# Patient Record
Sex: MALE | Race: WHITE | Employment: OTHER | URBAN - NONMETROPOLITAN AREA
[De-identification: names, ages, dates, MRNs, and addresses within clinical notes are randomized per-mention and may not be internally consistent; named-entity substitution may affect disease eponyms.]

---

## 2017-01-03 ENCOUNTER — ANTI-COAG VISIT (OUTPATIENT)
Dept: OTHER | Age: 82
End: 2017-01-03

## 2017-01-03 VITALS
DIASTOLIC BLOOD PRESSURE: 70 MMHG | HEART RATE: 48 BPM | SYSTOLIC BLOOD PRESSURE: 150 MMHG | BODY MASS INDEX: 31.58 KG/M2 | WEIGHT: 184 LBS

## 2017-01-03 DIAGNOSIS — I48.91 ATRIAL FIBRILLATION, CONTROLLED (HCC): ICD-10-CM

## 2017-01-03 DIAGNOSIS — I48.91 ATRIAL FIBRILLATION, UNSPECIFIED TYPE (HCC): ICD-10-CM

## 2017-01-03 LAB — POC INR: 2.4 (ref 0.8–1.2)

## 2017-01-03 PROCEDURE — 85610 PROTHROMBIN TIME: CPT | Performed by: PHARMACIST

## 2017-01-03 ASSESSMENT — ENCOUNTER SYMPTOMS
SHORTNESS OF BREATH: 0
DIARRHEA: 0
BLOOD IN STOOL: 0
CONSTIPATION: 0

## 2017-01-17 ENCOUNTER — ANTI-COAG VISIT (OUTPATIENT)
Dept: OTHER | Age: 82
End: 2017-01-17

## 2017-01-17 VITALS
WEIGHT: 183 LBS | BODY MASS INDEX: 31.41 KG/M2 | SYSTOLIC BLOOD PRESSURE: 134 MMHG | DIASTOLIC BLOOD PRESSURE: 74 MMHG | HEART RATE: 48 BPM

## 2017-01-17 DIAGNOSIS — I48.91 ATRIAL FIBRILLATION, UNSPECIFIED TYPE (HCC): ICD-10-CM

## 2017-01-17 DIAGNOSIS — I48.91 ATRIAL FIBRILLATION, CONTROLLED (HCC): ICD-10-CM

## 2017-01-17 LAB — POC INR: 2.5 (ref 0.8–1.2)

## 2017-01-17 PROCEDURE — 99999 PR OFFICE/OUTPT VISIT,PROCEDURE ONLY: CPT | Performed by: NURSE PRACTITIONER

## 2017-01-17 PROCEDURE — 85610 PROTHROMBIN TIME: CPT | Performed by: NURSE PRACTITIONER

## 2017-01-17 PROCEDURE — 4040F PNEUMOC VAC/ADMIN/RCVD: CPT | Performed by: NURSE PRACTITIONER

## 2017-01-17 PROCEDURE — G8419 CALC BMI OUT NRM PARAM NOF/U: HCPCS | Performed by: NURSE PRACTITIONER

## 2017-01-17 PROCEDURE — G8598 ASA/ANTIPLAT THER USED: HCPCS | Performed by: NURSE PRACTITIONER

## 2017-01-17 PROCEDURE — G8427 DOCREV CUR MEDS BY ELIG CLIN: HCPCS | Performed by: NURSE PRACTITIONER

## 2017-01-17 ASSESSMENT — ENCOUNTER SYMPTOMS
SHORTNESS OF BREATH: 0
CONSTIPATION: 0
BLOOD IN STOOL: 0
DIARRHEA: 0

## 2017-02-07 ENCOUNTER — ANTI-COAG VISIT (OUTPATIENT)
Dept: OTHER | Age: 82
End: 2017-02-07

## 2017-02-07 VITALS
HEART RATE: 43 BPM | SYSTOLIC BLOOD PRESSURE: 151 MMHG | BODY MASS INDEX: 30.9 KG/M2 | DIASTOLIC BLOOD PRESSURE: 67 MMHG | WEIGHT: 180 LBS

## 2017-02-07 DIAGNOSIS — I48.91 ATRIAL FIBRILLATION, CONTROLLED (HCC): ICD-10-CM

## 2017-02-07 DIAGNOSIS — I48.91 ATRIAL FIBRILLATION, UNSPECIFIED TYPE (HCC): ICD-10-CM

## 2017-02-07 LAB
HCT VFR BLD CALC: 33.9 % (ref 42–52)
HEMOGLOBIN: 11 GM/DL (ref 14–18)
POC INR: 2.8 (ref 0.8–1.2)

## 2017-02-07 PROCEDURE — 85610 PROTHROMBIN TIME: CPT | Performed by: NURSE PRACTITIONER

## 2017-02-07 PROCEDURE — 99999 PR OFFICE/OUTPT VISIT,PROCEDURE ONLY: CPT | Performed by: NURSE PRACTITIONER

## 2017-02-07 PROCEDURE — G8419 CALC BMI OUT NRM PARAM NOF/U: HCPCS | Performed by: NURSE PRACTITIONER

## 2017-02-07 PROCEDURE — 4040F PNEUMOC VAC/ADMIN/RCVD: CPT | Performed by: NURSE PRACTITIONER

## 2017-02-07 PROCEDURE — G8598 ASA/ANTIPLAT THER USED: HCPCS | Performed by: NURSE PRACTITIONER

## 2017-02-07 PROCEDURE — G8427 DOCREV CUR MEDS BY ELIG CLIN: HCPCS | Performed by: NURSE PRACTITIONER

## 2017-02-07 ASSESSMENT — ENCOUNTER SYMPTOMS
BLOOD IN STOOL: 0
DIARRHEA: 0
CONSTIPATION: 0
SHORTNESS OF BREATH: 1

## 2017-02-20 ENCOUNTER — OFFICE VISIT (OUTPATIENT)
Dept: CARDIOLOGY | Age: 82
End: 2017-02-20

## 2017-02-20 VITALS
BODY MASS INDEX: 30.34 KG/M2 | DIASTOLIC BLOOD PRESSURE: 60 MMHG | WEIGHT: 177.7 LBS | SYSTOLIC BLOOD PRESSURE: 132 MMHG | HEART RATE: 52 BPM | HEIGHT: 64 IN

## 2017-02-20 DIAGNOSIS — I10 ESSENTIAL HYPERTENSION: ICD-10-CM

## 2017-02-20 DIAGNOSIS — I25.10 CORONARY ARTERY DISEASE INVOLVING NATIVE CORONARY ARTERY OF NATIVE HEART WITHOUT ANGINA PECTORIS: Primary | ICD-10-CM

## 2017-02-20 DIAGNOSIS — I48.20 CHRONIC ATRIAL FIBRILLATION (HCC): ICD-10-CM

## 2017-02-20 PROCEDURE — G8598 ASA/ANTIPLAT THER USED: HCPCS | Performed by: NUCLEAR MEDICINE

## 2017-02-20 PROCEDURE — 4040F PNEUMOC VAC/ADMIN/RCVD: CPT | Performed by: NUCLEAR MEDICINE

## 2017-02-20 PROCEDURE — 1036F TOBACCO NON-USER: CPT | Performed by: NUCLEAR MEDICINE

## 2017-02-20 PROCEDURE — G8484 FLU IMMUNIZE NO ADMIN: HCPCS | Performed by: NUCLEAR MEDICINE

## 2017-02-20 PROCEDURE — G8417 CALC BMI ABV UP PARAM F/U: HCPCS | Performed by: NUCLEAR MEDICINE

## 2017-02-20 PROCEDURE — G8427 DOCREV CUR MEDS BY ELIG CLIN: HCPCS | Performed by: NUCLEAR MEDICINE

## 2017-02-20 PROCEDURE — 99213 OFFICE O/P EST LOW 20 MIN: CPT | Performed by: NUCLEAR MEDICINE

## 2017-02-20 PROCEDURE — 1123F ACP DISCUSS/DSCN MKR DOCD: CPT | Performed by: NUCLEAR MEDICINE

## 2017-03-07 ENCOUNTER — ANTI-COAG VISIT (OUTPATIENT)
Dept: OTHER | Age: 82
End: 2017-03-07

## 2017-03-07 VITALS
SYSTOLIC BLOOD PRESSURE: 145 MMHG | BODY MASS INDEX: 31.48 KG/M2 | DIASTOLIC BLOOD PRESSURE: 70 MMHG | WEIGHT: 183.4 LBS | HEART RATE: 48 BPM

## 2017-03-07 DIAGNOSIS — I48.20 CHRONIC ATRIAL FIBRILLATION (HCC): ICD-10-CM

## 2017-03-07 DIAGNOSIS — I48.91 ATRIAL FIBRILLATION, CONTROLLED (HCC): ICD-10-CM

## 2017-03-07 LAB — POC INR: 3 (ref 0.8–1.2)

## 2017-03-07 PROCEDURE — G8427 DOCREV CUR MEDS BY ELIG CLIN: HCPCS | Performed by: NURSE PRACTITIONER

## 2017-03-07 PROCEDURE — 85610 PROTHROMBIN TIME: CPT | Performed by: NURSE PRACTITIONER

## 2017-03-07 PROCEDURE — G8417 CALC BMI ABV UP PARAM F/U: HCPCS | Performed by: NURSE PRACTITIONER

## 2017-03-07 PROCEDURE — 99999 PR OFFICE/OUTPT VISIT,PROCEDURE ONLY: CPT | Performed by: NURSE PRACTITIONER

## 2017-03-07 PROCEDURE — 4040F PNEUMOC VAC/ADMIN/RCVD: CPT | Performed by: NURSE PRACTITIONER

## 2017-03-07 PROCEDURE — G8598 ASA/ANTIPLAT THER USED: HCPCS | Performed by: NURSE PRACTITIONER

## 2017-03-07 ASSESSMENT — ENCOUNTER SYMPTOMS
SHORTNESS OF BREATH: 1
DIARRHEA: 0
CONSTIPATION: 0
BLOOD IN STOOL: 0

## 2017-03-30 DIAGNOSIS — I48.91 ATRIAL FIBRILLATION WITH SLOW VENTRICULAR RESPONSE (HCC): Primary | ICD-10-CM

## 2017-04-04 ENCOUNTER — ANTI-COAG VISIT (OUTPATIENT)
Dept: OTHER | Age: 82
End: 2017-04-04

## 2017-04-04 VITALS
HEART RATE: 53 BPM | BODY MASS INDEX: 30.21 KG/M2 | DIASTOLIC BLOOD PRESSURE: 62 MMHG | SYSTOLIC BLOOD PRESSURE: 140 MMHG | WEIGHT: 176 LBS

## 2017-04-04 DIAGNOSIS — I48.91 ATRIAL FIBRILLATION, UNSPECIFIED TYPE (HCC): ICD-10-CM

## 2017-04-04 DIAGNOSIS — I48.91 ATRIAL FIBRILLATION WITH SLOW VENTRICULAR RESPONSE (HCC): ICD-10-CM

## 2017-04-04 LAB — POC INR: 2.2 (ref 0.8–1.2)

## 2017-04-04 RX ORDER — FUROSEMIDE 20 MG/1
20 TABLET ORAL DAILY
Qty: 30 TABLET | Refills: 2 | Status: SHIPPED | OUTPATIENT
Start: 2017-04-04 | End: 2021-10-18 | Stop reason: SDUPTHER

## 2017-04-04 RX ORDER — FUROSEMIDE 20 MG/1
20 TABLET ORAL DAILY
COMMUNITY
End: 2017-04-04 | Stop reason: SDUPTHER

## 2017-04-04 ASSESSMENT — ENCOUNTER SYMPTOMS
SHORTNESS OF BREATH: 0
BLOOD IN STOOL: 0
CONSTIPATION: 0
DIARRHEA: 0

## 2017-05-02 ENCOUNTER — ANTI-COAG VISIT (OUTPATIENT)
Dept: OTHER | Age: 82
End: 2017-05-02

## 2017-05-02 ENCOUNTER — TELEPHONE (OUTPATIENT)
Dept: CARDIOLOGY | Age: 82
End: 2017-05-02

## 2017-05-02 VITALS
BODY MASS INDEX: 29.01 KG/M2 | HEART RATE: 44 BPM | DIASTOLIC BLOOD PRESSURE: 60 MMHG | SYSTOLIC BLOOD PRESSURE: 128 MMHG | WEIGHT: 169 LBS

## 2017-05-02 DIAGNOSIS — I48.91 ATRIAL FIBRILLATION WITH SLOW VENTRICULAR RESPONSE (HCC): ICD-10-CM

## 2017-05-02 DIAGNOSIS — I48.91 ATRIAL FIBRILLATION, UNSPECIFIED TYPE (HCC): ICD-10-CM

## 2017-05-02 LAB — POC INR: 1.9 (ref 0.8–1.2)

## 2017-05-02 ASSESSMENT — ENCOUNTER SYMPTOMS
DIARRHEA: 0
BLOOD IN STOOL: 0
CONSTIPATION: 0

## 2017-05-23 ENCOUNTER — ANTI-COAG VISIT (OUTPATIENT)
Dept: OTHER | Age: 82
End: 2017-05-23

## 2017-05-23 VITALS
HEART RATE: 41 BPM | BODY MASS INDEX: 30.31 KG/M2 | WEIGHT: 176.6 LBS | DIASTOLIC BLOOD PRESSURE: 60 MMHG | SYSTOLIC BLOOD PRESSURE: 139 MMHG

## 2017-05-23 DIAGNOSIS — I48.91 ATRIAL FIBRILLATION WITH SLOW VENTRICULAR RESPONSE (HCC): ICD-10-CM

## 2017-05-23 DIAGNOSIS — I48.91 ATRIAL FIBRILLATION, UNSPECIFIED TYPE (HCC): ICD-10-CM

## 2017-05-23 LAB — POC INR: 2.3 (ref 0.8–1.2)

## 2017-05-23 ASSESSMENT — ENCOUNTER SYMPTOMS
SHORTNESS OF BREATH: 0
DIARRHEA: 0
CONSTIPATION: 0
BLOOD IN STOOL: 0

## 2017-06-20 ENCOUNTER — ANTI-COAG VISIT (OUTPATIENT)
Dept: OTHER | Age: 82
End: 2017-06-20

## 2017-06-20 VITALS
SYSTOLIC BLOOD PRESSURE: 149 MMHG | WEIGHT: 170.6 LBS | BODY MASS INDEX: 29.28 KG/M2 | DIASTOLIC BLOOD PRESSURE: 56 MMHG | HEART RATE: 44 BPM

## 2017-06-20 DIAGNOSIS — I48.91 ATRIAL FIBRILLATION, UNSPECIFIED TYPE (HCC): ICD-10-CM

## 2017-06-20 DIAGNOSIS — I48.91 ATRIAL FIBRILLATION WITH SLOW VENTRICULAR RESPONSE (HCC): ICD-10-CM

## 2017-06-20 LAB — POC INR: 2 (ref 0.8–1.2)

## 2017-06-20 ASSESSMENT — ENCOUNTER SYMPTOMS
BLOOD IN STOOL: 0
CONSTIPATION: 0
DIARRHEA: 0
SHORTNESS OF BREATH: 0

## 2017-07-19 ENCOUNTER — HOSPITAL ENCOUNTER (OUTPATIENT)
Dept: PHARMACY | Age: 82
Setting detail: THERAPIES SERIES
Discharge: HOME | End: 2017-07-19
Payer: MEDICARE

## 2017-07-19 VITALS
SYSTOLIC BLOOD PRESSURE: 142 MMHG | DIASTOLIC BLOOD PRESSURE: 74 MMHG | WEIGHT: 168.4 LBS | BODY MASS INDEX: 28.91 KG/M2 | HEART RATE: 40 BPM

## 2017-07-19 DIAGNOSIS — I48.91 ATRIAL FIBRILLATION, UNSPECIFIED TYPE (HCC): ICD-10-CM

## 2017-07-19 LAB — POC INR: 2 (ref 0.8–1.2)

## 2017-07-19 PROCEDURE — 36416 COLLJ CAPILLARY BLOOD SPEC: CPT

## 2017-07-19 PROCEDURE — 85610 PROTHROMBIN TIME: CPT

## 2017-07-19 PROCEDURE — 99211 OFF/OP EST MAY X REQ PHY/QHP: CPT

## 2017-07-19 ASSESSMENT — ENCOUNTER SYMPTOMS
CONSTIPATION: 0
BLOOD IN STOOL: 0
SHORTNESS OF BREATH: 0
DIARRHEA: 0

## 2017-08-21 ENCOUNTER — HOSPITAL ENCOUNTER (OUTPATIENT)
Dept: PHARMACY | Age: 82
Setting detail: THERAPIES SERIES
Discharge: HOME | End: 2017-08-21
Payer: MEDICARE

## 2017-08-21 VITALS
WEIGHT: 174.4 LBS | DIASTOLIC BLOOD PRESSURE: 70 MMHG | HEART RATE: 48 BPM | SYSTOLIC BLOOD PRESSURE: 160 MMHG | BODY MASS INDEX: 29.94 KG/M2

## 2017-08-21 DIAGNOSIS — I48.91 ATRIAL FIBRILLATION, UNSPECIFIED TYPE (HCC): ICD-10-CM

## 2017-08-21 LAB — POC INR: 2.1 (ref 0.8–1.2)

## 2017-08-21 PROCEDURE — 36416 COLLJ CAPILLARY BLOOD SPEC: CPT

## 2017-08-21 PROCEDURE — 99211 OFF/OP EST MAY X REQ PHY/QHP: CPT

## 2017-08-21 PROCEDURE — 85610 PROTHROMBIN TIME: CPT

## 2017-08-21 ASSESSMENT — ENCOUNTER SYMPTOMS
DIARRHEA: 0
BLOOD IN STOOL: 0
CONSTIPATION: 0
SHORTNESS OF BREATH: 0

## 2017-09-18 ENCOUNTER — HOSPITAL ENCOUNTER (OUTPATIENT)
Dept: PHARMACY | Age: 82
Setting detail: THERAPIES SERIES
Discharge: HOME | End: 2017-09-18
Payer: MEDICARE

## 2017-09-18 VITALS
WEIGHT: 171.6 LBS | HEART RATE: 60 BPM | BODY MASS INDEX: 29.46 KG/M2 | SYSTOLIC BLOOD PRESSURE: 158 MMHG | DIASTOLIC BLOOD PRESSURE: 86 MMHG

## 2017-09-18 DIAGNOSIS — I48.91 ATRIAL FIBRILLATION, UNSPECIFIED TYPE (HCC): ICD-10-CM

## 2017-09-18 LAB — POC INR: 1.8 (ref 0.8–1.2)

## 2017-09-18 PROCEDURE — 85610 PROTHROMBIN TIME: CPT

## 2017-09-18 PROCEDURE — 99211 OFF/OP EST MAY X REQ PHY/QHP: CPT

## 2017-09-18 PROCEDURE — 36416 COLLJ CAPILLARY BLOOD SPEC: CPT

## 2017-09-18 PROCEDURE — 99212 OFFICE O/P EST SF 10 MIN: CPT

## 2017-09-18 RX ORDER — WARFARIN SODIUM 3 MG/1
3 TABLET ORAL SEE ADMIN INSTRUCTIONS
Qty: 45 TABLET | Refills: 11 | Status: SHIPPED | OUTPATIENT
Start: 2017-09-18 | End: 2017-09-18 | Stop reason: SDUPTHER

## 2017-09-18 RX ORDER — WARFARIN SODIUM 3 MG/1
3 TABLET ORAL SEE ADMIN INSTRUCTIONS
Qty: 45 TABLET | Refills: 11 | Status: SHIPPED | OUTPATIENT
Start: 2017-09-18 | End: 2017-09-19 | Stop reason: SDUPTHER

## 2017-09-18 ASSESSMENT — ENCOUNTER SYMPTOMS
DIARRHEA: 0
BLOOD IN STOOL: 0
SHORTNESS OF BREATH: 0
CONSTIPATION: 0

## 2017-09-19 ENCOUNTER — TELEPHONE (OUTPATIENT)
Dept: PHARMACY | Age: 82
End: 2017-09-19

## 2017-09-19 RX ORDER — WARFARIN SODIUM 3 MG/1
3 TABLET ORAL SEE ADMIN INSTRUCTIONS
Qty: 45 TABLET | Refills: 11 | Status: SHIPPED | OUTPATIENT
Start: 2017-09-19 | End: 2017-09-19 | Stop reason: SDUPTHER

## 2017-09-19 RX ORDER — WARFARIN SODIUM 3 MG/1
3 TABLET ORAL SEE ADMIN INSTRUCTIONS
Qty: 45 TABLET | Refills: 11 | OUTPATIENT
Start: 2017-09-19 | End: 2021-04-19

## 2017-09-26 ENCOUNTER — TELEPHONE (OUTPATIENT)
Dept: CARDIOLOGY CLINIC | Age: 82
End: 2017-09-26

## 2017-10-10 ENCOUNTER — TELEPHONE (OUTPATIENT)
Dept: PHARMACY | Age: 82
End: 2017-10-10

## 2017-10-10 NOTE — TELEPHONE ENCOUNTER
Patient called and states he is going to be followed by the [place] for his coumadin and would like to be discharged from our clinic.      Thanks,   Armand Ralph

## 2019-09-13 LAB
ALBUMIN SERPL-MCNC: NORMAL G/DL
ALP BLD-CCNC: NORMAL U/L
ALT SERPL-CCNC: 20 U/L
ANION GAP SERPL CALCULATED.3IONS-SCNC: NORMAL MMOL/L
AST SERPL-CCNC: 23 U/L
AVERAGE GLUCOSE: NORMAL
BILIRUB SERPL-MCNC: 0.5 MG/DL (ref 0.1–1.4)
BUN BLDV-MCNC: NORMAL MG/DL
CALCIUM SERPL-MCNC: NORMAL MG/DL
CHLORIDE BLD-SCNC: NORMAL MMOL/L
CHOLESTEROL, TOTAL: 139 MG/DL
CHOLESTEROL/HDL RATIO: NORMAL
CO2: 24 MMOL/L
CREAT SERPL-MCNC: 1.4 MG/DL
GFR CALCULATED: NORMAL
GLUCOSE BLD-MCNC: 91 MG/DL
HBA1C MFR BLD: 5.9 %
HCT VFR BLD CALC: 36.2 % (ref 41–53)
HDLC SERPL-MCNC: 67 MG/DL (ref 35–70)
HEMOGLOBIN: 12.1 G/DL (ref 13.5–17.5)
LDL CHOLESTEROL CALCULATED: 60 MG/DL (ref 0–160)
PLATELET # BLD: 247 K/ΜL
POTASSIUM SERPL-SCNC: 3.9 MMOL/L
SODIUM BLD-SCNC: 141 MMOL/L
TOTAL PROTEIN: NORMAL
TRIGL SERPL-MCNC: 62 MG/DL
VLDLC SERPL CALC-MCNC: NORMAL MG/DL
WBC # BLD: 7.7 10^3/ML

## 2019-12-04 ENCOUNTER — HOSPITAL ENCOUNTER (EMERGENCY)
Age: 84
Discharge: HOME | End: 2019-12-04
Attending: EMERGENCY MEDICINE
Payer: MEDICARE

## 2019-12-04 VITALS
DIASTOLIC BLOOD PRESSURE: 78 MMHG | SYSTOLIC BLOOD PRESSURE: 128 MMHG | HEART RATE: 56 BPM | TEMPERATURE: 98.3 F | BODY MASS INDEX: 27.46 KG/M2 | RESPIRATION RATE: 17 BRPM | WEIGHT: 160 LBS | OXYGEN SATURATION: 98 %

## 2019-12-04 DIAGNOSIS — Z79.01 WARFARIN ANTICOAGULATION: ICD-10-CM

## 2019-12-04 DIAGNOSIS — N30.01 ACUTE CYSTITIS WITH HEMATURIA: Primary | ICD-10-CM

## 2019-12-04 LAB
BILIRUBIN URINE: NEGATIVE
BLOOD, URINE: ABNORMAL
CHARACTER, URINE: ABNORMAL
COLOR: YELLOW
GLUCOSE, URINE: NEGATIVE MG/DL
KETONES, URINE: 40
LEUKOCYTES, UA: ABNORMAL
NITRATE, UA: POSITIVE
PH UA: 5.5 (ref 5–9)
PROTEIN UA: 30 MG/DL
REFLEX TO URINE C & S: ABNORMAL
SPECIFIC GRAVITY UA: 1.01 (ref 1–1.03)
UROBILINOGEN, URINE: 1 EU/DL (ref 0–1)

## 2019-12-04 PROCEDURE — 87086 URINE CULTURE/COLONY COUNT: CPT

## 2019-12-04 PROCEDURE — 99213 OFFICE O/P EST LOW 20 MIN: CPT

## 2019-12-04 PROCEDURE — 87186 SC STD MICRODIL/AGAR DIL: CPT

## 2019-12-04 PROCEDURE — 99213 OFFICE O/P EST LOW 20 MIN: CPT | Performed by: EMERGENCY MEDICINE

## 2019-12-04 PROCEDURE — 87077 CULTURE AEROBIC IDENTIFY: CPT

## 2019-12-04 PROCEDURE — 81003 URINALYSIS AUTO W/O SCOPE: CPT

## 2019-12-04 RX ORDER — CEFDINIR 300 MG/1
300 CAPSULE ORAL 2 TIMES DAILY
Qty: 14 CAPSULE | Refills: 0 | Status: SHIPPED | OUTPATIENT
Start: 2019-12-04 | End: 2019-12-11

## 2019-12-04 ASSESSMENT — ENCOUNTER SYMPTOMS
VOMITING: 0
TROUBLE SWALLOWING: 0
SORE THROAT: 0
COUGH: 0
EYE REDNESS: 0
SINUS PRESSURE: 0
BACK PAIN: 0
NAUSEA: 0
STRIDOR: 0
EYE PAIN: 0
ABDOMINAL PAIN: 0
SHORTNESS OF BREATH: 0
DIARRHEA: 0
EYE DISCHARGE: 0
WHEEZING: 0
VOICE CHANGE: 0

## 2019-12-06 LAB
ORGANISM: ABNORMAL
URINE CULTURE REFLEX: ABNORMAL

## 2020-01-16 ENCOUNTER — OFFICE VISIT (OUTPATIENT)
Dept: UROLOGY | Age: 85
End: 2020-01-16
Payer: OTHER GOVERNMENT

## 2020-01-16 VITALS
SYSTOLIC BLOOD PRESSURE: 138 MMHG | BODY MASS INDEX: 30.17 KG/M2 | WEIGHT: 176.7 LBS | DIASTOLIC BLOOD PRESSURE: 70 MMHG | HEIGHT: 64 IN

## 2020-01-16 LAB
BILIRUBIN URINE: NEGATIVE
BLOOD URINE, POC: NEGATIVE
CHARACTER, URINE: CLEAR
COLOR, URINE: YELLOW
GLUCOSE URINE: NEGATIVE MG/DL
KETONES, URINE: NEGATIVE
LEUKOCYTE CLUMPS, URINE: NEGATIVE
NITRITE, URINE: NEGATIVE
PH, URINE: 5.5 (ref 5–9)
PROTEIN, URINE: NEGATIVE MG/DL
SPECIFIC GRAVITY, URINE: 1.01 (ref 1–1.03)
UROBILINOGEN, URINE: 0.2 EU/DL (ref 0–1)

## 2020-01-16 PROCEDURE — 99204 OFFICE O/P NEW MOD 45 MIN: CPT | Performed by: UROLOGY

## 2020-01-16 PROCEDURE — 81003 URINALYSIS AUTO W/O SCOPE: CPT | Performed by: UROLOGY

## 2020-01-16 RX ORDER — TAMSULOSIN HYDROCHLORIDE 0.4 MG/1
0.4 CAPSULE ORAL DAILY
Qty: 24 CAPSULE | Refills: 0 | Status: ON HOLD | OUTPATIENT
Start: 2020-01-16 | End: 2020-12-09 | Stop reason: SDUPTHER

## 2020-01-16 RX ORDER — TAMSULOSIN HYDROCHLORIDE 0.4 MG/1
0.4 CAPSULE ORAL DAILY
Qty: 30 CAPSULE | Refills: 5 | Status: SHIPPED | OUTPATIENT
Start: 2020-01-16 | End: 2020-01-16

## 2020-01-16 RX ORDER — TAMSULOSIN HYDROCHLORIDE 0.4 MG/1
0.4 CAPSULE ORAL DAILY
Qty: 30 CAPSULE | Refills: 5 | Status: ON HOLD | OUTPATIENT
Start: 2020-01-16 | End: 2021-02-02 | Stop reason: HOSPADM

## 2020-01-16 NOTE — PROGRESS NOTES
Yellow YELLOW-STR    Character, Urine Clear [id number]       Last BUN and creatinine:  Lab Results   Component Value Date    BUN 5 (L) 08/24/2016     Lab Results   Component Value Date    CREATININE 1.4 09/13/2019         Imaging Reviewed during this Office Visit:   Joaquin Stephenson MD independently reviewed the images and verified the radiology reports from:    CT 8/20/2016      1. The appearance of the small bowel is consistent with a small bowel enteritis. Infectious versus ischemic. The ascites is likely reactive. A partial small bowel obstruction is not completely excluded distally near the anastomosis with the colon. 2. There is a new moderate-sized right and scant left pleural effusion. 3. There is a moderate-sized hiatal hernia containing oral contrast.   4. Stable cardiomegaly. 5. Stable severe diverticular change of the sigmoid colon which is uncomplicated this time.    6. Most likely a small simple cyst in the right renal cortex.             PAST MEDICAL, FAMILY AND SOCIAL HISTORY:  Past Medical History:   Diagnosis Date    Actinic keratoses 8/21/2013    Adenomatous polyp 2008    Atrial fibrillation ([address]     CAD (coronary artery disease) 2009    Carotid artery disease ([address] 2009    left    Hyperlipidemia 2004    Hypertension 2004    Inguinal hernia, left 2/19/2014    Unspecified cerebral artery occlusion with cerebral infarction      Past Surgical History:   Procedure Laterality Date    ABDOMEN SURGERY      ABDOMINAL ADHESION SURGERY  6/2012    Dr Guerra Sing REMOVAL  2004    right    [facility]    CORONARY ARTERY BYPASS GRAFT  2009    SMALL INTESTINE SURGERY      SMALL INTESTINE SURGERY  02/20/2015    Exploratory Laparotomy     Family History   Problem Relation Age of Onset    Cancer Mother      Outpatient Medications Marked as Taking for the 1/16/20 encounter (Office Visit) with Hill Musa MD   Medication Sig Dispense Refill    tamsulosin (FLOMAX) 0.4 MG capsule Take 1 capsule by mouth daily 30 capsule 5    tamsulosin (FLOMAX) 0.4 MG capsule Take 1 capsule by mouth daily Take one capsule daily 24 capsule 0    warfarin (COUMADIN) 3 MG tablet Take 1 tablet by mouth See Admin Instructions Indications: Anticoagulant Therapy, [facility], #45=30 days supply 45 tablet 11    metoprolol tartrate (LOPRESSOR) 50 MG tablet Take 25 mg by mouth 2 times daily Indications: Atrial Fibrillation       diltiazem (CARDIZEM CD) 120 MG ER capsule Take 120 mg by mouth daily Indications: Atrial Fibrillation       pantoprazole (PROTONIX) 40 MG tablet Take 1 tablet by mouth 2 times daily (before meals). 60 tablet 0    pravastatin (PRAVACHOL) 40 MG tablet Take 40 mg by mouth every evening. Indications: Blood Cholesterol Abnormal      aspirin 325 MG EC tablet Take 81 mg by mouth daily Indications: Anticoagulant Therapy Take with food. Patient has no known allergies. Social History     Tobacco Use   Smoking Status Former Smoker    Last attempt to quit: 1980    Years since quittin.0   Smokeless Tobacco Never Used      (If patient a smoker, smoking cessation counseling offered)   Social History     Substance and Sexual Activity   Alcohol Use Yes    Alcohol/week: 1.0 standard drinks    Types: 1 Cans of beer per week    Comment: occasionally       REVIEW OF SYSTEMS:  Constitutional: negative  Eyes: negative  Respiratory: negative  Cardiovascular: negative  Gastrointestinal: negative  Genitourinary: see HPI  Musculoskeletal: negative  Skin: negative   Neurological: negative  Hematological/Lymphatic: negative  Psychological: negative        Physical Exam:    This a 80 y.o. male  Vitals:    20 1421   BP: 138/70     Body mass index is 30.33 kg/m². Constitutional: Patient in no acute distress; Neuro: alert and oriented to person place and time.     Psych: Mood and affect normal.  Skin: warm, dry  Lungs: Respiratory effort normal, Symmetric chest rise  Cardiovascular:  Normal peripheral pulses; Regular rate, radial pulses palpable  Abdomen: Soft, non-tender, non-distended with no CVA, flank pain, hepatosplenomegaly or hernia. Kidneys normal.  Bladder non-tender and not distended. Lymphatics: no palpable lymphadenopathy  Minimal/no edema in bilateral lower extremities      Assessment and Plan        1. Recurrent UTI    2. Benign localized prostatic hyperplasia with lower urinary tract symptoms (LUTS)               Plan:      No abx as infection has cleared  Will start flomax for worsening LUTS, namely nocturia. Infections likely secondary to BPH  Follow up in six weeks for med check.       Prescriptions Ordered:  Orders Placed This Encounter   Medications    DISCONTD: tamsulosin (FLOMAX) 0.4 MG capsule     Sig: Take 1 capsule by mouth daily     Dispense:  30 capsule     Refill:  5    tamsulosin (FLOMAX) 0.4 MG capsule     Sig: Take 1 capsule by mouth daily     Dispense:  30 capsule     Refill:  5    tamsulosin (FLOMAX) 0.4 MG capsule     Sig: Take 1 capsule by mouth daily Take one capsule daily     Dispense:  24 capsule     Refill:  0      Orders Placed:  Orders Placed This Encounter   Procedures    POCT Urinalysis No Micro (Auto)            LIZETTE SIEGEL [facility], MD

## 2020-02-25 ENCOUNTER — OFFICE VISIT (OUTPATIENT)
Dept: UROLOGY | Age: 85
End: 2020-02-25
Payer: OTHER GOVERNMENT

## 2020-02-25 VITALS
BODY MASS INDEX: 29.82 KG/M2 | WEIGHT: 174.7 LBS | HEIGHT: 64 IN | SYSTOLIC BLOOD PRESSURE: 130 MMHG | DIASTOLIC BLOOD PRESSURE: 64 MMHG

## 2020-02-25 LAB
BILIRUBIN URINE: NEGATIVE
BLOOD URINE, POC: NEGATIVE
CHARACTER, URINE: CLEAR
COLOR, URINE: YELLOW
GLUCOSE URINE: NEGATIVE MG/DL
KETONES, URINE: NEGATIVE
LEUKOCYTE CLUMPS, URINE: NEGATIVE
NITRITE, URINE: NEGATIVE
PH, URINE: 5.5 (ref 5–9)
PROTEIN, URINE: NEGATIVE MG/DL
SPECIFIC GRAVITY, URINE: 1.02 (ref 1–1.03)
UROBILINOGEN, URINE: 0.2 EU/DL (ref 0–1)

## 2020-02-25 PROCEDURE — 81003 URINALYSIS AUTO W/O SCOPE: CPT | Performed by: UROLOGY

## 2020-02-25 PROCEDURE — 99213 OFFICE O/P EST LOW 20 MIN: CPT | Performed by: UROLOGY

## 2020-12-09 ENCOUNTER — HOSPITAL ENCOUNTER (INPATIENT)
Age: 85
LOS: 3 days | Discharge: HOME | DRG: 310 | End: 2020-12-12
Attending: EMERGENCY MEDICINE | Admitting: HOSPITALIST
Payer: MEDICARE

## 2020-12-09 PROBLEM — R00.1 SYMPTOMATIC BRADYCARDIA: Status: ACTIVE | Noted: 2020-12-09

## 2020-12-09 LAB
ANION GAP SERPL CALCULATED.3IONS-SCNC: 12 MEQ/L (ref 8–16)
APTT: 43.6 SECONDS (ref 22–38)
BASOPHILS # BLD: 0.8 %
BASOPHILS ABSOLUTE: 0.1 THOU/MM3 (ref 0–0.1)
BUN BLDV-MCNC: 26 MG/DL (ref 7–22)
CALCIUM SERPL-MCNC: 8.8 MG/DL (ref 8.5–10.5)
CHLORIDE BLD-SCNC: 100 MEQ/L (ref 98–111)
CO2: 24 MEQ/L (ref 23–33)
CREAT SERPL-MCNC: 1.5 MG/DL (ref 0.4–1.2)
EKG ATRIAL RATE: 42 BPM
EKG Q-T INTERVAL: 512 MS
EKG QRS DURATION: 98 MS
EKG QTC CALCULATION (BAZETT): 401 MS
EKG R AXIS: -83 DEGREES
EKG T AXIS: 153 DEGREES
EKG VENTRICULAR RATE: 37 BPM
EOSINOPHIL # BLD: 1.1 %
EOSINOPHILS ABSOLUTE: 0.1 THOU/MM3 (ref 0–0.4)
ERYTHROCYTE [DISTWIDTH] IN BLOOD BY AUTOMATED COUNT: 17.4 % (ref 11.5–14.5)
ERYTHROCYTE [DISTWIDTH] IN BLOOD BY AUTOMATED COUNT: 55.1 FL (ref 35–45)
GFR SERPL CREATININE-BSD FRML MDRD: 44 ML/MIN/1.73M2
GLUCOSE BLD-MCNC: 106 MG/DL (ref 70–108)
HCT VFR BLD CALC: 33.9 % (ref 42–52)
HEMOGLOBIN: 10.6 GM/DL (ref 14–18)
IMMATURE GRANS (ABS): 0.01 THOU/MM3 (ref 0–0.07)
IMMATURE GRANULOCYTES: 0.1 %
INR BLD: 3.78 (ref 0.85–1.13)
LYMPHOCYTES # BLD: 26.3 %
LYMPHOCYTES ABSOLUTE: 1.9 THOU/MM3 (ref 1–4.8)
MCH RBC QN AUTO: 27.2 PG (ref 26–33)
MCHC RBC AUTO-ENTMCNC: 31.3 GM/DL (ref 32.2–35.5)
MCV RBC AUTO: 86.9 FL (ref 80–94)
MONOCYTES # BLD: 13.3 %
MONOCYTES ABSOLUTE: 1 THOU/MM3 (ref 0.4–1.3)
NUCLEATED RED BLOOD CELLS: 0 /100 WBC
OSMOLALITY CALCULATION: 277.1 MOSMOL/KG (ref 275–300)
PLATELET # BLD: 226 THOU/MM3 (ref 130–400)
PMV BLD AUTO: 10.9 FL (ref 9.4–12.4)
POTASSIUM SERPL-SCNC: 5.4 MEQ/L (ref 3.5–5.2)
RBC # BLD: 3.9 MILL/MM3 (ref 4.7–6.1)
SEG NEUTROPHILS: 58.4 %
SEGMENTED NEUTROPHILS ABSOLUTE COUNT: 4.2 THOU/MM3 (ref 1.8–7.7)
SODIUM BLD-SCNC: 136 MEQ/L (ref 135–145)
T4 FREE: 1.29 NG/DL (ref 0.93–1.76)
TROPONIN T: < 0.01 NG/ML
TSH SERPL DL<=0.05 MIU/L-ACNC: 9.47 UIU/ML (ref 0.4–4.2)
WBC # BLD: 7.2 THOU/MM3 (ref 4.8–10.8)

## 2020-12-09 PROCEDURE — 99221 1ST HOSP IP/OBS SF/LOW 40: CPT | Performed by: INTERNAL MEDICINE

## 2020-12-09 PROCEDURE — 94760 N-INVAS EAR/PLS OXIMETRY 1: CPT

## 2020-12-09 PROCEDURE — 2060000000 HC ICU INTERMEDIATE R&B

## 2020-12-09 PROCEDURE — 99222 1ST HOSP IP/OBS MODERATE 55: CPT | Performed by: PHYSICIAN ASSISTANT

## 2020-12-09 PROCEDURE — 85610 PROTHROMBIN TIME: CPT

## 2020-12-09 PROCEDURE — 99223 1ST HOSP IP/OBS HIGH 75: CPT | Performed by: INTERNAL MEDICINE

## 2020-12-09 PROCEDURE — 93005 ELECTROCARDIOGRAM TRACING: CPT | Performed by: EMERGENCY MEDICINE

## 2020-12-09 PROCEDURE — 6370000000 HC RX 637 (ALT 250 FOR IP): Performed by: PHYSICIAN ASSISTANT

## 2020-12-09 PROCEDURE — 84443 ASSAY THYROID STIM HORMONE: CPT

## 2020-12-09 PROCEDURE — 85025 COMPLETE CBC W/AUTO DIFF WBC: CPT

## 2020-12-09 PROCEDURE — 36415 COLL VENOUS BLD VENIPUNCTURE: CPT

## 2020-12-09 PROCEDURE — 80048 BASIC METABOLIC PNL TOTAL CA: CPT

## 2020-12-09 PROCEDURE — 99285 EMERGENCY DEPT VISIT HI MDM: CPT

## 2020-12-09 PROCEDURE — 85730 THROMBOPLASTIN TIME PARTIAL: CPT

## 2020-12-09 PROCEDURE — 93010 ELECTROCARDIOGRAM REPORT: CPT | Performed by: NUCLEAR MEDICINE

## 2020-12-09 PROCEDURE — 84439 ASSAY OF FREE THYROXINE: CPT

## 2020-12-09 PROCEDURE — 84484 ASSAY OF TROPONIN QUANT: CPT

## 2020-12-09 PROCEDURE — 2580000003 HC RX 258: Performed by: PHYSICIAN ASSISTANT

## 2020-12-09 RX ORDER — ACETAMINOPHEN 325 MG/1
650 TABLET ORAL EVERY 6 HOURS PRN
Status: DISCONTINUED | OUTPATIENT
Start: 2020-12-09 | End: 2020-12-12 | Stop reason: HOSPADM

## 2020-12-09 RX ORDER — TAMSULOSIN HYDROCHLORIDE 0.4 MG/1
0.4 CAPSULE ORAL DAILY
Status: DISCONTINUED | OUTPATIENT
Start: 2020-12-10 | End: 2020-12-12 | Stop reason: HOSPADM

## 2020-12-09 RX ORDER — ONDANSETRON 2 MG/ML
4 INJECTION INTRAMUSCULAR; INTRAVENOUS EVERY 6 HOURS PRN
Status: DISCONTINUED | OUTPATIENT
Start: 2020-12-09 | End: 2020-12-12 | Stop reason: HOSPADM

## 2020-12-09 RX ORDER — FUROSEMIDE 40 MG/1
20 TABLET ORAL DAILY
Status: DISCONTINUED | OUTPATIENT
Start: 2020-12-10 | End: 2020-12-12 | Stop reason: HOSPADM

## 2020-12-09 RX ORDER — HYDRALAZINE HYDROCHLORIDE 25 MG/1
25 TABLET, FILM COATED ORAL EVERY 4 HOURS PRN
Status: DISCONTINUED | OUTPATIENT
Start: 2020-12-09 | End: 2020-12-12 | Stop reason: HOSPADM

## 2020-12-09 RX ORDER — PRAVASTATIN SODIUM 40 MG
40 TABLET ORAL EVERY EVENING
Status: DISCONTINUED | OUTPATIENT
Start: 2020-12-09 | End: 2020-12-12 | Stop reason: HOSPADM

## 2020-12-09 RX ORDER — POLYETHYLENE GLYCOL 3350 17 G/17G
17 POWDER, FOR SOLUTION ORAL DAILY PRN
Status: DISCONTINUED | OUTPATIENT
Start: 2020-12-09 | End: 2020-12-12 | Stop reason: HOSPADM

## 2020-12-09 RX ORDER — ASPIRIN 81 MG/1
81 TABLET ORAL DAILY
Status: DISCONTINUED | OUTPATIENT
Start: 2020-12-10 | End: 2020-12-12 | Stop reason: HOSPADM

## 2020-12-09 RX ORDER — SODIUM CHLORIDE 0.9 % (FLUSH) 0.9 %
10 SYRINGE (ML) INJECTION PRN
Status: DISCONTINUED | OUTPATIENT
Start: 2020-12-09 | End: 2020-12-12 | Stop reason: HOSPADM

## 2020-12-09 RX ORDER — SODIUM CHLORIDE 0.9 % (FLUSH) 0.9 %
10 SYRINGE (ML) INJECTION EVERY 12 HOURS SCHEDULED
Status: DISCONTINUED | OUTPATIENT
Start: 2020-12-09 | End: 2020-12-12 | Stop reason: HOSPADM

## 2020-12-09 RX ORDER — PROMETHAZINE HYDROCHLORIDE 25 MG/1
12.5 TABLET ORAL EVERY 6 HOURS PRN
Status: DISCONTINUED | OUTPATIENT
Start: 2020-12-09 | End: 2020-12-12 | Stop reason: HOSPADM

## 2020-12-09 RX ORDER — ACETAMINOPHEN 650 MG/1
650 SUPPOSITORY RECTAL EVERY 6 HOURS PRN
Status: DISCONTINUED | OUTPATIENT
Start: 2020-12-09 | End: 2020-12-12 | Stop reason: HOSPADM

## 2020-12-09 RX ORDER — PANTOPRAZOLE SODIUM 40 MG/1
40 TABLET, DELAYED RELEASE ORAL
Status: DISCONTINUED | OUTPATIENT
Start: 2020-12-09 | End: 2020-12-12 | Stop reason: HOSPADM

## 2020-12-09 RX ADMIN — PANTOPRAZOLE SODIUM 40 MG: 40 TABLET, DELAYED RELEASE ORAL at 17:37

## 2020-12-09 RX ADMIN — PRAVASTATIN SODIUM 40 MG: 40 TABLET ORAL at 17:38

## 2020-12-09 RX ADMIN — Medication 10 ML: at 20:15

## 2020-12-09 ASSESSMENT — PAIN SCALES - GENERAL
PAINLEVEL_OUTOF10: 0

## 2020-12-09 NOTE — PROGRESS NOTES
Pharmacy Medication History Note      List of current medications patient is taking is complete. Source of information: [facility] & patient    Changes made to medication list:  Medications removed (include reason, ex. therapy complete or physician discontinued):  Duplicate entry of tamsulosin     Medications added/doses adjusted:  Aspirin 325 mg daily changed to 81 mg daily   Klor-con 20 mEq daily     Other notes (ex. Recent course of antibiotics, Coumadin dosing):  Pt takes coumadin for a. Fib and has 3 mg tablets. Prior to admission, he was taking 3 mg Mon & 4.5 mg all other days. Pt came to [address] [place] [address] from [facility] appointment where INR was elevated. At anti-coagulation apt on 12/9/20, clinic changed directions to 3 mg Mon/Wed/Fri and 4.5 mg Tue/Thu/Sat/Sun but pt had not started that regimen upon admission to [address] [place] [address].    Outpatient warfarin provider: Jerry Cox (phone: [phone])  Has anticoagulation apt on 12/23/20 at 9:30 am already scheduled   Copy of med list placed in patient's chart      Allergies reviewed      Electronically signed by Corine Kelly [address] [place] [address] on 12/9/2020 at 3:54 PM

## 2020-12-09 NOTE — ED NOTES
Pt and family; daughter in law and son updated on POC and admission process. Pt and family verbalized understanding. Pt denies any needs and voices no complaints.       Hallie Celeste RN  12/09/20 1223

## 2020-12-09 NOTE — H&P
Hospitalist History & Physical    Patient:  Taryn Hein    Unit/Bed:4A-09/009-A  YOB: 1932  MRN: [id number]   Acct: [de-identified]   PCP: Leydi Contreras DO  Code Status: Full Code    Date of Service: Pt seen/examined on 12/09/20 and admitted to Inpatient with expected LOS greater than two midnights due to medical therapy. Chief Complaint: low HR    Assessment/Plan:    1. Bradycardia: ASX, HR 30s. Hold home diltiazem and metoprolol. Maintain tele. Consult cardiology. 2. Chronic atrial fibrillation: anticoagulated on coumadin. Diltiazem and metoprolol held d/t #1.     3. Hx CAD: asa/plavix/statin. BB held d/t #1. History of Present Illness: This is a 80year old male with PMHx CAD, HTN, AF who presented to [facility] for bradycardia. Pt states that he was incidentially noted to have a HR in the 30s during a routine visit with his PCP today and was advised to come to the ED. Pt has been asymptomatic. He denies syncope, lightheadedness, weakness, SOB, CP, blurred vision, HA, abd pain, n/v/d, urinary sx. Pt admitted for further evaluation. Review of Systems: Pertinent positives as noted in the HPI. All other systems reviewed and negative.     Past Medical History:        Diagnosis Date    Actinic keratoses 8/21/2013    Adenomatous polyp 2008    [facility] [address]     CAD (coronary artery disease) 2009    Carotid artery disease ([facility] 2009    left    Hyperlipidemia 2004    Hypertension 2004    Inguinal hernia, left 2/19/2014    Unspecified cerebral artery occlusion with cerebral infarction        Past Surgical History:        Procedure Laterality Date    ABDOMEN SURGERY      ABDOMINAL ADHESION SURGERY  6/2012    Dr Pradeep Talbert  2004    right    [facility]OS[facility]      [facility]    CORONARY ARTERY BYPASS GRAFT  2009    SMALL INTESTINE SURGERY      SMALL INTESTINE SURGERY  02/20/2015    Exploratory Laparotomy       Home Medications:   No current facility-administered medications on file prior to encounter. Current Outpatient Medications on File Prior to Encounter   Medication Sig Dispense Refill    tamsulosin (FLOMAX) 0.4 MG capsule Take 1 capsule by mouth daily 30 capsule 5    warfarin (COUMADIN) 3 MG tablet Take 1 tablet by mouth See Admin Instructions Indications: Anticoagulant Therapy, [facility] dose, #45=30 days supply 45 tablet 11    furosemide (LASIX) 20 MG tablet Take 1 tablet by mouth daily 30 tablet 2    metoprolol tartrate (LOPRESSOR) 50 MG tablet Take 25 mg by mouth 2 times daily Indications: Atrial Fibrillation       diltiazem (CARDIZEM CD) 120 MG ER capsule Take 120 mg by mouth daily Indications: Atrial Fibrillation       pantoprazole (PROTONIX) 40 MG tablet Take 1 tablet by mouth 2 times daily (before meals). 60 tablet 0    pravastatin (PRAVACHOL) 40 MG tablet Take 40 mg by mouth every evening. Indications: Blood Cholesterol Abnormal      aspirin 81 MG EC tablet Take 81 mg by mouth daily Indications: Anticoagulant Therapy Take with food. Allergies:    Patient has no known allergies. Social History:    reports that he quit smoking about 40 years ago. He has never used smokeless tobacco. He reports current alcohol use of about 1.0 standard drinks of alcohol per week. He reports that he does not use drugs. Family History:       Problem Relation Age of Onset   [facility] Cancer Mother        Diet:  DIET CARDIAC;      Physical Exam:  BP (!) 140/58   Pulse (!) 38   Temp 97.8 °F (36.6 °C) (Oral)   Resp 22   Ht 5' 4\" (1.626 m)   Wt 174 lb 3.2 oz (79 kg)   SpO2 98%   BMI 29.90 kg/m²   General:   Pleasant male. NAD   HEENT:  normocephalic and atraumatic. No scleral icterus. PERR. Neck: supple. No JVD. No thyromegaly. Lungs: clear to auscultation. No retractions  Cardiac: regular, slow rate, without murmur. Abdomen: soft. Nontender.   Bowel sounds

## 2020-12-09 NOTE — ED PROVIDER NOTES
[address]       Chief Complaint   Patient presents with    Bradycardia       Nurses Notes reviewed and I agree except as noted in the HPI. HISTORY OF PRESENT ILLNESS    Manuela Vazquez is a 80 y.o. male who presents with complaint of bradycardia, symptomatic. Incidental finding during a primary care physician visit at the [place]. Patient has no history of bradycardias, denies any chest pain, denies syncope, denies any fatigue. He has known history of CAD, status post CABG 11 years ago. States that he has no heart specialist.  Onset: Acute  Duration: Prior to arrival  Timing: Constant  Location of Pain: No pain  Intesity/severity: Severe bradycardia  Modifying Factors:   Relieved by;  Previous Episodes; Tx Before arrival:   REVIEW OF SYSTEMS      Review of Systems   Constitutional: Negative for fever, chills, diaphoresis and fatigue. HENT: Negative for congestion, drooling, facial swelling and sore throat. Eyes: Negative for photophobia, pain and discharge. Respiratory: Negative for cough, shortness of breath, wheezing and stridor. Cardiovascular: Negative for chest pain, palpitations and leg swelling. Positive for bradycardia  Gastrointestinal: Negative for abdominal pain, blood in stool and abdominal distention. Endocrine: Negative for cold intolerance, heat intolerance, polydipsia and polyuria. Genitourinary: Negative for dysuria, urgency, hematuria and difficulty urinating. Musculoskeletal: Negative for gait problem, neck pain and neck stiffness. Skin; No rash, No itching  Neurological: Negative for seizures, weakness and numbness. Hematological: Negative for adenopathy. Does not bruise/bleed easily. Psychiatric/Behavioral: Negative for hallucinations, confusion and agitation.      PAST MEDICAL HISTORY    has a past medical history of Actinic keratoses, Adenomatous polyp, Atrial fibrillation ([facility] CAD (coronary artery disease), [facility] disease ([facility] Hyperlipidemia, Hypertension, Inguinal hernia, left, and Unspecified cerebral artery occlusion with cerebral infarction. SURGICAL HISTORY      has a past surgical history that includes Cataract removal (); Coronary artery bypass graft (); Colonoscopy; Abdominal adhesion surgery (2012); Small intestine surgery; Small intestine surgery (2015); Cardiac surgery; and Abdomen surgery. CURRENT MEDICATIONS       Current Discharge Medication List      CONTINUE these medications which have NOT CHANGED    Details   tamsulosin (FLOMAX) 0.4 MG capsule Take 1 capsule by mouth daily  Qty: 30 capsule, Refills: 5      warfarin (COUMADIN) 3 MG tablet Take 1 tablet by mouth See Admin Instructions Indications: Anticoagulant Therapy, [facility] F[facility] to dose, #45=30 days supply  Qty: 45 tablet, Refills: 11      furosemide (LASIX) 20 MG tablet Take 1 tablet by mouth daily  Qty: 30 tablet, Refills: 2      metoprolol tartrate (LOPRESSOR) 50 MG tablet Take 25 mg by mouth 2 times daily Indications: Atrial Fibrillation       diltiazem (CARDIZEM CD) 120 MG ER capsule Take 120 mg by mouth daily Indications: Atrial Fibrillation       pantoprazole (PROTONIX) 40 MG tablet Take 1 tablet by mouth 2 times daily (before meals). Qty: 60 tablet, Refills: 0      pravastatin (PRAVACHOL) 40 MG tablet Take 40 mg by mouth every evening. Indications: Blood Cholesterol Abnormal      aspirin 81 MG EC tablet Take 81 mg by mouth daily Indications: Anticoagulant Therapy Take with food. ALLERGIES     has No Known Allergies. FAMILY HISTORY     He indicated that his mother is . He indicated that his father is . family history includes Cancer in his mother. SOCIAL HISTORY      reports that he quit smoking about 40 years ago. He has never used smokeless tobacco. He reports current alcohol use of about 1.0 standard drinks of alcohol per week.  He reports that he does not use drugs.    PHYSICAL EXAM     INITIAL VITALS:  height is 5' 4\" (1.626 m) and weight is 174 lb 3.2 oz (79 kg). His oral temperature is 97.8 °F (36.6 °C). His blood pressure is 140/58 (abnormal) and his pulse is 38 (abnormal). His respiration is 22 and oxygen saturation is 99%. Physical Exam   Constitutional:  well-developed and well-nourished. HENT: Head: Normocephalic, atraumatic, Bilateral external ears normal, Oropharynx mosit, No oral exudates, Nose normal.   Eyes: PERRL, EOMI, Conjunctiva normal, No discharge. No scleral icterus  Neck: Normal range of motion, No tenderness, Supple  Cardiovascular: Slow rate, regular rhythm, S1 normal and S2 normal.  Exam reveals no gallop. Pulmonary/Chest: Effort normal and breath sounds normal. No accessory muscle usage or stridor. No respiratory distress. no wheezes. has no rales. exhibits no tenderness. Abdominal: Soft. Bowel sounds are normal.  exhibits no distension. There is no tenderness. There is no rebound and no guarding. Extremities: No edema, no tenderness, no cyanosis, no clubbing. Musculoskeletal: Good range of motion in major joints is observed. No major deformities noted. Neurological: Alert and oriented ×3, normal motor function, normal sensory function, no focal deficits. GCS 15  Skin: Skin is warm, dry and intact. No rash noted. No erythema. Psychiatric: Affect normal, judgment normal, mood normal.  DIFFERENTIAL DIAGNOSIS:       DIAGNOSTIC RESULTS     EKG: All EKG's are interpreted by the Emergency Department Physician who either signs or Co-signs this chart in the absence of a cardiologist.      RADIOLOGY: non-plain film images(s) such as CT, Ultrasound and MRI are read by the radiologist.  Plain radiographic images are visualized and preliminarily interpreted by the emergency physician unless otherwise stated below.       LABS:   Labs Reviewed   CBC WITH AUTO DIFFERENTIAL - Abnormal; Notable for the following components:       Result Value RBC 3.90 (*)     Hemoglobin 10.6 (*)     Hematocrit 33.9 (*)     MCHC 31.3 (*)     RDW-CV 17.4 (*)     RDW-SD 55.1 (*)     All other components within normal limits   BASIC METABOLIC PANEL - Abnormal; Notable for the following components:    Potassium 5.4 (*)     BUN 26 (*)     CREATININE 1.5 (*)     All other components within normal limits   PROTIME-INR - Abnormal; Notable for the following components:    INR 3.78 (*)     All other components within normal limits   APTT - Abnormal; Notable for the following components:    aPTT 43.6 (*)     All other components within normal limits   GLOMERULAR FILTRATION RATE, ESTIMATED - Abnormal; Notable for the following components:    Est, Glom Filt Rate 44 (*)     All other components within normal limits   TROPONIN   ANION GAP   OSMOLALITY       EMERGENCY DEPARTMENT COURSE:   Vitals:    Vitals:    12/09/20 1313 12/09/20 1328 12/09/20 1413 12/09/20 1446   BP: (!) 142/58 (!) 142/48 (!) 140/47 (!) 140/58   Pulse: (!) 37 (!) 39 (!) 36 (!) 38   Resp: 16 17 18 22   Temp:    97.8 °F (36.6 °C)   TempSrc:    Oral   SpO2: 96% 98%  99%   Weight:    174 lb 3.2 oz (79 kg)   Height:    5' 4\" (1.626 m)         CRITICAL CARE:     CONSULTS:  Dr Kelley Abebe, Cardiology   Hospitalist    PROCEDURES:  None    FINAL IMPRESSION      1. Bradycardia          DISPOSITION/PLAN   Admitted    PATIENT REFERRED TO:  No follow-up provider specified.     DISCHARGE MEDICATIONS:  Current Discharge Medication List          (Please note that portions of this note were completed with a voice recognition program.  Efforts were made to edit the dictations but occasionally words are mis-transcribed.)    Shelby Anne, [address] [place] [address],   12/09/20 9949

## 2020-12-09 NOTE — PROGRESS NOTES
Patient admitted to [facility] Room 09 from ED. No complaints upon arrival. IV site free of s/s of infection or infiltration. Vital signs obtained. Oriented to room. Policies and procedures for 4a explained. All questions answered with no further questions at this time. Fall prevention and safety discussed with patient. 2 person skin check completed. Patient declines PCP notification  Patient request family notification. PARVIN Wood notified.

## 2020-12-09 NOTE — ED NOTES
Bed: 011A  Expected date: 12/9/20  Expected time: 11:56 AM  Means of arrival: [facility]  Comments:     Grace Almanza RN  12/09/20 1922

## 2020-12-09 NOTE — CONSULTS
[address] [facility]   [address] [facility] [address]  Dept: [phone]  Loc: [phone]     CARDIAC ELECTROPHYSIOLOGY: CONSULTATION NOTE    PATIENT DEMOGRAPHICS:  Date:   12/9/2020  Patient name: Chele Estrada  YOB: 1932  Sex: male   MRN:   [id number]  Date of admission:  12/9/2020 12:01 PM      PRIMARY CARE PHYSICIAN:   Elizabeth Watkins DO    REFERRING PROVIDER:  Carlton Spears MD    REASON FOR CONSULTATION:  Junctional bradycardia, evaluation for permanent pacemaker implantation. HISTORY OF PRESENT ILLNESS:  87/M incidentally noted to have bradycardia (heart rate 30 bpm) during a routine visit with his primary care physician this morning. The patient was asymptomatic and hemodynamically stable. He was referred here for further evaluation. The patient has chronic atrial fibrillation on rate control with Metoprolol and Cardizem, CAD/CABG, HTN, HPL and remote history of CVA (no neurological deficits). The patient lives by hisself and is independent. Reports no complaints. He has chronic exertional shortness of breath and mild bilateral lower extremity edema but denies chest pain, orthopnea, PND, dizziness, syncope or fatigue. REVIEW OF SYSTEMS:    Constitutional: Negative for chills and fever  HENT: Negative for congestion, sinus pressure, sneezing and sore throat. Eyes: Negative for pain, discharge, redness and itching. Respiratory: Negative for apnea, cough  Gastrointestinal: Negative for blood in stool, constipation, diarrhea   Endocrine: Negative for cold intolerance, heat intolerance, polydipsia. Genitourinary: Negative for dysuria, enuresis, flank pain and hematuria. Musculoskeletal: Negative for arthralgias, joint swelling and neck pain. Neurological: Negative for numbness and headaches. Psychiatric/Behavioral: Negative for agitation, confusion, decreased concentration and dysphoric mood.       PAST MEDICAL HISTORY:  Past Medical History:   Diagnosis Date    Actinic keratoses 2013    Adenomatous polyp 2008    [facility]     CAD (coronary artery disease) 2009    Carotid artery disease ([facility] [address] [facility] 2009    left    Hyperlipidemia 2004    Hypertension 2004    Inguinal hernia, left 2014    Unspecified cerebral artery occlusion with cerebral infarction        PSH:  Past Surgical History:   Procedure Laterality Date    ABDOMEN SURGERY      ABDOMINAL ADHESION SURGERY  2012    Dr Kenny Bejarano CATARACT REMOVAL  2004    right    COLONOSCOPY      [facility]    CORONARY ARTERY BYPASS GRAFT  2009    SMALL INTESTINE SURGERY      SMALL INTESTINE SURGERY  2015    Exploratory Laparotomy       FAMILY HISTORY:  Family History   Problem Relation Age of Onset    Cancer Mother         SOCIAL HISTORY:  Social History     Socioeconomic History    Marital status:      Spouse name: None    Number of children: None    Years of education: None    Highest education level: None   Occupational History    None   Social Needs    Financial resource strain: None    Food insecurity     Worry: None     Inability: None    Transportation needs     Medical: None     Non-medical: None   Tobacco Use    Smoking status: Former Smoker     Last attempt to quit: 1980     Years since quittin.9    Smokeless tobacco: Never Used   Substance and Sexual Activity    Alcohol use:  Yes     Alcohol/week: 1.0 standard drinks     Types: 1 Cans of beer per week     Comment: occasionally    Drug use: No    Sexual activity: None   Lifestyle    Physical activity     Days per week: None     Minutes per session: None    Stress: None   Relationships    Social connections     Talks on phone: None     Gets together: None     Attends Hinduism service: None     Active member of club or organization: None     Attends meetings of clubs or organizations: None     Relationship status: None    Intimate hours ending 12/09/20 1707  Patient Vitals for the past 96 hrs (Last 3 readings):   Weight   12/09/20 1446 174 lb 3.2 oz (79 kg)   12/09/20 1216 162 lb 9.6 oz (73.8 kg)     GENERAL: Alert and oriented. No distress. EYES: No pallor or icterus. ENT: No central cyanosis. VESSELS: No jugular venous distension. Faint bilateral carotid bruits. HEART: Normal S1/S2 intensity. Bradycardic, ESM I/VI. No rub or gallop. LUNGS: Clear to auscultation. ABDOMEN: Soft and non-tender. EXTREMITIES: 1+ lower extremity edema. Feet are warm. NEUROLOGICAL: Hard of hearing. No neurological deficits. Jerad Hunt LABORATORY DATA AND DIAGNOSTIC DATA:  I have personally reviewed and interpreted the results of the following diagnostic testing    Lab Results   Component Value Date    WBC 7.2 12/09/2020    HGB 10.6 (L) 12/09/2020    HCT 33.9 (L) 12/09/2020     12/09/2020    CHOL 139 09/13/2019    TRIG 62 09/13/2019    HDL 67 09/13/2019    ALT 20 09/13/2019    AST 23 09/13/2019     12/09/2020    K 5.4 (H) 12/09/2020     12/09/2020    CREATININE 1.5 (H) 12/09/2020    BUN 26 (H) 12/09/2020    CO2 24 12/09/2020    TSH 2.170 02/20/2015    INR 3.78 (H) 12/09/2020    LABA1C 5.9 09/13/2019       Echocardiogram 2/19/2015: LVEF 55-65%, moderately dilated LA, mild MR and TR and mild concentric LVH. ECG 12/9/20: Atrial fibrillation, complete heart block with slow narrow complex escape. IMPRESSION:  1. Chronic atrial fibrillation with slow ventricular rate. 2. Underlying complete heart block with narrow QRS escape. 3. CAD/CABG (2009)  4. Chronic anticoagulation, supratherapeutic INR. 5. Hypertension, controlled. 6. Hyperlipidemia. ASSESSMENT AND RECOMMENDATION:  The patients is on a reasonable dose of Cardizem and metoprolol. I cannot completely exclude underlying AV node disease possibly made worse by the combination of AVN blocking agents.  He has similar issue is past and his heart rate improved following dose

## 2020-12-09 NOTE — PROGRESS NOTES
Clinical Pharmacy Note    Bang Arora is a 80 y.o. male for whom pharmacy has been asked to manage warfarin therapy. Reason for Admission: bradycardia    Consulting Physician: Conrado Clark PA-C   Warfarin dose prior to admission: 3 mg Mon & 4.5 mg all other days   Pt came to [facility] from [place] appointment. At anti-coagulation apt on 12/9/20, changed directions to 3 mg Mon/Wed/Fri and 4.5 mg Tue/Thu/Sat/Sun but pt had not started that regimen upon admission  Warfarin indication: atrial fibrillation  Target INR range: 2-3  Outpatient warfarin provider: Jerry Cox (phone: [phone])    Has anticoagulation apt on 12/23/20 at 9:30 am already scheduled     Past Medical History:   Diagnosis Date    Actinic keratoses 8/21/2013    Adenomatous polyp 2008    [facility]rial [facility] ([facility] [facility]     CAD (coronary artery disease) 2009    Carotid artery disease ([facility] [address] 2009    left    Hyperlipidemia 2004    Hypertension 2004    Inguinal hernia, left 2/19/2014    Unspecified cerebral artery occlusion with cerebral infarction       Recent Labs     12/09/20  1211   INR 3.78*     Recent Labs     12/09/20  1211   HGB 10.6*   HCT 33.9*        Current warfarin drug-drug interactions: aspirin    Date INR Warfarin Dose   12/9/20 3.78 No coumadin                                   Daily PT/INR until stable within therapeutic range. Thank you for the consult.    Faviola Mccormack, PharmD, BCPS, [facility] 12/9/2020 3:52 PM

## 2020-12-09 NOTE — CONSULTS
[facility] [address] [facility]    Patient's Name/Date of Birth: Treasure Douglas / 3/30/1932 (73 y.o.)    Date: December 9, 2020     Referring Provider: Adán Judd PA-C    CHIEF COMPLAINT: bradycardia       HPI: This is a pleasant 80 y.o. male with a history of CAD s/p CABG 2009, Atrial fibrillation, HLD, HTN, CVA. He presented to the ED today with symptomatic bradycardia, found incidentally at the [facility]. He has had a similar bradycardic episode in the past, family stated it may have been medication induced. Patient has no acute complaints. He denies headaches, dizziness, light headedness, chest pain, palpitations, shortness of breath, cough, lower extremity edema. ECG 12/09/2020: junctional bradycardia, regular rhythm. Patient is hemodynamically stable, with normotensive pressure.        All labs, EKG's, diagnostic testing and images as well as cardiac cath, stress testing were reviewed during this encounter    Past Medical History:   Diagnosis Date    Actinic keratoses 8/21/2013    Adenomatous polyp 2008    Atrial fibrillation ([address]     CAD (coronary artery disease) 2009    Carotid artery disease ([address] 2009    left    Hyperlipidemia 2004    Hypertension 2004    Inguinal hernia, left 2/19/2014    Unspecified cerebral artery occlusion with cerebral infarction      Past Surgical History:   Procedure Laterality Date    ABDOMEN SURGERY      ABDOMINAL ADHESION SURGERY  6/2012    Dr Moon Boateng  2004    right    [facility]OS[facility]      [facility]    CORONARY ARTERY BYPASS GRAFT  2009    SMALL INTESTINE SURGERY      SMALL INTESTINE SURGERY  02/20/2015    Exploratory Laparotomy     Current Facility-Administered Medications   Medication Dose Route Frequency Provider Last Rate Last Dose    [START ON 12/10/2020] aspirin EC tablet 81 mg  81 mg Oral Daily Tuyet Escobar PA-C        [START ON 12/10/2020] furosemide (LASIX) tablet 20 mg  20 mg Oral Daily Dipika Kenney PA-C        pantoprazole (PROTONIX) tablet 40 mg  40 mg Oral BID AC Tuyet Escobar PA-C        pravastatin (PRAVACHOL) tablet 40 mg  40 mg Oral QPM Tuyet Escobar PA-C        [START ON 12/10/2020] tamsulosin (FLOMAX) capsule 0.4 mg  0.4 mg Oral Daily Tuyet Escobar PA-C        sodium chloride flush 0.9 % injection 10 mL  10 mL Intravenous 2 times per day Tuyet Escobar PA-C        sodium chloride flush 0.9 % injection 10 mL  10 mL Intravenous PRN Tuyet Escobar PA-C        acetaminophen (TYLENOL) tablet 650 mg  650 mg Oral Q6H PRN Tuyet Escobar PA-C        Or    acetaminophen (TYLENOL) suppository 650 mg  650 mg Rectal Q6H PRN Tuyet Escobar PA-C        polyethylene glycol (GLYCOLAX) packet 17 g  17 g Oral Daily PRN Tuyet Escobar PA-C        promethazine (PHENERGAN) tablet 12.5 mg  12.5 mg Oral Q6H PRN Tuyet Escobar PA-C        Or    ondansetron (ZOFRAN) injection 4 mg  4 mg Intravenous Q6H PRN Tuyet Escobar PA-C        hydrALAZINE (APRESOLINE) tablet 25 mg  25 mg Oral Q4H PRN Tuyet Escobar PA-C        warfarin (COUMADIN) daily dosing (placeholder)   Other RX Placeholder Dipika Kenney PA-C         Prior to Admission medications    Medication Sig Start Date End Date Taking?  Authorizing Provider   tamsulosin (FLOMAX) 0.4 MG capsule Take 1 capsule by mouth daily 1/16/20  Yes Jan Feliciano MD   warfarin (COUMADIN) 3 MG tablet Take 1 tablet by mouth See Admin Instructions Indications: Anticoagulant Therapy, Atrial Fibrillation [facility] coumadin clinic to dose, #45=30 days supply 9/19/17  Yes Silvino Camargo MD   furosemide (LASIX) 20 MG tablet Take 1 tablet by mouth daily 4/4/17  Yes Meena Hogan MD   metoprolol tartrate (LOPRESSOR) 50 MG tablet Take 25 mg by mouth 2 times daily Indications: Atrial Fibrillation    Yes Historical Provider, MD   diltiazem (CARDIZEM CD) 120 MG ER capsule Take 120 mg by mouth daily Indications: Atrial Fibrillation Yes Historical Provider, MD   pantoprazole (PROTONIX) 40 MG tablet Take 1 tablet by mouth 2 times daily (before meals). 3/1/15  Yes ROBBIE Tom CNP   pravastatin (PRAVACHOL) 40 MG tablet Take 40 mg by mouth every evening. Indications: Blood Cholesterol Abnormal   Yes Historical Provider, MD   aspirin 81 MG EC tablet Take 81 mg by mouth daily Indications: Anticoagulant Therapy Take with food. Yes Historical Provider, MD   Scheduled Meds:   [START ON 12/10/2020] aspirin  81 mg Oral Daily    [START ON 12/10/2020] furosemide  20 mg Oral Daily    pantoprazole  40 mg Oral BID AC    pravastatin  40 mg Oral QPM    [START ON 12/10/2020] tamsulosin  0.4 mg Oral Daily    sodium chloride flush  10 mL Intravenous 2 times per day    warfarin (COUMADIN) daily dosing (placeholder)   Other RX Placeholder     Continuous Infusions:  PRN Meds:.sodium chloride flush, acetaminophen **OR** acetaminophen, polyethylene glycol, promethazine **OR** ondansetron, hydrALAZINE    No Known Allergies  Family History   Problem Relation Age of Onset    Cancer Mother      Social History     Socioeconomic History    Marital status:      Spouse name: Not on file    Number of children: Not on file    Years of education: Not on file    Highest education level: Not on file   Occupational History    Not on file   Social Needs    Financial resource strain: Not on file    Food insecurity     Worry: Not on file     Inability: Not on file    Transportation needs     Medical: Not on file     Non-medical: Not on file   Tobacco Use    Smoking status: Former Smoker     Last attempt to quit: 1980     Years since quittin.9    Smokeless tobacco: Never Used   Substance and Sexual Activity    Alcohol use:  Yes     Alcohol/week: 1.0 standard drinks     Types: 1 Cans of beer per week     Comment: occasionally    Drug use: No    Sexual activity: Not on file   Lifestyle    Physical activity     Days per week: Not on file     Minutes per session: Not on file    Stress: Not on file   Relationships    Social connections     Talks on phone: Not on file     Gets together: Not on file     Attends Roman Catholic service: Not on file     Active member of club or organization: Not on file     Attends meetings of clubs or organizations: Not on file     Relationship status: Not on file    Intimate partner violence     Fear of current or ex partner: Not on file     Emotionally abused: Not on file     Physically abused: Not on file     Forced sexual activity: Not on file   Other Topics Concern    Not on file   Social History Narrative    Not on file     ROS:   Constitutional: Denies any recent wt change. Eyes:  Denies any blurring or double vision, no glaucoma. Ears/Nose/Mouth/Throat:  Denies any chronic sinus/rhinitis, bleeding gums. Cardiovascular:  As described above. Respiratory:  Denies any frequent cough, wheezing or coughing up blood. Genitourinary:  Denies difficulty with urination and kidney stones. Gastrointestinal:  Denies any chronic problems with abdominal pain, nausea, vomiting or diarrhea. Musculoskeletal:  Denies any joint pain, back pain, or difficulty walking. Integumentary:  Denies any rash. Neurological:  No numbness or tingling. Endocrine:  Denies any polydipsia. Hematologic/Lymphatic:  Denies any hemorrhage or lymphatic drainage problems. Labs:  CBC:   Recent Labs     12/09/20  1211   WBC 7.2   HGB 10.6*   HCT 33.9*   MCV 86.9        BMP:   Recent Labs     12/09/20  1211      K 5.4*      CO2 24   BUN 26*   CREATININE 1.5*     Accucheck Glucoses: No results for input(s): POCGLU in the last 72 hours. Cardiac Enzymes: No results for input(s): CKTOTAL, CKMB, CKMBINDEX, TROPONINI in the last 72 hours.   PT/INR:   Recent Labs     12/09/20  1211   INR 3.78*     APTT:   Recent Labs     12/09/20  1211   APTT 43.6*     Liver Profile:  Lab Results   Component Value Date    AST 23 09/13/2019    ALT 20 09/13/2019    BILIDIR <0.2 02/20/2015    BILITOT 0.5 09/13/2019    ALKPHOS 237 08/20/2016     Lab Results   Component Value Date    CHOL 139 09/13/2019    HDL 67 09/13/2019    TRIG 62 09/13/2019     TSH:   Lab Results   Component Value Date    TSH 2.170 02/20/2015     UA:   Lab Results   Component Value Date    COLORU Yellow 02/25/2020    COLORU Yellow 12/04/2019    PHUR 5.50 12/04/2019    LABCAST NONE SEEN 09/06/2014    LABCAST NONE SEEN 09/06/2014    WBCUA 0-2 08/20/2016    RBCUA 3-5 08/20/2016    YEAST NONE SEEN 08/20/2016    BACTERIA NONE 08/20/2016    SPECGRAV 1.015 12/04/2019    LEUKOCYTESUR Moderate 12/04/2019    LEUKOCYTESUR NEGATIVE 08/20/2016    UROBILINOGEN 0.20 02/25/2020    BILIRUBINUR Negative 02/25/2020    BLOODU Negative 02/25/2020    BLOODU Moderate 12/04/2019    GLUCOSEU Negative 02/25/2020         Physical Exam:  Vitals:    12/09/20 1446   BP: (!) 140/58   Pulse: (!) 38   Resp: 22   Temp: 97.8 °F (36.6 °C)   SpO2: 99%    No intake or output data in the 24 hours ending 12/09/20 1638     General:  No acute distress. Neck: Supple, no JVD, no carotid bruits. Heart: Bradycardic rate, Regular rhythm normal S1 and S2, no rubs, murmurs or gallops. Lungs: clear to ascultation no rales, wheezes, or rhonchi. Abdomen: positive bowel sounds, soft, non-tender, non-distended, no bruits, no masses  Extremities:no clubbing, cyanosis. Bilateral lower extremity +1 pitting edema. Neurologic: alert and oriented x 3, cranial nerves 2-12 grossly intact, motor and sensory intact, moving all extremities. Skin: No rashes. 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 Referral to EP for possible pacemaker placement. Thank you for allowing us to participate in the care of this patient. Please do not hesitate to call us with questions.     Electronically signed by Juanita Rebolledo, OMS III on 12/9/2020 at 4:38 PM     Attending Supervising [de-identified] Attestation Statement  I performed a history and physical examination on the patient and discussed the management with the resident physician/med student. I reviewed and agree with the findings and plan as documented in the resident's/med student;s note except for as noted below. 79 yo M hx of CABG, Afib, CVA on [address] who presents for management of profound bradycardia. Found to have junctional bradycardia with rates 30-40s. He is asymptomatic. Was at [facility], asymptomatic. He does have some mild fatigue and weakness, attributed to his age per patient. He is on BB/CCB. INR ~3.  Cr 1.5, K 5.4. Lives independently and is still driving. No chest pain, angina, [place], orthopnea, PND, sob at rest, palpitations, LE edema, or syncope. D/c BB/CCB for now, observe HR/rhythm, he has an indication for his BB due to his CAD/CABG/Afib. Consult EP for discussion regarding PM.  Will need INR to drop. Check TSH, TTE.   Further recommendations based on results and clinical course    Electronically signed by Toyin Booth MD on 12/10/20 at 5:58 AM EST      Interventional Cardiology - [facility]

## 2020-12-10 LAB
ANION GAP SERPL CALCULATED.3IONS-SCNC: 10 MEQ/L (ref 8–16)
BUN BLDV-MCNC: 24 MG/DL (ref 7–22)
CALCIUM SERPL-MCNC: 8.3 MG/DL (ref 8.5–10.5)
CHLORIDE BLD-SCNC: 102 MEQ/L (ref 98–111)
CO2: 22 MEQ/L (ref 23–33)
CREAT SERPL-MCNC: 1.4 MG/DL (ref 0.4–1.2)
ERYTHROCYTE [DISTWIDTH] IN BLOOD BY AUTOMATED COUNT: 17.2 % (ref 11.5–14.5)
ERYTHROCYTE [DISTWIDTH] IN BLOOD BY AUTOMATED COUNT: 54.3 FL (ref 35–45)
GFR SERPL CREATININE-BSD FRML MDRD: 48 ML/MIN/1.73M2
GLUCOSE BLD-MCNC: 83 MG/DL (ref 70–108)
HCT VFR BLD CALC: 31.2 % (ref 42–52)
HEMOGLOBIN: 9.9 GM/DL (ref 14–18)
INR BLD: 3.89 (ref 0.85–1.13)
LV EF: 58 %
LVEF MODALITY: NORMAL
MCH RBC QN AUTO: 27.3 PG (ref 26–33)
MCHC RBC AUTO-ENTMCNC: 31.7 GM/DL (ref 32.2–35.5)
MCV RBC AUTO: 86.2 FL (ref 80–94)
PLATELET # BLD: 200 THOU/MM3 (ref 130–400)
PMV BLD AUTO: 10.8 FL (ref 9.4–12.4)
POTASSIUM REFLEX MAGNESIUM: 4.1 MEQ/L (ref 3.5–5.2)
RBC # BLD: 3.62 MILL/MM3 (ref 4.7–6.1)
SODIUM BLD-SCNC: 134 MEQ/L (ref 135–145)
WBC # BLD: 5.5 THOU/MM3 (ref 4.8–10.8)

## 2020-12-10 PROCEDURE — 2580000003 HC RX 258: Performed by: PHYSICIAN ASSISTANT

## 2020-12-10 PROCEDURE — 6370000000 HC RX 637 (ALT 250 FOR IP): Performed by: NURSE PRACTITIONER

## 2020-12-10 PROCEDURE — 99232 SBSQ HOSP IP/OBS MODERATE 35: CPT | Performed by: NURSE PRACTITIONER

## 2020-12-10 PROCEDURE — 85027 COMPLETE CBC AUTOMATED: CPT

## 2020-12-10 PROCEDURE — 93005 ELECTROCARDIOGRAM TRACING: CPT | Performed by: PHYSICIAN ASSISTANT

## 2020-12-10 PROCEDURE — 6370000000 HC RX 637 (ALT 250 FOR IP): Performed by: PHYSICIAN ASSISTANT

## 2020-12-10 PROCEDURE — 99233 SBSQ HOSP IP/OBS HIGH 50: CPT | Performed by: PHYSICIAN ASSISTANT

## 2020-12-10 PROCEDURE — 36415 COLL VENOUS BLD VENIPUNCTURE: CPT

## 2020-12-10 PROCEDURE — 80048 BASIC METABOLIC PNL TOTAL CA: CPT

## 2020-12-10 PROCEDURE — 94760 N-INVAS EAR/PLS OXIMETRY 1: CPT

## 2020-12-10 PROCEDURE — 85610 PROTHROMBIN TIME: CPT

## 2020-12-10 PROCEDURE — 93306 TTE W/DOPPLER COMPLETE: CPT

## 2020-12-10 PROCEDURE — 2060000000 HC ICU INTERMEDIATE R&B

## 2020-12-10 RX ORDER — AMLODIPINE BESYLATE 10 MG/1
10 TABLET ORAL DAILY
Status: DISCONTINUED | OUTPATIENT
Start: 2020-12-10 | End: 2020-12-11

## 2020-12-10 RX ADMIN — AMLODIPINE BESYLATE 10 MG: 10 TABLET ORAL at 12:08

## 2020-12-10 RX ADMIN — FUROSEMIDE 20 MG: 40 TABLET ORAL at 09:56

## 2020-12-10 RX ADMIN — TAMSULOSIN HYDROCHLORIDE 0.4 MG: 0.4 CAPSULE ORAL at 09:57

## 2020-12-10 RX ADMIN — Medication 10 ML: at 09:57

## 2020-12-10 RX ADMIN — Medication 10 ML: at 20:15

## 2020-12-10 RX ADMIN — PRAVASTATIN SODIUM 40 MG: 40 TABLET ORAL at 20:14

## 2020-12-10 RX ADMIN — ASPIRIN 81 MG: 81 TABLET, COATED ORAL at 09:56

## 2020-12-10 RX ADMIN — ACETAMINOPHEN 650 MG: 325 TABLET ORAL at 12:09

## 2020-12-10 RX ADMIN — PANTOPRAZOLE SODIUM 40 MG: 40 TABLET, DELAYED RELEASE ORAL at 20:14

## 2020-12-10 RX ADMIN — PANTOPRAZOLE SODIUM 40 MG: 40 TABLET, DELAYED RELEASE ORAL at 05:23

## 2020-12-10 ASSESSMENT — PAIN SCALES - GENERAL
PAINLEVEL_OUTOF10: 6
PAINLEVEL_OUTOF10: 0

## 2020-12-10 ASSESSMENT — PAIN DESCRIPTION - DESCRIPTORS: DESCRIPTORS: SHARP

## 2020-12-10 ASSESSMENT — PAIN DESCRIPTION - ONSET: ONSET: GRADUAL

## 2020-12-10 ASSESSMENT — PAIN DESCRIPTION - PAIN TYPE: TYPE: ACUTE PAIN

## 2020-12-10 ASSESSMENT — PAIN DESCRIPTION - ORIENTATION: ORIENTATION: LEFT

## 2020-12-10 ASSESSMENT — PAIN DESCRIPTION - FREQUENCY: FREQUENCY: INTERMITTENT

## 2020-12-10 ASSESSMENT — PAIN DESCRIPTION - LOCATION: LOCATION: ABDOMEN

## 2020-12-10 NOTE — PROGRESS NOTES
Cardiology Progress Note      Patient:  Kike Jacinto  YOB: 1932  MRN: [id number]   Acct: [de-identified]  Admit Date:  12/9/2020  Primary Cardiologist: Dr. Sabrina Mejia by Dr. Laureen Lizarraga / Josue Gage    Per prior cardiology consult note-  REASON FOR CONSULTATION:  Junctional bradycardia, evaluation for permanent pacemaker implantation.      HISTORY OF PRESENT ILLNESS:  87/M incidentally noted to have bradycardia (heart rate 30 bpm) during a routine visit with his primary care physician this morning. The patient was asymptomatic and hemodynamically stable. He was referred here for further evaluation. The patient has chronic atrial fibrillation on rate control with Metoprolol and Cardizem, CAD/CABG, HTN, HPL and remote history of CVA (no neurological deficits). The patient lives by hisself and is independent.     Reports no complaints.  He has chronic exertional shortness of breath and mild bilateral lower extremity edema but denies chest pain, orthopnea, PND, dizziness, syncope or fatigue.         Subjective (Events in last 24 hours):     Pt up in room Hr 59-60 with this   No dizziness or SOB or CP     AFB SVR noted   BP up       Objective:   /60   Pulse (!) 44   Temp 97.9 °F (36.6 °C) (Oral)   Resp 16   Ht 5' 4\" (1.626 m)   Wt 175 lb 14.4 oz (79.8 kg)   SpO2 94%   BMI 30.19 kg/m²        TELEMETRY: AFB SVR rest 30-40   Activity 59-60    Physical Exam:  General Appearance: alert and oriented to person, place and time, in no acute distress  Cardiovascular: irregular rhythm, normal S1 and S2, no murmurs, rubs, clicks, or gallops, distal pulses intact, no   Pulmonary/Chest: clear to auscultation bilaterally- no wheezes, rales or rhonchi, normal air movement, no respiratory distress  Abdomen: soft, non-tender, non-distended, normal bowel sounds, no masses Extremities: no cyanosis, clubbing or edema, pulses present   Skin: warm and dry, no rash or erythema  Head: normocephalic and atraumatic  Musculoskeletal: normal range of motion, no joint swelling, deformity or tenderness  Neurological: alert, oriented, normal speech, no focal findings or movement disorder noted    Medications:    aspirin  81 mg Oral Daily    furosemide  20 mg Oral Daily    pantoprazole  40 mg Oral BID AC    pravastatin  40 mg Oral QPM    tamsulosin  0.4 mg Oral Daily    sodium chloride flush  10 mL Intravenous 2 times per day    warfarin (COUMADIN) daily dosing (placeholder)   Other RX Placeholder       sodium chloride flush, 10 mL, PRN  acetaminophen, 650 mg, Q6H PRN    Or  acetaminophen, 650 mg, Q6H PRN  polyethylene glycol, 17 g, Daily PRN  promethazine, 12.5 mg, Q6H PRN    Or  ondansetron, 4 mg, Q6H PRN  hydrALAZINE, 25 mg, Q4H PRN        Diagnostics:  EK-DEC-2020 12:11:14 [facility] ROUTINE RETRIEVAL  ** Poor data quality, interpretation may be adversely affected  Junctional bradycardia  Left axis deviation  Anteroseptal infarct (cited on or before 2009)  Abnormal ECG  When compared with ECG of 22-AUG-2016 13:35,  Junctional rhythm has replaced Atrial fibrillation  Incomplete right bundle branch block is no longer Present  Questionable change in initial forces of Septal leads  Confirmed by OSORIO DICKERSON ([id number]) on 2020 4:09:46 PM    Echo:  Left ventricle:  Size was normal.  Systolic function was normal. Ejection fraction was estimated in the range of 55 % to 65 %. There were no regional wall motion abnormalities.     Left atrium:  The atrium was moderately dilated.     Right ventricle: The ventricle was mildly dilated.     Mitral valve: There was mild regurgitation.     Tricuspid valve: There was mild regurgitation.     Prepared and signed by  Carla White MD  Signed [id number]       Stress: IMPRESSIONS:   -  Equivocal study.   -  There was a small amount of ischemia in the anterior region.  -  Left ventricular systolic function was normal.  -  Clinical correlation is

## 2020-12-10 NOTE — PROGRESS NOTES
Hospitalist Progress Note      Patient:  Cecily Hatchet    Unit/Bed:4A-09/009-A  YOB: 1932  MRN: [id number]   Acct: [de-identified]   PCP: Raul Alexander DO  Date of Admission: 12/9/2020    Assessment/Plan:    1. Asymptomatic Bradycardia: Noted at OP visit. Home Diltiazem and Metoprolol held on admission, monitoring on telemetry with cardiology/EP consulted. 1. HR 36-45 overnight, remains asymptomatic. Echo pending. 2. Discussed case with cardiology, monitoring response to holding CCB/BB. Has strong indication for BB with hx of CAD and Afib. EP notes may need pacemaker placement when INR <2.5.   3. Discussed coumadin reversal with cardiology who would prefer to monitor INR daily, no coumadin for past two days, rather than reversing with FFP or Vit K due to thromboembolic risks and [id number] of 6. INR in AM, monitor on telemetry. 2. Chronic Atrial Fibrillation: Chronically on Coumadin/Diltiazem as above, currently held. On Coumadin. INR supratherapeutic at 3.8, goal <2.5 for pacemaker placement. 3. Supratherapeutic INR: Goal <2.5 for possible pacemaker placement per EP. Discussion with cardiology regarding monitoring rather than reversal as above. 4. Hx of CAD: Continue ASA/statin. BB held as above. Denies any active CP, monitor on tlemetry. Disposition: Monitoring HR response on telemetry off BB/CCB, monitor INR goal <2.5 for possible pacemaker implantation per EP. Discussed with Dr. Sotero Duncan, plans possible pacemaker placement next week if he remains bradycardic despite CCB/BB washout. Chief Complaint: Bradycardia    Initial H and P:-    \"This is a 80year old male with PMHx CAD, HTN, AF who presented to [facility] for bradycardia. Pt states that he was incidentially noted to have a HR in the 30s during a routine visit with his PCP today and was advised to come to the ED. Pt has been asymptomatic.  He denies syncope, lightheadedness, weakness, SOB, CP, blurred vision, HA, abd pain, n/v/d, urinary sx. Pt admitted for further evaluation. \"    Subjective (past 24 hours):   Patient in chair eating breakfast upon evaluation. Denies any chest pain, palpitations, lightheadedness, or dizziness. States he feels fine without any shortness of breath. Denies any symptoms with activity including ambulating. Discussed current plan of care. No further questions at this time. Past medical history, family history, social history and allergies reviewed again and is unchanged since admission. ROS (12 point review of systems completed. Pertinent positives noted. Otherwise ROS is negative)     Medications:  Reviewed    Infusion Medications   Scheduled Medications    amLODIPine  10 mg Oral Daily    aspirin  81 mg Oral Daily    furosemide  20 mg Oral Daily    pantoprazole  40 mg Oral BID AC    pravastatin  40 mg Oral QPM    tamsulosin  0.4 mg Oral Daily    sodium chloride flush  10 mL Intravenous 2 times per day    warfarin (COUMADIN) daily dosing (placeholder)   Other RX Placeholder     PRN Meds: sodium chloride flush, acetaminophen **OR** acetaminophen, polyethylene glycol, promethazine **OR** ondansetron, hydrALAZINE      Intake/Output Summary (Last 24 hours) at 12/10/2020 1122  Last data filed at 12/10/2020 0357  Gross per 24 hour   Intake 550 ml   Output --   Net 550 ml       Diet:  DIET CARDIAC; Exam:  /60   Pulse (!) 44   Temp 97.9 °F (36.6 °C) (Oral)   Resp 16   Ht 5' 4\" (1.626 m)   Wt 175 lb 14.4 oz (79.8 kg)   SpO2 95%   BMI 30.19 kg/m²   General appearance: No apparent distress, appears stated age and cooperative. HEENT: Pupils equal, round, and reactive to light. Conjunctivae/corneas clear. Neck: Supple, with full range of motion. No jugular venous distention. Trachea midline. Respiratory:  Normal respiratory effort. Clear to auscultation, bilaterally without Rales/Wheezes/Rhonchi.   Cardiovascular: Regular rhythm, bradycardia with normal S1/S2 without murmurs, rubs or gallops. Abdomen: Soft, non-tender, non-distended with normal bowel sounds. Musculoskeletal: passive and active ROM x 4 extremities. Skin: Skin color, texture, turgor normal.  No rashes or lesions. Neurologic:  Neurovascularly intact without any focal sensory/motor deficits. Cranial nerves: II-XII intact, grossly non-focal.  Psychiatric: Alert and oriented, thought content appropriate, normal insight  Capillary Refill: Brisk,< 3 seconds   Peripheral Pulses: +2 palpable, equal bilaterally     Labs:   Recent Labs     12/09/20  1211 12/10/20  0611   WBC 7.2 5.5   HGB 10.6* 9.9*   HCT 33.9* 31.2*    200     Recent Labs     12/09/20  1211 12/10/20  0611    134*   K 5.4* 4.1    102   CO2 24 22*   BUN 26* 24*   CREATININE 1.5* 1.4*   CALCIUM 8.8 8.3*     No results for input(s): AST, ALT, BILIDIR, BILITOT, ALKPHOS in the last 72 hours. Recent Labs     12/09/20  1211 12/10/20  0611   INR 3.78* 3.89*     No results for input(s): CKTOTAL, TROPONINI in the last 72 hours. Microbiology:    Blood culture #1:   Lab Results   Component Value Date    BC No growth-preliminary  No growth   02/19/2015       Blood culture #2:No results found for: Alli Lucas    Organism:  Lab Results   Component Value Date    ORG Escherichia coli 12/04/2019       No results found for: LABGRAM    MRSA culture only:No results found for: [address] [place] [address]    Urine culture:   Lab Results   Component Value Date    LABURIN  09/06/2014     Mixed growth. The mixture of organisms present represents  both organisms that may cause urinary tract infections and  organisms that are not a common cause of urinary tract  infections and are possibly skin vin or distal urethral  vin.          Respiratory culture: No results found for: CULTRESP    Aerobic and Anaerobic :  No results found for: LABAERO  No results found for: LABANAE    Urinalysis:      Lab Results   Component Value Date    NITRU Negative 02/25/2020    WBCUA 0-2 08/20/2016    BACTERIA NONE 08/20/2016    RBCUA 3-5 08/20/2016    BLOODU Negative 02/25/2020    BLOODU Moderate 12/04/2019    SPECGRAV 1.015 12/04/2019    GLUCOSEU Negative 02/25/2020       Radiology:  No orders to display     No results found.     Electronically signed by Celia Huitron PA-C on 12/10/2020 at 11:22 AM

## 2020-12-10 NOTE — CARE COORDINATION
DISASTER CHARTING    12/10/20, 9:27 AM EST    DISCHARGE ONGOING EVALUATION:     Scott Sims day: 1  Location: 4A-09/009-A Reason for admit: Symptomatic bradycardia [R00.1]   Barriers to Discharge: Patient was at PCP at [facility] and found to have HR 30's and was transferred to [facility] ED. Cardiology consulted for junctional bradycardia and evaluation for permanent pacemaker implantation. Cardizem and lopressor held; await INR drifting down and response to stopping BB/CCB meds. Echo done, not read yet. Afebrile. Junctional 40's. On room air. Ox4. Po lasix, protonix, flomax, coumadin - pharmacy dosing. Na+ 134, K+ 5.4 -now 4.1, BUN 26 - now 24, Creat 1.5 - now 1.4, tsh 9.47, trop neg, hgb 10.6 -now 9.9, INR 3.78 - now 3.89. PCP: Elizabeth Watkins, DO  Patient Goals/Plan/Treatment Preferences: Spoke with Julia Iraheta; states he lives at home alone and did not use any DME or have any HH services PTA. He drives, cares for himself independently, and has a PCP. Julai Iraheta states he plans to return home at discharge, denies needs, and declines HH. Julia Iraheta states he did not call the [place] to notify that he was here; this  states she will [facility] to notify. Julia Iraheta states he also has Rogelio Kraft. [facility] and spoke with Brendan; reported patient's admission to [address] [place] [address]. Notification #[id number].

## 2020-12-11 LAB
ANION GAP SERPL CALCULATED.3IONS-SCNC: 9 MEQ/L (ref 8–16)
BUN BLDV-MCNC: 20 MG/DL (ref 7–22)
CALCIUM SERPL-MCNC: 8.2 MG/DL (ref 8.5–10.5)
CHLORIDE BLD-SCNC: 104 MEQ/L (ref 98–111)
CO2: 24 MEQ/L (ref 23–33)
CREAT SERPL-MCNC: 1.3 MG/DL (ref 0.4–1.2)
EKG ATRIAL RATE: 40 BPM
EKG Q-T INTERVAL: 458 MS
EKG QRS DURATION: 126 MS
EKG QTC CALCULATION (BAZETT): 480 MS
EKG R AXIS: -41 DEGREES
EKG T AXIS: -34 DEGREES
EKG VENTRICULAR RATE: 66 BPM
ERYTHROCYTE [DISTWIDTH] IN BLOOD BY AUTOMATED COUNT: 17.3 % (ref 11.5–14.5)
ERYTHROCYTE [DISTWIDTH] IN BLOOD BY AUTOMATED COUNT: 53.6 FL (ref 35–45)
GFR SERPL CREATININE-BSD FRML MDRD: 52 ML/MIN/1.73M2
GLUCOSE BLD-MCNC: 89 MG/DL (ref 70–108)
HCT VFR BLD CALC: 28.2 % (ref 42–52)
HEMOGLOBIN: 9 GM/DL (ref 14–18)
INR BLD: 2.71 (ref 0.85–1.13)
MAGNESIUM: 1.8 MG/DL (ref 1.6–2.4)
MCH RBC QN AUTO: 27.2 PG (ref 26–33)
MCHC RBC AUTO-ENTMCNC: 31.9 GM/DL (ref 32.2–35.5)
MCV RBC AUTO: 85.2 FL (ref 80–94)
PLATELET # BLD: 191 THOU/MM3 (ref 130–400)
PMV BLD AUTO: 11.1 FL (ref 9.4–12.4)
POTASSIUM SERPL-SCNC: 4 MEQ/L (ref 3.5–5.2)
RBC # BLD: 3.31 MILL/MM3 (ref 4.7–6.1)
SODIUM BLD-SCNC: 137 MEQ/L (ref 135–145)
WBC # BLD: 8.5 THOU/MM3 (ref 4.8–10.8)

## 2020-12-11 PROCEDURE — 99232 SBSQ HOSP IP/OBS MODERATE 35: CPT | Performed by: NURSE PRACTITIONER

## 2020-12-11 PROCEDURE — 6370000000 HC RX 637 (ALT 250 FOR IP): Performed by: PHYSICIAN ASSISTANT

## 2020-12-11 PROCEDURE — 85027 COMPLETE CBC AUTOMATED: CPT

## 2020-12-11 PROCEDURE — 83735 ASSAY OF MAGNESIUM: CPT

## 2020-12-11 PROCEDURE — 85610 PROTHROMBIN TIME: CPT

## 2020-12-11 PROCEDURE — 93010 ELECTROCARDIOGRAM REPORT: CPT | Performed by: NUCLEAR MEDICINE

## 2020-12-11 PROCEDURE — 99232 SBSQ HOSP IP/OBS MODERATE 35: CPT | Performed by: PHYSICIAN ASSISTANT

## 2020-12-11 PROCEDURE — 80048 BASIC METABOLIC PNL TOTAL CA: CPT

## 2020-12-11 PROCEDURE — 94760 N-INVAS EAR/PLS OXIMETRY 1: CPT

## 2020-12-11 PROCEDURE — 36415 COLL VENOUS BLD VENIPUNCTURE: CPT

## 2020-12-11 PROCEDURE — 2060000000 HC ICU INTERMEDIATE R&B

## 2020-12-11 PROCEDURE — 2580000003 HC RX 258: Performed by: PHYSICIAN ASSISTANT

## 2020-12-11 RX ORDER — WARFARIN SODIUM 3 MG/1
3 TABLET ORAL
Status: COMPLETED | OUTPATIENT
Start: 2020-12-11 | End: 2020-12-11

## 2020-12-11 RX ADMIN — ASPIRIN 81 MG: 81 TABLET, COATED ORAL at 14:34

## 2020-12-11 RX ADMIN — PANTOPRAZOLE SODIUM 40 MG: 40 TABLET, DELAYED RELEASE ORAL at 06:41

## 2020-12-11 RX ADMIN — PRAVASTATIN SODIUM 40 MG: 40 TABLET ORAL at 22:20

## 2020-12-11 RX ADMIN — Medication 10 ML: at 22:20

## 2020-12-11 RX ADMIN — TAMSULOSIN HYDROCHLORIDE 0.4 MG: 0.4 CAPSULE ORAL at 14:35

## 2020-12-11 RX ADMIN — PANTOPRAZOLE SODIUM 40 MG: 40 TABLET, DELAYED RELEASE ORAL at 18:30

## 2020-12-11 RX ADMIN — FUROSEMIDE 20 MG: 40 TABLET ORAL at 14:35

## 2020-12-11 RX ADMIN — WARFARIN SODIUM 3 MG: 3 TABLET ORAL at 18:28

## 2020-12-11 ASSESSMENT — PAIN SCALES - GENERAL
PAINLEVEL_OUTOF10: 0

## 2020-12-11 NOTE — PROGRESS NOTES
Value Date     12/11/2020    K 4.0 12/11/2020    K 4.1 12/10/2020     12/11/2020    CO2 24 12/11/2020    BUN 20 12/11/2020    CREATININE 1.3 12/11/2020    LABGLOM 52 12/11/2020    GLUCOSE 89 12/11/2020    GLUCOSE 87 01/09/2015    CALCIUM 8.2 12/11/2020       Hepatic Function Panel:    Lab Results   Component Value Date    ALKPHOS 237 08/20/2016    ALT 20 09/13/2019    AST 23 09/13/2019    PROT 7.5 08/20/2016    BILITOT 0.5 09/13/2019    BILIDIR <0.2 02/20/2015    LABALBU 3.2 08/24/2016       Magnesium:    Lab Results   Component Value Date    MG 1.8 12/11/2020       PT/INR:    Lab Results   Component Value Date    INR 2.71 12/11/2020       HgBA1c:    Lab Results   Component Value Date    LABA1C 5.9 09/13/2019       FLP:    Lab Results   Component Value Date    TRIG 62 09/13/2019    HDL 67 09/13/2019    LDLCALC 60 09/13/2019    LABVLDL 9 01/09/2015       TSH:    Lab Results   Component Value Date    TSH 9.470 12/09/2020         Assessment:  15 beats NSVT-- lytes WNL    Chronic AFB w/ SVR--> lowest 30's when asleep - currently while awake - 48   With activity hr 59-60 --> pt asymtpomatic   [id number] = 6       HTN  HLP    CKD stage 3  Hyperkalemia - 5.4  Anemia    Hx PVD  Hx CAD / CABG    Hx CVA      Plan:    · Add back BB - for NSVT   · Monitor HR   · Continue Coumadin  · likely not need PPM this time   · follow         Electronically signed by ROBBIE Campos - CNP on 12/11/2020 at 9:41 AM

## 2020-12-11 NOTE — FLOWSHEET NOTE
12/10/20 2301   Provider Notification   Reason for Communication Review case   Provider Name [facility]   Provider Notification [facility] Clinician (CNS, NP, CNM, CRNA, PA)   Method of Communication Secure Message   Response Waiting for response   Notification Time 99 [id number]   Message sent at this time regarding 15 beat run of vtach. Awaiting response at this time.

## 2020-12-11 NOTE — PROGRESS NOTES
Hospitalist Progress Note      Patient:  Kike Jacinto    Unit/Bed:4A-09/009-A  YOB: 1932  MRN: [id number]   Acct: [de-identified]   PCP: Jeremiah Jordan DO  Date of Admission: 12/9/2020    Assessment/Plan:    1. Asymptomatic Bradycardia/NSVT: Noted at OP visit. Home Diltiazem and Metoprolol held on admission, monitoring on telemetry with cardiology/EP consulted. 1. Discussed case with cardiology, monitoring response to holding CCB/BB. Has strong indication for BB with hx of CAD and Afib. EP notes may need pacemaker placement when INR <2.5.   2. Discussed coumadin reversal with cardiology who would prefer to monitor INR daily, no coumadin for past two days, rather than reversing with FFP or Vit K due to thromboembolic risks and [id number] of 6.  3. 15 beat run of NSVT overnight. BB resumed per cardiology. Discussed case with Abhinav Singleton, will continue to monitor on telemetry at this time, possible pacemaker implantation next week. INR 2.7, goal 2-2.5, discussed with pharmacy. 2. Chronic Atrial Fibrillation: Chronically on Coumadin/Diltiazem as above, currently held. On Coumadin. INR supratherapeutic at 2.7, goal <2.5 for pacemaker placement. 3. Supratherapeutic INR: Goal <2.5 for possible pacemaker placement per EP. Discussion with cardiology regarding monitoring rather than reversal as above. 1. Resolved, still not at goal of 2-2.5, monitor daily. 4. Hx of CAD: Continue ASA/statin. BB held as above. Denies any active CP, monitor on tlemetry. Disposition: BB resumed per cardiology recs 2/2 NSVT. Continue to monitor on telemetry, possible pacemaker placement next week per EP. Chief Complaint: Bradycardia    Initial H and P:-    \"This is a 80year old male with PMHx CAD, HTN, AF who presented to [facility] for bradycardia.  Pt states that he was incidentially noted to have a HR in the 30s during a routine visit with his PCP today and was advised to come to the ED. Pt has been asymptomatic. He denies syncope, lightheadedness, weakness, SOB, CP, blurred vision, HA, abd pain, n/v/d, urinary sx. Pt admitted for further evaluation. \"    Subjective (past 24 hours):   Resting comfortably in bed. No concerns or complaints of chest pain, palpitations, or shortness of breath overnight. Was unaware he was in V. tach last night. Tolerating diet without difficulty. Slept well. Discussed current plan of care. Past medical history, family history, social history and allergies reviewed again and is unchanged since admission. ROS (12 point review of systems completed. Pertinent positives noted. Otherwise ROS is negative)     Medications:  Reviewed    Infusion Medications   Scheduled Medications    amLODIPine  10 mg Oral Daily    aspirin  81 mg Oral Daily    furosemide  20 mg Oral Daily    pantoprazole  40 mg Oral BID AC    pravastatin  40 mg Oral QPM    tamsulosin  0.4 mg Oral Daily    sodium chloride flush  10 mL Intravenous 2 times per day    warfarin (COUMADIN) daily dosing (placeholder)   Other RX Placeholder     PRN Meds: sodium chloride flush, acetaminophen **OR** acetaminophen, polyethylene glycol, promethazine **OR** ondansetron, hydrALAZINE      Intake/Output Summary (Last 24 hours) at 12/11/2020 0896  Last data filed at 12/11/2020 0317  Gross per 24 hour   Intake 590 ml   Output --   Net 590 ml       Diet:  DIET CARDIAC; Exam:  /65   Pulse (!) 49   Temp 97.6 °F (36.4 °C) (Oral)   Resp 18   Ht 5' 4\" (1.626 m)   Wt 172 lb 8 oz (78.2 kg)   SpO2 96%   BMI 29.61 kg/m²   General appearance: No apparent distress, appears stated age and cooperative. HEENT: Pupils equal, round, and reactive to light. Conjunctivae/corneas clear. Neck: Supple, with full range of motion. No jugular venous distention. Trachea midline. Respiratory:  Normal respiratory effort.  Clear to auscultation, bilaterally without Rales/Wheezes/Rhonchi. Cardiovascular: Regular rhythm, bradycardia with normal S1/S2 without murmurs, rubs or gallops. Abdomen: Soft, non-tender, non-distended with normal bowel sounds. Musculoskeletal: passive and active ROM x 4 extremities. Skin: Skin color, texture, turgor normal.  No rashes or lesions. Neurologic:  Neurovascularly intact without any focal sensory/motor deficits. Cranial nerves: II-XII intact, grossly non-focal.  Psychiatric: Alert and oriented, thought content appropriate, normal insight  Capillary Refill: Brisk,< 3 seconds   Peripheral Pulses: +2 palpable, equal bilaterally     Labs:   Recent Labs     12/09/20  1211 12/10/20  0611 12/11/20  0522   WBC 7.2 5.5 8.5   HGB 10.6* 9.9* 9.0*   HCT 33.9* 31.2* 28.2*    200 191     Recent Labs     12/09/20  1211 12/10/20  0611 12/11/20  0521    134* 137   K 5.4* 4.1 4.0    102 104   CO2 24 22* 24   BUN 26* 24* 20   CREATININE 1.5* 1.4* 1.3*   CALCIUM 8.8 8.3* 8.2*     No results for input(s): AST, ALT, BILIDIR, BILITOT, ALKPHOS in the last 72 hours. Recent Labs     12/09/20  1211 12/10/20  0611 12/11/20  0521   INR 3.78* 3.89* 2.71*     No results for input(s): CKTOTAL, TROPONINI in the last 72 hours. Microbiology:    Blood culture #1:   Lab Results   Component Value Date    BC No growth-preliminary  No growth   02/19/2015       Blood culture #2:No results found for: Dean Upton    Organism:  Lab Results   Component Value Date    ORG Escherichia coli 12/04/2019       No results found for: LABGRAM    MRSA culture only:No results found for: [facility]    Urine culture:   Lab Results   Component Value Date    LABURIN  09/06/2014     Mixed growth. The mixture of organisms present represents  both organisms that may cause urinary tract infections and  organisms that are not a common cause of urinary tract  infections and are possibly skin vin or distal urethral  vin.          Respiratory culture: No results found for: CULTRESP    Aerobic and Anaerobic :  No results found for: LABAERO  No results found for: LABANAE    Urinalysis:      Lab Results   Component Value Date    NITRU Negative 02/25/2020    WBCUA 0-2 08/20/2016    BACTERIA NONE 08/20/2016    RBCUA 3-5 08/20/2016    BLOODU Negative 02/25/2020    BLOODU Moderate 12/04/2019    SPECGRAV 1.015 12/04/2019    GLUCOSEU Negative 02/25/2020       Radiology:  No orders to display     No results found.     Electronically signed by Tony Yu PA-C on 12/11/2020 at 8:44 AM

## 2020-12-11 NOTE — PROGRESS NOTES
Clinical Pharmacy Note    Warfarin consult follow-up    Recent Labs     12/11/20  0521   INR 2.71*     Recent Labs     12/09/20  1211 12/10/20  0611 12/11/20  0522   HGB 10.6* 9.9* 9.0*   HCT 33.9* 31.2* 28.2*    200 191       Significant Drug-Drug Interactions:  New warfarin drug-drug interactions: None  Discontinued drug-drug interactions: None  Current warfarin drug-drug interactions: aspirin     Date INR Warfarin Dose   12/9/20 3.78 No coumadin    12/10/2020  3.89  No Coumadin    12/11/2020   2.71   3 mg                                         Notes:                     Daily PT/INR until stable within therapeutic range.      Eric Babcock, PharmD, BCPS  12/11/2020  10:52 AM

## 2020-12-11 NOTE — CARE COORDINATION
DISASTER CHARTING    12/11/20, 10:11 AM EST    DISCHARGE ONGOING EVALUATION:     [address] day: 2  Location: [facility]09/009-A Reason for admit: Symptomatic bradycardia [R00.1]   Barriers to Discharge: Overnight had 15 beats VTach, then junctional, then afib, then back to junctional rhythm. Cardiology saw this am; BB restarted this morning, continue coumadin, and states will likely not need PPM at this time. Afebrile. NSR 60's. On room air. Ox4. Asa, Po lasix, lopressor, protonix, pravastatin, flomax, coumadin - pharmacy dosing. Creat down to 1.3, hgb 9, INR 2.71. PCP: Karis Schmid, DO  Patient Goals/Plan/Treatment Preferences: Home alone. Denies needs, declines HH.

## 2020-12-12 VITALS
HEART RATE: 58 BPM | WEIGHT: 170.7 LBS | SYSTOLIC BLOOD PRESSURE: 137 MMHG | DIASTOLIC BLOOD PRESSURE: 63 MMHG | HEIGHT: 64 IN | BODY MASS INDEX: 29.14 KG/M2 | RESPIRATION RATE: 18 BRPM | TEMPERATURE: 97.8 F | OXYGEN SATURATION: 95 %

## 2020-12-12 LAB
ANION GAP SERPL CALCULATED.3IONS-SCNC: 9 MEQ/L (ref 8–16)
BUN BLDV-MCNC: 16 MG/DL (ref 7–22)
CALCIUM SERPL-MCNC: 8 MG/DL (ref 8.5–10.5)
CHLORIDE BLD-SCNC: 103 MEQ/L (ref 98–111)
CO2: 25 MEQ/L (ref 23–33)
CREAT SERPL-MCNC: 1.3 MG/DL (ref 0.4–1.2)
GFR SERPL CREATININE-BSD FRML MDRD: 52 ML/MIN/1.73M2
GLUCOSE BLD-MCNC: 96 MG/DL (ref 70–108)
INR BLD: 1.7 (ref 0.85–1.13)
MAGNESIUM: 1.9 MG/DL (ref 1.6–2.4)
POTASSIUM SERPL-SCNC: 4 MEQ/L (ref 3.5–5.2)
SODIUM BLD-SCNC: 137 MEQ/L (ref 135–145)

## 2020-12-12 PROCEDURE — 6370000000 HC RX 637 (ALT 250 FOR IP): Performed by: PHYSICIAN ASSISTANT

## 2020-12-12 PROCEDURE — 80048 BASIC METABOLIC PNL TOTAL CA: CPT

## 2020-12-12 PROCEDURE — 36415 COLL VENOUS BLD VENIPUNCTURE: CPT

## 2020-12-12 PROCEDURE — 83735 ASSAY OF MAGNESIUM: CPT

## 2020-12-12 PROCEDURE — 99238 HOSP IP/OBS DSCHRG MGMT 30/<: CPT | Performed by: PHYSICIAN ASSISTANT

## 2020-12-12 PROCEDURE — 0296T HC EXT ECG RECORDING 2-21 DAY HOOKUP: CPT

## 2020-12-12 PROCEDURE — 99232 SBSQ HOSP IP/OBS MODERATE 35: CPT | Performed by: NURSE PRACTITIONER

## 2020-12-12 PROCEDURE — 2580000003 HC RX 258: Performed by: PHYSICIAN ASSISTANT

## 2020-12-12 PROCEDURE — 85610 PROTHROMBIN TIME: CPT

## 2020-12-12 RX ORDER — HYDRALAZINE HYDROCHLORIDE 25 MG/1
25 TABLET, FILM COATED ORAL 3 TIMES DAILY
Qty: 90 TABLET | Refills: 3 | Status: ON HOLD | OUTPATIENT
Start: 2020-12-12 | End: 2021-02-02 | Stop reason: HOSPADM

## 2020-12-12 RX ORDER — WARFARIN SODIUM 3 MG/1
6 TABLET ORAL
Status: COMPLETED | OUTPATIENT
Start: 2020-12-12 | End: 2020-12-12

## 2020-12-12 RX ADMIN — WARFARIN SODIUM 6 MG: 3 TABLET ORAL at 13:27

## 2020-12-12 RX ADMIN — Medication 10 ML: at 09:19

## 2020-12-12 RX ADMIN — ASPIRIN 81 MG: 81 TABLET, COATED ORAL at 09:19

## 2020-12-12 RX ADMIN — FUROSEMIDE 20 MG: 40 TABLET ORAL at 09:19

## 2020-12-12 RX ADMIN — PANTOPRAZOLE SODIUM 40 MG: 40 TABLET, DELAYED RELEASE ORAL at 09:19

## 2020-12-12 RX ADMIN — HYDRALAZINE HYDROCHLORIDE 25 MG: 25 TABLET, FILM COATED ORAL at 00:19

## 2020-12-12 RX ADMIN — TAMSULOSIN HYDROCHLORIDE 0.4 MG: 0.4 CAPSULE ORAL at 09:19

## 2020-12-12 NOTE — PROCEDURES
Mini  [id number] applied. Patient will wear ten days. Detailed instructions given to patient and his son.

## 2020-12-12 NOTE — DISCHARGE SUMMARY
Hospitalist Discharge Summary        Patient: Wendy Luis  YOB: 1932  MRN: [id number]   Acct: [de-identified]    Primary Care Physician: Ramone Bose DO    Admit date  12/9/2020    Discharge date:  12/12/2020 9:00 AM    Chief Complaint on presentation :-  Bradycardia    Discharge Assessment and Plan:-   1. Asymptomatic Bradycardia/NSVT: Noted at OP visit. Home Diltiazem and Metoprolol held on admission, monitoring on telemetry with cardiology/EP consulted. 1. Discussed case with cardiology, monitoring response to holding CCB/BB. Has strong indication for BB with hx of CAD and Afib. 2. Discussed coumadin reversal with cardiology who would prefer to monitor INR daily, no coumadin for past two days, rather than reversing with FFP or Vit K due to thromboembolic risks and [id number] of 6.  3. 15 beat run of NSVT during admission noted on telemetry. 4. Recommendations per cardio, raji for d/c. Discontinue BB/CCB, ten day event monitor on d/c, f/u with Dr. Mellissa Hancock, EP in 2 weeks. No plans for PPM at this time. Continue Coumadin, discussed with pharmacy. Warfarin recommendations in place on d/c with repeat INR 12/23/20 and results to be sent to [facility]. 2. Chronic Atrial Fibrillation: Chronically on Coumadin/Diltiazem as above, currently held. On Coumadin. INR subtherapeutic at 1.7,  Coumadin dosing per pharmacy as above. Stable for d/c with ten day event monitor and f/u with EP in 2 weeks. 3. Subtherapeutic INR: Initially supratherapeutic with Initial goal <2.5 for possible pacemaker placement per EP. Discussion with cardiology regarding monitoring rather than reversal as above. 1. INR 1.7 on day of d/c. 6mg of Coumadin prior to d/c with further recs for coumadin dosing per pharmacy and f/u INR as above. 4. Hx of CAD: Continue ASA/statin. BB held as above. Denies any active CP, monitored on telemetry. 5. Essential HTN: BB/CCB held per cardiology on d/c.  Discussed case with cardio, okay for Hydralazine 25mg TID. Initial H and P and Hospital course:-  \"This is a 80year old male with PMHx CAD, HTN, AF who presented to [facility] for bradycardia. Pt states that he was incidentially noted to have a HR in the 30s during a routine visit with his PCP today and was advised to come to the ED. Pt has been asymptomatic. He denies syncope, lightheadedness, weakness, SOB, CP, blurred vision, HA, abd pain, n/v/d, urinary sx. Pt admitted for further evaluation. \"    As above, patient was initially admitted on 12/9/2020 secondary to being noted to have asymptomatic bradycardia at outpatient visit. Heart rate was in 30s to 40s, cardiology and EP were consulted on admission with home CCB/BB held. Patient had supratherapeutic INR on Coumadin and pharmacy assisted with management at that time. Initially patient was considered for permanent pacemaker, however responded nicely with CCB/BB held with improvement in heart rate. Patient does have strong indication for beta-blocker with history of CAD and chronic A. fib. INR went from supratherapeutic to subtherapeutic with pharmacy assisting with management. On 12/12/2020, cardiology notes that patient being planned for permanent pacemaker at this time. They discontinued CCB/BB with plans for hydralazine for hypertension. Instructed to have 10-day cardiac event monitor placed and follow-up with EP, Dr. Angel Foss in 2 weeks. Patient stable for discharge and agreeable with plan of care. Return to ED for new or worsening concerns or complaints. Remained asymptomatic throughout admission.     Physical Exam:-  Vitals:   Patient Vitals for the past 24 hrs:   BP Temp Temp src Pulse Resp SpO2 Weight   12/12/20 0350 (!) 108/52 98.1 °F (36.7 °C) Oral 56 14 93 % 170 lb 11.2 oz (77.4 kg)   12/12/20 0015 (!) 184/74 -- -- -- -- -- --   12/11/20 2337 (!) 181/71 -- -- 63 -- -- --   12/11/20 2212 (!) 141/60 98.2 °F (36.8 °C) Oral 51 16 95 % --   12/11/20 1749 -- -- -- -- mouth daily     warfarin 3 MG tablet  Commonly known as: COUMADIN  Take as directed. If you are unsure how to take this medication, talk to your nurse or doctor. Original instructions:  Take 1 tablet by mouth See Admin Instructions Indications: Anticoagulant Therapy, Atrial Fibrillation [facility] coumadin clinic to dose, #45=30 days supply        STOP taking these medications    dilTIAZem 120 MG extended release capsule  Commonly known as: CARDIZEM CD     metoprolol tartrate 50 MG tablet  Commonly known as: LOPRESSOR           Where to Get Your Medications      These medications were sent to [address] - [place] - [place] [phone] [address], [place] [address]    Phone: [phone]   · hydrALAZINE 25 MG tablet         Labs :-  Recent Results (from the past 72 hour(s))   CBC auto differential    Collection Time: 12/09/20 12:11 PM   Result Value Ref Range    WBC 7.2 4.8 - 10.8 thou/mm3    RBC 3.90 (L) 4.70 - 6.10 mill/mm3    Hemoglobin 10.6 (L) 14.0 - 18.0 gm/dl    Hematocrit 33.9 (L) 42.0 - 52.0 %    MCV 86.9 80.0 - 94.0 fL    MCH 27.2 26.0 - 33.0 pg    MCHC 31.3 (L) 32.2 - 35.5 gm/dl    RDW-CV 17.4 (H) 11.5 - 14.5 %    RDW-SD 55.1 (H) 35.0 - 45.0 fL    Platelets 107 404 - 232 thou/mm3    MPV 10.9 9.4 - 12.4 fL    Seg Neutrophils 58.4 %    Lymphocytes 26.3 %    Monocytes 13.3 %    Eosinophils 1.1 %    Basophils 0.8 %    Immature Granulocytes 0.1 %    Segs Absolute 4.2 1.8 - 7.7 thou/mm3    Lymphocytes Absolute 1.9 1.0 - 4.8 thou/mm3    Monocytes Absolute 1.0 0.4 - 1.3 thou/mm3    Eosinophils Absolute 0.1 0.0 - 0.4 thou/mm3    Basophils Absolute 0.1 0.0 - 0.1 thou/mm3    Immature Grans (Abs) 0.01 0.00 - 0.07 thou/mm3    nRBC 0 /100 wbc   Troponin    Collection Time: 12/09/20 12:11 PM   Result Value Ref Range    Troponin T < 0.010 ng/ml   Basic Metabolic Panel    Collection Time: 12/09/20 12:11 PM   Result Value Ref Range    Sodium 136 135 - 145 meq/L    Potassium 5.4 (H) MPV 11.1 9.4 - 12.4 fL   Basic Metabolic Panel    Collection Time: 12/12/20  5:16 AM   Result Value Ref Range    Sodium 137 135 - 145 meq/L    Potassium 4.0 3.5 - 5.2 meq/L    Chloride 103 98 - 111 meq/L    CO2 25 23 - 33 meq/L    Glucose 96 70 - 108 mg/dL    BUN 16 7 - 22 mg/dL    CREATININE 1.3 (H) 0.4 - 1.2 mg/dL    Calcium 8.0 (L) 8.5 - 10.5 mg/dL   Magnesium    Collection Time: 12/12/20  5:16 AM   Result Value Ref Range    Magnesium 1.9 1.6 - 2.4 mg/dL   Anion Gap    Collection Time: 12/12/20  5:16 AM   Result Value Ref Range    Anion Gap 9.0 8.0 - 16.0 meq/L   Glomerular Filtration Rate, Estimated    Collection Time: 12/12/20  5:16 AM   Result Value Ref Range    Est, Glom Filt Rate 52 (A) ml/min/1.73m2   Protime-INR    Collection Time: 12/12/20  5:17 AM   Result Value Ref Range    INR 1.70 (H) 0.85 - 1.13        Microbiology:    Blood culture #1:   Lab Results   Component Value Date    BC No growth-preliminary  No growth   02/19/2015       Blood culture #2:No results found for: [place]ela Dunk    Organism:  No results found for: LABGRAM    MRSA culture only:No results found for: [address] [place] [address]    Urine culture:   Lab Results   Component Value Date    LABURIN  09/06/2014     Mixed growth. The mixture of organisms present represents  both organisms that may cause urinary tract infections and  organisms that are not a common cause of urinary tract  infections and are possibly skin vin or distal urethral  vin.        Lab Results   Component Value Date    ORG Escherichia coli 12/04/2019        Respiratory culture: No results found for: CULTRESP    Aerobic and Anaerobic :  No results found for: LABAERO  No results found for: LABANAE    Urinalysis:      Lab Results   Component Value Date    NITRU Negative 02/25/2020    WBCUA 0-2 08/20/2016    BACTERIA NONE 08/20/2016    RBCUA 3-5 08/20/2016    BLOODU Negative 02/25/2020    BLOODU Moderate 12/04/2019    SPECGRAV 1.015 12/04/2019    GLUCOSEU Negative 02/25/2020 Radiology:-  Echo Complete 2d W Doppler W Color    Result Date: 12/10/2020  Transthoracic Echocardiography Report (TTE)  Demographics   Patient Name  Dom Kim Gender             Male                H   MR #          [id number]      Race                                               Ethnicity   Account #     [de-identified]      Room Number        0009   Accession     [id number]     Date of Study      12/10/2020  Number   Date of Birth 03/30/1932     Referring          Julienne Morales                               Physician          Mona Hampton MD   Age           80 year(s)     Sonographer        MARNIE Christianson, RDCS,                                                  RDMS, RVT                                Interpreting       Mona Hampton MD                               Physician  Procedure Type of Study   TTE procedure:ECHOCARDIOGRAM COMPLETE 2D W DOPPLER W COLOR. Procedure Date Date: 12/10/2020 Start: 08:52 AM Study Location: Bedside Technical Quality: Adequate visualization Indications:Bradycardia. Additional Medical History:exsmoker, coronary artery disease, coronary artery bypass grafting,m hypertension, hyperlipidemia, history of cerebrovascular accident, atrial fibrillation Patient Status: Routine Height: 64 inches Weight: 175 pounds BSA: 1.85 m^2 BMI: 30.04 kg/m^2 BP: 127/60 mmHg  Conclusions   Summary  Ejection fraction was estimated at 55-60%. Left atrial size was severely dilated. Right atrial size was severely dilated. There was mild mitral regurgitation. There was trace tricuspid regurgitation. Assuming RAP = 5 mmHg, the estimated RVSP = 44 mmHg. IVC size is within normal limits with normal respiratory phasic changes.    Signature   ----------------------------------------------------------------  Electronically signed by Mona Hampton MD (Interpreting  physician) on 12/10/2020 at 11:31 AM  ----------------------------------------------------------------   Findings Mitral Valve  The mitral valve structure was normal with normal leaflet separation. DOPPLER: The transmitral velocity was within the normal range with no  evidence for mitral stenosis. There was mild mitral regurgitation. Aortic Valve  The aortic valve was trileaflet with normal thickness and cuspal  separation. DOPPLER: Transaortic velocity was within the normal range with  no evidence of aortic stenosis. There was no evidence of aortic  regurgitation. Tricuspid Valve  The tricuspid valve structure was normal with normal leaflet separation. DOPPLER: There was no evidence of tricuspid stenosis. There was trace  tricuspid regurgitation. Assuming RAP = 5 mmHg, the estimated RVSP = 44  mmHg. Pulmonic Valve  The pulmonic valve leaflets exhibited normal thickness, no calcification,  and normal cuspal separation. DOPPLER: The transpulmonic velocity was  within the normal range with trace regurgitation. Left Atrium  Left atrial size was severely dilated. Left Ventricle  Normal left ventricular size and systolic function. There were no regional wall motion abnormalities. Wall thickness was within normal limits. Ejection fraction was estimated at 55-60%. Right Atrium  Right atrial size was severely dilated. Right Ventricle  The right ventricular size was normal with normal systolic function and  wall thickness. Pericardial Effusion  The pericardium was normal in appearance with no evidence of a pericardial  effusion. Pleural Effusion  No evidence of pleural effusion. Aorta / Great Vessels  IVC size is within normal limits with normal respiratory phasic changes.   M-Mode/2D Measurements & Calculations   LV Diastolic   LV Systolic Dimension:    AV Cusp Separation: 1.7 cmLA  Dimension: 4.4 3.1 cm                    Dimension: 5.4 cmAO Root  cm             LV Volume Diastolic: 84.6 Dimension: 3.3 cmLA Area: 28.3  LV FS:29.6 %   ml                        cm^2  LV PW          LV Volume Systolic: 60.4 Diastolic: 1.1 ml  cm             LV EDV/LV EDV Index: 87.7  Septum         ml/47 m^2LV ESV/LV ESV    RV Diastolic Dimension: 3.6 cm  Diastolic: 1.1 Index: 87.7 ml/20 m^2  cm             EF Calculated: 56.8 %     LA/Aorta: 1.64                                           Ascending Aorta: 3 cm                                           LA volume/Index: 90.4 ml /49m^2  Doppler Measurements & Calculations   MV Peak E-Wave: 157 cm/s  AV Peak Velocity: 183  LVOT Peak Velocity: 104  MV Peak A-Wave: 64.3 cm/s cm/s                   cm/s  MV E/A Ratio: 2.44        AV Peak Gradient: 13.4 LVOT Peak Gradient: 4  MV Peak Gradient: 9.86    mmHg                   mmHg  mmHg                                                   TV Peak E-Wave: 46.1 cm/s  MV Deceleration Time: 123  msec                                             TV Peak Gradient: 0.85                            IVRT: 81 msec          mmHg                                                   TR Velocity:314 cm/s                                                   TR Gradient:39.44 mmHg                            AV DVI (Vmax):0.57     PV Peak Velocity: 71.8                                                   cm/s  MR Velocity: 465 cm/s                            PV Peak Gradient: 2.06                                                   mmHg                                                    [place] ED Velocity: 117 cm/s  [email or web address] DocKey=[id number]       Follow-up scheduled after discharge :-    in 1 weeks with Jeremiah Jordan, DO  in 2 weeks with Dr. Josue Gage, Electrophysiology    Consultations during this hospital stay:-  [] NONE [x] Cardiology  [] Nephrology  [] Hemo onco   [] GI   [] ID  [] Endocrine  [] Pulm    [] Neuro    [] Psych   [] Urology  [] ENT   [] G SURGERY   []Ortho    []CV surg    [] Palliative  [] Hospice [] Pain management   []    []TCU [] PT/OT  OTHERS:-    Disposition: home  Condition at Discharge: Stable    Time Spent:- 30 minutes    Electronically signed by Mushtaq Wagner PA-C on 12/12/2020 at 9:00 AM  Discharging Hospitalist

## 2020-12-12 NOTE — PROGRESS NOTES
within the normal range with no   evidence for mitral stenosis. There was mild mitral regurgitation. Aortic Valve   The aortic valve was trileaflet with normal thickness and cuspal   separation. DOPPLER: Transaortic velocity was within the normal range with   no evidence of aortic stenosis. There was no evidence of aortic   regurgitation. Tricuspid Valve   The tricuspid valve structure was normal with normal leaflet separation. DOPPLER: There was no evidence of tricuspid stenosis. There was trace   tricuspid regurgitation. Assuming RAP = 5 mmHg, the estimated RVSP = 44   mmHg. Pulmonic Valve   The pulmonic valve leaflets exhibited normal thickness, no calcification,   and normal cuspal separation. DOPPLER: The transpulmonic velocity was   within the normal range with trace regurgitation. Left Atrium   Left atrial size was severely dilated. Left Ventricle   Normal left ventricular size and systolic function. There were no regional wall motion abnormalities. Wall thickness was within normal limits. Ejection fraction was estimated at 55-60%. Right Atrium   Right atrial size was severely dilated. Right Ventricle   The right ventricular size was normal with normal systolic function and   wall thickness. Pericardial Effusion   The pericardium was normal in appearance with no evidence of a pericardial   effusion. Pleural Effusion   No evidence of pleural effusion. Aorta / Great Vessels   IVC size is within normal limits with normal respiratory phasic changes. Stress: IMPRESSIONS:   -  Equivocal study.   -  There was a small amount of ischemia in the anterior region.  -  Left ventricular systolic function was normal.  -  Clinical correlation is recommended.     Prepared and signed by     Renetta Collado MD  Signed 08/27/2015      Left Heart Cath:     Lab Data:    Cardiac Enzymes:  No results for input(s): CKTOTAL, CKMB, CKMBINDEX, TROPONINI in the last 72 hours.    CBC:   Lab Results   Component Value Date    WBC 8.5 12/11/2020    RBC 3.31 12/11/2020    HGB 9.0 12/11/2020    HCT 28.2 12/11/2020     12/11/2020       CMP:    Lab Results   Component Value Date     12/12/2020    K 4.0 12/12/2020    K 4.1 12/10/2020     12/12/2020    CO2 25 12/12/2020    BUN 16 12/12/2020    CREATININE 1.3 12/12/2020    LABGLOM 52 12/12/2020    GLUCOSE 96 12/12/2020    GLUCOSE 87 01/09/2015    CALCIUM 8.0 12/12/2020       Hepatic Function Panel:    Lab Results   Component Value Date    ALKPHOS 237 08/20/2016    ALT 20 09/13/2019    AST 23 09/13/2019    PROT 7.5 08/20/2016    BILITOT 0.5 09/13/2019    BILIDIR <0.2 02/20/2015    LABALBU 3.2 08/24/2016       Magnesium:    Lab Results   Component Value Date    MG 1.9 12/12/2020       PT/INR:    Lab Results   Component Value Date    INR 1.70 12/12/2020       HgBA1c:    Lab Results   Component Value Date    LABA1C 5.9 09/13/2019       FLP:    Lab Results   Component Value Date    TRIG 62 09/13/2019    HDL 67 09/13/2019    LDLCALC 60 09/13/2019    LABVLDL 9 01/09/2015       TSH:    Lab Results   Component Value Date    TSH 9.470 12/09/2020         Assessment:  15 beats NSVT-- lytes WNL- none further     Chronic AFB w/ SVR--> lowest 30's when asleep - currently while awake -79   With activity hr 70-80--> pt asymtpomatic   [id number] = 6       HTN  HLP    CKD stage 3  Anemia    Hx PVD  Hx CAD / CABG    Hx CVA      Plan:    · Ok to DC   · Event at DC --> 10 day event   · Follow 2 weeks Dr. Cindy Garcia           Electronically signed by ROBBIE Thakkar CNP on 12/12/2020 at 7:50 AM

## 2020-12-12 NOTE — PROGRESS NOTES
Clinical Pharmacy Note                                               Warfarin Discharge Recommendations      Pt discharged from [facility] today after admission for bradycardia    INR today:  Recent Labs     12/12/20  0517   INR 1.70*       Coumadin 3 mg tabs    Interacting medications at discharge: aspirin    INR goal during admission:2.0-3.0    Recommendations for discharge:   Date Warfarin Dose   12/12/20 6 mg (given in hospital prior to discharge)   12/13/20 4.5 mg   12/14/202 3 mg   12/15/20 4.5 mg   12/16/20 3 mg   12/17/20 4.5 mg    12/18/20 3 mg   12/19/20 4.5 mg   12/20/20 4.5 mg   12/21/20 3 mg   12/22/20 4.5 mg                           Recheck INR:  12/23/20 with results to [facility] appointment at 9:30 AM.       Lashon Marcelo, PharmD, BCPS  12/12/2020  8:58 AM

## 2020-12-30 NOTE — PROCEDURES
[address] [place] [address]                                 EVENT MONITOR    PATIENT NAME: Viky Thomas                   :        1932  MED REC NO:   [id number]                           ROOM:       0009  ACCOUNT NO:   [de-identified]                           ADMIT DATE: 2020  PROVIDER:     Ari Perez M.D. CLINICAL HISTORY AND INDICATION:  This is an 80-year-old patient with  atrial fibrillation. EVENT MONITOR DESCRIPTION:  Event monitor was attached to the patient  between 2020 and 2020. EVENT MONITOR FINDINGS:  Baseline rhythm showed atrial fibrillation. The patient was pretty much in AFib throughout the reading with variable  heart rate, no prolonged pauses with rare PVCs. CONCLUSION:  1. Persistent atrial fibrillation with variable heart rate, mostly seem  to be within controlled rate with rare to occasional PVCs. 2.  Clinical correlation is recommended.         Leeanna Nunez M.D.    D: 2020 7:42:52       T: 2020 10:35:22     [id number]  Job#: [id number]     Doc#: [id number]    CC:

## 2021-01-05 ENCOUNTER — HOSPITAL ENCOUNTER (EMERGENCY)
Age: 86
Discharge: HOME | End: 2021-01-05
Payer: OTHER GOVERNMENT

## 2021-01-05 ENCOUNTER — APPOINTMENT (OUTPATIENT)
Dept: GENERAL RADIOLOGY | Age: 86
End: 2021-01-05
Payer: OTHER GOVERNMENT

## 2021-01-05 VITALS
SYSTOLIC BLOOD PRESSURE: 132 MMHG | DIASTOLIC BLOOD PRESSURE: 82 MMHG | OXYGEN SATURATION: 97 % | HEIGHT: 64 IN | BODY MASS INDEX: 27.31 KG/M2 | RESPIRATION RATE: 18 BRPM | HEART RATE: 58 BPM | TEMPERATURE: 98.1 F | WEIGHT: 160 LBS

## 2021-01-05 DIAGNOSIS — R25.2 MUSCLE CRAMPING: Primary | ICD-10-CM

## 2021-01-05 DIAGNOSIS — M79.605 LEFT LEG PAIN: ICD-10-CM

## 2021-01-05 LAB
ANION GAP SERPL CALCULATED.3IONS-SCNC: 12 MEQ/L (ref 8–16)
BASOPHILS # BLD: 0.6 %
BASOPHILS ABSOLUTE: 0 THOU/MM3 (ref 0–0.1)
BILIRUBIN URINE: NEGATIVE
BLOOD, URINE: NEGATIVE
BUN BLDV-MCNC: 24 MG/DL (ref 7–22)
CALCIUM SERPL-MCNC: 8.9 MG/DL (ref 8.5–10.5)
CHARACTER, URINE: CLEAR
CHLORIDE BLD-SCNC: 98 MEQ/L (ref 98–111)
CO2: 20 MEQ/L (ref 23–33)
COLOR: YELLOW
CREAT SERPL-MCNC: 1.4 MG/DL (ref 0.4–1.2)
EKG ATRIAL RATE: 144 BPM
EKG Q-T INTERVAL: 502 MS
EKG QRS DURATION: 96 MS
EKG QTC CALCULATION (BAZETT): 444 MS
EKG R AXIS: -87 DEGREES
EKG T AXIS: 40 DEGREES
EKG VENTRICULAR RATE: 47 BPM
EOSINOPHIL # BLD: 0.3 %
EOSINOPHILS ABSOLUTE: 0 THOU/MM3 (ref 0–0.4)
ERYTHROCYTE [DISTWIDTH] IN BLOOD BY AUTOMATED COUNT: 18 % (ref 11.5–14.5)
ERYTHROCYTE [DISTWIDTH] IN BLOOD BY AUTOMATED COUNT: 55.7 FL (ref 35–45)
GFR SERPL CREATININE-BSD FRML MDRD: 48 ML/MIN/1.73M2
GLUCOSE BLD-MCNC: 100 MG/DL (ref 70–108)
GLUCOSE URINE: NEGATIVE MG/DL
HCT VFR BLD CALC: 31.1 % (ref 42–52)
HEMOGLOBIN: 9.9 GM/DL (ref 14–18)
IMMATURE GRANS (ABS): 0.02 THOU/MM3 (ref 0–0.07)
IMMATURE GRANULOCYTES: 0.3 %
INR BLD: 1.35 (ref 0.85–1.13)
KETONES, URINE: NEGATIVE
LEUKOCYTE ESTERASE, URINE: NEGATIVE
LYMPHOCYTES # BLD: 15.7 %
LYMPHOCYTES ABSOLUTE: 1 THOU/MM3 (ref 1–4.8)
MAGNESIUM: 1.9 MG/DL (ref 1.6–2.4)
MCH RBC QN AUTO: 27.3 PG (ref 26–33)
MCHC RBC AUTO-ENTMCNC: 31.8 GM/DL (ref 32.2–35.5)
MCV RBC AUTO: 85.9 FL (ref 80–94)
MONOCYTES # BLD: 11.7 %
MONOCYTES ABSOLUTE: 0.7 THOU/MM3 (ref 0.4–1.3)
NITRITE, URINE: NEGATIVE
NUCLEATED RED BLOOD CELLS: 0 /100 WBC
OSMOLALITY CALCULATION: 264.9 MOSMOL/KG (ref 275–300)
PH UA: 5 (ref 5–9)
PLATELET # BLD: 229 THOU/MM3 (ref 130–400)
PMV BLD AUTO: 10.7 FL (ref 9.4–12.4)
POTASSIUM SERPL-SCNC: 4.2 MEQ/L (ref 3.5–5.2)
PROTEIN UA: NEGATIVE
RBC # BLD: 3.62 MILL/MM3 (ref 4.7–6.1)
SEG NEUTROPHILS: 71.4 %
SEGMENTED NEUTROPHILS ABSOLUTE COUNT: 4.4 THOU/MM3 (ref 1.8–7.7)
SODIUM BLD-SCNC: 130 MEQ/L (ref 135–145)
SPECIFIC GRAVITY, URINE: < 1.005 (ref 1–1.03)
TOTAL CK: 271 U/L (ref 55–170)
UROBILINOGEN, URINE: 0.2 EU/DL (ref 0–1)
WBC # BLD: 6.2 THOU/MM3 (ref 4.8–10.8)

## 2021-01-05 PROCEDURE — 73502 X-RAY EXAM HIP UNI 2-3 VIEWS: CPT

## 2021-01-05 PROCEDURE — 96375 TX/PRO/DX INJ NEW DRUG ADDON: CPT

## 2021-01-05 PROCEDURE — 36415 COLL VENOUS BLD VENIPUNCTURE: CPT

## 2021-01-05 PROCEDURE — 93005 ELECTROCARDIOGRAM TRACING: CPT | Performed by: EMERGENCY MEDICINE

## 2021-01-05 PROCEDURE — 6360000002 HC RX W HCPCS

## 2021-01-05 PROCEDURE — 81003 URINALYSIS AUTO W/O SCOPE: CPT

## 2021-01-05 PROCEDURE — 80048 BASIC METABOLIC PNL TOTAL CA: CPT

## 2021-01-05 PROCEDURE — 6360000002 HC RX W HCPCS: Performed by: PHYSICIAN ASSISTANT

## 2021-01-05 PROCEDURE — 82550 ASSAY OF CK (CPK): CPT

## 2021-01-05 PROCEDURE — 2580000003 HC RX 258: Performed by: PHYSICIAN ASSISTANT

## 2021-01-05 PROCEDURE — 96376 TX/PRO/DX INJ SAME DRUG ADON: CPT

## 2021-01-05 PROCEDURE — 85610 PROTHROMBIN TIME: CPT

## 2021-01-05 PROCEDURE — 99284 EMERGENCY DEPT VISIT MOD MDM: CPT

## 2021-01-05 PROCEDURE — 85025 COMPLETE CBC W/AUTO DIFF WBC: CPT

## 2021-01-05 PROCEDURE — 96374 THER/PROPH/DIAG INJ IV PUSH: CPT

## 2021-01-05 PROCEDURE — 83735 ASSAY OF MAGNESIUM: CPT

## 2021-01-05 RX ORDER — FENTANYL CITRATE 50 UG/ML
INJECTION, SOLUTION INTRAMUSCULAR; INTRAVENOUS
Status: COMPLETED
Start: 2021-01-05 | End: 2021-01-05

## 2021-01-05 RX ORDER — FENTANYL CITRATE 50 UG/ML
50 INJECTION, SOLUTION INTRAMUSCULAR; INTRAVENOUS ONCE
Status: COMPLETED | OUTPATIENT
Start: 2021-01-05 | End: 2021-01-05

## 2021-01-05 RX ORDER — CYCLOBENZAPRINE HCL 5 MG
5 TABLET ORAL 3 TIMES DAILY PRN
Qty: 10 TABLET | Refills: 0 | Status: ON HOLD | OUTPATIENT
Start: 2021-01-05 | End: 2021-02-02 | Stop reason: HOSPADM

## 2021-01-05 RX ORDER — ORPHENADRINE CITRATE 30 MG/ML
30 INJECTION INTRAMUSCULAR; INTRAVENOUS ONCE
Status: COMPLETED | OUTPATIENT
Start: 2021-01-05 | End: 2021-01-05

## 2021-01-05 RX ORDER — 0.9 % SODIUM CHLORIDE 0.9 %
1000 INTRAVENOUS SOLUTION INTRAVENOUS ONCE
Status: COMPLETED | OUTPATIENT
Start: 2021-01-05 | End: 2021-01-05

## 2021-01-05 RX ADMIN — FENTANYL CITRATE 50 MCG: 50 INJECTION, SOLUTION INTRAMUSCULAR; INTRAVENOUS at 13:47

## 2021-01-05 RX ADMIN — ORPHENADRINE CITRATE 30 MG: 30 INJECTION INTRAMUSCULAR; INTRAVENOUS at 11:04

## 2021-01-05 RX ADMIN — ORPHENADRINE CITRATE 30 MG: 30 INJECTION INTRAMUSCULAR; INTRAVENOUS at 12:41

## 2021-01-05 RX ADMIN — SODIUM CHLORIDE 1000 ML: 9 INJECTION, SOLUTION INTRAVENOUS at 11:03

## 2021-01-05 ASSESSMENT — ENCOUNTER SYMPTOMS
BACK PAIN: 0
VOMITING: 0
NAUSEA: 0
RHINORRHEA: 0
PHOTOPHOBIA: 0
DIARRHEA: 0
SHORTNESS OF BREATH: 0

## 2021-01-05 ASSESSMENT — PAIN DESCRIPTION - ORIENTATION: ORIENTATION: LEFT

## 2021-01-05 ASSESSMENT — PAIN DESCRIPTION - PAIN TYPE: TYPE: ACUTE PAIN

## 2021-01-05 ASSESSMENT — PAIN DESCRIPTION - DESCRIPTORS: DESCRIPTORS: ACHING

## 2021-01-05 ASSESSMENT — PAIN SCALES - GENERAL
PAINLEVEL_OUTOF10: 10
PAINLEVEL_OUTOF10: 8

## 2021-01-05 NOTE — ED NOTES
Bed: 029A  Expected date: 1/5/21  Expected time: 9:13 AM  Means of arrival:   Comments:  MAHOGANY Norton RN  01/05/21 0331

## 2021-01-05 NOTE — ED NOTES
Reassessment of the patients Leg Pain   is unchanged, the patients pain reassessment is a 6/10, Side rails up times 2, call light in reach, will continue to monitor.      Vidal Xie RN  01/05/21 7986

## 2021-01-05 NOTE — ED PROVIDER NOTES
[facility] EMERGENCY DEPT      CHIEF COMPLAINT       Chief Complaint   Patient presents with    Leg Pain       Nurses Notes reviewed and I agree except as noted in the HPI. HISTORY OF PRESENT ILLNESS    Deb White is a 80 y.o. male who presents for muscle cramping. Patient woke up with cramping in his left thigh. Yesterday his hands were cramped up. Son states this has happened before when the patient did not drink enough water. Cramping is occurring almost every 2 minutes. Patient drank some water yesterday and the hands improved. The patient denies fall or injury. There has been no chest pain or shortness of breath. Patient denies other complaints. The patient does take pravastatin. REVIEW OF SYSTEMS     Review of Systems   Constitutional: Negative for appetite change, chills and fever. HENT: Negative for congestion and rhinorrhea. Eyes: Negative for photophobia. Respiratory: Negative for shortness of breath. Cardiovascular: Negative for chest pain and leg swelling. Gastrointestinal: Negative for diarrhea, nausea and vomiting. Genitourinary: Negative for decreased urine volume. Musculoskeletal: Positive for myalgias. Negative for arthralgias, back pain, gait problem and neck pain. Skin: Negative for rash and wound. Neurological: Negative for weakness and numbness. Psychiatric/Behavioral: Negative for confusion. PAST MEDICAL HISTORY    has a past medical history of Actinic keratoses, Adenomatous polyp, Atrial fibrillation (HCC), CAD (coronary artery disease), Carotid artery disease ([facility] [place] [address] Hyperlipidemia, Hypertension, Inguinal hernia, left, and Unspecified cerebral artery occlusion with cerebral infarction. SURGICAL HISTORY      has a past surgical history that includes Cataract removal (2004); Coronary artery bypass graft (2009); Colonoscopy; Abdominal adhesion surgery (6/2012); Small intestine surgery; Small intestine surgery (02/20/2015);  Cardiac surgery; and Abdomen surgery. CURRENT MEDICATIONS       Discharge Medication List as of 2021 12:27 PM      CONTINUE these medications which have NOT CHANGED    Details   hydrALAZINE (APRESOLINE) 25 MG tablet Take 1 tablet by mouth 3 times daily, Disp-90 tablet, R-3Normal      tamsulosin (FLOMAX) 0.4 MG capsule Take 1 capsule by mouth daily, Disp-30 capsule, R-5Print      warfarin (COUMADIN) 3 MG tablet Take 1 tablet by mouth See Admin Instructions Indications: Anticoagulant Therapy, [facility] to dose, #45=30 days supply, Disp-45 tablet, R-11Phone In      furosemide (LASIX) 20 MG tablet Take 1 tablet by mouth daily, Disp-30 tablet, R-2Normal      pantoprazole (PROTONIX) 40 MG tablet Take 1 tablet by mouth 2 times daily (before meals). , Disp-60 tablet, R-0      pravastatin (PRAVACHOL) 40 MG tablet Take 40 mg by mouth every evening. Indications: Blood Cholesterol Abnormal      aspirin 81 MG EC tablet Take 81 mg by mouth daily Indications: Anticoagulant Therapy Take with food. Historical Med             ALLERGIES     has No Known Allergies. FAMILY HISTORY     He indicated that his mother is . He indicated that his father is . family history includes Cancer in his mother. SOCIAL HISTORY    reports that he quit smoking about 41 years ago. He has never used smokeless tobacco. He reports current alcohol use of about 1.0 standard drinks of alcohol per week. He reports that he does not use drugs. PHYSICAL EXAM     INITIAL VITALS:  height is 5' 4\" (1.626 m) and weight is 160 lb (72.6 kg). His oral temperature is 98.1 °F (36.7 °C). His blood pressure is 132/82 and his pulse is 58. His respiration is 18 and oxygen saturation is 97%. Physical Exam  Vitals signs and nursing note reviewed. Constitutional:       General: He is not in acute distress. Appearance: He is well-developed. He is not toxic-appearing or diaphoretic.    HENT:      Head: Normocephalic and atraumatic. Right Ear: Hearing normal.      Left Ear: Hearing normal.      Nose: Nose normal. No nasal deformity or rhinorrhea. Mouth/Throat:      Pharynx: Uvula midline. No oropharyngeal exudate. Eyes:      General: Lids are normal. No scleral icterus. Conjunctiva/sclera: Conjunctivae normal.      Pupils: Pupils are equal, round, and reactive to light. Neck:      Musculoskeletal: Normal range of motion and neck supple. No neck rigidity. Trachea: Trachea normal. No tracheal deviation. Cardiovascular:      Rate and Rhythm: Normal rate and regular rhythm. Pulses:           Dorsalis pedis pulses are 2+ on the right side and 2+ on the left side. Posterior tibial pulses are 2+ on the right side and 2+ on the left side. Heart sounds: Normal heart sounds. No murmur. Pulmonary:      Effort: Pulmonary effort is normal. No tachypnea or respiratory distress. Breath sounds: Normal breath sounds. No stridor. No decreased breath sounds or wheezing. Abdominal:      General: There is no distension. Palpations: Abdomen is soft. Abdomen is not rigid. Tenderness: There is no abdominal tenderness. There is no guarding. Musculoskeletal: Normal range of motion. Left hip: Normal.      Right hand: Normal.      Left hand: Normal.      Left upper leg: Normal.   Lymphadenopathy:      Cervical: No cervical adenopathy. Skin:     General: Skin is warm and dry. Coloration: Skin is not pale. Findings: No rash. Neurological:      Mental Status: He is alert and oriented to person, place, and time. GCS: GCS eye subscore is 4. GCS verbal subscore is 5. GCS motor subscore is 6. Sensory: No sensory deficit. Gait: Gait normal.      Comments: No gross deficits observed   Psychiatric:         Mood and Affect: Mood normal.         Speech: Speech normal.         Behavior: Behavior normal. Behavior is cooperative. Thought Content:  Thought content normal. DIFFERENTIAL DIAGNOSIS:   Including but not limited to: Hypokalemia, hyperkalemia, dehydration, ANNIKA, hypercalcemia, medication adverse drug reaction    DIAGNOSTIC RESULTS     EKG: All EKG's are interpreted by [facility] who either signs or Co-signs this chart in the absence of a cardiologist.  None    RADIOLOGY: non-plain film images(s) such as CT,Ultrasound and MRI are read by the radiologist.  Plain radiographic images are visualized and preliminarily interpreted by the emergency physician unless otherwise stated below. XR HIP 2-3 VW W PELVIS LEFT   Final Result   No acute abnormality. **This report has been created using voice recognition software. It may contain minor errors which are inherent in voice recognition technology. **      Final report electronically signed by Dr. Ever Quispe on 1/5/2021 11:35 AM          LABS:   Labs Reviewed   BASIC METABOLIC PANEL - Abnormal; Notable for the following components:       Result Value    Sodium 130 (*)     CO2 20 (*)     BUN 24 (*)     CREATININE 1.4 (*)     All other components within normal limits   CBC WITH AUTO DIFFERENTIAL - Abnormal; Notable for the following components:    RBC 3.62 (*)     Hemoglobin 9.9 (*)     Hematocrit 31.1 (*)     MCHC 31.8 (*)     RDW-CV 18.0 (*)     RDW-SD 55.7 (*)     All other components within normal limits   PROTIME-INR - Abnormal; Notable for the following components:    INR 1.35 (*)     All other components within normal limits   GLOMERULAR FILTRATION RATE, ESTIMATED - Abnormal; Notable for the following components:    Est, Glom Filt Rate 48 (*)     All other components within normal limits   OSMOLALITY - Abnormal; Notable for the following components:    Osmolality Calc 264.9 (*)     All other components within normal limits   CK - Abnormal; Notable for the following components:     Total  (*)     All other components within normal limits   MAGNESIUM   URINE RT REFLEX TO CULTURE ANION GAP       EMERGENCY DEPARTMENT COURSE:   Vitals:    Vitals:    01/05/21 0922 01/05/21 1036 01/05/21 1237 01/05/21 1350   BP: (!) 150/44 (!) 125/49 (!) 141/63 132/82   Pulse: 54 52 58 58   Resp: 20 18 18 18   Temp: 98.1 °F (36.7 °C)      TempSrc: Oral      SpO2: 97% 97% 97% 97%   Weight: 160 lb (72.6 kg)      Height: 5' 4\" (1.626 m)        MDM:  The patient was seen and evaluated within the ED today for cramping of the left thigh. On exam, I appreciated a neurovascularly intact patient with no signs of DVT. Old records were reviewed. Within the department, I observed the patient's vital signs to be within acceptable range. Radiological studies within the department revealed no acute process. Laboratory work was reassuring. Within the department, the patient was treated with saline and Norflex. I observed the patient's condition to modestly improve during the duration of their stay. I have considered metabolic derangement, DVT and this patient's presentation is not consistent with such entities and therefore, no further testing was warranted. The patient was comfortable with the plan of discharge home and to follow up with Dr. Jean Pierre Ricardo. Anticipatory guidance was given. The patient was told to stop pravastatin. The patient was instructed to return to the emergency department for any worsening of their symptoms. Patient was discharged from the emergency department in good condition with all questions answered. See disposition below. I have given the patient strict written and verbal instructions about care at home, follow-up, and signs and symptoms of worsening of condition and they did verbalize understanding. CRITICAL CARE:   None    CONSULTS:  None    PROCEDURES:  None    FINAL IMPRESSION      1. Muscle cramping    2. Left leg pain          DISPOSITION/PLAN     1. Muscle cramping    2.  Left leg pain        PATIENT REFERRED TO:  DO Jesus Burris 53 [address]  [phone]    Schedule an appointment as soon as possible for a visit         DISCHARGE MEDICATIONS:  Discharge Medication List as of 1/5/2021 12:27 PM      START taking these medications    Details   cyclobenzaprine (FLEXERIL) 5 MG tablet Take 1 tablet by mouth 3 times daily as needed for Muscle spasms, Disp-10 tablet, R-0Print             (Please note that portions of this note were completed with a voice recognition program.  Efforts were made to edit the dictations but occasionally words are mis-transcribed.)    Armand Allen PA-C 01/09/21 2:51 PM    JESSICA Marcos PA-C  01/05/21 [address] JESSICA Krishnan  01/09/21 3646

## 2021-01-05 NOTE — ED NOTES
Patient to ED for left leg and hip pain. Patient states pain started this AM. Patient denies injury/ trauma.  Patient also complaining of hands cramping intermittently over the past several weeks     [place] GERARDO Koenig  01/05/21 [id number]

## 2021-01-06 ENCOUNTER — OFFICE VISIT (OUTPATIENT)
Dept: CARDIOLOGY CLINIC | Age: 86
End: 2021-01-06
Payer: MEDICARE

## 2021-01-06 VITALS
OXYGEN SATURATION: 98 % | WEIGHT: 162 LBS | SYSTOLIC BLOOD PRESSURE: 137 MMHG | HEART RATE: 53 BPM | DIASTOLIC BLOOD PRESSURE: 53 MMHG | HEIGHT: 64 IN | BODY MASS INDEX: 27.66 KG/M2

## 2021-01-06 DIAGNOSIS — R00.1 BRADYCARDIA: ICD-10-CM

## 2021-01-06 DIAGNOSIS — I48.91 ATRIAL FIBRILLATION, UNSPECIFIED TYPE (HCC): Primary | ICD-10-CM

## 2021-01-06 PROCEDURE — 4040F PNEUMOC VAC/ADMIN/RCVD: CPT | Performed by: INTERNAL MEDICINE

## 2021-01-06 PROCEDURE — 1123F ACP DISCUSS/DSCN MKR DOCD: CPT | Performed by: INTERNAL MEDICINE

## 2021-01-06 PROCEDURE — G8484 FLU IMMUNIZE NO ADMIN: HCPCS | Performed by: INTERNAL MEDICINE

## 2021-01-06 PROCEDURE — 93000 ELECTROCARDIOGRAM COMPLETE: CPT | Performed by: INTERNAL MEDICINE

## 2021-01-06 PROCEDURE — G8427 DOCREV CUR MEDS BY ELIG CLIN: HCPCS | Performed by: INTERNAL MEDICINE

## 2021-01-06 PROCEDURE — 99213 OFFICE O/P EST LOW 20 MIN: CPT | Performed by: INTERNAL MEDICINE

## 2021-01-06 PROCEDURE — G8417 CALC BMI ABV UP PARAM F/U: HCPCS | Performed by: INTERNAL MEDICINE

## 2021-01-06 PROCEDURE — 1111F DSCHRG MED/CURRENT MED MERGE: CPT | Performed by: INTERNAL MEDICINE

## 2021-01-06 PROCEDURE — 1036F TOBACCO NON-USER: CPT | Performed by: INTERNAL MEDICINE

## 2021-01-06 NOTE — PROGRESS NOTES
heat intolerance, polydipsia. Genitourinary: Negative for dysuria, enuresis, flank pain and hematuria. Musculoskeletal: Negative for arthralgias, joint swelling and neck pain. Neurological: Negative for numbness and headaches. Psychiatric/Behavioral: Negative for agitation, confusion, decreased concentration and dysphoric mood. PAST MEDICAL HISTORY:  Past Medical History:   Diagnosis Date    Actinic keratoses 2013    Adenomatous polyp 2008    [facility] [address]     CAD (coronary artery disease) 2009    Carotid artery disease ([facility] [address] 2009    left    Hyperlipidemia 2004    Hypertension 2004    Inguinal hernia, left 2014    Unspecified cerebral artery occlusion with cerebral infarction        PSH:  Past Surgical History:   Procedure Laterality Date    ABDOMEN SURGERY      ABDOMINAL ADHESION SURGERY  2012    Dr Bacilio Fraser CATARACT REMOVAL  2004    right    COLONOSCOPY      [facility]    CORONARY ARTERY BYPASS GRAFT  2009    SMALL INTESTINE SURGERY      SMALL INTESTINE SURGERY  2015    Exploratory Laparotomy       FAMILY HISTORY:  Family History   Problem Relation Age of Onset    Cancer Mother         SOCIAL HISTORY:  Social History     Socioeconomic History    Marital status:      Spouse name: None    Number of children: None    Years of education: None    Highest education level: None   Occupational History    None   Social Needs    Financial resource strain: None    Food insecurity     Worry: None     Inability: None    Transportation needs     Medical: None     Non-medical: None   Tobacco Use    Smoking status: Former Smoker     Quit date: 1980     Years since quittin.0    Smokeless tobacco: Never Used   Substance and Sexual Activity    Alcohol use:  Yes     Alcohol/week: 1.0 standard drinks     Types: 1 Cans of beer per week     Comment: occasionally    Drug use: No    Sexual activity: None   Lifestyle    Physical activity     Days per week: None     Minutes per session: None    Stress: None   Relationships    Social connections     Talks on phone: None     Gets together: None     Attends Restorationist service: None     Active member of club or organization: None     Attends meetings of clubs or organizations: None     Relationship status: None    Intimate partner violence     Fear of current or ex partner: None     Emotionally abused: None     Physically abused: None     Forced sexual activity: None   Other Topics Concern    None   Social History Narrative    None        ALLERGY HISTORY:  No Known Allergies     MEDICATIONS:  Current Outpatient Medications   Medication Sig Dispense Refill    hydrALAZINE (APRESOLINE) 25 MG tablet Take 1 tablet by mouth 3 times daily 90 tablet 3    tamsulosin (FLOMAX) 0.4 MG capsule Take 1 capsule by mouth daily 30 capsule 5    warfarin (COUMADIN) 3 MG tablet Take 1 tablet by mouth See Admin Instructions Indications: Anticoagulant Therapy, [facility] dose, #45=30 days supply 45 tablet 11    furosemide (LASIX) 20 MG tablet Take 1 tablet by mouth daily 30 tablet 2    pantoprazole (PROTONIX) 40 MG tablet Take 1 tablet by mouth 2 times daily (before meals). 60 tablet 0    aspirin 81 MG EC tablet Take 81 mg by mouth daily Indications: Anticoagulant Therapy Take with food.  cyclobenzaprine (FLEXERIL) 5 MG tablet Take 1 tablet by mouth 3 times daily as needed for Muscle spasms (Patient not taking: Reported on 1/6/2021) 10 tablet 0    pravastatin (PRAVACHOL) 40 MG tablet Take 40 mg by mouth every evening. Indications: Blood Cholesterol Abnormal       No current facility-administered medications for this visit. PHYSICAL EXAM:  BP (!) 137/53   Pulse 53   Ht 5' 4\" (1.626 m)   Wt 162 lb (73.5 kg)   SpO2 98%   BMI 27.81 kg/m²   No intake or output data in the 24 hours ending 01/06/21 1542  [unfilled]  GENERAL: Alert and oriented.  No

## 2021-01-12 LAB
BUN BLDV-MCNC: NORMAL MG/DL
CALCIUM SERPL-MCNC: NORMAL MG/DL
CHLORIDE BLD-SCNC: NORMAL MMOL/L
CO2: 22 MMOL/L
CREAT SERPL-MCNC: 1.8 MG/DL
GFR CALCULATED: NORMAL
GLUCOSE BLD-MCNC: 85 MG/DL
POTASSIUM SERPL-SCNC: 3.8 MMOL/L
SODIUM BLD-SCNC: 131 MMOL/L

## 2021-01-17 ENCOUNTER — APPOINTMENT (OUTPATIENT)
Dept: CT IMAGING | Age: 86
DRG: 177 | End: 2021-01-17
Payer: OTHER GOVERNMENT

## 2021-01-17 ENCOUNTER — APPOINTMENT (OUTPATIENT)
Dept: GENERAL RADIOLOGY | Age: 86
DRG: 177 | End: 2021-01-17
Payer: OTHER GOVERNMENT

## 2021-01-17 ENCOUNTER — HOSPITAL ENCOUNTER (INPATIENT)
Age: 86
LOS: 19 days | Discharge: NURSING, REHAB OR LONG-TERM CARE | DRG: 177 | End: 2021-02-05
Attending: EMERGENCY MEDICINE | Admitting: INTERNAL MEDICINE
Payer: OTHER GOVERNMENT

## 2021-01-17 DIAGNOSIS — E87.1 HYPO-OSMOLAR HYPONATREMIA: ICD-10-CM

## 2021-01-17 DIAGNOSIS — R29.6 UNWITNESSED FALL: Primary | ICD-10-CM

## 2021-01-17 DIAGNOSIS — T79.6XXA TRAUMATIC RHABDOMYOLYSIS, INITIAL ENCOUNTER (HCC): ICD-10-CM

## 2021-01-17 LAB
ANION GAP SERPL CALCULATED.3IONS-SCNC: 12 MEQ/L (ref 8–16)
ANION GAP SERPL CALCULATED.3IONS-SCNC: 18 MEQ/L (ref 8–16)
APTT: 42.8 SECONDS (ref 22–38)
BACTERIA: ABNORMAL
BASE EXCESS MIXED: -5.6 MMOL/L (ref -2–3)
BASOPHILS # BLD: 0.2 %
BASOPHILS ABSOLUTE: 0 THOU/MM3 (ref 0–0.1)
BILIRUBIN URINE: NEGATIVE
BLOOD, URINE: ABNORMAL
BUN BLDV-MCNC: 16 MG/DL (ref 7–22)
BUN BLDV-MCNC: 17 MG/DL (ref 7–22)
CALCIUM SERPL-MCNC: 7.8 MG/DL (ref 8.5–10.5)
CALCIUM SERPL-MCNC: 8.4 MG/DL (ref 8.5–10.5)
CASTS: ABNORMAL /LPF
CASTS: ABNORMAL /LPF
CHARACTER, URINE: ABNORMAL
CHLORIDE BLD-SCNC: 82 MEQ/L (ref 98–111)
CHLORIDE BLD-SCNC: 86 MEQ/L (ref 98–111)
CO2: 16 MEQ/L (ref 23–33)
CO2: 18 MEQ/L (ref 23–33)
COLLECTED BY:: ABNORMAL
COLOR: ABNORMAL
CREAT SERPL-MCNC: 1.1 MG/DL (ref 0.4–1.2)
CREAT SERPL-MCNC: 1.4 MG/DL (ref 0.4–1.2)
CREATININE URINE: 146.3 MG/DL
CRYSTALS: ABNORMAL
DEVICE: ABNORMAL
EKG ATRIAL RATE: 24 BPM
EKG Q-T INTERVAL: 534 MS
EKG QRS DURATION: 100 MS
EKG QTC CALCULATION (BAZETT): 537 MS
EKG R AXIS: -81 DEGREES
EKG T AXIS: -7 DEGREES
EKG VENTRICULAR RATE: 61 BPM
EOSINOPHIL # BLD: 0.1 %
EOSINOPHILS ABSOLUTE: 0 THOU/MM3 (ref 0–0.4)
EPITHELIAL CELLS, UA: ABNORMAL /HPF
ERYTHROCYTE [DISTWIDTH] IN BLOOD BY AUTOMATED COUNT: 18 % (ref 11.5–14.5)
ERYTHROCYTE [DISTWIDTH] IN BLOOD BY AUTOMATED COUNT: 53.3 FL (ref 35–45)
FIO2, MIXED VENOUS: 3
GFR SERPL CREATININE-BSD FRML MDRD: 48 ML/MIN/1.73M2
GFR SERPL CREATININE-BSD FRML MDRD: 63 ML/MIN/1.73M2
GLUCOSE BLD-MCNC: 106 MG/DL (ref 70–108)
GLUCOSE BLD-MCNC: 72 MG/DL (ref 70–108)
GLUCOSE BLD-MCNC: 87 MG/DL (ref 70–108)
GLUCOSE, URINE: NEGATIVE MG/DL
HCO3, MIXED: 19 MMOL/L (ref 23–28)
HCT VFR BLD CALC: 31.1 % (ref 42–52)
HEMOGLOBIN: 10.5 GM/DL (ref 14–18)
IMMATURE GRANS (ABS): 0.03 THOU/MM3 (ref 0–0.07)
IMMATURE GRANULOCYTES: 0.3 %
INR BLD: 5.19 (ref 0.85–1.13)
KETONES, URINE: 15
LEUKOCYTE EST, POC: ABNORMAL
LYMPHOCYTES # BLD: 5.2 %
LYMPHOCYTES ABSOLUTE: 0.6 THOU/MM3 (ref 1–4.8)
MAGNESIUM: 1.4 MG/DL (ref 1.6–2.4)
MCH RBC QN AUTO: 27.9 PG (ref 26–33)
MCHC RBC AUTO-ENTMCNC: 33.8 GM/DL (ref 32.2–35.5)
MCV RBC AUTO: 82.5 FL (ref 80–94)
MISCELLANEOUS LAB TEST RESULT: ABNORMAL
MONOCYTES # BLD: 9.2 %
MONOCYTES ABSOLUTE: 1 THOU/MM3 (ref 0.4–1.3)
MRSA SCREEN RT-PCR: NEGATIVE
MYOGLOBIN URINE: POSITIVE
NITRITE, URINE: NEGATIVE
NUCLEATED RED BLOOD CELLS: 0 /100 WBC
O2 SAT, MIXED: 59 %
OSMOLALITY CALCULATION: 235.3 MOSMOL/KG (ref 275–300)
PCO2, MIXED VENOUS: 31 MMHG (ref 41–51)
PH UA: 5 (ref 5–9)
PH, MIXED: 7.39 (ref 7.31–7.41)
PLATELET # BLD: 277 THOU/MM3 (ref 130–400)
PMV BLD AUTO: 10.3 FL (ref 9.4–12.4)
PO2 MIXED: 30 MMHG (ref 25–40)
POC LACTIC ACID: 1.8 MMOL/L (ref 0.5–1.9)
POTASSIUM REFLEX MAGNESIUM: 4.9 MEQ/L (ref 3.5–5.2)
POTASSIUM SERPL-SCNC: 4.8 MEQ/L (ref 3.5–5.2)
PRO-BNP: ABNORMAL PG/ML (ref 0–1800)
PROCALCITONIN: 0.73 NG/ML (ref 0.01–0.09)
PROTEIN UA: 100 MG/DL
RBC # BLD: 3.77 MILL/MM3 (ref 4.7–6.1)
RBC URINE: ABNORMAL /HPF
RENAL EPITHELIAL, UA: ABNORMAL
SEG NEUTROPHILS: 85 %
SEGMENTED NEUTROPHILS ABSOLUTE COUNT: 9.4 THOU/MM3 (ref 1.8–7.7)
SODIUM BLD-SCNC: 116 MEQ/L (ref 135–145)
SODIUM URINE: < 20 MEQ/L
SPECIFIC GRAVITY UA: 1.02 (ref 1–1.03)
TOTAL CK: ABNORMAL U/L (ref 55–170)
TROPONIN T: 0.55 NG/ML
TROPONIN T: 0.58 NG/ML
UROBILINOGEN, URINE: 1 EU/DL (ref 0–1)
VANCOMYCIN RESISTANT ENTEROCOCCUS: NEGATIVE
WBC # BLD: 11 THOU/MM3 (ref 4.8–10.8)
WBC UA: ABNORMAL /HPF
YEAST: ABNORMAL

## 2021-01-17 PROCEDURE — 51702 INSERT TEMP BLADDER CATH: CPT

## 2021-01-17 PROCEDURE — 84550 ASSAY OF BLOOD/URIC ACID: CPT

## 2021-01-17 PROCEDURE — 96360 HYDRATION IV INFUSION INIT: CPT

## 2021-01-17 PROCEDURE — 82948 REAGENT STRIP/BLOOD GLUCOSE: CPT

## 2021-01-17 PROCEDURE — 87641 MR-STAPH DNA AMP PROBE: CPT

## 2021-01-17 PROCEDURE — 85730 THROMBOPLASTIN TIME PARTIAL: CPT

## 2021-01-17 PROCEDURE — 73552 X-RAY EXAM OF FEMUR 2/>: CPT

## 2021-01-17 PROCEDURE — 72170 X-RAY EXAM OF PELVIS: CPT

## 2021-01-17 PROCEDURE — 82553 CREATINE MB FRACTION: CPT

## 2021-01-17 PROCEDURE — 93005 ELECTROCARDIOGRAM TRACING: CPT | Performed by: EMERGENCY MEDICINE

## 2021-01-17 PROCEDURE — 85610 PROTHROMBIN TIME: CPT

## 2021-01-17 PROCEDURE — 6360000002 HC RX W HCPCS: Performed by: NURSE PRACTITIONER

## 2021-01-17 PROCEDURE — 72125 CT NECK SPINE W/O DYE: CPT

## 2021-01-17 PROCEDURE — 2000000000 HC ICU R&B

## 2021-01-17 PROCEDURE — 2580000003 HC RX 258: Performed by: INTERNAL MEDICINE

## 2021-01-17 PROCEDURE — 83605 ASSAY OF LACTIC ACID: CPT

## 2021-01-17 PROCEDURE — 84145 PROCALCITONIN (PCT): CPT

## 2021-01-17 PROCEDURE — 99291 CRITICAL CARE FIRST HOUR: CPT | Performed by: INTERNAL MEDICINE

## 2021-01-17 PROCEDURE — 81001 URINALYSIS AUTO W/SCOPE: CPT

## 2021-01-17 PROCEDURE — 36415 COLL VENOUS BLD VENIPUNCTURE: CPT

## 2021-01-17 PROCEDURE — 82550 ASSAY OF CK (CPK): CPT

## 2021-01-17 PROCEDURE — 83874 ASSAY OF MYOGLOBIN: CPT

## 2021-01-17 PROCEDURE — 87081 CULTURE SCREEN ONLY: CPT

## 2021-01-17 PROCEDURE — 71250 CT THORAX DX C-: CPT

## 2021-01-17 PROCEDURE — 84295 ASSAY OF SERUM SODIUM: CPT

## 2021-01-17 PROCEDURE — 84484 ASSAY OF TROPONIN QUANT: CPT

## 2021-01-17 PROCEDURE — 71046 X-RAY EXAM CHEST 2 VIEWS: CPT

## 2021-01-17 PROCEDURE — 2580000003 HC RX 258: Performed by: NURSE PRACTITIONER

## 2021-01-17 PROCEDURE — 80048 BASIC METABOLIC PNL TOTAL CA: CPT

## 2021-01-17 PROCEDURE — 85025 COMPLETE CBC W/AUTO DIFF WBC: CPT

## 2021-01-17 PROCEDURE — 70450 CT HEAD/BRAIN W/O DYE: CPT

## 2021-01-17 PROCEDURE — 36556 INSERT NON-TUNNEL CV CATH: CPT

## 2021-01-17 PROCEDURE — 6360000002 HC RX W HCPCS

## 2021-01-17 PROCEDURE — 87500 VANOMYCIN DNA AMP PROBE: CPT

## 2021-01-17 PROCEDURE — 6370000000 HC RX 637 (ALT 250 FOR IP): Performed by: INTERNAL MEDICINE

## 2021-01-17 PROCEDURE — 83735 ASSAY OF MAGNESIUM: CPT

## 2021-01-17 PROCEDURE — 84300 ASSAY OF URINE SODIUM: CPT

## 2021-01-17 PROCEDURE — 36556 INSERT NON-TUNNEL CV CATH: CPT | Performed by: INTERNAL MEDICINE

## 2021-01-17 PROCEDURE — 82570 ASSAY OF URINE CREATININE: CPT

## 2021-01-17 PROCEDURE — 82803 BLOOD GASES ANY COMBINATION: CPT

## 2021-01-17 PROCEDURE — 2580000003 HC RX 258: Performed by: STUDENT IN AN ORGANIZED HEALTH CARE EDUCATION/TRAINING PROGRAM

## 2021-01-17 PROCEDURE — 99285 EMERGENCY DEPT VISIT HI MDM: CPT

## 2021-01-17 PROCEDURE — 02HV33Z INSERTION OF INFUSION DEVICE INTO SUPERIOR VENA CAVA, PERCUTANEOUS APPROACH: ICD-10-PCS | Performed by: INTERNAL MEDICINE

## 2021-01-17 PROCEDURE — 83880 ASSAY OF NATRIURETIC PEPTIDE: CPT

## 2021-01-17 PROCEDURE — 82533 TOTAL CORTISOL: CPT

## 2021-01-17 PROCEDURE — 71045 X-RAY EXAM CHEST 1 VIEW: CPT

## 2021-01-17 RX ORDER — PANTOPRAZOLE SODIUM 40 MG/1
40 TABLET, DELAYED RELEASE ORAL
Status: DISCONTINUED | OUTPATIENT
Start: 2021-01-18 | End: 2021-01-31

## 2021-01-17 RX ORDER — SODIUM CHLORIDE, SODIUM LACTATE, POTASSIUM CHLORIDE, CALCIUM CHLORIDE 600; 310; 30; 20 MG/100ML; MG/100ML; MG/100ML; MG/100ML
INJECTION, SOLUTION INTRAVENOUS ONCE
Status: COMPLETED | OUTPATIENT
Start: 2021-01-17 | End: 2021-01-17

## 2021-01-17 RX ORDER — ONDANSETRON 2 MG/ML
4 INJECTION INTRAMUSCULAR; INTRAVENOUS EVERY 6 HOURS PRN
Status: DISCONTINUED | OUTPATIENT
Start: 2021-01-17 | End: 2021-01-26 | Stop reason: SDUPTHER

## 2021-01-17 RX ORDER — SODIUM CHLORIDE, SODIUM LACTATE, POTASSIUM CHLORIDE, AND CALCIUM CHLORIDE .6; .31; .03; .02 G/100ML; G/100ML; G/100ML; G/100ML
500 INJECTION, SOLUTION INTRAVENOUS ONCE
Status: DISCONTINUED | OUTPATIENT
Start: 2021-01-17 | End: 2021-01-17

## 2021-01-17 RX ORDER — CYCLOBENZAPRINE HCL 10 MG
5 TABLET ORAL 3 TIMES DAILY PRN
Status: DISCONTINUED | OUTPATIENT
Start: 2021-01-17 | End: 2021-02-02

## 2021-01-17 RX ORDER — SODIUM CHLORIDE 0.9 % (FLUSH) 0.9 %
10 SYRINGE (ML) INJECTION EVERY 12 HOURS SCHEDULED
Status: DISCONTINUED | OUTPATIENT
Start: 2021-01-17 | End: 2021-01-26 | Stop reason: SDUPTHER

## 2021-01-17 RX ORDER — POLYETHYLENE GLYCOL 3350 17 G/17G
17 POWDER, FOR SOLUTION ORAL DAILY PRN
Status: DISCONTINUED | OUTPATIENT
Start: 2021-01-17 | End: 2021-01-29

## 2021-01-17 RX ORDER — SODIUM CHLORIDE 0.9 % (FLUSH) 0.9 %
10 SYRINGE (ML) INJECTION PRN
Status: DISCONTINUED | OUTPATIENT
Start: 2021-01-17 | End: 2021-01-26 | Stop reason: SDUPTHER

## 2021-01-17 RX ORDER — PRAVASTATIN SODIUM 40 MG
40 TABLET ORAL EVERY EVENING
Status: DISCONTINUED | OUTPATIENT
Start: 2021-01-17 | End: 2021-01-27

## 2021-01-17 RX ORDER — TAMSULOSIN HYDROCHLORIDE 0.4 MG/1
0.4 CAPSULE ORAL DAILY
Status: DISCONTINUED | OUTPATIENT
Start: 2021-01-17 | End: 2021-02-05 | Stop reason: HOSPADM

## 2021-01-17 RX ORDER — MIDAZOLAM HYDROCHLORIDE 1 MG/ML
INJECTION INTRAMUSCULAR; INTRAVENOUS
Status: COMPLETED
Start: 2021-01-17 | End: 2021-01-17

## 2021-01-17 RX ORDER — 3% SODIUM CHLORIDE 3 G/100ML
50 INJECTION, SOLUTION INTRAVENOUS ONCE
Status: COMPLETED | OUTPATIENT
Start: 2021-01-17 | End: 2021-01-17

## 2021-01-17 RX ORDER — SODIUM CHLORIDE 9 MG/ML
INJECTION, SOLUTION INTRAVENOUS CONTINUOUS
Status: DISCONTINUED | OUTPATIENT
Start: 2021-01-17 | End: 2021-01-17

## 2021-01-17 RX ORDER — ACETAMINOPHEN 650 MG/1
650 SUPPOSITORY RECTAL EVERY 6 HOURS PRN
Status: DISCONTINUED | OUTPATIENT
Start: 2021-01-17 | End: 2021-01-18 | Stop reason: SDUPTHER

## 2021-01-17 RX ORDER — ACETAMINOPHEN 325 MG/1
650 TABLET ORAL EVERY 6 HOURS PRN
Status: DISCONTINUED | OUTPATIENT
Start: 2021-01-17 | End: 2021-01-18 | Stop reason: SDUPTHER

## 2021-01-17 RX ORDER — ASPIRIN 81 MG/1
81 TABLET ORAL DAILY
Status: DISCONTINUED | OUTPATIENT
Start: 2021-01-17 | End: 2021-02-02 | Stop reason: SDUPTHER

## 2021-01-17 RX ORDER — PROMETHAZINE HYDROCHLORIDE 25 MG/1
12.5 TABLET ORAL EVERY 6 HOURS PRN
Status: DISCONTINUED | OUTPATIENT
Start: 2021-01-17 | End: 2021-01-26 | Stop reason: SDUPTHER

## 2021-01-17 RX ORDER — MAGNESIUM SULFATE IN WATER 40 MG/ML
2 INJECTION, SOLUTION INTRAVENOUS PRN
Status: DISCONTINUED | OUTPATIENT
Start: 2021-01-17 | End: 2021-02-05 | Stop reason: HOSPADM

## 2021-01-17 RX ORDER — HYDRALAZINE HYDROCHLORIDE 25 MG/1
25 TABLET, FILM COATED ORAL 3 TIMES DAILY
Status: DISCONTINUED | OUTPATIENT
Start: 2021-01-17 | End: 2021-01-29

## 2021-01-17 RX ORDER — WARFARIN SODIUM 3 MG/1
3 TABLET ORAL SEE ADMIN INSTRUCTIONS
Status: DISCONTINUED | OUTPATIENT
Start: 2021-01-17 | End: 2021-01-17 | Stop reason: DRUGHIGH

## 2021-01-17 RX ORDER — 3% SODIUM CHLORIDE 3 G/100ML
50 INJECTION, SOLUTION INTRAVENOUS ONCE
Status: DISCONTINUED | OUTPATIENT
Start: 2021-01-17 | End: 2021-01-17

## 2021-01-17 RX ADMIN — SODIUM CHLORIDE, POTASSIUM CHLORIDE, SODIUM LACTATE AND CALCIUM CHLORIDE: 600; 310; 30; 20 INJECTION, SOLUTION INTRAVENOUS at 15:15

## 2021-01-17 RX ADMIN — PRAVASTATIN SODIUM 40 MG: 40 TABLET ORAL at 21:57

## 2021-01-17 RX ADMIN — SODIUM CHLORIDE 50 ML: 3 INJECTION, SOLUTION INTRAVENOUS at 22:31

## 2021-01-17 RX ADMIN — MIDAZOLAM 1 MG: 1 INJECTION INTRAMUSCULAR; INTRAVENOUS at 18:54

## 2021-01-17 RX ADMIN — SODIUM CHLORIDE: 9 INJECTION, SOLUTION INTRAVENOUS at 17:45

## 2021-01-17 RX ADMIN — TAMSULOSIN HYDROCHLORIDE 0.4 MG: 0.4 CAPSULE ORAL at 21:57

## 2021-01-17 RX ADMIN — MAGNESIUM SULFATE HEPTAHYDRATE 2 G: 40 INJECTION, SOLUTION INTRAVENOUS at 22:18

## 2021-01-17 ASSESSMENT — ENCOUNTER SYMPTOMS
DIARRHEA: 0
NAUSEA: 0
SHORTNESS OF BREATH: 0
COUGH: 0
EYE PAIN: 0
VOMITING: 0
ABDOMINAL PAIN: 0

## 2021-01-17 ASSESSMENT — PAIN DESCRIPTION - FREQUENCY: FREQUENCY: CONTINUOUS

## 2021-01-17 ASSESSMENT — PAIN DESCRIPTION - DESCRIPTORS: DESCRIPTORS: CRAMPING

## 2021-01-17 NOTE — ED PROVIDER NOTES
Negative for rash. Neurological: Positive for syncope. Negative for dizziness, speech difficulty, weakness and headaches. Psychiatric/Behavioral: The patient is not nervous/anxious. PAST MEDICAL AND SURGICAL HISTORY     Past Medical History:   Diagnosis Date    Actinic keratoses 2013    Adenomatous polyp     Atrial fibrillation ([facility] [address]     CAD (coronary artery disease) 2009    Carotid artery disease ([facility] [address] 2009    left    Hyperlipidemia 2004    Hypertension 2004    Inguinal hernia, left 2014    Unspecified cerebral artery occlusion with cerebral infarction      Past Surgical History:   Procedure Laterality Date    ABDOMEN SURGERY      ABDOMINAL ADHESION SURGERY  2012    Dr Tim Mosley CATARACT REMOVAL  2004    right    [facility]ONOSCOPY      [facility]    CORONARY ARTERY BYPASS GRAFT  2009    SMALL INTESTINE SURGERY      SMALL INTESTINE SURGERY  2015    Exploratory Laparotomy         MEDICATIONS     Current Facility-Administered Medications:     0.9 % sodium chloride infusion, , Intravenous, Continuous, Dick Hodge MD, Last Rate: 100 mL/hr at 21 1745, New Bag at 21 174    sodium chloride 3 % solution 50 mL, 50 mL, Intravenous, Once, Dick Hodge MD    midazolam (VERSED) 2 MG/2ML injection, , , ,       SOCIAL HISTORY     Social History     Social History Narrative    Not on file     Social History     Tobacco Use    Smoking status: Former Smoker     Quit date: 1980     Years since quittin.0    Smokeless tobacco: Never Used   Substance Use Topics    Alcohol use:  Yes     Alcohol/week: 1.0 standard drinks     Types: 1 Cans of beer per week     Comment: occasionally    Drug use: No         ALLERGIES   No Known Allergies      FAMILY HISTORY     Family History   Problem Relation Age of Onset    Cancer Mother          PREVIOUS RECORDS   Previous records reviewed: history, imaging, previous ED visits, previous labs.        PHYSICAL EXAM     ED Triage Vitals [01/17/21 1447]   BP Temp Temp Source Pulse Resp SpO2 Height Weight   (!) 107/53 97.6 °F (36.4 °C) Oral 52 22 100 % 5' 4\" (1.626 m) 162 lb (73.5 kg)     Initial vital signs and nursing assessment reviewed and BP and HR low, increased RR . Body mass index is 27.81 kg/m². Pulsoximetry is normal per my interpretation. Additional Vital Signs:  Vitals:    01/17/21 1800   BP: (!) 112/51   Pulse: 57   Resp: 18   Temp:    SpO2: 95%       Physical Exam  Vitals signs and nursing note reviewed. Constitutional:       General: He is not in acute distress. Appearance: He is well-developed. He is not diaphoretic. HENT:      Head: Normocephalic and atraumatic. Right Ear: External ear normal.      Left Ear: External ear normal.      Nose: Nose normal.      Mouth/Throat:      Mouth: Mucous membranes are moist.   Eyes:      General: No scleral icterus. Right eye: No discharge. Left eye: No discharge. Extraocular Movements: Extraocular movements intact. Conjunctiva/sclera: Conjunctivae normal.      Pupils: Pupils are equal, round, and reactive to light. Neck:      Musculoskeletal: Normal range of motion. Cardiovascular:      Rate and Rhythm: Bradycardia present. Rhythm irregular. Heart sounds: Normal heart sounds. No murmur. Pulmonary:      Effort: Pulmonary effort is normal.      Breath sounds: Normal breath sounds. No wheezing or rales. Abdominal:      Palpations: Abdomen is soft. Tenderness: There is no abdominal tenderness. Skin:     General: Skin is warm and dry. Findings: Bruising (left medial thigh ) and lesion (superficial abrasions to bilateral knees, right shin superficial laceration) present. No erythema or rash. Neurological:      Mental Status: He is alert and oriented to person, place, and time. Cranial Nerves: No cranial nerve deficit. Sensory: No sensory deficit.       Motor: Weakness (4/5 left leg ) present. Psychiatric:         Behavior: Behavior normal.         Thought Content: Thought content normal.         Judgment: Judgment normal.         MEDICAL DECISION MAKING   Initial Assessment: Witnessed fall with possible loss of consciousness was on the floor for unknown period of time and is now having generalized muscle aches. Therefore considering cardiogenic syncope, electrolyte abnormalities, CVA, rhabdomyolysis, MI.   Plan: Stat CT head, CT cervical spine, x-ray left hip, x-ray left femur, CBC, BMP, CK, troponin, chest x-ray, INR and PTT, begin lactated Ringer's. ED RESULTS   Laboratory results:  Labs Reviewed   CBC WITH AUTO DIFFERENTIAL - Abnormal; Notable for the following components:       Result Value    WBC 11.0 (*)     RBC 3.77 (*)     Hemoglobin 10.5 (*)     Hematocrit 31.1 (*)     RDW-CV 18.0 (*)     RDW-SD 53.3 (*)     Segs Absolute 9.4 (*)     Lymphocytes Absolute 0.6 (*)     All other components within normal limits   BASIC METABOLIC PANEL W/ REFLEX TO MG FOR LOW K - Abnormal; Notable for the following components:    Sodium 116 (*)     Chloride 82 (*)     CO2 16 (*)     Calcium 8.4 (*)     All other components within normal limits   TROPONIN - Abnormal; Notable for the following components:    Troponin T 0.577 (*)     All other components within normal limits   PROTIME-INR - Abnormal; Notable for the following components:    INR 5.19 (*)     All other components within normal limits   APTT - Abnormal; Notable for the following components:    aPTT 42.8 (*)     All other components within normal limits   CK - Abnormal; Notable for the following components:     Total CK 10,517 (*)     All other components within normal limits   BRAIN NATRIURETIC PEPTIDE - Abnormal; Notable for the following components:    Pro-BNP 86247.0 (*)     All other components within normal limits   ANION GAP - Abnormal; Notable for the following components:    Anion Gap 18.0 (*)     All other components within normal limits   GLOMERULAR FILTRATION RATE, ESTIMATED - Abnormal; Notable for the following components:    Est, Glom Filt Rate 63 (*)     All other components within normal limits   OSMOLALITY - Abnormal; Notable for the following components:    Osmolality Calc 235.3 (*)     All other components within normal limits   CULTURE, MRSA, SCREENING   CULTURE, VRE   SODIUM, URINE, RANDOM   CREATININE, RANDOM URINE   LACTIC ACID, PLASMA   MRSA BY PCR   SODIUM   SODIUM   SODIUM   SODIUM   SODIUM   SODIUM   SODIUM   POCT GLUCOSE       Radiologic studies results:  CT Head WO Contrast   Final Result   1. No acute intracranial hemorrhage or mass effect. 2. Moderate diffuse atrophy is demonstrated. 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 **This report has been created using voice recognition software. It may contain minor errors which are inherent in voice recognition technology. **      Final report electronically signed by Dr. Gina Sabillon on 1/17/2021 4:17 PM      CT Cervical Spine WO Contrast   Final Result   1. Slightly limited evaluation due to motion artifact. However, no definite acute fracture of the cervical spine is seen. 2. Straightening of the normal cervical lordosis is demonstrated. This may be related to muscle spasm. 3. Multilevel degenerative changes of the cervical spine are demonstrated. **This report has been created using voice recognition software. It may contain minor errors which are inherent in voice recognition technology. **      Final report electronically signed by Dr. Gina Sabillon on 1/17/2021 4:21 PM      XR PELVIS (1-2 VIEWS)   Final Result   1. No acute fracture or malalignment. Degenerative changes are as described above. **This report has been created using voice recognition software. It may contain minor errors which are inherent in voice recognition technology. **      Final report electronically signed by Dr. Citlalli Banks on 1/17/2021 3:56 PM      XR FEMUR LEFT (MIN 2 VIEWS)   Final Result   1. No acute fracture or malalignment. Degenerative changes are as described above. **This report has been created using voice recognition software. It may contain minor errors which are inherent in voice recognition technology. **      Final report electronically signed by Dr. Citlalli Banks on 1/17/2021 3:50 PM      XR CHEST (2 VW)   Final Result   1. Moderate enlargement of the cardiomediastinal silhouette. 2. There is hyperinflation noted. 2. No focal consolidation, pleural effusion or pneumothorax is seen. **This report has been created using voice recognition software. It may contain minor errors which are inherent in voice recognition technology. **      Final report electronically signed by Dr. Citlalli Banks on 1/17/2021 3:54 PM          ED Medications administered this visit:   Medications   0.9 % sodium chloride infusion ( Intravenous New Bag 1/17/21 1745)   sodium chloride 3 % solution 50 mL (has no administration in time range)   midazolam (VERSED) 2 MG/2ML injection (has no administration in time range)   lactated ringers infusion ( Intravenous Stopped 1/17/21 1745)         ED COURSE     ED Course as of Jan 17 1842   Sun Jan 17, 2021   1542 CBC demonstrating mildly elevated WBCs 11.0, hemoglobin 10.5 (baseline is 9-10), plt 277   CBC Auto Differential(!):    WBC 11.0(!)   RBC 3.77(!)   Hemoglobin Quant 10.5(!)   Hematocrit 31.1(!)   MCV 82.5   MCH 27.9   MCHC 33.8   RDW-CV 18.0(!)   RDW-SD 53.3(!)   Platelet Count 950   MPV 10.3   Seg Neutrophils 85.0   Lymphocytes 5.2   Monocytes 9.2   Eosinophils 0.1   Basophils 0.2   Immature Granulocytes 0.3   Segs Absolute 9.4(!)   Lymphocytes Absolute 0.6(!)   Monocytes Absolute 1.0   Eosinophils Absolute 0.0   Basophils Absolute 0.0   Immature Grans (Abs) 0.03   Nucleated. .. [KR]   1543 History of chronic bradycardia   Pulse: 52 [KR]   1550 P sodium 116, CO2 16, chloride 82, calcium 8.4. Anion gap 18. Basic Metabolic Panel w/ Reflex to MG(!!):    Sodium 116(!!)   Potassium 4.9   Chloride 82(!)   CO2 16(!)   Glucose 87   BUN 16   Creatinine 1.1   Calcium 8.4(!) [KR]   1551 Lactic acid ordered    [KR]   1618 CK 10,517   CK(!):    Total CK 10,517(!) [KR]   1619 PTT 42.8   APTT(!):    aPTT 42.8(!) [KR]   1619 INR 5.19   Protime-INR(!!):    INR 5.19(!!) [KR]   1620 Troponin(!):    Troponin T 0.577(!) [KR]   1620 BNP 33,543, troponin 0 0.577 likely due to rhabdomyolysis in setting of elevated CK   Brain Natriuretic Peptide(!):    Pro-BNP 44513. 0(!) [KR]   1621 No acute fracture    [KR]   1621 Moderate enlargement of cardiomediastinal silhouette, no infiltrate, effusion, pulmonary edema visualized   XR CHEST (2 VW) [KR]   1621 No acute intracranial abnormality   CT Head WO Contrast [KR]   1637 CK 10,517   CK(!):    Total CK 10,517(!) [KR]   1638 ICU contacted for admission, Dr. Austyn Ramírez accepted. [KR]      ED Course User Index  [KR] Jolenelois Elders, DO     ED course as above, in summary he had an unwitnessed fall. Imaging and blood work consistent with rhabdomyolysis due to elevated creatinine kinase of 10,517. He also is having hypoosmolar hyponatremia the sodium of 16. No prior history of BNP is on file, therefore hard to establish what his baseline BNP is. Today it is 33,543 however chest x-ray and on exam does not appear to be in fluid overload. He takes Coumadin for his atrial fibrillation, INR today 5.19. He received lactated ringers at 100mL/hr due while in the ED. Due to sodium of 116 in the setting of rhabdomyolysis, ICU was contacted for admission and accepted. MEDICATION CHANGES     Current Discharge Medication List            FINAL DISPOSITION     Final diagnoses:   Unwitnessed fall   Hypo-osmolar hyponatremia   Traumatic rhabdomyolysis, initial encounter ([facility] [address]     Condition: condition: critical  Dispo:  Admit to CCU/ICU      This transcription was electronically signed. Parts of this transcriptions may have been dictated by use of voice recognition software and electronically transcribed, and parts may have been transcribed with the assistance of an ED scribe. The transcription may contain errors not detected in proofreading. Please refer to my supervising physician's documentation if my documentation differs.     Electronically Signed: Rios Torres, 01/17/21, 6:42 PM            Kerry Pena DO  Resident  01/17/21 [id number]

## 2021-01-17 NOTE — ED NOTES
ED to inpatient nurses report    Chief Complaint   Patient presents with    Fall      Present to ED from home  LOC: alert and orientated to name, place, date  Vital signs   Vitals:    01/17/21 1555 01/17/21 1610 01/17/21 1656 01/17/21 1710   BP: 111/79 111/70 (!) 114/53 119/65   Pulse: 66 60 58 61   Resp: (!) 6 21 24 27   Temp:       TempSrc:       SpO2: (!) 87% 100%  99%   Weight:       Height:          Oxygen Baseline room air     Current needs required room air Bipap/Cpap No  LDAs:   Peripheral IV 01/17/21 Right Forearm (Active)   Site Assessment Clean;Dry; Intact 01/17/21 1511   Line Status Blood return noted; Flushed; Infusing 01/17/21 1511   Dressing Status Clean;Dry; Intact 01/17/21 1511   Dressing Intervention New 01/17/21 1511     Mobility: Requires assistance * 2  Pending ED orders: none   Present condition: stable      Electronically signed by Jeramy Patel RN on 1/17/2021 at 5:13 PM      Jeramy Patel RN  01/17/21 1719

## 2021-01-17 NOTE — PROGRESS NOTES
Patient arrived to unit from ED via cart. Patient transferred to ICU bed and placed on continuous ICU bedside monitor. Patient admitted for Hyponatremia [E87.1]. Vitals obtained. See flowsheets. Patient's IV access includes right forearm IV. Current infusions and rates of infusion include 0.9 @ 100 ml/hr. Assessment completed by Дмитрий Treviño. Two nurse skin assessment completed by Sima Krishna. See flowsheets for assessment details. Policies and procedures of ICU able to be explained to patient at this time. Family member(s)/representative(s) present at time of admission include Son, Jaja Phan. Patient rights explained to family member(s)/representatives and patient, as able. Patient/patient's family member(s)/representative(s) N/A to have physician notified of their admission. All questions posed by patient's family member(s)/representative(s) and patient answered at this time.

## 2021-01-17 NOTE — PROGRESS NOTES
Pharmacy Medication History Note      List of current medications patient is taking is complete. Source of information: patient's son    Changes made to medication list:  Medications removed (include reason, ex. therapy complete or physician discontinued):        Medications added/doses adjusted:  Warfarin 3 mg alternating with 4.5 mg [facility]      Other notes (ex. Recent course of antibiotics, Coumadin dosing):    Denies use of other OTC or herbal medications.       Allergies reviewed      Electronically signed by Dara Seip, [address] [place] [address] on 1/17/2021 at 4:47 PM

## 2021-01-17 NOTE — H&P
[facility]  Critical Care consult note    Patient:  Tio Goodwin    Unit/Bed:4D-06/006-A  MRN: [id number]   PCP: Lethia Babinski, DO  Date of Admission: 1/17/2021    Assessment and Plan:(All pulmonary edema, renal failure, PE, and respiratory failure diagnoses are acute in nature unless otherwise specified):       -Severe symptomatic Hyponatremia: Acute/? Subacute/? Chronic. Will place a CVC line and start patient on hypertonic saline ( 3%) 50ml bolus over 20minutes. Will continue to monitor serum sodium [id number] so that the serum sodium did not increase by >9meq/L in any 24hour period. Will continue IVF with 0.9NS at 100ml/hr due to his ongoing Rhabdomyolysis. -Rhabdomyolysis most likely due to his fall in the bathroom today morning. I contacted Dr. Leopold Andrea, MD-on-call for trauma service. No need for trauma consult at this time.  -Chronic Atrial Fibrillation with a controlled ventricular response. Stable blood pressure.  -Supratherapeutic INR. Patient currently on Coumadin therapy at home.  -Elevated troponins with history of coronary artery disease. He used to follow with Dr. Laurent Razo MD.  Patient found to have elevated troponins. Will trend troponins every 6 hourly x2 more times. Will consult cardiology service with Dr. Laurent Razo MD.  -Essential hypertension-under control with current medications.  -Decreased hearing sensation. He is currently using hearing aids. I spoke with Dr. Sandy Girard MD-patient's son at bedside and informed about my impression plan along with the prognosis. Chief Complaint: Patient found to be unresponsive in his bathroom today morning. Patient was evaluated in the emergency room at [facility] and found to be having severe hyponatremia. Patient also having rhabdomyolysis.     [place]: Tio Goodwin is a 80 y.o. male history of CVA, CAD, hypertension, hyperlipidemia, chronic atrial fibrillation on anticoagulation with Coumadin was recently admitted and discharged from [facility] for asymptomatic bradycardia management. He also had a history of recent falls. He presented to the emergency department for evaluation of unwitnessed fall at home today morning in his bathroom by his family members. He was found on the ground and was down for unknown length of time. Per his son Dr. Madyson Prasad. MD Asael he was in his normal state of health until yesterday 3 PM when he was seen by him for the last time. Patient lives alone at his home. Yesterday he was very chatty when he was visiting. Today, when they tried to reach him they were unable to get a hold of him therefore they went to his home to check on him and found him on the floor in the bathroom. He was brought via EMS to [facility] emergency department. He does not remember what happened prior to the incident. Denies chest pain, shortness of breath, abdominal pain. He reports pain in his right leg and he is also complaining of generalized muscle aches- this is chronic for him according to his son  Dr. Elsi Mcqueen who is at the bedside. Review of Systems:  General/Constitutional:  No fever or chills. HENT: Negative. Decreased hearing sensation. He is currently using hearing aids  Eyes: Negative. Upper respiratory tract: No nasal stuffiness. Lower respiratory tract/ lungs: No cough or sputum production. Cardiovascular: No palpitations or chest pain. Gastrointestinal: No nausea or vomiting. Neurological: No focal neurologiacal weakness. Extremities: Bilateral leg edema with a bruise markings on his both knees along with a small lacerations  Musculoskeletal: Generalized body aches. Genitourinary: Negative  Hematological: Negative. Psychiatric/Behavioral: Negative. Skin: No itching.     PMH:    Past Medical History:   Diagnosis Date    Actinic keratoses 8/21/2013    Adenomatous polyp 2008    Atrial fibrillation [facility]     CAD (coronary artery disease) 2009    Carotid artery disease ([facility] 2009    left    Hyperlipidemia 2004    Hypertension 2004    Inguinal hernia, left 2014    Unspecified cerebral artery occlusion with cerebral infarction        Past surgical Hx:  Past Surgical History:   Procedure Laterality Date    ABDOMEN SURGERY      ABDOMINAL ADHESION SURGERY  2012    Dr Guero Peralta  2004    right    [facility]    CORONARY ARTERY BYPASS GRAFT  2009    SMALL INTESTINE SURGERY      SMALL INTESTINE SURGERY  2015    Exploratory Laparotomy           SHX:    Social History     Tobacco Use    Smoking status: Former Smoker     Quit date: 1980     Years since quittin.0    Smokeless tobacco: Never Used   Substance Use Topics    Alcohol use: Yes     Alcohol/week: 1.0 standard drinks     Types: 1 Cans of beer per week     Comment: occasionally    Drug use: No       FHX:   Family History   Problem Relation Age of Onset    Cancer Mother         Allergies: No Known Allergies      Vitals: /65   Pulse 61   Temp 97.6 °F (36.4 °C) (Oral)   Resp 27   Ht 5' 4\" (1.626 m)   Wt 162 lb (73.5 kg)   SpO2 99%   BMI 27.81 kg/m²           Medications:     sodium chloride 100 mL/hr at 21 1745     Vital Signs:   VITALS:  /65   Pulse 61   Temp 97.6 °F (36.4 °C) (Oral)   Resp 27   Ht 5' 4\" (1.626 m)   Wt 162 lb (73.5 kg)   SpO2 99%   BMI 27.81 kg/m²   Body mass index is 27.81 kg/m². David [place] 24HR INTAKE/OUTPUT:  No intake or output data in the 24 hours ending 21 1753  Stool Output:      Diet: No diet orders on file     Physical Exam   Constitutional:  Patient appears moderately built and moderately nourished in no acute distress on room air. Head: Normocephalic and atraumatic.    Right Ear: External ear normal.  Hearing aid in place  Left Ear: External ear normal.   Mouth/Throat: Moist oral mucosa  Eyes: Pupils are equal, round, and reacting to light. Neck: Neck supple. No JVD present. Cardiovascular: Irregular heart rate with a controlled ventricular rhythm. Normal heart sounds. Pulmonary/Chest: Bilateral air entry present with good breath sounds. No respiratory distress on vent. No wheezes. No rales. Abdominal: Soft. Patient exhibits no distension. No organomegaly appreciated. BS +ve present. Musculoskeletal: Alert and oriented x3. Affect appropriate. Decreased hearing sensation. Extremities: Bilateral 1+ chronic leg edema edema. Noted to have bruits markings present over his right thigh. Skin abrasions noted over his both lower legs. .  Vasculature: capillary refill < 3 seconds. Neurological: No focal deficit. No seizures. Skin: Skin is warm and dry. Diagnostic Data: (All radiographs, tracings, PFTs, and imaging are personally viewed and interpreted unless otherwise noted). ABG:  Lab Results   Component Value Date    PH 7.30 02/20/2015    PCO2 35 02/20/2015    PO2 71 02/20/2015    HCO3 17 02/20/2015    O2SAT 93 02/20/2015     No results found for: IFIO2, MODE, SETTIDVOL, SETPEEP  CBC:   Recent Labs     01/17/21  1520   WBC 11.0*   HGB 10.5*   HCT 31.1*        BMP:  Recent Labs     01/17/21  1520   *   K 4.9   CL 82*   CO2 16*   BUN 16   CREATININE 1.1   GLUCOSE 87   CALCIUM 8.4*     Hepatic: No results for input(s): AST, ALT, ALB, BILITOT, ALKPHOS, LIPASE in the last 72 hours. Invalid input(s): AMYLASE  Cardiac Enzymes:   Recent Labs     01/17/21  1520   CKTOTAL 10,517*     BNP: No results for input(s): BNP in the last 72 hours. INR:   Recent Labs     01/17/21  1520   INR 5.19*     POC   Recent Labs     01/17/21  1437   POCGLU 106     No results for input(s): LACTA in the last 72 hours. Cultures: None    Radiology:  CT cervical spine without contrast performed today on 17 January 2021:    Slightly limited evaluation due to motion artifact.  However, no definite acute fracture of the cervical spine is seen. 2. Straightening of the normal cervical lordosis is demonstrated. This may be related to muscle spasm. 3. Multilevel degenerative changes of the cervical spine are demonstrated. CT head without contrast performed today: 1. No acute intracranial hemorrhage or mass effect. 2. Moderate diffuse atrophy is demonstrated. 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 X-ray of pelvis:  Sun Jan 17, 2021   PROCEDURE: XR PELVIS (1-2 VIEWS)   1. No acute fracture or malalignment. Degenerative changes are as described above. X-ray of left femur:  Sun Jan 17, 2021  3:50 PM   PROCEDURE: XR FEMUR LEFT (MIN 2 VIEWS)   1. No acute fracture or malalignment. Degenerative changes are as described above. Chest x-ray PA and lateral views performed today:  Sun Jan 17, 2021  3:54 PM   PROCEDURE: XR CHEST (2 VW)     1. Moderate enlargement of the cardiomediastinal silhouette. 2. There is hyperinflation noted. 2. No focal consolidation, pleural effusion or pneumothorax is seen. Sun Jan 17, 2021  3:50 PM   PROCEDURE: XR FEMUR LEFT (MIN 2 VIEWS)   1. No acute fracture or malalignment. Degenerative changes are as described above. Echo:  12/10/2020   Narrative & Impression      Transthoracic Echocardiography Report (TTE)  Conclusions      Summary   Ejection fraction was estimated at 55-60%. Left atrial size was severely dilated. Right atrial size was severely dilated. There was mild mitral regurgitation. There was trace tricuspid regurgitation. Assuming RAP = 5 mmHg, the estimated RVSP = 44 mmHg. IVC size is within normal limits with normal respiratory phasic changes.       Signature      ----------------------------------------------------------------   Electronically signed by Joyce Wilburn MD (Interpreting   physician) on 12/10/2020 at 11:31 AM   ----------------------------------------------------------------    ICU PROPHYLAXIS/THERAPY: DVT prophylaxis:                                  [x] Already on Anticoagulation. Code status    [x] Full resuscitation      Critical care time provided:70 Minutes excluding any procedures. The time was discontiguous.     Electronically signed by  Luda Joel MD on 1/17/2021 at 5:53 PM

## 2021-01-18 ENCOUNTER — APPOINTMENT (OUTPATIENT)
Dept: MRI IMAGING | Age: 86
DRG: 177 | End: 2021-01-18
Payer: OTHER GOVERNMENT

## 2021-01-18 LAB
ABO CHECK: NORMAL
ALBUMIN SERPL-MCNC: 2.9 G/DL (ref 3.5–5.1)
ALBUMIN SERPL-MCNC: 2.9 G/DL (ref 3.5–5.1)
ALP BLD-CCNC: 80 U/L (ref 38–126)
ALP BLD-CCNC: 85 U/L (ref 38–126)
ALT SERPL-CCNC: 66 U/L (ref 11–66)
ALT SERPL-CCNC: 71 U/L (ref 11–66)
ANION GAP SERPL CALCULATED.3IONS-SCNC: 10 MEQ/L (ref 8–16)
ANION GAP SERPL CALCULATED.3IONS-SCNC: 12 MEQ/L (ref 8–16)
ANION GAP SERPL CALCULATED.3IONS-SCNC: 12 MEQ/L (ref 8–16)
ANION GAP SERPL CALCULATED.3IONS-SCNC: 13 MEQ/L (ref 8–16)
ANION GAP SERPL CALCULATED.3IONS-SCNC: 8 MEQ/L (ref 8–16)
ANTIBODY SCREEN: NORMAL
AST SERPL-CCNC: 242 U/L (ref 5–40)
AST SERPL-CCNC: 242 U/L (ref 5–40)
BASOPHILS # BLD: 0.1 %
BASOPHILS ABSOLUTE: 0 THOU/MM3 (ref 0–0.1)
BILIRUB SERPL-MCNC: 0.6 MG/DL (ref 0.3–1.2)
BILIRUB SERPL-MCNC: 0.7 MG/DL (ref 0.3–1.2)
BUN BLDV-MCNC: 15 MG/DL (ref 7–22)
BUN BLDV-MCNC: 16 MG/DL (ref 7–22)
BUN BLDV-MCNC: 16 MG/DL (ref 7–22)
BUN BLDV-MCNC: 18 MG/DL (ref 7–22)
BUN BLDV-MCNC: 18 MG/DL (ref 7–22)
C-REACTIVE PROTEIN: 7.84 MG/DL (ref 0–1)
CALCIUM IONIZED: 1.05 MMOL/L (ref 1.12–1.32)
CALCIUM SERPL-MCNC: 7.6 MG/DL (ref 8.5–10.5)
CALCIUM SERPL-MCNC: 7.9 MG/DL (ref 8.5–10.5)
CALCIUM SERPL-MCNC: 8 MG/DL (ref 8.5–10.5)
CALCIUM SERPL-MCNC: 8 MG/DL (ref 8.5–10.5)
CALCIUM SERPL-MCNC: 8.1 MG/DL (ref 8.5–10.5)
CHLORIDE BLD-SCNC: 87 MEQ/L (ref 98–111)
CHLORIDE BLD-SCNC: 87 MEQ/L (ref 98–111)
CHLORIDE BLD-SCNC: 88 MEQ/L (ref 98–111)
CHLORIDE BLD-SCNC: 89 MEQ/L (ref 98–111)
CHLORIDE BLD-SCNC: 89 MEQ/L (ref 98–111)
CK MB: 164.5 NG/ML
CO2: 18 MEQ/L (ref 23–33)
CO2: 19 MEQ/L (ref 23–33)
CO2: 19 MEQ/L (ref 23–33)
CO2: 20 MEQ/L (ref 23–33)
CO2: 21 MEQ/L (ref 23–33)
CORTISOL COLLECTION INFO: NORMAL
CORTISOL: 20.91 UG/DL
CREAT SERPL-MCNC: 1.2 MG/DL (ref 0.4–1.2)
CREAT SERPL-MCNC: 1.3 MG/DL (ref 0.4–1.2)
CREAT SERPL-MCNC: 1.5 MG/DL (ref 0.4–1.2)
D-DIMER QUANTITATIVE: 1102 NG/ML FEU (ref 0–500)
D-DIMER QUANTITATIVE: 1158 NG/ML FEU (ref 0–500)
EKG ATRIAL RATE: 53 BPM
EKG ATRIAL RATE: 85 BPM
EKG Q-T INTERVAL: 502 MS
EKG Q-T INTERVAL: 592 MS
EKG QRS DURATION: 112 MS
EKG QRS DURATION: 170 MS
EKG QTC CALCULATION (BAZETT): 505 MS
EKG QTC CALCULATION (BAZETT): 581 MS
EKG R AXIS: -79 DEGREES
EKG R AXIS: 108 DEGREES
EKG T AXIS: -17 DEGREES
EKG T AXIS: -37 DEGREES
EKG VENTRICULAR RATE: 58 BPM
EKG VENTRICULAR RATE: 61 BPM
EOSINOPHIL # BLD: 0.1 %
EOSINOPHILS ABSOLUTE: 0 THOU/MM3 (ref 0–0.4)
ERYTHROCYTE [DISTWIDTH] IN BLOOD BY AUTOMATED COUNT: 18.2 % (ref 11.5–14.5)
ERYTHROCYTE [DISTWIDTH] IN BLOOD BY AUTOMATED COUNT: 54.6 FL (ref 35–45)
FERRITIN: 142 NG/ML (ref 22–322)
FIBRINOGEN: 338 MG/100ML (ref 155–475)
FIBRINOGEN: 370 MG/100ML (ref 155–475)
GFR SERPL CREATININE-BSD FRML MDRD: 44 ML/MIN/1.73M2
GFR SERPL CREATININE-BSD FRML MDRD: 52 ML/MIN/1.73M2
GFR SERPL CREATININE-BSD FRML MDRD: 57 ML/MIN/1.73M2
GLUCOSE BLD-MCNC: 62 MG/DL (ref 70–108)
GLUCOSE BLD-MCNC: 77 MG/DL (ref 70–108)
GLUCOSE BLD-MCNC: 86 MG/DL (ref 70–108)
GLUCOSE BLD-MCNC: 91 MG/DL (ref 70–108)
GLUCOSE BLD-MCNC: 92 MG/DL (ref 70–108)
GLUCOSE BLD-MCNC: 93 MG/DL (ref 70–108)
GLUCOSE BLD-MCNC: 99 MG/DL (ref 70–108)
HCT VFR BLD CALC: 26.8 % (ref 42–52)
HEMOGLOBIN: 9 GM/DL (ref 14–18)
IMMATURE GRANS (ABS): 0.03 THOU/MM3 (ref 0–0.07)
IMMATURE GRANULOCYTES: 0.4 %
INR BLD: 4.97 (ref 0.85–1.13)
INR BLD: 5.32 (ref 0.85–1.13)
LD: 583 U/L (ref 100–190)
LYMPHOCYTES # BLD: 5.6 %
LYMPHOCYTES ABSOLUTE: 0.4 THOU/MM3 (ref 1–4.8)
MAGNESIUM: 2.1 MG/DL (ref 1.6–2.4)
MCH RBC QN AUTO: 27.8 PG (ref 26–33)
MCHC RBC AUTO-ENTMCNC: 33.6 GM/DL (ref 32.2–35.5)
MCV RBC AUTO: 82.7 FL (ref 80–94)
MONOCYTES # BLD: 9.9 %
MONOCYTES ABSOLUTE: 0.8 THOU/MM3 (ref 0.4–1.3)
NUCLEATED RED BLOOD CELLS: 0 /100 WBC
OSMOLALITY: 247 MOSMOL/KG (ref 275–295)
OSMOLALITY: 247 MOSMOL/KG (ref 275–295)
OSMOLALITY: 250 MOSMOL/KG (ref 275–295)
OSMOLALITY: 252 MOSMOL/KG (ref 275–295)
OSMOLALITY: 258 MOSMOL/KG (ref 275–295)
PLATELET # BLD: 253 THOU/MM3 (ref 130–400)
PMV BLD AUTO: 11 FL (ref 9.4–12.4)
POTASSIUM REFLEX MAGNESIUM: 4.4 MEQ/L (ref 3.5–5.2)
POTASSIUM REFLEX MAGNESIUM: 4.6 MEQ/L (ref 3.5–5.2)
POTASSIUM SERPL-SCNC: 4.4 MEQ/L (ref 3.5–5.2)
POTASSIUM SERPL-SCNC: 4.4 MEQ/L (ref 3.5–5.2)
POTASSIUM SERPL-SCNC: 4.5 MEQ/L (ref 3.5–5.2)
POTASSIUM SERPL-SCNC: 4.6 MEQ/L (ref 3.5–5.2)
POTASSIUM SERPL-SCNC: 4.8 MEQ/L (ref 3.5–5.2)
PROLACTIN: 10.2 NG/ML
RBC # BLD: 3.24 MILL/MM3 (ref 4.7–6.1)
RH FACTOR: NORMAL
SARS-COV-2, NAAT: DETECTED
SEG NEUTROPHILS: 83.9 %
SEGMENTED NEUTROPHILS ABSOLUTE COUNT: 6.5 THOU/MM3 (ref 1.8–7.7)
SODIUM BLD-SCNC: 116 MEQ/L (ref 135–145)
SODIUM BLD-SCNC: 117 MEQ/L (ref 135–145)
SODIUM BLD-SCNC: 117 MEQ/L (ref 135–145)
SODIUM BLD-SCNC: 118 MEQ/L (ref 135–145)
SODIUM BLD-SCNC: 120 MEQ/L (ref 135–145)
SODIUM BLD-SCNC: 122 MEQ/L (ref 135–145)
TOTAL CK: 7393 U/L (ref 55–170)
TOTAL CK: 8378 U/L (ref 55–170)
TOTAL CK: 9463 U/L (ref 55–170)
TOTAL PROTEIN: 5.5 G/DL (ref 6.1–8)
TOTAL PROTEIN: 5.7 G/DL (ref 6.1–8)
TROPONIN T: 0.38 NG/ML
URIC ACID: 6.8 MG/DL (ref 3.7–7)
WBC # BLD: 7.7 THOU/MM3 (ref 4.8–10.8)

## 2021-01-18 PROCEDURE — 2580000003 HC RX 258: Performed by: INTERNAL MEDICINE

## 2021-01-18 PROCEDURE — 83930 ASSAY OF BLOOD OSMOLALITY: CPT

## 2021-01-18 PROCEDURE — 6370000000 HC RX 637 (ALT 250 FOR IP): Performed by: NURSE PRACTITIONER

## 2021-01-18 PROCEDURE — 83735 ASSAY OF MAGNESIUM: CPT

## 2021-01-18 PROCEDURE — U0002 COVID-19 LAB TEST NON-CDC: HCPCS

## 2021-01-18 PROCEDURE — 2580000003 HC RX 258: Performed by: NURSE PRACTITIONER

## 2021-01-18 PROCEDURE — 70551 MRI BRAIN STEM W/O DYE: CPT

## 2021-01-18 PROCEDURE — 82550 ASSAY OF CK (CPK): CPT

## 2021-01-18 PROCEDURE — 6360000002 HC RX W HCPCS: Performed by: INTERNAL MEDICINE

## 2021-01-18 PROCEDURE — 82330 ASSAY OF CALCIUM: CPT

## 2021-01-18 PROCEDURE — 99291 CRITICAL CARE FIRST HOUR: CPT | Performed by: INTERNAL MEDICINE

## 2021-01-18 PROCEDURE — 94761 N-INVAS EAR/PLS OXIMETRY MLT: CPT

## 2021-01-18 PROCEDURE — 84146 ASSAY OF PROLACTIN: CPT

## 2021-01-18 PROCEDURE — 84295 ASSAY OF SERUM SODIUM: CPT

## 2021-01-18 PROCEDURE — 85385 FIBRINOGEN ANTIGEN: CPT

## 2021-01-18 PROCEDURE — 84484 ASSAY OF TROPONIN QUANT: CPT

## 2021-01-18 PROCEDURE — 93005 ELECTROCARDIOGRAM TRACING: CPT | Performed by: NURSE PRACTITIONER

## 2021-01-18 PROCEDURE — 86901 BLOOD TYPING SEROLOGIC RH(D): CPT

## 2021-01-18 PROCEDURE — 83615 LACTATE (LD) (LDH) ENZYME: CPT

## 2021-01-18 PROCEDURE — 6360000002 HC RX W HCPCS

## 2021-01-18 PROCEDURE — 2700000000 HC OXYGEN THERAPY PER DAY

## 2021-01-18 PROCEDURE — 6360000002 HC RX W HCPCS: Performed by: NURSE PRACTITIONER

## 2021-01-18 PROCEDURE — 2100000000 HC CCU R&B

## 2021-01-18 PROCEDURE — 85025 COMPLETE CBC W/AUTO DIFF WBC: CPT

## 2021-01-18 PROCEDURE — 6370000000 HC RX 637 (ALT 250 FOR IP): Performed by: INTERNAL MEDICINE

## 2021-01-18 PROCEDURE — 86140 C-REACTIVE PROTEIN: CPT

## 2021-01-18 PROCEDURE — 80053 COMPREHEN METABOLIC PANEL: CPT

## 2021-01-18 PROCEDURE — 99223 1ST HOSP IP/OBS HIGH 75: CPT | Performed by: INTERNAL MEDICINE

## 2021-01-18 PROCEDURE — 86900 BLOOD TYPING SEROLOGIC ABO: CPT

## 2021-01-18 PROCEDURE — 95819 EEG AWAKE AND ASLEEP: CPT

## 2021-01-18 PROCEDURE — 86850 RBC ANTIBODY SCREEN: CPT

## 2021-01-18 PROCEDURE — 82728 ASSAY OF FERRITIN: CPT

## 2021-01-18 PROCEDURE — 95816 EEG AWAKE AND DROWSY: CPT | Performed by: PSYCHIATRY & NEUROLOGY

## 2021-01-18 PROCEDURE — 99291 CRITICAL CARE FIRST HOUR: CPT | Performed by: NURSE PRACTITIONER

## 2021-01-18 PROCEDURE — 85610 PROTHROMBIN TIME: CPT

## 2021-01-18 PROCEDURE — 2500000003 HC RX 250 WO HCPCS: Performed by: INTERNAL MEDICINE

## 2021-01-18 PROCEDURE — 85379 FIBRIN DEGRADATION QUANT: CPT

## 2021-01-18 PROCEDURE — 82948 REAGENT STRIP/BLOOD GLUCOSE: CPT

## 2021-01-18 PROCEDURE — 36592 COLLECT BLOOD FROM PICC: CPT

## 2021-01-18 PROCEDURE — 36415 COLL VENOUS BLD VENIPUNCTURE: CPT

## 2021-01-18 RX ORDER — LACTOBACILLUS RHAMNOSUS GG 10B CELL
1 CAPSULE ORAL
Status: DISCONTINUED | OUTPATIENT
Start: 2021-01-19 | End: 2021-02-02

## 2021-01-18 RX ORDER — ACETAMINOPHEN 325 MG/1
650 TABLET ORAL EVERY 6 HOURS PRN
Status: DISCONTINUED | OUTPATIENT
Start: 2021-01-18 | End: 2021-02-05 | Stop reason: HOSPADM

## 2021-01-18 RX ORDER — DEXAMETHASONE 4 MG/1
6 TABLET ORAL DAILY
Status: DISCONTINUED | OUTPATIENT
Start: 2021-01-18 | End: 2021-01-22

## 2021-01-18 RX ORDER — ACETAMINOPHEN 650 MG/1
650 SUPPOSITORY RECTAL EVERY 6 HOURS PRN
Status: DISCONTINUED | OUTPATIENT
Start: 2021-01-18 | End: 2021-02-05 | Stop reason: HOSPADM

## 2021-01-18 RX ORDER — 0.9 % SODIUM CHLORIDE 0.9 %
30 INTRAVENOUS SOLUTION INTRAVENOUS PRN
Status: DISCONTINUED | OUTPATIENT
Start: 2021-01-18 | End: 2021-01-23

## 2021-01-18 RX ORDER — DESMOPRESSIN ACETATE 4 UG/ML
2 INJECTION, SOLUTION INTRAVENOUS; SUBCUTANEOUS ONCE
Status: COMPLETED | OUTPATIENT
Start: 2021-01-18 | End: 2021-01-18

## 2021-01-18 RX ORDER — SODIUM CHLORIDE 9 MG/ML
INJECTION, SOLUTION INTRAVENOUS CONTINUOUS
Status: DISCONTINUED | OUTPATIENT
Start: 2021-01-18 | End: 2021-01-18

## 2021-01-18 RX ORDER — SODIUM CHLORIDE 9 MG/ML
INJECTION, SOLUTION INTRAVENOUS CONTINUOUS
Status: DISCONTINUED | OUTPATIENT
Start: 2021-01-18 | End: 2021-01-19

## 2021-01-18 RX ORDER — NALOXONE HYDROCHLORIDE 0.4 MG/ML
INJECTION, SOLUTION INTRAMUSCULAR; INTRAVENOUS; SUBCUTANEOUS
Status: COMPLETED
Start: 2021-01-18 | End: 2021-01-18

## 2021-01-18 RX ORDER — DOPAMINE HYDROCHLORIDE 160 MG/100ML
2.5 INJECTION, SOLUTION INTRAVENOUS CONTINUOUS
Status: DISCONTINUED | OUTPATIENT
Start: 2021-01-18 | End: 2021-01-20

## 2021-01-18 RX ORDER — DESMOPRESSIN ACETATE 4 UG/ML
1 INJECTION, SOLUTION INTRAVENOUS; SUBCUTANEOUS ONCE
Status: DISCONTINUED | OUTPATIENT
Start: 2021-01-18 | End: 2021-01-18

## 2021-01-18 RX ADMIN — SODIUM CHLORIDE, PRESERVATIVE FREE 10 ML: 5 INJECTION INTRAVENOUS at 08:41

## 2021-01-18 RX ADMIN — PIPERACILLIN AND TAZOBACTAM 3.38 G: 3; .375 INJECTION, POWDER, LYOPHILIZED, FOR SOLUTION INTRAVENOUS at 13:45

## 2021-01-18 RX ADMIN — CEFTRIAXONE SODIUM 1 G: 1 INJECTION, POWDER, FOR SOLUTION INTRAMUSCULAR; INTRAVENOUS at 18:02

## 2021-01-18 RX ADMIN — PIPERACILLIN AND TAZOBACTAM 3.38 G: 3; .375 INJECTION, POWDER, LYOPHILIZED, FOR SOLUTION INTRAVENOUS at 04:43

## 2021-01-18 RX ADMIN — NALOXONE HYDROCHLORIDE 0.4 MG: 0.4 INJECTION, SOLUTION INTRAMUSCULAR; INTRAVENOUS; SUBCUTANEOUS at 10:54

## 2021-01-18 RX ADMIN — PANTOPRAZOLE SODIUM 40 MG: 40 TABLET, DELAYED RELEASE ORAL at 06:10

## 2021-01-18 RX ADMIN — DOPAMINE HYDROCHLORIDE IN DEXTROSE 2.5 MCG/KG/MIN: 1.6 INJECTION, SOLUTION INTRAVENOUS at 00:46

## 2021-01-18 RX ADMIN — PHYTONADIONE 2 MG: 10 INJECTION, EMULSION INTRAMUSCULAR; INTRAVENOUS; SUBCUTANEOUS at 18:04

## 2021-01-18 RX ADMIN — SODIUM CHLORIDE: 9 INJECTION, SOLUTION INTRAVENOUS at 14:39

## 2021-01-18 RX ADMIN — SODIUM CHLORIDE: 9 INJECTION, SOLUTION INTRAVENOUS at 19:23

## 2021-01-18 RX ADMIN — DOPAMINE HYDROCHLORIDE IN DEXTROSE 10 MCG/KG/MIN: 1.6 INJECTION, SOLUTION INTRAVENOUS at 15:07

## 2021-01-18 RX ADMIN — DESMOPRESSIN ACETATE 2 MCG: 4 SOLUTION INTRAVENOUS at 13:46

## 2021-01-18 RX ADMIN — SODIUM CHLORIDE: 9 INJECTION, SOLUTION INTRAVENOUS at 06:09

## 2021-01-18 RX ADMIN — DEXAMETHASONE 6 MG: 1.5 TABLET ORAL at 04:54

## 2021-01-18 RX ADMIN — SODIUM CHLORIDE, PRESERVATIVE FREE 10 ML: 5 INJECTION INTRAVENOUS at 19:31

## 2021-01-18 RX ADMIN — DOPAMINE HYDROCHLORIDE IN DEXTROSE 7.5 MCG/KG/MIN: 1.6 INJECTION, SOLUTION INTRAVENOUS at 23:08

## 2021-01-18 RX ADMIN — REMDESIVIR 200 MG: 100 INJECTION, POWDER, LYOPHILIZED, FOR SOLUTION INTRAVENOUS at 08:41

## 2021-01-18 ASSESSMENT — PAIN SCALES - GENERAL: PAINLEVEL_OUTOF10: 0

## 2021-01-18 NOTE — PROGRESS NOTES
Physician Progress Note      PATIENT:               Rasta Marshall  CSN #:                  [id number]  :                       3/30/1932  ADMIT DATE:       2021 2:31 PM  DISCH DATE:  RESPONDING  PROVIDER #:        Bernadine MITTAL DO          QUERY TEXT:    Pt admitted post fall at home and noted to have documentation of   rhabdomyolysis. If possible, please document in progress notes and discharge   summary if you are evaluating and/or treating any of the following: The medical record reflects the following:  Risk Factors: fall at home  Clinical Indicators: documentation of rhabdo post unwitnessed fall at home; CK   8,378  Treatment: Labs, holding off on IVF d/t hyponatremia, monitoring  Per [email or web address]  Traumatic rhabdomyolysis cause examples: crush syndrome, prolonged   immobilization  Nontraumatic rhabdomyolysis cause examples:  marked exertion, hyperthermia,   metabolic myopathy, drugs or toxins, infections, electrolyte disorders. Options provided:  -- Traumatic rhabdomyolysis  -- Nontraumatic rhabdomyolysis  -- Other - I will add my own diagnosis  -- Disagree - Not applicable / Not valid  -- Disagree - Clinically unable to determine / Unknown  -- Refer to Clinical Documentation Reviewer    PROVIDER RESPONSE TEXT:    This patient has traumatic rhabdomyolysis.     Query created by: Gomez Turner on 2021 9:38 AM      Electronically signed by:  Xenia Gallardo DO 2021 9:45 AM

## 2021-01-18 NOTE — FLOWSHEET NOTE
01/18/21 0530   Provider Notification   Reason for Communication Critical Value (comment)  (INR 5.32)   Provider Name Toddjoe Valdez   Provider Notification Advance Practice Clinician (CNS, NP, CNM, CRNA, PA)   Method of Communication Call   Response No new orders   Notification Time 0530     Notified of INR level 5.32 this morning. No new orders received.

## 2021-01-18 NOTE — SIGNIFICANT EVENT
Madeline Robbins is an 55-year-old  male admitted to [facility] on 1/17/2021 with chief complaint of fall. Patient has a significant past medical history of former smoker, ASHD s/p CABG 2009, Atrial Fibrillation/permanent-on Coumadin, HFpEF-ECHO 12/2020 LVEF 55-60%, Essential HPTN, CVA, Chronic Hyponatremia (Sodium 1/5/2021 130), CKD stage 3 (Baseline Crea 1.1-1.7), BPH. Recent 1/16/2021 had been seen by Urology for BPH and recurrent UTI. Recent [facility] Hospitalization 12/9/2020 Asymptomatic Bradycardia/NSVT stopped BB and CCB with f/u with Dr. Suad Layton and 10 day Cardiac event monitor. 1/6/2021 Seen [facility] Dr. Suad Layton and no plans for PPM at this time. Ila Mandel presented to [facility] ER 1/17/2021 with chief complaint of unwitnessed fall at home. Patient does live home alone. He was found on the ground and was down for unknown length of time. Per son, states that he was his normal self yesterday and was very chatty when he was visiting. Today, when they tried to reach him they were unable to get a hold of him therefore they went to his home to check on him and found him on the floor in the bathroom. He does not remember what happened prior to the incident. He reports pain in his right leg and he is complaining of generalized muscle aches and this is chronic for him. ER evaluation noted hyponatremia and elevated CPK. Patient was admitted to the [facility] ICU. CT of Head-no acute intracranial hemorrhage or mass effect, encephalomalacia within the left temporal lobe from prior remote infarct. CT of Cervical Spine no acute fracture. CT of Chest-moderate right pleural effusion, mild opacity right lung base. CVC was placed and patient did receive 50 ml of 3% Normal Saline. COVID positive. Acute Respiratory failure, hypoxia: multifactorial noted right sided pleural effusion, and noted mild volume overload. 1/18/2021 COVID testing did come back positive.      Mild COVID-will consult pharmacy for Remdesivir, and will start steroids. With questioning family and patient onset of symptoms noted complaint of diarrhea 4-5 days prior to admission. Positive for cough. Patient does not met criteria for SIRS/Sepsis at time of admission. Chronic Hyponatremia, severe (sodium 116) hypotonic. Patient does have low risk for ODS. Symptoms of fatigue, cramps. Patient did receive Versed for CVC placement which has caused some altered mental status. Repeat labs every 3 hours. Nephrology has been consulted with noted rhabdomyolysis that was present at time of admission. Concerns of volume overload with elevated P-BNP and lower leg swelling present at time of admission. CT of CHEST and Chest Xray no CHF present. 1/17/2021 50 ml of 3% NS bolus was given. Rhabdomyolysis multifactorial noted history of fall, statin use and severe hyponatremia. CPK 14,274-5,984, Urine myoglobin is positive. Urine positive for large amount of blood. No ANNIKA present at time of admission. I did collaborate with Nephrology, no IV fluids to be ran at this time. Concerns of overcorrection secondary to Low Urine Sodium. Elevated Troponin in the setting of fall. Noted T wave inversion in Leads V1-2-3-4 with no reciprocal changes. These were present on EKG 1/5/2021 except V4. Patient is on Coumadin. Cardiology to evaluate. Syncope History of fall prior to admission with LOC, patient has no recall of events prior. ? Cardiac in nature, doubtful Seizure but considering hyponatremia possible. EEG will be ordered. Prolactin level is pending. Cardiology to evaluate. CAP/versus COVID related pneumonia ? Aspiration. Patient does not met criteria for Severe pneumonia at time of admission. PCT=0.73 Positive for cough and mild Leukocytosis. No fever. Zosyn will be started. Undifferentiated shock likely cardiogenic secondary to bradycardia, Dopamine has been started to maintain MAP >65. Random Cortisol 20.91. Anion gap acidosis: repeat BMP and monitor. Supra-therapeutic INR: with no bleeding at time of admission. Considering elevated risk thromboembolism with COVID positive state. Will monitor. Patient is on Coumadin, family denies any recent ATB exposure. CKD stage 3 (baseline Crea 1.1-1.7) Dose medication to GFR, no NSAIDS to be given. Anemia, normocytic monitor and trend. [id number]  Lungs Audible crackles in the RLL  Positive for bilateral lower leg edema  Noted lacerations on bilateral lower legs. [place]    Patient will be transferred to   Family were updated on current condition and plan of care    Due to the immediate potential for life-threatening deterioration due to severe hyponatremia, Covid positive, acute respiratory failure, I spent 35 minutes providing critical care. This time is excluding time spent performing procedures.     Remedios Christian, DNP, APRN-CNP

## 2021-01-18 NOTE — CONSULTS
[facility] [address] [place] [address]  Cardiology Consult      Patient:  Fabio Smith  YOB: 1932    MRN: [id number]   Acct: [de-identified]     Primary Care Physician: Conner Oden DO    REASON FOR CONSULT:    For evaluation of elevated troponins.  History of bradycardia with episode of unresponsiveness. CHIEF COMPLAINT:    AMS    HISTORY OF PRESENT ILLNESS:    Fabio Smith is a pleasant 80year old male patient with past medical history that includes:   Past Medical History:   Diagnosis Date    Actinic keratoses 8/21/2013    Adenomatous polyp 2008    Atrial fibrillation ([facility] [address]     CAD (coronary artery disease) 2009    Carotid artery disease ([facility] [address] 2009    left    Hyperlipidemia 2004    Hypertension 2004    Inguinal hernia, left 2/19/2014    Unspecified cerebral artery occlusion with cerebral infarction    Has h/o CABG. He is currently non-verbal, unable to provide history. Chart was reviewed. The patient was admitted to the hospital on 11/17/2021 with AMS. He apparently was found unresponsive in the bathroom. Labs revealed evidence for severe hyponatremia. Patient did not report chest pain, shortness of breath, dyspnea on exertion. He has known history of falls. EKG 1/17/2021 revealed A Fib, HR 61, QTc 505 msec, RBBB. RKG today revealed AF, HR 53. Cardiology was consulted for elevated troponin and bradycardia. Troponin 0.57>0.054>0. 38. The patient is being treated for Rhabdomyolysis, CK was as high as 10,500. Cr was up to 1.4. Ns 117. Ca 8. Hgb 9. The patient was noted to have bradycardia during recent admit at [address] [place] [address]. Recent event monitor was c/w AF. His HR has improved after cardizem was stopped. Patient was seen by EP, Dr Joycelyn Rivers, as outpatient on 1/6/2021. Recommendation was to continue to monitor, avoid AVB agents and consider PPM only if patient develops symptomatic bradycardia.      All labs, EKG's, diagnostic testing and images as well as cardiac cath, stress testing were reviewed during this encounter    CARDIAC TESTING    EVENT MONITOR 2020  PATIENT NAME: Jolynn Gonzalez                   :        1932  MED REC NO:   [id number]                           ROOM:       0009  ACCOUNT NO:   [de-identified]                           ADMIT DATE: 2020  PROVIDER:     Michelle Castillo M.D.     CLINICAL HISTORY AND INDICATION:  This is an 80year-old patient with  atrial fibrillation.     EVENT MONITOR DESCRIPTION:  Event monitor was attached to the patient  between 2020 and 2020.     EVENT MONITOR FINDINGS:  Baseline rhythm showed atrial fibrillation. The patient was pretty much in AFib throughout the reading with variable  heart rate, no prolonged pauses with rare PVCs.     CONCLUSION:  1. Persistent atrial fibrillation with variable heart rate, mostly seem  to be within controlled rate with rare to occasional PVCs. 2.  Clinical correlation is recommended.           Janette Maxwell M.D.     D: 2020 7:42:52     Echo:   ECHO:   Results for orders placed during the hospital encounter of 20   ECHO Complete 2D W Doppler W Color    Narrative Transthoracic Echocardiography Report (TTE)     Demographics      Patient Name  Maty Leyva Gender             Male                 H      MR #          [id number]      Race                                                  Ethnicity      Account #     [de-identified]      Room Number        0009      Accession     [id number]     Date of Study      12/10/2020   Number      Date of Birth 1932     Referring          Vonnie Pineda                                Physician          Joaquina Novak MD      Age           80 year(s)     Sonographer        MARNIE Abdullahi, RDCS,                                                   RDMS, RVT                                   Interpreting       Joaquina Novak MD                                Physician     Procedure    Type of Study      TTE procedure:ECHOCARDIOGRAM COMPLETE 2D W DOPPLER W COLOR. Procedure Date  Date: 12/10/2020 Start: 08:52 AM    Study Location: Bedside  Technical Quality: Adequate visualization    Indications:Bradycardia. Additional Medical History:exsmoker, coronary artery disease, coronary  artery bypass grafting,m hypertension, hyperlipidemia, history of  cerebrovascular accident, atrial fibrillation    Patient Status: Routine    Height: 64 inches Weight: 175 pounds BSA: 1.85 m^2 BMI: 30.04 kg/m^2    BP: 127/60 mmHg     Conclusions      Summary   Ejection fraction was estimated at 55-60%. Left atrial size was severely dilated. Right atrial size was severely dilated. There was mild mitral regurgitation. There was trace tricuspid regurgitation. Assuming RAP = 5 mmHg, the estimated RVSP = 44 mmHg. IVC size is within normal limits with normal respiratory phasic changes. Signature      ----------------------------------------------------------------   Electronically signed by Zenon Nevarez MD (Interpreting   physician) on 12/10/2020 at 11:31 AM   ----------------------------------------------------------------      Findings      Mitral Valve   The mitral valve structure was normal with normal leaflet separation. DOPPLER: The transmitral velocity was within the normal range with no   evidence for mitral stenosis. There was mild mitral regurgitation. Aortic Valve   The aortic valve was trileaflet with normal thickness and cuspal   separation. DOPPLER: Transaortic velocity was within the normal range with   no evidence of aortic stenosis. There was no evidence of aortic   regurgitation. Tricuspid Valve   The tricuspid valve structure was normal with normal leaflet separation. DOPPLER: There was no evidence of tricuspid stenosis. There was trace   tricuspid regurgitation. Assuming RAP = 5 mmHg, the estimated RVSP = 44   mmHg.       Pulmonic Valve   The pulmonic valve leaflets exhibited normal thickness, no calcification,   and normal cuspal separation. DOPPLER: The transpulmonic velocity was   within the normal range with trace regurgitation. Left Atrium   Left atrial size was severely dilated. Left Ventricle   Normal left ventricular size and systolic function. There were no regional wall motion abnormalities. Wall thickness was within normal limits. Ejection fraction was estimated at 55-60%. Right Atrium   Right atrial size was severely dilated. Right Ventricle   The right ventricular size was normal with normal systolic function and   wall thickness. Pericardial Effusion   The pericardium was normal in appearance with no evidence of a pericardial   effusion. Pleural Effusion   No evidence of pleural effusion. Aorta / Great Vessels   IVC size is within normal limits with normal respiratory phasic changes.      M-Mode/2D Measurements & Calculations      LV Diastolic   LV Systolic Dimension:    AV Cusp Separation: 1.7 cmLA   Dimension: 4.4 3.1 cm                    Dimension: 5.4 cmAO Root   cm             LV Volume Diastolic: 49.0 Dimension: 3.3 cmLA Area: 28.3   LV FS:29.6 %   ml                        cm^2   LV PW          LV Volume Systolic: 40.8   Diastolic: 1.1 ml   cm             LV EDV/LV EDV Index: 87.7   Septum         ml/47 m^2LV ESV/LV ESV    RV Diastolic Dimension: 3.6 cm   Diastolic: 1.1 Index: 27.9 ml/20 m^2   cm             EF Calculated: 56.8 %     LA/Aorta: 1.64                                            Ascending Aorta: 3 cm                                            LA volume/Index: 90.4 ml /49m^2     Doppler Measurements & Calculations      MV Peak E-Wave: 157 cm/s  AV Peak Velocity: 183  LVOT Peak Velocity: 104   MV Peak A-Wave: 64.3 cm/s cm/s                   cm/s   MV E/A Ratio: 2.44        AV Peak Gradient: 13.4 LVOT Peak Gradient: 4   MV Peak Gradient: 9.86    mmHg                   mmHg   mmHg                                                    [facility] to dose, #45=30 days supply  furosemide (LASIX) 20 MG tablet, Take 1 tablet by mouth daily  pantoprazole (PROTONIX) 40 MG tablet, Take 1 tablet by mouth 2 times daily (before meals). pravastatin (PRAVACHOL) 40 MG tablet, Take 40 mg by mouth every evening. Indications: Blood Cholesterol Abnormal  aspirin 81 MG EC tablet, Take 81 mg by mouth daily Indications: Anticoagulant Therapy Take with food. cyclobenzaprine (FLEXERIL) 5 MG tablet, Take 1 tablet by mouth 3 times daily as needed for Muscle spasms (Patient not taking: Reported on 1/6/2021)    Allergies:    Patient has no known allergies. Social History:    reports that he quit smoking about 41 years ago. He has never used smokeless tobacco. He reports current alcohol use of about 1.0 standard drinks of alcohol per week. He reports that he does not use drugs. Family History:   family history includes Cancer in his mother.     REVIEW OF SYSTEMS:  Unable to obtain  See HPI    PHYSICAL EXAM:   Vitals:  Patient Vitals for the past 24 hrs:   BP Temp Temp src Pulse Resp SpO2 Height Weight   01/18/21 1430 (!) 108/51 -- -- 62 17 96 % -- --   01/18/21 1400 (!) 112/52 -- -- 63 17 95 % -- --   01/18/21 1300 (!) 115/50 -- -- 62 17 97 % -- --   01/18/21 1200 (!) 119/49 98.7 °F (37.1 °C) Axillary 65 17 97 % -- --   01/18/21 1030 (!) 118/48 -- -- 69 16 93 % -- --   01/18/21 0950 -- -- -- -- 15 95 % -- --   01/18/21 0900 (!) 128/55 -- -- 63 16 97 % -- --   01/18/21 0800 (!) 126/50 98.9 °F (37.2 °C) Axillary 65 18 96 % -- --   01/18/21 0703 (!) 115/45 -- -- 66 25 97 % -- --   01/18/21 0625 (!) 119/42 -- -- 67 23 95 % -- --   01/18/21 0603 (!) 109/44 -- -- 66 21 93 % -- --   01/18/21 0503 (!) 119/44 -- -- 65 24 94 % -- --   01/18/21 0403 (!) 127/58 97.7 °F (36.5 °C) Oral 61 24 93 % -- 158 lb 1.1 oz (71.7 kg)   01/18/21 0341 (!) 120/42 -- -- 63 22 94 % -- --   01/18/21 0303 (!) 122/38 -- -- 59 23 94 % -- --   01/18/21 0204 (!) 120/44 -- -- 62 23 94 % -- -- 01/18/21 0115 (!) 108/56 -- -- 63 21 91 % -- --   01/18/21 0100 (!) 98/35 -- -- 59 20 90 % -- --   01/18/21 0000 (!) 103/38 99.5 °F (37.5 °C) Axillary 59 25 93 % -- --   01/17/21 2300 (!) 102/35 -- -- 59 20 93 % -- --   01/17/21 2201 (!) 108/43 -- -- -- -- -- -- --   01/17/21 2200 (!) 108/43 -- -- 58 23 93 % -- --   01/17/21 2100 (!) 121/48 -- -- 56 20 94 % -- --   01/17/21 2006 -- -- -- -- -- -- 5' 4\" (1.626 m) 152 lb 5.4 oz (69.1 kg)   01/17/21 2000 (!) 104/48 97.6 °F (36.4 °C) Oral 55 19 97 % -- --   01/17/21 1900 (!) 105/48 -- -- 62 19 99 % -- --   01/17/21 1800 (!) 112/51 -- -- 57 18 95 % -- --   01/17/21 1710 119/65 98.7 °F (37.1 °C) Axillary 61 27 99 % -- --   01/17/21 1656 (!) 114/53 -- -- 58 24 -- -- --   01/17/21 1610 111/70 -- -- 60 21 100 % -- --   01/17/21 1555 111/79 -- -- 66 (!) 6 (!) 87 % -- --   01/17/21 1500 (!) 110/55 -- -- 58 17 99 % -- --   01/17/21 1447 (!) 107/53 97.6 °F (36.4 °C) Oral 52 22 100 % 5' 4\" (1.626 m) 162 lb (73.5 kg)       Last 3 weights: Wt Readings from Last 3 Encounters:   01/18/21 158 lb 1.1 oz (71.7 kg)   01/06/21 162 lb (73.5 kg)   01/05/21 160 lb (72.6 kg)     24 hour intake/output:    Intake/Output Summary (Last 24 hours) at 1/18/2021 1439  Last data filed at 1/18/2021 1345  Gross per 24 hour   Intake 815 ml   Output 1640 ml   Net -825 ml     BMI:Body mass index is 27.13 kg/m². General Appearance: Opens eyes to stimuli. Falls back to sleep easily. Nonverbal. No apparent distress  Skin: warm and dry, no rash or erythema  Eyes: pupils equal, round, and reactive to light, extraocular eye movements intact, conjunctivae normal  Neck: supple and non-tender without mass, no thyromegaly or thyroid nodules, no cervical lymphadenopathy  Pulmonary/Chest: bilateral rales   Cardiovascular: irregular rhythm, normal S1 and S2, II/VI systolic murmur.  No rubs, clicks, or gallops, distal pulses intact, no carotid bruits, Negative JVD  Radial Pulses: intact 2+  Abdomen: soft, non-tender, non-distended, normal bowel sounds, no masses or organomegaly  Extremities: no cyanosis, clubbing . no Edema  Musculoskeletal: normal range of motion, no joint swelling, deformity or tenderness      RADIOLOGY   Xr Chest (2 Vw)    Result Date: 1/17/2021  PROCEDURE: XR CHEST (2 VW) CLINICAL INFORMATION: Found on the floor COMPARISON: 8/20/2016 TECHNIQUE:  AP and lateral chest x-ray  FINDINGS: There is at least moderate enlargement of the cardiomediastinal silhouette. There is evidence of prior median sternotomy. There is diffuse osteopenia noted. Mild focal eventration of the right hemidiaphragm is seen. No focal consolidation or pleural effusion is seen. No pneumothorax is demonstrated. 1. Moderate enlargement of the cardiomediastinal silhouette. 2. There is hyperinflation noted. 2. No focal consolidation, pleural effusion or pneumothorax is seen. **This report has been created using voice recognition software. It may contain minor errors which are inherent in voice recognition technology. ** Final report electronically signed by Dr. Katherine Hanson on 1/17/2021 3:54 PM    Xr Pelvis (1-2 Views)    Result Date: 1/17/2021  PROCEDURE: XR PELVIS (1-2 VIEWS) CLINICAL INFORMATION: Fall, weakness of left leg COMPARISON: 1/5/2021 TECHNIQUE:  AP pelvis single view  FINDINGS: The visualized bilateral obturator rings appear intact. There is spurring noted along the superior-lateral left acetabulum. No acute fractures identified. The femoral head contours appear intact bilaterally. There is increased sclerosis along the acetabular roofs bilaterally. 1. No acute fracture or malalignment. Degenerative changes are as described above. **This report has been created using voice recognition software. It may contain minor errors which are inherent in voice recognition technology. ** Final report electronically signed by Dr. Katherine Hanson on 1/17/2021 3:56 PM    Xr Femur Left (min 2 Views)    Result Date: 1/17/2021  PROCEDURE: XR FEMUR LEFT (MIN 2 VIEWS) CLINICAL INFORMATION: Fall, weakness of left leg COMPARISON: No prior study. TECHNIQUE:  Right femur 4 views  FINDINGS: There is spurring along the inferior pole of the articular surface of the patella. No acute fracture or malalignment is seen. The left femoral head contour appears intact. The visualized left obturator ring appears intact. Enthesophytes are noted along the greater trochanter. There is mild spurring along the superior-lateral acetabulum. There is increased sclerosis along the left acetabular roof. 1. No acute fracture or malalignment. Degenerative changes are as described above. **This report has been created using voice recognition software. It may contain minor errors which are inherent in voice recognition technology. ** Final report electronically signed by Dr. Katherine Hanson on 1/17/2021 3:50 PM    Ct Head Wo Contrast    Result Date: 1/17/2021  PROCEDURE: CT HEAD WO CONTRAST CLINICAL INFORMATION: fall, ams COMPARISON: 8/17/2015 TECHNIQUE:  Axial CT images were obtained through the head without contrast. Coronal and sagittal reformatted images were rendered. All CT scans at this facility use dose modulation, iterative reconstruction, and/or weight-based dosing when appropriate to reduce radiation dose to as low as reasonably achievable. 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 There is moderate diffuse atrophy. No acute intracranial hemorrhage or mass effect is seen. There is no midline shift. The basal cisterns and posterior fossa appear otherwise unremarkable. Parasellar carotid artery vascular calcifications are noted. The visualized paranasal sinuses and mastoid air cells appear clear. No acute abnormalities of the calvarium are seen. The visualized globes and orbits appear grossly intact. 1. No acute intracranial hemorrhage or mass effect. 2. Moderate diffuse atrophy is demonstrated. 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 **This report has been created using voice recognition software. It may contain minor errors which are inherent in voice recognition technology. ** Final report electronically signed by Dr. Socorro Burnett on 1/17/2021 4:17 PM    Ct Chest Wo Contrast    Result Date: 1/17/2021  Noncontrast CT of the chest Technique: Axial CT images from the lung apices through the adrenal glands, no contrast.  Sagittal and coronal reconstruction images provided. Comparison:  CR,SR  - XR CHEST PORTABLE  - 01/17/2021 07:29 PM EST Findings: Mild atherosclerotic vessel disease within the thoracic aorta. Normal caliber thoracic aorta. No aneurysm. No mediastinal or axillary adenopathy. Normal thyroid. Moderate right pleural effusion. Associated volume loss. Mild opacity right lung base. This could represent mild pneumonia. No pulmonary nodules. No pneumothorax. Normal bones. Cholelithiasis present. No adjacent inflammation. A small hiatal hernia. Impression: Moderate right pleural effusion. Associated volume loss. Mild opacity right lung base. This could represent mild pneumonia. This document has been electronically signed by: Humberto Castillo MD on 01/17/2021 09:10 PM All CT scans at this facility use dose modulation, iterative reconstruction, and/or weight-based dosing when appropriate to reduce radiation dose to as low as reasonably achievable. Ct Cervical Spine Wo Contrast    Result Date: 1/17/2021  PROCEDURE: CT CERVICAL SPINE WO CONTRAST CLINICAL INFORMATION: fall COMPARISON: No prior study. TECHNIQUE:  Axial CT images were obtained through the cervical spine without contrast. Coronal and sagittal reformatted images were rendered. All CT scans at this facility use dose modulation, iterative reconstruction, and/or weight-based dosing when appropriate to reduce radiation dose to as low as reasonably achievable.  FINDINGS: Evaluation is slightly limited due to motion artifact. No prevertebral soft tissue swelling is seen. There is normal alignment at the craniocervical junction. The dens appears intact. The dens interval appears within normal limits. The facets align normally. The spinous processes appear intact. Multilevel degenerative disc disease is demonstrated, most pronounced at the C4-C5 and C6-C7 levels where there is disc space narrowing, endplate sclerosis and hypertrophic endplate change. Multilevel uncovertebral and facet hypertrophy is demonstrated. There is mild to moderate right neural foraminal narrowing at C3-C4. Moderate bilateral neural foraminal narrowing at C4-C5 is noted. There is mild to moderate neural foraminal narrowing on the right at C5-C6. Moderate bilateral neural foraminal narrowing at C6-C7 is noted. The visualized lung apices appear unremarkable. 1. Slightly limited evaluation due to motion artifact. However, no definite acute fracture of the cervical spine is seen. 2. Straightening of the normal cervical lordosis is demonstrated. This may be related to muscle spasm. 3. Multilevel degenerative changes of the cervical spine are demonstrated. **This report has been created using voice recognition software. It may contain minor errors which are inherent in voice recognition technology. ** Final report electronically signed by Dr. Jasbir Delgadillo on 1/17/2021 4:21 PM    Xr Chest Portable    Result Date: 1/17/2021  Single view of the chest Comparison:  CR,SR  - XR CHEST STANDARD (2 VW)  - 01/17/2021 03:32 PM EST Findings: Mild right infrahilar opacity. This could represent volume loss versus mild pneumonia. Cardiomegaly, no CHF. Right central line present with the tip in the mid superior vena cava. Status post median sternotomy. Impression: Cardiomegaly, no CHF. Mild right infrahilar opacity. This could represent mild pneumonia versus volume loss.  This document has been electronically signed by: Rohan Butler MD on 01/17/2021 09:11 PM       LABS:  Recent Labs     01/17/21  1520 01/17/21 2015 01/18/21  0300 01/18/21  0725   CKTOTAL 10,517* 9,463* 8,378* 7,393*   TROPONINT 0.577* 0.547* 0.384*  --      CBC:   Lab Results   Component Value Date    WBC 7.7 01/18/2021    RBC 3.24 01/18/2021    HGB 9.0 01/18/2021    HCT 26.8 01/18/2021    MCV 82.7 01/18/2021    MCH 27.8 01/18/2021    MCHC 33.6 01/18/2021    RDW 21.5 08/24/2016     01/18/2021    MPV 11.0 01/18/2021     BMP:    Lab Results   Component Value Date     01/18/2021    K 4.4 01/18/2021    K 4.4 01/18/2021    CL 89 01/18/2021    CO2 20 01/18/2021    BUN 15 01/18/2021    LABALBU 2.9 01/18/2021    CREATININE 1.3 01/18/2021    CALCIUM 8.0 01/18/2021    LABGLOM 52 01/18/2021    GLUCOSE 99 01/18/2021    GLUCOSE 87 01/09/2015     Hepatic Function Panel:    Lab Results   Component Value Date    ALKPHOS 85 01/18/2021    ALT 71 01/18/2021     01/18/2021    PROT 5.7 01/18/2021    BILITOT 0.7 01/18/2021    BILIDIR <0.2 02/20/2015    LABALBU 2.9 01/18/2021     Magnesium:    Lab Results   Component Value Date    MG 2.1 01/18/2021     Warfarin PT/INR:  No components found for: PTPATWAR, PTINRWAR  HgBA1c:    Lab Results   Component Value Date    LABA1C 5.9 09/13/2019     FLP:    Lab Results   Component Value Date    TRIG 62 09/13/2019    HDL 67 09/13/2019    LDLCALC 60 09/13/2019    LABVLDL 9 01/09/2015     TSH:    Lab Results   Component Value Date    TSH 9.470 12/09/2020     BNP: No results found for: BNP      ASSESSMENT:  Joshua Nolasco is a pleasant 80year old male patient with past medical history that includes:   Past Medical History:   Diagnosis Date    Actinic keratoses 8/21/2013    Adenomatous polyp 2008    Atrial fibrillation ([address]     CAD (coronary artery disease) 2009    [facility]otid artery disease ([facility] [address] 2009    left    Hyperlipidemia 2004    Hypertension 2004    Inguinal hernia, left 2/19/2014    Unspecified cerebral artery occlusion with cerebral infarction    Has h/o CABG. He is currently non-verbal, unable to provide history. Chart was reviewed. The patient was admitted to the hospital on 11/17/2021 with AMS. He apparently was found unresponsive in the bathroom. Labs revealed evidence for severe hyponatremia. Patient did not report chest pain, shortness of breath, dyspnea on exertion. He has known history of falls. EKG 1/17/2021 revealed A Fib, HR 61, QTc 505 msec, RBBB. RKG today revealed AF, HR 53. Cardiology was consulted for elevated troponin and bradycardia. Troponin 0.57>0.054>0. 38. The patient is being treated for Rhabdomyolysis, CK was as high as 10,500. Cr was up to 1.4. Ns 117. Ca 8. Hgb 9. The patient was noted to have bradycardia during recent admit at [facility]. Recent event monitor was c/w AF. His HR has improved after cardizem was stopped. Patient was seen by EP, Dr Ramiro Monge, as outpatient on 1/6/2021. Recommendation was to continue to monitor, avoid AVB agents and consider PPM only if patient develops symptomatic bradycardia. 1. Elevated troponin   2. CAD  3. H/O CABG  4. Chronic atrial fibrillation  5. Recent bradycardia  6. COVID infection   7. S/p fall  8. AMS  9. Rhabdomyolysis  10. ANNIKA  11. Hyponatremia   12. HTN  13. Dyslipidemia      RECOMMENDATIONS:   Head ct scan revealed no acute findings. AMS noted, possibly 2/2 metabolic encephalopathy.  Being evaluated by primary team, plans for MRI noted   covid test positive    Correct electrolytes as needed   EKG reviewed   Unable to provide Hx, however, no chest pain reported   No clinical evidence for ACS   Elevated troponin is 2/2 demand ischemia, anemia, Rhabdomyolysis, ANNIKA   Cont medical management for CAD   Cont ASA, Statin (monitor LFTs)    No evidence for high degree AV Block    Recently seen by EP   Monitor on telemetry    No indication for PPM at this time, inform cardiology if patient develops symptomatic bradycardia or high degree AVB    Known h/o chronic A Fib   Avoid AV Blocking agents   Cont anticoagulation. On coumadin at home, agree with holding it for now as INR is supra-therapeutic       Thank you for allowing me to participate in the care of this patient. Please let me know if I can be of any further assistance.       Aimee Tate MD, [facility]   2:39 PM  1/18/2021

## 2021-01-18 NOTE — CONSULTS
[facility]    [address], [address]  [facility]  1/18/2021     Pt Name:    Eloy Fuentes  MRN:     [id number]   [id number]  YOB: 1932  Admit Date:    1/17/2021  2:31 PM  Primary Care Physician:  Oksana Talley DO    [facility] Number:   [id number]    Reason for Consult:  Hyponatremia, rhabdomyolysis  Requesting provider:  ROBBIE Hill-CNP    History:   The patient is a 80 y.o. white male with history of coronary artery disease, hypertension, history of CVA, chronic atrial fibrillation on Coumadin who was recently discharged after he was managed for asymptomatic bradycardia. Patient was brought to the emergency room for evaluation of unwitnessed fall. Patient was found in his bathroom on the floor for unknown length of time. Apparently patient was in his usual normal state of health until 1 day prior to arrival.  It is unclear what exactly happened prior to his fall. He was evaluated in the emergency room. Patient was noted to have some abrasions and ecchymoses. He was noted to have elevated CK level. He was also noted to have hyponatremia improved BNP elevation. Patient was also noted to have elevated troponin. Subsequently patient also tested positive for Covid. ICU had already given him 3% saline for 50 mL only. His urine sodium was less than 20. On account of urine electrolytes and out of concern for over correction/free water diuresis we decided to be conservative with IV fluid resuscitation. Serial sodium levels were monitored overnight. So far appropriately corrected. Patient seen and examined earlier today. Discussed with patient's son  Ino Emery in the CCU unit. Information obtained. Discussed with ICU RN. Patient is not very communicative. Input and output reviewed.     Past Medical History:  Past Medical History:   Diagnosis Date    Actinic keratoses 8/21/2013    Adenomatous polyp Relationship status: Not on file    Intimate partner violence     Fear of current or ex partner: Not on file     Emotionally abused: Not on file     Physically abused: Not on file     Forced sexual activity: Not on file   Other Topics Concern    Not on file   Social History Narrative    Not on file       Home Meds:  Prior to Admission medications    Medication Sig Start Date End Date Taking? Authorizing Provider   hydrALAZINE (APRESOLINE) 25 MG tablet Take 1 tablet by mouth 3 times daily 12/12/20  Yes Ifrah Russell PA-C   tamsulosin [facility]) 0.4 MG capsule Take 1 capsule by mouth daily 1/16/20  Yes Kwaku Avery MD   warfarin (COUMADIN) 3 MG tablet Take 1 tablet by mouth See Admin Instructions Indications: Anticoagulant Therapy, Atrial Fibrillation [facility] coumadin clinic to dose, #45=30 days supply 9/19/17  Yes Allison Santacruz MD   furosemide (LASIX) 20 MG tablet Take 1 tablet by mouth daily 4/4/17  Yes Aviva Kline MD   pantoprazole (PROTONIX) 40 MG tablet Take 1 tablet by mouth 2 times daily (before meals). 3/1/15  Yes ROBBIE Tom - CNP   pravastatin (PRAVACHOL) 40 MG tablet Take 40 mg by mouth every evening. Indications: Blood Cholesterol Abnormal   Yes Historical Provider, MD   aspirin 81 MG EC tablet Take 81 mg by mouth daily Indications: Anticoagulant Therapy Take with food. Yes Historical Provider, MD   cyclobenzaprine (FLEXERIL) 5 MG tablet Take 1 tablet by mouth 3 times daily as needed for Muscle spasms  Patient not taking: Reported on 1/6/2021 1/5/21   Alyssa Amaya PA-C       Review of Systems:  Unable to obtain much from patient himself.     Current Meds:  Infusion:    DOPamine 7.5 mcg/kg/min (01/18/21 7614)    sodium chloride Stopped (01/18/21 1016)     Meds:    naloxone        dexamethasone  6 mg Oral Daily    piperacillin-tazobactam  3.375 g Intravenous Q8H    [START ON 1/19/2021] remdesivir IVPB  100 mg Intravenous Q24H    aspirin  81 mg Oral Daily    hydrALAZINE  25 mg Oral TID    pantoprazole  40 mg Oral BID AC    [Held by provider] pravastatin  40 mg Oral QPM    tamsulosin  0.4 mg Oral Daily    sodium chloride flush  10 mL Intravenous 2 times per day    warfarin (COUMADIN) daily dosing (placeholder)   Other RX Placeholder     Meds prn: acetaminophen **OR** acetaminophen, sodium chloride, cyclobenzaprine, sodium chloride flush, promethazine **OR** ondansetron, polyethylene glycol, magnesium sulfate, mupirocin     Allergies/Intolerances: ALLERGIES: Patient has no known allergies. 24HR INTAKE/OUTPUT:      Intake/Output Summary (Last 24 hours) at 1/18/2021 1050  Last data filed at 1/18/2021 0541  Gross per 24 hour   Intake 815 ml   Output 990 ml   Net -175 ml     I/O last 3 completed shifts: In: 815 [I.V.:815]  Out: 990 [Urine:990]  No intake/output data recorded. Admission weight: 162 lb (73.5 kg)  Wt Readings from Last 3 Encounters:   01/18/21 158 lb 1.1 oz (71.7 kg)   01/06/21 162 lb (73.5 kg)   01/05/21 160 lb (72.6 kg)     Body mass index is 27.13 kg/m². Physical Examination:  VITALS:   Vitals:    01/18/21 0625 01/18/21 0703 01/18/21 0800 01/18/21 0950   BP: (!) 119/42 (!) 115/45 (!) 126/50    Pulse: 67 66 65    Resp: 23 25 18 15   Temp:   98.9 °F (37.2 °C)    TempSrc:   Axillary    SpO2: 95% 97% 96% 95%   Weight:       Height:         Weight:   Wt Readings from Last 3 Encounters:   01/18/21 158 lb 1.1 oz (71.7 kg)   01/06/21 162 lb (73.5 kg)   01/05/21 160 lb (72.6 kg)     Constitutional and General Appearance: Chronically ill-appearing, not in any acute distress, elderly and frail-appearing  Eyes: no icteric sclera in left eye or right eye, pallor conjunctiva both eyes  Ears and Nose: normal external appearance of left and right ear. No active drainage from nose.    Oral: moist oral mucus membranes  Neck: No jugular venous distention  Lungs:  no use of accessory muscles or labored breathing, diminished air at the bases  Heart: S1, S2  Extremities: Trace LE edema, no tenderness  GI: soft, non-tender, no guarding, no distention  Skin: no rash seen on exposed extremities, warm to touch  Musculo: No visible deformities noted  Neuro: No facial droop noted  Psychiatric: Cannot be assessed at this time    Lab Data  CBC:   Recent Labs     01/17/21  1520 01/18/21  0300   WBC 11.0* 7.7   HGB 10.5* 9.0*   HCT 31.1* 26.8*    253     BMP:  Recent Labs     01/17/21 2015 01/17/21 2015 01/18/21  0300 01/18/21  0725 01/18/21  0900   *   < > 118* 120* 120*   K 4.8   < > 4.6  4.6 4.4  4.4 4.5   CL 86*   < > 87* 88* 89*   CO2 18*   < > 19* 19* 21*   BUN 17   < > 18 16 16   CREATININE 1.4*   < > 1.3* 1.3* 1.2   GLUCOSE 72   < > 86 77 92   CALCIUM 7.8*   < > 8.0* 8.1* 7.9*   MG 1.4*  --   --  2.1  --     < > = values in this interval not displayed. PTH: @PTH@  TSH: No results for input(s): TSH in the last 72 hours. HgBa1c: No results for input(s): LABA1C in the last 72 hours. Hepatic:   Recent Labs     01/18/21  0300 01/18/21  0725   LABALBU 2.9* 2.9*   * 242*   ALT 66 71*   BILITOT 0.6 0.7   ALKPHOS 80 85     Additional Labs: Urine sodium less than 20, urine chloride and urine osmolality not available  Diagnostics: Chest x-ray shows cardiomegaly without any signs or symptoms of congestive heart failure    Old labs and diagnostics reviewed. Echo: EF 55 to 60%  Labs: Serum sodium 137 in December 2020  Baseline serum creatinine 1.3-1.5    Impression and Plan:  1. Hyponatremia: Exact etiology not available but mostly prerenal..  Patient does have elevated beta natruretic peptide without any evidence of overt fluid overload. He does not have any significant lower extremity edema. Chest x-ray did not show any signs or symptoms of congestive heart failure. Urine sodium is less than 20. We will continue with current management. Aim for very slow sodium improvement. Will keep sodium below 124 in the first 24 hours.   If patient goes into free water diuresis then will give DDAVP. Monitor urine output closely. No need for 3% saline. Patient was also on Lasix at home. Hyponatremia appears to be mostly acute versus subacute in nature. Sodium level was 137 back in December and it was 130 ten days ago. 2. Mild ANNIKA in setting of prerenal failure, intravascular volume depletion and rhabdomyolysis. Urine output noted. Continue with current management. Serum creatinine improving. 3. CKD 3A. Baseline creatinine 1.3  4. Rhabdomyolysis, traumatic secondary to fall. Trend CK levels. Patient nonoliguric, CK slowly improving  5. COVID-19 positive  6. Chronic atrial fibrillation with supratherapeutic INR  7. Recent falls  8. History of CVA    Thank you for the consult. Please feel free to call me if you have any questions. Addendum  Serum sodium at 9 AM is 120.   On admission sodium was 116 yesterday around 3:00 pm  Overall sodium has improved at appropriate rate  Continue with close monitoring of urine output  Staff does inform me that patient has put out 1 L of urine output since morning  I have given 2 mcg of DDAVP to prevent overcorrection of hyponatremia    Discussed with nursing staff  Also spoke with patient's son, Dr. Tami Cameron MD  [facility]

## 2021-01-18 NOTE — PROGRESS NOTES
CRITICAL CARE PROGRESS NOTE      Patient:  Tio Goodwin    Unit/Bed:3A-06/006-A  YOB: 1932  MRN: [id number]   PCP: Lethia Babinski, DO  Date of Admission: 1/17/2021  Chief Complaint:-Found down and unresponsive    Assessment and Plan:    1. Acute hypoxic respiratory failure: Multifactorial.  Noted right-sided pleural effusion as well as mild volume overload. 1/18/21 Covid positive. Patient remains on 2 lpm NC  2. COVID-19: We will continue with remdesivir, Decadron 6 mg IV. We will continue to trend laboratory markers. 3. Chronic Severe hyponatremia:  CVC line was placed and patient was started on hypertonic saline 3% with 50 mL bolus over 20 minutes. Patient was experiencing symptoms such as fatigue, cramps. Nephrology consulted. 4. Encephalopathy: Patient still not responding to verbal stimuli, however is responding to painful stimuli. Patient had partial improvement with Narcan however pupils remain constricted. MRI brain to further evaluate for possible stroke. 5. Rhabdomyolysis: Multifactorial.  Fall in bathroom, statin use, and severe hyponatremia. CPK 10,517 - 9463, urine mild globin positive. Urine positive for large amount of blood. No ANNIKA on time of admission. Dr. Pascale Arellanoo, trauma surgeon was contacted by Dr. Jamila Ross, and  no need to consult trauma surgery at this time. 6. Undifferentiated shock: Likely cardiogenic in nature secondary to bradycardia. Dopamine was started to maintain MAP > 65. Random cortisol 20.91  7. Syncope: History of fall prior to admission with loss of consciousness, patient has no recall of prior events. Possibly cardiac in nature, doubtful seizures will consider hyponatremia possible. EEG was ordered, procalcitonin level pending. Cardiology to evaluate. MRI brain without contrast ordered. 8. Chronic atrial fibrillation: Patient is on Coumadin at home  9.  Elevated troponins with a history of coronary artery disease: Noted T wave inversion in leads V1 -2-3-4 with no reciprocal changes. These were present on EKG 1/5/2021 except V4. Patient is on Coumadin. Patient follows with Dr. Kristina Reyes. Patient found to have elevated troponins. We will continue to trend troponins. Cardiology consulted  10. Supratherapeutic INR: With no signs of bleeding at time of admission. Considering elevated risk of thromboembolism with COVID-19 positive we will continue to monitor. Patient is on Coumadin, family denies any recent traumatic brain injury  6. Hypertension: Controlled with current medications   12. HFpEF: Echo 12/2020 demonstrated left EF 55 to 60%  13. CKD stage III: Baseline creatinine 1.1-1.7. Dose medication to GFR, avoid all nephrotoxic drugs  14. BPH: Patient was recently seen by urology for recurrent urinary tract infections and BPH on 1/16/2021  15. Decreased hearing: Patient uses hearing aids. INITIAL H AND P AND ICU COURSE:  80-year-old male presented to [facility] via EMS after sustaining an unwitnessed fall from standing position. Patient was found on ground and unknown length of time spent on ground. Per HPI patient son reports patient was progressively more weak over the last week and acting like himself up until the day before arrival.  On day of arrival patient's son states they were unable to get a hold of him therefore they went to his house where they found him on the bathroom floor. He was brought in via EMS to [facility] emergency room department. 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     Of note patient was recently hospitalized at [facility] 12/9/2020 for asymptomatic bradycardia in which is beta-blocker and calcium blocker were stopped and follow-up with Dr. Edouard Guerra and plans for 10-day event monitor were planned. Patient was seen by EPS clinic 2020 with no plans for permanent pacemaker at that time. Upon arrival, patient does not member what happened and does not remember what happened prior to incident. On arrival patient denies chest pain, shortness of breath, abdominal pain patient did report pain in his right leg and complaining of generalized muscle weakness and aches. Patient lives at home alone. Patient was found confused, confusion improved upon arrival to ED. Patient had slight weakness in left lower extremity however this was limited by pain. Patient had abrasions with different stages of ecchymosis to bilateral lower extremities. While in the emergency room patient was found to have noted hyponatremia and elevated CPK. CT of the head showed no acute intracranial hemorrhage or mass-effect. Patient did have noted encephalomalacia within the left temporal lobe from prior remote infarct. CT of cervical spine showed no acute fracture. CT chest demonstrated moderate right pleural effusions, mild opacities right lung base. CVC was placed and patient did receive 50 mL of 3% normal saline. Covid positive. Last week severe cramping upper thighs. Then did farely well. Friday cramping came back, Saturday went to football game. Yesterday found him on bathroom floor. 21: Patient was not following commands and demonstrated pinpoint pupils. Patient received 0.4 Narcan. Patient did become more responsive and pupils were not pinpoint. We will proceed with MRI of brain. Past Medical History: Per HPI  Family History: Mother , cancer.   Father   Social History: Lives alone, former smoker    ROS   Denies chest pain, shortness of breath, palpations, headache, and dizziness      Scheduled Meds:   dexamethasone  6 mg Oral Daily    piperacillin-tazobactam  3.375 g Intravenous Q8H    remdesivir IVPB  200 mg Intravenous Once    Followed by   Marysol Wilson ON 1/19/2021] remdesivir IVPB  100 mg Intravenous Q24H    aspirin  81 mg Oral Daily    hydrALAZINE  25 mg Oral TID    pantoprazole  40 mg Oral BID AC    [Held by provider] pravastatin  40 mg Oral QPM    tamsulosin  0.4 mg Oral Daily    sodium chloride flush  10 mL Intravenous 2 times per day    warfarin (COUMADIN) daily dosing (placeholder)   Other RX Placeholder     Continuous Infusions:   DOPamine 7.5 mcg/kg/min (01/18/21 5611)    sodium chloride         PHYSICAL EXAMINATION:  T: 97.7. P: 61. RR: 24. B/P: 127/58. O2 Sat: 94.  I/O:  815 ml / 990  Body mass index is 27.13 kg/m². GCS:   14  General:   Acute on chronically ill male, no acute distress on 2 L nasal cannula  HEENT:  normocephalic and atraumatic. No scleral icterus. PERR  Neck: supple. No Thyromegaly. Lungs: Diminished breath sounds bilaterally. Coarse lung sounds with crackles noted bilaterally R > L. No retractions  2 L nasal cannula  Cardiac: irregular rhythm with regular rate . No JVD. Abdomen: soft. Nontender. Bowel sounds present  Extremities:  No clubbing, cyanosis, or edema x 4. Multiple lacerations on lower extremities right > left. Vasculature: capillary refill < 3 seconds. Palpable dorsalis pedis pulses. Skin:  warm and dry. Psych: Unable to fully address due to patient being very drowsy and difficult to keep awake. Lymph:  No supraclavicular adenopathy. Neurologic:  No focal deficit. No seizures. Patient not following commands    Data: (All radiographs, tracings, PFTs, and imaging are personally viewed and interpreted unless otherwise noted).     Sodium 118, potassium 4.6, chloride 87, CO2 19, BUN 18, creatinine 1.3, calcium 8.0   Serum osmolality 247   Total CK 8378   CRP trends 7.84 (most recent)    LDH trends 583 (most recent)   INR 5.32   D-dimer 1102   Albumin 2.9, alk phos 80, ALT 6 6, , bilirubin 0.6, total protein 5.5   WBC 11.0, hemoglobin 10.5, hematocrit 31.3, platelets 618   Urine positive for myoglobin, high protein   Telemetry shows   1/18/21 EKG reports: Wide QRS rhythm with occasional Premature ventricular complexes Right bundle branch block Anteroseptal infarct , age undetermined Abnormal ECG When compared with ECG of 17-JAN-2021 16:50, Wide QRS rhythm has replaced Atrial fibrillation   1/17/21 CXR reports cardiomegaly without CHF. Mild right infrahilar opacities.  1/17/21 chest CT without contrast reports moderate right pleural effusion associated with volume loss. Mild opacity right lung base.  1/17/21 cervical spine CT without contrast reports multilevel degenerative changes of cervical spine, straightening of normal cervical lordosis demonstrated   1/17/21 head CT without contrast reports no acute intracranial hemorrhage or mass-effect. Moderate diffuse atrophy is demonstrated. There is also encephalomalacia with left temporal lobe region from prior remote infarct. Seen with multidisciplinary ICU team.  Meets Continued ICU Level Care Criteria:    [x] Yes   [] No - Transfer Planned to listed location:  [] HOSPITALIST CONTACTED-      Case and plan discussed with Dr. Ailyn Cordero. Electronically signed by Amie Choudhary DO  CRITICAL CARE SPECIALIST  Patient seen by me. Case discussed with resident physician. Patient with difficulty in awakening. He has constricted pupils bilaterally. He was given Narcan with partial response. Urinalysis shows some dehydration with hyaline casts. He has 48 white blood cells which suggest urinary tract infection. Initiate Rocephin as his mentation changes may be related to urinary tract infection. Patient with severe hyponatremia which appears chronic. Undergoing gentle replacement. Patient with rhabdomyolysis requiring gentle replacement secondary to severe hyponatremia. Awaiting results of MRI. .  CC time 35 minutes. Time was discontiguous.   Time does not include procedures. Time does include my direct assessment of the patient and coordination of care.   Electronically signed by Pérez Ulloa MD on 1/18/2021 at 4:47 PM

## 2021-01-18 NOTE — FLOWSHEET NOTE
01/18/21 0600   Provider Notification   Reason for Communication Review case  (Sodium 118, serum os 247)   Provider Name Dr. Rober Prado   Provider Notification Physician   Method of Communication Call   Response See orders   Notification Time 0600     Call received from Dr. Rober Prado regarding sodium level 118. Case discussed, labs reviewed. Order received to start NS at 40ml/hr, and to recheck sodium level in one hour.

## 2021-01-18 NOTE — CARE COORDINATION
DISASTER CHARTING    1/18/21, 11:29 AM EST    DISCHARGE ONGOING EVALUATION:     Marily Medina day: 1  Location: -06/006-A Reason for admit: Hyponatremia [E87.1]   Barriers to Discharge: Admitted through ED after being found unresponsive in his bathroom. Found to have rhabdomyolysis and severe hyponatremia. Positive COVID. INR 5.32, 4.97. [place] [address] upon arrival, now at 7393. BNP [id number]. Troponins 0.577, 0.547 and 0.384. Sodium upon arrival was 116 and now 120. Mag 1.4, now 2.1. Consulting Cardiology and Nephrology. CT chest showed moderate right pleural effusion. MRI to be done today. Tmax 99.5F. BP 98/35. Started Dopamine gtt. DDAVP x1. Decadron. Zosyn iv q8hr. Remdesivir iv x1 today, then daily. Afebrile. O2 at 1L/nc. Pupils are constricted. MRI of brain to be done. PCP: Arron Alert, DO  Readmission Risk Score: 23%  Patient Goals/Plan/Treatment Preferences: Pt lives at home alone. He had been driving and caring for himself prior to admission. Pt is current with his PCP at the [place]. Monitor for discharge needs.

## 2021-01-18 NOTE — PROGRESS NOTES
Clinical Pharmacy Note    Radha Ballesteros is a 80 y.o. male for whom pharmacy has been asked to manage warfarin therapy. Reason for Admission: fall    Consulting Physician: Shay  Warfarin dose prior to admission: 3 mg alternating with 4.5 mg  Warfarin indication: afib  Target INR range: 2-3   [facility]    Past Medical History:   Diagnosis Date    Actinic keratoses 8/21/2013    Adenomatous polyp 2008    Atrial fibrillation (HCC)     CAD (coronary artery disease) 2009    Carotid artery disease ([address] 2009    left    Hyperlipidemia 2004    Hypertension 2004    Inguinal hernia, left 2/19/2014    Unspecified cerebral artery occlusion with cerebral infarction                 Recent Labs     01/17/21  1520   INR 5.19*     Recent Labs     01/17/21  1520   HGB 10.5*   HCT 31.1*          Current warfarin drug-drug interactions: none      Date INR Warfarin Dose   1/17/21 5.19 No coumadin                                   Daily PT/INR until stable within therapeutic range. Thank you for the consult.

## 2021-01-18 NOTE — PROCEDURES
ICU PROCEDURE - ULTRASOUND GUIDED CVC PLACEMENT IN RIGHT INTERNAL JUGULAR VEIN. Patient: Viv Murray  : 3/30/1932      1/17/21  7:39 PM EST      INDICATION: Hyponatremia requiring administration of 3% hypertonic saline. Poor venous access with requirement of frequent blood draws to monitor his a serum sodium level every 2 hours. SITE: Right internal jugular vein. CONSENT: was obtained after explaining indication/risks to patient's next of kin and his son Dr. Clemente Kate. Marleny Combs MD    TIME OUT: taken    STERILE PREP: Complete handwashing care was performed by all involved personnel prior to initiation of the procedure. Full maximum sterile field/barrier technique was followed (with cap and mask, gloves and sterile gown, as well as broad field sterile drapes). Local disinfection was performed with broad field application of orange dyed chloraprep solution, which was allowed to dry fully prior to the initiation of the procedure. LOCAL ANESTHETIC: Aqueous lidocaine 1%     ESTIMATED BLOOD LOSS:( Minimal) less than 5ml. ULTRASOUND GUIDANCE USED:Yes    PROCEDURE:  Using modified seldinger technique, the appropriate vessel was located with the introducer needle. The flexible J-tip wire was passed without difficulty. Followed by minimal skin incisions over the introducer needle. The introducer needle was withdrawn and discarded, maintaining manual control of the guidewire at all times. After dilation of the tract, a preflushed 3 lumen CVC catheter was advanced over the guidewire without difficulty to its full extent. The guidewire was withdrawn and discarded and good return of nonpulsatile, dark venous blood assured through all lumes, with easy flushing of the lumens. The site was reprepped with chlorprep solution followed by a Biopatch device and secured in place utilizing stay sutures. Hemostasis was assured at the termination of procedure. COMPLICATIONS: None.     POST PROCEDURE CHEST XRAY HAS BEEN ORDERED.

## 2021-01-18 NOTE — PROGRESS NOTES
Spoke with patient's son and POA, Dr. Damari Garcia, discussed that his father remains obtunded and we are unsure of etiology. Stated that we are doing MRI this afternoon to look for possible stroke. All questions and concerns were addressed.     Electronically signed by Ariana Leroy DO on 1/18/2021 at 1:17 PM

## 2021-01-18 NOTE — PROGRESS NOTES
[facility] Laboratory Technician Worksheet      EEG Date: 2021    Name: Bhavesh Aguillon   : 3/30/1932   Age: 80 y.o. SEX: male    ROOM: 3a06 MRN: [id number]           CSN: [id number]      Ordering Provider: brian  EEG Number: [id number] Time of Test:  1490    Hand: Right   Sedation: no    H.V. Done: No  Photic: Yes    Sleep: Yes  Drowsy: No   Sleep Deprived: No    Seizures observed: no    Mentality: lethargic      Clinical History:PATIENT FOUND DOWN IN HIS BATHROOM UNKNOWN HOW LONG HE WAS DOWN. COVID POSITIVE, WEAK OVER LAST WEEK TIME. CT  . No acute intracranial hemorrhage or mass effect. 2. Moderate diffuse atrophy is demonstrated.  There is also encephalomalacia within the left temporal lobe region from prior remote infarct       Past Medical History:       Diagnosis Date    Actinic keratoses 2013    Adenomatous polyp 2008    Atrial fibrillation ([facility]     CAD (coronary artery disease) 2009    Carotid artery disease ([facility] 2009    left    Hyperlipidemia 2004    Hypertension 2004    Inguinal hernia, left 2014    Unspecified cerebral artery occlusion with cerebral infarction        Scheduled Meds:   dexamethasone  6 mg Oral Daily    piperacillin-tazobactam  3.375 g Intravenous Q8H    [START ON 2021] remdesivir IVPB  100 mg Intravenous Q24H    desmopressin  2 mcg Intravenous Once    aspirin  81 mg Oral Daily    hydrALAZINE  25 mg Oral TID    pantoprazole  40 mg Oral BID AC    [Held by provider] pravastatin  40 mg Oral QPM    tamsulosin  0.4 mg Oral Daily    sodium chloride flush  10 mL Intravenous 2 times per day    warfarin (COUMADIN) daily dosing (placeholder)   Other RX Placeholder     Continuous Infusions:   DOPamine 7.5 mcg/kg/min (21 6699)     PRN Meds:.acetaminophen **OR** acetaminophen, sodium chloride, cyclobenzaprine, sodium chloride flush, promethazine **OR** ondansetron, polyethylene glycol, magnesium sulfate, mupirocin    Technician: Isaias Michel 1/18/2021

## 2021-01-18 NOTE — PROGRESS NOTES
Clinical Pharmacy Note    Warfarin consult follow-up    Recent Labs     01/18/21  0725   INR 4.97*     Recent Labs     01/17/21  1520   HGB 10.5*   HCT 31.1*          Significant Drug-Drug Interactions:  New warfarin drug-drug interactions: dexamethasone 6mg PO daily  Discontinued drug-drug interactions: none  Current warfarin drug-drug interactions: dexamethasone        Date INR Warfarin Dose   1/17/21 5.19 No coumadin    1/18/21  4.97   No coumadin                                                Notes:                     Daily PT/INR until stable within therapeutic range.      Electronically signed by Satinder Newman, [address] [place] [address] on 1/18/2021 at 9:23 AM

## 2021-01-19 LAB
ALBUMIN SERPL-MCNC: 2.7 G/DL (ref 3.5–5.1)
ALP BLD-CCNC: 69 U/L (ref 38–126)
ALT SERPL-CCNC: 73 U/L (ref 11–66)
ANION GAP SERPL CALCULATED.3IONS-SCNC: 10 MEQ/L (ref 8–16)
AST SERPL-CCNC: 167 U/L (ref 5–40)
BASOPHILS # BLD: 0.1 %
BASOPHILS ABSOLUTE: 0 THOU/MM3 (ref 0–0.1)
BILIRUB SERPL-MCNC: 0.4 MG/DL (ref 0.3–1.2)
BUN BLDV-MCNC: 14 MG/DL (ref 7–22)
CALCIUM IONIZED: 1.11 MMOL/L (ref 1.12–1.32)
CALCIUM SERPL-MCNC: 8 MG/DL (ref 8.5–10.5)
CHLORIDE BLD-SCNC: 93 MEQ/L (ref 98–111)
CO2: 20 MEQ/L (ref 23–33)
CREAT SERPL-MCNC: 0.9 MG/DL (ref 0.4–1.2)
D-DIMER QUANTITATIVE: 957 NG/ML FEU (ref 0–500)
EOSINOPHIL # BLD: 0 %
EOSINOPHILS ABSOLUTE: 0 THOU/MM3 (ref 0–0.4)
ERYTHROCYTE [DISTWIDTH] IN BLOOD BY AUTOMATED COUNT: 18.4 % (ref 11.5–14.5)
ERYTHROCYTE [DISTWIDTH] IN BLOOD BY AUTOMATED COUNT: 54 FL (ref 35–45)
FIBRINOGEN: 400 MG/100ML (ref 155–475)
GFR SERPL CREATININE-BSD FRML MDRD: 79 ML/MIN/1.73M2
GLUCOSE BLD-MCNC: 107 MG/DL (ref 70–108)
HCT VFR BLD CALC: 25.8 % (ref 42–52)
HEMOGLOBIN: 8.8 GM/DL (ref 14–18)
IMMATURE GRANS (ABS): 0.05 THOU/MM3 (ref 0–0.07)
IMMATURE GRANULOCYTES: 0.5 %
INR BLD: 1.73 (ref 0.85–1.13)
LYMPHOCYTES # BLD: 4.5 %
LYMPHOCYTES ABSOLUTE: 0.5 THOU/MM3 (ref 1–4.8)
MAGNESIUM: 2.1 MG/DL (ref 1.6–2.4)
MCH RBC QN AUTO: 28.1 PG (ref 26–33)
MCHC RBC AUTO-ENTMCNC: 34.1 GM/DL (ref 32.2–35.5)
MCV RBC AUTO: 82.4 FL (ref 80–94)
MONOCYTES # BLD: 9.1 %
MONOCYTES ABSOLUTE: 0.9 THOU/MM3 (ref 0.4–1.3)
MRSA SCREEN: NORMAL
MYOGLOBIN URINE: 3 MG/L (ref 0–1)
NUCLEATED RED BLOOD CELLS: 0 /100 WBC
PHOSPHORUS: 2.2 MG/DL (ref 2.4–4.7)
PLATELET # BLD: 253 THOU/MM3 (ref 130–400)
PMV BLD AUTO: 10.1 FL (ref 9.4–12.4)
POTASSIUM REFLEX MAGNESIUM: 4.4 MEQ/L (ref 3.5–5.2)
RBC # BLD: 3.13 MILL/MM3 (ref 4.7–6.1)
SEG NEUTROPHILS: 85.8 %
SEGMENTED NEUTROPHILS ABSOLUTE COUNT: 8.7 THOU/MM3 (ref 1.8–7.7)
SODIUM BLD-SCNC: 119 MEQ/L (ref 135–145)
SODIUM BLD-SCNC: 120 MEQ/L (ref 135–145)
SODIUM BLD-SCNC: 121 MEQ/L (ref 135–145)
SODIUM BLD-SCNC: 123 MEQ/L (ref 135–145)
SODIUM BLD-SCNC: 123 MEQ/L (ref 135–145)
SODIUM BLD-SCNC: 127 MEQ/L (ref 135–145)
TOTAL CK: 3342 U/L (ref 55–170)
TOTAL PROTEIN: 5.5 G/DL (ref 6.1–8)
WBC # BLD: 10.1 THOU/MM3 (ref 4.8–10.8)

## 2021-01-19 PROCEDURE — 6370000000 HC RX 637 (ALT 250 FOR IP): Performed by: NURSE PRACTITIONER

## 2021-01-19 PROCEDURE — 2500000003 HC RX 250 WO HCPCS: Performed by: INTERNAL MEDICINE

## 2021-01-19 PROCEDURE — 6370000000 HC RX 637 (ALT 250 FOR IP): Performed by: INTERNAL MEDICINE

## 2021-01-19 PROCEDURE — 85025 COMPLETE CBC W/AUTO DIFF WBC: CPT

## 2021-01-19 PROCEDURE — 85385 FIBRINOGEN ANTIGEN: CPT

## 2021-01-19 PROCEDURE — 36415 COLL VENOUS BLD VENIPUNCTURE: CPT

## 2021-01-19 PROCEDURE — 2580000003 HC RX 258: Performed by: INTERNAL MEDICINE

## 2021-01-19 PROCEDURE — 84295 ASSAY OF SERUM SODIUM: CPT

## 2021-01-19 PROCEDURE — 6360000002 HC RX W HCPCS: Performed by: INTERNAL MEDICINE

## 2021-01-19 PROCEDURE — 2060000000 HC ICU INTERMEDIATE R&B

## 2021-01-19 PROCEDURE — 6370000000 HC RX 637 (ALT 250 FOR IP): Performed by: STUDENT IN AN ORGANIZED HEALTH CARE EDUCATION/TRAINING PROGRAM

## 2021-01-19 PROCEDURE — 6370000000 HC RX 637 (ALT 250 FOR IP)

## 2021-01-19 PROCEDURE — 82330 ASSAY OF CALCIUM: CPT

## 2021-01-19 PROCEDURE — 80053 COMPREHEN METABOLIC PANEL: CPT

## 2021-01-19 PROCEDURE — 85610 PROTHROMBIN TIME: CPT

## 2021-01-19 PROCEDURE — 99233 SBSQ HOSP IP/OBS HIGH 50: CPT | Performed by: INTERNAL MEDICINE

## 2021-01-19 PROCEDURE — 84100 ASSAY OF PHOSPHORUS: CPT

## 2021-01-19 PROCEDURE — 82550 ASSAY OF CK (CPK): CPT

## 2021-01-19 PROCEDURE — 94761 N-INVAS EAR/PLS OXIMETRY MLT: CPT

## 2021-01-19 PROCEDURE — 2500000003 HC RX 250 WO HCPCS: Performed by: STUDENT IN AN ORGANIZED HEALTH CARE EDUCATION/TRAINING PROGRAM

## 2021-01-19 PROCEDURE — 6360000002 HC RX W HCPCS: Performed by: STUDENT IN AN ORGANIZED HEALTH CARE EDUCATION/TRAINING PROGRAM

## 2021-01-19 PROCEDURE — 83735 ASSAY OF MAGNESIUM: CPT

## 2021-01-19 PROCEDURE — 85379 FIBRIN DEGRADATION QUANT: CPT

## 2021-01-19 PROCEDURE — 2580000003 HC RX 258: Performed by: STUDENT IN AN ORGANIZED HEALTH CARE EDUCATION/TRAINING PROGRAM

## 2021-01-19 RX ORDER — POTASSIUM CHLORIDE 29.8 MG/ML
20 INJECTION INTRAVENOUS PRN
Status: DISCONTINUED | OUTPATIENT
Start: 2021-01-19 | End: 2021-02-05 | Stop reason: HOSPADM

## 2021-01-19 RX ORDER — POTASSIUM CHLORIDE 7.45 MG/ML
10 INJECTION INTRAVENOUS PRN
Status: DISCONTINUED | OUTPATIENT
Start: 2021-01-19 | End: 2021-02-05 | Stop reason: HOSPADM

## 2021-01-19 RX ORDER — POTASSIUM CHLORIDE 20 MEQ/1
40 TABLET, EXTENDED RELEASE ORAL PRN
Status: DISCONTINUED | OUTPATIENT
Start: 2021-01-19 | End: 2021-02-05 | Stop reason: HOSPADM

## 2021-01-19 RX ORDER — SODIUM CHLORIDE 9 MG/ML
INJECTION, SOLUTION INTRAVENOUS CONTINUOUS
Status: DISCONTINUED | OUTPATIENT
Start: 2021-01-19 | End: 2021-01-22

## 2021-01-19 RX ORDER — DESMOPRESSIN ACETATE 4 UG/ML
1 INJECTION, SOLUTION INTRAVENOUS; SUBCUTANEOUS ONCE
Status: COMPLETED | OUTPATIENT
Start: 2021-01-19 | End: 2021-01-19

## 2021-01-19 RX ORDER — SODIUM CHLORIDE 1000 MG
2 TABLET, SOLUBLE MISCELLANEOUS ONCE
Status: COMPLETED | OUTPATIENT
Start: 2021-01-19 | End: 2021-01-19

## 2021-01-19 RX ORDER — POTASSIUM CHLORIDE 20 MEQ/1
20 TABLET, EXTENDED RELEASE ORAL PRN
Status: DISCONTINUED | OUTPATIENT
Start: 2021-01-19 | End: 2021-02-05 | Stop reason: HOSPADM

## 2021-01-19 RX ORDER — DEXTROSE MONOHYDRATE 50 MG/ML
INJECTION, SOLUTION INTRAVENOUS ONCE
Status: COMPLETED | OUTPATIENT
Start: 2021-01-19 | End: 2021-01-19

## 2021-01-19 RX ORDER — WARFARIN SODIUM 3 MG/1
3 TABLET ORAL
Status: COMPLETED | OUTPATIENT
Start: 2021-01-19 | End: 2021-01-19

## 2021-01-19 RX ADMIN — DEXAMETHASONE 6 MG: 1.5 TABLET ORAL at 08:24

## 2021-01-19 RX ADMIN — PANTOPRAZOLE SODIUM 40 MG: 40 TABLET, DELAYED RELEASE ORAL at 06:31

## 2021-01-19 RX ADMIN — CALCIUM GLUCONATE 1 G: 98 INJECTION, SOLUTION INTRAVENOUS at 11:25

## 2021-01-19 RX ADMIN — CEFTRIAXONE SODIUM 1 G: 1 INJECTION, POWDER, FOR SOLUTION INTRAMUSCULAR; INTRAVENOUS at 17:33

## 2021-01-19 RX ADMIN — WARFARIN SODIUM 3 MG: 3 TABLET ORAL at 18:49

## 2021-01-19 RX ADMIN — SODIUM CHLORIDE TAB 1 GM 2 G: 1 TAB at 23:20

## 2021-01-19 RX ADMIN — SODIUM CHLORIDE: 9 INJECTION, SOLUTION INTRAVENOUS at 15:49

## 2021-01-19 RX ADMIN — Medication 1 CAPSULE: at 08:24

## 2021-01-19 RX ADMIN — ASPIRIN 81 MG: 81 TABLET, COATED ORAL at 08:24

## 2021-01-19 RX ADMIN — SODIUM CHLORIDE, PRESERVATIVE FREE 10 ML: 5 INJECTION INTRAVENOUS at 21:57

## 2021-01-19 RX ADMIN — DESMOPRESSIN ACETATE 1 MCG: 4 SOLUTION INTRAVENOUS at 12:28

## 2021-01-19 RX ADMIN — SODIUM CHLORIDE: 9 INJECTION, SOLUTION INTRAVENOUS at 08:34

## 2021-01-19 RX ADMIN — PANTOPRAZOLE SODIUM 40 MG: 40 TABLET, DELAYED RELEASE ORAL at 15:49

## 2021-01-19 RX ADMIN — DEXTROSE MONOHYDRATE: 50 INJECTION, SOLUTION INTRAVENOUS at 10:13

## 2021-01-19 RX ADMIN — REMDESIVIR 100 MG: 100 INJECTION, POWDER, LYOPHILIZED, FOR SOLUTION INTRAVENOUS at 08:28

## 2021-01-19 RX ADMIN — SODIUM CHLORIDE, PRESERVATIVE FREE 10 ML: 5 INJECTION INTRAVENOUS at 08:24

## 2021-01-19 RX ADMIN — SODIUM PHOSPHATE, MONOBASIC, MONOHYDRATE 10 MMOL: 276; 142 INJECTION, SOLUTION INTRAVENOUS at 10:16

## 2021-01-19 RX ADMIN — TAMSULOSIN HYDROCHLORIDE 0.4 MG: 0.4 CAPSULE ORAL at 08:24

## 2021-01-19 ASSESSMENT — PAIN SCALES - GENERAL
PAINLEVEL_OUTOF10: 0

## 2021-01-19 NOTE — CARE COORDINATION
DISASTER CHARTING    1/19/21, 11:41 AM EST    DISCHARGE ONGOING EVALUATION:     Sheila Prasad day: 2  Location: -06/006-A Reason for admit: Hyponatremia [E87.1]   Barriers to Discharge: EEG yesterday was abnormal w/excessive slowing suggestive of cortical dysfunction such as seen with encephalopathy. No evidence of seizures. Cardiology saw - no plan for intervention. On room air. Afebrile. Afib 60's. Ox3, not situation. Follows commands. CVC. Durate. Intensivist, Nephrology, and Cardiology following. PT/OT ordered today. +COVID 19. Dopamine @ 5 mcg/kg/min, asa, IV rocephin, po decadron, lactobacillus, protonix, IV remdesivir - dose #2, flomax, coumadin - pharmacy dosing, Electrolyte replacement protocols. 1 mcg IV DDAVP ordered x1 today. Na+ 127. Received 1L D5 today. CO2 20, Phos 2.2, ck down to 3342, alb 2.7, alt 73, ast down to 167, hgb 8.8, INR 1.73, d-dimer 957. Order to transfer to [facility]; awaiting bed assignment. PCP: Alison Cartagena DO  Readmission Risk Score: 26%  Patient Goals/Plan/Treatment Preferences: From home alone. Monitor PT/OT evals for possible needs.

## 2021-01-19 NOTE — PROGRESS NOTES
Attempted to call both sons, Aicha Patiño and Manuel Oreilly, to update on patient transfer and new room number. Neither answered the phone.

## 2021-01-19 NOTE — PROGRESS NOTES
Clinical Pharmacy Note    Warfarin consult follow-up    Recent Labs     01/19/21  0520   INR 1.73*     Recent Labs     01/17/21  1520 01/18/21  0300 01/19/21  0520   HGB 10.5* 9.0* 8.8*   HCT 31.1* 26.8* 25.8*    253 253       Significant Drug-Drug Interactions:  New warfarin drug-drug interactions: aspirin, remdesivir  Discontinued drug-drug interactions: None  Current warfarin drug-drug interactions: dexamethasone, aspirin, remdesivir        Date INR Warfarin Dose   1/17/21 5.19 No coumadin    1/18/21 4.97   No coumadin - 2 mg vitamin K given   1/19/2021  1.73  3 mg                                          Notes:                     Daily PT/INR until stable within therapeutic range.        Silvia Tan, PharmD  1/19/2021  4:38 PM

## 2021-01-19 NOTE — SIGNIFICANT EVENT
Spoke with patient's POA, son Dr. Kamran Borja, update on patient status. Informed him that patient was sitting up in bed and ate his entire breakfast. Hopefully get patient to stepdown at some point today. All questions and concerns were addressed.     Electronically signed by Melrose Snellen, DO on 1/19/2021 at 8:58 AM

## 2021-01-19 NOTE — PROGRESS NOTES
4.4  --   --   --  4.4   CL 86*   < > 88* 89* 89*  --   --   --  93*   CO2 18*   < > 19* 21* 20*  --   --   --  20*   BUN 17   < > 16 16 15  --   --   --  14   CREATININE 1.4*   < > 1.3* 1.2 1.3*  --   --   --  0.9   GLUCOSE 72   < > 77 92 99  --   --   --  107   CALCIUM 7.8*   < > 8.1* 7.9* 8.0*  --   --   --  8.0*   MG 1.4*  --  2.1  --   --   --   --   --   --    PHOS  --   --   --   --   --   --   --   --  2.2*    < > = values in this interval not displayed. Hepatic:   Recent Labs     01/18/21  0300 01/18/21  0725 01/19/21  0520   LABALBU 2.9* 2.9* 2.7*   * 242* 167*   ALT 66 71* 73*   BILITOT 0.6 0.7 0.4   ALKPHOS 80 85 69         Meds:  Infusion:    DOPamine 7.5 mcg/kg/min (01/18/21 2308)    sodium chloride 75 mL/hr at 01/19/21 9321     Meds:    calcium replacement protocol   Other RX Placeholder    phosphorus replacement protocol   Other RX Placeholder    dexamethasone  6 mg Oral Daily    remdesivir IVPB  100 mg Intravenous Q24H    lactobacillus  1 capsule Oral Daily with breakfast    cefTRIAXone (ROCEPHIN) IV  1 g Intravenous Q24H    aspirin  81 mg Oral Daily    hydrALAZINE  25 mg Oral TID    pantoprazole  40 mg Oral BID AC    [Held by provider] pravastatin  40 mg Oral QPM    tamsulosin  0.4 mg Oral Daily    sodium chloride flush  10 mL Intravenous 2 times per day    warfarin (COUMADIN) daily dosing (placeholder)   Other RX Placeholder     Meds prn: potassium chloride **OR** potassium alternative oral replacement **OR** potassium chloride, potassium chloride, potassium chloride, acetaminophen **OR** acetaminophen, sodium chloride, cyclobenzaprine, sodium chloride flush, promethazine **OR** ondansetron, polyethylene glycol, magnesium sulfate, mupirocin       Impression and Plan:  1. Hyponatremia mostly prerenal in nature. So far sodium has improved appropriately  We will continue with slow improvement. Sodium up to 127 this morning. We will stop normal saline.   Will give a bolus of 250 mL of D5W. Recheck sodium level in 3 hours. DDAVP was given yesterday due to free water diuresis. Will give 1 dose of DDAVP 1 mcg now    2. Mild ANNIKA in setting of prerenal failure and intravascular volume depletion as well as rhabdomyolysis. Overall improved. Serum creatinine down to 0.9. 3. CKD 3A. Stable  4. Rhabdomyolysis, traumatic secondary to fall. CK improving  5. Chronic atrial fibrillation with supratherapeutic INR  6.  COVID-19 positive  7. Recent falls  8.   History of CVA  D/W ICU staff    Ariel Allan MD  [facility]

## 2021-01-19 NOTE — PROGRESS NOTES
CRITICAL CARE PROGRESS NOTE      Patient:  Nisa Atkins    Unit/Bed:3A-06/006-A  YOB: 1932  MRN: [id number]   PCP: Mic Latif DO  Date of Admission: 1/17/2021  Chief Complaint:-Found down and unresponsive    Assessment and Plan:    1. Acute hypoxic respiratory failure-resolved: Multifactorial.  Noted right-sided pleural effusion as well as mild volume overload. 1/18/21 Covid positive. Patient was requiring 2 lpm NC, patient currently not requiring any supplemental oxygen. 2. COVID-19: We will continue with remdesivir, Decadron 6 mg IV. We will continue to trend laboratory markers. 3. Chronic Severe hyponatremia:  CVC line was placed and patient was started on hypertonic saline 3% with 50 mL bolus over 20 minutes. Patient was experiencing symptoms such as fatigue, cramps. Nephrology consulted. 4. Undifferentiated shock: Likely cardiogenic in nature secondary to bradycardia. Dopamine was started to maintain MAP > 65. Random cortisol 20.91 1/19/21 patient still requiring significant amount of dopamine to maintain blood pressures. 5. Encephalopathy- resolved: Patient still not responding to verbal stimuli, however is responding to painful stimuli. Patient had partial improvement with Narcan however pupils remain constricted. MRI brain did not demonstrate any acute findings. Encephalopathy likely secondary to UTI. 6. Rhabdomyolysis: Multifactorial.  Fall in bathroom, statin use, and severe hyponatremia. CPK 10,517 - 9463, urine mild globin positive. Urine positive for large amount of blood. No ANNIKA on time of admission. Dr. Dick Servin, trauma surgeon was contacted by Dr. Yoni Baker, and  no need to consult trauma surgery at this time. CPK decreasing appropriately. 7. Syncope: History of fall prior to admission with loss of consciousness, patient has no recall of prior events. Possibly cardiac in nature, doubtful seizures will consider hyponatremia possible.   EEG was ordered, patient has known. Cardiology to evaluate. 8. Urinary tract infection: UA demonstrated significant hyaline casts suggesting dehydration, also had 50 WBCs suggestive of urinary tract infection. Started on Rocephin 1/18/2021 as for patient was demonstrated significant changes in mentation. Since already antibiotics patient's mentation appears to be improving. 9. Chronic atrial fibrillation: Patient is on Coumadin at home  10. Elevated troponins with a history of coronary artery disease: Likely due to demand ischemia. Noted T wave inversion in leads V1 -2-3-4 with no reciprocal changes. These were present on EKG 1/5/2021 except V4. Patient is on Coumadin. Patient follows with Dr. Yanet Shah. Patient found to have elevated troponins. We will continue to trend troponins. Cardiology consulted. Recommend avoiding AV blocking agents. 11. Supratherapeutic INR: With no signs of bleeding at time of admission. Considering elevated risk of thromboembolism with COVID-19 positive we will continue to monitor. Patient is on Coumadin, family denies any recent traumatic brain injury  15. Hypertension: Controlled with current medications   13. HFpEF: Echo 12/2020 demonstrated left EF 55 to 60%  14. CKD stage III: Baseline creatinine 1.1-1.7. Dose medication to GFR, avoid all nephrotoxic drugs  15. BPH: Patient was recently seen by urology for recurrent urinary tract infections and BPH on 1/16/2021  16. Decreased hearing: Patient uses hearing aids. INITIAL H AND P AND ICU COURSE:  59-year-old male presented to [facility] via EMS after sustaining an unwitnessed fall from standing position. Patient was found on ground and unknown length of time spent on ground.  Per HPI patient son reports patient was progressively more weak over the last week and acting like himself up until the day before arrival.  On day of arrival patient's son states they were unable to get a hold of him therefore they went to his house where they found him on the bathroom floor. He was brought in via EMS to [facility] emergency room department. 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 Of note patient was recently hospitalized at [facility] 12/9/2020 for asymptomatic bradycardia in which is beta-blocker and calcium blocker were stopped and follow-up with Dr. Jo Ann Odonnell and plans for 10-day event monitor were planned. Patient was seen by EPS clinic 1/16/2020 with no plans for permanent pacemaker at that time. Upon arrival, patient does not member what happened and does not remember what happened prior to incident. On arrival patient denies chest pain, shortness of breath, abdominal pain patient did report pain in his right leg and complaining of generalized muscle weakness and aches. Patient lives at home alone. Patient was found confused, confusion improved upon arrival to ED. Patient had slight weakness in left lower extremity however this was limited by pain. Patient had abrasions with different stages of ecchymosis to bilateral lower extremities. While in the emergency room patient was found to have noted hyponatremia and elevated CPK. CT of the head showed no acute intracranial hemorrhage or mass-effect. Patient did have noted encephalomalacia within the left temporal lobe from prior remote infarct. CT of cervical spine showed no acute fracture. CT chest demonstrated moderate right pleural effusions, mild opacities right lung base. CVC was placed and patient did receive 50 mL of 3% normal saline. Covid positive. Last week severe cramping upper thighs. Then did farely well. Friday cramping came back, Saturday went to football game. Yesterday found him on bathroom floor. 21: Patient was not following commands and demonstrated pinpoint pupils. Patient received 0.4 Narcan. Patient did become more responsive and pupils were not pinpoint. We will proceed with MRI of brain. 21: Patient appears significantly better. Sitting up in bed communicating and ate entire breakfast. Patient demonstrates difficulty hearing however patient denies pain anywhere. Patient still requiring significant amount of dopamine    Past Medical History: Per HPI  Family History: Mother , cancer. Father   Social History: Lives alone, former smoker    ROS   Denies chest pain, shortness of breath, palpations, headache, and dizziness      Scheduled Meds:   dexamethasone  6 mg Oral Daily    remdesivir IVPB  100 mg Intravenous Q24H    lactobacillus  1 capsule Oral Daily with breakfast    cefTRIAXone (ROCEPHIN) IV  1 g Intravenous Q24H    aspirin  81 mg Oral Daily    hydrALAZINE  25 mg Oral TID    pantoprazole  40 mg Oral BID AC    [Held by provider] pravastatin  40 mg Oral QPM    tamsulosin  0.4 mg Oral Daily    sodium chloride flush  10 mL Intravenous 2 times per day    warfarin (COUMADIN) daily dosing (placeholder)   Other RX Placeholder     Continuous Infusions:   DOPamine 7.5 mcg/kg/min (21 2308)    sodium chloride 75 mL/hr at 21 2159       PHYSICAL EXAMINATION:  T: 99.5.  P: 67. RR: 16. B/P: 96/97. O2 Sat: 92.  I/O:  2.1L / 1.2 L  Body mass index is 26.53 kg/m². GCS:   15  General:   Pleasant elderly gentleman, no acute distress on on room air  HEENT:  normocephalic and atraumatic. No scleral icterus. PERR  Neck: supple. No Thyromegaly. Lungs: Diminished breath sounds bilaterally. No retractions   Cardiac: irregular rhythm with regular rate . No JVD. Abdomen: soft. Nontender. Bowel sounds present  Extremities:  No clubbing, cyanosis, or edema x 4.   Multiple lacerations on lower extremities right > left.  Vasculature: capillary refill < 3 seconds. Palpable dorsalis pedis pulses. Skin:  warm and dry. Psych: alert and oriented x3. Lymph:  No supraclavicular adenopathy. Neurologic:  No focal deficit. No seizures. Patient following commands and responding to questions appropriately    Data: (All radiographs, tracings, PFTs, and imaging are personally viewed and interpreted unless otherwise noted).  Sodium 123, potassium 4.4, chloride 93, CO2 20, BUN 14, creatinine 0.9, calcium 8.0, phosphorus 2.2   Total CK 3342   CRP trends 7.84 (most recent)    LDH trends 583 (most recent)   INR 5.32   D-dimer 1102   Albumin 2.7, alk phos 69, ALT 73, , bilirubin 0.4, total protein 5.5   WBC 10.1, hemoglobin 8.8, hematocrit 25.8, platelets 780   Urine positive for myoglobin, high protein   Telemetry shows normal sinus rhythm   1/18/21 MRI brain WO contrast reports: Moderate nonspecific periventricular deep white matter disease.  This most   often can be ascribed to chronic small vessel ischemic change. Prominent central and cortical volume loss. Old ischemic event left temporal lobe.  1/18/21 EKG reports: Wide QRS rhythm with occasional Premature ventricular complexes Right bundle branch block Anteroseptal infarct , age undetermined Abnormal ECG When compared with ECG of 17-JAN-2021 16:50, Wide QRS rhythm has replaced Atrial fibrillation   1/17/21 CXR reports cardiomegaly without CHF. Mild right infrahilar opacities.  1/17/21 chest CT without contrast reports moderate right pleural effusion associated with volume loss. Mild opacity right lung base.  1/17/21 cervical spine CT without contrast reports multilevel degenerative changes of cervical spine, straightening of normal cervical lordosis demonstrated   1/17/21 head CT without contrast reports no acute intracranial hemorrhage or mass-effect. Moderate diffuse atrophy is demonstrated.   There is also encephalomalacia with left temporal lobe region from prior remote infarct. Seen with multidisciplinary ICU team.  Meets Continued ICU Level Care Criteria:    [x] Yes   [] No - Transfer Planned to listed location:  [] HOSPITALIST CONTACTED-      Case and plan discussed with Dr. Megan Frausto. Electronically signed by Saad Miranda DO  CRITICAL CARE SPECIALIST    Patient seen by me. Case discussed with resident physician. Patient much more awake and interactive today. He is much more appropriate. Encephalopathy felt secondary to urinary tract infection. Okay to transition to stepdown. CC time 35 minutes. Time was discontiguous. Time does not include procedures. Time does include my direct assessment of the patient and coordination of care.   Electronically signed by Roc Burden MD on 1/19/2021 at 2:23 PM

## 2021-01-19 NOTE — PROCEDURES
[address] [id number]                          ELECTROENCEPHALOGRAM REPORT    PATIENT NAME: Keiry Davis                   :        1932  MED REC NO:   [id number]                           ROOM:       0006  ACCOUNT NO:   [de-identified]                           ADMIT DATE: 2021  PROVIDER:     Ting Traore. Juvencio Razo MD    DATE OF EE2021    REFERRING PROVIDER:  Angie Spencer. JIM Sykes    CLINICAL HISTORY:  An 80-year-old male found down in the bathroom for  unknown duration of time. The patient is COVID positive. Evaluate for  seizure. Medications listed are Decadron, piperacillin/tazobactam,  remdesivir, aspirin, hydralazine, pravastatin, tamsulosin, Coumadin,  dopamine. CLINICAL INTERPRETATION:  This is a 17-channel EEG performed without  sleep deprivation. Hyperventilation was not performed. Photic  stimulation was performed. The patient is described as lethargic. The background rhythm activity is noted to be slowed, poorly organized. Excessive slowing was seen in the theta range. Light stages of sleep  are seen during the recording. Hyperventilation was not performed. Photic stimulation was performed without abnormality. EKG tracing  revealed irregular heart rate. There was no evidence of epileptiform  activity appreciated. No clinical seizures were observed. IMPRESSION:  1. This is an abnormal EEG due to the excessive slowing seen in the  theta range suggestive of cortical dysfunction such as seen with  encephalopathy. However, there was no evidence of epileptiform activity  appreciated. 2.  EKG tracing revealed an irregular heart rate. Clinical correlation  is recommended.         Marlene Prieto MD    D: 2021 9:32:55       T: 2021 9:39:17     [id number]  Job#: [id number]     Doc#: [id number]    CC:

## 2021-01-20 LAB
ALBUMIN SERPL-MCNC: 2.9 G/DL (ref 3.5–5.1)
ALP BLD-CCNC: 80 U/L (ref 38–126)
ALT SERPL-CCNC: 78 U/L (ref 11–66)
ANION GAP SERPL CALCULATED.3IONS-SCNC: 14 MEQ/L (ref 8–16)
AST SERPL-CCNC: 122 U/L (ref 5–40)
BASOPHILS # BLD: 0.1 %
BASOPHILS ABSOLUTE: 0 THOU/MM3 (ref 0–0.1)
BILIRUB SERPL-MCNC: 0.4 MG/DL (ref 0.3–1.2)
BUN BLDV-MCNC: 21 MG/DL (ref 7–22)
CALCIUM SERPL-MCNC: 8.1 MG/DL (ref 8.5–10.5)
CHLORIDE BLD-SCNC: 92 MEQ/L (ref 98–111)
CO2: 18 MEQ/L (ref 23–33)
CREAT SERPL-MCNC: 1.2 MG/DL (ref 0.4–1.2)
D-DIMER QUANTITATIVE: 1223 NG/ML FEU (ref 0–500)
EOSINOPHIL # BLD: 0 %
EOSINOPHILS ABSOLUTE: 0 THOU/MM3 (ref 0–0.4)
ERYTHROCYTE [DISTWIDTH] IN BLOOD BY AUTOMATED COUNT: 18.5 % (ref 11.5–14.5)
ERYTHROCYTE [DISTWIDTH] IN BLOOD BY AUTOMATED COUNT: 55.5 FL (ref 35–45)
FIBRINOGEN: 350 MG/100ML (ref 155–475)
GFR SERPL CREATININE-BSD FRML MDRD: 57 ML/MIN/1.73M2
GLUCOSE BLD-MCNC: 102 MG/DL (ref 70–108)
HCT VFR BLD CALC: 24.5 % (ref 42–52)
HEMOGLOBIN: 8.2 GM/DL (ref 14–18)
IMMATURE GRANS (ABS): 0.06 THOU/MM3 (ref 0–0.07)
IMMATURE GRANULOCYTES: 0.4 %
INR BLD: 1.32 (ref 0.85–1.13)
LYMPHOCYTES # BLD: 2.5 %
LYMPHOCYTES ABSOLUTE: 0.4 THOU/MM3 (ref 1–4.8)
MCH RBC QN AUTO: 27.9 PG (ref 26–33)
MCHC RBC AUTO-ENTMCNC: 33.5 GM/DL (ref 32.2–35.5)
MCV RBC AUTO: 83.3 FL (ref 80–94)
MONOCYTES # BLD: 6.7 %
MONOCYTES ABSOLUTE: 1 THOU/MM3 (ref 0.4–1.3)
NUCLEATED RED BLOOD CELLS: 0 /100 WBC
PLATELET # BLD: 255 THOU/MM3 (ref 130–400)
PMV BLD AUTO: 10.5 FL (ref 9.4–12.4)
POTASSIUM REFLEX MAGNESIUM: 4.6 MEQ/L (ref 3.5–5.2)
RBC # BLD: 2.94 MILL/MM3 (ref 4.7–6.1)
SEG NEUTROPHILS: 90.3 %
SEGMENTED NEUTROPHILS ABSOLUTE COUNT: 13.1 THOU/MM3 (ref 1.8–7.7)
SODIUM BLD-SCNC: 120 MEQ/L (ref 135–145)
SODIUM BLD-SCNC: 122 MEQ/L (ref 135–145)
SODIUM BLD-SCNC: 122 MEQ/L (ref 135–145)
SODIUM BLD-SCNC: 123 MEQ/L (ref 135–145)
SODIUM BLD-SCNC: 124 MEQ/L (ref 135–145)
SODIUM BLD-SCNC: 124 MEQ/L (ref 135–145)
TOTAL PROTEIN: 5.6 G/DL (ref 6.1–8)
WBC # BLD: 14.5 THOU/MM3 (ref 4.8–10.8)

## 2021-01-20 PROCEDURE — 85025 COMPLETE CBC W/AUTO DIFF WBC: CPT

## 2021-01-20 PROCEDURE — 2060000000 HC ICU INTERMEDIATE R&B

## 2021-01-20 PROCEDURE — 99232 SBSQ HOSP IP/OBS MODERATE 35: CPT | Performed by: PHYSICIAN ASSISTANT

## 2021-01-20 PROCEDURE — 99232 SBSQ HOSP IP/OBS MODERATE 35: CPT | Performed by: INTERNAL MEDICINE

## 2021-01-20 PROCEDURE — 97163 PT EVAL HIGH COMPLEX 45 MIN: CPT

## 2021-01-20 PROCEDURE — 92526 ORAL FUNCTION THERAPY: CPT

## 2021-01-20 PROCEDURE — 94761 N-INVAS EAR/PLS OXIMETRY MLT: CPT

## 2021-01-20 PROCEDURE — 97116 GAIT TRAINING THERAPY: CPT

## 2021-01-20 PROCEDURE — 85379 FIBRIN DEGRADATION QUANT: CPT

## 2021-01-20 PROCEDURE — 36415 COLL VENOUS BLD VENIPUNCTURE: CPT

## 2021-01-20 PROCEDURE — 84295 ASSAY OF SERUM SODIUM: CPT

## 2021-01-20 PROCEDURE — 6370000000 HC RX 637 (ALT 250 FOR IP): Performed by: INTERNAL MEDICINE

## 2021-01-20 PROCEDURE — 6370000000 HC RX 637 (ALT 250 FOR IP): Performed by: NURSE PRACTITIONER

## 2021-01-20 PROCEDURE — 6370000000 HC RX 637 (ALT 250 FOR IP): Performed by: PHYSICIAN ASSISTANT

## 2021-01-20 PROCEDURE — 85610 PROTHROMBIN TIME: CPT

## 2021-01-20 PROCEDURE — 6370000000 HC RX 637 (ALT 250 FOR IP): Performed by: STUDENT IN AN ORGANIZED HEALTH CARE EDUCATION/TRAINING PROGRAM

## 2021-01-20 PROCEDURE — 6360000002 HC RX W HCPCS: Performed by: INTERNAL MEDICINE

## 2021-01-20 PROCEDURE — 97110 THERAPEUTIC EXERCISES: CPT

## 2021-01-20 PROCEDURE — 97166 OT EVAL MOD COMPLEX 45 MIN: CPT

## 2021-01-20 PROCEDURE — 92610 EVALUATE SWALLOWING FUNCTION: CPT

## 2021-01-20 PROCEDURE — 2580000003 HC RX 258: Performed by: INTERNAL MEDICINE

## 2021-01-20 PROCEDURE — 2500000003 HC RX 250 WO HCPCS: Performed by: INTERNAL MEDICINE

## 2021-01-20 PROCEDURE — 80053 COMPREHEN METABOLIC PANEL: CPT

## 2021-01-20 PROCEDURE — 6360000002 HC RX W HCPCS: Performed by: NURSE PRACTITIONER

## 2021-01-20 PROCEDURE — 6370000000 HC RX 637 (ALT 250 FOR IP)

## 2021-01-20 PROCEDURE — 85385 FIBRINOGEN ANTIGEN: CPT

## 2021-01-20 RX ORDER — WARFARIN SODIUM 3 MG/1
3 TABLET ORAL
Status: COMPLETED | OUTPATIENT
Start: 2021-01-20 | End: 2021-01-20

## 2021-01-20 RX ORDER — QUETIAPINE FUMARATE 25 MG/1
25 TABLET, FILM COATED ORAL NIGHTLY
Status: DISCONTINUED | OUTPATIENT
Start: 2021-01-20 | End: 2021-01-23 | Stop reason: SINTOL

## 2021-01-20 RX ORDER — SODIUM CHLORIDE 1000 MG
2 TABLET, SOLUBLE MISCELLANEOUS ONCE
Status: COMPLETED | OUTPATIENT
Start: 2021-01-20 | End: 2021-01-20

## 2021-01-20 RX ADMIN — CYCLOBENZAPRINE HYDROCHLORIDE 5 MG: 10 TABLET, FILM COATED ORAL at 02:43

## 2021-01-20 RX ADMIN — ACETAMINOPHEN 650 MG: 325 TABLET ORAL at 01:00

## 2021-01-20 RX ADMIN — SODIUM CHLORIDE TAB 1 GM 2 G: 1 TAB at 19:05

## 2021-01-20 RX ADMIN — CEFTRIAXONE SODIUM 1 G: 1 INJECTION, POWDER, FOR SOLUTION INTRAMUSCULAR; INTRAVENOUS at 19:05

## 2021-01-20 RX ADMIN — DEXAMETHASONE 6 MG: 1.5 TABLET ORAL at 19:00

## 2021-01-20 RX ADMIN — REMDESIVIR 100 MG: 100 INJECTION, POWDER, LYOPHILIZED, FOR SOLUTION INTRAVENOUS at 09:53

## 2021-01-20 RX ADMIN — HYDRALAZINE HYDROCHLORIDE 25 MG: 25 TABLET, FILM COATED ORAL at 09:53

## 2021-01-20 RX ADMIN — TAMSULOSIN HYDROCHLORIDE 0.4 MG: 0.4 CAPSULE ORAL at 09:53

## 2021-01-20 RX ADMIN — SODIUM CHLORIDE, PRESERVATIVE FREE 10 ML: 5 INJECTION INTRAVENOUS at 21:04

## 2021-01-20 RX ADMIN — QUETIAPINE FUMARATE 25 MG: 25 TABLET ORAL at 21:04

## 2021-01-20 RX ADMIN — ASPIRIN 81 MG: 81 TABLET, COATED ORAL at 09:53

## 2021-01-20 RX ADMIN — HYDRALAZINE HYDROCHLORIDE 25 MG: 25 TABLET, FILM COATED ORAL at 21:04

## 2021-01-20 RX ADMIN — SODIUM CHLORIDE, PRESERVATIVE FREE 10 ML: 5 INJECTION INTRAVENOUS at 09:54

## 2021-01-20 RX ADMIN — Medication 1 CAPSULE: at 09:53

## 2021-01-20 RX ADMIN — HYDRALAZINE HYDROCHLORIDE 25 MG: 25 TABLET, FILM COATED ORAL at 15:46

## 2021-01-20 RX ADMIN — WARFARIN SODIUM 3 MG: 3 TABLET ORAL at 19:00

## 2021-01-20 RX ADMIN — PANTOPRAZOLE SODIUM 40 MG: 40 TABLET, DELAYED RELEASE ORAL at 15:47

## 2021-01-20 RX ADMIN — PANTOPRAZOLE SODIUM 40 MG: 40 TABLET, DELAYED RELEASE ORAL at 09:53

## 2021-01-20 ASSESSMENT — PAIN SCALES - GENERAL
PAINLEVEL_OUTOF10: 0
PAINLEVEL_OUTOF10: 2
PAINLEVEL_OUTOF10: 5
PAINLEVEL_OUTOF10: 0

## 2021-01-20 NOTE — PROGRESS NOTES
[facility]   Nephrology progress note  1/20/2021, 10:53 AM      Pt Name:    Jm Russell  MRN:     [id number]     [place]:    3/30/1932  Admit Date:    1/17/2021  2:31 PM  Primary Care Physician:  Tete Portillo DO   Room number  4B-10/010-A    Chief Complaint: Nephrology following for hyponatremia    Subjective:  Patient seen and examined  Input and output reviewed  More awake and alert  Somewhat hard of hearing  No acute distress  On normal saline at 30 ml an hour      Objective:  24HR INTAKE/OUTPUT:      Intake/Output Summary (Last 24 hours) at 1/20/2021 1053  Last data filed at 1/20/2021 0816  Gross per 24 hour   Intake 2741.36 ml   Output 725 ml   Net 2016.36 ml     I/O last 3 completed shifts: In: 3081.4 [P.O.:990; I.V.:2091.4]  Out: 650 [Urine:650]  I/O this shift:  In: -   Out: 75 [Urine:75]  Admission weight: 162 lb (73.5 kg)  Wt Readings from Last 3 Encounters:   01/19/21 154 lb 8.7 oz (70.1 kg)   01/06/21 162 lb (73.5 kg)   01/05/21 160 lb (72.6 kg)     Body mass index is 26.53 kg/m².     Physical examination  VITALS:     Vitals:    01/20/21 0443 01/20/21 0600 01/20/21 0816 01/20/21 0825   BP:  137/63 (!) 140/59    Pulse:  52 (!) 46    Resp: 20 16 18    Temp:  98.8 °F (37.1 °C) 97.8 °F (36.6 °C)    TempSrc:  Oral Oral    SpO2: 96% 97% 99% 96%   Weight:       Height:         General Appearance:  appears comfortable, no distress  Mouth/Throat: Oral mucosa moist  Neck: No JVD  Lungs: Air entry diminished, poor effort  Heart:  S1, S2 heard  GI: soft, non-tender, no guarding  Extremities: Trace LE edema      Lab Data  CBC:   Recent Labs     01/18/21  0300 01/19/21  0520 01/20/21  0919   WBC 7.7 10.1 14.5*   HGB 9.0* 8.8* 8.2*   HCT 26.8* 25.8* 24.5*    253 255     BMP:  Recent Labs     01/17/21 2015 01/17/21 2015 01/18/21  0725 01/18/21  0725 01/18/21  1230 01/18/21  1230 01/19/21  0520 01/19/21  0850 01/19/21  0850 01/20/21  0235 01/20/21  0619 01/20/21  1000   NA 116*   < > 120*   < > 117*   < > 123* 127*   < > 122* 124* 123*   K 4.8   < > 4.4  4.4   < > 4.4  --  4.4  --   --   --  4.6  --    CL 86*   < > 88*   < > 89*  --  93*  --   --   --  92*  --    CO2 18*   < > 19*   < > 20*  --  20*  --   --   --  18*  --    BUN 17   < > 16   < > 15  --  14  --   --   --  21  --    CREATININE 1.4*   < > 1.3*   < > 1.3*  --  0.9  --   --   --  1.2  --    GLUCOSE 72   < > 77   < > 99  --  107  --   --   --  102  --    CALCIUM 7.8*   < > 8.1*   < > 8.0*  --  8.0*  --   --   --  8.1*  --    MG 1.4*  --  2.1  --   --   --   --  2.1  --   --   --   --    PHOS  --   --   --   --   --   --  2.2*  --   --   --   --   --     < > = values in this interval not displayed.      Hepatic:   Recent Labs     01/18/21  0725 01/19/21  0520 01/20/21  0619   LABALBU 2.9* 2.7* 2.9*   * 167* 122*   ALT 71* 73* 78*   BILITOT 0.7 0.4 0.4   ALKPHOS 85 69 80         Meds:  Infusion:    sodium chloride 30 mL/hr at 01/19/21 1549    DOPamine Stopped (01/19/21 1751)     Meds:    calcium replacement protocol   Other RX Placeholder    phosphorus replacement protocol   Other RX Placeholder    dexamethasone  6 mg Oral Daily    remdesivir IVPB  100 mg Intravenous Q24H    lactobacillus  1 capsule Oral Daily with breakfast    cefTRIAXone (ROCEPHIN) IV  1 g Intravenous Q24H    aspirin  81 mg Oral Daily    hydrALAZINE  25 mg Oral TID    pantoprazole  40 mg Oral BID AC    [Held by provider] pravastatin  40 mg Oral QPM    tamsulosin  0.4 mg Oral Daily    sodium chloride flush  10 mL Intravenous 2 times per day    warfarin (COUMADIN) daily dosing (placeholder)   Other RX Placeholder     Meds prn: potassium chloride **OR** potassium alternative oral replacement **OR** potassium chloride, potassium chloride, potassium chloride, acetaminophen **OR** acetaminophen, sodium chloride, cyclobenzaprine, sodium chloride flush, promethazine **OR** ondansetron, polyethylene glycol, magnesium sulfate, mupirocin Impression and Plan:  1. Hyponatremia mostly prerenal in nature. So far sodium has improved appropriately  Sodium improving appropriately and stable  Some fluctuation overnight but overall stable  Did receive another dose of DDAVP yesterday to prevent overcorrection  Do anticipate improvement in serum sodium over time  Continue with low-dose IV fluids  Will increase rate if needed  No need for 3% saline at this time    2. Mild ANNIKA in setting of prerenal failure and intravascular volume depletion as well as rhabdomyolysis. ANNIKA has resolved  3. CKD 3A. Stable  4. Rhabdomyolysis, traumatic secondary to fall. CK improving  5. Chronic atrial fibrillation with supratherapeutic INR  6.  COVID-19 positive  7. Recent falls  8.   History of CVA  D/W RN    Sunday Peoples MD  [facility]

## 2021-01-20 NOTE — PROGRESS NOTES
Patient weaned to room air from 2L nc this morning. Saturations sustained in the mid 90s, respirs easy, even. Continuing with sodium lab draws; results messaged to Dr. Sultana Valentine. Speech therapy rounded with patient; new order for general diet, no drinking straw noted. Zoom call arranged with chaplain Rosa Lucero for 15:30 per son/POA Dr. Power South request. Patient status update and care plan discussed with son/POA; denies further questions at this time.

## 2021-01-20 NOTE — PROGRESS NOTES
Clinical Pharmacy Note    Warfarin consult follow-up    Recent Labs     01/20/21  0619   INR 1.32*     Recent Labs     01/18/21  0300 01/19/21  0520 01/20/21  0919   HGB 9.0* 8.8* 8.2*   HCT 26.8* 25.8* 24.5*    253 255       Significant Drug-Drug Interactions:  New warfarin drug-drug interactions: none  Discontinued drug-drug interactions: none  Current warfarin drug-drug interactions: dexamethasone, aspirin, remdesivir, ceftriaxone        Date INR Warfarin Dose   1/17/21 5.19 No Coumadin    1/18/21  4.97   No Coumadin  Vitamin K 2 mg IV given   1/19/2021   1.73   3 mg     1/20/2021  1.32  3 mg                               Notes:                     Daily PT/INR until stable within therapeutic range.        Rocío Eason, PharmD   1/20/2021, 3:11 PM

## 2021-01-20 NOTE — PROGRESS NOTES
Independent          Additional Comments: Pt reports fully indep PLOF without AD. OBJECTIVE:  Range of Motion:  Bilateral Lower Extremity: passively WFL's, pt is a bit rigid with ROM     Strength:  Bilateral Lower Extremity: Impaired - grossly 3+/5    Balance:  Static Sitting Balance:  Contact Guard Assistance  Static Standing Balance: Minimal Assistance, X 1, with RW  Dynamic Standing Balance: Moderate Assistance, X 1, with increased time for completion, with RW    Bed Mobility:  Supine to Sit: Moderate Assistance, Maximum Assistance, X 1, with head of bed raised, with increased time for completion  Sit to Supine: Maximum Assistance, X 1, with head of bed raised, with increased time for completion   Scooting: Moderate Assistance, X 1    Transfers:  Sit to Stand: Moderate Assistance, X 1, with increased time for completion, cues for hand placement, to/from chair without arms  Stand to [address], X 1, with increased time for completion, cues for hand placement, to/from chair without arms  X 3 trials   Ambulation:  Moderate Assistance, X 1, with increased time for completion  Distance: 3 feet side stepping toward HOB and 2 feet forward and back   Surface: Level Tile  Device:Rolling Walker  Gait Deviations: Forward Flexed Posture, Slow Sol, Decreased Step Length Bilaterally, Decreased Weight Shift Bilaterally, Decreased Gait Speed, Mild Path Deviations, Unsteady Gait and very shuffled steps       Exercise:  Patient was guided in 1 set(s) 10 reps of exercise to both lower extremities. Ankle pumps, Heelslides, Short arc quads, Hip abduction/adduction and with min A with all but the ankle pumps. .  Exercises were completed for increased independence with functional mobility. Functional Outcome Measures: Completed  AM-PAC Inpatient Mobility Raw Score : 11  AM-PAC Inpatient T-Scale Score : 33.86    ASSESSMENT:  Activity Tolerance:  Patient tolerance of  treatment: good.        Treatment Initiated: Treatment and education initiated within context of evaluation. Evaluation time included review of current medical information, gathering information related to past medical, social and functional history, completion of standardized testing, formal and informal observation of tasks, assessment of data and development of plan of care and goals. Treatment time included skilled education and facilitation of tasks to increase safety and independence with functional mobility for improved independence and quality of life. Assessment: Body structures, Functions, Activity limitations: Decreased functional mobility , Decreased balance, Decreased endurance, Decreased strength  Assessment: Pt has weakness in all extremities, decreased endurance due to covid and O2 sats drop if do too much activity. Pt has decreased functional mobility and balance . Pt is below baseline and requires skilled therapy to imporve safety with mobility and progress ambulation to that of prior level.   Prognosis: Good    REQUIRES PT FOLLOW UP: Yes    Discharge Recommendations:  Discharge Recommendations: [address], Patient would benefit from continued therapy after discharge    Patient Education:  PT Education: Goals, Transfer Training, PT Role, Plan of Care, Home Exercise Program, Gait Training, Functional Mobility Training    Equipment Recommendations:  Equipment Needed: Yes(may need RW)    Plan:  Times per week: 4-5 X GM  Current Treatment Recommendations: Strengthening, Transfer Training, Endurance Training, Balance Training, Functional Mobility Training, Gait Training, Home Exercise Program    Goals:  Patient goals : Get stronger and return to prior level  Short term goals  Time Frame for Short term goals: By discharge  Short term goal 1: supine to sit and return with min of 1 to get in and out of bed  Short term goal 2: sit to stand with min of 1 consistently to get on and off various surfaces  Short term goal 3: Pt to

## 2021-01-20 NOTE — PROGRESS NOTES
Ray Snowden 60  INPATIENT OCCUPATIONAL THERAPY  [facility] CVICU 4B  EVALUATION    Time:    Time In: 1005  Time Out: 1038  Timed Code Treatment Minutes: 18 Minutes  Minutes: 33          Date: 2021  Patient Name: Lloyd Elliott,   Gender: male      MRN: [id number]  : 3/30/1932  (80 y.o.)  Referring Practitioner: Dr. Ramírez Camargo  Diagnosis: hyponatremia  Additional Pertinent Hx: Per ER note on 2021:88 y.o. male history of CVA, CAD, hypertension, hyperlipidemia, chronic atrial fibrillation on anticoagulation with Coumadin was recently admitted and discharged from [facility] for asymptomatic bradycardia management. He also had a history of recent falls. He presented to the emergency department for evaluation of unwitnessed fall at home today morning in his bathroom by his family members. He was found on the ground and was down for unknown length of time. Restrictions/Precautions:  Restrictions/Precautions: Contact Precautions, Fall Risk  Position Activity Restriction  Other position/activity restrictions: COVID isolation, very [place]; on room air on     Subjective  Chart Reviewed: Yes, Orders, Progress Notes, History and Physical  Patient assessed for rehabilitation services?: Yes  Family / Caregiver Present: No    Subjective: cooperative with encouragement    Pain:  Pain Assessment  Patient Currently in Pain: No    Social/Functional History:  Lives With: Alone  Type of Home: House  Home Layout: One level  Home Access: Level entry  Home Equipment: Cane   Bathroom Shower/Tub: Walk-in shower  Bathroom Toilet: Handicap height  Bathroom Equipment: Shower chair, Grab bars in shower       ADL Assistance: Independent  Homemaking Assistance: Independent  Ambulation Assistance: Independent  Transfer Assistance: Independent          Additional Comments: Pt reports fully indep PLOF without AD.     VISION:WFL      HEARING:  very [place]    COGNITION: Slow Processing and very [place]; difficult to assess    RANGE OF MOTION:  Bilateral Upper Extremity:  WFL    STRENGTH:  Bilateral Upper Extremity:  4-/5    SENSATION:   WFL    ADL:   Lower Extremity Dressing: Dependent. for donning slipper socks. BALANCE:  Sitting Balance:  Contact Guard Assistance. tendency to lean posterior  Standing Balance: Moderate Assistance. forward flexed posture    BED MOBILITY:  Supine to Sit: Maximum Assistance    Sit to Supine: Maximum Assistance    Rolling: with max A (R/L)    TRANSFERS:  Sit to Stand: Moderate Assistance. from EOB (forward flexed posture, difficulty with getting fully erect    FUNCTIONAL MOBILITY:  Unable to step on this date     Exercise:  Completed shoulder flexion x 5 reps while seated EOB; tendency to lean posteriorly with this task    Activity Tolerance:  Patient tolerance of  treatment: fair. Assessment:  Assessment: Pt demo decreased ADL & functional mobility indep over PLOF d/t decreased endurance, balance, strength, & cognition. Continued OT recommended to educate Pt on safety & adaptative strategies for returning to ADL & IADLs at home. Performance deficits / Impairments: Decreased functional mobility , Decreased ADL status, Decreased safe awareness, Decreased cognition, Decreased balance, Decreased endurance, Decreased strength, Decreased high-level IADLs  Prognosis: Fair  REQUIRES OT FOLLOW UP: Yes  Decision Making: Low Complexity  Safety Devices in place: Yes  Type of devices: All fall risk precautions in place, Gait belt, Call light within reach    Treatment Initiated: Treatment and education initiated within context of evaluation. Evaluation time included review of current medical information, gathering information related to past medical, social and functional history, completion of standardized testing, formal and informal observation of tasks, assessment of data and development of plan of care and goals.   Treatment time included skilled education and facilitation of tasks to increase safety and independence with ADL's for improved functional independence and quality of life. Discharge Recommendations:  Continue to assess pending progress(may be TCU candidate)    Patient Education:  OT Education: OT Role, Plan of Care, ADL Adaptive Strategies, Transfer Training  Barriers to Learning: decreased cogition    Equipment Recommendations:       Plan:  Times per week: 5x  Current Treatment Recommendations: Balance Training, Self-Care / ADL, Endurance Training, Functional Mobility Training, Strengthening, Safety Education & Training. See long-term goal time frame for expected duration of plan of care. If no long-term goals established, a short length of stay is anticipated. Goals:  Patient goals : Pt did not state  Short term goals  Time Frame for Short term goals: until discharge  Short term goal 1: Complete various sit-stand t/fs with min A x 1 for increased ease of BSC t/fs  Short term goal 2: Complete 1-2 min standing ADL with min a X 1 with BUE support on RW  Short term goal 3: Complete BUE AROM/light strengthening exercises to increase UB endurance for BADL  Short term goal 4: Complete BADL routine with min A & min vcs for safety & sequencing  Short term goal 5: Tolerate further assessment of functional ambulation by OTR when appropriate  Long term goals  Time Frame for Long term goals : No LTG d/t short ELOS         Following session, patient left in safe position with all fall risk precautions in place.

## 2021-01-20 NOTE — PROGRESS NOTES
[facility]  SPEECH THERAPY  [facility] CVICU 4B  Bedside Swallowing Evaluation & Dysphagia tx      SLP Individual Minutes  Time In: 2252  Time Out: 2872  Minutes: 19  Timed Code Treatment Minutes: 0 Minutes   Bedside swallow evaluation: 11 minutes  Dysphagia tx: 8 minutes      Date: 2021  Patient Name: David Perez      CSN: [id number]   : 3/30/1932  (80 y.o.)  Gender: male   Referring Physician:  Anil Richardson MD  Diagnosis: Hyponatremia   Secondary Diagnosis: Dysphagia  Precautions: Aspiration, COVID-19   History of Present Illness/Injury: Patient admitted to [facility] with above dx. Per chart review, \"Velasquez Vyas is a 80 y. o. male history of CVA, CAD, hypertension, hyperlipidemia, chronic atrial fibrillation on anticoagulation with Coumadin was recently admitted and discharged from [facility] for asymptomatic bradycardia management. He also had a history of recent falls. He presented to the emergency department for evaluation of unwitnessed fall at home today morning in his bathroom by his family members. He was found on the ground and was down for unknown length of time.  Per his son Dr. Napoleon Vyas MD he was in his normal state of health until yesterday 3 PM when he was seen by him for the last time. Patient lives alone at his home. Yesterday he was very chatty when he was visiting. Brian Frederick, when they tried to reach him they were unable to get a hold of him therefore they went to his home to check on him and found him on the floor in the bathroom. [facility] was brought via EMS to [facility] emergency department. [facility] does not remember what happened prior to the incident.  Denies chest pain, shortness of breath, abdominal pain.  He reports pain in his right leg and he is also complaining of generalized muscle aches- this is chronic for him according to his son  Dr. George Tinoco who is at the bedside. \" ST consulted d/t concerns for aspiration and coughing max assistance provided. Patient with dry, consistent cough prior to any PO trials and intermittent dry cough noted intermittently throughout trials; suspect related to current medical dx of COVID-19. Certainly cannot fully evaluate pharyngeal function of the swallow without support of external imaging. Patient demonstrated with functional oral mastication, adequate bolus formation, timely swallow, no overt s/s of aspiration. Patient with improved performance/control within upright head positioning and restriction to straws. Recommend regular diet with thin liquids, no straws. Patient would benefit from skilled ST services to target barriers/deficits with focus on compensatory strategies. *patient with intermittent confusion; patient lives home alone per patient report and chart review- ST with plan to complete cognitive evaluation within subsequent sessions to evaluate cognitive function and assist with discharge recommendations. RECOMMENDATIONS/ASSESSMENT:   Modified Barium Swallow:  MBS is not indicated at this time. Will recommend as appropriate  Diet Recommendations:  Regular diet with thin liquids, no straws   Strategies:  Full Upright Position, Small Bite/Sip, No Straw and Pulmonary Monitoring   Rehabilitation Potential: good    EDUCATION:  Learner: Patient  Education:  Reviewed results and recommendations of this evaluation, Reviewed diet and strategies, Reviewed signs, symptoms and risks of aspiration, Reviewed ST goals and Plan of Care, Reviewed recommendations for follow-up and Education Related to Potential Risks and Complications Due to Impairment/Illness/Injury  Evaluation of Education: Verbalizes understanding, Needs further instruction, No evidence of learning and Family not present    PLAN:  Skilled SLP intervention on acute care 3-5 x per week or until goals met and/or pt plateaus in function.   Specific interventions for next session may include: dysphagia, complete cognitive evaluation Lysbeth Carrel PATIENT GOAL:    Did not state. Will further assess during treatment. SHORT TERM GOALS:  Short-term Goals  Timeframe for Short-term Goals: 2 weeks  Goal 1: Patient will consume regular diet with thin liquids (no straws) without s/s of aspiration in order to safely maintain adequate hydration and nutrition  INTERVENTIONS: Skilled dysphagia treatment completed within PO trials of puree, hard solids and thin liquids (no straws). Patient consumed remainder of applesauce cup (75%) and cracker (x3 additional crackers) and thin liquids via cup demonstrating with appropriate oral mastication, adequate bolus formation, timely swallow, no s/s of aspiration. Recommend continued regular diet with thin liquids, no straws. Goal 2: Patient will demonstrate understanding of compensatory swallowing strategies (upright position, small bites/sips, slow rate, no straws) in order to reduce risk of aspiration  Goal 3: Should pulmonary concerns arise and/or concerns for dysphagia recommend instrumental evaluation  INTERVENTIONS: ST provided skilled dysphagia treatment discussing importance of upright positioning with all PO intake, including PO medication, rational of avoidance to straws and small bites/sips to assist with reducing risk of aspiration. Patient verbalized understanding.  Would benefit from ongoing education     Goal 4: Complete cognitive evaluation and determine goals and POC as clinically appropriate (patient lives home alone)  INTERVENTIONS: Will complete within subsequent sessions     LONG TERM GOALS:  No LTGs due to [address]

## 2021-01-20 NOTE — CARE COORDINATION
DISASTER CHARTING    1/20/21, 12:16 PM EST    DISCHARGE ONGOING EVALUATION:     Tanner Cha day: 3  Location: [facility]10/010-A Reason for admit: Hyponatremia [E87.1]   Barriers to Discharge: Tmax 98.8, oxygen saturations 96% on room air. PT/OT/ST Na is 123., calcium is 8.1, WBC 14.5. IV fluids, electrolyte replacement protocols, IV Remdesivir, Pharmacy dosing Coumadin, INR 1.32, D-dimer is 1223.00. PCP: Peri Jimenez DO  Readmission Risk Score: 26%  Patient Goals/Plan/Treatment Preferences: from home alone. TCU consult placed. Will continue to follow.

## 2021-01-21 LAB
ALBUMIN SERPL-MCNC: 2.9 G/DL (ref 3.5–5.1)
ALP BLD-CCNC: 80 U/L (ref 38–126)
ALT SERPL-CCNC: 91 U/L (ref 11–66)
ANION GAP SERPL CALCULATED.3IONS-SCNC: 14 MEQ/L (ref 8–16)
AST SERPL-CCNC: 108 U/L (ref 5–40)
BASOPHILS # BLD: 0 %
BASOPHILS ABSOLUTE: 0 THOU/MM3 (ref 0–0.1)
BILIRUB SERPL-MCNC: 0.4 MG/DL (ref 0.3–1.2)
BILIRUBIN DIRECT: < 0.2 MG/DL (ref 0–0.3)
BUN BLDV-MCNC: 22 MG/DL (ref 7–22)
CALCIUM SERPL-MCNC: 8.4 MG/DL (ref 8.5–10.5)
CHLORIDE BLD-SCNC: 92 MEQ/L (ref 98–111)
CHLORIDE, URINE: 35 MEQ/L
CO2: 18 MEQ/L (ref 23–33)
CREAT SERPL-MCNC: 1.1 MG/DL (ref 0.4–1.2)
D-DIMER QUANTITATIVE: 1522 NG/ML FEU (ref 0–500)
EOSINOPHIL # BLD: 0 %
EOSINOPHILS ABSOLUTE: 0 THOU/MM3 (ref 0–0.4)
ERYTHROCYTE [DISTWIDTH] IN BLOOD BY AUTOMATED COUNT: 18.7 % (ref 11.5–14.5)
ERYTHROCYTE [DISTWIDTH] IN BLOOD BY AUTOMATED COUNT: 55.8 FL (ref 35–45)
FIBRINOGEN: 332 MG/100ML (ref 155–475)
GFR SERPL CREATININE-BSD FRML MDRD: 63 ML/MIN/1.73M2
GLUCOSE BLD-MCNC: 125 MG/DL (ref 70–108)
HCT VFR BLD CALC: 25.7 % (ref 42–52)
HEMOGLOBIN: 8.7 GM/DL (ref 14–18)
IMMATURE GRANS (ABS): 0.04 THOU/MM3 (ref 0–0.07)
IMMATURE GRANULOCYTES: 0.5 %
INR BLD: 1.39 (ref 0.85–1.13)
LYMPHOCYTES # BLD: 1.4 %
LYMPHOCYTES ABSOLUTE: 0.1 THOU/MM3 (ref 1–4.8)
MAGNESIUM: 2.1 MG/DL (ref 1.6–2.4)
MCH RBC QN AUTO: 28.2 PG (ref 26–33)
MCHC RBC AUTO-ENTMCNC: 33.9 GM/DL (ref 32.2–35.5)
MCV RBC AUTO: 83.2 FL (ref 80–94)
MONOCYTES # BLD: 4.2 %
MONOCYTES ABSOLUTE: 0.3 THOU/MM3 (ref 0.4–1.3)
NUCLEATED RED BLOOD CELLS: 0 /100 WBC
OSMOLALITY URINE: 511 MOSMOL/KG (ref 250–750)
PHOSPHORUS: 3 MG/DL (ref 2.4–4.7)
PLATELET # BLD: 288 THOU/MM3 (ref 130–400)
PMV BLD AUTO: 10.5 FL (ref 9.4–12.4)
POTASSIUM SERPL-SCNC: 4.6 MEQ/L (ref 3.5–5.2)
RBC # BLD: 3.09 MILL/MM3 (ref 4.7–6.1)
SEG NEUTROPHILS: 93.9 %
SEGMENTED NEUTROPHILS ABSOLUTE COUNT: 7.3 THOU/MM3 (ref 1.8–7.7)
SODIUM BLD-SCNC: 123 MEQ/L (ref 135–145)
SODIUM BLD-SCNC: 123 MEQ/L (ref 135–145)
SODIUM BLD-SCNC: 124 MEQ/L (ref 135–145)
SODIUM BLD-SCNC: 125 MEQ/L (ref 135–145)
SODIUM BLD-SCNC: 125 MEQ/L (ref 135–145)
SODIUM URINE: < 20 MEQ/L
TOTAL CK: 1111 U/L (ref 55–170)
TOTAL PROTEIN: 5.9 G/DL (ref 6.1–8)
WBC # BLD: 7.8 THOU/MM3 (ref 4.8–10.8)

## 2021-01-21 PROCEDURE — 6370000000 HC RX 637 (ALT 250 FOR IP)

## 2021-01-21 PROCEDURE — 6370000000 HC RX 637 (ALT 250 FOR IP): Performed by: PHYSICIAN ASSISTANT

## 2021-01-21 PROCEDURE — 6360000002 HC RX W HCPCS: Performed by: INTERNAL MEDICINE

## 2021-01-21 PROCEDURE — 82550 ASSAY OF CK (CPK): CPT

## 2021-01-21 PROCEDURE — 2580000003 HC RX 258: Performed by: INTERNAL MEDICINE

## 2021-01-21 PROCEDURE — 83935 ASSAY OF URINE OSMOLALITY: CPT

## 2021-01-21 PROCEDURE — 6370000000 HC RX 637 (ALT 250 FOR IP): Performed by: INTERNAL MEDICINE

## 2021-01-21 PROCEDURE — 2500000003 HC RX 250 WO HCPCS: Performed by: INTERNAL MEDICINE

## 2021-01-21 PROCEDURE — 80053 COMPREHEN METABOLIC PANEL: CPT

## 2021-01-21 PROCEDURE — 36415 COLL VENOUS BLD VENIPUNCTURE: CPT

## 2021-01-21 PROCEDURE — 6370000000 HC RX 637 (ALT 250 FOR IP): Performed by: NURSE PRACTITIONER

## 2021-01-21 PROCEDURE — 82248 BILIRUBIN DIRECT: CPT

## 2021-01-21 PROCEDURE — 85025 COMPLETE CBC W/AUTO DIFF WBC: CPT

## 2021-01-21 PROCEDURE — 6370000000 HC RX 637 (ALT 250 FOR IP): Performed by: STUDENT IN AN ORGANIZED HEALTH CARE EDUCATION/TRAINING PROGRAM

## 2021-01-21 PROCEDURE — 6360000002 HC RX W HCPCS: Performed by: PHYSICIAN ASSISTANT

## 2021-01-21 PROCEDURE — 84300 ASSAY OF URINE SODIUM: CPT

## 2021-01-21 PROCEDURE — 84100 ASSAY OF PHOSPHORUS: CPT

## 2021-01-21 PROCEDURE — 6360000002 HC RX W HCPCS: Performed by: NURSE PRACTITIONER

## 2021-01-21 PROCEDURE — 85379 FIBRIN DEGRADATION QUANT: CPT

## 2021-01-21 PROCEDURE — 85610 PROTHROMBIN TIME: CPT

## 2021-01-21 PROCEDURE — 92526 ORAL FUNCTION THERAPY: CPT

## 2021-01-21 PROCEDURE — 99233 SBSQ HOSP IP/OBS HIGH 50: CPT | Performed by: INTERNAL MEDICINE

## 2021-01-21 PROCEDURE — 82436 ASSAY OF URINE CHLORIDE: CPT

## 2021-01-21 PROCEDURE — 99232 SBSQ HOSP IP/OBS MODERATE 35: CPT | Performed by: INTERNAL MEDICINE

## 2021-01-21 PROCEDURE — 83735 ASSAY OF MAGNESIUM: CPT

## 2021-01-21 PROCEDURE — 84295 ASSAY OF SERUM SODIUM: CPT

## 2021-01-21 PROCEDURE — 85385 FIBRINOGEN ANTIGEN: CPT

## 2021-01-21 PROCEDURE — 2060000000 HC ICU INTERMEDIATE R&B

## 2021-01-21 RX ORDER — WARFARIN SODIUM 5 MG/1
5 TABLET ORAL
Status: COMPLETED | OUTPATIENT
Start: 2021-01-21 | End: 2021-01-21

## 2021-01-21 RX ORDER — LANOLIN ALCOHOL/MO/W.PET/CERES
3 CREAM (GRAM) TOPICAL NIGHTLY
Status: DISCONTINUED | OUTPATIENT
Start: 2021-01-21 | End: 2021-02-02

## 2021-01-21 RX ORDER — SODIUM CHLORIDE 1000 MG
2 TABLET, SOLUBLE MISCELLANEOUS ONCE
Status: COMPLETED | OUTPATIENT
Start: 2021-01-21 | End: 2021-01-21

## 2021-01-21 RX ORDER — HALOPERIDOL 5 MG/ML
2 INJECTION INTRAMUSCULAR ONCE
Status: COMPLETED | OUTPATIENT
Start: 2021-01-21 | End: 2021-01-21

## 2021-01-21 RX ADMIN — CEFTRIAXONE SODIUM 1 G: 1 INJECTION, POWDER, FOR SOLUTION INTRAMUSCULAR; INTRAVENOUS at 20:17

## 2021-01-21 RX ADMIN — PANTOPRAZOLE SODIUM 40 MG: 40 TABLET, DELAYED RELEASE ORAL at 17:25

## 2021-01-21 RX ADMIN — Medication 3 MG: at 20:17

## 2021-01-21 RX ADMIN — SODIUM CHLORIDE, PRESERVATIVE FREE 10 ML: 5 INJECTION INTRAVENOUS at 08:43

## 2021-01-21 RX ADMIN — ASPIRIN 81 MG: 81 TABLET, COATED ORAL at 08:42

## 2021-01-21 RX ADMIN — REMDESIVIR 100 MG: 100 INJECTION, POWDER, LYOPHILIZED, FOR SOLUTION INTRAVENOUS at 08:58

## 2021-01-21 RX ADMIN — HALOPERIDOL LACTATE 2 MG: 5 INJECTION, SOLUTION INTRAMUSCULAR at 02:52

## 2021-01-21 RX ADMIN — Medication 1 CAPSULE: at 08:42

## 2021-01-21 RX ADMIN — WARFARIN SODIUM 5 MG: 5 TABLET ORAL at 20:17

## 2021-01-21 RX ADMIN — SODIUM CHLORIDE: 9 INJECTION, SOLUTION INTRAVENOUS at 13:26

## 2021-01-21 RX ADMIN — HYDRALAZINE HYDROCHLORIDE 25 MG: 25 TABLET, FILM COATED ORAL at 20:17

## 2021-01-21 RX ADMIN — HYDRALAZINE HYDROCHLORIDE 25 MG: 25 TABLET, FILM COATED ORAL at 08:43

## 2021-01-21 RX ADMIN — QUETIAPINE FUMARATE 25 MG: 25 TABLET ORAL at 20:17

## 2021-01-21 RX ADMIN — PANTOPRAZOLE SODIUM 40 MG: 40 TABLET, DELAYED RELEASE ORAL at 06:44

## 2021-01-21 RX ADMIN — HYDRALAZINE HYDROCHLORIDE 25 MG: 25 TABLET, FILM COATED ORAL at 13:25

## 2021-01-21 RX ADMIN — SODIUM CHLORIDE TAB 1 GM 2 G: 1 TAB at 17:25

## 2021-01-21 RX ADMIN — DEXAMETHASONE 6 MG: 1.5 TABLET ORAL at 08:42

## 2021-01-21 RX ADMIN — TAMSULOSIN HYDROCHLORIDE 0.4 MG: 0.4 CAPSULE ORAL at 08:42

## 2021-01-21 ASSESSMENT — PAIN SCALES - WONG BAKER: WONGBAKER_NUMERICALRESPONSE: 0

## 2021-01-21 ASSESSMENT — PAIN SCALES - GENERAL
PAINLEVEL_OUTOF10: 0

## 2021-01-21 NOTE — FLOWSHEET NOTE
01/21/21 0233   Provider Notification   Reason for Communication Evaluate;New orders   Provider Name [facility]    Provider Notification Physician   Method of Communication Secure Message   Response See orders   Notification Time [phone]     telesitter in room, pt has pulled at IJ and boswell multiple times.  Increased agitation and confusion throughout night.  fighting and threatening staff

## 2021-01-21 NOTE — PROGRESS NOTES
Hospitalist Progress Note      Patient:  Hernandez Och    Unit/Bed:4B-10/010-A  YOB: 1932  MRN: [id number]   Acct: [de-identified]   PCP: Allie Fishman DO  Date of Admission: 1/17/2021    Assessment/Plan:    COVID 19: Patient tested positive for COVID-19 on 1/18. Patient is afebrile. Remdesvir (Pharmay to Dose - Day 3) and Decadron (Day 3) ordered. Continue Vitamin C, Vitamin D, Zinc.   Acute respiratory failure, hypoxia: Resolved. Patient's baseline is room air. Patient is currently requiring no additional oxygen. Incentive spirometry. Acapella. Hyponatremia, severe, chronic: Nephrology consulted. Patient was started on 3% saline. Patient is now on 30 cc/hr on NS. Sodium Q4H. Recent sodium trend: 127, 123, 121, 119, 122, 124, 123, 124. Syncope & Collapse: Pt was found unresponsive and down prior to admission. Cardiology and Neurology consulted. EEG was negative. PT/OT. Bradycardia: Cardiology consulted. Avoid AV blocking agents. No indication for PPM.  Possibly related to remdesivir usage. Telemetry. Shock: Pt required pressors, Dopamine. This is weaned off. BP is stable. Acute metabolic encephalopathy: Pt has had bouts of encephalopathy throughout his stay. During this evaluation, pt was alert and able to have good conversation but deep thought questions were answered inappropriate. Rhabdomyolysis: CK trending down 9463 to 3342. IVF. CK in the AM.   Atrial Fibrillation, chronic: Bradycardia. Continue Coumadin. Pharmacy to dose. Elevated Troponin: Cardiology consulted. They recommended no acute intervention. Essential HTN: Pressure has been labile. Continue home medications. HFpEF, chronic: Latest echo (12/20) EF 55 to 60%. CKD, Stage 3: Creatinine stable around 1.2. BPH: Aware. Hearing Loss: Hearing aids. Deconditioning: Physical therapy. Occupational Therapy. Social work.   TCU recommended ECF due to low level of activity. Chief Complaint: Fall     Initial H and P:-    80-year-old male presented to [facility] via EMS after sustaining an unwitnessed fall from standing position. Patient was found on ground and unknown length of time spent on ground. Per HPI patient son reports patient was progressively more weak over the last week and acting like himself up until the day before arrival.  On day of arrival patient's son states they were unable to get a hold of him therefore they went to his house where they found him on the bathroom floor. He was brought in via EMS to [facility] emergency room department.     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     Of note patient was recently hospitalized at [facility] 12/9/2020 for asymptomatic bradycardia in which is beta-blocker and calcium blocker were stopped and follow-up with Dr. Haresh Crowley and plans for 10-day event monitor were planned. Patient was seen by EPS clinic 1/16/2020 with no plans for permanent pacemaker at that time.     Upon arrival, patient does not member what happened and does not remember what happened prior to incident. On arrival patient denies chest pain, shortness of breath, abdominal pain patient did report pain in his right leg and complaining of generalized muscle weakness and aches. Patient lives at home alone. Patient was found confused, confusion improved upon arrival to ED. Patient had slight weakness in left lower extremity however this was limited by pain. Patient had abrasions with different stages of ecchymosis to bilateral lower extremities. While in the emergency room patient was found to have noted hyponatremia and elevated CPK.   CT of the head showed no acute intracranial hemorrhage or mass-effect. Patient did have noted encephalomalacia within the left temporal lobe from prior remote infarct. CT of cervical spine showed no acute fracture. CT chest demonstrated moderate right pleural effusions, mild opacities right lung base. CVC was placed and patient did receive 50 mL of 3% normal saline. Covid positive.     Last week severe cramping upper thighs. Then did farely well. Friday cramping came back, Saturday went to football game. Yesterday found him on bathroom floor.      1/18/21: Patient was not following commands and demonstrated pinpoint pupils. Patient received 0.4 Narcan. Patient did become more responsive and pupils were not pinpoint. We will proceed with MRI of brain.     1/19/21: Patient appears significantly better. Sitting up in bed communicating and ate entire breakfast. Patient demonstrates difficulty hearing however patient denies pain anywhere. Patient still requiring significant amount of dopamine    1/20: Pt was transferred out of ICU. Pt did not eat much, he does not feel like eating or like the food. Sodium is stable around 122. Subjective (past 24 hours):   No acute events overnight. Patient not eating well today. Patient does not like the food. Patient states that he eats 2 ounces steak with llanes and toast every day and this is not what he normally eats. Patient would like to have better food. Patient states he is 80years old and does not eat a lot and people do not understand. Past medical history, family history, social history and allergies reviewed again and is unchanged since admission. ROS Pt denies CP, SOB, HA, abd pain.      Medications:  Reviewed    Infusion Medications    sodium chloride 30 mL/hr at 01/19/21 1549    DOPamine Stopped (01/19/21 1751)     Scheduled Medications    sodium chloride  2 g Oral Once    calcium replacement protocol   Other RX Placeholder    phosphorus replacement protocol Other RX Placeholder    dexamethasone  6 mg Oral Daily    remdesivir IVPB  100 mg Intravenous Q24H    lactobacillus  1 capsule Oral Daily with breakfast    cefTRIAXone (ROCEPHIN) IV  1 g Intravenous Q24H    aspirin  81 mg Oral Daily    hydrALAZINE  25 mg Oral TID    pantoprazole  40 mg Oral BID AC    [Held by provider] pravastatin  40 mg Oral QPM    tamsulosin  0.4 mg Oral Daily    sodium chloride flush  10 mL Intravenous 2 times per day    warfarin (COUMADIN) daily dosing (placeholder)   Other RX Placeholder     PRN Meds: potassium chloride **OR** potassium alternative oral replacement **OR** potassium chloride, potassium chloride, potassium chloride, acetaminophen **OR** acetaminophen, sodium chloride, cyclobenzaprine, sodium chloride flush, promethazine **OR** ondansetron, polyethylene glycol, magnesium sulfate, mupirocin      Intake/Output Summary (Last 24 hours) at 1/20/2021 1901  Last data filed at 1/20/2021 0816  Gross per 24 hour   Intake 450 ml   Output 375 ml   Net 75 ml       Diet:  DIET GENERAL; No Drinking Straw    Exam:  /62   Pulse 52   Temp 96.5 °F (35.8 °C) (Axillary)   Resp 18   Ht 5' 4\" (1.626 m)   Wt 154 lb 8.7 oz (70.1 kg)   SpO2 98%   BMI 26.53 kg/m²   General appearance: No apparent distress, appears younger than stated age and cooperative. HEENT: Pupils equal, round, and reactive to light. Conjunctivae/corneas clear. Neck: Supple, with full range of motion. No jugular venous distention. Trachea midline. Respiratory:  Normal respiratory effort on RA. Clear to auscultation, bilaterally without Rales/Wheezes/Rhonchi. Cardiovascular: Irregular rate and rhythm with normal S1/S2 without murmurs, rubs or gallops. Abdomen: Soft, non-tender, non-distended with normal bowel sounds. Musculoskeletal: passive and active ROM x 4 extremities. Skin: Skin color, texture, turgor normal.  No rashes or lesions.   Neurologic:  Neurovascularly intact without any focal sensory/motor deficits. Cranial nerves: II-XII intact, grossly non-focal.  Psychiatric: Alert and oriented, milldy confused   Capillary Refill: Brisk,< 3 seconds   Peripheral Pulses: +2 palpable, equal bilaterally     Labs:   Recent Labs     01/18/21  0300 01/19/21  0520 01/20/21  0919   WBC 7.7 10.1 14.5*   HGB 9.0* 8.8* 8.2*   HCT 26.8* 25.8* 24.5*    253 255     Recent Labs     01/18/21  1230 01/18/21  1230 01/19/21  0520 01/19/21  0520 01/20/21  0619 01/20/21  1000 01/20/21  1630   *   < > 123*   < > 124* 123* 122*   K 4.4  --  4.4  --  4.6  --   --    CL 89*  --  93*  --  92*  --   --    CO2 20*  --  20*  --  18*  --   --    BUN 15  --  14  --  21  --   --    CREATININE 1.3*  --  0.9  --  1.2  --   --    CALCIUM 8.0*  --  8.0*  --  8.1*  --   --    PHOS  --   --  2.2*  --   --   --   --     < > = values in this interval not displayed. Recent Labs     01/18/21  0725 01/19/21 0520 01/20/21 0619   * 167* 122*   ALT 71* 73* 78*   BILITOT 0.7 0.4 0.4   ALKPHOS 85 69 80     Recent Labs     01/18/21  0725 01/19/21  0520 01/20/21  0619   INR 4.97* 1.73* 1.32*     Recent Labs     01/18/21  0300 01/18/21  0725 01/19/21 0520   CKTOTAL 8,378* 7,393* 3,342*     Urinalysis:      Lab Results   Component Value Date    NITRU NEGATIVE 01/17/2021    WBCUA 25-50 01/17/2021    BACTERIA NONE SEEN 01/17/2021    RBCUA 15-20 01/17/2021    BLOODU LARGE 01/17/2021    SPECGRAV 1.017 01/17/2021    GLUCOSEU NEGATIVE 01/05/2021       Radiology:  MRI BRAIN WO CONTRAST   Final Result   Impression:   Moderate nonspecific periventricular deep white matter disease. This most    often can be ascribed to chronic small vessel ischemic change. Prominent central and cortical volume loss. Old ischemic event left temporal lobe. This document has been electronically signed by: Pam Bustamante MD on    01/19/2021 12:59 AM         CT CHEST WO CONTRAST   Final Result   Impression:   Moderate right pleural effusion.   Associated volume loss. Mild opacity right lung base. This could represent mild pneumonia. This document has been electronically signed by: Jose Malave MD on    01/17/2021 09:10 PM      All CT scans at this facility use dose modulation, iterative    reconstruction, and/or weight-based   dosing when appropriate to reduce radiation dose to as low as reasonably    achievable. XR CHEST PORTABLE   Final Result   Impression:   Cardiomegaly, no CHF. Mild right infrahilar opacity. This could represent mild pneumonia versus    volume loss. This document has been electronically signed by: Jose Malave MD on    01/17/2021 09:11 PM         CT Head WO Contrast   Final Result   1. No acute intracranial hemorrhage or mass effect. 2. Moderate diffuse atrophy is demonstrated. 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 **This report has been created using voice recognition software. It may contain minor errors which are inherent in voice recognition technology. **      Final report electronically signed by Dr. Alexys Bill on 1/17/2021 4:17 PM      CT Cervical Spine WO Contrast   Final Result   1. Slightly limited evaluation due to motion artifact. However, no definite acute fracture of the cervical spine is seen. 2. Straightening of the normal cervical lordosis is demonstrated. This may be related to muscle spasm. 3. Multilevel degenerative changes of the cervical spine are demonstrated. **This report has been created using voice recognition software. It may contain minor errors which are inherent in voice recognition technology. **      Final report electronically signed by Dr. Alexys Bill on 1/17/2021 4:21 PM      XR PELVIS (1-2 VIEWS)   Final Result   1. No acute fracture or malalignment. Degenerative changes are as described above. **This report has been created using voice recognition software.   It may contain minor errors which are inherent in voice recognition technology. **      Final report electronically signed by Dr. Socorro Burnett on 1/17/2021 3:56 PM      XR FEMUR LEFT (MIN 2 VIEWS)   Final Result   1. No acute fracture or malalignment. Degenerative changes are as described above. **This report has been created using voice recognition software. It may contain minor errors which are inherent in voice recognition technology. **      Final report electronically signed by Dr. Socorro Burnett on 1/17/2021 3:50 PM      XR CHEST (2 VW)   Final Result   1. Moderate enlargement of the cardiomediastinal silhouette. 2. There is hyperinflation noted. 2. No focal consolidation, pleural effusion or pneumothorax is seen. **This report has been created using voice recognition software. It may contain minor errors which are inherent in voice recognition technology. **      Final report electronically signed by Dr. Socorro Burnett on 1/17/2021 3:54 PM        Electronically signed by OSWALD Rueda on 1/20/2021 at 7:01 PM

## 2021-01-21 NOTE — PROGRESS NOTES
Pt becoming increasingly confused and agitated throughout night. For example, pt stated \"there's a portal up there\" while pointing up and \"push it up and through the door back there\" while pointing to wall behind bed. Unable to reorient pt. Telesitter alarmed multiple times this shift for pt pulling at lines and tubes. At [address] alarmed, tech and nurse to room, pt observed forcefully pulling at boswell. Attempted to stop pt and redirect. Pt began swinging his fists at staff stating, \"let go, I'm going to punch you. \" due to increased confusion, agitated, harm to self and harm to staff hospitalist notified. See orders for bilateral wrist restraints and one time dose haldol.

## 2021-01-21 NOTE — PROGRESS NOTES
Patient with increased agitation and confusion. Attempting to pull out right IJ, stating that he \"wants it out of there. \" Patient prevented from pulling out IJ x 3 nurses. Reports that he hasn't slept in three days. Reports that he believes the IJ to be interfering with his hearing aids, though it is unclear if he is mistaking the IJ for his hearing aids. States that he has four ears. Son/POA Dr. Aristeo Dyer transferred into room to attempt to reorient patient. Patient receptive after great effort of son and this RN. Son stating that patient experiences disorientation when in the hospital setting. Right IJ in place; reinforced. Report given to Dr. Woo Gutierrez via [facility] with request for telesitter to monitor risk to line, and sleep aid so that patient may get better rest this evening. OSWALD Ayers to assess patient. Telesitter initiated. Awaiting additional orders.

## 2021-01-21 NOTE — PROGRESS NOTES
Talked to son, Dr. Ricki Gregory, and updated on patient's condition. All questions and concerns answered.

## 2021-01-21 NOTE — PROGRESS NOTES
[facility]  INPATIENT SPEECH THERAPY  [facility] CVICU 4B  DAILY NOTE    TIME   SLP Individual Minutes  Time In: 8217  Time Out: 8852  Minutes: 24  Timed Code Treatment Minutes: 0 Minutes       Date: 2021  Patient Name: Margarita Bray      CSN: [id number]   : 3/30/1932  (80 y.o.)  Gender: male   Referring Physician:  Daniel Johns MD  Diagnosis: Hyponatremia   Secondary Diagnosis: Dysphagia  Precautions: Aspiration   Current Diet: Regular diet with thin liquids, no straws; *diet downgrade to NPO -see note below   Swallowing Strategies: Upright position, small bites/sips  Date of Last MBS: Not Applicable    Pain:  No pain reported. Subjective:  Upon ST entering room patient lying in supine position with legs half-way out of bed; ST assisted patient with upright positioning and offerings of ice cream to complete therapy session. Following achievement of upright positioning patient seen at bedside; soft wrist restraints in place- patient highly confused. No awareness to current environment or etiology of hospitalization. Patient with consistent, dry cough; patient unable to appropriately complete adequate respiratory/speech coordination to express wants/needs without coughing. Although this date patient with very limited ability to express wants and needs d/t significant level of confusion.      Short-Term Goals:  SHORT TERM GOAL #1:  Goal 1: Patient will consume regular diet with thin liquids (no straws) without s/s of aspiration in order to safely maintain adequate hydration and nutrition  INTERVENTIONS: Patient refused breakfast tray; ST offered PO items consisting of pudding, mandarin oranges and ice cream. Patient strongly refused pudding and mandarin oranges; however, agreeable to vanilla ice cream. Patient consumed entire cup of vanilla ice cream demonstrated with very inconsistent performance; patient with appropriate bolus control and timely swallow ~25% of trials for the remainder 75% of trials patient demonstrated with limited to no awareness of bolus within oral cavity with multiple attempts to \"speak while eating\" while also coughing with and without trials placing patient at very high risk for aspiration with any PO intake at this time. Patient also completed PO trials of thin liquids via straw attempt d/t bilateral wrist restraints; poor breath/swallow coordination with immediate coughing. Patient with notable SOB to follow; however, oxygen levels did remain above 94. ST recommending NPO for this date with approval of PO medication crushed in puree for this date only with plans to re-evaluate/complete skilled speech therapy session as priority 01/22/2021 to determine readiness to resume PO diet or readiness to complete instrumental evaluation (MBS). ST with perfect serve message to attending physician Dr. Parth Obrien; Dr. Parth Obrien in agreement to ST recommendations. SHORT TERM GOAL #2:  Goal 2: Patient will demonstrate understanding of compensatory swallowing strategies (upright position, small bites/sips, slow rate, no straws) in order to reduce risk of aspiration  INTERVENTIONS: See STG1 for details; patient unavailable to comprehend strategies at this time d/t high level of confusion. SHORT TERM GOAL #3:  Goal 3: Should pulmonary concerns arise and/or concerns for dysphagia recommend instrumental evaluation  INTERVENTIONS: RN reporting, \"poor pulmonary status, wheezing. \"     SHORT TERM GOAL #4:  Goal 4: Complete cognitive evaluation and determine goals and POC as clinically appropriate (patient lives home alone)  INTERVENTIONS: Not appropriate at this time d/t high level of confusion and ongoing agitation       Long-Term Goals: No LTGs due to short ELOS         EDUCATION:  Learner: Patient  Education:  Reviewed results and recommendations of this evaluation, Reviewed diet and strategies, Reviewed signs, symptoms and risks of aspiration, Reviewed ST goals and Plan of Care, Reviewed recommendations for follow-up and Education Related to Potential Risks and Complications Due to Impairment/Illness/Injury  Evaluation of Education: Needs further instruction, No evidence of learning and Family not present    ASSESSMENT/PLAN:  Activity Tolerance:  Patient tolerance of  treatment: fair. -patient very confused this date      Assessment/Plan: Patient progressing toward established goals. Continues to require skilled care of licensed speech pathologist to progress toward achievement of established goals and plan of care. .     Plan for Next Session: PO trials, ? Virgen Colunga M.S. Jarvis Hardin

## 2021-01-21 NOTE — PROGRESS NOTES
125* 125*   K 4.4  --   --  4.6  --  4.6  --   --    CL 93*  --   --  92*  --  92*  --   --    CO2 20*  --   --  18*  --  18*  --   --    BUN 14  --   --  21  --  22  --   --    CREATININE 0.9  --   --  1.2  --  1.1  --   --    GLUCOSE 107  --   --  102  --  125*  --   --    CALCIUM 8.0*  --   --  8.1*  --  8.4*  --   --    MG  --  2.1  --   --   --  2.1  --   --    PHOS 2.2*  --   --   --   --  3.0  --   --     < > = values in this interval not displayed. Hepatic:   Recent Labs     01/19/21  0520 01/20/21  0619 01/21/21  0900   LABALBU 2.7* 2.9* 2.9*   * 122* 108*   ALT 73* 78* 91*   BILITOT 0.4 0.4 0.4   ALKPHOS 69 80 80         Meds:  Infusion:    sodium chloride 75 mL/hr at 01/21/21 1326     Meds:    warfarin  5 mg Oral Once    melatonin  3 mg Oral Nightly    QUEtiapine  25 mg Oral Nightly    calcium replacement protocol   Other RX Placeholder    phosphorus replacement protocol   Other RX Placeholder    dexamethasone  6 mg Oral Daily    remdesivir IVPB  100 mg Intravenous Q24H    lactobacillus  1 capsule Oral Daily with breakfast    cefTRIAXone (ROCEPHIN) IV  1 g Intravenous Q24H    aspirin  81 mg Oral Daily    hydrALAZINE  25 mg Oral TID    pantoprazole  40 mg Oral BID AC    [Held by provider] pravastatin  40 mg Oral QPM    tamsulosin  0.4 mg Oral Daily    sodium chloride flush  10 mL Intravenous 2 times per day    warfarin (COUMADIN) daily dosing (placeholder)   Other RX Placeholder     Meds prn: potassium chloride **OR** potassium alternative oral replacement **OR** potassium chloride, potassium chloride, potassium chloride, acetaminophen **OR** acetaminophen, sodium chloride, cyclobenzaprine, sodium chloride flush, promethazine **OR** ondansetron, polyethylene glycol, magnesium sulfate, mupirocin       Impression and Plan:  1. Hyponatremia mostly prerenal in nature.   So far sodium has improved appropriately  Very slowly improving at this time  Sodium staying around 123-125  Urine sodium less than 20 this morning, urine chloride 35  However urine osmolality is 511  We will continue with normal saline for now  We will give 2 g of salt tablet  Continue with serial sodium monitoring  Expect sodium levels to improve further  Recheck uric acid level in a.m.    2.  Mild ANNIKA in setting of prerenal failure and intravascular volume depletion as well as rhabdomyolysis. ANNIKA has resolved  3. CKD 3A. Stable  4. Rhabdomyolysis, traumatic secondary to fall. CK improving  5. Chronic atrial fibrillation  6. COVID-19 positive  7. Recent falls  8.   History of CVA    D/W RN    Nayan Coles MD  [facility]

## 2021-01-21 NOTE — PROGRESS NOTES
Clinical Pharmacy Note    Warfarin consult follow-up    Recent Labs     01/21/21  0515   INR 1.39*     Recent Labs     01/19/21  0520 01/20/21  0919 01/21/21  0515   HGB 8.8* 8.2* 8.7*   HCT 25.8* 24.5* 25.7*    255 288       Significant Drug-Drug Interactions:  New warfarin drug-drug interactions: None  Discontinued drug-drug interactions: None  Current warfarin drug-drug interactions: dexamethasone, aspirin, remdesivir, ceftriaxone        Date INR Warfarin Dose   1/17/21 5.19 No Coumadin    1/18/21  4.97   No Coumadin  Vitamin K 2 mg IV given   1/19/2021   1.73   3 mg     1/20/2021  1.32  3 mg    1/21/2021  1.39 5 mg                         Notes:                     Daily PT/INR until stable within therapeutic range.      Haley Wright, PharmD  1/21/2021  12:47 PM

## 2021-01-21 NOTE — PROGRESS NOTES
Hospitalist Progress Note      Patient:  Steven Rodrigues    Unit/Bed:4B-10/010-A  YOB: 1932  MRN: [id number]   Acct: [de-identified]   PCP: Ganesh Calderón DO  Date of Admission: 1/17/2021    Assessment/Plan:    COVID 19: Patient tested positive for COVID-19 on 1/18. Patient is afebrile. Remdesvir (Pharmay to Dose - Day 4) and Decadron (Day 4) ordered. Continue Vitamin C, Vitamin D, Zinc, Acapella and IS. Acute respiratory failure, hypoxia: Resolved. Patient's baseline is room air. Patient is currently requiring no additional oxygen. Incentive spirometry. Acapella. Hyponatremia, severe, chronic: Nephrology consulted. NS IV 75 cc/hr. Sodium Q4H. Recent sodium trend: 127, 123, 121, 119, 122, 124, 123, 124, 125. Syncope & Collapse: Pt was found unresponsive and down prior to admission. Cardiology and Neurology consulted. EEG was negative. MRI no acute CVA. Cardiology signed off. Bradycardia: Cardiology consulted. Avoid AV blocking agents. No indication for PPM.  Possibly related to remdesivir usage. Telemetry. Acute metabolic encephalopathy: Pt has had bouts of encephalopathy throughout his stay. During this evaluation, pt was alert and able to have good conversation but deep thought questions were answered inappropriate. Rhabdomyolysis: CK trending down 9463 to 3342. IVF. CK in the AM.   Atrial Fibrillation, chronic: Bradycardia. Continue Coumadin. Pharmacy to dose. Elevated Troponin: Cardiology consulted. Likely demand mismatch. They recommended no acute intervention. Essential HTN: Pressure has been labile. Continue home medications. HFpEF, chronic: Latest echo (12/20) EF 55 to 60%. CKD, Stage 3: Creatinine stable around 1.2. BPH: Aware. Hearing Loss: Hearing aids. Deconditioning: Physical therapy. Occupational Therapy. Social work. TCU recommended ECF due to low level of activity.     Chief Complaint: Fall     Initial H and P:-    80-year-old male presented to [facility] via EMS after sustaining an unwitnessed fall from standing position. Patient was found on ground and unknown length of time spent on ground. Per HPI patient son reports patient was progressively more weak over the last week and acting like himself up until the day before arrival.  On day of arrival patient's son states they were unable to get a hold of him therefore they went to his house where they found him on the bathroom floor. He was brought in via EMS to [facility] emergency room department.     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     Of note patient was recently hospitalized at [facility] 12/9/2020 for asymptomatic bradycardia in which is beta-blocker and calcium blocker were stopped and follow-up with Dr. Larry Carreon and plans for 10-day event monitor were planned. Patient was seen by EPS clinic 1/16/2020 with no plans for permanent pacemaker at that time.     Upon arrival, patient does not member what happened and does not remember what happened prior to incident. On arrival patient denies chest pain, shortness of breath, abdominal pain patient did report pain in his right leg and complaining of generalized muscle weakness and aches. Patient lives at home alone. Patient was found confused, confusion improved upon arrival to ED. Patient had slight weakness in left lower extremity however this was limited by pain. Patient had abrasions with different stages of ecchymosis to bilateral lower extremities. While in the emergency room patient was found to have noted hyponatremia and elevated CPK. CT of the head showed no acute intracranial hemorrhage or mass-effect. Patient did have noted encephalomalacia within the left temporal lobe from prior remote infarct. CT of cervical spine showed no acute fracture. CT chest demonstrated moderate right pleural effusions, mild opacities right lung base. CVC was placed and patient did receive 50 mL of 3% normal saline. Covid positive.     Last week severe cramping upper thighs. Then did farely well. Friday cramping came back, Saturday went to football game. Yesterday found him on bathroom floor.      1/18/21: Patient was not following commands and demonstrated pinpoint pupils. Patient received 0.4 Narcan. Patient did become more responsive and pupils were not pinpoint. We will proceed with MRI of brain.     1/19/21: Patient appears significantly better. Sitting up in bed communicating and ate entire breakfast. Patient demonstrates difficulty hearing however patient denies pain anywhere. Patient still requiring significant amount of dopamine    1/20: Pt was transferred out of ICU. Pt did not eat much, he does not feel like eating or like the food. Sodium is stable around 122. Subjective (past 24 hours):   No acute events overnight. Patient not eating well today. SLP saw and recommending NPO except for medications. Mental status waxes and wanes. HD stable on RA. No fevers or chills. No chest pain or sob. Past medical history, family history, social history and allergies reviewed again and is unchanged since admission. ROS Pt denies CP, SOB, HA, abd pain.      Medications:  Reviewed    Infusion Medications    sodium chloride 75 mL/hr at 01/21/21 1326     Scheduled Medications    warfarin  5 mg Oral Once    QUEtiapine  25 mg Oral Nightly    calcium replacement protocol   Other RX Placeholder    phosphorus replacement protocol   Other RX Placeholder    dexamethasone  6 mg Oral Daily    remdesivir IVPB  100 mg Intravenous Q24H    lactobacillus  1 capsule Oral Daily with breakfast    cefTRIAXone (ROCEPHIN) IV  1 g Intravenous Q24H    aspirin  81 mg Oral Daily    hydrALAZINE  25 mg Oral TID    pantoprazole  40 mg Oral BID AC    [Held by provider] pravastatin  40 mg Oral QPM    tamsulosin  0.4 mg Oral Daily    sodium chloride flush  10 mL Intravenous 2 times per day    warfarin (COUMADIN) daily dosing (placeholder)   Other RX Placeholder     PRN Meds: potassium chloride **OR** potassium alternative oral replacement **OR** potassium chloride, potassium chloride, potassium chloride, acetaminophen **OR** acetaminophen, sodium chloride, cyclobenzaprine, sodium chloride flush, promethazine **OR** ondansetron, polyethylene glycol, magnesium sulfate, mupirocin      Intake/Output Summary (Last 24 hours) at 1/21/2021 1422  Last data filed at 1/21/2021 1330  Gross per 24 hour   Intake 1902.38 ml   Output 850 ml   Net 1052.38 ml       Diet:  Diet NPO Effective Now Exceptions are: Other (Specify); Specify Other: Exception of PO medications crushed in puree    Exam:  BP (!) 164/76   Pulse 72   Temp 97.7 °F (36.5 °C) (Oral)   Resp 20   Ht 5' 4\" (1.626 m)   Wt 154 lb 8.7 oz (70.1 kg)   SpO2 93%   BMI 26.53 kg/m²   General appearance: No apparent distress, appears younger than stated age and cooperative. HEENT: Pupils equal, round, and reactive to light. Conjunctivae/corneas clear. Neck: Supple, with full range of motion. No jugular venous distention. Trachea midline. Respiratory:  Normal respiratory effort on RA. Clear to auscultation, bilaterally without Rales/Wheezes/Rhonchi. Cardiovascular: Irregular rate and rhythm with normal S1/S2 without murmurs, rubs or gallops. Abdomen: Soft, non-tender, non-distended with normal bowel sounds. Musculoskeletal: passive and active ROM x 4 extremities. Skin: Skin color, texture, turgor normal.  No rashes or lesions. Neurologic:  Neurovascularly intact without any focal sensory/motor deficits.  Cranial nerves: II-XII intact, grossly non-focal.  Psychiatric: Alert and oriented, milldy confused   Capillary Refill: Brisk,< 3 seconds   Peripheral Pulses: +2 palpable, equal bilaterally     Labs:   Recent Labs     01/19/21  0520 01/20/21  0919 01/21/21 0515   WBC 10.1 14.5* 7.8   HGB 8.8* 8.2* 8.7*   HCT 25.8* 24.5* 25.7*    255 288     Recent Labs     01/19/21 0520 01/19/21  0520 01/20/21  0619 01/20/21  0619 01/21/21  0515 01/21/21  0900 01/21/21  1330   *   < > 124*   < > 124* 125* 125*   K 4.4  --  4.6  --  4.6  --   --    CL 93*  --  92*  --  92*  --   --    CO2 20*  --  18*  --  18*  --   --    BUN 14  --  21  --  22  --   --    CREATININE 0.9  --  1.2  --  1.1  --   --    CALCIUM 8.0*  --  8.1*  --  8.4*  --   --    PHOS 2.2*  --   --   --  3.0  --   --     < > = values in this interval not displayed. Recent Labs     01/19/21 0520 01/20/21 0619 01/21/21  0900   * 122* 108*   ALT 73* 78* 91*   BILIDIR  --   --  <0.2   BILITOT 0.4 0.4 0.4   ALKPHOS 69 80 80     Recent Labs     01/19/21 0520 01/20/21 0619 01/21/21 0515   INR 1.73* 1.32* 1.39*     Recent Labs     01/19/21 0520 01/21/21 0515   CKTOTAL 3,342* 1,111*     Urinalysis:      Lab Results   Component Value Date    NITRU NEGATIVE 01/17/2021    WBCUA 25-50 01/17/2021    BACTERIA NONE SEEN 01/17/2021    RBCUA 15-20 01/17/2021    BLOODU LARGE 01/17/2021    SPECGRAV 1.017 01/17/2021    GLUCOSEU NEGATIVE 01/05/2021       Radiology:  MRI BRAIN WO CONTRAST   Final Result   Impression:   Moderate nonspecific periventricular deep white matter disease. This most    often can be ascribed to chronic small vessel ischemic change. Prominent central and cortical volume loss. Old ischemic event left temporal lobe. This document has been electronically signed by: Anuja Lagunas MD on    01/19/2021 12:59 AM         CT CHEST WO CONTRAST   Final Result   Impression:   Moderate right pleural effusion. Associated volume loss. Mild opacity right lung base. This could represent mild pneumonia.       This electronically signed by Dr. Refugio Santana on 1/17/2021 3:56 PM      XR FEMUR LEFT (MIN 2 VIEWS)   Final Result   1. No acute fracture or malalignment. Degenerative changes are as described above. **This report has been created using voice recognition software. It may contain minor errors which are inherent in voice recognition technology. **      Final report electronically signed by Dr. Refugio Santana on 1/17/2021 3:50 PM      XR CHEST (2 VW)   Final Result   1. Moderate enlargement of the cardiomediastinal silhouette. 2. There is hyperinflation noted. 2. No focal consolidation, pleural effusion or pneumothorax is seen. **This report has been created using voice recognition software. It may contain minor errors which are inherent in voice recognition technology. **      Final report electronically signed by Dr. Refugio Santana on 1/17/2021 3:54 PM        Electronically signed by Jim Nation MD on 1/21/2021 at 2:22 PM

## 2021-01-21 NOTE — PROGRESS NOTES
[facility] [address]  PHYSICAL THERAPY MISSED TREATMENT NOTE  [facility] CV[facility] 4B    Date: 2021  Patient Name: Lloyd Elliott        MRN: [id number]   : 3/30/1932  (80 y.o.)  Gender: male   Referring Practitioner: Uriel Schmitt DO  Diagnosis: Hyponatremia         REASON FOR MISSED TREATMENT:  Hold PT session this date due to patient demonstrating increased agitation, not following commands and now requiring B wrist restraints throughout the day. Will attempt again on next treatment date. Brisa Stearns  [facility] PTA [id number]

## 2021-01-21 NOTE — FLOWSHEET NOTE
01/21/21 1238   Emergency Contacts   Contact 1: Name Dr. Kavita Hutton 1: Number [phone]   Contact 1: Relationship Son      Updated on pt condition this shift. Pt increasingly confused and agitated; pulling at lines and threatening staff. Bilateral wrist restraints and one time dose haldol ordered. Will continue to monitor and update appropriately.

## 2021-01-21 NOTE — CARE COORDINATION
DISASTER CHARTING    1/21/21, 12:53 PM EST    DISCHARGE ONGOING EVALUATION:     Lisa Winkler day: 4  Location: -10/010-A Reason for admit: Hyponatremia [E87.1]   Barriers to Discharge: palliative care for goals of care and code status, confused /pulling at IJ, PT/OT, telesitter, SLP recommends NPO except meds crushed, Na every 4 hours:124/125, follow hgb, IVF, Rocephin IV, Decadron, Vitamins, Day #4 Remdesivir, Coumadin as per pharmacy. PCP: Damian Bethea DO  Readmission Risk Score: 32%  Patient Goals/Plan/Treatment Preferences: from home alone; too low level for TCU per [place]. PT/OT recommends SNF; SW consulted.

## 2021-01-22 ENCOUNTER — APPOINTMENT (OUTPATIENT)
Dept: GENERAL RADIOLOGY | Age: 86
DRG: 177 | End: 2021-01-22
Payer: OTHER GOVERNMENT

## 2021-01-22 LAB
BASOPHILS # BLD: 0 %
BASOPHILS ABSOLUTE: 0 THOU/MM3 (ref 0–0.1)
D-DIMER QUANTITATIVE: 1227 NG/ML FEU (ref 0–500)
EOSINOPHIL # BLD: 0 %
EOSINOPHILS ABSOLUTE: 0 THOU/MM3 (ref 0–0.4)
ERYTHROCYTE [DISTWIDTH] IN BLOOD BY AUTOMATED COUNT: 18.9 % (ref 11.5–14.5)
ERYTHROCYTE [DISTWIDTH] IN BLOOD BY AUTOMATED COUNT: 56.6 FL (ref 35–45)
FIBRINOGEN: 277 MG/100ML (ref 155–475)
HCT VFR BLD CALC: 25 % (ref 42–52)
HEMOGLOBIN: 8.4 GM/DL (ref 14–18)
IMMATURE GRANS (ABS): 0.07 THOU/MM3 (ref 0–0.07)
IMMATURE GRANULOCYTES: 0.6 %
INR BLD: 1.51 (ref 0.85–1.13)
LYMPHOCYTES # BLD: 4.3 %
LYMPHOCYTES ABSOLUTE: 0.5 THOU/MM3 (ref 1–4.8)
MCH RBC QN AUTO: 28.1 PG (ref 26–33)
MCHC RBC AUTO-ENTMCNC: 33.6 GM/DL (ref 32.2–35.5)
MCV RBC AUTO: 83.6 FL (ref 80–94)
MONOCYTES # BLD: 13.2 %
MONOCYTES ABSOLUTE: 1.5 THOU/MM3 (ref 0.4–1.3)
NUCLEATED RED BLOOD CELLS: 0 /100 WBC
PLATELET # BLD: 318 THOU/MM3 (ref 130–400)
PMV BLD AUTO: 10.5 FL (ref 9.4–12.4)
RBC # BLD: 2.99 MILL/MM3 (ref 4.7–6.1)
SEG NEUTROPHILS: 81.9 %
SEGMENTED NEUTROPHILS ABSOLUTE COUNT: 9 THOU/MM3 (ref 1.8–7.7)
SODIUM BLD-SCNC: 124 MEQ/L (ref 135–145)
SODIUM BLD-SCNC: 126 MEQ/L (ref 135–145)
SODIUM BLD-SCNC: 128 MEQ/L (ref 135–145)
SODIUM BLD-SCNC: 131 MEQ/L (ref 135–145)
SODIUM BLD-SCNC: 131 MEQ/L (ref 135–145)
WBC # BLD: 11 THOU/MM3 (ref 4.8–10.8)

## 2021-01-22 PROCEDURE — 99233 SBSQ HOSP IP/OBS HIGH 50: CPT | Performed by: INTERNAL MEDICINE

## 2021-01-22 PROCEDURE — 71045 X-RAY EXAM CHEST 1 VIEW: CPT

## 2021-01-22 PROCEDURE — 6360000002 HC RX W HCPCS: Performed by: NURSE PRACTITIONER

## 2021-01-22 PROCEDURE — 2500000003 HC RX 250 WO HCPCS: Performed by: INTERNAL MEDICINE

## 2021-01-22 PROCEDURE — 85025 COMPLETE CBC W/AUTO DIFF WBC: CPT

## 2021-01-22 PROCEDURE — 97530 THERAPEUTIC ACTIVITIES: CPT

## 2021-01-22 PROCEDURE — 6370000000 HC RX 637 (ALT 250 FOR IP): Performed by: NURSE PRACTITIONER

## 2021-01-22 PROCEDURE — 2060000000 HC ICU INTERMEDIATE R&B

## 2021-01-22 PROCEDURE — 99232 SBSQ HOSP IP/OBS MODERATE 35: CPT | Performed by: INTERNAL MEDICINE

## 2021-01-22 PROCEDURE — 2580000003 HC RX 258: Performed by: INTERNAL MEDICINE

## 2021-01-22 PROCEDURE — 74018 RADEX ABDOMEN 1 VIEW: CPT

## 2021-01-22 PROCEDURE — 85385 FIBRINOGEN ANTIGEN: CPT

## 2021-01-22 PROCEDURE — 6360000002 HC RX W HCPCS: Performed by: INTERNAL MEDICINE

## 2021-01-22 PROCEDURE — 97110 THERAPEUTIC EXERCISES: CPT

## 2021-01-22 PROCEDURE — 85379 FIBRIN DEGRADATION QUANT: CPT

## 2021-01-22 PROCEDURE — 6370000000 HC RX 637 (ALT 250 FOR IP): Performed by: STUDENT IN AN ORGANIZED HEALTH CARE EDUCATION/TRAINING PROGRAM

## 2021-01-22 PROCEDURE — 84295 ASSAY OF SERUM SODIUM: CPT

## 2021-01-22 PROCEDURE — 92526 ORAL FUNCTION THERAPY: CPT

## 2021-01-22 PROCEDURE — 92612 ENDOSCOPY SWALLOW (FEES) VID: CPT

## 2021-01-22 PROCEDURE — 6370000000 HC RX 637 (ALT 250 FOR IP): Performed by: INTERNAL MEDICINE

## 2021-01-22 PROCEDURE — 36415 COLL VENOUS BLD VENIPUNCTURE: CPT

## 2021-01-22 PROCEDURE — 85610 PROTHROMBIN TIME: CPT

## 2021-01-22 PROCEDURE — 0D9670Z DRAINAGE OF STOMACH WITH DRAINAGE DEVICE, VIA NATURAL OR ARTIFICIAL OPENING: ICD-10-PCS | Performed by: RADIOLOGY

## 2021-01-22 RX ORDER — WARFARIN SODIUM 5 MG/1
5 TABLET ORAL
Status: DISPENSED | OUTPATIENT
Start: 2021-01-22 | End: 2021-01-23

## 2021-01-22 RX ORDER — DEXTROSE MONOHYDRATE 50 MG/ML
INJECTION, SOLUTION INTRAVENOUS CONTINUOUS
Status: ACTIVE | OUTPATIENT
Start: 2021-01-22 | End: 2021-01-22

## 2021-01-22 RX ADMIN — SODIUM CHLORIDE, PRESERVATIVE FREE 10 ML: 5 INJECTION INTRAVENOUS at 21:30

## 2021-01-22 RX ADMIN — ASPIRIN 81 MG: 81 TABLET, COATED ORAL at 08:58

## 2021-01-22 RX ADMIN — DEXTROSE MONOHYDRATE: 50 INJECTION, SOLUTION INTRAVENOUS at 16:59

## 2021-01-22 RX ADMIN — Medication 1 CAPSULE: at 08:59

## 2021-01-22 RX ADMIN — CEFTRIAXONE SODIUM 1 G: 1 INJECTION, POWDER, FOR SOLUTION INTRAMUSCULAR; INTRAVENOUS at 18:31

## 2021-01-22 RX ADMIN — PANTOPRAZOLE SODIUM 40 MG: 40 TABLET, DELAYED RELEASE ORAL at 09:02

## 2021-01-22 RX ADMIN — REMDESIVIR 100 MG: 100 INJECTION, POWDER, LYOPHILIZED, FOR SOLUTION INTRAVENOUS at 08:59

## 2021-01-22 RX ADMIN — HYDRALAZINE HYDROCHLORIDE 25 MG: 25 TABLET, FILM COATED ORAL at 08:58

## 2021-01-22 RX ADMIN — DEXAMETHASONE 6 MG: 1.5 TABLET ORAL at 08:59

## 2021-01-22 RX ADMIN — TAMSULOSIN HYDROCHLORIDE 0.4 MG: 0.4 CAPSULE ORAL at 08:59

## 2021-01-22 ASSESSMENT — PAIN SCALES - WONG BAKER
WONGBAKER_NUMERICALRESPONSE: 0

## 2021-01-22 ASSESSMENT — PAIN SCALES - GENERAL: PAINLEVEL_OUTOF10: 0

## 2021-01-22 NOTE — PROGRESS NOTES
[address] [address]  INPATIENT SPEECH THERAPY  [facility] CV[facility] 4B  DAILY NOTE    TIME   SLP Individual Minutes  Time In: 2344  Time Out: 1007  Minutes: 11  Timed Code Treatment Minutes: 0 Minutes       Date: 2021  Patient Name: Rupa Orlando      CSN: [id number]   : 3/30/1932  (80 y.o.)  Gender: male   Referring Physician:  Arianna Delgado MD  Diagnosis: Hyponatremia   Secondary Diagnosis: Dysphagia  Precautions: Aspiration   Current Diet: NPO  Swallowing Strategies: Not Applicable  Date of Last MBS: Not Applicable    Pain:  No pain reported. Subjective:  Pt seen resting in bed and speaking to oneself with confused/incoherent speech. Pt placed in soft wrist restraints. Ongoing confusion. Required assist for repositioning to fully upright to maximize pt tolerance of PO trials this date. Short-Term Goals:  SHORT TERM GOAL #1:  Goal 1: Patient will consume regular diet with thin liquids (no straws) without s/s of aspiration in order to safely maintain adequate hydration and nutrition GOAL NOT MET  NEW GOAL: Goal 1: Pt will consume trials of ice chips, puree and thin liquids x5+ to determine appropriateness for completion of instrumental assessment (I.e. MBS vs FEES). INTERVENTIONS: Skilled dysphagia therapy via PO trials. Trials of thin liquids by straw x2, cough x1  Trials of puree x2, cough x2   **Pt with cough evident at baseline prior to PO intake. Pt demonstrations of at least mild oral dysphagia evidenced by inconsistent oral preparation function with pt demonstrating decreased oral opening and adequate labial closure to elicit effective extraction of bolus presented by spoon. Improved with further trials. Pt with slow bolus formation/transit. Concers for at least mild pharyngeal dysphagia. Certainly cannot r/o premature pharyngeal entry. Immediate cough inconsistently following PO consumption. Pt elicitation of multiple swallows per bolus, indicative of potential pharyngeal stasis. Given high concerns for pharyngeal dysfunction, recommendations for FEES to further assess prior to implementation of PO diet. Will complete this afternoon as tolerated by pt. Pt to remain strict NPO prior to procedure. SHORT TERM GOAL #2:  Goal 2: Patient will demonstrate understanding of compensatory swallowing strategies (upright position, small bites/sips, slow rate, no straws) in order to reduce risk of aspiration  INTERVENTIONS: See STG1 for details; patient unavailable to comprehend strategies at this time d/t high level of confusion. SHORT TERM GOAL #3:  Goal 3: Should pulmonary concerns arise and/or concerns for dysphagia recommend instrumental evaluation  INTERVENTIONS: Will complete FEES this afternoon. See STG 1. SHORT TERM GOAL #4:  Goal 4: Complete cognitive evaluation and determine goals and POC as clinically appropriate (patient lives home alone)  INTERVENTIONS: Not appropriate at this time d/t high level of confusion       Long-Term Goals: No LTGs due to short ELOS         EDUCATION:  Learner: Patient  Education:  Reviewed results and recommendations of this evaluation, Reviewed diet and strategies, Reviewed signs, symptoms and risks of aspiration and Reviewed ST goals and Plan of Care  Evaluation of Education: Needs further instruction, No evidence of learning and Family not present    ASSESSMENT/PLAN:  Activity Tolerance:  Patient tolerance of  treatment: fair. Ongoing confusion      Assessment/Plan: Patient not progressing toward established goals due to continued s/s of aspiration present. .  Will modify plan of care as follows: Complete FEES as tolerated by pt.      Plan for Next Session: Lori Landrum M.S. Hector Servant 1/22/2021

## 2021-01-22 NOTE — FLOWSHEET NOTE
Velasquez's daughter, Yola Dsouza called this  wanting to arrange a zoom meeting soon. Her information was gathered and this  will contact her nurse to see when is a good time to do this.       01/22/21 9802   Encounter Summary   Services provided to: Family   Referral/Consult From: Family;Nurse   Support System Children   Continue Visiting Yes  (1/22 wants zoom)   Complexity of Encounter Moderate   Length of Encounter 15 minutes   Spiritual/Sikh   Type Spiritual support   Care Plan:  Continue spiritual and emotional care for patient and family. Including prayers. Follow up with nurse and then with family.

## 2021-01-22 NOTE — CARE COORDINATION
DISCHARGE/PLANNING EVALUATION  1/22/21, 10:56 AM EST    Reason for Referral: Possible placement for inpt rehab. Mental Status: Alert and oriented pta. Pt currently confused and somewhat agitated due to sleep deprivation. Decision Making: Makes own decisions. Family/Social/Home Environment: Assessment completed with pts son,  Dima Negrete. Son states pt resides in a condo with one step/hand rail to enter. Pt lives alone and is independent. Pt is linked to [facility] and takes the bus for appointments. SW notified son that TCU declined pt and will need a back up plan. Son states is ok with nursing home and would like SW to try [facility], [place] [facility] and [facility]. Current Services including food security, transportation and housekeeping:  See note above. Current Equipment: cane and grab bars in bathroom. Payment Source: [employer]. Concerns or Barriers to Discharge: None reported. Post acute provider list with quality measures, geographic area and applicable managed care information provided. Questions regarding selection process answered: reviewed ecf list with son over the phone. Teach Back Method used with son regarding care plan and discharge planning. Patients son verbalized understanding of the plan of care and contribute to goal setting. Patient goals, treatment preferences and discharge plan: planning ecf at discharge for short term rehab. Anticipated discharge early next week. Electronically signed by CASSI Harley on 1/22/2021 at 10:56 AM    MAZIN called Admissions for [facility], spoke with [place], states no beds on rehab at this time and will review with staff there to see when they may have an opening. MAZIN called [facility] and left detailed message with Admissions Coord Terra Vazquez. MAZIN called [facility], Admissions Coord is not in today, call transferred to [employer], [place], message left.

## 2021-01-22 NOTE — PROGRESS NOTES
[facility]   Nephrology progress note  1/22/2021, 10:45 AM      Pt Name:    David Perez  MRN:     [id number]     [place]:    3/30/1932  Admit Date:    1/17/2021  2:31 PM  Primary Care Physician:  Allie Fishman DO   Room number  4B-10/010-A    Chief Complaint: Nephrology following for hyponatremia    Subjective:  Patient seen and examined  Seen and examined earlier today  Not very oriented  1:1 tele observation  Confused at times  Discussed with RN      Objective:  24HR INTAKE/OUTPUT:      Intake/Output Summary (Last 24 hours) at 1/22/2021 1045  Last data filed at 1/22/2021 0835  Gross per 24 hour   Intake 2030.01 ml   Output 850 ml   Net 1180.01 ml     I/O last 3 completed shifts: In: 2030 [I.V.:2030]  Out: 725 [Urine:725]  I/O this shift:  In: -   Out: 125 [Urine:125]  Admission weight: 162 lb (73.5 kg)  Wt Readings from Last 3 Encounters:   01/19/21 154 lb 8.7 oz (70.1 kg)   01/06/21 162 lb (73.5 kg)   01/05/21 160 lb (72.6 kg)     Body mass index is 26.53 kg/m².     Physical examination  VITALS:     Vitals:    01/21/21 2000 01/21/21 2304 01/22/21 0339 01/22/21 0835   BP: (!) 142/70 (!) 157/69 135/67 139/65   Pulse: 74 76 72 72   Resp: 20  16 18   Temp: 97.5 °F (36.4 °C) 97.5 °F (36.4 °C) 97.1 °F (36.2 °C) 97.4 °F (36.3 °C)   TempSrc: Oral Axillary Axillary Axillary   SpO2: 97% 96% 96% 96%   Weight:       Height:         General Appearance:  appears comfortable, no distress  Mouth/Throat: Oral mucosa moist  Neck: No JVD  Lungs: Air entry diminished, poor effort  Heart:  S1, S2 heard  GI: soft, non-tender, no guarding  Extremities: Trace LE edema      Lab Data  CBC:   Recent Labs     01/20/21  0919 01/21/21  0515 01/22/21  0440   WBC 14.5* 7.8 11.0*   HGB 8.2* 8.7* 8.4*   HCT 24.5* 25.7* 25.0*    288 318     BMP:  Recent Labs     01/20/21  0619 01/20/21  0619 01/21/21  0515 01/21/21  0515 01/21/21 2052 01/22/21  0120 01/22/21  0440   *   < > 124*   < > 123* 126* 124* K 4.6  --  4.6  --   --   --   --    CL 92*  --  92*  --   --   --   --    CO2 18*  --  18*  --   --   --   --    BUN 21  --  22  --   --   --   --    CREATININE 1.2  --  1.1  --   --   --   --    GLUCOSE 102  --  125*  --   --   --   --    CALCIUM 8.1*  --  8.4*  --   --   --   --    MG  --   --  2.1  --   --   --   --    PHOS  --   --  3.0  --   --   --   --     < > = values in this interval not displayed. Hepatic:   Recent Labs     01/20/21  0619 01/21/21  0900   LABALBU 2.9* 2.9*   * 108*   ALT 78* 91*   BILITOT 0.4 0.4   ALKPHOS 80 80         Meds:  Infusion:    sodium chloride 75 mL/hr at 01/21/21 1326     Meds:    melatonin  3 mg Oral Nightly    QUEtiapine  25 mg Oral Nightly    calcium replacement protocol   Other RX Placeholder    phosphorus replacement protocol   Other RX Placeholder    lactobacillus  1 capsule Oral Daily with breakfast    cefTRIAXone (ROCEPHIN) IV  1 g Intravenous Q24H    aspirin  81 mg Oral Daily    hydrALAZINE  25 mg Oral TID    pantoprazole  40 mg Oral BID AC    [Held by provider] pravastatin  40 mg Oral QPM    tamsulosin  0.4 mg Oral Daily    sodium chloride flush  10 mL Intravenous 2 times per day    warfarin (COUMADIN) daily dosing (placeholder)   Other RX Placeholder     Meds prn: potassium chloride **OR** potassium alternative oral replacement **OR** potassium chloride, potassium chloride, potassium chloride, acetaminophen **OR** acetaminophen, sodium chloride, cyclobenzaprine, sodium chloride flush, promethazine **OR** ondansetron, polyethylene glycol, magnesium sulfate, mupirocin       Impression and Plan:  1. Hyponatremia mostly prerenal in nature. So far sodium has improved appropriately  Very slowly improving at this time  Sodium is up to 128 now  Overall appropriate improvement  Will decrease normal saline to 50 ml per hour  Okay to change sodium frequency to every 6 hours  Monitor for free water diuresis    2.   Mild ANNIKA in setting of prerenal failure and intravascular volume depletion as well as rhabdomyolysis. ANNIKA has resolved  3. CKD 3A. Stable  4. Rhabdomyolysis, traumatic secondary to fall. CK improving, about 1100 yesterday  We will recheck in a.m.  5.  Chronic atrial fibrillation  6. COVID-19 positive  7. Recent falls  8.   History of CVA    Discussed with son, Dr. Danni Peter  Discussed with hospitalist  D/W GERARDO Lo MD  [facility]

## 2021-01-22 NOTE — PROGRESS NOTES
Clinical Pharmacy Note    Warfarin consult follow-up    Recent Labs     01/22/21  0440   INR 1.51*     Recent Labs     01/20/21  0919 01/21/21  0515 01/22/21  0440   HGB 8.2* 8.7* 8.4*   HCT 24.5* 25.7* 25.0*    288 318       Significant Drug-Drug Interactions:  New warfarin drug-drug interactions: quetiapine  Discontinued drug-drug interactions: dexamethasone, remdesivir  Current warfarin drug-drug interactions: aspirin, ceftriaxone, quetiapine        Date INR Warfarin Dose   1/17/21 5.19 No Coumadin    1/18/21  4.97   No Coumadin  Vitamin K 2 mg IV given   1/19/2021   1.73   3 mg     1/20/2021  1.32  3 mg     1/21/2021  1.39 5 mg    1/22/2021  1.51  5 mg                Notes:                     Daily PT/INR until stable within therapeutic range.        Charissa Valerio, PharmD   1/22/2021, 3:02 PM

## 2021-01-22 NOTE — CARE COORDINATION
DISASTER CHARTING    1/22/21, 8:29 AM EST    DISCHARGE ONGOING EVALUATION:     Elgin Ragsdale day: 5  Location: [facility]10/010-A Reason for admit: Hyponatremia [E87.1]   Barriers to Discharge: agitated/ restless, confused, pulling at lines, bilat wrist restraints, telesitter, Na 124/128, IVF 75/hr, ASA, Rocephin IV, Apresoline, PPI, Seroquel at HS, day #5 Remdesivir, Decadron day #5, on room air, sats 96%, nephrology follows. PCP: Vaishali Hatfield DO from [place]  Readmission Risk Score: 33%  Patient Goals/Plan/Treatment Preferences: SW consulted to see for ECF placement; see Stephanie RETANA note from today.

## 2021-01-22 NOTE — CARE COORDINATION
1/22/21, 1:38 PM EST    DISCHARGE PLANNING EVALUATION    PC received from Dennie Casa in admission with [facility], they can accept pt when medically ready. PC received from [facility], no beds avail now, possibly next week. Exect director will review chart and discuss next week. Marina with [facility] states no beds available at this time. MAZIN called son for update, son would like to move forward with Denise Bolanos. MAZIN spoke with Dennie Casa with [facility] and faxed face sheet.

## 2021-01-22 NOTE — PROGRESS NOTES
Comprehensive Nutrition Assessment    Type and Reason for Visit:  Initial(LOS)    Nutrition Recommendations/Plan:   Recommend diet vs. NPO per SLP. If requires ongoing NPO status - consider begin TF Vital 1.2 at 10 ml/hr. If able to take po - will start ONS as able. Nutrition Assessment:    Pt. nutritionally compromised AEB NPO status, weight loss over the past 1.5 months. At risk for further nutrition compromise r/t COVID+, confusion, advanced age and underlying medical condition (hx CAD, CKD, CVA). Nutrition recommendations/interventions as per above. Malnutrition Assessment:  Malnutrition Status:  Insufficient data    Context:  Acute Illness     Findings of the 6 clinical characteristics of malnutrition:  Energy Intake:  Unable to assess  Weight Loss:  1 - 5% over 1 month(-9.4% in 1.5 months)     Body Fat Loss:  Unable to assess     Muscle Mass Loss:  Unable to assess    Fluid Accumulation:  Unable to assess     Strength:  Not Performed    Estimated Daily Nutrient Needs:  Energy (kcal):  7907-8741 kcals (18-20); Weight Used for Energy Requirements:  (70 kgm 1/19)     Protein (g):   gm (1.2-2); Weight Used for Protein Requirements:  Ideal(59 kgm)     Method Used for Fluid Requirements:  1 ml/kcal        Nutrition Related Findings:  COVID+ (diagnosed 1/18); respiratory failure resolved, on room air; s/p SLP evaluation - rec NPO; plan to re-eval; per rounds - no plans for NGT/TF as hoping confusion will resolve/improve; 1/21: Glucose 125, Sodium 124, Potassium 4.6; Rx includes Seroquel, Culturelle, Decadron, Glycolax prn, no BM noted      Wounds:  None       Current Nutrition Therapies:    Diet NPO Effective Now Exceptions are: Other (Specify);  Specify Other: Exception of PO medications crushed in puree    Anthropometric Measures:  · Height: 5' 4\" (162.6 cm)  · Current Body Weight: 154 lb 8.7 oz (70.1 kg)(1/19 +2 edema)   · Admission Body Weight: 152 lb 5.4 oz (69.1 kg)(1/17 +2 edema) · Usual Body Weight: (per EMR: 2/25/20: 174# 11.2 oz, 12/9/20: 170# 11.2 oz)     · Ideal Body Weight: 130 lbs;      · BMI: 26.5  · BMI Categories: Overweight (BMI 25.0-29. 9)       Nutrition Diagnosis:   · Inadequate oral intake related to cognitive or neurological impairment, swallowing difficulty as evidenced by swallow study results, NPO or clear liquid status due to medical condition      Nutrition Interventions:   Food and/or Nutrient Delivery:  Continue NPO  Nutrition Education/Counseling:  Education not appropriate   Coordination of Nutrition Care:  Continue to monitor while inpatient    Goals:  Pt. will receive adequate nutrition in next 1-2 days (TF vs. po diet)       Nutrition Monitoring and Evaluation:   Behavioral-Environmental Outcomes:  None Identified   Food/Nutrient Intake Outcomes:  Diet Advancement/Tolerance  Physical Signs/Symptoms Outcomes:  Biochemical Data, Chewing or Swallowing, GI Status, Fluid Status or Edema, Nutrition Focused Physical Findings, Skin, Weight     Discharge Planning:     Too soon to determine     Electronically signed by Christiano Munoz RD, LD on 1/22/21 at 10:41 AM EST    Contact: [phone]

## 2021-01-22 NOTE — PROGRESS NOTES
[address] [address]  INPATIENT PHYSICAL THERAPY  DAILY NOTE  [facility] 4B - 4B-10/010-A    Time In: 1173  Time Out: 1536  Timed Code Treatment Minutes: 29 Minutes  Minutes: 29          Date: 2021  Patient Name: Viv Murray,  Gender:  male        MRN: [id number]  : 3/30/1932  (80 y.o.)     Referring Practitioner: Marcella Otero DO  Diagnosis: Hyponatremia  Additional Pertinent Hx: Per ER note on 2021:88 y.o. male history of CVA, CAD, hypertension, hyperlipidemia, chronic atrial fibrillation on anticoagulation with Coumadin was recently admitted and discharged from [facility] for asymptomatic bradycardia management. He also had a history of recent falls. He presented to the emergency department for evaluation of unwitnessed fall at home today morning in his bathroom by his family members. He was found on the ground and was down for unknown length of time. Prior Level of Function:  Lives With: Alone  Type of Home: House  Home Layout: One level  Home Access: Level entry  Home Equipment: Cane   Bathroom Shower/Tub: Walk-in shower  Bathroom Toilet: Handicap height  Bathroom Equipment: Shower chair, Grab bars in shower    ADL Assistance: [address]: Independent  Transfer Assistance: Independent  Additional Comments: Pt reports fully indep PLOF without AD. Restrictions/Precautions:  Restrictions/Precautions: Contact Precautions, Fall Risk  Position Activity Restriction  Other position/activity restrictions: COVID isolation, very [place]; on room air on ; Bilateral soft wrist restraints d/t confusion and pulling at lines. SUBJECTIVE: RN Maria Teresa Toribio approving session, states that patient has been more confused today but was okay to try therapy with him, requesting that she be in room to assist during session d/t patient frequently attempting to pull at multiple lines.  Patient in bed upon arrival, eyes staring up towards Patient ID: Alda Mathews  MRN: [id number]  : 1970    Chief Complaint   Patient presents with   • Physical     Annual pap/pelciv exam       HPI  Has been gaining weight and now her wedding ring does not fit.  Also for some months waking up with drenching sweats.  No missed menstrual cycles.  Mother did have a history of autoimmune disease, Sjogren's and Raynaud's.  Patient also has Raynaud's.  So wondering if she needs an autoimmune work-up.  Has some bilateral hip pain but no rash and no other significant arthralgias.  Her job is very stressful.  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  She is not gained as much as she had thought but the increase in ring size is unusual.    Patient has history of back surgeries x3.  They were done at age 21, 27, 33.  She is a 7-year-old son.  After he was born her back pain increased significantly and resulted in the third surgery.  She had a number of laminectomies.  She currently takes herbal supplements such as to [place], [place] [facility], and glucosamine.  She took Lyrica until it was no longer covered by insurance and gabapentin made her too drowsy.    She needs a mammogram and she is overdue for Pap.  All Paps in the past have been normal.    Calcium intake.  She now gets some GI upset with yogurt.  She probably does not get enough calcium in her diet.  At one time she was taking a multivitamin with calcium    Exercise.  She needs to increase it.  Last year she did a cardiovascular version of yoga but then injured her left foot.  It is still somewhat painful.  She did see podiatry.  She got new balance shoes but did not go back to podiatry for custom orthotics.  She has seen a nap her past and received acupuncture in the past.    Family history mother  of pulmonary fibrosis and complications of Sjogren's  Maternal grandmother lymphoma in her 80s  No history of colon cancer, breast cancer or coronary artery disease  Paternal grandmother stroke in her 70s  Paternal grandfather--  ,  ceiling and patient talking to someone in room (believe patient might have been praying?). Confused throughout session but pleasant, hallucinating frequently throughout. PAIN: Unable to assess. OBJECTIVE:  Bed Mobility:  Rolling to Left: Maximum Assistance, X 2   Rolling to Right: Maximum Assistance, X 2   Supine to Sit: Maximum Assistance, X 2, with head of bed raised, with verbal cues , with increased time for completion due to patient being unable to follow directions  Sit to Supine: Maximum Assistance, X 2, with increased time for completion   Scooting: Moderate Assistance, Maximum Assistance, X 1    Transfers:  Sit to Stand: Minimal Assistance, Maximum Assistance, X 2, fromo EOB (x2 trials)  Stand to Sit:Moderate Assistance, X 1    Ambulation:  Not Tested  *Unsafe to trial ambulation this date. Balance:  Static Sitting Balance:  Contact Guard Assistance, Minimal Assistance, X 1, with verbal cues , due to left sided lean, unable to achieve midline  Dynamic Sitting Balance: Minimal Assistance, with verbal cues , while sitting EOB, unsupported, therapist had patieint complete dynamic reaching to try and orient back to midline, reaching multiple times with RUE but not following directions to do much reaching with LUE. Static Standing Balance: Minimal Assistance, Maximum Assistance, X 2, with BUE support on AD, increased lean to Left and posteriorly on 1st trial; Slightly better with 2nd trial but still required +2 for safety. Exercise:  Not completed this date due to increased confusion and time spent on other activities. Functional Outcome Measures: Completed  AM-PAC Inpatient Mobility Raw Score : 9  AM-PAC Inpatient T-Scale Score : 30.55    ASSESSMENT:  Assessment: Patient progressing toward established goals. The patient tolerated session fair, limited due to increased confusion this date and having increased difficulty following directions.  Would benefit from additional skilled PT to maternal grandfather --old age    Left forearm lesion for months area it is flesh-colored and somewhat bumpy.  Also last week elbows had peeling skin and left wall, almost wound like lesions on bilateral elbows.  Positive family history of melanoma and a cousin.    Had congestion, bronchitis for a year last year.  She did have construction going on at her house.    No past medical history on file.    Family History   Problem Relation Age of Onset   • Lung Disease Mother         Pulmonary fibrosis   • Sjogren's Syndrome Mother    • Non-Hodgkin's Lymphoma Maternal Grandmother    • Stroke Paternal Grandmother         In 70s   • Cancer Neg Hx         No breast cancer, colon cancer, lung cancer   • Heart disease Neg Hx        Past Surgical History:   Procedure Laterality Date   • Spine surgery      Multiple back surgeries at ages 21, 27, 33       Social History     Socioeconomic History   • Marital status: /Civil Union     Spouse name: Not on file   • Number of children: Not on file   • Years of education: Not on file   • Highest education level: Not on file   Occupational History   • Not on file   Social Needs   • Financial resource strain: Not on file   • Food insecurity:     Worry: Not on file     Inability: Not on file   • Transportation needs:     Medical: Not on file     Non-medical: Not on file   Tobacco Use   • Smoking status: Never Smoker   • Smokeless tobacco: Never Used   Substance and Sexual Activity   • Alcohol use: Not Currently   • Drug use: Never   • Sexual activity: Yes     Partners: Male     Birth control/protection: Condom   Lifestyle   • Physical activity:     Days per week: Not on file     Minutes per session: Not on file   • Stress: Not on file   Relationships   • Social connections:     Talks on phone: Not on file     Gets together: Not on file     Attends [employer] service: Not on file     Active member of club or organization: Not on file     Attends meetings of clubs or organizations: Not  improve functional mobility. Activity Tolerance:  Patient tolerance of  treatment: fair. Limited by cognition. Equipment Recommendations:Equipment Needed: Yes(may need RW)  Discharge Recommendations: Continue to assess, Subacute/Skilled Nursing Facility, Patient would benefit from continued therapy after discharge    Plan: Times per week: 4-5 X GM  Current Treatment Recommendations: Strengthening, Transfer Training, Endurance Training, Balance Training, Functional Mobility Training, Gait Training, Home Exercise Program    Patient Education  Patient Education: Plan of Care, Bed Mobility, Transfers, Reviewed Prior Education, - Patient Requires Continued Education    Goals:  Patient goals : Get stronger and return to prior level  Short term goals  Time Frame for Short term goals: By discharge  Short term goal 1: supine to sit and return with min of 1 to get in and out of bed  Short term goal 2: sit to stand with min of 1 consistently to get on and off various surfaces  Short term goal 3: Pt to ambulate with RW 30 feet with CGA to walk safely to bathroom  Long term goals  Time Frame for Long term goals : NA due to short ELOS    Following session, patient left in safe position with all fall risk precautions in place. on file     Relationship status: Not on file   • Intimate partner violence:     Fear of current or ex partner: Not on file     Emotionally abused: Not on file     Physically abused: Not on file     Forced sexual activity: Not on file   Other Topics Concern   • Not on file   Social History Narrative   • Not on file       Current Outpatient Medications   Medication Sig Dispense Refill   • Multiple Vitamins-Minerals (MULTI FOR HER) Cap      • Omega-3 Fatty Acids (FISH OIL) 1200 MG CAPSULE DELAYED RELEASE      • Cholecalciferol (VITAMIN D-3) 5000 units Tab Take 5,000 Units by mouth 2 days a week. 100 tablet 0   • pimecrolimus (ELIDEL) 1 % cream Apply topically 2 times daily. 30 g 0     No current facility-administered medications for this visit.        ALLERGIES:   Allergen Reactions   • Sulfa Antibiotics RASH         ROS  Review of Systems   Constitutional: Positive for unexpected weight change.   HENT: Positive for congestion.    Musculoskeletal:        See HPI       Physical:  Visit Vitals  /72 (BP Location: [facility], Patient Position: Sitting, Cuff Size: Large Adult)   Pulse 58   Temp 96.9 °F (36.1 °C) (Tympanic)   Resp 18   Ht 5' 10\" (1.778 m)   Wt 108.4 kg (239 lb)   LMP 06/12/2019   SpO2 99%   BMI 34.29 kg/m²       Physical Exam   Constitutional: She is oriented to person, place, and time. She appears well-developed and well-nourished. No distress.   Heavyset   HENT:   Head: Normocephalic and atraumatic.   Nose: Nose normal.   Eyes: Pupils are equal, round, and reactive to light. Conjunctivae and EOM are normal. No scleral icterus.   Neck: Normal range of motion. Neck supple. No thyromegaly present.   Cardiovascular: Normal rate, regular rhythm, normal heart sounds and intact distal pulses. Exam reveals no gallop and no friction rub.   No murmur heard.  Breast exam normal   Pulmonary/Chest: Effort normal and breath sounds normal. No respiratory distress. She has no wheezes. She has no rales. She exhibits no  tenderness.   Abdominal: Soft. Bowel sounds are normal. She exhibits no distension and no mass. There is no tenderness. There is no rebound and no guarding.   Genitourinary:   Genitourinary Comments: Bimanual exam and vaginal exam normal.  Some minimal vaginal discharge   Musculoskeletal: Normal range of motion.   Lymphadenopathy:     She has no cervical adenopathy.   Neurological: She is alert and oriented to person, place, and time. No cranial nerve deficit or sensory deficit. She exhibits normal muscle tone. Coordination normal.   Skin: Skin is warm and dry. No rash noted.   Bilateral elbows with dark red raw peeled off skin irregular borders.  Left forearm with raised flesh-colored 3 mm round lesion   Psychiatric: She has a normal mood and affect. Her behavior is normal. Judgment and thought content normal.   Nursing note and vitals reviewed.        ASSESSMENT AND PLAN: Routine physical  Visit for screening mammogram  - MAMMO SCREENING BILATERAL W FAITH; Future    Skin lesion  Left forearm and bilateral lesions to be evaluated  - SERVICE TO DERMATOLOGY    Pap today.  mammogram ordered.  Calcium intake and vitamin D intake discussed.  Patient takes vitamin D supplements    Sweats and ring not fitting and weight gain.  Will check TSH.  Could be related to menopausal symptoms.  Will also check growth hormone for possible acromegaly.    Positive family history of rheumatologic disease.  Check ADRIENNE    Vitamin D deficiency.  Check vitamin D    Fasting blood work including lipids and CMP      Eliza Lobo MD

## 2021-01-22 NOTE — PROGRESS NOTES
Hospitalist Progress Note      Patient:  Steven Rodrigues    Unit/Bed:4B-10/010-A  YOB: 1932  MRN: [id number]   Acct: [de-identified]   PCP: Ganesh Calderón DO  Date of Admission: 1/17/2021    Assessment/Plan:    COVID 19 PNA: Patient tested positive for COVID-19 on 1/18. Patient is afebrile. Completed 5 days of Decadron and IV Remdisivir. Continue Vitamin C, Vitamin D, Zinc, Acapella and IS. Acute respiratory failure, hypoxia: Resolved. Patient's baseline is room air. Patient is currently requiring no additional oxygen. Incentive spirometry. Acapella. Hyponatremia, severe, chronic: Nephrology consulted. NS IV 75 cc/hr. Sodium Q4H. Recent sodium trend: 127, 123, 121, 119, 122, 124, 123, 124, 125, 128. Syncope & Collapse: Pt was found unresponsive and down prior to admission. Cardiology and Neurology consulted. EEG was negative. MRI no acute CVA. Cardiology signed off. Bradycardia: Cardiology consulted. Avoid AV blocking agents. No indication for PPM.  Possibly related to remdesivir usage. Telemetry. COVID-19 Encephalopathy: Pt has had bouts of encephalopathy throughout his stay. During this evaluation, pt was alert and able to have good conversation but deep thought questions were answered inappropriate. Start Melatonin nightly. Pt on restraints. Keep oriented, blinds open in the morning, visible clock. Rhabdomyolysis: CK trending down 9463 to 3342. IVF. CK in the AM.   Atrial Fibrillation, chronic: Bradycardia. Continue Coumadin. Pharmacy to dose. Elevated Troponin: Cardiology consulted. Likely demand mismatch. They recommended no acute intervention. Essential HTN: Pressure has been labile. Continue home medications. HFpEF, chronic: Latest echo (12/20) EF 55 to 60%. CKD, Stage 3: Creatinine stable around 1.2. BPH: Aware. Hearing Loss: Hearing aids. Deconditioning: Physical therapy. Occupational Therapy. Social work.  TCU recommended ECF due to low level of activity. Chief Complaint: Fall     Initial H and P:-    80-year-old male presented to [facility] via EMS after sustaining an unwitnessed fall from standing position. Patient was found on ground and unknown length of time spent on ground. Per HPI patient son reports patient was progressively more weak over the last week and acting like himself up until the day before arrival.  On day of arrival patient's son states they were unable to get a hold of him therefore they went to his house where they found him on the bathroom floor. He was brought in via EMS to [facility] emergency room department.     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     Of note patient was recently hospitalized at [facility] 12/9/2020 for asymptomatic bradycardia in which is beta-blocker and calcium blocker were stopped and follow-up with Dr. Bertha Guerrero and plans for 10-day event monitor were planned. Patient was seen by EPS clinic 1/16/2020 with no plans for permanent pacemaker at that time.     Upon arrival, patient does not member what happened and does not remember what happened prior to incident. On arrival patient denies chest pain, shortness of breath, abdominal pain patient did report pain in his right leg and complaining of generalized muscle weakness and aches. Patient lives at home alone. Patient was found confused, confusion improved upon arrival to ED. Patient had slight weakness in left lower extremity however this was limited by pain. Patient had abrasions with different stages of ecchymosis to bilateral lower extremities.   While in the emergency room patient was found to have noted hyponatremia and elevated CPK. CT of the head showed no acute intracranial hemorrhage or mass-effect. Patient did have noted encephalomalacia within the left temporal lobe from prior remote infarct. CT of cervical spine showed no acute fracture. CT chest demonstrated moderate right pleural effusions, mild opacities right lung base. CVC was placed and patient did receive 50 mL of 3% normal saline. Covid positive.     Last week severe cramping upper thighs. Then did farely well. Friday cramping came back, Saturday went to football game. Yesterday found him on bathroom floor.      1/18/21: Patient was not following commands and demonstrated pinpoint pupils. Patient received 0.4 Narcan. Patient did become more responsive and pupils were not pinpoint. We will proceed with MRI of brain.     1/19/21: Patient appears significantly better. Sitting up in bed communicating and ate entire breakfast. Patient demonstrates difficulty hearing however patient denies pain anywhere. Patient still requiring significant amount of dopamine    1/20: Pt was transferred out of ICU. Pt did not eat much, he does not feel like eating or like the food. Sodium is stable around 122.     1/21: No acute events overnight. Patient not eating well today. SLP saw and recommending NPO except for medications. Mental status waxes and wanes. HD stable on RA. No fevers or chills. No chest pain or sob. Subjective (past 24 hours):   Overnight more confused pulling on lines, restrains placed. Pt follows commands however confused. No fevers or chills. On RA. Not eating as pt is too confused. Past medical history, family history, social history and allergies reviewed again and is unchanged since admission. ROS Pt denies CP, SOB, HA, abd pain.      Medications:  Reviewed    Infusion Medications    sodium chloride 75 mL/hr at 01/21/21 1326     Scheduled Medications    melatonin  3 mg Oral Nightly    QUEtiapine  25 mg Oral Nightly    calcium replacement protocol   Other RX Placeholder    phosphorus replacement protocol   Other RX Placeholder    lactobacillus  1 capsule Oral Daily with breakfast    cefTRIAXone (ROCEPHIN) IV  1 g Intravenous Q24H    aspirin  81 mg Oral Daily    hydrALAZINE  25 mg Oral TID    pantoprazole  40 mg Oral BID AC    [Held by provider] pravastatin  40 mg Oral QPM    tamsulosin  0.4 mg Oral Daily    sodium chloride flush  10 mL Intravenous 2 times per day    warfarin (COUMADIN) daily dosing (placeholder)   Other RX Placeholder     PRN Meds: potassium chloride **OR** potassium alternative oral replacement **OR** potassium chloride, potassium chloride, potassium chloride, acetaminophen **OR** acetaminophen, sodium chloride, cyclobenzaprine, sodium chloride flush, promethazine **OR** ondansetron, polyethylene glycol, magnesium sulfate, mupirocin      Intake/Output Summary (Last 24 hours) at 1/22/2021 1216  Last data filed at 1/22/2021 0835  Gross per 24 hour   Intake 2030.01 ml   Output 850 ml   Net 1180.01 ml       Diet:  Diet NPO Effective Now Exceptions are: Other (Specify); Specify Other: Pending Feese swallow test; No meds, either    Exam:  /60   Pulse 56   Temp 98.1 °F (36.7 °C) (Axillary)   Resp 18   Ht 5' 4\" (1.626 m)   Wt 154 lb 8.7 oz (70.1 kg)   SpO2 96%   BMI 26.53 kg/m²   General appearance: No apparent distress, appears younger than stated age and cooperative. HEENT: Pupils equal, round, and reactive to light. Conjunctivae/corneas clear. Neck: Supple, with full range of motion. No jugular venous distention. Trachea midline. Respiratory:  Normal respiratory effort on RA. Clear to auscultation, bilaterally without Rales/Wheezes/Rhonchi. Cardiovascular: Irregular rate and rhythm with normal S1/S2 without murmurs, rubs or gallops. Abdomen: Soft, non-tender, non-distended with normal bowel sounds. Musculoskeletal: passive and active ROM x 4 extremities.   Skin: Skin color, texture, turgor normal. No rashes or lesions. Neurologic:  Neurovascularly intact without any focal sensory/motor deficits. Cranial nerves: II-XII intact, grossly non-focal.  Psychiatric: follows commands, on restrains. Confused. Capillary Refill: Brisk,< 3 seconds   Peripheral Pulses: +2 palpable, equal bilaterally     Labs:   Recent Labs     01/20/21  0919 01/21/21  0515 01/22/21  0440   WBC 14.5* 7.8 11.0*   HGB 8.2* 8.7* 8.4*   HCT 24.5* 25.7* 25.0*    288 318     Recent Labs     01/20/21  0619 01/20/21  0619 01/21/21  0515 01/21/21  0515 01/22/21  0120 01/22/21  0440 01/22/21  1015   *   < > 124*   < > 126* 124* 128*   K 4.6  --  4.6  --   --   --   --    CL 92*  --  92*  --   --   --   --    CO2 18*  --  18*  --   --   --   --    BUN 21 -- 22  --   --   --   --    CREATININE 1.2  --  1.1  --   --   --   --    CALCIUM 8.1*  --  8.4*  --   --   --   --    PHOS  --   --  3.0  --   --   --   --     < > = values in this interval not displayed. Recent Labs     01/20/21  0619 01/21/21  0900   * 108*   ALT 78* 91*   BILIDIR  --  <0.2   BILITOT 0.4 0.4   ALKPHOS 80 80     Recent Labs     01/20/21  0619 01/21/21  0515 01/22/21  0440   INR 1.32* 1.39* 1.51*     Recent Labs     01/21/21 0515   CKTOTAL 1,111*     Urinalysis:      Lab Results   Component Value Date    NITRU NEGATIVE 01/17/2021    WBCUA 25-50 01/17/2021    BACTERIA NONE SEEN 01/17/2021    RBCUA 15-20 01/17/2021    BLOODU LARGE 01/17/2021    SPECGRAV 1.017 01/17/2021    GLUCOSEU NEGATIVE 01/05/2021       Radiology:  MRI BRAIN WO CONTRAST   Final Result   Impression:   Moderate nonspecific periventricular deep white matter disease. This most    often can be ascribed to chronic small vessel ischemic change. Prominent central and cortical volume loss. Old ischemic event left temporal lobe.       This document has been electronically signed by: Gayatri Clements MD on    01/19/2021 12:59 AM         CT CHEST WO CONTRAST   Final Result   Impression: Moderate right pleural effusion. Associated volume loss. Mild opacity right lung base. This could represent mild pneumonia. This document has been electronically signed by: Govind Mosquera MD on    01/17/2021 09:10 PM      All CT scans at this facility use dose modulation, iterative    reconstruction, and/or weight-based   dosing when appropriate to reduce radiation dose to as low as reasonably    achievable. XR CHEST PORTABLE   Final Result   Impression:   Cardiomegaly, no CHF. Mild right infrahilar opacity. This could represent mild pneumonia versus    volume loss. This document has been electronically signed by: Govind Mosquera MD on    01/17/2021 09:11 PM         CT Head WO Contrast   Final Result   1. No acute intracranial hemorrhage or mass effect. 2. Moderate diffuse atrophy is demonstrated. 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 **This report has been created using voice recognition software. It may contain minor errors which are inherent in voice recognition technology. **      Final report electronically signed by Dr. Gustabo Atkins on 1/17/2021 4:17 PM      CT Cervical Spine WO Contrast   Final Result   1. Slightly limited evaluation due to motion artifact. However, no definite acute fracture of the cervical spine is seen. 2. Straightening of the normal cervical lordosis is demonstrated. This may be related to muscle spasm. 3. Multilevel degenerative changes of the cervical spine are demonstrated. **This report has been created using voice recognition software. It may contain minor errors which are inherent in voice recognition technology. **      Final report electronically signed by Dr. Gustabo Atkins on 1/17/2021 4:21 PM      XR PELVIS (1-2 VIEWS)   Final Result   1. No acute fracture or malalignment. Degenerative changes are as described above.             **This report has been created using voice recognition software. It may contain minor errors which are inherent in voice recognition technology. **      Final report electronically signed by Dr. Andi Subramanian on 1/17/2021 3:56 PM      XR FEMUR LEFT (MIN 2 VIEWS)   Final Result   1. No acute fracture or malalignment. Degenerative changes are as described above. **This report has been created using voice recognition software. It may contain minor errors which are inherent in voice recognition technology. **      Final report electronically signed by Dr. Andi Subramanian on 1/17/2021 3:50 PM      XR CHEST (2 VW)   Final Result   1. Moderate enlargement of the cardiomediastinal silhouette. 2. There is hyperinflation noted. 2. No focal consolidation, pleural effusion or pneumothorax is seen. **This report has been created using voice recognition software. It may contain minor errors which are inherent in voice recognition technology. **      Final report electronically signed by Dr. Andi Subramanian on 1/17/2021 3:54 PM        Electronically signed by Ariana Baxter MD on 1/22/2021 at 12:16 PM

## 2021-01-22 NOTE — PROGRESS NOTES
[address] [address]  [facility] CVICU 4B  Occupational Therapy  Daily Note  Time:   Time In: 8315  Time Out: 1100  Timed Code Treatment Minutes: 32 Minutes  Minutes: 31          Date: 2021  Patient Name: Jm Russell,   Gender: male      Room: [facility]10/010-A  MRN: [id number]  : 3/30/1932  (80 y.o.)  Referring Practitioner: Dr. Tori Shetty  Diagnosis: hyponatremia  Additional Pertinent Hx: Per ER note on 2021:88 y.o. male history of CVA, CAD, hypertension, hyperlipidemia, chronic atrial fibrillation on anticoagulation with Coumadin was recently admitted and discharged from [facility] for asymptomatic bradycardia management. He also had a history of recent falls. He presented to the emergency department for evaluation of unwitnessed fall at home today morning in his bathroom by his family members. He was found on the ground and was down for unknown length of time. Restrictions/Precautions:  Restrictions/Precautions: Contact Precautions, Fall Risk  Position Activity Restriction  Other position/activity restrictions: COVID isolation, very [place]; on room air on      SUBJECTIVE: Nurse Jay vazquez session, In bed upon arrival, nurse present during session for safety, patient pleasant but impulsive, telesitter      Patient on Room Air  upon arrival to room. Patient O2 sats at 95 %. Upon activity patient maintaining O2 at 93 %. Pt requiring minimal rest break(s) to recover. PAIN: 0/10:       COGNITION: Impaired Memory, Decreased Problem Solving, Difficulty Following Commands and Impulsive    ADL:   No ADL's completed this session. [employer] BALANCE:  Sitting Balance:  Stand By Assistance. at EOB greater than 8 minutes with 0 UE support    BED MOBILITY:  Supine to Sit: Maximum Assistance HOB elevated, used bedrail, difficulty problem solving task  Sit to Supine: Dependent unable to problem solve commands    TRANSFERS:  Sit to Stand: Moderate Assistance.  EOB  Stand to Sit: Moderate Assistance. ADDITIONAL ACTIVITIES:  Guided patient through BUE AROM this date x10 reps x1 set at EOB in order to increase BUE strength and improve activity tolerance for ADLs and homemaking tasks. ASSESSMENT:     Activity Tolerance:  Patient tolerance of  treatment: good. Discharge Recommendations: Continue to assess pending progress(may be TCU candidate)   Equipment Recommendations:    Plan: Times per week: 5x  Current Treatment Recommendations: Balance Training, Self-Care / ADL, Endurance Training, Functional Mobility Training, Strengthening, Safety Education & Training    Patient Education  Patient Education: Home Exercise Program and Orientation    Goals  Short term goals  Time Frame for Short term goals: until discharge  Short term goal 1: Complete various sit-stand t/fs with min A x 1 for increased ease of BSC t/fs  Short term goal 2: Complete 1-2 min standing ADL with min a X 1 with BUE support on RW  Short term goal 3: Complete BUE AROM/light strengthening exercises to increase UB endurance for BADL  Short term goal 4: Complete BADL routine with min A & min vcs for safety & sequencing  Short term goal 5: Tolerate further assessment of functional ambulation by OTR when appropriate  Long term goals  Time Frame for Long term goals : No LTG d/t short ELOS    Following session, patient left in safe position with all fall risk precautions in place.

## 2021-01-22 NOTE — FLOWSHEET NOTE
Set up the zoom and the nurse took the teli-health machine in the room. Sekou Escobedo is conversing with her father.       01/22/21 1100   Encounter Summary   Services provided to: Patient   Referral/Consult From: Nurse   Support System Children   Continue Visiting Yes  (1/22 Zoom  at 11:00)   Complexity of Encounter Low   Length of Encounter 15 minutes   Spiritual/Sabianist   Type Spiritual support

## 2021-01-22 NOTE — PROGRESS NOTES
[facility]  Fiberoptic Endoscopic Evaluation of Swallowing  STRZ CVICU 4B      SLP Individual Minutes  Time In: 8893  Time Out: 0577  Minutes: 20  Timed Code Treatment Minutes: 0 Minutes       Date: 2021  Patient Name: Vinod Dwyer       CSN: [id number]   : 3/30/1932  (80 y.o.)  Gender: male   Referring Physician: Dick Doherty MD  Diagnosis: Hyponatremia   Secondary Diagnosis: Dysphagia; COVID-19   Date of Last MBS/FEES:  N/a   Diet:  NPO    History of Present Illness/Injury: Pt admit with above dx. Please refer to H&P for full pt medical hx. Per chart review, pt with hx of CVA and multiple falls. Was previously living at home alone. Positive for COVID-19 virus. Considerable decline in mentation with nonsensical speech, poor basic direction following and increasing dysphagia. ST recommendations for FEES to further assess r/o pharyngeal dysfunction and determine pt needs for alternative means of nutrition. has a past medical history of Actinic keratoses, Adenomatous polyp, Atrial fibrillation (HCC), CAD (coronary artery disease), Carotid artery disease ([address] Hyperlipidemia, Hypertension, Inguinal hernia, left, and Unspecified cerebral artery occlusion with cerebral infarction. Reason for Study: r/o pharyngeal dysfunction s/t declines in mentation   Prior Swallowing Evaluation/Results: N/a   Pain:  None reported     Current Diet:  NPO    Respiratory Status:Independent    Behavioral Observation: Alert with confusion and nonsensical speech highly prevalent. Poor basic direction following. Cognition: Exam limited by cognition-- poor pt ability to follow directions to elicit multiple swallows    ORAL MECHANISM EXAM: **poor ability to follow commands; the following obtained informally in conjunction to PO intake.    Facial Symmetry WFL    Labial/Facial WFL    Lingual WFL    Oral Mucosa WFL    Mandibular Movement [facility]    Velum Not Tested    Sensation Not Tested    Cough/Reflexes Not Tested      FEES PROCEDURE DETAILS:  Procedure performed by: Milena Cunha MS CCC-SLP  Feeder/Assistant: Lloyd Lora MA CCC-SLP  Scope/Serial Number: Chip Tip Flex Scope - Serial #:  Topical Anesthetic Used: No  Patient Positioning: Bed-- in less than optimal positioning with hyperextended neck and later slouched positioning in bed despite ST repositioning to fully upright prior to the study. Procedure explained and education provided to patient including purpose, benefits and risks of testing. Understanding and agreement with testing was demonstrated. A flexible fiber-optic nasoendoscope was passed transnasally to view the nasopharynx, oropharynx, hypopharynx, larynx through the left nares without difficuty.     Patient tolerance of procedure: Mild discomfort    ANATOMIC/PHYSIOLIGIC ASSESSMENT:  Nasal Cavity WFL    Velopharyngeal Port Impaired Narrow passage    Base of Tongue WFL    Lingual Tonsils WFL    Vocal Fold Margin - Right WFL    Vocal Fold Margin - Left WFL    Abductory/Adductory Movement [facility]    Ventricular Folds WFL    Pharyngeal Contraction for Pitch Glide Impaired    Epiglottis   WFL    Secretions - Appearance thin/clear/stringy    Secretions - Location    diffusely spread unilaterally throughout left side of pharynx in valleculae, pyriform sinuses with significant pooling at the left lateral channel and interarytenoid notch     Valleculae   WFL    Pyriform Sinus   WFL    Glottal Closure   WFL      PATIENT WAS EVALUATED USING: Secretions, Ice Chips, Thin and Puree    SWALLOW OBSERVATION: Delayed oral transit, Premature spillage, Incomplete epiglottic inversion, Residue - Vallecular, Residue - Pyriform, Residue - Base of tongue and Residue - Posterior pharyngeal wall    LARYNGEAL PENETRATION/ASPIRATION: No evidence of aspiration and Laryngeal penetration evident after swallow    PHARYNGEAL PHASE YAS SCORE (dysphagia outcome and severity score)  1 = Severe Dysphagia - NPO - Unable to tolerate any PO safely- Severe residue unable to clear - Silent aspiration with two or more consistencies, non-functional cough OR Unable to achieve a swallow    PENETRATION-ASPIRATION SCALE (PAS)   Ice Chips: 4 = Material enters the airway, contacts the vocal folds, and is ejected from the airway  Thin Liquids: 4 = Material enters the airway, contacts the vocal folds, and is ejected from the airway  Puree:  4 = Material enters the airway, contacts the vocal folds, and is ejected from the airway    ESOPHAGEAL PHASE: No significant findings     ATTEMPTED TECHNIQUES:  Small Bolus Size Ineffective    Straw Not Attempted    Cup Not Attempted    Chin Tuck Not Attempted    Head Turn Not Attempted    Spoon Presentations Ineffective    Volitional Cough Not Attempted    Spontaneous Cough Effective           DIAGNOSTIC IMPRESSIONS:  Pt presents with at least mild oral dysphagia evidenced by delayed oral transit time with slow bolus formation/transit and decreased oral bolus control resulting in premature pharyngeal entry to the left valleculae and at times to the left pyriform sinuses with thinner viscosities. Pt with severe pharyngeal dysphagia evidenced a delayed swallow, decreased laryngeal elevation, likely decreased anterior hyolaryngeal excursion and poor pharyngeal constriction evidenced by incomplete white out period with additional concerns for poor BOT retraction given presence of severe pharyngeal stasis at the left side of valleculae, left pyriform sinuses and severe pooling within the left lateral channel and at the interarytenoid notch (pt with hx of prior CVA). Current level of impaired mentation highly impacting current level of swallow function with pt continuously talking with confused/nonsensical speech production, inability to follow basic commands for elicitation of multiple swallows and slow overall mental processing present.  Given severe stasis/pooling, pt with eventual deep penetration of ALL tested consistencies to the level of the vocal folds. Appropriate sensation with pt eliciting spontaneous coughing. Cough was successful in expelling material into pt's hypopharynx; however, continuous pooling and severe stasis placing pt at continued risks for repeated penetration and likely aspiration. No observed aspiration during the study. Liquid wash was unsuccessful at clearing stasis with additional build up found thereafter. Recommendations for STRICT NPO with consideration for PEG tube. Verbal review of above provided to RN, Peter Schneider and pt's son thereafter. ST to continue follow up for dysphagia management. Recommendations for repeat instrumental assessment as mentation improves. Pt will require a ST/cognitive assessment when clinically appropriate to further establish Goals/POC. Diet Recommendations:  Strict NPO   Strategies:  Recommend NPO   Rehabilitation Potential: fair    EDUCATION:  Learner: Patient and RN, Son   Education:  Reviewed results and recommendations of this evaluation, Reviewed diet and strategies, Reviewed ST goals and Plan of Care and Reviewed recommendations for follow-up  Evaluation of Education: Koki Stairs understanding, Needs further instruction and No evidence of learning    PLAN:  Speech Therapy evaluation to assess speech, language, cognition and/or voice and Skilled SLP intervention on acute care 3-5 x per week or until goals met and/or pt plateaus in function. Specific interventions for next session may include: dysphagia management and ST/cognitive assessment as clinically appropriate . PATIENT GOAL:    Did not state. Will further assess during treatment.     SHORT TERM GOALS:  Short-term Goals  Timeframe for Short-term Goals: 2 weeks  Goal 1: Pt will consume PO TRIALS of ice chips and thin liquids by spoon with ST only with improved mentation, demonstration of improved swallow timeliness and with absence of s/s of aspiration to determine pt appropriateness for repeat instrumental assessment (i.e. FEES vs MBS). Goal 2: Complete cognitive evaluation and determine goals and POC as clinically appropriate (patient lives home alone)  Goal 3: . Goal 4: .       LONG TERM GOALS:  No established LTG's given short PRABHA Mcclendon M.S. Zahira Gagnon 1/22/2021

## 2021-01-23 LAB
ANION GAP SERPL CALCULATED.3IONS-SCNC: 8 MEQ/L (ref 8–16)
BASOPHILS # BLD: 0 %
BASOPHILS ABSOLUTE: 0 THOU/MM3 (ref 0–0.1)
BUN BLDV-MCNC: 22 MG/DL (ref 7–22)
CALCIUM SERPL-MCNC: 8.3 MG/DL (ref 8.5–10.5)
CHLORIDE BLD-SCNC: 105 MEQ/L (ref 98–111)
CO2: 21 MEQ/L (ref 23–33)
CREAT SERPL-MCNC: 1.1 MG/DL (ref 0.4–1.2)
D-DIMER QUANTITATIVE: 1450 NG/ML FEU (ref 0–500)
EOSINOPHIL # BLD: 0 %
EOSINOPHILS ABSOLUTE: 0 THOU/MM3 (ref 0–0.4)
ERYTHROCYTE [DISTWIDTH] IN BLOOD BY AUTOMATED COUNT: 19.1 % (ref 11.5–14.5)
ERYTHROCYTE [DISTWIDTH] IN BLOOD BY AUTOMATED COUNT: 57.1 FL (ref 35–45)
FIBRINOGEN: 250 MG/100ML (ref 155–475)
GFR SERPL CREATININE-BSD FRML MDRD: 63 ML/MIN/1.73M2
GLUCOSE BLD-MCNC: 79 MG/DL (ref 70–108)
HCT VFR BLD CALC: 24 % (ref 42–52)
HEMOGLOBIN: 7.9 GM/DL (ref 14–18)
IMMATURE GRANS (ABS): 0.04 THOU/MM3 (ref 0–0.07)
IMMATURE GRANULOCYTES: 0.4 %
INR BLD: 1.94 (ref 0.85–1.13)
LYMPHOCYTES # BLD: 3.9 %
LYMPHOCYTES ABSOLUTE: 0.4 THOU/MM3 (ref 1–4.8)
MCH RBC QN AUTO: 27.7 PG (ref 26–33)
MCHC RBC AUTO-ENTMCNC: 32.9 GM/DL (ref 32.2–35.5)
MCV RBC AUTO: 84.2 FL (ref 80–94)
MONOCYTES # BLD: 11.9 %
MONOCYTES ABSOLUTE: 1.3 THOU/MM3 (ref 0.4–1.3)
NUCLEATED RED BLOOD CELLS: 0 /100 WBC
PLATELET # BLD: 280 THOU/MM3 (ref 130–400)
PMV BLD AUTO: 10.1 FL (ref 9.4–12.4)
POTASSIUM SERPL-SCNC: 4.6 MEQ/L (ref 3.5–5.2)
RBC # BLD: 2.85 MILL/MM3 (ref 4.7–6.1)
SEG NEUTROPHILS: 83.8 %
SEGMENTED NEUTROPHILS ABSOLUTE COUNT: 9 THOU/MM3 (ref 1.8–7.7)
SODIUM BLD-SCNC: 133 MEQ/L (ref 135–145)
SODIUM BLD-SCNC: 134 MEQ/L (ref 135–145)
WBC # BLD: 10.7 THOU/MM3 (ref 4.8–10.8)

## 2021-01-23 PROCEDURE — 85025 COMPLETE CBC W/AUTO DIFF WBC: CPT

## 2021-01-23 PROCEDURE — 2580000003 HC RX 258: Performed by: INTERNAL MEDICINE

## 2021-01-23 PROCEDURE — 94761 N-INVAS EAR/PLS OXIMETRY MLT: CPT

## 2021-01-23 PROCEDURE — 99232 SBSQ HOSP IP/OBS MODERATE 35: CPT | Performed by: INTERNAL MEDICINE

## 2021-01-23 PROCEDURE — 6360000002 HC RX W HCPCS: Performed by: INTERNAL MEDICINE

## 2021-01-23 PROCEDURE — 1200000003 HC TELEMETRY R&B

## 2021-01-23 PROCEDURE — 36415 COLL VENOUS BLD VENIPUNCTURE: CPT

## 2021-01-23 PROCEDURE — 85610 PROTHROMBIN TIME: CPT

## 2021-01-23 PROCEDURE — 80048 BASIC METABOLIC PNL TOTAL CA: CPT

## 2021-01-23 PROCEDURE — 84295 ASSAY OF SERUM SODIUM: CPT

## 2021-01-23 PROCEDURE — 85379 FIBRIN DEGRADATION QUANT: CPT

## 2021-01-23 PROCEDURE — 99233 SBSQ HOSP IP/OBS HIGH 50: CPT | Performed by: INTERNAL MEDICINE

## 2021-01-23 PROCEDURE — 85385 FIBRINOGEN ANTIGEN: CPT

## 2021-01-23 RX ORDER — DEXTROSE AND SODIUM CHLORIDE 5; .45 G/100ML; G/100ML
INJECTION, SOLUTION INTRAVENOUS CONTINUOUS
Status: DISCONTINUED | OUTPATIENT
Start: 2021-01-23 | End: 2021-01-29

## 2021-01-23 RX ORDER — WARFARIN SODIUM 2 MG/1
2 TABLET ORAL
Status: DISPENSED | OUTPATIENT
Start: 2021-01-23 | End: 2021-01-24

## 2021-01-23 RX ORDER — DEXTROSE MONOHYDRATE 50 MG/ML
INJECTION, SOLUTION INTRAVENOUS CONTINUOUS
Status: ACTIVE | OUTPATIENT
Start: 2021-01-23 | End: 2021-01-23

## 2021-01-23 RX ADMIN — DEXTROSE AND SODIUM CHLORIDE: 5; 450 INJECTION, SOLUTION INTRAVENOUS at 16:08

## 2021-01-23 RX ADMIN — SODIUM CHLORIDE, PRESERVATIVE FREE 10 ML: 5 INJECTION INTRAVENOUS at 09:15

## 2021-01-23 RX ADMIN — DEXTROSE MONOHYDRATE: 50 INJECTION, SOLUTION INTRAVENOUS at 05:49

## 2021-01-23 RX ADMIN — CEFTRIAXONE SODIUM 1 G: 1 INJECTION, POWDER, FOR SOLUTION INTRAMUSCULAR; INTRAVENOUS at 19:42

## 2021-01-23 ASSESSMENT — PAIN SCALES - WONG BAKER
WONGBAKER_NUMERICALRESPONSE: 0

## 2021-01-23 ASSESSMENT — PAIN SCALES - GENERAL: PAINLEVEL_OUTOF10: 0

## 2021-01-23 NOTE — PROGRESS NOTES
Hospitalist Progress Note      Patient:  Jj Marti    Unit/Bed:4B-10/010-A  YOB: 1932  MRN: [id number]   Acct: [de-identified]   PCP: Alex Hair DO  Date of Admission: 1/17/2021    Assessment/Plan:    COVID 19 PNA: Patient tested positive for COVID-19 on 1/18. Symptoms first started on 1/11/2021. Patient is afebrile. Completed 5 days of Decadron and IV Remdisivir. Continue Vitamin C, Vitamin D, Zinc, Acapella and IS. Acute respiratory failure, hypoxia: Resolved. Patient's baseline is room air. Patient is currently requiring no additional oxygen. Incentive spirometry. Acapella. Hyponatremia, severe, chronic: Nephrology consulted. Sodium Q4H. Recent sodium trend: 127, 123, 121, 119, 122, 124, 123, 124, 125, 128, 131, 134. Syncope & Collapse: Pt was found unresponsive and down prior to admission. Cardiology and Neurology consulted. EEG was negative. MRI no acute CVA. Cardiology signed off. Sinus Bradycardia: Cardiology consulted. Avoid AV blocking agents. No indication for PPM. Possibly related to remdesivir usage. Telemetry. COVID-19 Encephalopathy: Pt has had bouts of encephalopathy throughout his stay. Due to confusion, SLP recommending NPO. Start Melatonin nightly. Pt on restraints. Keep oriented, blinds open in the morning, visible clock. Rhabdomyolysis: CK trending down 9463 to 3342. IVF. CK in the AM.   Atrial Fibrillation, chronic: Bradycardia. Continue Coumadin. Pharmacy to dose. Elevated Troponin: Cardiology consulted. Likely demand mismatch. They recommended no acute intervention. Essential HTN: Pressure has been labile. Continue home medications. HFpEF, chronic: Latest echo (12/20) EF 55 to 60%. CKD, Stage 3: Creatinine stable around 1.2. BPH: Aware. Hearing Loss: Hearing aids. Deconditioning: Physical therapy. Occupational Therapy. Social work. TCU recommended ECF due to low level of activity.       Dispo: pt is out of window and can be transferred out of COVID unit to medical GPU. Pt is confused, has been NPO. If confusion does not improve, will need peg tube. Son aware. Chief Complaint: Fall     Initial H and P:-    30-year-old male presented to [facility] via EMS after sustaining an unwitnessed fall from standing position. Patient was found on ground and unknown length of time spent on ground. Per HPI patient son reports patient was progressively more weak over the last week and acting like himself up until the day before arrival.  On day of arrival patient's son states they were unable to get a hold of him therefore they went to his house where they found him on the bathroom floor. He was brought in via EMS to [facility] emergency room department.     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     Of note patient was recently hospitalized at [facility] 12/9/2020 for asymptomatic bradycardia in which is beta-blocker and calcium blocker were stopped and follow-up with Dr. Denny Claudio and plans for 10-day event monitor were planned. Patient was seen by EPS clinic 1/16/2020 with no plans for permanent pacemaker at that time.     Upon arrival, patient does not member what happened and does not remember what happened prior to incident. On arrival patient denies chest pain, shortness of breath, abdominal pain patient did report pain in his right leg and complaining of generalized muscle weakness and aches. Patient lives at home alone. Patient was found confused, confusion improved upon arrival to ED. Patient had slight weakness in left lower extremity however this was limited by pain.   Patient had abrasions with different stages of ecchymosis to bilateral lower extremities. While in the emergency room patient was found to have noted hyponatremia and elevated CPK. CT of the head showed no acute intracranial hemorrhage or mass-effect. Patient did have noted encephalomalacia within the left temporal lobe from prior remote infarct. CT of cervical spine showed no acute fracture. CT chest demonstrated moderate right pleural effusions, mild opacities right lung base. CVC was placed and patient did receive 50 mL of 3% normal saline. Covid positive.     Last week severe cramping upper thighs. Then did farely well. Friday cramping came back, Saturday went to football game. Yesterday found him on bathroom floor.      1/18/21: Patient was not following commands and demonstrated pinpoint pupils. Patient received 0.4 Narcan. Patient did become more responsive and pupils were not pinpoint. We will proceed with MRI of brain.     1/19/21: Patient appears significantly better. Sitting up in bed communicating and ate entire breakfast. Patient demonstrates difficulty hearing however patient denies pain anywhere. Patient still requiring significant amount of dopamine    1/20: Pt was transferred out of ICU. Pt did not eat much, he does not feel like eating or like the food. Sodium is stable around 122.     1/21: No acute events overnight. Patient not eating well today. SLP saw and recommending NPO except for medications. Mental status waxes and wanes. HD stable on RA. No fevers or chills. No chest pain or sob.     1/22: Overnight more confused pulling on lines, restrains placed. Pt follows commands however confused. No fevers or chills. On RA. Not eating as pt is too confused. Subjective (past 24 hours):   No acute events overnight. Pt still confused, on retrains. Follows commands, no fevers or chills. On RA. No nausea or vomiting. No diarrhea.        Past medical history, family history, social history and allergies reviewed again and is unchanged since admission. ROS Pt denies CP, SOB, HA, abd pain. Medications:  Reviewed    Infusion Medications     Scheduled Medications    warfarin  2 mg Oral Once    melatonin  3 mg Oral Nightly    QUEtiapine  25 mg Oral Nightly    calcium replacement protocol   Other RX Placeholder    phosphorus replacement protocol   Other RX Placeholder    lactobacillus  1 capsule Oral Daily with breakfast    cefTRIAXone (ROCEPHIN) IV  1 g Intravenous Q24H    aspirin  81 mg Oral Daily    hydrALAZINE  25 mg Oral TID    pantoprazole  40 mg Oral BID AC    [Held by provider] pravastatin  40 mg Oral QPM    tamsulosin  0.4 mg Oral Daily    sodium chloride flush  10 mL Intravenous 2 times per day    warfarin (COUMADIN) daily dosing (placeholder)   Other RX Placeholder     PRN Meds: potassium chloride **OR** potassium alternative oral replacement **OR** potassium chloride, potassium chloride, potassium chloride, acetaminophen **OR** acetaminophen, sodium chloride, cyclobenzaprine, sodium chloride flush, promethazine **OR** ondansetron, polyethylene glycol, magnesium sulfate, mupirocin      Intake/Output Summary (Last 24 hours) at 1/23/2021 1244  Last data filed at 1/23/2021 0357  Gross per 24 hour   Intake --   Output 1375 ml   Net -1375 ml       Diet:  Diet NPO Effective Now Exceptions are: Other (Specify); Specify Other: Pending Feese swallow test; No meds, either    Exam:  /68   Pulse 51   Temp 97 °F (36.1 °C) (Axillary)   Resp 16   Ht 5' 4\" (1.626 m)   Wt 154 lb 8.7 oz (70.1 kg)   SpO2 94%   BMI 26.53 kg/m²   General appearance: chronically elderly male   HEENT: Pupils equal, round, and reactive to light. Conjunctivae/corneas clear. Neck: Supple, with full range of motion. No jugular venous distention. Trachea midline. Respiratory:  Normal respiratory effort on RA. Clear to auscultation, bilaterally without Rales/Wheezes/Rhonchi.   Cardiovascular: Irregular rate and rhythm with normal S1/S2 without murmurs, rubs or gallops. Abdomen: Soft, non-tender, non-distended with normal bowel sounds. Musculoskeletal: passive and active ROM x 4 extremities. Skin: Skin color, texture, turgor normal.  No rashes or lesions. Neurologic:  Neurovascularly intact without any focal sensory/motor deficits. Cranial nerves: II-XII intact, grossly non-focal.  Psychiatric: follows commands, on restrains. Confused. Capillary Refill: Brisk,< 3 seconds   Peripheral Pulses: +2 palpable, equal bilaterally     Labs:   Recent Labs     01/21/21 0515 01/22/21 0440 01/23/21  0305   WBC 7.8 11.0* 10.7   HGB 8.7* 8.4* 7.9*   HCT 25.7* 25.0* 24.0*    318 280     Recent Labs     01/21/21  0515 01/21/21  0515 01/22/21  2149 01/23/21  0305 01/23/21  1015   *   < > 131* 134* 133*   K 4.6  --   --  4.6  --    CL 92*  --   --  105  --    CO2 18*  --   --  21*  --    BUN 22  --   --  22  --    CREATININE 1.1  --   --  1.1  --    CALCIUM 8.4*  --   --  8.3*  --    PHOS 3.0  --   --   --   --     < > = values in this interval not displayed. Recent Labs     01/21/21  0900   *   ALT 91*   BILIDIR <0.2   BILITOT 0.4   ALKPHOS 80     Recent Labs     01/21/21 0515 01/22/21 0440 01/23/21  0305   INR 1.39* 1.51* 1.94*     Recent Labs     01/21/21 0515   CKTOTAL 1,111*     Urinalysis:      Lab Results   Component Value Date    NITRU NEGATIVE 01/17/2021    WBCUA 25-50 01/17/2021    BACTERIA NONE SEEN 01/17/2021    RBCUA 15-20 01/17/2021    BLOODU LARGE 01/17/2021    SPECGRAV 1.017 01/17/2021    GLUCOSEU NEGATIVE 01/05/2021       Radiology:  XR CHEST PORTABLE   Final Result   The nasogastric tube is at the level of the left mainstem bronchus. Recommend removal and repositioning. This document has been electronically signed by: Nolvia Escobar MD on    01/22/2021 11:31 PM         XR ABDOMEN FOR NG/OG/NE TUBE PLACEMENT   Final Result   1. No visible nasogastric tube on the abdominal x-ray.  On correlation with    the concurrent chest x-ray, the nasogastric tube is at the level of the    left mainstem bronchus. Recommend removal and repositioning. 2. Extensive gaseous distention of bowel suggesting possible ileus. This document has been electronically signed by: Lashawn Crain MD on    01/22/2021 11:22 PM         XR CHEST PORTABLE   Final Result   Stable right basilar opacity. **This report has been created using voice recognition software. It may contain minor errors which are inherent in voice recognition technology. **      Final report electronically signed by Dr. aDnny Marshall MD on 1/22/2021 4:55 PM      MRI BRAIN WO CONTRAST   Final Result   Impression:   Moderate nonspecific periventricular deep white matter disease. This most    often can be ascribed to chronic small vessel ischemic change. Prominent central and cortical volume loss. Old ischemic event left temporal lobe. This document has been electronically signed by: Joel Bowen MD on    01/19/2021 12:59 AM         CT CHEST WO CONTRAST   Final Result   Impression:   Moderate right pleural effusion. Associated volume loss. Mild opacity right lung base. This could represent mild pneumonia. This document has been electronically signed by: Joel Bowen MD on    01/17/2021 09:10 PM      All CT scans at this facility use dose modulation, iterative    reconstruction, and/or weight-based   dosing when appropriate to reduce radiation dose to as low as reasonably    achievable. XR CHEST PORTABLE   Final Result   Impression:   Cardiomegaly, no CHF. Mild right infrahilar opacity. This could represent mild pneumonia versus    volume loss. This document has been electronically signed by: Joel Bowen MD on    01/17/2021 09:11 PM         CT Head WO Contrast   Final Result   1. No acute intracranial hemorrhage or mass effect. 2. Moderate diffuse atrophy is demonstrated.  There is also minor errors which are inherent in voice recognition technology. **      Final report electronically signed by Dr. Magen López on 1/17/2021 3:54 PM        Electronically signed by Silver Salvador MD on 1/23/2021 at 12:44 PM

## 2021-01-23 NOTE — PROGRESS NOTES
Renal Progress Note  [facility]    Patient :  Steven Rodrigues; 80 y.o. MRN# [id number]  Location:  [facility]10/010-A  Attending:  Ariana Baxter MD  Admit Date:  1/17/2021   Hospital Day: 6      Subjective:     Nephrology is following the patient for hyponatremia. Patient seen and examined. Sodium was 134 this AM, received 250 cc D5W. Patient is confused. He is strictly NPO. On room air. Outpatient Medications:     Medications Prior to Admission: hydrALAZINE (APRESOLINE) 25 MG tablet, Take 1 tablet by mouth 3 times daily  tamsulosin (FLOMAX) 0.4 MG capsule, Take 1 capsule by mouth daily  warfarin (COUMADIN) 3 MG tablet, Take 1 tablet by mouth See Admin Instructions Indications: Anticoagulant Therapy, [facility] to dose, #45=30 days supply  furosemide (LASIX) 20 MG tablet, Take 1 tablet by mouth daily  pantoprazole (PROTONIX) 40 MG tablet, Take 1 tablet by mouth 2 times daily (before meals). pravastatin (PRAVACHOL) 40 MG tablet, Take 40 mg by mouth every evening. Indications: Blood Cholesterol Abnormal  aspirin 81 MG EC tablet, Take 81 mg by mouth daily Indications: Anticoagulant Therapy Take with food.   cyclobenzaprine (FLEXERIL) 5 MG tablet, Take 1 tablet by mouth 3 times daily as needed for Muscle spasms (Patient not taking: Reported on 1/6/2021)    Current Medications:     Scheduled Meds:    warfarin  2 mg Oral Once    melatonin  3 mg Oral Nightly    QUEtiapine  25 mg Oral Nightly    calcium replacement protocol   Other RX Placeholder    phosphorus replacement protocol   Other RX Placeholder    lactobacillus  1 capsule Oral Daily with breakfast    cefTRIAXone (ROCEPHIN) IV  1 g Intravenous Q24H    aspirin  81 mg Oral Daily    hydrALAZINE  25 mg Oral TID    pantoprazole  40 mg Oral BID AC    [Held by provider] pravastatin  40 mg Oral QPM    tamsulosin  0.4 mg Oral Daily    sodium chloride flush  10 mL Intravenous 2 times per day    warfarin (COUMADIN) daily dosing (placeholder)   Other RX Placeholder     Continuous Infusions:    dextrose 5 % and 0.45 % NaCl       PRN Meds:  potassium chloride **OR** potassium alternative oral replacement **OR** potassium chloride, potassium chloride, potassium chloride, acetaminophen **OR** acetaminophen, sodium chloride, cyclobenzaprine, sodium chloride flush, promethazine **OR** ondansetron, polyethylene glycol, magnesium sulfate, mupirocin    Input/Output:       I/O last 3 completed shifts:  In: -   Out: 1500 [Urine:1500]. No data found. Vital Signs:   Temperature:  Temp: 97 °F (36.1 °C)  TMax:   Temp (24hrs), Av.7 °F (36.5 °C), Min:97 °F (36.1 °C), Max:98.4 °F (36.9 °C)    Respirations:  Resp: 16  Pulse:   Pulse: 51  BP:    BP: 117/68  BP Range: Systolic (80NXG), GQA:910 , Min:117 , QYN:552       Diastolic (55UPV), BF, Min:46, Max:68      Physical Examination:     General:  Asleep, arousable to voice, moaning  HEENT: NC/AT/ MMM  Chest:               +cough, lungs diminished  Cardiac:  Irregularly irregular  Abdomen: Soft, non-tender,  Neuro:  confused  SKIN:  No rashes, good skin turgor. Extremities:  Edema in arms, no LE edema    Labs:       Recent Labs     21  0515 21  0440 21  0305   WBC 7.8 11.0* 10.7   RBC 3.09* 2.99* 2.85*   HGB 8.7* 8.4* 7.9*   HCT 25.7* 25.0* 24.0*   MCV 83.2 83.6 84.2   MCH 28.2 28.1 27.7   MCHC 33.9 33.6 32.9    318 280   MPV 10.5 10.5 10.1      BMP:   Recent Labs     21  0515 21  0515 21  2149 21  0305 21  1015   *   < > 131* 134* 133*   K 4.6  --   --  4.6  --    CL 92*  --   --  105  --    CO2 18*  --   --  21*  --    BUN 22  --   --  22  --    CREATININE 1.1  --   --  1.1  --    GLUCOSE 125*  --   --  79  --    CALCIUM 8.4*  --   --  8.3*  --     < > = values in this interval not displayed.       Phosphorus:     Recent Labs     21  0515   PHOS 3.0     Magnesium:    Recent Labs     21  0515   MG 2.1 Albumin:    Recent Labs     01/21/21  0900   LABALBU 2.9*     BNP:    No results found for: BNP  DEDE:    No results found for: DEDE  SPEP:  Lab Results   Component Value Date    PROT 5.9 01/21/2021     UPEP:   No results found for: LABPE  C3:   No results found for: C3  C4:   No results found for: C4  MPO ANCA:   No results found for: MPO  PR3 ANCA:   No results found for: PR3  Anti-GBM:   No results found for: GBMABIGG  Hep BsAg:       No results found for: HEPBSAG  Hep C AB:        No results found for: HEPCAB    Urinalysis/Chemistries:      Lab Results   Component Value Date    NITRU NEGATIVE 01/17/2021    COLORU DK YELLOW 01/17/2021    PHUR 5.0 01/17/2021    LABCAST >15 HYALINE 01/17/2021    LABCAST 4-8 FINE GRAN 01/17/2021    WBCUA 25-50 01/17/2021    RBCUA 15-20 01/17/2021    YEAST NONE SEEN 01/17/2021    BACTERIA NONE SEEN 01/17/2021    SPECGRAV 1.017 01/17/2021    LEUKOCYTESUR SMALL 01/17/2021    LEUKOCYTESUR NEGATIVE 01/05/2021    UROBILINOGEN 1.0 01/17/2021    BILIRUBINUR NEGATIVE 01/17/2021    BLOODU LARGE 01/17/2021    GLUCOSEU NEGATIVE 01/05/2021    KETUA 15 01/17/2021     Urine Sodium:   No results found for: ELA  Urine Potassium:  No results found for: KUR  Urine Chloride:  No results found for: CLUR  Urine Osmolarity:   Lab Results   Component Value Date    OSMOU 511 01/21/2021     Urine Protein:   No components found for: TOTALPROTEIN, URINE   Urine Creatinine:   No results found for: LABCREA  Urine Eosinophils:  No components found for: UEOS        Impression and Plan:  1. Hyponatremia:overall improving. Up 9 in about 30 hours. Patient is strictly NPO so will start him on some low rate maintenance fluids D5 half normal saline @ 30 cc/hr. Reduce sodium checks to q 12 hours. Sodium may drop a little on the hypotonic fluid. If staying around 130s will continue the same fluid but if drops below will change to D5 normal saline. 2. CKD III s/p mild ANNIKA  3. COVID-19 pneumonia  4.  S/p syncopal episode  5. Elevated CPK due to fall, much improved  6. Anemia  7. Afib  8. Hx CVA          Please don't hesitate to call with any questions.   Electronically signed by Mariela Mejia DO on 1/23/2021 at 2:00 PM

## 2021-01-23 NOTE — PROGRESS NOTES
Patient more disoriented for today than on previous assessment. Alert to self only, speech incomprehensible. Patient struggled with swallow eval earlier this morning; speech rounded again to complete Feese swallow test. Patient performed poorly with residual into airway as visualized via scope, per ST. Speech recommending peg tube. Patient with frequent wet cough, saturations mid 90s RA. Chest xray ordered. Chest xray resulted to read no significant changes fro previous xray. D5W infusing at 50ml/hr x 5 hrs per Ninetta Denver order r/t sodium 131, up from 124 & 128 this early morning and afternoon, respectively. Patient with audible rhonchi, respirs mildly labored, seemingly aggravated by very frequent moist cough; saturations 94% RA. Bilat upper arm edema +2 nonpitting. Vital signs stable. Report given to Dr. Ninetta Denver, oncoming Dr. Lj Laureano, Dr. Trista Fabian, and son/POA Dr. Reymundo Erickson. Patient remains NPO at this time. Bilateral soft restraints in place with ROM and comfort measures provided.

## 2021-01-23 NOTE — PROGRESS NOTES
Clinical Pharmacy Note    Warfarin consult follow-up    Recent Labs     01/23/21  0305   INR 1.94*     Recent Labs     01/21/21  0515 01/22/21  0440 01/23/21  0305   HGB 8.7* 8.4* 7.9*   HCT 25.7* 25.0* 24.0*    318 280       Significant Drug-Drug Interactions:  New warfarin drug-drug interactions: none  Discontinued drug-drug interactions: none  Current warfarin drug-drug interactions: aspirin, ceftriaxone, quetiapine        Date INR Warfarin Dose   1/17/21 5.19 No Coumadin    1/18/21  4.97   No Coumadin  Vitamin K 2 mg IV given   1/19/2021   1.73   3 mg     1/20/2021  1.32  3 mg     1/21/2021  1.39 5 mg    1/22/2021  1.51  5 mg     1/23/2021 1.94                      2 mg         Notes:                     Daily PT/INR until stable within therapeutic range. Hg continues to trend down. Will follow.      Nati Burdick, JerelD, BCPS  1/23/2021  8:45 AM

## 2021-01-23 NOTE — PROGRESS NOTES
Patient resting quietly with eyes closed during much of the shift; alerts to voice, follows commands. Level of consciousness improving throughout the day. Patient alert with spontaneous eye opening, provides short verbal responses and non verbal gestures as appropriate to the situation. States that he is feeling better. Update provided to son. Seroquel discontinued. D5W in .45 infusing per order r/t NPO status. Soft restraints in place, telesitter at bedside; ROM and comfort measures provided.

## 2021-01-24 LAB
ANION GAP SERPL CALCULATED.3IONS-SCNC: 7 MEQ/L (ref 8–16)
BASOPHILS # BLD: 0.1 %
BASOPHILS ABSOLUTE: 0 THOU/MM3 (ref 0–0.1)
BUN BLDV-MCNC: 17 MG/DL (ref 7–22)
CALCIUM SERPL-MCNC: 8.3 MG/DL (ref 8.5–10.5)
CHLORIDE BLD-SCNC: 106 MEQ/L (ref 98–111)
CO2: 22 MEQ/L (ref 23–33)
CREAT SERPL-MCNC: 1.2 MG/DL (ref 0.4–1.2)
D-DIMER QUANTITATIVE: 1247 NG/ML FEU (ref 0–500)
EOSINOPHIL # BLD: 0.7 %
EOSINOPHILS ABSOLUTE: 0.1 THOU/MM3 (ref 0–0.4)
ERYTHROCYTE [DISTWIDTH] IN BLOOD BY AUTOMATED COUNT: 19.3 % (ref 11.5–14.5)
ERYTHROCYTE [DISTWIDTH] IN BLOOD BY AUTOMATED COUNT: 59.3 FL (ref 35–45)
FIBRINOGEN: 244 MG/100ML (ref 155–475)
GFR SERPL CREATININE-BSD FRML MDRD: 57 ML/MIN/1.73M2
GLUCOSE BLD-MCNC: 84 MG/DL (ref 70–108)
HCT VFR BLD CALC: 25 % (ref 42–52)
HEMOGLOBIN: 8.2 GM/DL (ref 14–18)
IMMATURE GRANS (ABS): 0.07 THOU/MM3 (ref 0–0.07)
IMMATURE GRANULOCYTES: 0.7 %
INR BLD: 2.01 (ref 0.85–1.13)
LYMPHOCYTES # BLD: 5.1 %
LYMPHOCYTES ABSOLUTE: 0.5 THOU/MM3 (ref 1–4.8)
MAGNESIUM: 2 MG/DL (ref 1.6–2.4)
MCH RBC QN AUTO: 27.9 PG (ref 26–33)
MCHC RBC AUTO-ENTMCNC: 32.8 GM/DL (ref 32.2–35.5)
MCV RBC AUTO: 85 FL (ref 80–94)
MONOCYTES # BLD: 11.7 %
MONOCYTES ABSOLUTE: 1.2 THOU/MM3 (ref 0.4–1.3)
NUCLEATED RED BLOOD CELLS: 0 /100 WBC
PLATELET # BLD: 295 THOU/MM3 (ref 130–400)
PMV BLD AUTO: 10 FL (ref 9.4–12.4)
POTASSIUM SERPL-SCNC: 4 MEQ/L (ref 3.5–5.2)
RBC # BLD: 2.94 MILL/MM3 (ref 4.7–6.1)
SEG NEUTROPHILS: 81.7 %
SEGMENTED NEUTROPHILS ABSOLUTE COUNT: 8.3 THOU/MM3 (ref 1.8–7.7)
SODIUM BLD-SCNC: 135 MEQ/L (ref 135–145)
WBC # BLD: 10.1 THOU/MM3 (ref 4.8–10.8)

## 2021-01-24 PROCEDURE — 85610 PROTHROMBIN TIME: CPT

## 2021-01-24 PROCEDURE — 99232 SBSQ HOSP IP/OBS MODERATE 35: CPT | Performed by: INTERNAL MEDICINE

## 2021-01-24 PROCEDURE — 6360000002 HC RX W HCPCS: Performed by: INTERNAL MEDICINE

## 2021-01-24 PROCEDURE — 94761 N-INVAS EAR/PLS OXIMETRY MLT: CPT

## 2021-01-24 PROCEDURE — 80048 BASIC METABOLIC PNL TOTAL CA: CPT

## 2021-01-24 PROCEDURE — 36415 COLL VENOUS BLD VENIPUNCTURE: CPT

## 2021-01-24 PROCEDURE — 97530 THERAPEUTIC ACTIVITIES: CPT

## 2021-01-24 PROCEDURE — 6360000002 HC RX W HCPCS: Performed by: FAMILY MEDICINE

## 2021-01-24 PROCEDURE — 99232 SBSQ HOSP IP/OBS MODERATE 35: CPT | Performed by: FAMILY MEDICINE

## 2021-01-24 PROCEDURE — 93005 ELECTROCARDIOGRAM TRACING: CPT | Performed by: FAMILY MEDICINE

## 2021-01-24 PROCEDURE — 83735 ASSAY OF MAGNESIUM: CPT

## 2021-01-24 PROCEDURE — 2580000003 HC RX 258: Performed by: INTERNAL MEDICINE

## 2021-01-24 PROCEDURE — 85379 FIBRIN DEGRADATION QUANT: CPT

## 2021-01-24 PROCEDURE — 97110 THERAPEUTIC EXERCISES: CPT

## 2021-01-24 PROCEDURE — 85385 FIBRINOGEN ANTIGEN: CPT

## 2021-01-24 PROCEDURE — 1200000003 HC TELEMETRY R&B

## 2021-01-24 PROCEDURE — 85025 COMPLETE CBC W/AUTO DIFF WBC: CPT

## 2021-01-24 RX ADMIN — CEFTRIAXONE SODIUM 1 G: 1 INJECTION, POWDER, FOR SOLUTION INTRAMUSCULAR; INTRAVENOUS at 18:15

## 2021-01-24 RX ADMIN — ENOXAPARIN SODIUM 80 MG: 80 INJECTION SUBCUTANEOUS at 21:23

## 2021-01-24 RX ADMIN — SODIUM CHLORIDE, PRESERVATIVE FREE 10 ML: 5 INJECTION INTRAVENOUS at 21:23

## 2021-01-24 ASSESSMENT — PAIN SCALES - WONG BAKER
WONGBAKER_NUMERICALRESPONSE: 0

## 2021-01-24 ASSESSMENT — PAIN SCALES - GENERAL
PAINLEVEL_OUTOF10: 0
PAINLEVEL_OUTOF10: 0

## 2021-01-24 NOTE — PROGRESS NOTES
on just telemetry as it does not provide the average beat per minute. Verified with EKG: A fib with SVR, HR 55  Continue to monitor    Acute Traumatic Rhabdomyolysis   CK trended down from 9463 to 1111. Will stop checking. Continue gentle fluid hydration     Normocytic Anemia, RAZA vs dilutional  Hgb 10 on admission, now stable around 8  No gross bleeding noted  Check for Iron panel, FOBT  Patient will be restarting coumadin soon    Elevated Troponin  Cardiology consulted. Likely demand mismatch. They recommended no acute intervention. Essential HTN  Pressure has been labile. Continue home medications. HFpEF, chronic  Latest echo (12/20) EF 55 to 60%. CKD, Stage 3  Creatinine stable around 1.2.    BPH  Aware. Hearing Loss  Hearing aids. Deconditioning  Physical therapy. Occupational Therapy. Social work. TCU recommended ECF due to low level of activity. Disposition  Will repeat swallow evaluation due to improved mentation tomorrow  If he fails again, will discuss with family about possible PEG  Continue hydration with D5 containing fluid while NPO  ECF placement on discharge      Chief Complaint:   [facility] Course:   45-year-old male presented to [facility] via EMS after sustaining an unwitnessed fall from standing position.  Patient was found on ground and unknown length of time spent on ground.  Per HPI patient son reports patient was progressively more weak over the last week and acting like himself up until the day before arrival.  On day of arrival patient's son states they were unable to get a hold of him therefore they went to his house where they found him on the bathroom floor. [facility] was brought in via EMS to [facility] emergency room department.     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     Of note patient was recently hospitalized at [facility] 12/9/2020 for asymptomatic bradycardia in which is beta-blocker and calcium blocker were stopped and follow-up with Dr. Nerissa Phan and plans for 10-day event monitor were planned. Brooke Long was seen by EPS clinic 1/16/2020 with no plans for permanent pacemaker at that time.     Upon arrival, patient does not member what happened and does not remember what happened prior to incident.  On arrival patient denies chest pain, shortness of breath, abdominal pain patient did report pain in his right leg and complaining of generalized muscle weakness and aches.  Patient lives at home alone.  Patient was found confused, confusion improved upon arrival to ED.  Patient had slight weakness in left lower extremity however this was limited by pain.  Patient had abrasions with different stages of ecchymosis to bilateral lower extremities.  While in the emergency room patient was found to have noted hyponatremia and elevated CPK.  CT of the head showed no acute intracranial hemorrhage or mass-effect.  Patient did have noted encephalomalacia within the left temporal lobe from prior remote infarct.  CT of cervical spine showed no acute fracture.  CT chest demonstrated moderate right pleural effusions, mild opacities right lung base.  CVC was placed and patient did receive 50 mL of 3% normal saline.  Covid positive.     Last week severe cramping upper thighs. Then did farely well. Friday cramping came back, Saturday went to football game. Yesterday found him on bathroom floor.      1/18/21: Patient was not following commands and demonstrated pinpoint pupils.  Patient received 0.4 Narcan.  Patient did become more responsive and pupils were not pinpoint.  We will proceed with MRI of brain.     1/19/21: Patient appears significantly better.  Sitting up in bed communicating and ate entire breakfast. Patient demonstrates difficulty hearing however patient denies pain anywhere. Patient still requiring significant amount of dopamine     1/20: Pt was transferred out of ICU. Pt did not eat much, he does not feel like eating or like the food. Sodium is stable around 122.      1/21: No acute events overnight. Patient not eating well today. SLP saw and recommending NPO except for medications. Mental status waxes and wanes. HD stable on RA. No fevers or chills. No chest pain or sob.      1/22: Overnight more confused pulling on lines, restrains placed. Pt follows commands however confused. No fevers or chills. On RA. Not eating as pt is too confused.        Subjective:   Patient seen and examined, appears comfortable in bed, without any signs of cardiorespiratory distress. He is hard of hearing but alert, cooperative and answering questions appropriately. VS stable, afebrile, saturating well on room air. Patient denies chest pain, palpitations, or shortness of breath. He denies any nausea, vomiting, abdominal pain, diarrhea or constipation.        Medications:  Reviewed    Infusion Medications    dextrose 5 % and 0.45 % NaCl 30 mL/hr at 01/23/21 1608     Scheduled Medications    melatonin  3 mg Oral Nightly    calcium replacement protocol   Other RX Placeholder    phosphorus replacement protocol   Other RX Placeholder    lactobacillus  1 capsule Oral Daily with breakfast    cefTRIAXone (ROCEPHIN) IV  1 g Intravenous Q24H    aspirin  81 mg Oral Daily    hydrALAZINE  25 mg Oral TID    pantoprazole  40 mg Oral BID AC    [Held by provider] pravastatin  40 mg Oral QPM    tamsulosin  0.4 mg Oral Daily    sodium chloride flush  10 mL Intravenous 2 times per day    warfarin (COUMADIN) daily dosing (placeholder)   Other RX Placeholder     PRN Meds: potassium chloride **OR** potassium alternative oral replacement **OR** potassium chloride, potassium chloride, potassium 8.3*     No results for input(s): AST, ALT, BILIDIR, BILITOT, ALKPHOS in the last 72 hours. Recent Labs     01/22/21  0440 01/23/21  0305 01/24/21  0511   INR 1.51* 1.94* 2.01*     No results for input(s): CKTOTAL, TROPONINI in the last 72 hours. Urinalysis:      Lab Results   Component Value Date    NITRU NEGATIVE 01/17/2021    WBCUA 25-50 01/17/2021    BACTERIA NONE SEEN 01/17/2021    RBCUA 15-20 01/17/2021    BLOODU LARGE 01/17/2021    SPECGRAV 1.017 01/17/2021    GLUCOSEU NEGATIVE 01/05/2021       Radiology:  XR CHEST PORTABLE   Final Result   The nasogastric tube is at the level of the left mainstem bronchus. Recommend removal and repositioning. This document has been electronically signed by: Germán Singleton MD on    01/22/2021 11:31 PM         XR ABDOMEN FOR NG/OG/NE TUBE PLACEMENT   Final Result   1. No visible nasogastric tube on the abdominal x-ray. On correlation with    the concurrent chest x-ray, the nasogastric tube is at the level of the    left mainstem bronchus. Recommend removal and repositioning. 2. Extensive gaseous distention of bowel suggesting possible ileus. This document has been electronically signed by: Germán Singleton MD on    01/22/2021 11:22 PM         XR CHEST PORTABLE   Final Result   Stable right basilar opacity. **This report has been created using voice recognition software. It may contain minor errors which are inherent in voice recognition technology. **      Final report electronically signed by Dr. Leopoldo Mins, MD on 1/22/2021 4:55 PM      MRI BRAIN WO CONTRAST   Final Result   Impression:   Moderate nonspecific periventricular deep white matter disease. This most    often can be ascribed to chronic small vessel ischemic change. Prominent central and cortical volume loss. Old ischemic event left temporal lobe.       This document has been electronically signed by: Tia Velasco MD on    01/19/2021 12:59 AM         CT CHEST WO CONTRAST   Final Result   Impression:   Moderate right pleural effusion. Associated volume loss. Mild opacity right lung base. This could represent mild pneumonia. This document has been electronically signed by: Ayesha Alex MD on    01/17/2021 09:10 PM      All CT scans at this facility use dose modulation, iterative    reconstruction, and/or weight-based   dosing when appropriate to reduce radiation dose to as low as reasonably    achievable. XR CHEST PORTABLE   Final Result   Impression:   Cardiomegaly, no CHF. Mild right infrahilar opacity. This could represent mild pneumonia versus    volume loss. This document has been electronically signed by: Ayesha Alex MD on    01/17/2021 09:11 PM         CT Head WO Contrast   Final Result   1. No acute intracranial hemorrhage or mass effect. 2. Moderate diffuse atrophy is demonstrated. 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 **This report has been created using voice recognition software. It may contain minor errors which are inherent in voice recognition technology. **      Final report electronically signed by Dr. Debbie Oseguera on 1/17/2021 4:17 PM      CT Cervical Spine WO Contrast   Final Result   1. Slightly limited evaluation due to motion artifact. However, no definite acute fracture of the cervical spine is seen. 2. Straightening of the normal cervical lordosis is demonstrated. This may be related to muscle spasm. 3. Multilevel degenerative changes of the cervical spine are demonstrated. **This report has been created using voice recognition software. It may contain minor errors which are inherent in voice recognition technology. **      Final report electronically signed by Dr. Debbie Oseguera on 1/17/2021 4:21 PM      XR PELVIS (1-2 VIEWS)   Final Result   1. No acute fracture or malalignment. Degenerative changes are as described above.

## 2021-01-24 NOTE — PROGRESS NOTES
Renal Progress Note  [facility]    Patient :  Mehul Ramírez; 80 y.o. MRN# [id number]  Location:  [facility]22/022-A  Attending:  Yimi Rosa MD  Admit Date:  1/17/2021   Hospital Day: 7      Subjective:     Nephrology is following the patient for hyponatremia. Patient seen and examined. More awake and alert today. He is on room air. Denies any complaints. Outpatient Medications:     Medications Prior to Admission: hydrALAZINE (APRESOLINE) 25 MG tablet, Take 1 tablet by mouth 3 times daily  tamsulosin (FLOMAX) 0.4 MG capsule, Take 1 capsule by mouth daily  warfarin (COUMADIN) 3 MG tablet, Take 1 tablet by mouth See Admin Instructions Indications: Anticoagulant Therapy, Atrial Fibrillation [facility] coumadin clinic to dose, #45=30 days supply  furosemide (LASIX) 20 MG tablet, Take 1 tablet by mouth daily  pantoprazole (PROTONIX) 40 MG tablet, Take 1 tablet by mouth 2 times daily (before meals). pravastatin (PRAVACHOL) 40 MG tablet, Take 40 mg by mouth every evening. Indications: Blood Cholesterol Abnormal  aspirin 81 MG EC tablet, Take 81 mg by mouth daily Indications: Anticoagulant Therapy Take with food.   cyclobenzaprine (FLEXERIL) 5 MG tablet, Take 1 tablet by mouth 3 times daily as needed for Muscle spasms (Patient not taking: Reported on 1/6/2021)    Current Medications:     Scheduled Meds:    melatonin  3 mg Oral Nightly    calcium replacement protocol   Other RX Placeholder    phosphorus replacement protocol   Other RX Placeholder    lactobacillus  1 capsule Oral Daily with breakfast    cefTRIAXone (ROCEPHIN) IV  1 g Intravenous Q24H    aspirin  81 mg Oral Daily    hydrALAZINE  25 mg Oral TID    pantoprazole  40 mg Oral BID AC    [Held by provider] pravastatin  40 mg Oral QPM    tamsulosin  0.4 mg Oral Daily    sodium chloride flush  10 mL Intravenous 2 times per day    warfarin (COUMADIN) daily dosing (placeholder)   Other RX Placeholder     Continuous Infusions:    dextrose 5 % and 0.45 % NaCl 30 mL/hr at 21 1608     PRN Meds:  potassium chloride **OR** potassium alternative oral replacement **OR** potassium chloride, potassium chloride, potassium chloride, acetaminophen **OR** acetaminophen, cyclobenzaprine, sodium chloride flush, promethazine **OR** ondansetron, polyethylene glycol, magnesium sulfate, mupirocin    Input/Output:       I/O last 3 completed shifts: In: 634 [I.V.:634]  Out: 1100 [Urine:1100]. Patient Vitals for the past 96 hrs (Last 3 readings):   Weight   21 0330 169 lb 12.8 oz (77 kg)       Vital Signs:   Temperature:  Temp: 98 °F (36.7 °C)  TMax:   Temp (24hrs), Av °F (36.7 °C), Min:97.4 °F (36.3 °C), Max:98.5 °F (36.9 °C)    Respirations:  Resp: 16  Pulse:   Pulse: 74  BP:    BP: (!) 145/63  BP Range: Systolic (80FJV), IEA:930 , Min:115 , ZPO:275       Diastolic (38RIM), KFI:68, Min:60, Max:95      Physical Examination:     General:  Awake, more alert  HEENT: NC/AT/ MMM  Chest:               Has some audible wheezing  Cardiac:  Irregularly irregular  Abdomen: Soft, non-tender,  Neuro:  alert  SKIN:  No rashes, good skin turgor. Extremities:  Edema in arms, no LE edema    Labs:       Recent Labs     21  0440 21  0305 21  0511   WBC 11.0* 10.7 10.1   RBC 2.99* 2.85* 2.94*   HGB 8.4* 7.9* 8.2*   HCT 25.0* 24.0* 25.0*   MCV 83.6 84.2 85.0   MCH 28.1 27.7 27.9   MCHC 33.6 32.9 32.8    280 295   MPV 10.5 10.1 10.0      BMP:   Recent Labs     21  0305 21  1015 21  0511   * 133* 135   K 4.6  --  4.0     --  106   CO2 21*  --  22*   BUN 22  --  17   CREATININE 1.1  --  1.2   GLUCOSE 79  --  84   CALCIUM 8.3*  --  8.3*      Phosphorus:     No results for input(s): PHOS in the last 72 hours. Magnesium:    No results for input(s): MG in the last 72 hours. Albumin:    No results for input(s): LABALBU in the last 72 hours.   BNP:    No results found for: BNP  DEDE:    No results found for:

## 2021-01-24 NOTE — PROGRESS NOTES
Made Dr Tran Hansen aware of patients Hr dropping into the 30's and 40's. He ordered an EKG. Results show Afib. Also, made him aware patient is not on any clot preventive agents. He ordered lovenox since patient is npo.

## 2021-01-24 NOTE — PROGRESS NOTES
[address] [address]  INPATIENT PHYSICAL THERAPY  DAILY NOTE  [facility] RENAL TELEMETRY 6K - 6K-22/022-A    Time In: 9625  Time Out: 4307  Timed Code Treatment Minutes: 23 Minutes  Minutes: 23          Date: 2021  Patient Name: Demarco Cisneros,  Gender:  male        MRN: [id number]  : 3/30/1932  (80 y.o.)     Referring Practitioner: Francia Holbrook DO  Diagnosis: Hyponatremia  Additional Pertinent Hx: Per ER note on 2021:88 y.o. male history of CVA, CAD, hypertension, hyperlipidemia, chronic atrial fibrillation on anticoagulation with Coumadin was recently admitted and discharged from [facility] for asymptomatic bradycardia management. He also had a history of recent falls. He presented to the emergency department for evaluation of unwitnessed fall at home today morning in his bathroom by his family members. He was found on the ground and was down for unknown length of time. Prior Level of Function:  Lives With: Alone  Type of Home: House  Home Layout: One level  Home Access: Level entry  Home Equipment: Cane   Bathroom Shower/Tub: Walk-in shower  Bathroom Toilet: Handicap height  Bathroom Equipment: Shower chair, Grab bars in shower    ADL Assistance: [address]: Independent  Transfer Assistance: Independent  Additional Comments: Pt reports fully indep PLOF without AD. Restrictions/Precautions:  Restrictions/Precautions: Contact Precautions, Fall Risk  Position Activity Restriction  Other position/activity restrictions: COVID isolation, very [place]; on room air on      SUBJECTIVE: RN approved therapy session. Patient supine in bed upon PTA arrival. Patient with increased fatigue. Patient agreeable to therapy. Attempts for patient to sit EOB with patient refusal stating \"I just cant, I'm too tired\".     PAIN: 0/10: Patient denies pain    OBJECTIVE:  Bed Mobility:  Scooting: Attempts to scoot up higher to the head of bed, patient unable. TD for scooting this date. Transfers:  Not Tested    Ambulation:  Not Tested    Balance:  Not Tested    Exercise:  Patient was guided in 1 set(s) 15 reps of exercise to both lower extremities. Ankle pumps, Glut sets, Quad sets, Heelslides, Short arc quads, Hip abduction/adduction and Straight leg raises. Exercises were completed for increased independence with functional mobility. Functional Outcome Measures: Not completed       ASSESSMENT:  Assessment: Patient progressing toward established goals. Activity Tolerance:  Patient tolerance of  treatment: fair. Equipment Recommendations:Equipment Needed: Yes(may need RW)  Discharge Recommendations:  [address], Patient would benefit from continued therapy after discharge    Plan: Times per week: 4-5 X GM  Current Treatment Recommendations: Strengthening, Transfer Training, Endurance Training, Balance Training, Functional Mobility Training, Gait Training, Home Exercise Program    Patient Education  Patient Education: Plan of Care, Home Exercise Program, Bed Mobility    Goals:  Patient goals : Get stronger and return to prior level  Short term goals  Time Frame for Short term goals: By discharge  Short term goal 1: supine to sit and return with min of 1 to get in and out of bed  Short term goal 2: sit to stand with min of 1 consistently to get on and off various surfaces  Short term goal 3: Pt to ambulate with RW 30 feet with CGA to walk safely to bathroom  Long term goals  Time Frame for Long term goals : NA due to short ELOS    Following session, patient left in safe position with all fall risk precautions in place.

## 2021-01-25 LAB
ANION GAP SERPL CALCULATED.3IONS-SCNC: 9 MEQ/L (ref 8–16)
BASOPHILS # BLD: 0 %
BASOPHILS ABSOLUTE: 0 THOU/MM3 (ref 0–0.1)
BUN BLDV-MCNC: 15 MG/DL (ref 7–22)
CALCIUM SERPL-MCNC: 8.1 MG/DL (ref 8.5–10.5)
CHLORIDE BLD-SCNC: 108 MEQ/L (ref 98–111)
CO2: 22 MEQ/L (ref 23–33)
CREAT SERPL-MCNC: 1 MG/DL (ref 0.4–1.2)
EKG ATRIAL RATE: 55 BPM
EKG Q-T INTERVAL: 480 MS
EKG QRS DURATION: 120 MS
EKG QTC CALCULATION (BAZETT): 455 MS
EKG R AXIS: 7 DEGREES
EKG T AXIS: 140 DEGREES
EKG VENTRICULAR RATE: 54 BPM
EOSINOPHIL # BLD: 0.9 %
EOSINOPHILS ABSOLUTE: 0.1 THOU/MM3 (ref 0–0.4)
ERYTHROCYTE [DISTWIDTH] IN BLOOD BY AUTOMATED COUNT: 19.4 % (ref 11.5–14.5)
ERYTHROCYTE [DISTWIDTH] IN BLOOD BY AUTOMATED COUNT: 59.7 FL (ref 35–45)
FOLATE: 13.3 NG/ML (ref 4.8–24.2)
GFR SERPL CREATININE-BSD FRML MDRD: 70 ML/MIN/1.73M2
GLUCOSE BLD-MCNC: 82 MG/DL (ref 70–108)
HCT VFR BLD CALC: 25.8 % (ref 42–52)
HEMOGLOBIN: 8.3 GM/DL (ref 14–18)
HEPATITIS B SURFACE ANTIGEN: NEGATIVE
HEPATITIS C ANTIBODY: NEGATIVE
HIV-1 ANTIBODY: NONREACTIVE
IMMATURE GRANS (ABS): 0.07 THOU/MM3 (ref 0–0.07)
IMMATURE GRANULOCYTES: 0.8 %
IRON SATURATION: 14 % (ref 20–50)
IRON: 31 UG/DL (ref 65–195)
LYMPHOCYTES # BLD: 8.3 %
LYMPHOCYTES ABSOLUTE: 0.7 THOU/MM3 (ref 1–4.8)
MCH RBC QN AUTO: 27.5 PG (ref 26–33)
MCHC RBC AUTO-ENTMCNC: 32.2 GM/DL (ref 32.2–35.5)
MCV RBC AUTO: 85.4 FL (ref 80–94)
MONOCYTES # BLD: 12.6 %
MONOCYTES ABSOLUTE: 1.1 THOU/MM3 (ref 0.4–1.3)
NUCLEATED RED BLOOD CELLS: 0 /100 WBC
PLATELET # BLD: 313 THOU/MM3 (ref 130–400)
PMV BLD AUTO: 10.3 FL (ref 9.4–12.4)
POTASSIUM SERPL-SCNC: 4.2 MEQ/L (ref 3.5–5.2)
RBC # BLD: 3.02 MILL/MM3 (ref 4.7–6.1)
SEG NEUTROPHILS: 77.4 %
SEGMENTED NEUTROPHILS ABSOLUTE COUNT: 6.6 THOU/MM3 (ref 1.8–7.7)
SODIUM BLD-SCNC: 139 MEQ/L (ref 135–145)
TOTAL IRON BINDING CAPACITY: 226 UG/DL (ref 171–450)
VITAMIN B-12: 971 PG/ML (ref 211–911)
WBC # BLD: 8.5 THOU/MM3 (ref 4.8–10.8)

## 2021-01-25 PROCEDURE — 97116 GAIT TRAINING THERAPY: CPT

## 2021-01-25 PROCEDURE — 6360000002 HC RX W HCPCS: Performed by: INTERNAL MEDICINE

## 2021-01-25 PROCEDURE — 2580000003 HC RX 258: Performed by: INTERNAL MEDICINE

## 2021-01-25 PROCEDURE — 97110 THERAPEUTIC EXERCISES: CPT

## 2021-01-25 PROCEDURE — 6360000002 HC RX W HCPCS: Performed by: FAMILY MEDICINE

## 2021-01-25 PROCEDURE — 99232 SBSQ HOSP IP/OBS MODERATE 35: CPT | Performed by: FAMILY MEDICINE

## 2021-01-25 PROCEDURE — 82607 VITAMIN B-12: CPT

## 2021-01-25 PROCEDURE — 80048 BASIC METABOLIC PNL TOTAL CA: CPT

## 2021-01-25 PROCEDURE — 92526 ORAL FUNCTION THERAPY: CPT

## 2021-01-25 PROCEDURE — 82746 ASSAY OF FOLIC ACID SERUM: CPT

## 2021-01-25 PROCEDURE — 83540 ASSAY OF IRON: CPT

## 2021-01-25 PROCEDURE — 83550 IRON BINDING TEST: CPT

## 2021-01-25 PROCEDURE — 36415 COLL VENOUS BLD VENIPUNCTURE: CPT

## 2021-01-25 PROCEDURE — 85025 COMPLETE CBC W/AUTO DIFF WBC: CPT

## 2021-01-25 PROCEDURE — 99232 SBSQ HOSP IP/OBS MODERATE 35: CPT | Performed by: INTERNAL MEDICINE

## 2021-01-25 PROCEDURE — 1200000003 HC TELEMETRY R&B

## 2021-01-25 PROCEDURE — 97530 THERAPEUTIC ACTIVITIES: CPT

## 2021-01-25 RX ADMIN — ENOXAPARIN SODIUM 80 MG: 80 INJECTION SUBCUTANEOUS at 19:28

## 2021-01-25 RX ADMIN — CEFTRIAXONE SODIUM 1 G: 1 INJECTION, POWDER, FOR SOLUTION INTRAMUSCULAR; INTRAVENOUS at 17:42

## 2021-01-25 RX ADMIN — ENOXAPARIN SODIUM 80 MG: 80 INJECTION SUBCUTANEOUS at 09:09

## 2021-01-25 RX ADMIN — SODIUM CHLORIDE, PRESERVATIVE FREE 10 ML: 5 INJECTION INTRAVENOUS at 19:28

## 2021-01-25 ASSESSMENT — PAIN SCALES - WONG BAKER
WONGBAKER_NUMERICALRESPONSE: 0

## 2021-01-25 ASSESSMENT — PAIN SCALES - GENERAL: PAINLEVEL_OUTOF10: 0

## 2021-01-25 NOTE — PROGRESS NOTES
Hospitalist Progress Note    Patient:  Kenneth Eng      Unit/Bed:6K-22/022-A    YOB: 1932    MRN: [id number]       Acct: [de-identified]     PCP: Vaishali Hatfield DO    Date of Admission: 1/17/2021    Assessment/Plan:    Anticipated Discharge in :     LEONEL/Lur[facility] [address] Problems    Diagnosis Date Noted    Traumatic rhabdomyolysis ([facility] [address] [place] 6XXA]     Fall [W19. XXXA]     Supratherapeutic INR [R79.1]     Coagulopathy ([facility] [address] [D68.9]     Hyponatremia [E87.1] 02/19/2015     COVID 19 PNA  Patient tested positive for COVID-19 on 1/18. Symptoms first started on 1/11/2021, now out of isolation  Completed 5 days of Decadron and IV Remdisivir. Continue Vitamin C, Vitamin D, Zinc, Acapella and IS. VS stable, afebrile and saturating well on RA  Discontinue daily fibrinogen, dimer monitoring    Acute Hypoxic respiratory failure, resolved  on room air    Hyponatremia, severe, likely pre-renal in etiology  Nephrology consulted. Sodium was slowly corrected, currently 139  Currently on D5 0.45 NS at 30cc/hr as patient is still NPO    Acute Metabolic Encephalopathy, likely from COVID and severe hyponatremia, improved  Currently NPO, as he failed swallow evaluation  Continue speech therapy  Patient is alert and coherent today, answers questions appropriately    Dysphagia  Continue SLP  Patient failed his swallow eval again despite improvement of mentation  Discussed with patient and his children, Maciej Venegas and Chay Macias, and they want to proceed with PEG  Prefers Dr. Emy Painter if available, will consult    Syncope & Collapse  Likely secondary to dehydration, hyponatremia and COVID infection  Pt was found unresponsive and down prior to admission. Cardiology and Neurology consulted. EEG was negative. MRI no acute CVA. Cardiology signed off. Chronic Atrial Fibrillation with SVR (HR 60-70's)  Cardiology consulted. Avoid AV blocking agents.    No indication for PPM.   Bradycardia possibly aggravated by remdesivir usage, which has been stopped. Nurse informed me on 1/24 that his HR fluctuates between 30-40, this is not accurate based on just telemetry as it does not provide the average beat per minute. Verified with EKG: A fib with SVR, HR 55  Continue to monitor    Acute Traumatic Rhabdomyolysis   CK trended down from 9463 to 1111. Will stop checking. Continue gentle fluid hydration     Normocytic Anemia, RAZA vs dilutional  Hgb 10 on admission, now stable around 8  No gross bleeding noted  Iron panel suggests RAZA, will start iron supplementation    Elevated Troponin  Cardiology consulted. Likely demand mismatch. They recommended no acute intervention. Essential HTN  Pressure has been labile. Continue home medications. HFpEF, chronic  Latest echo (12/20) EF 55 to 60%. CKD, Stage 3  Creatinine stable around 1.0    BPH  Aware. Hearing Loss  Hearing aids. Deconditioning  Speech, Physical, Occupational Therapy. ECF placement (lima con)    Disposition  Consult GI for PEG  Continue hydration with D5 containing fluid while NPO  ECF placement on discharge    Chief Complaint:   [facility] Course:   27-year-old male presented to [facility] via EMS after sustaining an unwitnessed fall from standing position.  Patient was found on ground and unknown length of time spent on ground.  Per HPI patient son reports patient was progressively more weak over the last week and acting like himself up until the day before arrival.  On day of arrival patient's son states they were unable to get a hold of him therefore they went to his house where they found him on the bathroom floor. Molly Zapata was brought in via EMS to [facility] emergency room department.     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     Of note patient was recently hospitalized at [facility] 12/9/2020 for asymptomatic bradycardia in which is beta-blocker and calcium blocker were stopped and follow-up with Dr. Elvira Neal and plans for 10-day event monitor were planned. Artem Abraham was seen by EPS clinic 1/16/2020 with no plans for permanent pacemaker at that time.     Upon arrival, patient does not member what happened and does not remember what happened prior to incident.  On arrival patient denies chest pain, shortness of breath, abdominal pain patient did report pain in his right leg and complaining of generalized muscle weakness and aches.  Patient lives at home alone.  Patient was found confused, confusion improved upon arrival to ED.  Patient had slight weakness in left lower extremity however this was limited by pain.  Patient had abrasions with different stages of ecchymosis to bilateral lower extremities.  While in the emergency room patient was found to have noted hyponatremia and elevated CPK.  CT of the head showed no acute intracranial hemorrhage or mass-effect.  Patient did have noted encephalomalacia within the left temporal lobe from prior remote infarct.  CT of cervical spine showed no acute fracture.  CT chest demonstrated moderate right pleural effusions, mild opacities right lung base.  CVC was placed and patient did receive 50 mL of 3% normal saline.  Covid positive.     Last week severe cramping upper thighs. Then did farely well. Friday cramping came back, Saturday went to football game. Yesterday found him on bathroom floor.      1/18/21: Patient was not following commands and demonstrated pinpoint pupils.  Patient received 0.4 Narcan.  Patient did become more responsive and pupils were not pinpoint.  We will proceed with MRI of brain.     1/19/21: Patient appears significantly better.  Sitting up in bed communicating and ate entire breakfast. Patient demonstrates difficulty hearing however patient denies pain anywhere. Patient still requiring significant amount of dopamine     1/20: Pt was transferred out of ICU. Pt did not eat much, he does not feel like eating or like the food. Sodium is stable around 122.      1/21: No acute events overnight. Patient not eating well today. SLP saw and recommending NPO except for medications. Mental status waxes and wanes. HD stable on RA. No fevers or chills. No chest pain or sob.      1/22: Overnight more confused pulling on lines, restrains placed. Pt follows commands however confused. No fevers or chills. On RA. Not eating as pt is too confused.        Subjective:   Patient seen and examined, appears comfortable in bed, without any signs of cardiorespiratory distress. He is hard of hearing but alert, cooperative and answering questions appropriately. VS stable, afebrile, saturating well on room air. Patient denies chest pain, palpitations, or shortness of breath. He denies any nausea, vomiting, abdominal pain, diarrhea or constipation.        Medications:  Reviewed    Infusion Medications    dextrose 5 % and 0.45 % NaCl 30 mL/hr at 01/23/21 1608     Scheduled Medications    enoxaparin  1 mg/kg Subcutaneous Q12H    melatonin  3 mg Oral Nightly    calcium replacement protocol   Other RX Placeholder    phosphorus replacement protocol   Other RX Placeholder    lactobacillus  1 capsule Oral Daily with breakfast    cefTRIAXone (ROCEPHIN) IV  1 g Intravenous Q24H    aspirin  81 mg Oral Daily    hydrALAZINE  25 mg Oral TID    pantoprazole  40 mg Oral BID AC    [Held by provider] pravastatin  40 mg Oral QPM    tamsulosin  0.4 mg Oral Daily    sodium chloride flush  10 mL Intravenous 2 times per day     PRN Meds: potassium chloride **OR** potassium alternative oral replacement **OR** potassium chloride, potassium chloride, potassium chloride, acetaminophen **OR** acetaminophen, cyclobenzaprine, sodium chloride flush, promethazine **OR** ondansetron, polyethylene glycol, magnesium sulfate, mupirocin      Intake/Output Summary (Last 24 hours) at 1/25/2021 0956  Last data filed at 1/25/2021 0449  Gross per 24 hour   Intake 677.84 ml   Output 875 ml   Net -197.16 ml       Diet:  Diet NPO Effective Now Exceptions are: Other (Specify); Specify Other: Pending Feese swallow test; No meds, either    Exam:  /65   Pulse 61   Temp 97.5 °F (36.4 °C) (Oral)   Resp 18   Ht 5' 4\" (1.626 m)   Wt 169 lb 12.8 oz (77 kg)   SpO2 93%   BMI 29.15 kg/m²     General appearance: No apparent distress, appears stated age and cooperative. Frail appearing  HEENT: Pupils equal, round, and reactive to light. Conjunctivae/corneas clear. Neck: Supple, with full range of motion. No jugular venous distention. Trachea midline. Respiratory:  Normal respiratory effort. Clear to auscultation, bilaterally without Rales/Wheezes/Rhonchi. Cardiovascular: irregularly irregular rhythm with normal S1/S2 without murmurs, rubs or gallops. Abdomen: Soft, non-tender, non-distended with normal bowel sounds. Musculoskeletal: passive and active ROM x 4 extremities. Skin: Skin color, texture, turgor normal.  No rashes or lesions. Neurologic:  Neurovascularly intact without any focal sensory/motor deficits.  Cranial nerves: II-XII intact, grossly non-focal.  Psychiatric: Alert and oriented, thought content appropriate, normal insight  Capillary Refill: Brisk,< 3 seconds   Peripheral Pulses: +2 palpable, equal bilaterally       Labs:   Recent Labs     01/23/21  0305 01/24/21  0511 01/25/21  0449   WBC 10.7 10.1 8.5   HGB 7.9* 8.2* 8.3*   HCT 24.0* 25.0* 25.8*    295 313     Recent Labs     01/23/21  0305 01/23/21  1015 01/24/21  0511 01/25/21  0449   * 133* 135 139   K 4.6  --  4.0 4.2     --  106 108   CO2 21*  --  22* 22*   BUN 22  --  17 15   CREATININE 1.1  --  1.2 1.0   CALCIUM 8.3*  --  8.3* 8.1*     No results for input(s): AST, ALT, BILIDIR, BILITOT, ALKPHOS in the last 72 hours. Recent Labs     01/23/21  0305 01/24/21  0511   INR 1.94* 2.01*     No results for input(s): CKTOTAL, TROPONINI in the last 72 hours. Urinalysis:      Lab Results   Component Value Date    NITRU NEGATIVE 01/17/2021    WBCUA 25-50 01/17/2021    BACTERIA NONE SEEN 01/17/2021    RBCUA 15-20 01/17/2021    BLOODU LARGE 01/17/2021    SPECGRAV 1.017 01/17/2021    GLUCOSEU NEGATIVE 01/05/2021       Radiology:  XR CHEST PORTABLE   Final Result   The nasogastric tube is at the level of the left mainstem bronchus. Recommend removal and repositioning. This document has been electronically signed by: Claudette Mullins MD on    01/22/2021 11:31 PM         XR ABDOMEN FOR NG/OG/NE TUBE PLACEMENT   Final Result   1. No visible nasogastric tube on the abdominal x-ray. On correlation with    the concurrent chest x-ray, the nasogastric tube is at the level of the    left mainstem bronchus. Recommend removal and repositioning. 2. Extensive gaseous distention of bowel suggesting possible ileus. This document has been electronically signed by: Claudette Mullins MD on    01/22/2021 11:22 PM         XR CHEST PORTABLE   Final Result   Stable right basilar opacity. **This report has been created using voice recognition software. It may contain minor errors which are inherent in voice recognition technology. **      Final report electronically signed by Dr. Teagan Phan MD on 1/22/2021 4:55 PM      MRI BRAIN WO CONTRAST   Final Result   Impression:   Moderate nonspecific periventricular deep white matter disease. This most    often can be ascribed to chronic small vessel ischemic change. Prominent central and cortical volume loss. Old ischemic event left temporal lobe.       This document has been electronically signed by: Cortney Doty MD on    01/19/2021 12:59 AM         CT CHEST WO CONTRAST   Final Result   Impression:   Moderate right pleural effusion. Associated volume loss. Mild opacity right lung base. This could represent mild pneumonia. This document has been electronically signed by: El Fisher MD on    01/17/2021 09:10 PM      All CT scans at this facility use dose modulation, iterative    reconstruction, and/or weight-based   dosing when appropriate to reduce radiation dose to as low as reasonably    achievable. XR CHEST PORTABLE   Final Result   Impression:   Cardiomegaly, no CHF. Mild right infrahilar opacity. This could represent mild pneumonia versus    volume loss. This document has been electronically signed by: El Fisher MD on    01/17/2021 09:11 PM         CT Head WO Contrast   Final Result   1. No acute intracranial hemorrhage or mass effect. 2. Moderate diffuse atrophy is demonstrated. 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 **This report has been created using voice recognition software. It may contain minor errors which are inherent in voice recognition technology. **      Final report electronically signed by Dr. Viki Richter on 1/17/2021 4:17 PM      CT Cervical Spine WO Contrast   Final Result   1. Slightly limited evaluation due to motion artifact. However, no definite acute fracture of the cervical spine is seen. 2. Straightening of the normal cervical lordosis is demonstrated. This may be related to muscle spasm. 3. Multilevel degenerative changes of the cervical spine are demonstrated. **This report has been created using voice recognition software. It may contain minor errors which are inherent in voice recognition technology. **      Final report electronically signed by Dr. Viki Richter on 1/17/2021 4:21 PM      XR PELVIS (1-2 VIEWS)   Final Result   1. No acute fracture or malalignment. Degenerative changes are as described above.             **This report has been created using voice recognition

## 2021-01-25 NOTE — PROGRESS NOTES
Text [facility] [place] regarding pt BP trending up,   Response: No just let me know if > 180

## 2021-01-25 NOTE — PROGRESS NOTES
Palliative care eval received \"per protocol\" to assist with goals of care. Pt admitted after being found on floor at home, for unknown period of time. Pt was found to have + COVID, UTI, hyponatremia and rhabdo. Per chart review, pt was confused for a time but is now awake and alert, appropriate in conversation. Pt has failed swallow eval and plan is for PEG tube placement. Discussed with Dr William Bolivar, no needs from palliative care at this time. We will remain available, floor to notify PRN if palliative care can be of assistance. Pt's nurse,FlakitaRN updated.

## 2021-01-25 NOTE — PROGRESS NOTES
[facility]   Nephrology progress note  1/25/2021, 9:08 AM      Pt Name:    Nisa Atkins  MRN:     [id number]     YOB: 1932  Admit Date:    1/17/2021  2:31 PM  Primary Care Physician:  Mic Latif DO   Room number  2D-50/195-C    Chief Complaint: Nephrology following for hyponatremia    Subjective:  Patient seen and examined. Alert and oriented x3. Responding to questions appropriately. No chest pain or shortness of breath. Feels okay. Afebrile. / 24 hrs  Failed speech eval.    Objective:  24HR INTAKE/OUTPUT:      Intake/Output Summary (Last 24 hours) at 1/25/2021 0908  Last data filed at 1/25/2021 0449  Gross per 24 hour   Intake 677.84 ml   Output 875 ml   Net -197.16 ml     I/O last 3 completed shifts: In: 677.8 [I.V.:677.8]  Out: 875 [Urine:875]  No intake/output data recorded. Admission weight: 162 lb (73.5 kg)  Wt Readings from Last 3 Encounters:   01/24/21 169 lb 12.8 oz (77 kg)   01/06/21 162 lb (73.5 kg)   01/05/21 160 lb (72.6 kg)     Body mass index is 29.15 kg/m².     Physical examination  VITALS:     Vitals:    01/24/21 2123 01/24/21 2356 01/25/21 0421 01/25/21 0845   BP: (!) 142/63 (!) 151/63 (!) 146/63 134/65   Pulse:  75 73 61   Resp:  16 16 18   Temp:  98.1 °F (36.7 °C) 97.9 °F (36.6 °C) 97.5 °F (36.4 °C)   TempSrc:  Oral Oral Oral   SpO2:  94% 93% 93%   Weight:       Height:         General Appearance: alert and cooperative with exam, appears comfortable, no distress  Mouth/Throat: Oral mucosa moist  Neck: No JVD  Lungs: Expiratory wheezes right lung base  Heart:  Irregularly irregular  GI: soft, non-tender, no guarding  Extremities: Right 1+ LE edema      Lab Data  CBC:   Recent Labs     01/23/21  0305 01/24/21  0511 01/25/21  0449   WBC 10.7 10.1 8.5   HGB 7.9* 8.2* 8.3*   HCT 24.0* 25.0* 25.8*    295 313     BMP:  Recent Labs     01/23/21  0305 01/23/21  1015 01/24/21  0511 01/25/21  0449   * 133* 135 139   K 4.6  --  4.0 4.2     --  106 108   CO2 21*  --  22* 22*   BUN 22  --  17 15   CREATININE 1.1  --  1.2 1.0   GLUCOSE 79  --  84 82   CALCIUM 8.3*  --  8.3* 8.1*   MG  --   --  2.0  --      Hepatic: No results for input(s): LABALBU, AST, ALT, ALB, BILITOT, ALKPHOS in the last 72 hours. Meds:  Infusion:    dextrose 5 % and 0.45 % NaCl 30 mL/hr at 01/23/21 1608     Meds:    enoxaparin  1 mg/kg Subcutaneous Q12H    melatonin  3 mg Oral Nightly    calcium replacement protocol   Other RX Placeholder    phosphorus replacement protocol   Other RX Placeholder    lactobacillus  1 capsule Oral Daily with breakfast    cefTRIAXone (ROCEPHIN) IV  1 g Intravenous Q24H    aspirin  81 mg Oral Daily    hydrALAZINE  25 mg Oral TID    pantoprazole  40 mg Oral BID AC    [Held by provider] pravastatin  40 mg Oral QPM    tamsulosin  0.4 mg Oral Daily    sodium chloride flush  10 mL Intravenous 2 times per day     Meds prn: potassium chloride **OR** potassium alternative oral replacement **OR** potassium chloride, potassium chloride, potassium chloride, acetaminophen **OR** acetaminophen, cyclobenzaprine, sodium chloride flush, promethazine **OR** ondansetron, polyethylene glycol, magnesium sulfate, mupirocin       Impression and Plan:  1. Hyponatremia: Resolved. On D5W with 1/2 NS at 30 ml/hr due to NPO status. 2. CKD Stage 3: Stable  3. S/p syncopal episode  4. COVID 19 positive  5. Anemia  6. Afib  7. Hx of CVA  8.  Confusion    D/W patient and RN    Graeme Wilhelm MD  [facility]

## 2021-01-25 NOTE — PROGRESS NOTES
[address] [address]  INPATIENT PHYSICAL THERAPY  DAILY NOTE  [facility] RENAL TELEMETRY 6K - 6K-22/022-A    Time In: 1388  Time Out: 1123  Timed Code Treatment Minutes: 32 Minutes  Minutes: 27          Date: 2021  Patient Name: Janet Gilbert,  Gender:  male        MRN: [id number]  : 3/30/1932  (80 y.o.)     Referring Practitioner: Mallory Blake DO  Diagnosis: Hyponatremia  Additional Pertinent Hx: Per ER note on 2021:88 y.o. male history of CVA, CAD, hypertension, hyperlipidemia, chronic atrial fibrillation on anticoagulation with Coumadin was recently admitted and discharged from [facility] for asymptomatic bradycardia management. He also had a history of recent falls. He presented to the emergency department for evaluation of unwitnessed fall at home today morning in his bathroom by his family members. He was found on the ground and was down for unknown length of time. Prior Level of Function:  Lives With: Alone  Type of Home: House  Home Layout: One level  Home Access: Level entry  Home Equipment: Cane   Bathroom Shower/Tub: Walk-in shower  Bathroom Toilet: Handicap height  Bathroom Equipment: Shower chair, Grab bars in shower    ADL Assistance: [address]: Independent  Transfer Assistance: Independent  Additional Comments: Pt reports fully indep PLOF without AD. Restrictions/Precautions:  Restrictions/Precautions: Contact Precautions, Fall Risk  Position Activity Restriction  Other position/activity restrictions: COVID isolation, very [place]; on room air on      SUBJECTIVE: RN approved session, reports patient wanting to get up to chair. Patient in bed upon arrival, more alert than previous sessions but still noting mild confusion, agreed and cooperative for therapy.  Patient on room air during session, Sp02 around 91% about 1-2 minutes after getting to chair, fluctuating readings throughout session, 83-93%, unsure of accuracy of readings due to quick fluctuations. PAIN: No pain noted. OBJECTIVE:  Bed Mobility:  Supine to Sit: Moderate Assistance, Maximum Assistance, X 2, with head of bed raised, with verbal cues , with increased time for completion  Scooting: Minimal Assistance, Moderate Assistance, X 1, to scoot out on EOB    Transfers:  Sit to Stand: [employer], Minimal Assistance, X 2, cues for hand placement, with verbal cues, from EOB, required Min. A x1 and CGA of another for safety/  Stand to [address] [address], Minimal Assistance, X 2, with increased time for completion, cues for hand placement, with verbal cues, to safely back up to chair and get lined up. Ambulation:  Minimal Assistance-Moderate Assistance x1 and CGA-Minimal Assist of another. Distance: 4 ft x1 (EOB-> recliner)  Surface: Level Tile  Device:Rolling Walker  Gait Deviations: Forward Flexed Posture, Slow Sol, Decreased Step Length Bilaterally, Decreased Weight Shift Right, difficulty advancing RLE at times and needed min. A to complete about 25% of time; Decreased Gait Speed, Decreased Heel Strike Bilaterally, Moderate Path Deviations, Unsteady Gait, Decreased Terminal Knee Extension on RLE during stance time. Balance:  Static Sitting Balance:  Stand By Assistance, sitting EOB, unsupported  Static Standing Balance: Contact Guard Assistance, Minimal Assistance, X 2, with BUE support on AD. Dynamic Standing Balance: Contact Guard Assistance, Minimal Assistance, Moderate Assistance, X 2, with verbal cues , with BUE support on AD, no leaning noted this date but did have difficulty advancing RLE at times    Exercise:  Patient was guided in 1 set(s) 10 reps of exercise to both lower extremities. Seated marches, Seated heel/toe raises, Long arc quads and seated hip ABD/ADD. Exercises were completed for increased independence with functional mobility.     Functional Outcome Measures: Completed  AM-PAC Inpatient Mobility Raw Score : 9  AM-[facility] Inpatient T-Scale Score : 30.55    ASSESSMENT:  Assessment: Patient progressing toward established goals. The patient tolerated session fairly well, still demonstrates limitations due to generalized weakness, increased instability and overall deconditioning that affects safe functional mobility. Also limited due to mild confusion that affects comprehension of activities. Would benefit from additional skilled PT to increase strength, endurance,balance and safety for improved functional mobility. Activity Tolerance:  Patient tolerance of  treatment: good. Equipment Recommendations:Equipment Needed: Yes(may need RW)  Discharge Recommendations:  [address], Patient would benefit from continued therapy after discharge    Plan: Times per week: 4-5 X GM  Current Treatment Recommendations: Strengthening, Transfer Training, Endurance Training, Balance Training, Functional Mobility Training, Gait Training, Home Exercise Program    Patient Education  Patient Education: Plan of Care, Precautions/Restrictions, [employer] Mobility, Transfers, Reviewed Prior Education, Gait, Health Promotion and Wellness Education, - Patient Requires Continued Education    Goals:  Patient goals : Get stronger and return to prior level  Short term goals  Time Frame for Short term goals: By discharge  Short term goal 1: supine to sit and return with min of 1 to get in and out of bed  Short term goal 2: sit to stand with min of 1 consistently to get on and off various surfaces  Short term goal 3: Pt to ambulate with RW 30 feet with CGA to walk safely to bathroom  Long term goals  Time Frame for Long term goals : NA due to short ELOS    Following session, patient left in safe position with all fall risk precautions in place.

## 2021-01-25 NOTE — PROGRESS NOTES
for processing  Rolling to Right: Minimal Assistance with hand over hand guidance for hand placement to log roll as well as BLE management  Supine to Sit: Moderate Assistance, X 2, with verbal cues , with increased time for completion HOB min elevated  Sit to Supine: Moderate Assistance, X 2, with head of bed raised, with verbal cues , with increased time for completion for trunk control and B LE management    ADDITIONAL ACTIVITIES:  Patient identified a personal goal to increase UB strength and improve overall endurance so they can complete their toilet & shower transfers; skilled edu on UE strengthening and patient completed BUE strengthening exercises x10 reps x1 set this date with a minimal resistance band in all joints and all planes. Patient tolerated fair, requiring min rest breaks. Pt required mod verbal/tactile cues for technique. ASSESSMENT:     Activity Tolerance:  Patient tolerance of  treatment: good. Discharge Recommendations: Continue to assess pending progress(may be TCU candidate)   Equipment Recommendations:    Plan: Times per week: 5x  Current Treatment Recommendations: Balance Training, Self-Care / ADL, Endurance Training, Functional Mobility Training, Strengthening, Safety Education & Training    Patient Education  Patient Education: [employer] and Home Exercise Program    Goals  Short term goals  Time Frame for Short term goals: until discharge  Short term goal 1: Complete various sit-stand t/fs with min A x 1 for increased ease of BSC t/fs  Short term goal 2: Complete 1-2 min standing ADL with min a X 1 with BUE support on RW  Short term goal 3: Complete BUE AROM/light strengthening exercises to increase UB endurance for BADL  Short term goal 4: Complete BADL routine with min A & min vcs for safety & sequencing  Short term goal 5:  Tolerate further assessment of functional ambulation by OTR when appropriate  Long term goals  Time Frame for Long term goals : No LTG d/t short

## 2021-01-25 NOTE — PROGRESS NOTES
Comprehensive Nutrition Assessment    Type and Reason for Visit:  Reassess(NPO monitor)    Nutrition Recommendations/Plan:   Recommend Jevity 1.5 at 50ml/ hour as goal and flush 240ml water every 6 hours (provides ~1800 kcals, 77 grams protein, 259 grams carbohydrate, 26 grams fiber, 1872ml water (960ml from flushes +912 ml water from formula) per 24 hours). NPO as per speech therapy/ doctor. Recommend bowel meds, no BM documented since admit. Nutrition Assessment:    Pt. moderately malnourished AEB criteria as listed below. At risk for further nutrition compromise r/t NPO due to dysphagia, COVID+, advanced age and underlying medical condition (hx CAD, CKD, CVA). Nutrition recommendations/interventions as per above. Malnutrition Assessment:  Malnutrition Status: Moderate malnutrition    Context:  Acute Illness     Findings of the 6 clinical characteristics of malnutrition:  Energy Intake:  1 - 75% or less of estimated energy requirements for 7 or more days  Weight Loss:  1 - 5% over 1 month(-9.4% in 1.5 months)     Body Fat Loss:  Unable to assess     Muscle Mass Loss:  1 - Mild muscle mass loss Temples (temporalis)  Fluid Accumulation:  Unable to assess     Strength:  Not Performed    Estimated Daily Nutrient Needs:  Energy (kcal):  6635-1094 kcals (20-25); Weight Used for Energy Requirements:  (77kgm on 1/24)     Protein (g):   gm (1.2-2); Weight Used for Protein Requirements:  Ideal(59 kgm)        Fluid (ml/day):  ~1925ml (25ml/kgm);  Method Used for Fluid Requirements:  (77kgm on 1/24)      Nutrition Related Findings:  COVID+ diagnosed 1/18, respiratory failure resolved, s/p SLP evaluation- recommend NPO, note per Attending: failed swallow evaluation despite improved mentation and patient and family wish to proceed with PEG tube; patient reports poor appetite prior to admission with not feeling well from virus; antibiotic, culturelle, prn glycolax, IV fluid; no BM noted since admit; glucose 85, BUN 15, Creatinine 1, potassium 4.2      Wounds:  (left heel: unstageable)       Current Nutrition Therapies:    Diet NPO Effective Now Exceptions are: Other (Specify); Specify Other: Pending Feese swallow test; No meds, either    Anthropometric Measures:  · Height: 5' 4\" (162.6 cm)  · Current Body Weight: 169 lb 12.8 oz (77 kg)(1/24; +1 non pitting, trace edema, bedscale; note variance from admission)   · Admission Body Weight: 152 lb 5.4 oz (69.1 kg)(1/17 +2 edema)    · Usual Body Weight: (per EMR: 2/25/20: 174# 11.2 oz, 12/9/20: 170# 11.2 oz)     · Ideal Body Weight: 130 lbs;      · BMI: 29.1  · BMI Categories: Overweight (BMI 25.0-29. 9)       Nutrition Diagnosis:   · Moderate malnutrition related to inadequate protein-energy intake as evidenced by poor intake prior to admission, mild muscle loss      Nutrition Interventions:   Food and/or Nutrient Delivery:  Continue NPO(as per SLP, recommend enteral feeding)  Nutrition Education/Counseling:  Education not appropriate   Coordination of Nutrition Care:  Continue to monitor while inpatient    Goals:  Pt. will receive adequate nutrition in next 1-2 days (TF vs. po diet)       Nutrition Monitoring and Evaluation:   Behavioral-Environmental Outcomes:  None Identified   Food/Nutrient Intake Outcomes:  Diet Advancement/Tolerance(NPO status)  Physical Signs/Symptoms Outcomes:  Biochemical Data, Chewing or Swallowing, GI Status, Fluid Status or Edema, Nutrition Focused Physical Findings, Skin, Weight     Discharge Planning:     Too soon to determine     Electronically signed by Stanley Barrera RD, LD on 1/25/21 at 12:29 PM EST    Contact: [phone]

## 2021-01-25 NOTE — PROGRESS NOTES
overt coughing noted with poor ability to \"catch\" breath with red face to follow. MD entered room; ST reviewed events and rational for overt coughing episode. Following return of appropriate breathing pattern; MD completed assessment. Patient agreeable to PO trials of ice chips although limited trials completed d/t ongoing severity of dysphagia and limited ability to maintain upright positioning. Patient consumed x3 ice chips in total; patient demonstrated with appropriate mastication; although patient with excessive conversational speaking while consuming PO ice chip negatively impacting overall awareness to the bolus resulting in delayed swallow. No s/s of aspiration following 2/3 trials, immediate coughing noted 1/3 trials. Patient required much time to recover. Patient stating \"That one also went down the wrong pipe. \"     GERARDO Salmeron reporting, \"wheezing noted today, continues to remain 93 or above while resting on room air. \"     ST discussed recommendations with patient and son to consider non-alternate means of nutrition d/t ongoing severity of oropharyngeal dysphagia, with potential/eventual consideration of PEG tube placement d/t evidence of waxing/weaning mentation/alertness, ongoing positioning barriers and ongoing deconditioning in relation to COVID-19. Patient and son verbalized understanding. Son Cami Christian did request to discuss recommendation with other brother Wilmer Sorto who is a MD     Dr. Rod Silverio updated via arleth mcnair RN updated. SHORT TERM GOAL #2:  Goal 2: Complete cognitive evaluation and determine goals and POC as clinically appropriate (patient lives home alone)  INTERVENTIONS: Patient with slightly improved alertness this date; not completed at this time d/t request to visit with son.  Will prioritize dysphagia interventions and complete to follow as clinically appropriate       Long-Term Goals: No LTGs due to short ELOS         EDUCATION:  Learner: Patient  Education:  Reviewed results and recommendations of this evaluation, Reviewed diet and strategies, Reviewed signs, symptoms and risks of aspiration, Reviewed ST goals and Plan of Care, Reviewed recommendations for follow-up and Education Related to Potential Risks and Complications Due to Impairment/Illness/Injury  Evaluation of Education: Verbalizes understanding, Demonstrates with assistance, Needs further instruction, No evidence of learning and Family not present    ASSESSMENT/PLAN:  Activity Tolerance:  Patient tolerance of  treatment: fair. Assessment/Plan: Patient progressing toward established goals. Continues to require skilled care of licensed speech pathologist to progress toward achievement of established goals and plan of care. .     Plan for Next Session:  Oral care; PO trials; education      BRANDON Duff 23

## 2021-01-25 NOTE — PLAN OF CARE
Problem: Nutrition  Goal: Optimal nutrition therapy  1/25/2021 1228 by Duane Starkey RD, LD  Outcome: Ongoing   Nutrition Problem #1: Moderate malnutrition  Intervention: Food and/or Nutrient Delivery: Continue NPO(as per SLP, recommend enteral feeding)  Nutritional Goals: Pt. will receive adequate nutrition in next 1-2 days (TF vs. po diet)

## 2021-01-25 NOTE — PLAN OF CARE
Problem: Falls - Risk of:  Goal: Will remain free from falls  Description: Will remain free from falls  Outcome: Ongoing   Bed alarm on    Problem: Skin Integrity:  Goal: Will show no infection signs and symptoms  Description: Will show no infection signs and symptoms  Outcome: Ongoing   Multiple skin abrasions from fall, unstagable on left heel    Problem:  Body Temperature -  Risk of, Imbalanced  Goal: Ability to maintain a body temperature within defined limits  Outcome: Ongoing   Afebrile    Problem: Nutrition Deficits  Goal: Optimize nutritional status  Outcome: Ongoing   NPO pending swallow eval

## 2021-01-25 NOTE — CARE COORDINATION
DISASTER CHARTING    1/25/21, 7:16 AM EST    DISCHARGE ONGOING EVALUATION:     [address] day: 8  Location: 6K-22/022-A Reason for admit: Hyponatremia [E87.1]   Barriers to Discharge: transferred  4B10 to 6K 22 over W/E, Na improved 139, D5.45 at 30/hr, NPO, failed swallow eval today with SLP, PT/OT following, GI consulted for PEG, hold ASA, Rocephin IV, electrolyte replacement protocols, hold Coumadin, INR 2.01. PCP: Kalee Martínez DO  Readmission Risk Score: 26%  Patient Goals/Plan/Treatment Preferences: from home alone; SW following for ECF placement.

## 2021-01-25 NOTE — PROGRESS NOTES
Pt transferred to [address]  from [address] [address]. Complaints: fall at home   Patient is alert and oriented to person and place. IV d5 and 0.45NS infusing into distal port of triple lumen ij at a rate of 30 mls/ hour with about 500 mls in the bag still. IV site free of s/s of infection or infiltration. Vital signs obtained. Two nurse skin assessment performed by Nicholas Freeman and Sukhjinder RN. Oriented to room. Policies and procedures for 6K explained. Missy Jones RN discussed hourly rounding with patient addressing 5 P's. Fall prevention and safety brochure discussed with patient. Bed alarm on. Call light in reach. Telesitter placed in room. Patient's son notified of transfer by transferring RN.

## 2021-01-25 NOTE — CARE COORDINATION
1/25/21, 7:21 AM EST    DISCHARGE BARRIERS        Patient transferred to 6K 22. Report given to unit Domonique Villarreal, regarding discharge plan for this patient.

## 2021-01-26 ENCOUNTER — ANESTHESIA (OUTPATIENT)
Dept: ENDOSCOPY | Age: 86
DRG: 177 | End: 2021-01-26
Payer: OTHER GOVERNMENT

## 2021-01-26 ENCOUNTER — ANESTHESIA EVENT (OUTPATIENT)
Dept: ENDOSCOPY | Age: 86
DRG: 177 | End: 2021-01-26
Payer: OTHER GOVERNMENT

## 2021-01-26 ENCOUNTER — APPOINTMENT (OUTPATIENT)
Dept: GENERAL RADIOLOGY | Age: 86
DRG: 177 | End: 2021-01-26
Payer: OTHER GOVERNMENT

## 2021-01-26 VITALS — SYSTOLIC BLOOD PRESSURE: 126 MMHG | OXYGEN SATURATION: 100 % | DIASTOLIC BLOOD PRESSURE: 62 MMHG

## 2021-01-26 LAB
ABO CHECK: NORMAL
ALBUMIN SERPL-MCNC: 2.6 G/DL (ref 3.5–5.1)
ALP BLD-CCNC: 74 U/L (ref 38–126)
ALT SERPL-CCNC: 49 U/L (ref 11–66)
ANION GAP SERPL CALCULATED.3IONS-SCNC: 10 MEQ/L (ref 8–16)
ANTIBODY SCREEN: NORMAL
AST SERPL-CCNC: 36 U/L (ref 5–40)
BASOPHILS # BLD: 0 %
BASOPHILS ABSOLUTE: 0 THOU/MM3 (ref 0–0.1)
BILIRUB SERPL-MCNC: 0.6 MG/DL (ref 0.3–1.2)
BILIRUBIN DIRECT: < 0.2 MG/DL (ref 0–0.3)
BUN BLDV-MCNC: 14 MG/DL (ref 7–22)
CALCIUM SERPL-MCNC: 8.1 MG/DL (ref 8.5–10.5)
CHLORIDE BLD-SCNC: 106 MEQ/L (ref 98–111)
CO2: 23 MEQ/L (ref 23–33)
CREAT SERPL-MCNC: 0.9 MG/DL (ref 0.4–1.2)
EOSINOPHIL # BLD: 1.3 %
EOSINOPHILS ABSOLUTE: 0.1 THOU/MM3 (ref 0–0.4)
ERYTHROCYTE [DISTWIDTH] IN BLOOD BY AUTOMATED COUNT: 19.5 % (ref 11.5–14.5)
ERYTHROCYTE [DISTWIDTH] IN BLOOD BY AUTOMATED COUNT: 60.3 FL (ref 35–45)
GFR SERPL CREATININE-BSD FRML MDRD: 79 ML/MIN/1.73M2
GLUCOSE BLD-MCNC: 87 MG/DL (ref 70–108)
HCT VFR BLD CALC: 26 % (ref 42–52)
HEMOGLOBIN: 8.2 GM/DL (ref 14–18)
IMMATURE GRANS (ABS): 0.07 THOU/MM3 (ref 0–0.07)
IMMATURE GRANULOCYTES: 0.9 %
INR BLD: 1.97 (ref 0.85–1.13)
INR BLD: 2.27 (ref 0.85–1.13)
LYMPHOCYTES # BLD: 16.1 %
LYMPHOCYTES ABSOLUTE: 1.3 THOU/MM3 (ref 1–4.8)
MAGNESIUM: 2.1 MG/DL (ref 1.6–2.4)
MCH RBC QN AUTO: 27.1 PG (ref 26–33)
MCHC RBC AUTO-ENTMCNC: 31.5 GM/DL (ref 32.2–35.5)
MCV RBC AUTO: 85.8 FL (ref 80–94)
MONOCYTES # BLD: 16.8 %
MONOCYTES ABSOLUTE: 1.3 THOU/MM3 (ref 0.4–1.3)
NUCLEATED RED BLOOD CELLS: 0 /100 WBC
PHOSPHORUS: 2.6 MG/DL (ref 2.4–4.7)
PLATELET # BLD: 306 THOU/MM3 (ref 130–400)
PMV BLD AUTO: 10.4 FL (ref 9.4–12.4)
POTASSIUM SERPL-SCNC: 3.9 MEQ/L (ref 3.5–5.2)
RBC # BLD: 3.03 MILL/MM3 (ref 4.7–6.1)
RH FACTOR: NORMAL
SEG NEUTROPHILS: 64.9 %
SEGMENTED NEUTROPHILS ABSOLUTE COUNT: 5.1 THOU/MM3 (ref 1.8–7.7)
SODIUM BLD-SCNC: 139 MEQ/L (ref 135–145)
TOTAL CK: 182 U/L (ref 55–170)
TOTAL PROTEIN: 5.3 G/DL (ref 6.1–8)
WBC # BLD: 7.8 THOU/MM3 (ref 4.8–10.8)

## 2021-01-26 PROCEDURE — 36415 COLL VENOUS BLD VENIPUNCTURE: CPT

## 2021-01-26 PROCEDURE — 7100000001 HC PACU RECOVERY - ADDTL 15 MIN: Performed by: INTERNAL MEDICINE

## 2021-01-26 PROCEDURE — 71045 X-RAY EXAM CHEST 1 VIEW: CPT

## 2021-01-26 PROCEDURE — 99232 SBSQ HOSP IP/OBS MODERATE 35: CPT | Performed by: FAMILY MEDICINE

## 2021-01-26 PROCEDURE — 6360000002 HC RX W HCPCS: Performed by: FAMILY MEDICINE

## 2021-01-26 PROCEDURE — 2580000003 HC RX 258: Performed by: INTERNAL MEDICINE

## 2021-01-26 PROCEDURE — 2580000003 HC RX 258: Performed by: STUDENT IN AN ORGANIZED HEALTH CARE EDUCATION/TRAINING PROGRAM

## 2021-01-26 PROCEDURE — 3700000001 HC ADD 15 MINUTES (ANESTHESIA): Performed by: INTERNAL MEDICINE

## 2021-01-26 PROCEDURE — 3700000000 HC ANESTHESIA ATTENDED CARE: Performed by: INTERNAL MEDICINE

## 2021-01-26 PROCEDURE — 82248 BILIRUBIN DIRECT: CPT

## 2021-01-26 PROCEDURE — 84100 ASSAY OF PHOSPHORUS: CPT

## 2021-01-26 PROCEDURE — 99232 SBSQ HOSP IP/OBS MODERATE 35: CPT | Performed by: INTERNAL MEDICINE

## 2021-01-26 PROCEDURE — 80053 COMPREHEN METABOLIC PANEL: CPT

## 2021-01-26 PROCEDURE — 6360000002 HC RX W HCPCS: Performed by: INTERNAL MEDICINE

## 2021-01-26 PROCEDURE — 0DH63UZ INSERTION OF FEEDING DEVICE INTO STOMACH, PERCUTANEOUS APPROACH: ICD-10-PCS | Performed by: INTERNAL MEDICINE

## 2021-01-26 PROCEDURE — 86923 COMPATIBILITY TEST ELECTRIC: CPT

## 2021-01-26 PROCEDURE — 3609013300 HC EGD TUBE PLACEMENT: Performed by: INTERNAL MEDICINE

## 2021-01-26 PROCEDURE — 85610 PROTHROMBIN TIME: CPT

## 2021-01-26 PROCEDURE — 6370000000 HC RX 637 (ALT 250 FOR IP): Performed by: NURSE PRACTITIONER

## 2021-01-26 PROCEDURE — 2709999900 HC NON-CHARGEABLE SUPPLY: Performed by: INTERNAL MEDICINE

## 2021-01-26 PROCEDURE — 86850 RBC ANTIBODY SCREEN: CPT

## 2021-01-26 PROCEDURE — 7100000000 HC PACU RECOVERY - FIRST 15 MIN: Performed by: INTERNAL MEDICINE

## 2021-01-26 PROCEDURE — 6360000002 HC RX W HCPCS: Performed by: NURSE PRACTITIONER

## 2021-01-26 PROCEDURE — 36430 TRANSFUSION BLD/BLD COMPNT: CPT

## 2021-01-26 PROCEDURE — 84145 PROCALCITONIN (PCT): CPT

## 2021-01-26 PROCEDURE — 86901 BLOOD TYPING SEROLOGIC RH(D): CPT

## 2021-01-26 PROCEDURE — 97116 GAIT TRAINING THERAPY: CPT

## 2021-01-26 PROCEDURE — 82550 ASSAY OF CK (CPK): CPT

## 2021-01-26 PROCEDURE — 2500000003 HC RX 250 WO HCPCS: Performed by: NURSE ANESTHETIST, CERTIFIED REGISTERED

## 2021-01-26 PROCEDURE — 83735 ASSAY OF MAGNESIUM: CPT

## 2021-01-26 PROCEDURE — 86900 BLOOD TYPING SEROLOGIC ABO: CPT

## 2021-01-26 PROCEDURE — 97110 THERAPEUTIC EXERCISES: CPT

## 2021-01-26 PROCEDURE — 6360000002 HC RX W HCPCS: Performed by: NURSE ANESTHETIST, CERTIFIED REGISTERED

## 2021-01-26 PROCEDURE — 85025 COMPLETE CBC W/AUTO DIFF WBC: CPT

## 2021-01-26 PROCEDURE — 1200000003 HC TELEMETRY R&B

## 2021-01-26 PROCEDURE — 2580000003 HC RX 258: Performed by: NURSE PRACTITIONER

## 2021-01-26 PROCEDURE — 2580000003 HC RX 258: Performed by: FAMILY MEDICINE

## 2021-01-26 PROCEDURE — P9017 PLASMA 1 DONOR FRZ W/IN 8 HR: HCPCS

## 2021-01-26 PROCEDURE — P9016 RBC LEUKOCYTES REDUCED: HCPCS

## 2021-01-26 RX ORDER — SODIUM CHLORIDE 9 MG/ML
INJECTION, SOLUTION INTRAVENOUS PRN
Status: COMPLETED | OUTPATIENT
Start: 2021-01-26 | End: 2021-01-26

## 2021-01-26 RX ORDER — SODIUM CHLORIDE 0.9 % (FLUSH) 0.9 %
10 SYRINGE (ML) INJECTION EVERY 12 HOURS SCHEDULED
Status: DISCONTINUED | OUTPATIENT
Start: 2021-01-26 | End: 2021-02-05 | Stop reason: HOSPADM

## 2021-01-26 RX ORDER — PROMETHAZINE HYDROCHLORIDE 25 MG/1
12.5 TABLET ORAL EVERY 6 HOURS PRN
Status: DISCONTINUED | OUTPATIENT
Start: 2021-01-26 | End: 2021-02-05 | Stop reason: HOSPADM

## 2021-01-26 RX ORDER — ONDANSETRON 2 MG/ML
4 INJECTION INTRAMUSCULAR; INTRAVENOUS EVERY 6 HOURS PRN
Status: DISCONTINUED | OUTPATIENT
Start: 2021-01-26 | End: 2021-02-05 | Stop reason: HOSPADM

## 2021-01-26 RX ORDER — LIDOCAINE HYDROCHLORIDE 20 MG/ML
INJECTION, SOLUTION INFILTRATION; PERINEURAL PRN
Status: DISCONTINUED | OUTPATIENT
Start: 2021-01-26 | End: 2021-01-26 | Stop reason: SDUPTHER

## 2021-01-26 RX ORDER — SODIUM CHLORIDE 0.9 % (FLUSH) 0.9 %
10 SYRINGE (ML) INJECTION PRN
Status: DISCONTINUED | OUTPATIENT
Start: 2021-01-26 | End: 2021-02-05 | Stop reason: HOSPADM

## 2021-01-26 RX ORDER — MORPHINE SULFATE 2 MG/ML
2 INJECTION, SOLUTION INTRAMUSCULAR; INTRAVENOUS EVERY 4 HOURS PRN
Status: DISCONTINUED | OUTPATIENT
Start: 2021-01-26 | End: 2021-02-05 | Stop reason: HOSPADM

## 2021-01-26 RX ORDER — PROPOFOL 10 MG/ML
INJECTION, EMULSION INTRAVENOUS PRN
Status: DISCONTINUED | OUTPATIENT
Start: 2021-01-26 | End: 2021-01-26 | Stop reason: SDUPTHER

## 2021-01-26 RX ORDER — SUCCINYLCHOLINE CHLORIDE 20 MG/ML
INJECTION INTRAMUSCULAR; INTRAVENOUS PRN
Status: DISCONTINUED | OUTPATIENT
Start: 2021-01-26 | End: 2021-01-26 | Stop reason: SDUPTHER

## 2021-01-26 RX ADMIN — HYDRALAZINE HYDROCHLORIDE 25 MG: 25 TABLET, FILM COATED ORAL at 21:36

## 2021-01-26 RX ADMIN — LIDOCAINE HYDROCHLORIDE 100 MG: 20 INJECTION, SOLUTION INFILTRATION; PERINEURAL at 13:28

## 2021-01-26 RX ADMIN — PHYTONADIONE 5 MG: 10 INJECTION, EMULSION INTRAMUSCULAR; INTRAVENOUS; SUBCUTANEOUS at 11:35

## 2021-01-26 RX ADMIN — SODIUM CHLORIDE, PRESERVATIVE FREE 10 ML: 5 INJECTION INTRAVENOUS at 21:44

## 2021-01-26 RX ADMIN — HYDRALAZINE HYDROCHLORIDE 25 MG: 25 TABLET, FILM COATED ORAL at 17:45

## 2021-01-26 RX ADMIN — PROPOFOL 100 MG: 10 INJECTION, EMULSION INTRAVENOUS at 13:28

## 2021-01-26 RX ADMIN — SODIUM CHLORIDE: 9 INJECTION, SOLUTION INTRAVENOUS at 13:23

## 2021-01-26 RX ADMIN — MORPHINE SULFATE 2 MG: 2 INJECTION, SOLUTION INTRAMUSCULAR; INTRAVENOUS at 17:18

## 2021-01-26 RX ADMIN — IRON SUCROSE 200 MG: 20 INJECTION, SOLUTION INTRAVENOUS at 11:08

## 2021-01-26 RX ADMIN — DEXTROSE AND SODIUM CHLORIDE: 5; 450 INJECTION, SOLUTION INTRAVENOUS at 05:13

## 2021-01-26 RX ADMIN — ACETAMINOPHEN 650 MG: 325 TABLET ORAL at 21:36

## 2021-01-26 RX ADMIN — VANCOMYCIN HYDROCHLORIDE 1000 MG: 1 INJECTION, POWDER, LYOPHILIZED, FOR SOLUTION INTRAVENOUS at 11:48

## 2021-01-26 RX ADMIN — SODIUM CHLORIDE, PRESERVATIVE FREE 10 ML: 5 INJECTION INTRAVENOUS at 11:48

## 2021-01-26 RX ADMIN — SUCCINYLCHOLINE CHLORIDE 140 MG: 20 INJECTION, SOLUTION INTRAMUSCULAR; INTRAVENOUS at 13:28

## 2021-01-26 RX ADMIN — Medication 10 ML: at 11:08

## 2021-01-26 ASSESSMENT — PULMONARY FUNCTION TESTS
PIF_VALUE: 17
PIF_VALUE: 18
PIF_VALUE: 0
PIF_VALUE: 16
PIF_VALUE: 19
PIF_VALUE: 18
PIF_VALUE: 17
PIF_VALUE: 39
PIF_VALUE: 5
PIF_VALUE: 17
PIF_VALUE: 3
PIF_VALUE: 17
PIF_VALUE: 17

## 2021-01-26 ASSESSMENT — PAIN - FUNCTIONAL ASSESSMENT: PAIN_FUNCTIONAL_ASSESSMENT: 0-10

## 2021-01-26 ASSESSMENT — PAIN SCALES - GENERAL
PAINLEVEL_OUTOF10: 0
PAINLEVEL_OUTOF10: 7

## 2021-01-26 ASSESSMENT — PAIN SCALES - WONG BAKER
WONGBAKER_NUMERICALRESPONSE: 0

## 2021-01-26 NOTE — PLAN OF CARE
Problem: Nutrition  Goal: Optimal nutrition therapy  Outcome: Ongoing   Nutrition Problem #1: Moderate malnutrition  Intervention: Food and/or Nutrient Delivery: Continue NPO, Start Tube Feeding  Nutritional Goals: Patient will tolerate EN to provide 75% or more of estimated nutrient needs while NPO.

## 2021-01-26 NOTE — PROGRESS NOTES
[address] [address]  INPATIENT PHYSICAL THERAPY  DAILY NOTE  STRZ RENAL TELEMETRY 6K - 6K-22/022-A    Time In: 0815  Time Out: 3244  Timed Code Treatment Minutes: 26 Minutes  Minutes: 26          Date: 2021  Patient Name: Eloy Fuentes,  Gender:  male        MRN: [id number]  : 3/30/1932  (80 y.o.)     Referring Practitioner: Cheri Piper DO  Diagnosis: Hyponatremia  Additional Pertinent Hx: Per ER note on 2021:88 y.o. male history of CVA, CAD, hypertension, hyperlipidemia, chronic atrial fibrillation on anticoagulation with Coumadin was recently admitted and discharged from [facility] for asymptomatic bradycardia management. He also had a history of recent falls. He presented to the emergency department for evaluation of unwitnessed fall at home today morning in his bathroom by his family members. He was found on the ground and was down for unknown length of time. Prior Level of Function:  Lives With: Alone  Type of Home: House  Home Layout: One level  Home Access: Level entry  Home Equipment: Cane   Bathroom Shower/Tub: Walk-in shower  Bathroom Toilet: Handicap height  Bathroom Equipment: Shower chair, Grab bars in shower    ADL Assistance: [address]: Independent  Transfer Assistance: Independent  Additional Comments: Pt reports fully indep PLOF without AD. Restrictions/Precautions:  Restrictions/Precautions: Contact Precautions, Fall Risk  Position Activity Restriction  Other position/activity restrictions: COVID isolation, very [place]; on room air on      SUBJECTIVE: RN approved session and patient up to chair. Patient in bed upon arrival, agreed and cooperative for therapy. Min. Confusion noted. PAIN: No pain noted.      OBJECTIVE:  Bed Mobility:  Supine to Sit: Maximum Assistance, X 1, with head of bed raised, with verbal cues , with increased time for completion  Scooting: Minimal Assistance, X 1, with verbal cues , to scoot forward on EOB; Mod-Max. A x2 to scoot back in recliner due to pt size. Transfers:  Sit to Stand: Minimal Assistance, X 2, with increased time for completion, cues for hand placement, from EOB  Stand to Sit:Minimal Assistance, X 2, cues for hand placement, with verbal cues, for lining up with chair and reaching back before sitting    Ambulation:  Contact Guard Assistance, Minimal Assistance, X 2, with verbal cues , with increased time for completion  Distance: 4 ft. x1  Surface: Level Tile  Device:Rolling Walker  Gait Deviations: Forward Flexed Posture, Slow Sol, Decreased Step Length Bilaterally, Decreased Gait Speed, Decreased Heel Strike Bilaterally, Narrow Base of Support, Mild Path Deviations, Unsteady Gait, Decreased Terminal Knee Extension bilaterally, delayed advancement of RLE during swing phase but was able to complete without assist this date just required verbal cues; required verbal cues for sequencing walker with turns. Balance:  Static Sitting Balance:  Stand By Assistance, sitting on EOB, unsupported, close SBA for safety  Static Standing Balance: Contact Guard Assistance, Minimal Assistance, X 2, with BUE support on AD, verbal cues for posture  Dynamic Standing Balance: Contact Guard Assistance, Minimal Assistance, X 2, with cues for safety, with verbal cues , with verbal cues for posture and widening PAMELA    Exercise:  Patient was guided in 1 set(s) 10 reps of exercise to both lower extremities. Ankle pumps, Quad sets, Heelslides, Hip abduction/adduction, Seated marches and Long arc quads. Exercises were completed for increased independence with functional mobility. Functional Outcome Measures: Completed  AM-PAC Inpatient Mobility Raw Score : 9  -[facility] Inpatient T-Scale Score : 30.55    ASSESSMENT:  Assessment: Patient progressing toward established goals. The patient tolerated session fairly well, showing steady progress at this time.  Still demonstrates increased limitations due to generalized weakness, increased instability and overall deconditioning that affects safe functional mobility. Would benefit from additional skilled PT to increase strength, endurance, balance and safety for improved functional mobility. Activity Tolerance:  Patient tolerance of  treatment: good. Limited by endurance. Equipment Recommendations:Equipment Needed: Yes(may need RW)  Discharge Recommendations:  [address], Patient would benefit from continued therapy after discharge    Plan: Times per week: 4-5 X GM  Current Treatment Recommendations: Strengthening, Transfer Training, Endurance Training, Balance Training, Functional Mobility Training, Gait Training, Home Exercise Program    Patient Education  Patient Education: Plan of Care, Precautions/Restrictions, [employer] Mobility, Transfers, Reviewed Prior Education, Gait, Health Promotion and Wellness Education, - Patient Requires Continued Education    Goals:  Patient goals : Get stronger and return to prior level  Short term goals  Time Frame for Short term goals: By discharge  Short term goal 1: supine to sit and return with min of 1 to get in and out of bed  Short term goal 2: sit to stand with min of 1 consistently to get on and off various surfaces  Short term goal 3: Pt to ambulate with RW 30 feet with CGA to walk safely to bathroom  Long term goals  Time Frame for Long term goals : NA due to short ELOS    Following session, patient left in safe position with all fall risk precautions in place.

## 2021-01-26 NOTE — DISCHARGE INSTR - COC
Continuity of Care Form    Patient Name: Jm Russell   :  3/30/1932  MRN:  [id number]    [address] [place] [address] date:  2021  Discharge date:  2021    Code Status Order: Full Code   Advance Directives:      Admitting Physician:  Kilo Leonardo MD  PCP: Tete Portillo DO    Discharging Nurse: [id number] Unit/Room#: 6K-21/200-A  Discharging Unit Phone Number: [phone]    Emergency Contact:   Extended Emergency Contact Information  Primary Emergency Contact: Lata Keith  Address: [facility]             [address] Phone: [phone]  Relation: Child  Secondary Emergency Contact: Luis [address] Phone: [phone]  Relation: Child    Past Surgical History:  Past Surgical History:   Procedure Laterality Date    ABDOMEN SURGERY      ABDOMINAL ADHESION SURGERY  2012    Dr Dayna Sagastume      right    COLONOSCOPY      [facility]    CORONARY ARTERY BYPASS GRAFT  2009    SMALL INTESTINE SURGERY      SMALL INTESTINE SURGERY  2015    Exploratory Laparotomy       Immunization History:   Immunization History   Administered Date(s) Administered    Influenza Vaccine, unspecified formulation 10/25/2016    Influenza Virus Vaccine 11/15/2013, 2014, 10/22/2015    Pneumococcal Polysaccharide ([id number]) 2014    Zoster Live (Zostavax) 2012       Active Problems:  Patient Active Problem List   Diagnosis Code    Hyperlipidemia E78.5    Essential hypertension I10    Carotid artery disease (HCC) I77.9    S/P CABG (coronary artery bypass graft) Z95.1    Inguinal hernia, left K40.90    Generalized abdominal pain R10.84    Hyponatremia E87.1    Leukocytosis D72.829    Enteritis K52.9    SIRS (systemic inflammatory response syndrome) ([address] R65.10    Acidosis E87.2    Small bowel obstruction ([address] K56.609    Small bowel ischemia (HCC) K55.9    Anticoagulated on Coumadin Z79.01 History of cardioversion Z98.890    Hiatal hernia K44.9    Coronary artery disease involving native coronary artery of native heart without angina pectoris I25.10    History of stroke Z86.73    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 6XXA    Fall W19. XXXA    Supratherapeutic INR R79.1    Coagulopathy (HCC) D68.9    COVID-19 U07.1    Elevated CK R74.8       Isolation/Infection:   Isolation            No Isolation          Patient Infection Status       Infection Onset Added Last Indicated Last Indicated By Review Planned Expiration Resolved Resolved By    None active    Resolved    COVID-19 01/18/21 01/18/21 01/18/21 COVID-19   01/25/21 Zahira Wright, RN    S/S 1/11, +1/18    COVID-19 Rule Out 01/18/21 01/18/21 01/18/21 COVID-19 (Ordered)   01/18/21 Rule-Out Test Resulted            Nurse Assessment:  Last Vital Signs: /61   Pulse 63   Temp 98.7 °F (37.1 °C) (Oral)   Resp 18   Ht 5' 4\" (1.626 m)   Wt 169 lb 12.8 oz (77 kg)   SpO2 92%   BMI 29.15 kg/m²     Last documented pain score (0-10 scale): Pain Level: 0  Last Weight:   Wt Readings from Last 1 Encounters:   01/24/21 169 lb 12.8 oz (77 kg)     Mental Status:  oriented, alert and Periods of confuson     IV Access:  - None     Nursing Mobility/ADLs:  Walking   Assisted  Transfer  Assisted  Bathing  Assisted  Dressing  Assisted  Toileting  Assisted  Feeding  Assisted  Med Admin  Assisted  Med Delivery   crushed down PEG tube    Wound Care Documentation and Therapy:  Wound 02/26/15 Abrasion(s) Abdomen Left adhesive torn skin (Active)   Number of days: 2161       Incision 02/20/15 Abdomen Mid (Active)   Number of days: 2167       Wound 01/24/21 Left unstagable (Active)   Wound Etiology Pressure Unstageable 01/25/21 0856   Dressing/Treatment Open to air 01/26/21 0820   Wound Assessment Dry;Purple/maroon 01/26/21 0820   Drainage Amount None 01/26/21 0820   Odor None 01/26/21 0820   Marcela-wound Assessment Intact 01/26/21 0820   Number of days: 2        Elimination:  Continence: Bowel: No  Bladder: No  Urinary Catheter: Insertion Date: 02/03/2021 - due to urinary retention     Colostomy/Ileostomy/Ileal Conduit: No       Date of Last BM: 2/3/2021    Intake/Output Summary (Last 24 hours) at 1/26/2021 1014  Last data filed at 1/26/2021 0515  Gross per 24 hour   Intake 805.42 ml   Output 875 ml   Net -69.58 ml     I/O last 3 completed shifts: In: 805.4 [I.V.:805.4]  Out: 875 [Urine:875]    Safety Concerns:     History of Falls (last 30 days), At Risk for Falls and periods of confusion     Impairments/Disabilities:      Vision and Hearing - Hearing aids     Nutrition Therapy:  Current Nutrition Therapy:   - Tube Feedings:  Jevity 1.5 , continuous feeding 50 ml/hr, free water flushes Q6H 240 ml    Routes of Feeding: Gastrostomy Tube  Liquids: No Liquids  Daily Fluid Restriction: no  Last Modified Barium Swallow with Video (Video Swallowing Test): not done    Treatments at the Time of Hospital Discharge:   Respiratory Treatments: n/a  Oxygen Therapy:  is not on home oxygen therapy.   Ventilator:    - No ventilator support    Rehab Therapies: Physical Therapy, Occupational Therapy and Speech/Language Therapy  Weight Bearing Status/Restrictions: No weight bearing restirctions  Other Medical Equipment (for information only, NOT a DME order):  walker, bedside commode and hospital bed  Other Treatments: n/a    Patient's personal belongings (please select all that are sent with patient):  Glasses, Hearing Aides bilateral , electric razor, offloading boots    RN SIGNATURE:  Electronically signed by Val Silva RN on 2/3/21 at 11:08 AM EST    CASE MANAGEMENT/SOCIAL WORK SECTION    Inpatient Status Date: 1/17/21    Readmission Risk Assessment Score:  Readmission Risk              Risk of Unplanned Readmission:        29 Discharging to Facility/ Agency   Name:    Address: [address] [place] [address]. [facility], [address]  [facility] [address]    Dialysis Facility (if applicable)   Name:  Address:  Dialysis Schedule:  Phone:  Fax:    / signature: Electronically signed by CASSI Salazar on 1/26/21 at 10:15 AM EST    PHYSICIAN SECTION    Prognosis: Fair    Condition at Discharge: Stable    Rehab Potential (if transferring to Rehab): Fair    Recommended Labs or Other Treatments After Discharge: *daily cbc and protime    Physician Certification: I certify the above information and transfer of Kay Smith  is necessary for the continuing treatment of the diagnosis listed and that he requires [facility] for greater 30 days.      Update Admission H&P: No change in H&P    PHYSICIAN SIGNATURE:  Electronically signed by Anam Bee MD on 2/5/21 at 12:38 PM EST

## 2021-01-26 NOTE — PROGRESS NOTES
[address] [address]  SPEECH THERAPY MISSED TREATMENT NOTE  [facility] RENAL TELEMETRY 6K      Date: 2021  Patient Name: Kenneth Eng        MRN: [id number]    : 3/30/1932  (80 y.o.)    REASON FOR MISSED TREATMENT:  Attempted to see pt at [phone] for completion of dysphagia interventions. GERARDO Ramos Doctor reporting pt URIEL for PEG tube placement. Will plan to resume per established POC on subsequent tx date.     Cintia Broussard M.A., [address] [place] [address]

## 2021-01-26 NOTE — PROGRESS NOTES
Spoke with Dr Angel Garcia regarding 2 successful NG tube insertion but pt having uncontrollable coughing until the NGs were removed. STAT order for INR placed, drawn and sent to lab. Order to receive consent from POA for PEG tube and hopes to do it today. Discussed if unable to do place today then maybe adding TPN for some nutrition, Dr Angel Garcia agreed but stated to defer that to hospitalist. Also placed order for Vanc 1gm to be given 1 hr prior to PEG tube placement. All orders placed.

## 2021-01-26 NOTE — H&P
[address] [address]  Sedation/Analgesia History & Physical    Patient: Viv Murray : 3/30/1932  Med Rec#: [id number] Acc#: [id number]   Provider Performing Procedure: Claudeen Sprinkle, MD  Primary Care Physician: Doug Johnson DO    PRE-PROCEDURE   Brief History/Pre-Procedure Diagnosis:The patient is a 80 y.o.,  male with significant past medical history of oropharyngeal dysphagia. MEDICAL HISTORY  []CAD/Valve  []Liver Disease  []Lung Disease []Diabetes  []Hypertension []Renal Disease  [x]Additional information:       has a past medical history of Actinic keratoses, Adenomatous polyp, Atrial fibrillation ([facility] [address] CAD (coronary artery disease), Carotid artery disease ([facility] [address] [facility] Hyperlipidemia, Hypertension, Inguinal hernia, left, and Unspecified cerebral artery occlusion with cerebral infarction. SURGICAL HISTORY   has a past surgical history that includes Cataract removal (); Coronary artery bypass graft (); Colonoscopy; Abdominal adhesion surgery (2012); Small intestine surgery; Small intestine surgery (2015); Cardiac surgery; and Abdomen surgery.   Additional information:       ALLERGIES   Allergies as of 2021    (No Known Allergies)     Additional information:       MEDICATIONS       Current Facility-Administered Medications:     0.9 % sodium chloride infusion, , Intravenous, PRN, Alisha Haile MD    [Held by provider] enoxaparin (LOVENOX) injection 80 mg, 1 mg/kg, Subcutaneous, Q12H, Timothy Pringle MD, Stopped at 21 56    dextrose 5 % and 0.45 % sodium chloride infusion, , Intravenous, Continuous, Olga Lidia Mari DO, Last Rate: 30 mL/hr at 21, New Bag at 21    melatonin tablet 3 mg, 3 mg, Oral, Nightly, Bernard Guzman MD, 3 mg at 21 2017    calcium replacement protocol, , Other, RX Placeholder, Merril Orchard, DO    phosphorus replacement protocol, , Other, RX Placeholder, Merril Orchard, DO    potassium chloride (KLOR-CON M) extended release tablet 40 mEq, 40 mEq, Oral, PRN **OR** potassium bicarb-citric acid (EFFER-K) effervescent tablet 40 mEq, 40 mEq, Oral, PRN **OR** potassium chloride 10 mEq/100 mL IVPB (Peripheral Line), 10 mEq, Intravenous, PRN, Jacob Cabezas, DO    potassium chloride 20 mEq/50 mL IVPB (Central Line), 20 mEq, Intravenous, PRN, Jacob Cabezas, DO    potassium chloride (KLOR-CON M) extended release tablet 20 mEq, 20 mEq, Oral, PRN, Jacob Cabezas, DO    acetaminophen (TYLENOL) tablet 650 mg, 650 mg, Oral, Q6H PRN, 650 mg at 01/20/21 0100 **OR** acetaminophen (TYLENOL) suppository 650 mg, 650 mg, Rectal, Q6H PRN, Christin Sammie Councilman, APRN - CNP    lactobacillus (CULTURELLE) capsule 1 capsule, 1 capsule, Oral, Daily with breakfast, Jacob Cabezas, DO, 1 capsule at 01/22/21 0859    aspirin EC tablet 81 mg, 81 mg, Oral, Daily, Subha Hackett MD, 81 mg at 01/22/21 0858    cyclobenzaprine (FLEXERIL) tablet 5 mg, 5 mg, Oral, TID PRN, Subha Hackett MD, 5 mg at 01/20/21 0243    hydrALAZINE (APRESOLINE) tablet 25 mg, 25 mg, Oral, TID, Christin Sammie Councilman, APRN - CNP, 25 mg at 01/22/21 0858    pantoprazole (PROTONIX) tablet 40 mg, 40 mg, Oral, BID AC, Subha Hackett MD, 40 mg at 01/22/21 0902    [Held by provider] pravastatin (PRAVACHOL) tablet 40 mg, 40 mg, Oral, QPM, Subha Hackett MD, 40 mg at 01/17/21 2157    tamsulosin (FLOMAX) capsule 0.4 mg, 0.4 mg, Oral, Daily, Subha Hackett MD, 0.4 mg at 01/22/21 0859    sodium chloride flush 0.9 % injection 10 mL, 10 mL, Intravenous, 2 times per day, Subha Hackett MD, 10 mL at 01/26/21 1148    sodium chloride flush 0.9 % injection 10 mL, 10 mL, Intravenous, PRN, Subha Hackett MD, 10 mL at 01/26/21 1108    promethazine (PHENERGAN) tablet 12.5 mg, 12.5 mg, Oral, Q6H PRN **OR** ondansetron (ZOFRAN) injection 4 mg, 4 mg, Intravenous, Q6H PRN, Darrin HEAD MD Shay    polyethylene glycol (GLYCOLAX) packet 17 g, 17 g, Oral, Daily PRN, Vandana Avila MD    magnesium sulfate 2 g in 50 mL IVPB premix, 2 g, Intravenous, PRN, ROBBIE Pina CNP, Stopped at 01/18/21 0013    mupirocin (BACTROBAN) 2 % ointment, , Topical, PRN, ROBBIE Pina CNP  Prior to Admission medications    Medication Sig Start Date End Date Taking? Authorizing Provider   hydrALAZINE (APRESOLINE) 25 MG tablet Take 1 tablet by mouth 3 times daily 12/12/20  Yes Ifrah Russell PA-C   tamsulosin [facility]) 0.4 MG capsule Take 1 capsule by mouth daily 1/16/20  Yes Kwaku Avery MD   warfarin (COUMADIN) 3 MG tablet Take 1 tablet by mouth See Admin Instructions Indications: Anticoagulant Therapy, [facility], #45=30 days supply 9/19/17  Yes Estrella Acosta MD   furosemide (LASIX) 20 MG tablet Take 1 tablet by mouth daily 4/4/17  Yes Aviva Kline MD   pantoprazole (PROTONIX) 40 MG tablet Take 1 tablet by mouth 2 times daily (before meals). 3/1/15  Yes ROBBIE Tom CNP   pravastatin (PRAVACHOL) 40 MG tablet Take 40 mg by mouth every evening. Indications: Blood Cholesterol Abnormal   Yes Historical Provider, MD   aspirin 81 MG EC tablet Take 81 mg by mouth daily Indications: Anticoagulant Therapy Take with food. Yes Historical Provider, MD   cyclobenzaprine (FLEXERIL) 5 MG tablet Take 1 tablet by mouth 3 times daily as needed for Muscle spasms  Patient not taking: Reported on 1/6/2021 1/5/21   Alyssa Amaya PA-C     Additional information:       PHYSICAL:    height is 5' 4\" (1.626 m) and weight is 169 lb 12.8 oz (77 kg). His oral temperature is 98.9 °F (37.2 °C). His blood pressure is 154/67 (abnormal) and his pulse is 48 (abnormal). His respiration is 20 and oxygen saturation is 92%.    Heart:  [x]Regular rate and rhythm  []Other:    Lungs:  [x]Clear []Other:    Abdomen: [x]Soft    []Other:    Mental Status: [x]Alert & Oriented  []Other:      VITAL SIGNS   Patient Vitals for the past 24 hrs:   BP Temp Temp src Pulse Resp SpO2   01/26/21 1309 (!) 154/67 -- -- (!) 48 20 --   01/26/21 1042 (!) 159/68 98.9 °F (37.2 °C) Oral 61 16 --   01/26/21 1017 (!) 161/70 99.1 °F (37.3 °C) -- 64 16 --   01/26/21 0800 125/61 98.7 °F (37.1 °C) Oral 63 18 92 %   01/26/21 0422 (!) 154/67 98.1 °F (36.7 °C) Oral 64 18 93 %   01/26/21 0021 135/65 98.2 °F (36.8 °C) Oral 57 16 92 %   01/25/21 1918 (!) 141/84 98 °F (36.7 °C) Oral 64 16 94 %   01/25/21 1642 (!) 156/72 97.7 °F (36.5 °C) Oral 66 16 97 %       PLANNED PROCEDURE     [x]PEG  []Colonoscopy []Flex Sigmoid  []ERCP []EUS   []Cystoscopy  [] CATH [] BRONCH     Consent: I have discussed with the patient and/or the patient representative the indication, alternatives, and the possible risks and/or complications of the planned procedure and the anesthesia methods. The patient and/or patient representative appear to understand and agree to proceed. SEDATION (SEE ANESTHESIA NOTE FOR DETAILS)    ASA Classification: Class 3 - A patient with severe systemic disease that limits activity but is not incapacitating    Airway Assessment: normal    Monitoring and Safety: The patient will be placed on a cardiac monitor and vital signs, pulse oximetry and level of consciousness will be continuously evaluated throughout the procedure. The patient will be closely monitored until recovery from the medications is complete and the patient has returned to baseline status. Respiratory therapy will be on standby during the procedure. [x]Pre-procedure diagnostic studies complete and results available. Comment:    [x]Previous sedation/anesthesia experiences assessed. Comment:    [x]The patient is an appropriate candidate to undergo the planned procedure sedation and anesthesia.  (Refer to nursing sedation/analgesia documentation record)  [x]Formulation and discussion of sedation/procedure plan, risks, and expectations with patient and/or responsible adult completed. [x]Patient examined immediately prior to the procedure.  (Refer to nursing sedation/analgesia documentation record)    Cuca Lane MD   Electronically signed 1/26/2021 at 1:17 PM

## 2021-01-26 NOTE — ANESTHESIA POSTPROCEDURE EVALUATION
Department of Anesthesiology  Postprocedure Note    Patient: Lugene Hodgkin  MRN: [id number]  YOB: 1932  Date of evaluation: 1/26/2021  Time:  6:18 PM     Procedure Summary     Date: 01/26/21 Room / Location: [address] [place] [address] / [address] [place] [address]    Anesthesia Start: 2416 Anesthesia Stop: 1400    Procedure: EGD ESOPHAGOGASTRODUODENOSCOPY PEG TUBE INSERTION (N/A ) Diagnosis: (Hyponatremia)    Surgeons: Willow Talbot MD Responsible Provider: Bin Orellana MD    Anesthesia Type: general ASA Status: 4          Anesthesia Type: general    Florence Phase I: Florence Score: 9    Florence Phase II:      Last vitals: Reviewed and per EMR flowsheets. Anesthesia Post Evaluation    Patient location during evaluation: PACU  Patient participation: complete - patient participated  Level of consciousness: awake and alert  Airway patency: patent  Nausea & Vomiting: no nausea and no vomiting  Complications: no  Cardiovascular status: hemodynamically stable  Respiratory status: acceptable  Hydration status: euvolemic      [facility]  POST-ANESTHESIA NOTE       Name:  Lugene Hodgkin                                         Age:  80 y.o.   MRN:  [id number]      Last Vitals:  BP (!) 179/81   Pulse 80   Temp 98.9 °F (37.2 °C) (Oral)   Resp 22   Ht 5' 4\" (1.626 m)   Wt 169 lb 12.8 oz (77 kg)   SpO2 98%   BMI 29.15 kg/m²   Patient Vitals for the past 4 hrs:   BP Temp Temp src Pulse Resp SpO2   01/26/21 1740 (!) 179/81 98.9 °F (37.2 °C) Oral 80 22 98 %   01/26/21 1715 (!) 189/73 98.8 °F (37.1 °C) Oral 58 22 --   01/26/21 1532 (!) 162/78 -- -- -- -- --   01/26/21 1511 (!) 189/78 97.5 °F (36.4 °C) Oral 68 18 97 %   01/26/21 1440 (!) 151/66 -- -- 70 27 97 %   01/26/21 1435 (!) 156/70 -- -- 73 17 97 %   01/26/21 1430 138/61 -- -- 72 23 97 %   01/26/21 1425 134/63 -- -- 70 26 96 %   01/26/21 1420 (!) 118/58 -- -- 77 18 95 %       Level of Consciousness:  Awake    Respiratory:

## 2021-01-26 NOTE — PROGRESS NOTES
[facility]   Nephrology progress note  1/26/2021, 8:07 AM      Pt Name:    Jm Russell  MRN:     [id number]     YOB: 1932  Admit Date:    1/17/2021  2:31 PM  Primary Care Physician:  Tete Portillo DO   Room number  5G-15/658-W    Chief Complaint: Nephrology following for hyponatremia    Subjective:  Patient seen and examined. Alert and oriented x3. Responding to questions appropriately. No chest pain or shortness of breath. Feels okay. Afebrile. / 24 hrs  PEG tube scheduled for today contingent on INR level. Objective:  24HR INTAKE/OUTPUT:      Intake/Output Summary (Last 24 hours) at 1/26/2021 0807  Last data filed at 1/26/2021 0515  Gross per 24 hour   Intake 805.42 ml   Output 875 ml   Net -69.58 ml     I/O last 3 completed shifts: In: 805.4 [I.V.:805.4]  Out: 875 [Urine:875]  No intake/output data recorded. Admission weight: 162 lb (73.5 kg)  Wt Readings from Last 3 Encounters:   01/24/21 169 lb 12.8 oz (77 kg)   01/06/21 162 lb (73.5 kg)   01/05/21 160 lb (72.6 kg)     Body mass index is 29.15 kg/m².     Physical examination  VITALS:     Vitals:    01/25/21 1918 01/26/21 0021 01/26/21 0422 01/26/21 0800   BP: (!) 141/84 135/65 (!) 154/67 125/61   Pulse: 64 57 64 63   Resp: 16 16 18 18   Temp: 98 °F (36.7 °C) 98.2 °F (36.8 °C) 98.1 °F (36.7 °C) 98.7 °F (37.1 °C)   TempSrc: Oral Oral Oral Oral   SpO2: 94% 92% 93% 92%   Weight:       Height:         General Appearance: alert and cooperative with exam, appears comfortable, no distress  Mouth/Throat: Oral mucosa moist  Neck: No JVD  Lungs: Normal respiratory effort  Heart:  Irregularly irregular  GI: soft, non-tender, no guarding  Extremities: Right 1+ LE edema      Lab Data  CBC:   Recent Labs     01/24/21  0511 01/25/21  0449 01/26/21  0454   WBC 10.1 8.5 7.8   HGB 8.2* 8.3* 8.2*   HCT 25.0* 25.8* 26.0*    313 306     BMP:  Recent Labs     01/24/21  0511 01/25/21  0449 01/26/21  0454    139

## 2021-01-26 NOTE — PROGRESS NOTES
Pt. Resting in bed. Denies pain at this time. Bed alarm on. Call light within reach. Denies needs at this time.

## 2021-01-26 NOTE — PROGRESS NOTES
[facility]  OCCUPATIONAL THERAPY MISSED TREATMENT NOTE  [facility] RENAL TELEMETRY 6K  6K-22/022-A      Date: 2021  Patient Name: Margarita Bray        CSN: [id number]   : 3/30/1932  (80 y.o.)  Gender: male   Referring Practitioner: Dr. Aileen Rojas  Diagnosis: hyponatremia         REASON FOR MISSED TREATMENT: OT attempted 2x this AM. 1st attempt physical therapy in with patient. 2nd attempt pt just returned to bed with nursing staff. Pt scheduled for PEG placement this PM. OT will see as time allows.

## 2021-01-26 NOTE — PROCEDURES
[facility]  PEG Tube placement    Pt Name: Viv Murray  MRN: [id number]  YOB: 1932                [facility] Endoscopy  PEG       Patient: Viv Murray  : 3/30/1932  Acct#: [de-identified]  PROCEDURE PERFORMED:  PEG tube placement. MEDICATIONS GIVEN:  General anesthesia was used, see Anesthesia note for details. Vancomycin 1g IV given pre procedure within 30 minutes of procedure. One Unit of FFP and Vitamin K 5mg IV given pre procedure. INDICATIONS FOR PROCEDURE: 80 y.o. , male with oropharyngeal dysphagia. Post COVID. Hx of peptic ulcer disease. Endoscopist:  Claudeen Sprinkle, MD    DESCRIPTION OF PROCEDURE: After obtaining informed consent from the [address] [place] [address], Dr. Damari Garcia, the upper endoscope was placed into the mouth and advanced under direct visualization to the second portion of the duodenum. Airway was assessed and is adequate for anesthesia. The patient was placed in the supine position. The patient was continuously monitored to ensure adequate sedation and patient   safety. FINDINGS:   After transillumination and indentation of the gastric wall by external finger pressure was demonstrated, the site was prepped and draped in a sterile manner. Chlorhexidine was used to sterilize the skin. A seeker needle was used, and 2ml of lidocaine 2% was injected around the PEG site. Using a surgical blade, a small incision of the abdominal wall was made at the PEG site. A 25-guage needle with cannula was inserted through the abdominal wall and into the gastric lumen. A guidewire was passed through the cannula and grasped with a snare and removed through the mouth. Next, a 24 English PEG tube was secured to the guidewire and pulled through the abdominal wall. A satisfactory final position was confirmed endoscopically. The postprocedure appearance was satisfactory. The external bumper position is at  4 cm at the skin.       COMPLICATIONS: The patient tolerated the procedure well. There were no immediate complications. EBL : < 10 mL    SUMMARY:  1. Successful placement of a 24 Korean PEG tube at 4 cm at the skin. 2. Otherwise, Normal endoscopic exam to the second portion of the duodenum. RECOMMENDATIONS:  1. PEG orders written. 2. Change dressings under the anchor daily x 3 days. 3. Avoid Lovenox/anticoagulation for the next 12 hours. Restart Lovenox at 0400.   4.    Consult Nutrition for Enteral feeding prescription, may begin using PEG  At 1800 for nutrition. 4. May begin using PEG for medications and flushing at 1500.  5. Consult Pharmacy for medication compatibility. 6. GI to see tomorrow AM for PEG check. 7. I talked to Dr. Murillo Memos after the procedure and informed him of the procedure, and everything went well. 8. Home team to decide on [employer], when needs to be removed, exchanged; also if Rocephin is still needed. 9. Abdominal Binder.    10. Give one Unit of FFP still, patient only received one unit this AM.     Gil Pardo MD, [facility]

## 2021-01-26 NOTE — PROGRESS NOTES
Spoke with Dr Dayna Grewal, updated him that patient's INR was 2.27 order received to give 2 units FFP and draw INR stat 30 min after finished

## 2021-01-26 NOTE — PROGRESS NOTES
placed. Note successfully placed, able to use PEG for TF's at 1800 today per nutrition recommendations. Discussed plans with RN, high usage of TF pumps in house, limited supply so may have to do bolus feeding. Note no BM documented in 9 days, glycolax prn available. 1/26/21: Sodium 139, Potassium 3.9, BUN 14, Creatinine 0.9, Glucose 87. Rx: D5.45 NaCl at 30 ml/hr, culturelle, pravachol, protonix. Wounds:  (left heel: unstageable)       Current Nutrition Therapies:    Diet NPO Effective Now Exceptions are: Other (Specify); Specify Other: Pending Feese swallow test; No meds, either  DIET TUBE FEED CONTINUOUS/CYCLIC NPO; 1.5 Calorie with Fiber (Jevity 1.5); Gastrostomy  Current Tube Feeding (TF) Orders:  · Feeding Route: PEG(1/26/21 Dr. Diego Li)  · Formula: Standard w/Fiber(Jevity 1.5)  · Schedule: Continuous  · Water Flushes: Recommend 240 mls 4 times per day when IVF's stopped. · Goal TF & Flush Orders Provides: Jevity 1.5 goal 50 ml/hr (or 300 mls bolus, 4 times per day pending TF pump availability)~ 1200 mls, 1800 kcals, 77 grams protein, 259 grams CHO, 26 grams fiber, 1872 mls total water (TF+ flushes) per 24 hours      Anthropometric Measures:  · Height: 5' 4\" (162.6 cm)  · Current Body Weight: 169 lb 12.8 oz (77 kg)(1/24; +1 non pitting, trace edema, bedscale; note variance from admission)   · Admission Body Weight: 152 lb 5.4 oz (69.1 kg)(1/17 +2 edema)    · Usual Body Weight: (per EMR: 2/25/20: 174# 11.2 oz, 12/9/20: 170# 11.2 oz)     · Ideal Body Weight: 130 lbs;   · BMI: 29.1  · Adjusted Body Weight:  ; No Adjustment   · BMI Categories: Overweight (BMI 25.0-29. 9)       Nutrition Diagnosis:   · Moderate malnutrition related to inadequate protein-energy intake as evidenced by poor intake prior to admission, mild muscle loss      Nutrition Interventions:   Food and/or Nutrient Delivery:  Continue NPO, Start Tube Feeding  Nutrition Education/Counseling:  Education not appropriate   Coordination of Nutrition Care:  Continue to monitor while inpatient    Goals:  Patient will tolerate EN to provide 75% or more of estimated nutrient needs while NPO. Nutrition Monitoring and Evaluation:   Behavioral-Environmental Outcomes:  None Identified   Food/Nutrient Intake Outcomes:  Diet Advancement/Tolerance, Enteral Nutrition Intake/Tolerance, IVF Intake  Physical Signs/Symptoms Outcomes:  Biochemical Data, Chewing or Swallowing, GI Status, Fluid Status or Edema, Nutrition Focused Physical Findings, Skin, Weight     Discharge Planning:     Too soon to determine     Electronically signed by Shonda Maldonado RD, LD on 1/26/21 at 3:38 PM EST    Contact: [phone]

## 2021-01-26 NOTE — PROGRESS NOTES
Hospitalist Progress Note    Patient:  Tio Goodwin      Unit/Bed:6K-22/022-A    YOB: 1932    MRN: [id number]       Acct: [de-identified]     PCP: Lethia Babinski, DO    Date of Admission: 1/17/2021    Assessment/Plan:    Anticipated Discharge in :     LEONEL/Lur[facility]ell [address] Problems    Diagnosis Date Noted    COVID-19 [U07.1]     Elevated CK [R74.8]     Traumatic rhabdomyolysis ([facility] [address] Arlean Gather. 6XXA]     Fall [W19. XXXA]     Supratherapeutic INR [R79.1]     Coagulopathy ([facility] [address] [D68.9]     Hyponatremia [E87.1] 02/19/2015     Pneumonia secondary to COVID 19  Patient tested positive for COVID-19 on 1/18. Symptoms first started on 1/11/2021, now out of isolation  Completed 5 days of Decadron and IV Remdisivir. Discontinue Rocephin (finished 8 days)  Continue Vitamin C, Vitamin D, Zinc, Acapella and IS. VS stable, afebrile and saturating well on RA  Discontinue daily fibrinogen, dimer monitoring    Acute Hypoxic respiratory failure, resolved  on room air    Hyponatremia, severe, likely pre-renal in etiology  Nephrology consulted. Sodium was slowly corrected, currently 139  Currently on D5 0.45 NS at 30cc/hr as patient is still NPO    Acute Metabolic Encephalopathy, likely from COVID and severe hyponatremia, improved  Currently NPO, as he failed swallow evaluation  Continue speech therapy  Patient is alert and coherent today, answers questions appropriately    Dysphagia  Continue SLP  Patient failed his swallow eval again despite improvement of mentation  Discussed with patient and his children on 1/25, Zaynab Campos and Charo Dior, and they want to proceed with PEG  Consult Dr. Janae Huang group  Dietitian consult for PEG feeding    Syncope & Collapse  Likely secondary to dehydration, hyponatremia and COVID infection  Pt was found unresponsive and down prior to admission. Cardiology and Neurology consulted. EEG was negative. MRI no acute CVA. Cardiology signed off.       Chronic Atrial Fibrillation with SVR (HR 60-70's)  Cardiology consulted. Avoid AV blocking agents. No indication for PPM.   Bradycardia possibly aggravated by remdesivir usage, which has been stopped. On lovenox for now, resume coumadin after PEG insertion if ok with GI  Continue telemetry monitoring    Acute Traumatic Rhabdomyolysis   CK trended down from 9463 to 1111. Will stop checking. Continue gentle fluid hydration     Normocytic Anemia, RAZA vs dilutional  Hgb 10 on admission, now stable around 8  No gross bleeding noted  Iron panel suggests RAZA, will start iron supplementation    Elevated Troponin  Cardiology consulted. Likely demand mismatch. They recommended no acute intervention. Essential HTN  Pressure has been labile. Continue home medications. HFpEF, chronic  Latest echo (12/20) EF 55 to 60%. CKD, Stage 3  Creatinine stable around 1.0    BPH  Aware. Hearing Loss  Hearing aids. Deconditioning  Speech, Physical, Occupational Therapy. ECF placement (lima con)    Disposition  Consult GI for PEG  Continue hydration with D5 containing fluid while NPO  ECF placement on discharge in 1-2 days    Chief Complaint:   [facility] Course:   77-year-old male presented to [facility] via EMS after sustaining an unwitnessed fall from standing position.  Patient was found on ground and unknown length of time spent on ground.  Per HPI patient son reports patient was progressively more weak over the last week and acting like himself up until the day before arrival.  On day of arrival patient's son states they were unable to get a hold of him therefore they went to his house where they found him on the bathroom floor. Armand Meyers was brought in via EMS to [facility] emergency room department.     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     Of note patient was recently hospitalized at [facility] 12/9/2020 for asymptomatic bradycardia in which is beta-blocker and calcium blocker were stopped and follow-up with Dr. Taz Callejas and plans for 10-day event monitor were planned. Mello Huitron was seen by EPS clinic 1/16/2020 with no plans for permanent pacemaker at that time.     Upon arrival, patient does not member what happened and does not remember what happened prior to incident.  On arrival patient denies chest pain, shortness of breath, abdominal pain patient did report pain in his right leg and complaining of generalized muscle weakness and aches.  Patient lives at home alone.  Patient was found confused, confusion improved upon arrival to ED.  Patient had slight weakness in left lower extremity however this was limited by pain.  Patient had abrasions with different stages of ecchymosis to bilateral lower extremities.  While in the emergency room patient was found to have noted hyponatremia and elevated CPK.  CT of the head showed no acute intracranial hemorrhage or mass-effect.  Patient did have noted encephalomalacia within the left temporal lobe from prior remote infarct.  CT of cervical spine showed no acute fracture.  CT chest demonstrated moderate right pleural effusions, mild opacities right lung base.  CVC was placed and patient did receive 50 mL of 3% normal saline.  Covid positive.     Last week severe cramping upper thighs. Then did farely well. Friday cramping came back, Saturday went to football game. Yesterday found him on bathroom floor.      1/18/21: Patient was not following commands and demonstrated pinpoint pupils.  Patient received 0.4 Narcan.  Patient did become more responsive and pupils were not pinpoint.  We will proceed with MRI of brain.     1/19/21: Patient appears significantly better.  Sitting up in bed communicating and ate entire breakfast. Patient demonstrates difficulty hearing however patient denies pain anywhere. Patient still requiring significant amount of dopamine     1/20: Pt was transferred out of ICU. Pt did not eat much, he does not feel like eating or like the food. Sodium is stable around 122.      1/21: No acute events overnight. Patient not eating well today. SLP saw and recommending NPO except for medications. Mental status waxes and wanes. HD stable on RA. No fevers or chills. No chest pain or sob.      1/22: Overnight more confused pulling on lines, restrains placed. Pt follows commands however confused. No fevers or chills. On RA. Not eating as pt is too confused.        Subjective:   Patient seen and examined, appears comfortable in bed, without any signs of cardiorespiratory distress. He is hard of hearing but alert, cooperative and answering questions appropriately. VS stable, afebrile, saturating well on room air. Patient denies chest pain, palpitations, or shortness of breath. He denies any nausea, vomiting, abdominal pain, diarrhea or constipation.  For peg insertion today      Medications:  Reviewed    Infusion Medications    sodium chloride      dextrose 5 % and 0.45 % NaCl 30 mL/hr at 01/26/21 0089     Scheduled Medications    vancomycin  1,000 mg Intravenous Once    [Held by provider] enoxaparin  1 mg/kg Subcutaneous Q12H    melatonin  3 mg Oral Nightly    calcium replacement protocol   Other RX Placeholder    phosphorus replacement protocol   Other RX Placeholder    lactobacillus  1 capsule Oral Daily with breakfast    aspirin  81 mg Oral Daily    hydrALAZINE  25 mg Oral TID    pantoprazole  40 mg Oral BID AC    [Held by provider] pravastatin  40 mg Oral QPM    tamsulosin  0.4 mg Oral Daily    sodium chloride flush  10 mL Intravenous 2 times per day     PRN Meds: sodium chloride, potassium chloride **OR** potassium alternative oral replacement **OR** potassium chloride, potassium chloride, potassium chloride, acetaminophen **OR** acetaminophen, cyclobenzaprine, sodium chloride flush, promethazine **OR** ondansetron, polyethylene glycol, magnesium sulfate, mupirocin      Intake/Output Summary (Last 24 hours) at 1/26/2021 0904  Last data filed at 1/26/2021 0515  Gross per 24 hour   Intake 805.42 ml   Output 875 ml   Net -69.58 ml       Diet:  Diet NPO Effective Now Exceptions are: Other (Specify); Specify Other: Pending Feese swallow test; No meds, either    Exam:  /61   Pulse 63   Temp 98.7 °F (37.1 °C) (Oral)   Resp 18   Ht 5' 4\" (1.626 m)   Wt 169 lb 12.8 oz (77 kg)   SpO2 92%   BMI 29.15 kg/m²     General appearance: No apparent distress, appears stated age and cooperative. Frail appearing  HEENT: Pupils equal, round, and reactive to light. Conjunctivae/corneas clear. Neck: Supple, with full range of motion. No jugular venous distention. Trachea midline. Respiratory:  Normal respiratory effort. Clear to auscultation, bilaterally without Rales/Wheezes/Rhonchi. Cardiovascular: irregularly irregular rhythm with normal S1/S2 without murmurs, rubs or gallops. Abdomen: Soft, non-tender, non-distended with normal bowel sounds. Musculoskeletal: passive and active ROM x 4 extremities. Skin: Skin color, texture, turgor normal.  No rashes or lesions. Neurologic:  Neurovascularly intact without any focal sensory/motor deficits.  Cranial nerves: II-XII intact, grossly non-focal.  Psychiatric: Alert and oriented, thought content appropriate, normal insight  Capillary Refill: Brisk,< 3 seconds   Peripheral Pulses: +2 palpable, equal bilaterally       Labs:   Recent Labs     01/24/21  0511 01/25/21  0449 01/26/21  0454   WBC 10.1 8.5 7.8   HGB 8.2* 8.3* 8.2*   HCT 25.0* 25.8* 26.0*    313 306     Recent Labs     01/24/21  0511 01/25/21  0449 01/26/21  0454    139 139   K 4.0 4.2 3.9    108 106   CO2 22* 22* 23   BUN 17 15 14   CREATININE 1.2 1.0 0.9   CALCIUM 8.3* 8.1* 8.1* Result   Impression:   Moderate right pleural effusion. Associated volume loss. Mild opacity right lung base. This could represent mild pneumonia. This document has been electronically signed by: Lucero Beckman MD on    01/17/2021 09:10 PM      All CT scans at this facility use dose modulation, iterative    reconstruction, and/or weight-based   dosing when appropriate to reduce radiation dose to as low as reasonably    achievable. XR CHEST PORTABLE   Final Result   Impression:   Cardiomegaly, no CHF. Mild right infrahilar opacity. This could represent mild pneumonia versus    volume loss. This document has been electronically signed by: Lucero Beckman MD on    01/17/2021 09:11 PM         CT Head WO Contrast   Final Result   1. No acute intracranial hemorrhage or mass effect. 2. Moderate diffuse atrophy is demonstrated. 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 **This report has been created using voice recognition software. It may contain minor errors which are inherent in voice recognition technology. **      Final report electronically signed by Dr. Tamiko Jung on 1/17/2021 4:17 PM      CT Cervical Spine WO Contrast   Final Result   1. Slightly limited evaluation due to motion artifact. However, no definite acute fracture of the cervical spine is seen. 2. Straightening of the normal cervical lordosis is demonstrated. This may be related to muscle spasm. 3. Multilevel degenerative changes of the cervical spine are demonstrated. **This report has been created using voice recognition software. It may contain minor errors which are inherent in voice recognition technology. **      Final report electronically signed by Dr. Tamiko Jung on 1/17/2021 4:21 PM      XR PELVIS (1-2 VIEWS)   Final Result   1. No acute fracture or malalignment. Degenerative changes are as described above.             **This report has been created using voice recognition software. It may contain minor errors which are inherent in voice recognition technology. **      Final report electronically signed by Dr. Tamiko Jung on 1/17/2021 3:56 PM      XR FEMUR LEFT (MIN 2 VIEWS)   Final Result   1. No acute fracture or malalignment. Degenerative changes are as described above. **This report has been created using voice recognition software. It may contain minor errors which are inherent in voice recognition technology. **      Final report electronically signed by Dr. Tamiko Jung on 1/17/2021 3:50 PM      XR CHEST (2 VW)   Final Result   1. Moderate enlargement of the cardiomediastinal silhouette. 2. There is hyperinflation noted. 2. No focal consolidation, pleural effusion or pneumothorax is seen. **This report has been created using voice recognition software. It may contain minor errors which are inherent in voice recognition technology. **      Final report electronically signed by Dr. Tamiko Jung on 1/17/2021 3:54 PM          Diet: Diet NPO Effective Now Exceptions are: Other (Specify);  Specify Other: Pending Feese swallow test; No meds, either    DVT prophylaxis: [] Lovenox                                 [] SCDs                                 [] SQ Heparin                                 [] Encourage ambulation           [] Already on Anticoagulation     Disposition:    [] Home       [] TCU       [] Rehab       [] Psych       [] SNF       [] [place]       [] Other-    Code Status: Full Code    PT/OT Eval Status:        Electronically signed by Pranav Vora MD on 1/26/2021 at 9:04 AM

## 2021-01-26 NOTE — PROGRESS NOTES
Photos taken, scope used GIF SER#571, 20 FR gastrostomy tube placed at 4 cm skin level.   Velasquez tolerated well, EGD completed

## 2021-01-26 NOTE — CARE COORDINATION
1/26/21, 10:15 AM EST    DISCHARGE PLANNING EVALUATION    SW spoke with Shailesh Medina with [facility], provided update on pt receiving Peg placement today. Confirmed that they are able to accept at discharge.

## 2021-01-26 NOTE — CARE COORDINATION
DISASTER CHARTING    1/26/21, 10:18 AM EST    DISCHARGE ONGOING EVALUATION:     [address] day: 9  Location: [facility]22/022-A Reason for admit: Hyponatremia [E87.1]   Barriers to Discharge: Hgb 8.2, INR 2.27. Failed swallow eval.  GI planning for PEG placement today. IVF, FFP, IV venofer ordered. PCP: Vivi Arguello DO  Readmission Risk Score: 29%  Patient Goals/Plan/Treatment Preferences: [facility]

## 2021-01-27 ENCOUNTER — APPOINTMENT (OUTPATIENT)
Dept: INTERVENTIONAL RADIOLOGY/VASCULAR | Age: 86
DRG: 177 | End: 2021-01-27
Payer: OTHER GOVERNMENT

## 2021-01-27 ENCOUNTER — APPOINTMENT (OUTPATIENT)
Dept: CT IMAGING | Age: 86
DRG: 177 | End: 2021-01-27
Payer: OTHER GOVERNMENT

## 2021-01-27 LAB
ALBUMIN SERPL-MCNC: 2.6 G/DL (ref 3.5–5.1)
ALP BLD-CCNC: 73 U/L (ref 38–126)
ALT SERPL-CCNC: 34 U/L (ref 11–66)
AMORPHOUS: ABNORMAL
ANION GAP SERPL CALCULATED.3IONS-SCNC: 7 MEQ/L (ref 8–16)
AST SERPL-CCNC: 29 U/L (ref 5–40)
BACTERIA: ABNORMAL /HPF
BASOPHILS # BLD: 0.1 %
BASOPHILS ABSOLUTE: 0 THOU/MM3 (ref 0–0.1)
BILIRUB SERPL-MCNC: 0.7 MG/DL (ref 0.3–1.2)
BILIRUBIN DIRECT: 0.3 MG/DL (ref 0–0.3)
BILIRUBIN URINE: ABNORMAL
BLOOD, URINE: ABNORMAL
BUN BLDV-MCNC: 16 MG/DL (ref 7–22)
CALCIUM SERPL-MCNC: 7.9 MG/DL (ref 8.5–10.5)
CASTS UA: ABNORMAL /LPF
CHARACTER, URINE: ABNORMAL
CHLORIDE BLD-SCNC: 105 MEQ/L (ref 98–111)
CO2: 25 MEQ/L (ref 23–33)
COLOR: ABNORMAL
CREAT SERPL-MCNC: 1.3 MG/DL (ref 0.4–1.2)
CRYSTALS, UA: ABNORMAL
EOSINOPHIL # BLD: 0.2 %
EOSINOPHILS ABSOLUTE: 0 THOU/MM3 (ref 0–0.4)
EPITHELIAL CELLS, UA: ABNORMAL /HPF
ERYTHROCYTE [DISTWIDTH] IN BLOOD BY AUTOMATED COUNT: 19.2 % (ref 11.5–14.5)
ERYTHROCYTE [DISTWIDTH] IN BLOOD BY AUTOMATED COUNT: 60 FL (ref 35–45)
GFR SERPL CREATININE-BSD FRML MDRD: 52 ML/MIN/1.73M2
GLUCOSE BLD-MCNC: 114 MG/DL (ref 70–108)
GLUCOSE URINE: NEGATIVE MG/DL
HCT VFR BLD CALC: 22 % (ref 42–52)
HCT VFR BLD CALC: 22.9 % (ref 42–52)
HEMOGLOBIN: 7 GM/DL (ref 14–18)
HEMOGLOBIN: 7.3 GM/DL (ref 14–18)
ICTOTEST: NEGATIVE
IMMATURE GRANS (ABS): 0.1 THOU/MM3 (ref 0–0.07)
IMMATURE GRANULOCYTES: 0.7 %
INR BLD: 1.42 (ref 0.85–1.13)
KETONES, URINE: ABNORMAL
LEUKOCYTE ESTERASE, URINE: ABNORMAL
LYMPHOCYTES # BLD: 4.6 %
LYMPHOCYTES ABSOLUTE: 0.6 THOU/MM3 (ref 1–4.8)
MCH RBC QN AUTO: 27.7 PG (ref 26–33)
MCHC RBC AUTO-ENTMCNC: 31.9 GM/DL (ref 32.2–35.5)
MCV RBC AUTO: 86.7 FL (ref 80–94)
MONOCYTES # BLD: 6.8 %
MONOCYTES ABSOLUTE: 1 THOU/MM3 (ref 0.4–1.3)
MUCUS: ABNORMAL
NITRITE, URINE: NEGATIVE
NUCLEATED RED BLOOD CELLS: 0 /100 WBC
OSMOLALITY: 293 MOSMOL/KG (ref 275–295)
PH UA: 5 (ref 5–9)
PLATELET # BLD: 228 THOU/MM3 (ref 130–400)
PMV BLD AUTO: 10.3 FL (ref 9.4–12.4)
POTASSIUM SERPL-SCNC: 3.8 MEQ/L (ref 3.5–5.2)
PRO-BNP: ABNORMAL PG/ML (ref 0–1800)
PROCALCITONIN: 1.44 NG/ML (ref 0.01–0.09)
PROTEIN UA: 100
RBC # BLD: 2.64 MILL/MM3 (ref 4.7–6.1)
RBC URINE: ABNORMAL /HPF
RENAL EPITHELIAL, UA: ABNORMAL
SEG NEUTROPHILS: 87.6 %
SEGMENTED NEUTROPHILS ABSOLUTE COUNT: 12.4 THOU/MM3 (ref 1.8–7.7)
SODIUM BLD-SCNC: 137 MEQ/L (ref 135–145)
SPECIFIC GRAVITY, URINE: 1.03 (ref 1–1.03)
TOTAL PROTEIN: 5.1 G/DL (ref 6.1–8)
UROBILINOGEN, URINE: 1 EU/DL (ref 0–1)
WBC # BLD: 14.1 THOU/MM3 (ref 4.8–10.8)
WBC UA: ABNORMAL /HPF

## 2021-01-27 PROCEDURE — 80053 COMPREHEN METABOLIC PANEL: CPT

## 2021-01-27 PROCEDURE — 87086 URINE CULTURE/COLONY COUNT: CPT

## 2021-01-27 PROCEDURE — 6370000000 HC RX 637 (ALT 250 FOR IP): Performed by: NURSE PRACTITIONER

## 2021-01-27 PROCEDURE — 85025 COMPLETE CBC W/AUTO DIFF WBC: CPT

## 2021-01-27 PROCEDURE — 2580000003 HC RX 258: Performed by: NURSE PRACTITIONER

## 2021-01-27 PROCEDURE — 94640 AIRWAY INHALATION TREATMENT: CPT

## 2021-01-27 PROCEDURE — 97110 THERAPEUTIC EXERCISES: CPT

## 2021-01-27 PROCEDURE — 2700000000 HC OXYGEN THERAPY PER DAY

## 2021-01-27 PROCEDURE — 2580000003 HC RX 258: Performed by: STUDENT IN AN ORGANIZED HEALTH CARE EDUCATION/TRAINING PROGRAM

## 2021-01-27 PROCEDURE — 83880 ASSAY OF NATRIURETIC PEPTIDE: CPT

## 2021-01-27 PROCEDURE — 85610 PROTHROMBIN TIME: CPT

## 2021-01-27 PROCEDURE — 97530 THERAPEUTIC ACTIVITIES: CPT

## 2021-01-27 PROCEDURE — 6360000002 HC RX W HCPCS: Performed by: PHYSICIAN ASSISTANT

## 2021-01-27 PROCEDURE — 81001 URINALYSIS AUTO W/SCOPE: CPT

## 2021-01-27 PROCEDURE — 87040 BLOOD CULTURE FOR BACTERIA: CPT

## 2021-01-27 PROCEDURE — 94760 N-INVAS EAR/PLS OXIMETRY 1: CPT

## 2021-01-27 PROCEDURE — 85018 HEMOGLOBIN: CPT

## 2021-01-27 PROCEDURE — 82248 BILIRUBIN DIRECT: CPT

## 2021-01-27 PROCEDURE — 2580000003 HC RX 258: Performed by: PHYSICIAN ASSISTANT

## 2021-01-27 PROCEDURE — 99232 SBSQ HOSP IP/OBS MODERATE 35: CPT | Performed by: INTERNAL MEDICINE

## 2021-01-27 PROCEDURE — 93970 EXTREMITY STUDY: CPT

## 2021-01-27 PROCEDURE — 87205 SMEAR GRAM STAIN: CPT

## 2021-01-27 PROCEDURE — 6370000000 HC RX 637 (ALT 250 FOR IP): Performed by: INTERNAL MEDICINE

## 2021-01-27 PROCEDURE — 71250 CT THORAX DX C-: CPT

## 2021-01-27 PROCEDURE — 6360000002 HC RX W HCPCS: Performed by: STUDENT IN AN ORGANIZED HEALTH CARE EDUCATION/TRAINING PROGRAM

## 2021-01-27 PROCEDURE — 85014 HEMATOCRIT: CPT

## 2021-01-27 PROCEDURE — 6370000000 HC RX 637 (ALT 250 FOR IP): Performed by: STUDENT IN AN ORGANIZED HEALTH CARE EDUCATION/TRAINING PROGRAM

## 2021-01-27 PROCEDURE — 83930 ASSAY OF BLOOD OSMOLALITY: CPT

## 2021-01-27 PROCEDURE — 36415 COLL VENOUS BLD VENIPUNCTURE: CPT

## 2021-01-27 PROCEDURE — 1200000003 HC TELEMETRY R&B

## 2021-01-27 RX ORDER — IPRATROPIUM BROMIDE AND ALBUTEROL SULFATE 2.5; .5 MG/3ML; MG/3ML
1 SOLUTION RESPIRATORY (INHALATION)
Status: DISCONTINUED | OUTPATIENT
Start: 2021-01-27 | End: 2021-02-05 | Stop reason: HOSPADM

## 2021-01-27 RX ORDER — PRAVASTATIN SODIUM 40 MG
40 TABLET ORAL NIGHTLY
Status: DISCONTINUED | OUTPATIENT
Start: 2021-01-27 | End: 2021-02-05 | Stop reason: HOSPADM

## 2021-01-27 RX ORDER — 0.9 % SODIUM CHLORIDE 0.9 %
500 INTRAVENOUS SOLUTION INTRAVENOUS ONCE
Status: COMPLETED | OUTPATIENT
Start: 2021-01-27 | End: 2021-01-27

## 2021-01-27 RX ORDER — SODIUM CHLORIDE 0.9 % (FLUSH) 0.9 %
10 SYRINGE (ML) INJECTION EVERY 12 HOURS SCHEDULED
Status: DISCONTINUED | OUTPATIENT
Start: 2021-01-27 | End: 2021-02-05 | Stop reason: HOSPADM

## 2021-01-27 RX ORDER — SODIUM CHLORIDE 0.9 % (FLUSH) 0.9 %
10 SYRINGE (ML) INJECTION PRN
Status: DISCONTINUED | OUTPATIENT
Start: 2021-01-27 | End: 2021-02-05 | Stop reason: HOSPADM

## 2021-01-27 RX ORDER — LIDOCAINE HYDROCHLORIDE 10 MG/ML
5 INJECTION, SOLUTION EPIDURAL; INFILTRATION; INTRACAUDAL; PERINEURAL ONCE
Status: DISCONTINUED | OUTPATIENT
Start: 2021-01-27 | End: 2021-02-05 | Stop reason: HOSPADM

## 2021-01-27 RX ADMIN — IPRATROPIUM BROMIDE AND ALBUTEROL SULFATE 1 AMPULE: .5; 3 SOLUTION RESPIRATORY (INHALATION) at 11:08

## 2021-01-27 RX ADMIN — Medication 3 MG: at 20:08

## 2021-01-27 RX ADMIN — ENOXAPARIN SODIUM 80 MG: 80 INJECTION SUBCUTANEOUS at 05:12

## 2021-01-27 RX ADMIN — HYDRALAZINE HYDROCHLORIDE 25 MG: 25 TABLET, FILM COATED ORAL at 09:00

## 2021-01-27 RX ADMIN — PIPERACILLIN AND TAZOBACTAM 3375 MG: 3; .375 INJECTION, POWDER, LYOPHILIZED, FOR SOLUTION INTRAVENOUS at 05:12

## 2021-01-27 RX ADMIN — SODIUM CHLORIDE, PRESERVATIVE FREE 10 ML: 5 INJECTION INTRAVENOUS at 20:08

## 2021-01-27 RX ADMIN — SODIUM CHLORIDE 500 ML: 9 INJECTION, SOLUTION INTRAVENOUS at 06:55

## 2021-01-27 RX ADMIN — IPRATROPIUM BROMIDE AND ALBUTEROL SULFATE 1 AMPULE: .5; 3 SOLUTION RESPIRATORY (INHALATION) at 06:59

## 2021-01-27 RX ADMIN — VANCOMYCIN HYDROCHLORIDE 1250 MG: 5 INJECTION, POWDER, LYOPHILIZED, FOR SOLUTION INTRAVENOUS at 09:00

## 2021-01-27 RX ADMIN — PIPERACILLIN AND TAZOBACTAM 3375 MG: 3; .375 INJECTION, POWDER, LYOPHILIZED, FOR SOLUTION INTRAVENOUS at 20:07

## 2021-01-27 RX ADMIN — PIPERACILLIN AND TAZOBACTAM 3375 MG: 3; .375 INJECTION, POWDER, LYOPHILIZED, FOR SOLUTION INTRAVENOUS at 11:00

## 2021-01-27 RX ADMIN — Medication 1 CAPSULE: at 09:00

## 2021-01-27 RX ADMIN — PRAVASTATIN SODIUM 40 MG: 40 TABLET ORAL at 21:35

## 2021-01-27 RX ADMIN — SODIUM CHLORIDE, PRESERVATIVE FREE 10 ML: 5 INJECTION INTRAVENOUS at 20:07

## 2021-01-27 RX ADMIN — ASPIRIN 81 MG: 81 TABLET, COATED ORAL at 14:46

## 2021-01-27 RX ADMIN — IPRATROPIUM BROMIDE AND ALBUTEROL SULFATE 1 AMPULE: .5; 3 SOLUTION RESPIRATORY (INHALATION) at 20:20

## 2021-01-27 RX ADMIN — TAMSULOSIN HYDROCHLORIDE 0.4 MG: 0.4 CAPSULE ORAL at 14:42

## 2021-01-27 RX ADMIN — HYDRALAZINE HYDROCHLORIDE 25 MG: 25 TABLET, FILM COATED ORAL at 20:08

## 2021-01-27 RX ADMIN — HYDRALAZINE HYDROCHLORIDE 25 MG: 25 TABLET, FILM COATED ORAL at 14:42

## 2021-01-27 RX ADMIN — PANTOPRAZOLE SODIUM 40 MG: 40 TABLET, DELAYED RELEASE ORAL at 16:00

## 2021-01-27 ASSESSMENT — PAIN SCALES - GENERAL
PAINLEVEL_OUTOF10: 0
PAINLEVEL_OUTOF10: 0

## 2021-01-27 NOTE — PLAN OF CARE
Problem: Nutrition  Goal: Optimal nutrition therapy  Outcome: Ongoing   Nutrition Problem #1: Moderate malnutrition  Intervention: Food and/or Nutrient Delivery: Continue NPO, Continue Current Tube Feeding  Nutritional Goals: Patient will tolerate EN to provide 75% or more of estimated nutrient needs while NPO.

## 2021-01-27 NOTE — PROGRESS NOTES
This patient is here because he had fallen at his residence on January 17th, 2021. Primary problem Hypoatremia noted. He recently tested positive for Covid but currently is not positive. Patient is a 2 hr turn, and a X2 assist.  Receives breathing treatments every 4 hours for breathing and wheezing. Pt has boswell catheter, and decreases urine output. Received Peg tube on 1/26/21, currently wearing abdominal binder. Pt has PT/OT ordered, and uses walker with assist X2. Strict Intake and Output monitored.   Nga

## 2021-01-27 NOTE — CARE COORDINATION
1/27/21, 9:48 AM EST    DISCHARGE PLANNING EVALUATION    SW spoke with Elder Gutiérrez with [facility], updated on Peg placement. Elder Gutiérrez advised that they are still verifying pts [place] coverage. Discussed discharge day, Elder Gutiérrez stated facility could accept tomorrow.

## 2021-01-27 NOTE — PROGRESS NOTES
[facility]  INPATIENT PHYSICAL THERAPY  DAILY NOTE  STRZ RENAL TELEMETRY 6K - 6K-22/022-A    Time In: 6216  Time Out: 1328  Timed Code Treatment Minutes: 35 Minutes  Minutes: 33          Date: 2021  Patient Name: Tio Goodwin,  Gender:  male        MRN: [id number]  : 3/30/1932  (80 y.o.)     Referring Practitioner: Jarvis Montes De Oca DO  Diagnosis: Hyponatremia  Additional Pertinent Hx: Per ER note on 2021:88 y.o. male history of CVA, CAD, hypertension, hyperlipidemia, chronic atrial fibrillation on anticoagulation with Coumadin was recently admitted and discharged from [facility] for asymptomatic bradycardia management. He also had a history of recent falls. He presented to the emergency department for evaluation of unwitnessed fall at home today morning in his bathroom by his family members. He was found on the ground and was down for unknown length of time. Prior Level of Function:  Lives With: Alone  Type of Home: House  Home Layout: One level  Home Access: Level entry  Home Equipment: Cane   Bathroom Shower/Tub: Walk-in shower  Bathroom Toilet: Handicap height  Bathroom Equipment: Shower chair, Grab bars in shower    ADL Assistance: [address]: Independent  Transfer Assistance: Independent  Additional Comments: Pt reports fully indep PLOF without AD. Restrictions/Precautions:  Restrictions/Precautions: Contact Precautions, Fall Risk  Position Activity Restriction  Other position/activity restrictions: very [place]; on room air on . ..on 1L on      SUBJECTIVE: Rn approved PT treatment. Upon entry, pt reclined in bedside chair, eager to return to bed, reporting buttock pain. Post session, pt supine in bed with bed alarm on and all needs within reach.     PAIN: 0/10: denies    OBJECTIVE:  Bed Mobility:  Rolling to Left: Moderate Assistance, X 1, with verbal cues , with increased time for completion Supine to Sit: Moderate Assistance, Maximum Assistance, X 2, with increased time for completion     Transfers:  Sit to Stand: Minimal Assistance, X 1, with increased time for completion, cues for hand placement  Stand to Sit:Moderate Assistance, X 1, cues for hand placement, with verbal cues, pt attempting to sit prior to surface being fully behind pt  Stand Pivot:initially min A, however pt fatigued quickly requiring increased assist to mod A. performed bedside chair>bed    Ambulation:  Not Tested, due to fatigue    Balance:  Static Standing Balance: Minimal Assistance, X 1, with RW  Dynamic Standing Balance: Moderate Assistance, X 1, with RW    Exercise:  Patient was guided in 1 set(s) 10 reps of exercise to both lower extremities. Ankle pumps, Glut sets, Quad sets, Heelslides, Short arc quads, Hip abduction/adduction, Seated heel/toe raises and Long arc quads. Exercises were completed for increased independence with functional mobility. Required active assist to complete first rep of heel slide to understand movement, pt then able to perform indep    Functional Outcome Measures: Completed  AM-PAC Inpatient Mobility Raw Score : 11  AM-PAC Inpatient T-Scale Score : 33.86    ASSESSMENT:  Assessment: Patient progressing toward established goals. Activity Tolerance:  Patient tolerance of  treatment: fair.  Limited by fatigue     Equipment Recommendations:Equipment Needed: Yes(may need RW)  Discharge Recommendations:  [address], Patient would benefit from continued therapy after discharge    Plan: Times per week: 4-5 X GM  Current Treatment Recommendations: Strengthening, Transfer Training, Endurance Training, Balance Training, Functional Mobility Training, Gait Training, Home Exercise Program    Patient Education  Patient Education: Plan of Care, Bed Mobility, Transfers, Verbal Exercise Instruction    Goals:  Patient goals : Get stronger and return to prior level  Short term goals  Time Frame for Short term goals: By discharge  Short term goal 1: supine to sit and return with min of 1 to get in and out of bed  Short term goal 2: sit to stand with min of 1 consistently to get on and off various surfaces  Short term goal 3: Pt to ambulate with RW 30 feet with CGA to walk safely to bathroom  Long term goals  Time Frame for Long term goals : NA due to short ELOS    Following session, patient left in safe position with all fall risk precautions in place.

## 2021-01-27 NOTE — PROGRESS NOTES
Hospitalist H+P      Patient:  Stafford Bernheim    Unit/Bed:6K-22/022-A  YOB: 1932  MRN: [id number]   Acct: [de-identified]     PCP: Carly Marin DO  Date of Admission: 1/17/2021        Assessment and Plan:        1. Decreased urine output notified nephrology will order serum osmolality start free water via PEG tube per dietitian's recommendation  2. Elevated WBC: We will obtain blood cultures  3. Worsening bilateral consolidation we will obtain a CT scan without contrast to evaluate may need to consider hospital-acquired pneumonia versus aspiration we will obtain blood cultures. 4. Dysphagia PEG tube placed patient is at goal with tube feeds will need free water with PEG tube feedings will discuss with dietitian  5. Chronic atrial fibrillation with SVR we will resume Coumadin and have pharmacy dose  6. Normocytic anemia hemoglobin currently at 7.3 we will type and screen  7. Chronic kidney disease stage III creatinine slightly elevated will discuss with nephrology will obtain a serum osmolality  8. Central line needs to be removed and replaced with peripheral IV or PICC line     CC: Fall    HPI: 40-year-old  male presents to [facility] via EMS as after sustaining an unwitnessed fall from a standing position. He was found on the ground and unknown length of time. Per son reports that patient was progressively more weak over the last few weeks and acting like himself. On the day of the arrival son states they were unable to get a hold of him therefore they went to his house where they found him on the bathroom floor. 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   While in the emergency room the patient was found to have hyponatremia and elevated CPK there is mild opacity in the right lung base patient was Covid positive at that time    ROS (14 point review of systems completed. Pertinent positives noted. Otherwise ROS is negative) : Poor historian    PMH:  Per HPI and       Diagnosis Date    Actinic keratoses 2013    Adenomatous polyp 2008    Atrial fibrillation ([address]     CAD (coronary artery disease) 2009    Carotid artery disease ([address] 2009    left    Hyperlipidemia 2004    Hypertension 2004    Inguinal hernia, left 2014    Unspecified cerebral artery occlusion with cerebral infarction      SHX:        Procedure Laterality Date    ABDOMEN SURGERY      ABDOMINAL ADHESION SURGERY  2012    Dr Penelope Patricia  2004    right    [facility]    CORONARY ARTERY BYPASS GRAFT  2009    SMALL INTESTINE SURGERY      SMALL INTESTINE SURGERY  2015    Exploratory Laparotomy    UPPER GASTROINTESTINAL ENDOSCOPY N/A 2021    EGD ESOPHAGOGASTRODUODENOSCOPY PEG TUBE INSERTION performed by Suad Flynn MD at [facility]     FHX:       Problem Relation Age of Onset    Cancer Mother      [facility]:   Social History     Socioeconomic History    Marital status:      Spouse name: None    Number of children: None    Years of education: None    Highest education level: None   Occupational History    None   Social Needs    Financial resource strain: None    Food insecurity     Worry: None     Inability: None    Transportation needs     Medical: None     Non-medical: None   Tobacco Use    Smoking status: Former Smoker     Quit date: 1980     Years since quittin.1    Smokeless tobacco: Never Used   Substance and Sexual Activity    Alcohol use:  Yes     Alcohol/week: 1.0 standard drinks     Types: 1 Cans of beer per week     Comment: occasionally    Drug use: No    Sexual activity: None   Lifestyle    Physical (LASIX) 20 MG tablet Take 1 tablet by mouth daily 4/4/17  Yes Cooper Garner MD   pantoprazole (PROTONIX) 40 MG tablet Take 1 tablet by mouth 2 times daily (before meals). 3/1/15  Yes Leonela Spivey APRN - CNP   pravastatin (PRAVACHOL) 40 MG tablet Take 40 mg by mouth every evening. Indications: Blood Cholesterol Abnormal   Yes Historical Provider, MD   aspirin 81 MG EC tablet Take 81 mg by mouth daily Indications: Anticoagulant Therapy Take with food. Yes Historical Provider, MD   cyclobenzaprine (FLEXERIL) 5 MG tablet Take 1 tablet by mouth 3 times daily as needed for Muscle spasms  Patient not taking: Reported on 1/6/2021 1/5/21   Jayden Khan PA-C      PHYSICAL EXAM:    BP (!) 132/59 Comment: RN aware  Pulse 60   Temp 98 °F (36.7 °C) (Oral)   Resp 20   Ht 5' 4\" (1.626 m)   Wt 169 lb 12.8 oz (77 kg)   SpO2 98%   BMI 29.15 kg/m²     General appearance:  No apparent distress, appears stated age and cooperative. Sitting comfortably in chair  HEENT:  Normal cephalic, atraumatic without obvious deformity. Pupils equal, round, and reactive to light. Extra ocular muscles intact. Conjunctivae/corneas clear. Neck: Supple, with full range of motion. no jugular venous distention. Trachea midline. no carotid bruits  Respiratory:  Normal respiratory effort. Clear to auscultation, upper airway wheezes, bases markedly decreased breath sounds right more so than the left no crackles or rails heard at this time  Cardiovascular:  Regular rate and rhythm with normal S1/S2 without murmurs, rubs or gallops. PMI non displaced  Abdomen: Soft, non-tender, non-distended with normal bowel sounds. No guarding, rebound. Musculoskeletal:  No clubbing, cyanosis. Trace pretibial edema right greater than left calves are soft full range of motion without deformity. Skin: Skin color, texture, turgor normal.  No rashes or lesions, or suspicious lesions.   Neurologic:  Neurovascularly intact without any focal sensory/motor deficits. Cranial nerves: II-XII intact, grossly non-focal.  Psychiatric:  Alert and oriented, thought content appropriate, normal insight  Capillary Refill: Brisk,< 2 seconds   Peripheral Pulses: +2 palpable, equal bilaterally upper and lower extremities  Lymphatics: no lymphadenopathy    Data: (All radiographs, tracings, PFTs, and imaging are personally viewed and interpreted unless otherwise noted).  WBC 14.1   Hemoglobin 7.3   Creatinine 1.3  Recent Labs     01/25/21  0449 01/26/21  0454 01/27/21  0540   WBC 8.5 7.8 14.1*   HGB 8.3* 8.2* 7.3*   HCT 25.8* 26.0* 22.9*    306 228      Recent Labs     01/25/21  0449 01/26/21  0454 01/27/21  0540    139 137   K 4.2 3.9 3.8    106 105   CO2 22* 23 25   BUN 15 14 16   CREATININE 1.0 0.9 1.3*   CALCIUM 8.1* 8.1* 7.9*   PHOS  --  2.6  --       Recent Labs     01/26/21  0454 01/27/21  0540   AST 36 29   ALT 49 34   BILIDIR <0.2 0.3   BILITOT 0.6 0.7   ALKPHOS 74 73      Recent Labs     01/26/21  0629 01/26/21  1142   INR 2.27* 1.97*      Recent Labs     01/26/21  0454   CKTOTAL 80*        Radiology reports-images reviewed in PACS  Xr Chest (2 Vw)    Result Date: 1/17/2021  PROCEDURE: XR CHEST (2 VW) CLINICAL INFORMATION: Found on the floor COMPARISON: 8/20/2016 TECHNIQUE:  AP and lateral chest x-ray  FINDINGS: There is at least moderate enlargement of the cardiomediastinal silhouette. There is evidence of prior median sternotomy. There is diffuse osteopenia noted. Mild focal eventration of the right hemidiaphragm is seen. No focal consolidation or pleural effusion is seen. No pneumothorax is demonstrated. 1. Moderate enlargement of the cardiomediastinal silhouette. 2. There is hyperinflation noted. 2. No focal consolidation, pleural effusion or pneumothorax is seen. **This report has been created using voice recognition software. It may contain minor errors which are inherent in voice recognition technology. ** Final report electronically signed by Dr. Martin Becerra on 1/17/2021 3:54 PM    Xr Pelvis (1-2 Views)    Result Date: 1/17/2021  PROCEDURE: XR PELVIS (1-2 VIEWS) CLINICAL INFORMATION: Fall, weakness of left leg COMPARISON: 1/5/2021 TECHNIQUE:  AP pelvis single view  FINDINGS: The visualized bilateral obturator rings appear intact. There is spurring noted along the superior-lateral left acetabulum. No acute fractures identified. The femoral head contours appear intact bilaterally. There is increased sclerosis along the acetabular roofs bilaterally. 1. No acute fracture or malalignment. Degenerative changes are as described above. **This report has been created using voice recognition software. It may contain minor errors which are inherent in voice recognition technology. ** Final report electronically signed by Dr. Martin Becerra on 1/17/2021 3:56 PM    Xr Femur Left (min 2 Views)    Result Date: 1/17/2021  PROCEDURE: XR FEMUR LEFT (MIN 2 VIEWS) CLINICAL INFORMATION: Fall, weakness of left leg COMPARISON: No prior study. TECHNIQUE:  Right femur 4 views  FINDINGS: There is spurring along the inferior pole of the articular surface of the patella. No acute fracture or malalignment is seen. The left femoral head contour appears intact. The visualized left obturator ring appears intact. Enthesophytes are noted along the greater trochanter. There is mild spurring along the superior-lateral acetabulum. There is increased sclerosis along the left acetabular roof. 1. No acute fracture or malalignment. Degenerative changes are as described above. **This report has been created using voice recognition software. It may contain minor errors which are inherent in voice recognition technology. ** Final report electronically signed by Dr. Martin Becerra on 1/17/2021 3:50 PM    Ct Head Wo Contrast    Result Date: 1/17/2021  PROCEDURE: CT HEAD WO CONTRAST CLINICAL INFORMATION: fall, ams COMPARISON: 8/17/2015 TECHNIQUE:  Axial CT images were Impression: Moderate right pleural effusion. Associated volume loss. Mild opacity right lung base. This could represent mild pneumonia. This document has been electronically signed by: Rohit Joe MD on 01/17/2021 09:10 PM All CT scans at this facility use dose modulation, iterative reconstruction, and/or weight-based dosing when appropriate to reduce radiation dose to as low as reasonably achievable. Ct Cervical Spine Wo Contrast    Result Date: 1/17/2021  PROCEDURE: CT CERVICAL SPINE WO CONTRAST CLINICAL INFORMATION: fall COMPARISON: No prior study. TECHNIQUE:  Axial CT images were obtained through the cervical spine without contrast. Coronal and sagittal reformatted images were rendered. All CT scans at this facility use dose modulation, iterative reconstruction, and/or weight-based dosing when appropriate to reduce radiation dose to as low as reasonably achievable. FINDINGS: Evaluation is slightly limited due to motion artifact. No prevertebral soft tissue swelling is seen. There is normal alignment at the craniocervical junction. The dens appears intact. The dens interval appears within normal limits. The facets align normally. The spinous processes appear intact. Multilevel degenerative disc disease is demonstrated, most pronounced at the C4-C5 and C6-C7 levels where there is disc space narrowing, endplate sclerosis and hypertrophic endplate change. Multilevel uncovertebral and facet hypertrophy is demonstrated. There is mild to moderate right neural foraminal narrowing at C3-C4. Moderate bilateral neural foraminal narrowing at C4-C5 is noted. There is mild to moderate neural foraminal narrowing on the right at C5-C6. Moderate bilateral neural foraminal narrowing at C6-C7 is noted. The visualized lung apices appear unremarkable. 1. Slightly limited evaluation due to motion artifact. However, no definite acute fracture of the cervical spine is seen.  2. Straightening of the normal cervical lordosis is demonstrated. This may be related to muscle spasm. 3. Multilevel degenerative changes of the cervical spine are demonstrated. **This report has been created using voice recognition software. It may contain minor errors which are inherent in voice recognition technology. ** Final report electronically signed by Dr. Edgar Jett on 1/17/2021 4:21 PM    Xr Chest Portable    Result Date: 1/26/2021  1 view chest x-ray. Comparison: 1/22/2021 Findings: Right central line unchanged. Bilateral consolidation has worsened somewhat. Cardiomegaly. No bony abnormality. Impression: Worsening bilateral consolidation. This document has been electronically signed by: Deny Tobias MD on 01/26/2021 10:04 PM     Xr Chest Portable    Result Date: 1/22/2021  1 view chest x-ray Comparison:  01/22/2021 04:33 PM EST, 01/22/2021 10:47 PM EST Findings: Incomplete visualization of the lung apices and the lateral aspect of the right lung. Poor evaluation of lung parenchyma secondary to technique. No consolidative process within visualized portions. Mild cardiomegaly. Prior sternotomy. No acute fracture. Central venous line at the cavoatrial junction. The nasogastric tube is at the level of the left mainstem bronchus. Recommend removal and repositioning. This document has been electronically signed by: Olesya Saldivar MD on 01/22/2021 11:31 PM     Xr Chest Portable    Result Date: 1/22/2021  PROCEDURE: XR CHEST PORTABLE CLINICAL INFORMATION: suspected aspiration. COMPARISON: Chest radiograph dated 1/17/2021. TECHNIQUE: AP upright view of the chest. FINDINGS: There are stable median sternotomy wires and mediastinal surgical clips. There is stable right internal jugular catheter with tip in the superior vena cava. There is stable tapering opacity at the right infrahilar region compared to prior radiographs which corresponded to a pleural effusion on prior CT. The lungs otherwise appear clear.  The cardiomediastinal silhouette is stable with mild cardiomegaly. As osseous structures appear grossly intact. Stable right basilar opacity. **This report has been created using voice recognition software. It may contain minor errors which are inherent in voice recognition technology. ** Final report electronically signed by Dr. Awa Langford MD on 1/22/2021 4:55 PM    Xr Chest Portable    Result Date: 1/17/2021  Single view of the chest Comparison:  CR,SR  - XR CHEST STANDARD (2 VW)  - 01/17/2021 03:32 PM EST Findings: Mild right infrahilar opacity. This could represent volume loss versus mild pneumonia. Cardiomegaly, no CHF. Right central line present with the tip in the mid superior vena cava. Status post median sternotomy. Impression: Cardiomegaly, no CHF. Mild right infrahilar opacity. This could represent mild pneumonia versus volume loss. This document has been electronically signed by: Humberto Castillo MD on 01/17/2021 09:11 PM     Xr Abdomen For Ng/og/ne Tube Placement    Result Date: 1/22/2021  ** ADDENDUM #1 ** This report was discussed with Jarett Zayas RN on Jan 22, 2021 23:28:00 EST. This document has been electronically signed by: Urmila Lincoln on 01/22/2021 11:28 PM ** ORIGINAL REPORT ** 1 view abdomen Comparison:  CR,SR  - XR CHEST PORTABLE  - 01/22/2021 10:47 PM EST Findings: No pneumoperitoneum or pneumatosis. No abnormal calcifications. No acute fractures. Status post sternotomy. 1. No visible nasogastric tube on the abdominal x-ray. On correlation with the concurrent chest x-ray, the nasogastric tube is at the level of the left mainstem bronchus. Recommend removal and repositioning. 2. Extensive gaseous distention of bowel suggesting possible ileus. This document has been electronically signed by: Lorenzo Montesinos MD on 01/22/2021 11:22 PM     Mri Brain Wo Contrast    Result Date: 1/19/2021  Noncontrast MRI of the brain.  Technique: Sagittal T1, coronal T2, axial T1, T2, FLAIR and diffusion-weighted imaging. Comparison:  CT,SR  - CT HEAD WO CONTRAST  - 01/17/2021 03:49 PM EST Findings: Normal signal on diffusion-weighted imaging. No evidence for acute ischemic event. Moderate nonspecific periventricular deep white matter disease. This most often can be ascribed to chronic small vessel ischemic change. Prominent central and cortical volume loss. Old ischemic event left temporal lobe. There are no intracranial masses. No evidence for hemorrhage. The ventricles are normal size, no hydrocephalus. Contents of the posterior fossa including brainstem and cerebellum are unremarkable. Normal vascular flow voids. Normal aeration of paranasal sinuses. Impression: Moderate nonspecific periventricular deep white matter disease. This most often can be ascribed to chronic small vessel ischemic change. Prominent central and cortical volume loss. Old ischemic event left temporal lobe. This document has been electronically signed by: Rick Velasquez MD on 01/19/2021 12:59 AM     Xr Hip 2-3 Vw W Pelvis Left    Result Date: 1/5/2021  PROCEDURE: XR HIP 2-3 VW W PELVIS LEFT CLINICAL INFORMATION: pain . COMPARISON: No prior study. TECHNIQUE: AP pelvis AP and frog-leg projections of the left hip. FINDINGS: No acute fracture or bone destruction. Hip joint spaces are preserved. SI joints are preserved. Bones are osteopenic and some mild degenerative changes seen in both iliac wings. Vascular calcifications are noted. No acute abnormality. **This report has been created using voice recognition software. It may contain minor errors which are inherent in voice recognition technology. ** Final report electronically signed by Dr. Heidi Cristina on 1/5/2021 11:35 AM      Electronically signed by Corina Ayala DO on 1/27/2021 at 11:53 AM

## 2021-01-27 NOTE — PROGRESS NOTES
Gastroenterology Progress Note    Patient:  Margarita Bray  YOB: 1932  MRN: [id number]     Acct: [de-identified]    Chief Complaint:    Chief Complaint   Patient presents with   Marco Antonio Fall       Date of Service: Pt seen/examined in consultation on 1/27/2021    Original HPI:      80 y.o. male who we are asked to see/evaluate by Travis Fountain, DO for PEG tube placement. This 68-year-old male with significant PMH of former smoker, HLD, HTN, CAD, s/p CABG, actinic keratosis, left inguinal hernia, A. fib, cerebral artery occlusion with infarct without focal neurologic deficits who presented to [address] on 1/17/2021 after an unwitnessed fall at home from standing position. On the day of arrival to the ED patient's son states that he was unable to reach him, went to check on him and found him on the bathroom floor. Day prior patient was in his normal state, able to communicate. CT head negative for bleed or mass-effect. In ED patient could not recall the events leading up to his visit, had slight weakness LLE limited by pain, head abrasions with different stages ecchymosis BLE. Patient was noted to have hyponatremia and elevated CPK, CT chest moderate right pleural effusions, mild opacities right lung base. Patient tested positive for COVID-19 on 1/18/21 although symptoms were started on 1/11/2021, currently out of isolation window. Patient finished a 5-day course of Decadron and IV remdesivir. Patient was admitted to ICU where he was found to be obtunded for a few days with subsequent improvement, transferred down to stepdown unit on 1/20/2021. Subjective - Last 24 hours:  Patient verbalizes no new complaints. Denies n/v/d, melena, BRBPR, melena, abdominal pain. RN informed that PEG functioning appropriately and that dressing was changed. Patient is afebrile, however there are new-onset lung crackles and upper airway wheezing that were not present on exam yesterday, with leukocytosis.  Tolerating enteric feeds well, no residuals. Past Medical History:    Past Medical History:   Diagnosis Date    Actinic keratoses 8/21/2013    Adenomatous polyp 2008    Atrial fibrillation ([facility] [address]     CAD (coronary artery disease) 2009    Carotid artery disease ([facility] [address] 2009    left    Hyperlipidemia 2004    Hypertension 2004    Inguinal hernia, left 2/19/2014    Unspecified cerebral artery occlusion with cerebral infarction        Home Medications:  Prior to Admission medications    Medication Sig Start Date End Date Taking? Authorizing Provider   hydrALAZINE (APRESOLINE) 25 MG tablet Take 1 tablet by mouth 3 times daily 12/12/20  Yes Malcolm John PA-C   tamsulosin [facility]) 0.4 MG capsule Take 1 capsule by mouth daily 1/16/20  Yes Violet Amaral MD   warfarin (COUMADIN) 3 MG tablet Take 1 tablet by mouth See Admin Instructions Indications: Anticoagulant Therapy, [facility], #45=30 days supply 9/19/17  Yes Marbella Cnoner MD   furosemide (LASIX) 20 MG tablet Take 1 tablet by mouth daily 4/4/17  Yes [address], MD   pantoprazole (PROTONIX) 40 MG tablet Take 1 tablet by mouth 2 times daily (before meals). 3/1/15  Yes ROBBIE Tom - CNP   pravastatin (PRAVACHOL) 40 MG tablet Take 40 mg by mouth every evening. Indications: Blood Cholesterol Abnormal   Yes Historical Provider, MD   aspirin 81 MG EC tablet Take 81 mg by mouth daily Indications: Anticoagulant Therapy Take with food.    Yes Historical Provider, MD   cyclobenzaprine (FLEXERIL) 5 MG tablet Take 1 tablet by mouth 3 times daily as needed for Muscle spasms  Patient not taking: Reported on 1/6/2021 1/5/21   Anshul Mccain PA-C       Surgical History:  Past Surgical History:   Procedure Laterality Date    ABDOMEN SURGERY      ABDOMINAL ADHESION SURGERY  6/2012    Dr Lata Mars  2004    right    COLONOSCOPY      [facility]    CORONARY ARTERY BYPASS GRAFT 2009    SMALL INTESTINE SURGERY      SMALL INTESTINE SURGERY  02/20/2015    Exploratory Laparotomy    UPPER GASTROINTESTINAL ENDOSCOPY N/A 1/26/2021    EGD ESOPHAGOGASTRODUODENOSCOPY PEG TUBE INSERTION performed by Leonela Zavaleta MD at [facility]       Family History:  Family History   Problem Relation Age of Onset    Cancer Mother        Past GI History:  Patient follows Dr. Dari Yee 8/23/2016 findings compatible with SBO  - In 2015, EGD for melena: ulcerative esophagitis and ulcerations of the distal third of the esophagus. 7 to 8 mm ulcer at the GE junction with clean base. Gastric erosions and duodenal erosions.  -History of SB resection for ischemia in 2015. Allergies:  Patient has no known allergies. Social History:   TOBACCO:   reports that he quit smoking about 41 years ago. He has never used smokeless tobacco.  ETOH:   reports current alcohol use of about 1.0 standard drinks of alcohol per week. Review Of Systems  GENERAL: No fever, chills or weight loss. CARDIOVASCULAR: No chest pain or palpitations. RESPIRATORY:  No dyspnea or cough. GI:  See HPI      PHYSICAL EXAM:  BP (!) 115/54 Comment: RN aware  Pulse 56   Temp 98 °F (36.7 °C) (Oral)   Resp 20   Ht 5' 4\" (1.626 m)   Wt 169 lb 12.8 oz (77 kg)   SpO2 98%   BMI 29.15 kg/m²     General appearance: No apparent distress, appears stated age and cooperative. HEENT: Normal cephalic, atraumatic without obvious deformity. Pupils equal, round, and reactive to light. Neck: Supple, with full range of motion. No jugular venous distention. Trachea midline. R IJV central access still in place. Respiratory:  Normal respiratory effort. Crackles bilaterally with upper airway inspiratory and expiratory wheezes. No oropharyngeal secretions noted. Cardiovascular: Regular rate and rhythm without murmurs, rubs or gallops. Abdomen: Soft, non-tender, non-distended with active bowel sounds.  Abdominal binder in place, PEG tube clean and dry, intact. Musculoskeletal: No clubbing, cyanosis or edema bilaterally. Abrasions BLE anterior surfaces below the knee, healing appropriately. Labs:   Recent Labs     01/27/21  0540   WBC 14.1*   HGB 7.3*   HCT 22.9*        Recent Labs     01/26/21  0454 01/27/21  0540    137   K 3.9 3.8    105   CO2 23 25   BUN 14 16   CREATININE 0.9 1.3*   CALCIUM 8.1* 7.9*   PHOS 2.6  --      Recent Labs     01/27/21  0540   AST 29   ALT 34   BILIDIR 0.3   BILITOT 0.7   ALKPHOS 73     Recent Labs     01/26/21  1142   INR 1.97*       Radiology:   Imaging reviewed. CT chest 01/17/2021: Moderate right pleural effusion, right lung base mild opacity. Code Status: Full Code    ASSESSMENT:  S/p successful PEG tube placement on 1/26/2021 for oropharyngeal dysphagia  S/p Covid PNA  w/rhabdo complicated by metabolic encephalopathy  Chronic A. fib with HR fluctuations into bradycardia (30-40bpm) on warfarin  Leukocytosis - reactive vs. Infectious  Acute traumatic rhabdomyolysis from fall  Prerenal hyponatremia  Normocytic anemia of unclear chronicity  History of CABG      PLAN:     Restarted lovenox   Dressing change under the anchor for 2 more days   Consult nutrition for enteral feeding recommendations, ok to start enteral nutrition   Spoke with primary, ok to remove central line. Draw cultures after line removed.  Aspiration precautions   GI signing off     This case involved complex decision-making. Total amount of time spent 45 min reviewing labs, imaging, examining central line and collaborating with nephrology and primary team.     Case reviewed and impression/plan reviewed in collaboration with Dr. Desirae Arvizu. Electronically signed by Renee Tee MD on 1/27/2021 at 1:48 PM    [facility]  Thank you for the consultation.

## 2021-01-27 NOTE — PROGRESS NOTES
Per Dr. Mimi Blair order bilater upper ext duplex due to patient swelling. Advised patients edema has gotten worse as the day has progressed.

## 2021-01-27 NOTE — PROGRESS NOTES
[facility]  SPEECH THERAPY MISSED TREATMENT NOTE  [facility] RENAL TELEMETRY 6K      Date: 2021  Patient Name: Joshua Nolasco        MRN: [id number]    : 3/30/1932  (80 y.o.)    REASON FOR MISSED TREATMENT:  Attempted to see patient for skilled speech therapy treatment; GERARDO Salinas approved session although did state Wilbert Schwab is on their way to take him for an ultra sound of his legs. \"  GERARDO Salinas confirmed, patient did receive PEG tube placement. ST retrieved supplies to complete dysphagia tx; upon return to room and initiation of session, transport arrived. Upon entry to room patient with audible wheezing; resting on 1-2L NC. GERARDO Salinas notified and confirmed awareness to audible wheezing. Due to arrival of transport; ST will hold speech therapy services this date and re-attempt . Recommend NPO + PEG tube for all hydration/nutrition at this time.      Tiffanie Kang M.S. Michael

## 2021-01-27 NOTE — CARE COORDINATION
DISASTER CHARTING    1/27/21, 9:45 AM EST    DISCHARGE ONGOING EVALUATION:     [address] day: 10  [facility]: [facility] Reason for admit: Hyponatremia [E87.1]   Barriers to Discharge: 01/26  PEG placement completed, PEG residual <10 ml, at goal on tube feedings, PT/OT, low urinary outputs, Tmax 100.1, IVF, free water with PEG feedngs added, serum osmo, PT/OT, nephrology follows, creat 1.3, I/O.    PCP: Carly Marin DO  Readmission Risk Score: 31%  Patient Goals/Plan/Treatment Preferences: planning [facility] when attending ready. SW follows.

## 2021-01-27 NOTE — PROGRESS NOTES
[facility]   Nephrology progress note  1/27/2021, 10:22 AM      Pt Name:    Vinod Dwyer  MRN:     [id number]     YOB: 1932  Admit Date:    1/17/2021  2:31 PM  Primary Care Physician:  Molly Freeman DO   Room number  7F-27/861-A    Chief Complaint: Nephrology was following for hyponatremia but now for ANNIKA  Subjective:  Patient seen and examined  Was informed that creatinine went up to 1.3 today  Discussed with RN earlier today  Urine output was low overnight and therefore received a bolus of normal saline  Comfortable  Not a very good historian      Objective:  24HR INTAKE/OUTPUT:      Intake/Output Summary (Last 24 hours) at 1/27/2021 1022  Last data filed at 1/27/2021 0548  Gross per 24 hour   Intake 2548.32 ml   Output 500 ml   Net 2048. 32 ml     I/O last 3 completed shifts: In: 2548.3 [I.V.:1572.3; Blood:630; NG/GT:346]  Out: 500 [Urine:500]  No intake/output data recorded. Admission weight: 162 lb (73.5 kg)  Wt Readings from Last 3 Encounters:   01/24/21 169 lb 12.8 oz (77 kg)   01/06/21 162 lb (73.5 kg)   01/05/21 160 lb (72.6 kg)     Body mass index is 29.15 kg/m².     Physical examination  VITALS:     Vitals:    01/27/21 0031 01/27/21 0508 01/27/21 0659 01/27/21 0817   BP:  (!) 125/55  (!) 132/59   Pulse:  66  60   Resp:  22 20 20   Temp: 97.9 °F (36.6 °C) 97.3 °F (36.3 °C)  98 °F (36.7 °C)   TempSrc: Oral Oral  Oral   SpO2:  93% 98%    Weight:       Height:         General Appearance: appears comfortable, no distress  Mouth/Throat: Oral mucosa moist  Neck: No JVD  Lungs:  no use of accessory muscles  Heart:  S1, S2 heard  GI: soft, non-tender, no guarding      Lab Data  CBC:   Recent Labs     01/25/21  0449 01/26/21  0454 01/27/21  0540   WBC 8.5 7.8 14.1*   HGB 8.3* 8.2* 7.3*   HCT 25.8* 26.0* 22.9*    306 228     BMP:  Recent Labs     01/25/21  0449 01/26/21  0454 01/27/21  0540    139 137   K 4.2 3.9 3.8    106 105   CO2 22* 23 25   BUN 15 14 16   CREATININE 1.0 0.9 1.3*   GLUCOSE 82 87 114*   CALCIUM 8.1* 8.1* 7.9*   MG  --  2.1  --    PHOS  --  2.6  --      Hepatic:   Recent Labs     01/26/21  0454 01/27/21  0540   LABALBU 2.6* 2.6*   AST 36 29   ALT 49 34   BILITOT 0.6 0.7   ALKPHOS 74 73         Meds:  Infusion:    dextrose 5 % and 0.45 % NaCl 30 mL/hr at 01/26/21 2016     Meds:    piperacillin-tazobactam  3,375 mg Intravenous Q8H    vancomycin (VANCOCIN) intermittent dosing (placeholder)   Other RX Placeholder    vancomycin  1,250 mg Intravenous Q24H    ipratropium-albuterol  1 ampule Inhalation Q4H WA    sodium chloride flush  10 mL Intravenous 2 times per day    enoxaparin  80 mg Subcutaneous Q12H    melatonin  3 mg Oral Nightly    calcium replacement protocol   Other RX Placeholder    phosphorus replacement protocol   Other RX Placeholder    lactobacillus  1 capsule Oral Daily with breakfast    aspirin  81 mg Oral Daily    hydrALAZINE  25 mg Oral TID    pantoprazole  40 mg Oral BID AC    [Held by provider] pravastatin  40 mg Oral QPM    tamsulosin  0.4 mg Oral Daily     Meds prn: sodium chloride flush, promethazine **OR** ondansetron, morphine, potassium chloride **OR** potassium alternative oral replacement **OR** potassium chloride, potassium chloride, potassium chloride, acetaminophen **OR** acetaminophen, cyclobenzaprine, polyethylene glycol, magnesium sulfate, mupirocin       Impression and Plan:  1. Acute Kidney injury. Serum creatinine today has gone up to 1.3  Likely due to prerenal and volume depletion  Illness. Will increase maintenance fluids to 50 mL/h for now    2. Recent hyponatremia. Resolved  3. Rhabdomyolysis. Resolved  4. Status post feeding tube placement  5. Leukocytosis and low-grade fever.   On IV antibiotics  Chest XRAY Last night showed worsening bilateral consolidation    D/W patient and RN    Susie Elizabeth MD  [facility]

## 2021-01-27 NOTE — PROGRESS NOTES
Text Trista AKERS regarding pt lung sounds and today plan of action. Waiting on response.    Received new orders

## 2021-01-27 NOTE — PROGRESS NOTES
Patient returned from xray, resting quietly in bed, patients eyes closed and appears to be in a restful sleep. Head of bed elevated. RN present in room. Took BP before administering medication. Bed alarm on, call light within reach.   M.Wicho

## 2021-01-27 NOTE — PLAN OF CARE
Problem: Impaired respiratory status  Goal: Clear lung sounds  Description: Clear lung sounds  Outcome: Ongoing   Improve breath sounds, increase aeration and decrease WOB.

## 2021-01-27 NOTE — PROGRESS NOTES
Patient is resting quietly in chair in room, patient has reported no change in pain level, and denies any needs at the current time. Television on, lights on, chair alarm on, call light within reach.   MYan

## 2021-01-27 NOTE — FLOWSHEET NOTE
Pt was asleep but he was anointed.       01/27/21 0756   Encounter Summary   Services provided to: Patient   Referral/Consult From: Rounding   Place of [facility] Visiting Yes  (1/27 )   Complexity of Encounter Low   Length of Encounter 15 minutes   Routine   Type Follow up   Assessment Sleeping   [facility]   Sacrament of Sick-Anointing Anointed

## 2021-01-27 NOTE — PROGRESS NOTES
Less than 5ml residual noted from PEG, pt tolerating tube feed.  Rate increase by 10ml to 40ml/hr per order

## 2021-01-27 NOTE — PROGRESS NOTES
Pharmacy Note  Vancomycin Consult    Steven Rodrigues is a 80 y.o. male started on Vancomycin for pneumonia; consult received to manage therapy. Also receiving the following antibiotics: Zosyn. Patient Active Problem List   Diagnosis    Hyperlipidemia    Essential hypertension    Carotid artery disease (HCC)    S/P CABG (coronary artery bypass graft)    Inguinal hernia, left    Generalized abdominal pain    Hyponatremia    Leukocytosis    Enteritis    SIRS (systemic inflammatory response syndrome) (HCC)    Acidosis    Small bowel obstruction (HCC)    Small bowel ischemia (HCC)    Anticoagulated on Coumadin    History of cardioversion    Hiatal hernia    Coronary artery disease involving native coronary artery of native heart without angina pectoris    History of stroke    History of ischemic bowel disease    Bradycardia    Enteritis    Atrial fibrillation (HCC)    Symptomatic bradycardia    Traumatic rhabdomyolysis ([address]    Fall    Supratherapeutic INR    Coagulopathy ([facility] [address]    COVID-19    Elevated CK       Allergies:  Patient has no known allergies. Temp max: 100.3    Recent Labs     01/25/21  0449 01/26/21  0454   BUN 15 14       Recent Labs     01/25/21  0449 01/26/21  0454   CREATININE 1.0 0.9       Recent Labs     01/25/21  0449 01/26/21  0454   WBC 8.5 7.8         Intake/Output Summary (Last 24 hours) at 1/27/2021 0338  Last data filed at 1/26/2021 2046  Gross per 24 hour   Intake 2176.77 ml   Output 600 ml   Net 1576.77 ml       Ht Readings from Last 1 Encounters:   01/17/21 5' 4\" (1.626 m)        Wt Readings from Last 1 Encounters:   01/24/21 169 lb 12.8 oz (77 kg)         Body mass index is 29.15 kg/m². Estimated Creatinine Clearance: 53 mL/min (based on SCr of 0.9 mg/dL). Goal Trough Level: 10-15 mcg/mL    Assessment/Plan:  Patient got a dose of vancomycin yesterday. Will change to vancomycin 1250 mg iv every 24 hours.   Timing of trough level will be determined based on culture results, renal function, and clinical response. Thank you for the consult. Will continue to follow.   Mary Alice Adkins AmstutzPharmD  1/27/2021   3:46 AM

## 2021-01-27 NOTE — PROGRESS NOTES
Comprehensive Nutrition Assessment    Type and Reason for Visit:  Reassess(tubefeeding)    Nutrition Recommendations/Plan:   -Recommend continue current TF regimen via PEG: goal Jevity 1.5 at 50 ml/hr.  -240 mls free water flush 4 times per day per MD order 1/27/21.  -Recommend bowel medication be given, no BM documented since admit, 10 days, discussed with RN. -Further diet recommendations per SLP. Nutrition Assessment:    Pt improving from a nutritional standpoint AEB reports of tolerating TF's at goal rate via new PEG. At risk for further nutrition compromise r/t continued NPO due to dysphagia, COVID+ 1/18/21, advanced age, increased nutrient needs to support wound healing, and underlying medical condition (hx CAD, CKD, CVA).  Nutrition recommendations/interventions as per above. .    Malnutrition Assessment:  Malnutrition Status: Moderate malnutrition    Context:  Acute Illness     Findings of the 6 clinical characteristics of malnutrition:  Energy Intake:  1 - 75% or less of estimated energy requirements for 7 or more days  Weight Loss:  1 - 5% over 1 month(-9.4% in 1.5 months)     Body Fat Loss:  Unable to assess     Muscle Mass Loss:  1 - Mild muscle mass loss Temples (temporalis)  Fluid Accumulation:  Unable to assess     Strength:  Not Performed    Estimated Daily Nutrient Needs:  Energy (kcal):  8218-4536 kcals (20-25); Weight Used for Energy Requirements:  (77kgm on 1/24)     Protein (g):   gm (1.2-2); Weight Used for Protein Requirements:  Ideal(59 kgm)        Fluid (ml/day):  ~1925ml (25ml/kgm); Method Used for Fluid Requirements:  (77kgm on 1/24)      Nutrition Related Findings:  Patient and his nurses seen, pt very tired, resting at this time, had been up in the chair today per RN. He is tolerating TF's at goal rate via new PEG. IVF's at 30 ml/hr. Note MD order for 240 mls free water flush 4 times per day. Pt only had out ~ 500 mls urine output in the last 24 hours.  Sodium 137, Nutrition Intake/Tolerance, IVF Intake  Physical Signs/Symptoms Outcomes:  Biochemical Data, Chewing or Swallowing, GI Status, Fluid Status or Edema, Nutrition Focused Physical Findings, Skin, Weight     Discharge Planning:     Too soon to determine     Electronically signed by Edelmira Gamez RD, LD on 1/27/21 at 3:00 PM EST    Contact: [phone]

## 2021-01-27 NOTE — PROGRESS NOTES
Patient is alert, oriented X3 resting in bed, behavior is appropriate, speech is clear, skin color is tan, warm and dry with sensation present, smooth and intact. Dry mucous membranes, facial symmetry equal and round. Head is round and symmetrical, no nodules or masses and depressions when palpated. Face appears smooth. Ears appear symmetrical, tan, warm and dry. Neck muscles appear equal in size Capillary refill less than 3 seconds. No clubbing of nails. Pupils are reactive to light and equal bilaterally. Upper extremities are weak bilaterally, tan, warm and dry. Handgrip weak bilaterally  Accessory muscle use is diminished, wheezing and crackles present in lung sounds bilaterally, symmetrical lung expansion equal bilaterally. Rate, rhythm and depth is weak, labored. Lower extremities tan, warm, dry, equal bilaterally. LLE edema +1, abrasions on both legs. Pedal pulses are present No extension in abdomen, non-tender, no masses noted, symmetrical bilaterally, bowel sounds hypoactive, decreased urine output. No abnormalities noted for back and spine, no pressure areas on coccyx and buttocks. Intrajugular IV patent and working freely. Peg tube placed 1/26/21, no unusual drainage. Patient has unsteady gait and balance. Assist X2, patient denies pain on scale of 1-10, call light given and within reach.   M.[facility]

## 2021-01-27 NOTE — PROGRESS NOTES
Less than 5ml of risidual noted on PEG tube, pt tolerating feeding well. Increased by 10ml to reach goal of 50ml/hr.

## 2021-01-27 NOTE — PROGRESS NOTES
[address] RENAL TELEMETRY 6K  Occupational Therapy  Daily Note  Time:    Time In: 92  Time Out: 7924  Timed Code Treatment Minutes: 25 Minutes  Minutes: 25          Date: 2021  Patient Name: Bhavesh Aguillon,   Gender: male      Room: [facility]200-A  MRN: [id number]  : 3/30/1932  (80 y.o.)  Referring Practitioner: Dr. Manuel Foster  Diagnosis: hyponatremia  Additional Pertinent Hx: Per ER note on 2021:88 y.o. male history of CVA, CAD, hypertension, hyperlipidemia, chronic atrial fibrillation on anticoagulation with Coumadin was recently admitted and discharged from [facility] for asymptomatic bradycardia management. He also had a history of recent falls. He presented to the emergency department for evaluation of unwitnessed fall at home today morning in his bathroom by his family members. He was found on the ground and was down for unknown length of time. Restrictions/Precautions:  Restrictions/Precautions: Contact Precautions, Fall Risk  Position Activity Restriction  Other position/activity restrictions: COVID isolation, very [place]; on room air on  , peg tub placement with abdominal binder in place     SUBJECTIVE: Pt sleeping in bed but arousing to name. Pt requiring cues throughout to stay awake     PAIN: did not state/10:  Abdomen upon initial sitting EOB then pt stating it felt ok with increased time EOB. COGNITION: Slow Processing    ADL:   Lower Extremity Dressing: Maximum Assistance. donning socks. BALANCE:  Sitting Balance:  Contact Guard Assistance. at eOB with increased time d/t initial complaints of increased pain in abdomen   Standing Balance: Moderate Assistance, X 1. and min A x1 from EOB with educaiton on hand placement and to stand tall     BED MOBILITY:  Supine to Sit: Maximum Assistance, X 1 with pt complaining of increased pain     TRANSFERS:  Sit to Stand:   Moderate Assistance, X 2. from EOB with education on hand placement  Stand to Sit: Maximum Assistance, X 2. into chair d/t pt becoming fatigue and having increased difficulty finishing stand pivot tranfser  Stand pivot: mod A x2 progressing to max x 2 d/t increased fatigue and difficulty finishing transfer. Pt provided max cues to continue to step with pt taking very small steps throughout trnsfers. FUNCTIONAL MOBILITY:  Not appropriate at this time       ASSESSMENT:     Activity Tolerance:  Patient tolerance of  treatment: poor. Pt becoming very fatigued quickly requiring increased assistance to transfers      Discharge Recommendations: Continue to assess pending progress(may be TCU candidate)  1/21, [facility]  Equipment Recommendations:    Plan: Times per week: 5x  Current Treatment Recommendations: Balance Training, Self-Care / ADL, Endurance Training, Functional Mobility Training, Strengthening, Safety Education & Training    Patient Education  Patient Education: safety with tranfsers     Goals  Short term goals  Time Frame for Short term goals: until discharge  Short term goal 1: Complete various sit-stand t/fs with min A x 1 for increased ease of BSC t/fs  Short term goal 2: Complete 1-2 min standing ADL with min a X 1 with BUE support on RW  Short term goal 3: Complete BUE AROM/light strengthening exercises to increase UB endurance for BADL  Short term goal 4: Complete BADL routine with min A & min vcs for safety & sequencing  Short term goal 5: Tolerate further assessment of functional ambulation by OTR when appropriate  Long term goals  Time Frame for Long term goals : No LTG d/t short ELOS    Following session, patient left in safe position with all fall risk precautions in place.     Revised Short-Term Goals  Short term goals  Time Frame for Short term goals: until discharge  Short term goal 1: Complete various sit-stand t/fs with min A x 1 for increased ease of BSC t/fs  Short term goal 2: Complete 1-2 min standing ADL with min a X 1 with BUE support on RW  Short term goal 3: Complete BUE AROM/light strengthening exercises to increase UB endurance for BADL  Short term goal 4: Complete BADL routine with min A & min vcs for safety & sequencing  Short term goal 5: pt to complete stand pivot transfer with min x2 consistently to further progress his mobility to/from bathroom or BSC.   Long term goals  Time Frame for Long term goals : No LTG d/t short ELOS

## 2021-01-28 ENCOUNTER — APPOINTMENT (OUTPATIENT)
Dept: GENERAL RADIOLOGY | Age: 86
DRG: 177 | End: 2021-01-28
Payer: OTHER GOVERNMENT

## 2021-01-28 ENCOUNTER — APPOINTMENT (OUTPATIENT)
Dept: ULTRASOUND IMAGING | Age: 86
DRG: 177 | End: 2021-01-28
Payer: OTHER GOVERNMENT

## 2021-01-28 LAB
ALBUMIN SERPL-MCNC: 2.3 G/DL (ref 3.5–5.1)
ALP BLD-CCNC: 65 U/L (ref 38–126)
ALT SERPL-CCNC: 28 U/L (ref 11–66)
ANION GAP SERPL CALCULATED.3IONS-SCNC: 8 MEQ/L (ref 8–16)
AST SERPL-CCNC: 20 U/L (ref 5–40)
BASOPHILS # BLD: 0.1 %
BASOPHILS ABSOLUTE: 0 THOU/MM3 (ref 0–0.1)
BILIRUB SERPL-MCNC: 0.4 MG/DL (ref 0.3–1.2)
BILIRUBIN DIRECT: < 0.2 MG/DL (ref 0–0.3)
BODY FLUID RBC: 2000 /CUMM
BUN BLDV-MCNC: 18 MG/DL (ref 7–22)
CALCIUM SERPL-MCNC: 7.7 MG/DL (ref 8.5–10.5)
CHARACTER, BODY FLUID: NORMAL
CHLORIDE BLD-SCNC: 107 MEQ/L (ref 98–111)
CO2: 24 MEQ/L (ref 23–33)
COLOR: NORMAL
CREAT SERPL-MCNC: 1.1 MG/DL (ref 0.4–1.2)
EOSINOPHIL # BLD: 1.2 %
EOSINOPHILS ABSOLUTE: 0.1 THOU/MM3 (ref 0–0.4)
ERYTHROCYTE [DISTWIDTH] IN BLOOD BY AUTOMATED COUNT: 19.5 % (ref 11.5–14.5)
ERYTHROCYTE [DISTWIDTH] IN BLOOD BY AUTOMATED COUNT: 60.7 FL (ref 35–45)
GFR SERPL CREATININE-BSD FRML MDRD: 63 ML/MIN/1.73M2
GLUCOSE BLD-MCNC: 129 MG/DL (ref 70–108)
GLUCOSE, FLUID: 140 MG/DL
HCT VFR BLD CALC: 21.7 % (ref 42–52)
HEMOGLOBIN: 6.8 GM/DL (ref 14–18)
IMMATURE GRANS (ABS): 0.06 THOU/MM3 (ref 0–0.07)
IMMATURE GRANULOCYTES: 0.7 %
INR BLD: 1.42 (ref 0.85–1.13)
LD, FLUID: 122 U/L
LYMPHOCYTES # BLD: 6.2 %
LYMPHOCYTES ABSOLUTE: 0.5 THOU/MM3 (ref 1–4.8)
MAGNESIUM: 2.1 MG/DL (ref 1.6–2.4)
MCH RBC QN AUTO: 27.3 PG (ref 26–33)
MCHC RBC AUTO-ENTMCNC: 31.3 GM/DL (ref 32.2–35.5)
MCV RBC AUTO: 87.1 FL (ref 80–94)
MESOTHELIAL CELLS BODY FLUID: NORMAL
MONOCYTES # BLD: 9.9 %
MONOCYTES ABSOLUTE: 0.8 THOU/MM3 (ref 0.4–1.3)
MONONUCLEAR CELLS BODY FLUID: 39.8 %
NUCLEATED RED BLOOD CELLS: 0 /100 WBC
PATHOLOGIST REVIEW: ABNORMAL
PATHOLOGIST REVIEW: NORMAL
PH FLUID: 7.53
PLATELET # BLD: 197 THOU/MM3 (ref 130–400)
PLATELET ESTIMATE: ADEQUATE
PMV BLD AUTO: 10.7 FL (ref 9.4–12.4)
POLYMORPHONUCLEAR CELLS BODY FLUID: 60.2 %
POTASSIUM SERPL-SCNC: 3.6 MEQ/L (ref 3.5–5.2)
PROTEIN FLUID: 1 GM/DL
RBC # BLD: 2.49 MILL/MM3 (ref 4.7–6.1)
SCAN OF BLOOD SMEAR: NORMAL
SCHISTOCYTES: ABNORMAL
SEG NEUTROPHILS: 81.9 %
SEGMENTED NEUTROPHILS ABSOLUTE COUNT: 6.9 THOU/MM3 (ref 1.8–7.7)
SODIUM BLD-SCNC: 139 MEQ/L (ref 135–145)
SPECIMEN: NORMAL
TOTAL NUCLEATED CELLS BODY FLUID: 227 /CUMM (ref 0–500)
TOTAL PROTEIN: 4.8 G/DL (ref 6.1–8)
TOTAL VOLUME RECEIVED BODY FLUID: 73 ML
WBC # BLD: 8.4 THOU/MM3 (ref 4.8–10.8)

## 2021-01-28 PROCEDURE — 85025 COMPLETE CBC W/AUTO DIFF WBC: CPT

## 2021-01-28 PROCEDURE — 99232 SBSQ HOSP IP/OBS MODERATE 35: CPT | Performed by: INTERNAL MEDICINE

## 2021-01-28 PROCEDURE — 85610 PROTHROMBIN TIME: CPT

## 2021-01-28 PROCEDURE — 02HV33Z INSERTION OF INFUSION DEVICE INTO SUPERIOR VENA CAVA, PERCUTANEOUS APPROACH: ICD-10-PCS | Performed by: FAMILY MEDICINE

## 2021-01-28 PROCEDURE — 6370000000 HC RX 637 (ALT 250 FOR IP): Performed by: NURSE PRACTITIONER

## 2021-01-28 PROCEDURE — 88305 TISSUE EXAM BY PATHOLOGIST: CPT

## 2021-01-28 PROCEDURE — 88112 CYTOPATH CELL ENHANCE TECH: CPT

## 2021-01-28 PROCEDURE — 76937 US GUIDE VASCULAR ACCESS: CPT

## 2021-01-28 PROCEDURE — 87116 MYCOBACTERIA CULTURE: CPT

## 2021-01-28 PROCEDURE — 6360000002 HC RX W HCPCS: Performed by: STUDENT IN AN ORGANIZED HEALTH CARE EDUCATION/TRAINING PROGRAM

## 2021-01-28 PROCEDURE — 2700000000 HC OXYGEN THERAPY PER DAY

## 2021-01-28 PROCEDURE — 1200000003 HC TELEMETRY R&B

## 2021-01-28 PROCEDURE — 2580000003 HC RX 258: Performed by: PHYSICIAN ASSISTANT

## 2021-01-28 PROCEDURE — 36430 TRANSFUSION BLD/BLD COMPNT: CPT

## 2021-01-28 PROCEDURE — 6370000000 HC RX 637 (ALT 250 FOR IP): Performed by: INTERNAL MEDICINE

## 2021-01-28 PROCEDURE — 71045 X-RAY EXAM CHEST 1 VIEW: CPT

## 2021-01-28 PROCEDURE — 87102 FUNGUS ISOLATION CULTURE: CPT

## 2021-01-28 PROCEDURE — 80053 COMPREHEN METABOLIC PANEL: CPT

## 2021-01-28 PROCEDURE — 2709999900 HC NON-CHARGEABLE SUPPLY

## 2021-01-28 PROCEDURE — 89050 BODY FLUID CELL COUNT: CPT

## 2021-01-28 PROCEDURE — 87070 CULTURE OTHR SPECIMN AEROBIC: CPT

## 2021-01-28 PROCEDURE — 83735 ASSAY OF MAGNESIUM: CPT

## 2021-01-28 PROCEDURE — 36415 COLL VENOUS BLD VENIPUNCTURE: CPT

## 2021-01-28 PROCEDURE — 6370000000 HC RX 637 (ALT 250 FOR IP): Performed by: STUDENT IN AN ORGANIZED HEALTH CARE EDUCATION/TRAINING PROGRAM

## 2021-01-28 PROCEDURE — 32555 ASPIRATE PLEURA W/ IMAGING: CPT

## 2021-01-28 PROCEDURE — 87075 CULTR BACTERIA EXCEPT BLOOD: CPT

## 2021-01-28 PROCEDURE — 0W993ZZ DRAINAGE OF RIGHT PLEURAL CAVITY, PERCUTANEOUS APPROACH: ICD-10-PCS | Performed by: RADIOLOGY

## 2021-01-28 PROCEDURE — 87205 SMEAR GRAM STAIN: CPT

## 2021-01-28 PROCEDURE — 84157 ASSAY OF PROTEIN OTHER: CPT

## 2021-01-28 PROCEDURE — 2580000003 HC RX 258: Performed by: STUDENT IN AN ORGANIZED HEALTH CARE EDUCATION/TRAINING PROGRAM

## 2021-01-28 PROCEDURE — 6360000002 HC RX W HCPCS: Performed by: PHYSICIAN ASSISTANT

## 2021-01-28 PROCEDURE — 83615 LACTATE (LD) (LDH) ENZYME: CPT

## 2021-01-28 PROCEDURE — 83986 ASSAY PH BODY FLUID NOS: CPT

## 2021-01-28 PROCEDURE — C1751 CATH, INF, PER/CENT/MIDLINE: HCPCS

## 2021-01-28 PROCEDURE — 82945 GLUCOSE OTHER FLUID: CPT

## 2021-01-28 PROCEDURE — 36568 INSJ PICC <5 YR W/O IMAGING: CPT

## 2021-01-28 PROCEDURE — 94640 AIRWAY INHALATION TREATMENT: CPT

## 2021-01-28 PROCEDURE — 99232 SBSQ HOSP IP/OBS MODERATE 35: CPT | Performed by: PHYSICIAN ASSISTANT

## 2021-01-28 PROCEDURE — P9016 RBC LEUKOCYTES REDUCED: HCPCS

## 2021-01-28 PROCEDURE — 82248 BILIRUBIN DIRECT: CPT

## 2021-01-28 PROCEDURE — 94760 N-INVAS EAR/PLS OXIMETRY 1: CPT

## 2021-01-28 PROCEDURE — 94667 MNPJ CHEST WALL 1ST: CPT

## 2021-01-28 RX ORDER — POTASSIUM CHLORIDE 20 MEQ/1
40 TABLET, EXTENDED RELEASE ORAL ONCE
Status: DISCONTINUED | OUTPATIENT
Start: 2021-01-28 | End: 2021-01-31

## 2021-01-28 RX ORDER — SODIUM CHLORIDE 9 MG/ML
INJECTION, SOLUTION INTRAVENOUS PRN
Status: DISCONTINUED | OUTPATIENT
Start: 2021-01-28 | End: 2021-02-05 | Stop reason: HOSPADM

## 2021-01-28 RX ADMIN — HYDRALAZINE HYDROCHLORIDE 25 MG: 25 TABLET, FILM COATED ORAL at 09:46

## 2021-01-28 RX ADMIN — PIPERACILLIN AND TAZOBACTAM 3375 MG: 3; .375 INJECTION, POWDER, LYOPHILIZED, FOR SOLUTION INTRAVENOUS at 20:59

## 2021-01-28 RX ADMIN — IPRATROPIUM BROMIDE AND ALBUTEROL SULFATE 1 AMPULE: .5; 3 SOLUTION RESPIRATORY (INHALATION) at 09:22

## 2021-01-28 RX ADMIN — VANCOMYCIN HYDROCHLORIDE 1250 MG: 5 INJECTION, POWDER, LYOPHILIZED, FOR SOLUTION INTRAVENOUS at 09:51

## 2021-01-28 RX ADMIN — POTASSIUM CHLORIDE 20 MEQ: 29.8 INJECTION, SOLUTION INTRAVENOUS at 18:03

## 2021-01-28 RX ADMIN — Medication 1 CAPSULE: at 09:48

## 2021-01-28 RX ADMIN — PIPERACILLIN AND TAZOBACTAM 3375 MG: 3; .375 INJECTION, POWDER, LYOPHILIZED, FOR SOLUTION INTRAVENOUS at 03:41

## 2021-01-28 RX ADMIN — ONDANSETRON 4 MG: 2 INJECTION INTRAMUSCULAR; INTRAVENOUS at 19:40

## 2021-01-28 RX ADMIN — IPRATROPIUM BROMIDE AND ALBUTEROL SULFATE 1 AMPULE: .5; 3 SOLUTION RESPIRATORY (INHALATION) at 17:04

## 2021-01-28 RX ADMIN — IPRATROPIUM BROMIDE AND ALBUTEROL SULFATE 1 AMPULE: .5; 3 SOLUTION RESPIRATORY (INHALATION) at 21:34

## 2021-01-28 RX ADMIN — SODIUM CHLORIDE, PRESERVATIVE FREE 10 ML: 5 INJECTION INTRAVENOUS at 21:01

## 2021-01-28 RX ADMIN — SODIUM CHLORIDE, PRESERVATIVE FREE 10 ML: 5 INJECTION INTRAVENOUS at 09:50

## 2021-01-28 RX ADMIN — PIPERACILLIN AND TAZOBACTAM 3375 MG: 3; .375 INJECTION, POWDER, LYOPHILIZED, FOR SOLUTION INTRAVENOUS at 11:12

## 2021-01-28 RX ADMIN — TAMSULOSIN HYDROCHLORIDE 0.4 MG: 0.4 CAPSULE ORAL at 09:48

## 2021-01-28 RX ADMIN — ENOXAPARIN SODIUM 80 MG: 80 INJECTION SUBCUTANEOUS at 16:00

## 2021-01-28 RX ADMIN — Medication 3 MG: at 21:00

## 2021-01-28 RX ADMIN — PANTOPRAZOLE SODIUM 40 MG: 40 TABLET, DELAYED RELEASE ORAL at 16:00

## 2021-01-28 RX ADMIN — POLYETHYLENE GLYCOL (3350) 17 G: 17 POWDER, FOR SOLUTION ORAL at 10:12

## 2021-01-28 RX ADMIN — IPRATROPIUM BROMIDE AND ALBUTEROL SULFATE 1 AMPULE: .5; 3 SOLUTION RESPIRATORY (INHALATION) at 13:55

## 2021-01-28 RX ADMIN — PRAVASTATIN SODIUM 40 MG: 40 TABLET ORAL at 21:00

## 2021-01-28 RX ADMIN — HYDRALAZINE HYDROCHLORIDE 25 MG: 25 TABLET, FILM COATED ORAL at 21:00

## 2021-01-28 ASSESSMENT — PAIN SCALES - GENERAL: PAINLEVEL_OUTOF10: 0

## 2021-01-28 NOTE — PROGRESS NOTES
[facility]  PHYSICAL THERAPY MISSED TREATMENT NOTE  ACUTE CARE  [facility] RENAL TELEMETRY 6K              Missed Treatment  Pt getting PICC line placed then having thoracentesis. Will check back tomorrow for therapy.

## 2021-01-28 NOTE — FLOWSHEET NOTE
01/28/21 0050   Provider Notification   Reason for Communication Evaluate  (9 bts of vtach)   Provider Name [facility]   Provider Notification Advance Practice Clinician (CNS, NP, CNM, CRNA, PA)   Method of Communication Secure Message   Notification Time [phone]       room 854, patient had 9 bts of vtach / bundle branch block, asymptomatic, pt was sleeping at the time. had 7 bts last night. Vitals stable. Will continue to monitor.

## 2021-01-28 NOTE — PROGRESS NOTES
Sent Dr. Dena Gaspar a message about patients Hgb of 6.8, asking if he wants patient to be transfused.

## 2021-01-28 NOTE — PROGRESS NOTES
Attempted to obtain consent from Richie listed phone number as [phone]. No answer and no confirmation that this phone number is Bethel Asp will attempt contact at a later time. Bhavin Sanchez RN.

## 2021-01-28 NOTE — PROGRESS NOTES
[facility]   Nephrology progress note  1/28/2021, 9:10 AM      Pt Name:    Saeid Hall  MRN:     [id number]     YOB: 1932  Admit Date:    1/17/2021  2:31 PM  Primary Care Physician:  Conrado Gonzalez DO   Room number  7J-74/128-O    Chief Complaint: Nephrology was following for hyponatremia but now for recurrent ANNIKA  Subjective:  Patient seen and examined  Seen and examined earlier today  No acute distress    Objective:  24HR INTAKE/OUTPUT:      Intake/Output Summary (Last 24 hours) at 1/28/2021 0910  Last data filed at 1/28/2021 0342  Gross per 24 hour   Intake 2547.92 ml   Output 475 ml   Net 2072.92 ml     I/O last 3 completed shifts: In: 2547.9 [I.V.:1637.9; NG/GT:910]  Out: 475 [Urine:475]  No intake/output data recorded. Admission weight: 162 lb (73.5 kg)  Wt Readings from Last 3 Encounters:   01/28/21 183 lb 6.4 oz (83.2 kg)   01/06/21 162 lb (73.5 kg)   01/05/21 160 lb (72.6 kg)     Body mass index is 31.48 kg/m².     Physical examination  VITALS:     Vitals:    01/27/21 2020 01/27/21 2331 01/28/21 0339 01/28/21 0730   BP:  (!) 102/50 (!) 115/55 (!) 122/58   Pulse:  65 64 62   Resp: 18 20 18 20   Temp:  97.3 °F (36.3 °C) 97.4 °F (36.3 °C) 97.7 °F (36.5 °C)   TempSrc:  Oral Oral Oral   SpO2: 97% 97% 96% 99%   Weight:   183 lb 6.4 oz (83.2 kg)    Height:         General Appearance: appears comfortable, no distress  Mouth/Throat: Oral mucosa moist  Neck: No JVD  Lungs:  no use of accessory muscles  Heart:  S1, S2 heard  GI: soft, non-tender, no guarding      Lab Data  CBC:   Recent Labs     01/26/21  0454 01/27/21  0540 01/27/21  2045 01/28/21  0400   WBC 7.8 14.1*  --  8.4   HGB 8.2* 7.3* 7.0* 6.8*   HCT 26.0* 22.9* 22.0* 21.7*    228  --  197     BMP:  Recent Labs     01/26/21  0454 01/27/21  0540 01/28/21  0400    137 139   K 3.9 3.8 3.6    105 107   CO2 23 25 24   BUN 14 16 18   CREATININE 0.9 1.3* 1.1   GLUCOSE 87 114* 129*   CALCIUM 8.1* 7.9* 7.7*   MG 2.1  --  2.1   PHOS 2.6  --   --      Hepatic:   Recent Labs     01/26/21  0454 01/27/21  0540 01/28/21  0400   LABALBU 2.6* 2.6* 2.3*   AST 36 29 20   ALT 49 34 28   BILITOT 0.6 0.7 0.4   ALKPHOS 74 73 65         Meds:  Infusion:    dextrose 5 % and 0.45 % NaCl 50 mL/hr at 01/27/21 1809     Meds:    piperacillin-tazobactam  3,375 mg Intravenous Q8H    vancomycin (VANCOCIN) intermittent dosing (placeholder)   Other RX Placeholder    vancomycin  1,250 mg Intravenous Q24H    ipratropium-albuterol  1 ampule Inhalation Q4H WA    pravastatin  40 mg PEG Tube Nightly    lidocaine 1 % injection  5 mL Intradermal Once    sodium chloride flush  10 mL Intravenous 2 times per day    sodium chloride flush  10 mL Intravenous 2 times per day    enoxaparin  80 mg Subcutaneous Q12H    melatonin  3 mg Oral Nightly    calcium replacement protocol   Other RX Placeholder    phosphorus replacement protocol   Other RX Placeholder    lactobacillus  1 capsule Oral Daily with breakfast    aspirin  81 mg Oral Daily    hydrALAZINE  25 mg Oral TID    pantoprazole  40 mg Oral BID AC    tamsulosin  0.4 mg Oral Daily     Meds prn: sodium chloride flush, sodium chloride flush, promethazine **OR** ondansetron, morphine, potassium chloride **OR** potassium alternative oral replacement **OR** potassium chloride, potassium chloride, potassium chloride, acetaminophen **OR** acetaminophen, cyclobenzaprine, polyethylene glycol, magnesium sulfate, mupirocin       Impression and Plan:  1. Acute Kidney injury. Serum creatinine yesterday went up to 1.3  Continue with IV fluids, down to 1.1 today    2. Recent hyponatremia. Resolved  3. Rhabdomyolysis. Resolved  4. Status post feeding tube placement  5. Leukocytosis and low-grade fever. On IV antibiotics. Chest x-ray concerning for worsening consolidation. 6.  Anemia: Patient is going to get transfusion to keep hemoglobin greater than 7  7.   Overall stable renal function at this time      Pari Fulton MD  [facility]

## 2021-01-28 NOTE — PROGRESS NOTES
Cardiology Progress Note      Patient:  Stafford Bernheim  YOB: 1932  MRN: [id number]   Acct: [de-identified]  [facility] Date:  1/17/2021  Primary Cardiologist: Mouna Hdz MD    Note per dr Windsor Mcardle:    For evaluation of elevated troponins.  History of bradycardia with episode of unresponsiveness.      CHIEF COMPLAINT:    AMS     HISTORY OF PRESENT ILLNESS:    Stafford Bernheim is a pleasant 80year old male patient with past medical history that includes:   Past Medical History        Past Medical History:   Diagnosis Date    Actinic keratoses 8/21/2013    Adenomatous polyp 2008    Atrial fibrillation ([facility] [address]      CAD (coronary artery disease) 2009    Carotid artery disease ([facility] [address] 2009     left    Hyperlipidemia 2004    Hypertension 2004    Inguinal hernia, left 2/19/2014    Unspecified cerebral artery occlusion with cerebral infarction        Has h/o CABG. He is currently non-verbal, unable to provide history. Chart was reviewed. The patient was admitted to the hospital on 11/17/2021 with AMS. He apparently was found unresponsive in the bathroom. Labs revealed evidence for severe hyponatremia. Patient did not report chest pain, shortness of breath, dyspnea on exertion. He has known history of falls. EKG 1/17/2021 revealed A Fib, HR 61, QTc 505 msec, RBBB. RKG today revealed AF, HR 53. Cardiology was consulted for elevated troponin and bradycardia. Troponin 0.57>0.054>0. 38. The patient is being treated for Rhabdomyolysis, CK was as high as 10,500. Cr was up to 1.4. Ns 117. Ca 8. Hgb 9. The patient was noted to have bradycardia during recent admit at [facility]. Recent event monitor was c/w AF. His HR has improved after cardizem was stopped. Patient was seen by EP, Dr Britney Osborn, as outpatient on 1/6/2021. Recommendation was to continue to monitor, avoid AVB agents and consider PPM only if patient develops symptomatic bradycardia.  \"    Subjective (Events in last 24 hours):   Called back to see pt for NSVT 9 beats and elevated bnp    Pt awake and alert. NAD. No cp, sob, Lightheadedness, syncope. On 2 l/min O2    Net I/o +8.9 L  Weight up 25 lb?     Objective:   BP (!) 106/53   Pulse 64   Temp 97.6 °F (36.4 °C) (Oral)   Resp 20   Ht 5' 4\" (1.626 m)   Wt 183 lb 6.4 oz (83.2 kg)   SpO2 98%   BMI 31.48 kg/m²        TELEMETRY: afib cvr 60s, 2 episodea of afib with aberrancy, no evidence    Physical Exam:  General Appearance: alert and oriented to person, place and time, in no acute distress  Cardiovascular: irregularly irregular  Pulmonary/Chest: clear to auscultation bilaterally- no wheezes, rales or rhonchi, normal air movement, no respiratory distress  Abdomen: soft, non-tender, non-distended, normal bowel sounds, no masses Extremities:+2 ble edema, pulse   Skin: warm and dry, no rash or erythema  Head: normocephalic and atraumatic  Eyes: pupils equal, round, and reactive to light  Neck: supple and non-tender without mass, no thyromegaly     Medications:    warfarin (COUMADIN) daily dosing (placeholder)   Other RX Placeholder    piperacillin-tazobactam  3,375 mg Intravenous Q8H    vancomycin (VANCOCIN) intermittent dosing (placeholder)   Other RX Placeholder    vancomycin  1,250 mg Intravenous Q24H    ipratropium-albuterol  1 ampule Inhalation Q4H WA    pravastatin  40 mg PEG Tube Nightly    lidocaine 1 % injection  5 mL Intradermal Once    sodium chloride flush  10 mL Intravenous 2 times per day    sodium chloride flush  10 mL Intravenous 2 times per day    enoxaparin  80 mg Subcutaneous Q12H    melatonin  3 mg Oral Nightly    calcium replacement protocol   Other RX Placeholder    phosphorus replacement protocol   Other RX Placeholder    lactobacillus  1 capsule Oral Daily with breakfast    aspirin  81 mg Oral Daily    hydrALAZINE  25 mg Oral TID    pantoprazole  40 mg Oral BID AC    tamsulosin  0.4 mg Oral Daily      sodium chloride      dextrose 5 % and 0.45 % NaCl 50 mL/hr at 01/27/21 1809         sodium chloride, , PRN      sodium chloride flush, 10 mL, PRN      sodium chloride flush, 10 mL, PRN      promethazine, 12.5 mg, Q6H PRN    Or      ondansetron, 4 mg, Q6H PRN      morphine, 2 mg, Q4H PRN      potassium chloride, 40 mEq, PRN    Or      potassium alternative oral replacement, 40 mEq, PRN    Or      potassium chloride, 10 mEq, PRN      potassium chloride, 20 mEq, PRN      potassium chloride, 20 mEq, PRN      acetaminophen, 650 mg, Q6H PRN    Or      acetaminophen, 650 mg, Q6H PRN      cyclobenzaprine, 5 mg, TID PRN      polyethylene glycol, 17 g, Daily PRN      magnesium sulfate, 2 g, PRN      mupirocin, , PRN      Lab Data:    Cardiac Enzymes:  Recent Labs     01/26/21  0454   CKTOTAL 182*       CBC:   Lab Results   Component Value Date    WBC 8.4 01/28/2021    RBC 2.49 01/28/2021    HGB 6.8 01/28/2021    HCT 21.7 01/28/2021     01/28/2021       CMP:    Lab Results   Component Value Date     01/28/2021    K 3.6 01/28/2021    K 4.6 01/20/2021     01/28/2021    CO2 24 01/28/2021    BUN 18 01/28/2021    CREATININE 1.1 01/28/2021    LABGLOM 63 01/28/2021    GLUCOSE 129 01/28/2021    GLUCOSE 87 01/09/2015    CALCIUM 7.7 01/28/2021       Hepatic Function Panel:    Lab Results   Component Value Date    ALKPHOS 65 01/28/2021    ALT 28 01/28/2021    AST 20 01/28/2021    PROT 4.8 01/28/2021    BILITOT 0.4 01/28/2021    BILIDIR <0.2 01/28/2021    LABALBU 2.3 01/28/2021       Magnesium:    Lab Results   Component Value Date    MG 2.1 01/28/2021       PT/INR:    Lab Results   Component Value Date    INR 1.42 01/28/2021       HgBA1c:    Lab Results   Component Value Date    LABA1C 5.9 09/13/2019       FLP:    Lab Results   Component Value Date    TRIG 62 09/13/2019    HDL 67 09/13/2019    LDLCALC 60 09/13/2019    LABVLDL 9 01/09/2015       TSH:    Lab Results   Component Value Date    TSH 9.470 12/09/2020         Assessment:    AMS  S/p fall  Rhabdomyolysis - resolved  ANNIKA - improving   Hyponatremia - resolved  +COVID 21 - out of isolation  Anemia - transfusion ordered for today    Elevated troponins - secondary to demand ischemia, anemia, rhabdo, ANNIKA - denies anginal symptoms  Hx CAD - s/p CABG  Ef 55-60 per TTE 12/10/20  Left and right atrium severely dilated   Chronic afib cvr with intermittent aberrancy    On coumadin/lovenox   Hx recent bradycardia  HTN  Dyslipidemia    Leukocytosis  Bilateral pleural effusion - s/p right thoracenteses 21 - 1 liter removed per nurse mariah  Dysphagia - s/p PEG  malnutrition    Plan:    Keep mag >2 and K >4, replace K   Cont asa/statin/hydralazine  Off AVN blocking meds due to hx afib with svr - seen by dr Jerad Morales last 21 - reviewed strips with dr Jerad Morales - agrees Bright Rivas with aberrancy   Pt appears with some fluid overload/?anasarca - consider diuretics and albumin (weight up 25 lb, +8.9L)  Consider decreasing or stopping hydralazine if bp remains on lower side  Will see prn  F/up dr Elmo Brink 2 weeks           Electronically signed by Jerrell Cabrera PA-C on 2021 at 1:53 PM

## 2021-01-28 NOTE — PROGRESS NOTES
[facility]  OCCUPATIONAL THERAPY MISSED TREATMENT NOTE  [facility] RENAL TELEMETRY 6K  6K-22/022-A      Date: 2021  Patient Name: David Perez        CSN: [id number]   : 3/30/1932  (80 y.o.)  Gender: male   Referring Practitioner: Dr. Munira Nevarez  Diagnosis: hyponatremia         REASON FOR MISSED TREATMENT: Hgb was found to be 6.8 this AM. Per RN pt is receiving 1 unit of blood. Pt also to receive PICC line and thoracentesis this date. OT will see 20 as time allows.

## 2021-01-28 NOTE — PROGRESS NOTES
coronary artery disease, status post CABG, carotid artery disease, actinic keratosis, left inguinal hernia, atrial fibrillation, and unspecified cerebral artery occlusion with cerebral artery infarct without neurological deficits. While in the emergency room the patient was found to have hyponatremia and elevated CPK there is mild opacity in the right lung base patient was Covid positive at that time    ROS (14 point review of systems completed. Pertinent positives noted. Otherwise ROS is negative) : Poor historian    PMH:  Per HPI and       Diagnosis Date    Actinic keratoses 2013    Adenomatous polyp     Atrial fibrillation ([address]     CAD (coronary artery disease) 2009    Carotid artery disease ([address] 2009    left    Hyperlipidemia 2004    Hypertension 2004    Inguinal hernia, left 2014    Unspecified cerebral artery occlusion with cerebral infarction      SHX:        Procedure Laterality Date    ABDOMEN SURGERY      ABDOMINAL ADHESION SURGERY  2012    Dr Wendy Espana  2004    right    COLONOSCOPY      neidic    CORONARY ARTERY BYPASS GRAFT  2009    SMALL INTESTINE SURGERY      SMALL INTESTINE SURGERY  2015    Exploratory Laparotomy    UPPER GASTROINTESTINAL ENDOSCOPY N/A 2021    EGD ESOPHAGOGASTRODUODENOSCOPY PEG TUBE INSERTION performed by Dustin Majano MD at [facility]     FHX:       Problem Relation Age of Onset    Cancer Mother      [facility]:   Social History     Socioeconomic History    Marital status:       Spouse name: None    Number of children: None    Years of education: None    Highest education level: None   Occupational History    None   Social Needs    Financial resource strain: None    Food insecurity     Worry: None     Inability: None    Transportation needs     Medical: None     Non-medical: None   Tobacco Use    Smoking status: Former Smoker     Quit date: 1980     Years since quittin.1  Smokeless tobacco: Never Used   Substance and Sexual Activity    Alcohol use: Yes     Alcohol/week: 1.0 standard drinks     Types: 1 Cans of beer per week     Comment: occasionally    Drug use: No    Sexual activity: None   Lifestyle    Physical activity     Days per week: None     Minutes per session: None    Stress: None   Relationships    Social connections     Talks on phone: None     Gets together: None     Attends Scientologist service: None     Active member of club or organization: None     Attends meetings of clubs or organizations: None     Relationship status: None    Intimate partner violence     Fear of current or ex partner: None     Emotionally abused: None     Physically abused: None     Forced sexual activity: None   Other Topics Concern    None   Social History Narrative    None      Allergies: Patient has no known allergies. Medications:     sodium chloride      dextrose 5 % and 0.45 % NaCl 50 mL/hr at 01/27/21 1809      piperacillin-tazobactam  3,375 mg Intravenous Q8H    vancomycin (VANCOCIN) intermittent dosing (placeholder)   Other RX Placeholder    vancomycin  1,250 mg Intravenous Q24H    ipratropium-albuterol  1 ampule Inhalation Q4H WA    pravastatin  40 mg PEG Tube Nightly    lidocaine 1 % injection  5 mL Intradermal Once    sodium chloride flush  10 mL Intravenous 2 times per day    sodium chloride flush  10 mL Intravenous 2 times per day    enoxaparin  80 mg Subcutaneous Q12H    melatonin  3 mg Oral Nightly    calcium replacement protocol   Other RX Placeholder    phosphorus replacement protocol   Other RX Placeholder    lactobacillus  1 capsule Oral Daily with breakfast    aspirin  81 mg Oral Daily    hydrALAZINE  25 mg Oral TID    pantoprazole  40 mg Oral BID AC    tamsulosin  0.4 mg Oral Daily     Prior to Admission medications    Medication Sig Start Date End Date Taking?  Authorizing Provider   hydrALAZINE (APRESOLINE) 25 MG tablet Take 1 tablet by mouth 3 times daily 12/12/20  Yes Ketty Rust PA-C   tamsulosin [facility]) 0.4 MG capsule Take 1 capsule by mouth daily 1/16/20  Yes Rossana Garcia MD   warfarin (COUMADIN) 3 MG tablet Take 1 tablet by mouth See Admin Instructions Indications: Anticoagulant Therapy, [facility], #45=30 days supply 9/19/17  Yes Bi Niño MD   furosemide (LASIX) 20 MG tablet Take 1 tablet by mouth daily 4/4/17  Yes Shekhar Morales MD   pantoprazole (PROTONIX) 40 MG tablet Take 1 tablet by mouth 2 times daily (before meals). 3/1/15  Yes ROBBIE Tom CNP   pravastatin (PRAVACHOL) 40 MG tablet Take 40 mg by mouth every evening. Indications: Blood Cholesterol Abnormal   Yes Historical Provider, MD   aspirin 81 MG EC tablet Take 81 mg by mouth daily Indications: Anticoagulant Therapy Take with food. Yes Historical Provider, MD   cyclobenzaprine (FLEXERIL) 5 MG tablet Take 1 tablet by mouth 3 times daily as needed for Muscle spasms  Patient not taking: Reported on 1/6/2021 1/5/21   Monique Barker PA-C      PHYSICAL EXAM:    BP (!) 109/53   Pulse 64   Temp 97.6 °F (36.4 °C) (Oral)   Resp 20   Ht 5' 4\" (1.626 m)   Wt 183 lb 6.4 oz (83.2 kg)   SpO2 98%   BMI 31.48 kg/m²     General appearance:  No apparent distress, appears stated age and cooperative. Sitting comfortably in chair  HEENT:  Normal cephalic, atraumatic without obvious deformity. Pupils equal, round, and reactive to light. Extra ocular muscles intact. Conjunctivae/corneas clear. Neck: Supple, with full range of motion. no jugular venous distention. Trachea midline. no carotid bruits  Respiratory:  Normal respiratory effort. Clear to auscultation, upper airway wheezes, bases markedly decreased breath sounds right more so than the left no crackles or rails heard at this time  Cardiovascular:  Regular rate and rhythm with normal S1/S2 without murmurs, rubs or gallops.  PMI non displaced  Abdomen: Soft, non-tender, non-distended with normal bowel sounds. No guarding, rebound. Musculoskeletal:  No clubbing, cyanosis. Trace pretibial edema right greater than left calves are soft full range of motion without deformity. Skin: Skin color, texture, turgor normal.  No rashes or lesions, or suspicious lesions. Neurologic:  Neurovascularly intact without any focal sensory/motor deficits. Cranial nerves: II-XII intact, grossly non-focal.  Psychiatric:  Alert and oriented, thought content appropriate, normal insight  Capillary Refill: Brisk,< 2 seconds   Peripheral Pulses: +2 palpable, equal bilaterally upper and lower extremities  Lymphatics: no lymphadenopathy    Data: (All radiographs, tracings, PFTs, and imaging are personally viewed and interpreted unless otherwise noted).  WBC 8.4   Hemoglobin 6.8  Recent Labs     01/26/21  0454 01/27/21  0540 01/27/21 2045 01/28/21  0400   WBC 7.8 14.1*  --  8.4   HGB 8.2* 7.3* 7.0* 6.8*   HCT 26.0* 22.9* 22.0* 21.7*    228  --  197     Recent Labs     01/26/21  0454 01/27/21  0540 01/28/21  0400    137 139   K 3.9 3.8 3.6    105 107   CO2 23 25 24   BUN 14 16 18   CREATININE 0.9 1.3* 1.1   CALCIUM 8.1* 7.9* 7.7*   PHOS 2.6  --   --      Recent Labs     01/26/21  0454 01/27/21  0540 01/28/21  0400   AST 36 29 20   ALT 49 34 28   BILIDIR <0.2 0.3 <0.2   BILITOT 0.6 0.7 0.4   ALKPHOS 74 73 65     Recent Labs     01/26/21  1142 01/27/21  2045 01/28/21  0815   INR 1.97* 1.42* 1.42*     Recent Labs     01/26/21  0454   CKTOTAL 182*       Radiology reports-images reviewed in PACS  Xr Chest (2 Vw)    Result Date: 1/17/2021  PROCEDURE: XR CHEST (2 VW) CLINICAL INFORMATION: Found on the floor COMPARISON: 8/20/2016 TECHNIQUE:  AP and lateral chest x-ray  FINDINGS: There is at least moderate enlargement of the cardiomediastinal silhouette. There is evidence of prior median sternotomy. There is diffuse osteopenia noted.  Mild focal eventration of the right hemidiaphragm is seen. No focal consolidation or pleural effusion is seen. No pneumothorax is demonstrated. 1. Moderate enlargement of the cardiomediastinal silhouette. 2. There is hyperinflation noted. 2. No focal consolidation, pleural effusion or pneumothorax is seen. **This report has been created using voice recognition software. It may contain minor errors which are inherent in voice recognition technology. ** Final report electronically signed by Dr. Carmen Baird on 1/17/2021 3:54 PM    Xr Pelvis (1-2 Views)    Result Date: 1/17/2021  PROCEDURE: XR PELVIS (1-2 VIEWS) CLINICAL INFORMATION: Fall, weakness of left leg COMPARISON: 1/5/2021 TECHNIQUE:  AP pelvis single view  FINDINGS: The visualized bilateral obturator rings appear intact. There is spurring noted along the superior-lateral left acetabulum. No acute fractures identified. The femoral head contours appear intact bilaterally. There is increased sclerosis along the acetabular roofs bilaterally. 1. No acute fracture or malalignment. Degenerative changes are as described above. **This report has been created using voice recognition software. It may contain minor errors which are inherent in voice recognition technology. ** Final report electronically signed by Dr. Carmen Baird on 1/17/2021 3:56 PM    Xr Femur Left (min 2 Views)    Result Date: 1/17/2021  PROCEDURE: XR FEMUR LEFT (MIN 2 VIEWS) CLINICAL INFORMATION: Fall, weakness of left leg COMPARISON: No prior study. TECHNIQUE:  Right femur 4 views  FINDINGS: There is spurring along the inferior pole of the articular surface of the patella. No acute fracture or malalignment is seen. The left femoral head contour appears intact. The visualized left obturator ring appears intact. Enthesophytes are noted along the greater trochanter. There is mild spurring along the superior-lateral acetabulum. There is increased sclerosis along the left acetabular roof. 1. No acute fracture or malalignment.  Degenerative changes are as described above. **This report has been created using voice recognition software. It may contain minor errors which are inherent in voice recognition technology. ** Final report electronically signed by Dr. Katherine Hanson on 1/17/2021 3:50 PM    Ct Head Wo Contrast    Result Date: 1/17/2021  PROCEDURE: CT HEAD WO CONTRAST CLINICAL INFORMATION: fall, ams COMPARISON: 8/17/2015 TECHNIQUE:  Axial CT images were obtained through the head without contrast. Coronal and sagittal reformatted images were rendered. All CT scans at this facility use dose modulation, iterative reconstruction, and/or weight-based dosing when appropriate to reduce radiation dose to as low as reasonably achievable. 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 There is moderate diffuse atrophy. No acute intracranial hemorrhage or mass effect is seen. There is no midline shift. The basal cisterns and posterior fossa appear otherwise unremarkable. Parasellar carotid artery vascular calcifications are noted. The visualized paranasal sinuses and mastoid air cells appear clear. No acute abnormalities of the calvarium are seen. The visualized globes and orbits appear grossly intact. 1. No acute intracranial hemorrhage or mass effect. 2. Moderate diffuse atrophy is demonstrated. 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 **This report has been created using voice recognition software. It may contain minor errors which are inherent in voice recognition technology. ** Final report electronically signed by Dr. Katherine Hanson on 1/17/2021 4:17 PM    Ct Chest Wo Contrast    Result Date: 1/17/2021  Noncontrast CT of the chest Technique: Axial CT images from the lung apices through the adrenal glands, no contrast.  Sagittal and coronal reconstruction images provided.  Comparison:  ROBBIE,SR  - XR CHEST PORTABLE  - 01/17/2021 07:29 PM EST Findings: Mild atherosclerotic vessel disease within the thoracic aorta. Normal caliber thoracic aorta. No aneurysm. No mediastinal or axillary adenopathy. Normal thyroid. Moderate right pleural effusion. Associated volume loss. Mild opacity right lung base. This could represent mild pneumonia. No pulmonary nodules. No pneumothorax. Normal bones. Cholelithiasis present. No adjacent inflammation. A small hiatal hernia. Impression: Moderate right pleural effusion. Associated volume loss. Mild opacity right lung base. This could represent mild pneumonia. This document has been electronically signed by: Elizabeth Martinez MD on 01/17/2021 09:10 PM All CT scans at this facility use dose modulation, iterative reconstruction, and/or weight-based dosing when appropriate to reduce radiation dose to as low as reasonably achievable. Ct Cervical Spine Wo Contrast    Result Date: 1/17/2021  PROCEDURE: CT CERVICAL SPINE WO CONTRAST CLINICAL INFORMATION: fall COMPARISON: No prior study. TECHNIQUE:  Axial CT images were obtained through the cervical spine without contrast. Coronal and sagittal reformatted images were rendered. All CT scans at this facility use dose modulation, iterative reconstruction, and/or weight-based dosing when appropriate to reduce radiation dose to as low as reasonably achievable. FINDINGS: Evaluation is slightly limited due to motion artifact. No prevertebral soft tissue swelling is seen. There is normal alignment at the craniocervical junction. The dens appears intact. The dens interval appears within normal limits. The facets align normally. The spinous processes appear intact. Multilevel degenerative disc disease is demonstrated, most pronounced at the C4-C5 and C6-C7 levels where there is disc space narrowing, endplate sclerosis and hypertrophic endplate change. Multilevel uncovertebral and facet hypertrophy is demonstrated. There is mild to moderate right neural foraminal narrowing at C3-C4.  Moderate bilateral neural foraminal narrowing at C4-C5 is noted. There is mild to moderate neural foraminal narrowing on the right at C5-C6. Moderate bilateral neural foraminal narrowing at C6-C7 is noted. The visualized lung apices appear unremarkable. 1. Slightly limited evaluation due to motion artifact. However, no definite acute fracture of the cervical spine is seen. 2. Straightening of the normal cervical lordosis is demonstrated. This may be related to muscle spasm. 3. Multilevel degenerative changes of the cervical spine are demonstrated. **This report has been created using voice recognition software. It may contain minor errors which are inherent in voice recognition technology. ** Final report electronically signed by Dr. Carmen Baird on 1/17/2021 4:21 PM    Xr Chest Portable    Result Date: 1/26/2021  1 view chest x-ray. Comparison: 1/22/2021 Findings: Right central line unchanged. Bilateral consolidation has worsened somewhat. Cardiomegaly. No bony abnormality. Impression: Worsening bilateral consolidation. This document has been electronically signed by: Ana Tripp MD on 01/26/2021 10:04 PM     Xr Chest Portable    Result Date: 1/22/2021  1 view chest x-ray Comparison:  01/22/2021 04:33 PM EST, 01/22/2021 10:47 PM EST Findings: Incomplete visualization of the lung apices and the lateral aspect of the right lung. Poor evaluation of lung parenchyma secondary to technique. No consolidative process within visualized portions. Mild cardiomegaly. Prior sternotomy. No acute fracture. Central venous line at the cavoatrial junction. The nasogastric tube is at the level of the left mainstem bronchus. Recommend removal and repositioning. This document has been electronically signed by: Jacqueline Joshi MD on 01/22/2021 11:31 PM     Xr Chest Portable    Result Date: 1/22/2021  PROCEDURE: XR CHEST PORTABLE CLINICAL INFORMATION: suspected aspiration. COMPARISON: Chest radiograph dated 1/17/2021.  TECHNIQUE: AP upright view of the chest. FINDINGS: There are stable median sternotomy wires and mediastinal surgical clips. There is stable right internal jugular catheter with tip in the superior vena cava. There is stable tapering opacity at the right infrahilar region compared to prior radiographs which corresponded to a pleural effusion on prior CT. The lungs otherwise appear clear. The cardiomediastinal silhouette is stable with mild cardiomegaly. As osseous structures appear grossly intact. Stable right basilar opacity. **This report has been created using voice recognition software. It may contain minor errors which are inherent in voice recognition technology. ** Final report electronically signed by Dr. Jose Juan Javier MD on 1/22/2021 4:55 PM    Xr Chest Portable    Result Date: 1/17/2021  Single view of the chest Comparison:  CR,SR  - XR CHEST STANDARD (2 VW)  - 01/17/2021 03:32 PM EST Findings: Mild right infrahilar opacity. This could represent volume loss versus mild pneumonia. Cardiomegaly, no CHF. Right central line present with the tip in the mid superior vena cava. Status post median sternotomy. Impression: Cardiomegaly, no CHF. Mild right infrahilar opacity. This could represent mild pneumonia versus volume loss. This document has been electronically signed by: Ayesha Alex MD on 01/17/2021 09:11 PM     Xr Abdomen For Ng/og/ne Tube Placement    Result Date: 1/22/2021  ** ADDENDUM #1 ** This report was discussed with Arun Serrato RN on Jan 22, 2021 23:28:00 EST. This document has been electronically signed by: Claudia Rogers on 01/22/2021 11:28 PM ** ORIGINAL REPORT ** 1 view abdomen Comparison:  CR,SR  - XR CHEST PORTABLE  - 01/22/2021 10:47 PM EST Findings: No pneumoperitoneum or pneumatosis. No abnormal calcifications. No acute fractures. Status post sternotomy. 1. No visible nasogastric tube on the abdominal x-ray.  On correlation with the concurrent chest x-ray, the nasogastric tube is at the level of the left mainstem bronchus. Recommend removal and repositioning. 2. Extensive gaseous distention of bowel suggesting possible ileus. This document has been electronically signed by: Eliecer Maier MD on 01/22/2021 11:22 PM     Mri Brain Wo Contrast    Result Date: 1/19/2021  Noncontrast MRI of the brain. Technique: Sagittal T1, coronal T2, axial T1, T2, FLAIR and diffusion-weighted imaging. Comparison:  CT,SR  - CT HEAD WO CONTRAST  - 01/17/2021 03:49 PM EST Findings: Normal signal on diffusion-weighted imaging. No evidence for acute ischemic event. Moderate nonspecific periventricular deep white matter disease. This most often can be ascribed to chronic small vessel ischemic change. Prominent central and cortical volume loss. Old ischemic event left temporal lobe. There are no intracranial masses. No evidence for hemorrhage. The ventricles are normal size, no hydrocephalus. Contents of the posterior fossa including brainstem and cerebellum are unremarkable. Normal vascular flow voids. Normal aeration of paranasal sinuses. Impression: Moderate nonspecific periventricular deep white matter disease. This most often can be ascribed to chronic small vessel ischemic change. Prominent central and cortical volume loss. Old ischemic event left temporal lobe. This document has been electronically signed by: Therese Valiente MD on 01/19/2021 12:59 AM     Xr Hip 2-3 Vw W Pelvis Left    Result Date: 1/5/2021  PROCEDURE: XR HIP 2-3 VW W PELVIS LEFT CLINICAL INFORMATION: pain . COMPARISON: No prior study. TECHNIQUE: AP pelvis AP and frog-leg projections of the left hip. FINDINGS: No acute fracture or bone destruction. Hip joint spaces are preserved. SI joints are preserved. Bones are osteopenic and some mild degenerative changes seen in both iliac wings. Vascular calcifications are noted. No acute abnormality. **This report has been created using voice recognition software.   It may contain minor errors which are inherent in voice recognition technology. ** Final report electronically signed by Dr. Davide Nevarez on 1/5/2021 11:35 AM      Electronically signed by Kathy Berumen DO on 1/28/2021 at 12:29 PM

## 2021-01-28 NOTE — FLOWSHEET NOTE
01/27/21 2132   Provider Notification   Reason for Communication Critical Value (comment)  (Hemoglobin 7.0)   Provider Name Johny Pinto   Provider Notification Advance Practice Clinician (CNS, NP, CNM, CRNA, PA)   Method of Communication Secure Message   Response See orders   Notification Time 2135       Notified hospitalist of patient's hgb 7.0. Previously 7.3, pt is on lovenox. Orders to recheck H&H in 6h. Order placed.

## 2021-01-28 NOTE — PROGRESS NOTES
PICC Procedure Note    Makenzie Contreras   Admitted- 1/17/2021  2:31 PM  Admission diagnosis- Hyponatremia [E87.1]      Attending Physician- Munira Shanks DO  Ordering Sita Garcia MD  Indication for Insertion: Poor Vascular Access    Catheter Insertion Date- 1/28/2021   Lot Number- [id number]  Gauge-6  Lumen-triple    Insertion Site- BERTHA Brachial  Vein Diameter- 3 mm  Catheter Length- 36 cm  Internal Length- 36 cm  Exposed Catheter Length- 0cm   Upper Arm Circumference- 31cm  Tip Confirmation System Bundle met- No: chest xray needed  If X-ray required, Tip Location- SVC  Radiologist- Dr Brandon Myers    PICC insertion successful- Yes  Ultrasound- yes    Okay To Use PICC- Yes    Electronically signed by Darylene Leech, RN on 1/28/2021 at 3:05 PM

## 2021-01-29 ENCOUNTER — APPOINTMENT (OUTPATIENT)
Dept: GENERAL RADIOLOGY | Age: 86
DRG: 177 | End: 2021-01-29
Payer: OTHER GOVERNMENT

## 2021-01-29 LAB
ALBUMIN SERPL-MCNC: 2.4 G/DL (ref 3.5–5.1)
ALP BLD-CCNC: 93 U/L (ref 38–126)
ALT SERPL-CCNC: 29 U/L (ref 11–66)
ANION GAP SERPL CALCULATED.3IONS-SCNC: 9 MEQ/L (ref 8–16)
AST SERPL-CCNC: 27 U/L (ref 5–40)
BASOPHILS # BLD: 0.2 %
BASOPHILS ABSOLUTE: 0 THOU/MM3 (ref 0–0.1)
BILIRUB SERPL-MCNC: 0.9 MG/DL (ref 0.3–1.2)
BILIRUBIN DIRECT: 0.4 MG/DL (ref 0–0.3)
BUN BLDV-MCNC: 19 MG/DL (ref 7–22)
CALCIUM SERPL-MCNC: 8 MG/DL (ref 8.5–10.5)
CHLORIDE BLD-SCNC: 107 MEQ/L (ref 98–111)
CO2: 22 MEQ/L (ref 23–33)
CREAT SERPL-MCNC: 1.2 MG/DL (ref 0.4–1.2)
EOSINOPHIL # BLD: 1 %
EOSINOPHILS ABSOLUTE: 0.1 THOU/MM3 (ref 0–0.4)
ERYTHROCYTE [DISTWIDTH] IN BLOOD BY AUTOMATED COUNT: 18.4 % (ref 11.5–14.5)
ERYTHROCYTE [DISTWIDTH] IN BLOOD BY AUTOMATED COUNT: 57.8 FL (ref 35–45)
GFR SERPL CREATININE-BSD FRML MDRD: 57 ML/MIN/1.73M2
GLUCOSE BLD-MCNC: 97 MG/DL (ref 70–108)
HCT VFR BLD CALC: 26.2 % (ref 42–52)
HEMOCCULT STL QL: POSITIVE
HEMOGLOBIN: 8.4 GM/DL (ref 14–18)
IMMATURE GRANS (ABS): 0.06 THOU/MM3 (ref 0–0.07)
IMMATURE GRANULOCYTES: 0.5 %
INR BLD: 1.2 (ref 0.85–1.13)
LYMPHOCYTES # BLD: 5.2 %
LYMPHOCYTES ABSOLUTE: 0.6 THOU/MM3 (ref 1–4.8)
MCH RBC QN AUTO: 28.4 PG (ref 26–33)
MCHC RBC AUTO-ENTMCNC: 32.1 GM/DL (ref 32.2–35.5)
MCV RBC AUTO: 88.5 FL (ref 80–94)
MONOCYTES # BLD: 8.3 %
MONOCYTES ABSOLUTE: 1 THOU/MM3 (ref 0.4–1.3)
NUCLEATED RED BLOOD CELLS: 0 /100 WBC
PLATELET # BLD: 163 THOU/MM3 (ref 130–400)
PMV BLD AUTO: 10.8 FL (ref 9.4–12.4)
POTASSIUM SERPL-SCNC: 4.7 MEQ/L (ref 3.5–5.2)
RBC # BLD: 2.96 MILL/MM3 (ref 4.7–6.1)
SEG NEUTROPHILS: 84.8 %
SEGMENTED NEUTROPHILS ABSOLUTE COUNT: 10.1 THOU/MM3 (ref 1.8–7.7)
SODIUM BLD-SCNC: 138 MEQ/L (ref 135–145)
TOTAL PROTEIN: 5.4 G/DL (ref 6.1–8)
URINE CULTURE REFLEX: NORMAL
VANCOMYCIN TROUGH: 10.2 UG/ML (ref 10–20)
WBC # BLD: 11.9 THOU/MM3 (ref 4.8–10.8)

## 2021-01-29 PROCEDURE — 2580000003 HC RX 258: Performed by: STUDENT IN AN ORGANIZED HEALTH CARE EDUCATION/TRAINING PROGRAM

## 2021-01-29 PROCEDURE — 99232 SBSQ HOSP IP/OBS MODERATE 35: CPT | Performed by: INTERNAL MEDICINE

## 2021-01-29 PROCEDURE — 6370000000 HC RX 637 (ALT 250 FOR IP): Performed by: INTERNAL MEDICINE

## 2021-01-29 PROCEDURE — 6360000002 HC RX W HCPCS: Performed by: PHYSICIAN ASSISTANT

## 2021-01-29 PROCEDURE — 6360000002 HC RX W HCPCS: Performed by: INTERNAL MEDICINE

## 2021-01-29 PROCEDURE — 82272 OCCULT BLD FECES 1-3 TESTS: CPT

## 2021-01-29 PROCEDURE — 74018 RADEX ABDOMEN 1 VIEW: CPT

## 2021-01-29 PROCEDURE — 2580000003 HC RX 258: Performed by: PHYSICIAN ASSISTANT

## 2021-01-29 PROCEDURE — 94640 AIRWAY INHALATION TREATMENT: CPT

## 2021-01-29 PROCEDURE — 1200000003 HC TELEMETRY R&B

## 2021-01-29 PROCEDURE — 82248 BILIRUBIN DIRECT: CPT

## 2021-01-29 PROCEDURE — 6370000000 HC RX 637 (ALT 250 FOR IP): Performed by: STUDENT IN AN ORGANIZED HEALTH CARE EDUCATION/TRAINING PROGRAM

## 2021-01-29 PROCEDURE — 94760 N-INVAS EAR/PLS OXIMETRY 1: CPT

## 2021-01-29 PROCEDURE — 80053 COMPREHEN METABOLIC PANEL: CPT

## 2021-01-29 PROCEDURE — 6370000000 HC RX 637 (ALT 250 FOR IP): Performed by: NURSE PRACTITIONER

## 2021-01-29 PROCEDURE — 6360000002 HC RX W HCPCS: Performed by: STUDENT IN AN ORGANIZED HEALTH CARE EDUCATION/TRAINING PROGRAM

## 2021-01-29 PROCEDURE — 85025 COMPLETE CBC W/AUTO DIFF WBC: CPT

## 2021-01-29 PROCEDURE — P9047 ALBUMIN (HUMAN), 25%, 50ML: HCPCS | Performed by: INTERNAL MEDICINE

## 2021-01-29 PROCEDURE — 80202 ASSAY OF VANCOMYCIN: CPT

## 2021-01-29 PROCEDURE — 2700000000 HC OXYGEN THERAPY PER DAY

## 2021-01-29 PROCEDURE — 36415 COLL VENOUS BLD VENIPUNCTURE: CPT

## 2021-01-29 PROCEDURE — 85610 PROTHROMBIN TIME: CPT

## 2021-01-29 RX ORDER — FUROSEMIDE 10 MG/ML
20 INJECTION INTRAMUSCULAR; INTRAVENOUS ONCE
Status: COMPLETED | OUTPATIENT
Start: 2021-01-29 | End: 2021-01-29

## 2021-01-29 RX ORDER — POLYETHYLENE GLYCOL 3350 17 G/17G
17 POWDER, FOR SOLUTION ORAL DAILY
Status: DISCONTINUED | OUTPATIENT
Start: 2021-01-29 | End: 2021-02-05 | Stop reason: HOSPADM

## 2021-01-29 RX ORDER — ALBUMIN (HUMAN) 12.5 G/50ML
25 SOLUTION INTRAVENOUS ONCE
Status: COMPLETED | OUTPATIENT
Start: 2021-01-29 | End: 2021-01-29

## 2021-01-29 RX ORDER — FUROSEMIDE 10 MG/ML
40 INJECTION INTRAMUSCULAR; INTRAVENOUS DAILY
Status: DISPENSED | OUTPATIENT
Start: 2021-01-30 | End: 2021-02-02

## 2021-01-29 RX ORDER — SODIUM PHOSPHATE,MONO-DIBASIC 19G-7G/118
1 ENEMA (ML) RECTAL ONCE
Status: DISCONTINUED | OUTPATIENT
Start: 2021-01-29 | End: 2021-02-05 | Stop reason: HOSPADM

## 2021-01-29 RX ORDER — FUROSEMIDE 10 MG/ML
40 INJECTION INTRAMUSCULAR; INTRAVENOUS ONCE
Status: COMPLETED | OUTPATIENT
Start: 2021-01-29 | End: 2021-01-29

## 2021-01-29 RX ADMIN — POLYETHYLENE GLYCOL 3350 17 G: 17 POWDER, FOR SOLUTION ORAL at 12:22

## 2021-01-29 RX ADMIN — SODIUM CHLORIDE, PRESERVATIVE FREE 10 ML: 5 INJECTION INTRAVENOUS at 08:41

## 2021-01-29 RX ADMIN — PRAVASTATIN SODIUM 40 MG: 40 TABLET ORAL at 21:56

## 2021-01-29 RX ADMIN — FUROSEMIDE 20 MG: 10 INJECTION, SOLUTION INTRAMUSCULAR; INTRAVENOUS at 21:57

## 2021-01-29 RX ADMIN — HYDRALAZINE HYDROCHLORIDE 25 MG: 25 TABLET, FILM COATED ORAL at 08:40

## 2021-01-29 RX ADMIN — ONDANSETRON 4 MG: 2 INJECTION INTRAMUSCULAR; INTRAVENOUS at 01:07

## 2021-01-29 RX ADMIN — DOCUSATE SODIUM 100 MG: 50 LIQUID ORAL at 12:22

## 2021-01-29 RX ADMIN — FUROSEMIDE 40 MG: 10 INJECTION, SOLUTION INTRAMUSCULAR; INTRAVENOUS at 14:02

## 2021-01-29 RX ADMIN — Medication 3 MG: at 21:56

## 2021-01-29 RX ADMIN — SODIUM CHLORIDE, PRESERVATIVE FREE 10 ML: 5 INJECTION INTRAVENOUS at 21:57

## 2021-01-29 RX ADMIN — IPRATROPIUM BROMIDE AND ALBUTEROL SULFATE 1 AMPULE: .5; 3 SOLUTION RESPIRATORY (INHALATION) at 10:03

## 2021-01-29 RX ADMIN — PIPERACILLIN AND TAZOBACTAM 3375 MG: 3; .375 INJECTION, POWDER, LYOPHILIZED, FOR SOLUTION INTRAVENOUS at 12:09

## 2021-01-29 RX ADMIN — ONDANSETRON 4 MG: 2 INJECTION INTRAMUSCULAR; INTRAVENOUS at 10:12

## 2021-01-29 RX ADMIN — Medication 1 CAPSULE: at 08:40

## 2021-01-29 RX ADMIN — ENOXAPARIN SODIUM 80 MG: 80 INJECTION SUBCUTANEOUS at 03:44

## 2021-01-29 RX ADMIN — TAMSULOSIN HYDROCHLORIDE 0.4 MG: 0.4 CAPSULE ORAL at 08:40

## 2021-01-29 RX ADMIN — PIPERACILLIN AND TAZOBACTAM 3375 MG: 3; .375 INJECTION, POWDER, LYOPHILIZED, FOR SOLUTION INTRAVENOUS at 21:56

## 2021-01-29 RX ADMIN — IPRATROPIUM BROMIDE AND ALBUTEROL SULFATE 1 AMPULE: .5; 3 SOLUTION RESPIRATORY (INHALATION) at 16:39

## 2021-01-29 RX ADMIN — ALBUMIN (HUMAN) 25 G: 0.25 INJECTION, SOLUTION INTRAVENOUS at 12:03

## 2021-01-29 RX ADMIN — PIPERACILLIN AND TAZOBACTAM 3375 MG: 3; .375 INJECTION, POWDER, LYOPHILIZED, FOR SOLUTION INTRAVENOUS at 03:43

## 2021-01-29 RX ADMIN — VANCOMYCIN HYDROCHLORIDE 1250 MG: 5 INJECTION, POWDER, LYOPHILIZED, FOR SOLUTION INTRAVENOUS at 09:26

## 2021-01-29 NOTE — PROGRESS NOTES
Hospitalist      Patient:  Nisa Atkins    Unit/Bed:6K-22/022-A  YOB: 1932  MRN: [id number]   Acct: [de-identified]     PCP: Mic Latif DO  Date of Admission: 1/17/2021        Assessment and Plan:        1. Decreased urine output notified nephrology will order serum osmolality start free water via PEG tube per dietitian's recommendation  2. Elevated WBC: We will obtain blood cultures  3. Worsening bilateral consolidation we will obtain a CT scan without contrast to evaluate may need to consider hospital-acquired pneumonia versus aspiration we will obtain blood cultures. 4. Dysphagia PEG tube placed patient is at goal with tube feeds will need free water with PEG tube feedings will discuss with dietitian  5. Chronic atrial fibrillation with SVR we will resume Coumadin and have pharmacy dose  6. Normocytic anemia hemoglobin currently at 7.3 we will type and screen  7. Chronic kidney disease stage III creatinine slightly elevated will discuss with nephrology will obtain a serum osmolality  8. Central line needs to be removed and replaced with peripheral IV or PICC line  9. Malnutrition: Nutritional status is poor PEG tube feedings are infusing without residuals,     1.28.2021 patient to undergo thoracentesis, PICC line placement, removal of IJ. Cultures to date are no growth. Will transfuse to keep hemoglobin greater then 7    1.29.2021 events noted during the night of dark brown emesis in color, will hold Coumadin dosing and hold Lovenox at this time, patient on Protonix twice daily, Will transfuse 25 g of albumin and follow it with Lasix 40 mg. No Bowel movements per nursing will start MiraLAX daily and Colace twice daily. Will resume tube feedings per protocol and check residuals. CC: Fall    HPI: 49-year-old  male presents to [facility] via EMS as after sustaining an unwitnessed fall from a standing position.   He was found on the ground and unknown length of time. Per son reports that patient was progressively more weak over the last few weeks and acting like himself. On the day of the arrival son states they were unable to get a hold of him therefore they went to his house where they found him on the bathroom floor. 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 While in the emergency room the patient was found to have hyponatremia and elevated CPK there is mild opacity in the right lung base patient was Covid positive at that time    ROS (14 point review of systems completed. Pertinent positives noted. Otherwise ROS is negative) : Poor historian    PMH:  Per HPI and       Diagnosis Date    Actinic keratoses 8/21/2013    Adenomatous polyp 2008    Atrial fibrillation ([facility] [address]     CAD (coronary artery disease) 2009    Carotid artery disease ([address] 2009    left    Hyperlipidemia 2004    Hypertension 2004    Inguinal hernia, left 2/19/2014    Unspecified cerebral artery occlusion with cerebral infarction      SHX:        Procedure Laterality Date    ABDOMEN SURGERY      ABDOMINAL ADHESION SURGERY  6/2012    Dr Solo Cabrera  2004    right    COLONOSCOPY      [facility]    CORONARY ARTERY BYPASS GRAFT  2009    SMALL INTESTINE SURGERY      SMALL INTESTINE SURGERY  02/20/2015    Exploratory Laparotomy    UPPER GASTROINTESTINAL ENDOSCOPY N/A 1/26/2021    EGD ESOPHAGOGASTRODUODENOSCOPY PEG TUBE INSERTION performed by Arben Myers MD at [facility]     FHX:       Problem Relation Age of Onset    Cancer Mother      [facility]:   Social History     Socioeconomic History    Marital status:       Spouse name: None    Number of children: None  Years of education: None    Highest education level: None   Occupational History    None   Social Needs    Financial resource strain: None    Food insecurity     Worry: None     Inability: None    Transportation needs     Medical: None     Non-medical: None   Tobacco Use    Smoking status: Former Smoker     Quit date: 1980     Years since quittin.1    Smokeless tobacco: Never Used   Substance and Sexual Activity    Alcohol use: Yes     Alcohol/week: 1.0 standard drinks     Types: 1 Cans of beer per week     Comment: occasionally    Drug use: No    Sexual activity: None   Lifestyle    Physical activity     Days per week: None     Minutes per session: None    Stress: None   Relationships    Social connections     Talks on phone: None     Gets together: None     Attends Church service: None     Active member of club or organization: None     Attends meetings of clubs or organizations: None     Relationship status: None    Intimate partner violence     Fear of current or ex partner: None     Emotionally abused: None     Physically abused: None     Forced sexual activity: None   Other Topics Concern    None   Social History Narrative    None      Allergies: Patient has no known allergies.   Medications:     sodium chloride Stopped (21 9107)      albumin human  25 g Intravenous Once    furosemide  40 mg Intravenous Once    warfarin (COUMADIN) daily dosing (placeholder)   Other RX Placeholder    potassium chloride  40 mEq Oral Once    piperacillin-tazobactam  3,375 mg Intravenous Q8H    vancomycin (VANCOCIN) intermittent dosing (placeholder)   Other RX Placeholder    vancomycin  1,250 mg Intravenous Q24H    ipratropium-albuterol  1 ampule Inhalation Q4H WA    pravastatin  40 mg PEG Tube Nightly    lidocaine 1 % injection  5 mL Intradermal Once    sodium chloride flush  10 mL Intravenous 2 times per day    sodium chloride flush  10 mL Intravenous 2 times per day    enoxaparin  80 mg Subcutaneous Q12H    melatonin  3 mg Oral Nightly    calcium replacement protocol   Other RX Placeholder    phosphorus replacement protocol   Other RX Placeholder    lactobacillus  1 capsule Oral Daily with breakfast    aspirin  81 mg Oral Daily    pantoprazole  40 mg Oral BID AC    tamsulosin  0.4 mg Oral Daily     Prior to Admission medications    Medication Sig Start Date End Date Taking? Authorizing Provider   hydrALAZINE (APRESOLINE) 25 MG tablet Take 1 tablet by mouth 3 times daily 12/12/20  Yes Von Eisenmenger, PA-C   tamsulosin [facility]) 0.4 MG capsule Take 1 capsule by mouth daily 1/16/20  Yes Vinicio Balderas MD   warfarin (COUMADIN) 3 MG tablet Take 1 tablet by mouth See Admin Instructions Indications: Anticoagulant Therapy, Atrial Fibrillation [facility] coumadin clinic to dose, #45=30 days supply 9/19/17  Yes Keegan Quinn MD   furosemide (LASIX) 20 MG tablet Take 1 tablet by mouth daily 4/4/17  Yes Ananth Lujan MD   pantoprazole (PROTONIX) 40 MG tablet Take 1 tablet by mouth 2 times daily (before meals). 3/1/15  Yes Leonela Spivey, APRN - CNP   pravastatin (PRAVACHOL) 40 MG tablet Take 40 mg by mouth every evening. Indications: Blood Cholesterol Abnormal   Yes Historical Provider, MD   aspirin 81 MG EC tablet Take 81 mg by mouth daily Indications: Anticoagulant Therapy Take with food. Yes Historical Provider, MD   cyclobenzaprine (FLEXERIL) 5 MG tablet Take 1 tablet by mouth 3 times daily as needed for Muscle spasms  Patient not taking: Reported on 1/6/2021 1/5/21   Lawson Casey PA-C      PHYSICAL EXAM:    BP (!) 112/51   Pulse 58   Temp 98.1 °F (36.7 °C) (Oral)   Resp 18   Ht 5' 4\" (1.626 m)   Wt 183 lb 6.4 oz (83.2 kg)   SpO2 92%   BMI 31.48 kg/m²     General appearance:  No apparent distress, appears stated age and cooperative. Sitting comfortably in chair  HEENT:  Normal cephalic, atraumatic without obvious deformity.  Pupils equal, round, and reactive to light. Extra ocular muscles intact. Conjunctivae/corneas clear. Neck: Supple, with full range of motion. no jugular venous distention. Trachea midline. no carotid bruits  Respiratory:  Normal respiratory effort. Clear to auscultation, upper airway wheezes, bases markedly decreased breath sounds right more so than the left no crackles or rails heard at this time  Cardiovascular:  Regular rate and rhythm with normal S1/S2 without murmurs, rubs or gallops. PMI non displaced  Abdomen: Soft, non-tender, non-distended with normal bowel sounds. No guarding, rebound. Musculoskeletal:  No clubbing, cyanosis. Trace pretibial edema right greater than left calves are soft full range of motion without deformity. Skin: Skin color, texture, turgor normal.  No rashes or lesions, or suspicious lesions. Neurologic:  Neurovascularly intact without any focal sensory/motor deficits. Cranial nerves: II-XII intact, grossly non-focal.  Psychiatric:  Alert and oriented, thought content appropriate, normal insight  Capillary Refill: Brisk,< 2 seconds   Peripheral Pulses: +2 palpable, equal bilaterally upper and lower extremities  Lymphatics: no lymphadenopathy    Data: (All radiographs, tracings, PFTs, and imaging are personally viewed and interpreted unless otherwise noted).     WBC 11.9   Hemoglobin 8.4   Platelets 864   Creatinine 1.1  Recent Labs     01/27/21 0540 01/27/21 2045 01/28/21  0400 01/29/21  0230   WBC 14.1*  --  8.4 11.9*   HGB 7.3* 7.0* 6.8* 8.4*   HCT 22.9* 22.0* 21.7* 26.2*     --  197 163     Recent Labs     01/27/21  0540 01/28/21  0400    139   K 3.8 3.6    107   CO2 25 24   BUN 16 18   CREATININE 1.3* 1.1   CALCIUM 7.9* 7.7*     Recent Labs     01/27/21  0540 01/28/21  0400 01/29/21  0230   AST 29 20 27   ALT 34 28 29   BILIDIR 0.3 <0.2 0.4*   BILITOT 0.7 0.4 0.9   ALKPHOS 73 65 93     Recent Labs     01/27/21 2045 01/28/21  0815 01/29/21  0640   INR 1.42* 1.42* 1.20*     No results for input(s): [employer] in the last 72 hours. Radiology reports-images reviewed in PACS  Xr Chest (2 Vw)    Result Date: 1/17/2021  PROCEDURE: XR CHEST (2 VW) CLINICAL INFORMATION: Found on the floor COMPARISON: 8/20/2016 TECHNIQUE:  AP and lateral chest x-ray  FINDINGS: There is at least moderate enlargement of the cardiomediastinal silhouette. There is evidence of prior median sternotomy. There is diffuse osteopenia noted. Mild focal eventration of the right hemidiaphragm is seen. No focal consolidation or pleural effusion is seen. No pneumothorax is demonstrated. 1. Moderate enlargement of the cardiomediastinal silhouette. 2. There is hyperinflation noted. 2. No focal consolidation, pleural effusion or pneumothorax is seen. **This report has been created using voice recognition software. It may contain minor errors which are inherent in voice recognition technology. ** Final report electronically signed by Dr. Saige Ortiz on 1/17/2021 3:54 PM    Xr Pelvis (1-2 Views)    Result Date: 1/17/2021  PROCEDURE: XR PELVIS (1-2 VIEWS) CLINICAL INFORMATION: Fall, weakness of left leg COMPARISON: 1/5/2021 TECHNIQUE:  AP pelvis single view  FINDINGS: The visualized bilateral obturator rings appear intact. There is spurring noted along the superior-lateral left acetabulum. No acute fractures identified. The femoral head contours appear intact bilaterally. There is increased sclerosis along the acetabular roofs bilaterally. 1. No acute fracture or malalignment. Degenerative changes are as described above. **This report has been created using voice recognition software. It may contain minor errors which are inherent in voice recognition technology. ** Final report electronically signed by Dr. Saige Ortiz on 1/17/2021 3:56 PM    Xr Femur Left (min 2 Views)    Result Date: 1/17/2021  PROCEDURE: XR FEMUR LEFT (MIN 2 VIEWS) CLINICAL INFORMATION: Fall, weakness of left leg COMPARISON: No prior study. TECHNIQUE:  Right femur 4 views  FINDINGS: There is spurring along the inferior pole of the articular surface of the patella. No acute fracture or malalignment is seen. The left femoral head contour appears intact. The visualized left obturator ring appears intact. Enthesophytes are noted along the greater trochanter. There is mild spurring along the superior-lateral acetabulum. There is increased sclerosis along the left acetabular roof. 1. No acute fracture or malalignment. Degenerative changes are as described above. **This report has been created using voice recognition software. It may contain minor errors which are inherent in voice recognition technology. ** Final report electronically signed by Dr. Roni Nelson on 1/17/2021 3:50 PM    Ct Head Wo Contrast    Result Date: 1/17/2021  PROCEDURE: CT HEAD WO CONTRAST CLINICAL INFORMATION: fall, ams COMPARISON: 8/17/2015 TECHNIQUE:  Axial CT images were obtained through the head without contrast. Coronal and sagittal reformatted images were rendered. All CT scans at this facility use dose modulation, iterative reconstruction, and/or weight-based dosing when appropriate to reduce radiation dose to as low as reasonably achievable. 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 There is moderate diffuse atrophy. No acute intracranial hemorrhage or mass effect is seen. There is no midline shift. The basal cisterns and posterior fossa appear otherwise unremarkable. Parasellar carotid artery vascular calcifications are noted. The visualized paranasal sinuses and mastoid air cells appear clear. No acute abnormalities of the calvarium are seen. The visualized globes and orbits appear grossly intact. 1. No acute intracranial hemorrhage or mass effect. 2. Moderate diffuse atrophy is demonstrated. 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  **This report has been created using voice recognition software. It may contain minor errors which are inherent in voice recognition technology. ** Final report electronically signed by Dr. Kvng Lafleur on 1/17/2021 4:17 PM    Ct Chest Wo Contrast    Result Date: 1/17/2021  Noncontrast CT of the chest Technique: Axial CT images from the lung apices through the adrenal glands, no contrast.  Sagittal and coronal reconstruction images provided. Comparison:  CR,SR  - XR CHEST PORTABLE  - 01/17/2021 07:29 PM EST Findings: Mild atherosclerotic vessel disease within the thoracic aorta. Normal caliber thoracic aorta. No aneurysm. No mediastinal or axillary adenopathy. Normal thyroid. Moderate right pleural effusion. Associated volume loss. Mild opacity right lung base. This could represent mild pneumonia. No pulmonary nodules. No pneumothorax. Normal bones. Cholelithiasis present. No adjacent inflammation. A small hiatal hernia. Impression: Moderate right pleural effusion. Associated volume loss. Mild opacity right lung base. This could represent mild pneumonia. This document has been electronically signed by: Sis Fleming MD on 01/17/2021 09:10 PM All CT scans at this facility use dose modulation, iterative reconstruction, and/or weight-based dosing when appropriate to reduce radiation dose to as low as reasonably achievable. Ct Cervical Spine Wo Contrast    Result Date: 1/17/2021  PROCEDURE: CT CERVICAL SPINE WO CONTRAST CLINICAL INFORMATION: fall COMPARISON: No prior study. TECHNIQUE:  Axial CT images were obtained through the cervical spine without contrast. Coronal and sagittal reformatted images were rendered. All CT scans at this facility use dose modulation, iterative reconstruction, and/or weight-based dosing when appropriate to reduce radiation dose to as low as reasonably achievable. FINDINGS: Evaluation is slightly limited due to motion artifact. No prevertebral soft tissue swelling is seen.  There is normal alignment at the craniocervical junction. The dens appears intact. The dens interval appears within normal limits. The facets align normally. The spinous processes appear intact. Multilevel degenerative disc disease is demonstrated, most pronounced at the C4-C5 and C6-C7 levels where there is disc space narrowing, endplate sclerosis and hypertrophic endplate change. Multilevel uncovertebral and facet hypertrophy is demonstrated. There is mild to moderate right neural foraminal narrowing at C3-C4. Moderate bilateral neural foraminal narrowing at C4-C5 is noted. There is mild to moderate neural foraminal narrowing on the right at C5-C6. Moderate bilateral neural foraminal narrowing at C6-C7 is noted. The visualized lung apices appear unremarkable. 1. Slightly limited evaluation due to motion artifact. However, no definite acute fracture of the cervical spine is seen. 2. Straightening of the normal cervical lordosis is demonstrated. This may be related to muscle spasm. 3. Multilevel degenerative changes of the cervical spine are demonstrated. **This report has been created using voice recognition software. It may contain minor errors which are inherent in voice recognition technology. ** Final report electronically signed by Dr. Edgar Jett on 1/17/2021 4:21 PM    Xr Chest Portable    Result Date: 1/26/2021  1 view chest x-ray. Comparison: 1/22/2021 Findings: Right central line unchanged. Bilateral consolidation has worsened somewhat. Cardiomegaly. No bony abnormality. Impression: Worsening bilateral consolidation. This document has been electronically signed by: Deny Tobias MD on 01/26/2021 10:04 PM     Xr Chest Portable    Result Date: 1/22/2021  1 view chest x-ray Comparison:  01/22/2021 04:33 PM EST, 01/22/2021 10:47 PM EST Findings: Incomplete visualization of the lung apices and the lateral aspect of the right lung. Poor evaluation of lung parenchyma secondary to technique.  No consolidative process within visualized portions. Mild cardiomegaly. Prior sternotomy. No acute fracture. Central venous line at the cavoatrial junction. The nasogastric tube is at the level of the left mainstem bronchus. Recommend removal and repositioning. This document has been electronically signed by: Nicky Dobson MD on 01/22/2021 11:31 PM     Xr Chest Portable    Result Date: 1/22/2021  PROCEDURE: XR CHEST PORTABLE CLINICAL INFORMATION: suspected aspiration. COMPARISON: Chest radiograph dated 1/17/2021. TECHNIQUE: AP upright view of the chest. FINDINGS: There are stable median sternotomy wires and mediastinal surgical clips. There is stable right internal jugular catheter with tip in the superior vena cava. There is stable tapering opacity at the right infrahilar region compared to prior radiographs which corresponded to a pleural effusion on prior CT. The lungs otherwise appear clear. The cardiomediastinal silhouette is stable with mild cardiomegaly. As osseous structures appear grossly intact. Stable right basilar opacity. **This report has been created using voice recognition software. It may contain minor errors which are inherent in voice recognition technology. ** Final report electronically signed by Dr. Eudelia Moritz, MD on 1/22/2021 4:55 PM    Xr Chest Portable    Result Date: 1/17/2021  Single view of the chest Comparison:  CR,SR  - XR CHEST STANDARD (2 VW)  - 01/17/2021 03:32 PM EST Findings: Mild right infrahilar opacity. This could represent volume loss versus mild pneumonia. Cardiomegaly, no CHF. Right central line present with the tip in the mid superior vena cava. Status post median sternotomy. Impression: Cardiomegaly, no CHF. Mild right infrahilar opacity. This could represent mild pneumonia versus volume loss.  This document has been electronically signed by: Govind Mosquera MD on 01/17/2021 09:11 PM     Xr Abdomen For Ng/og/ne Tube Placement    Result Date: 1/22/2021  ** ADDENDUM #1 ** This report was discussed with Barbera Hammans, RN on Jan 22, 2021 23:28:00 EST. This document has been electronically signed by: Warren Montes on 01/22/2021 11:28 PM ** ORIGINAL REPORT ** 1 view abdomen Comparison:  CR,SR  - XR CHEST PORTABLE  - 01/22/2021 10:47 PM EST Findings: No pneumoperitoneum or pneumatosis. No abnormal calcifications. No acute fractures. Status post sternotomy. 1. No visible nasogastric tube on the abdominal x-ray. On correlation with the concurrent chest x-ray, the nasogastric tube is at the level of the left mainstem bronchus. Recommend removal and repositioning. 2. Extensive gaseous distention of bowel suggesting possible ileus. This document has been electronically signed by: Fide Montes De Oca MD on 01/22/2021 11:22 PM     Mri Brain Wo Contrast    Result Date: 1/19/2021  Noncontrast MRI of the brain. Technique: Sagittal T1, coronal T2, axial T1, T2, FLAIR and diffusion-weighted imaging. Comparison:  CT,SR  - CT HEAD WO CONTRAST  - 01/17/2021 03:49 PM EST Findings: Normal signal on diffusion-weighted imaging. No evidence for acute ischemic event. Moderate nonspecific periventricular deep white matter disease. This most often can be ascribed to chronic small vessel ischemic change. Prominent central and cortical volume loss. Old ischemic event left temporal lobe. There are no intracranial masses. No evidence for hemorrhage. The ventricles are normal size, no hydrocephalus. Contents of the posterior fossa including brainstem and cerebellum are unremarkable. Normal vascular flow voids. Normal aeration of paranasal sinuses. Impression: Moderate nonspecific periventricular deep white matter disease. This most often can be ascribed to chronic small vessel ischemic change. Prominent central and cortical volume loss. Old ischemic event left temporal lobe.  This document has been electronically signed by: Sergio Martinez MD on 01/19/2021 12:59 AM     Xr Hip 2-3 Vw W Pelvis Left    Result Date: 1/5/2021  PROCEDURE: XR HIP 2-3 VW W PELVIS LEFT CLINICAL INFORMATION: pain . COMPARISON: No prior study. TECHNIQUE: AP pelvis AP and frog-leg projections of the left hip. FINDINGS: No acute fracture or bone destruction. Hip joint spaces are preserved. SI joints are preserved. Bones are osteopenic and some mild degenerative changes seen in both iliac wings. Vascular calcifications are noted. No acute abnormality. **This report has been created using voice recognition software. It may contain minor errors which are inherent in voice recognition technology. ** Final report electronically signed by Dr. Aneta Woods on 1/5/2021 11:35 AM      Electronically signed by Mariama Alejandro DO on 1/29/2021 at 11:18 AM

## 2021-01-29 NOTE — PROGRESS NOTES
Pharmacy Vancomycin Consult     Vancomycin Day: 4  Current Dosin mg Q24H     Temp max:  99.7    Recent Labs     21  0540 21  0400   BUN 16 18       Recent Labs     21  0540 21  0400   CREATININE 1.3* 1.1       Recent Labs     21  0400 21  0230   WBC 8.4 11.9*         Intake/Output Summary (Last 24 hours) at 2021 0951  Last data filed at 2021 2209  Gross per 24 hour   Intake 2342 ml   Output 550 ml   Net 1792 ml     Cultures/Sensitivities:  Date Source Result   21 urine NGTD   21 Right pleural fl NGTD   21 BC X 2  NGTD   21 MRSA screen  Negative        Ht Readings from Last 1 Encounters:   21 5' 4\" (1.626 m)        Wt Readings from Last 1 Encounters:   21 183 lb 6.4 oz (83.2 kg)         Body mass index is 31.48 kg/m². Estimated Creatinine Clearance: 45 mL/min (based on SCr of 1.1 mg/dL). Trough: 10.2 mcg/ml     Assessment/Plan:  No change, continue Vancomycin 1250 mg Q24H.       Dinah Ivy PharmD 2021 9:56 AM

## 2021-01-29 NOTE — PROGRESS NOTES
[facility]   Nephrology progress note  1/29/2021, 12:36 PM      Pt Name:    Jj Marti  MRN:     [id number]     [place]:    3/30/1932  Admit Date:    1/17/2021  2:31 PM  Primary Care Physician:  Alex Hair DO   Room number  4R-71/010-M    Chief Complaint: Nephrology was following for hyponatremia but now for recurrent ANNIKA  Subjective:  Patient seen and examined  Seen and examined earlier today  No acute distress  No chest pain  Events noted  Vomited with brown emesis overnight  On normal saline    Objective:  24HR INTAKE/OUTPUT:      Intake/Output Summary (Last 24 hours) at 1/29/2021 1236  Last data filed at 1/28/2021 2209  Gross per 24 hour   Intake 2342 ml   Output 400 ml   Net 1942 ml     I/O last 3 completed shifts: In: 2342 [P.O.:800; I.V.:862; Blood:350; NG/GT:330]  Out: 550 [Urine:550]  No intake/output data recorded. Admission weight: 162 lb (73.5 kg)  Wt Readings from Last 3 Encounters:   01/28/21 183 lb 6.4 oz (83.2 kg)   01/06/21 162 lb (73.5 kg)   01/05/21 160 lb (72.6 kg)     Body mass index is 31.48 kg/m².     Physical examination  VITALS:     Vitals:    01/29/21 0342 01/29/21 0830 01/29/21 1003 01/29/21 1232   BP: (!) 119/55 (!) 112/51  (!) 121/55   Pulse: 76 58  66   Resp: 20 18  18   Temp: 99.1 °F (37.3 °C) 98.1 °F (36.7 °C)  98.3 °F (36.8 °C)   TempSrc: Oral Oral  Axillary   SpO2: 96% 90% 92% 96%   Weight:       Height:         General Appearance: appears comfortable, no distress  Mouth/Throat: Oral mucosa moist  Neck: No JVD  Lungs:  no use of accessory muscles  Heart:  S1, S2 heard  GI: soft, non-tender, no guarding  Ext: Trace lower extremity edema      Lab Data  CBC:   Recent Labs     01/27/21  0540 01/27/21  2045 01/28/21  0400 01/29/21  0230   WBC 14.1*  --  8.4 11.9*   HGB 7.3* 7.0* 6.8* 8.4*   HCT 22.9* 22.0* 21.7* 26.2*     --  197 163     BMP:  Recent Labs     01/27/21  0540 01/28/21  0400    139   K 3.8 3.6    107   CO2 25 24 BUN 16 18   CREATININE 1.3* 1.1   GLUCOSE 114* 129*   CALCIUM 7.9* 7.7*   MG  --  2.1     Hepatic:   Recent Labs     01/27/21  0540 01/28/21  0400 01/29/21  0230   LABALBU 2.6* 2.3* 2.4*   AST 29 20 27   ALT 34 28 29   BILITOT 0.7 0.4 0.9   ALKPHOS 73 65 93         Meds:  Infusion:    sodium chloride Stopped (01/28/21 0515)     Meds:    albumin human  25 g Intravenous Once    furosemide  40 mg Intravenous Once    polyethylene glycol  17 g Oral Daily    sodium phosphate  1 enema Rectal Once    docusate  100 mg Oral BID    potassium chloride  40 mEq Oral Once    piperacillin-tazobactam  3,375 mg Intravenous Q8H    vancomycin (VANCOCIN) intermittent dosing (placeholder)   Other RX Placeholder    vancomycin  1,250 mg Intravenous Q24H    ipratropium-albuterol  1 ampule Inhalation Q4H WA    pravastatin  40 mg PEG Tube Nightly    lidocaine 1 % injection  5 mL Intradermal Once    sodium chloride flush  10 mL Intravenous 2 times per day    sodium chloride flush  10 mL Intravenous 2 times per day    [Held by provider] enoxaparin  80 mg Subcutaneous Q12H    melatonin  3 mg Oral Nightly    calcium replacement protocol   Other RX Placeholder    phosphorus replacement protocol   Other RX Placeholder    lactobacillus  1 capsule Oral Daily with breakfast    aspirin  81 mg Oral Daily    pantoprazole  40 mg Oral BID AC    tamsulosin  0.4 mg Oral Daily     Meds prn: sodium chloride, sodium chloride flush, sodium chloride flush, promethazine **OR** ondansetron, morphine, potassium chloride **OR** potassium alternative oral replacement **OR** potassium chloride, potassium chloride, potassium chloride, acetaminophen **OR** acetaminophen, cyclobenzaprine, magnesium sulfate, mupirocin       Impression and Plan:  1. Acute Kidney injury.   Serum creatinine yesterday went up to 1.3  Creatinine yesterday was down to 1.1  No new labs available  We will send stat BMP  Patient is exhibiting some edema--we will stop IV

## 2021-01-29 NOTE — PROGRESS NOTES
Comprehensive Nutrition Assessment    Type and Reason for Visit:  Reassess(tubefeeding)    Nutrition Recommendations/Plan:   -When able recommend resume TF's via PEG at Jevity 1.5, 10 ml/hr, increase 10 ml/hr every 4 hours as tolerated back to goal of 50 ml/hr.    -Free water 240 mls 4 times per day per MD order 1/27/21.  -Further diet recommendations per SLP. Nutrition Assessment:    Pt. declining from a nutritional standpoint AEB TF's held last night due to emesis. At risk for further nutrition compromise r/t continued NPO due to dysphagia, COVID+ 1/18/21, advanced age, increased nutrient needs to support wound healing, and underlying medical condition (hx CAD, CKD, CVA).  Nutrition recommendations/interventions as per above. .    Malnutrition Assessment:  Malnutrition Status: Moderate malnutrition    Context:  Acute Illness     Findings of the 6 clinical characteristics of malnutrition:  Energy Intake:  1 - 75% or less of estimated energy requirements for 7 or more days  Weight Loss:  1 - 5% over 1 month(-9.4% in 1.5 months)     Body Fat Loss:  Unable to assess     Muscle Mass Loss:  1 - Mild muscle mass loss Temples (temporalis)  Fluid Accumulation:  Unable to assess     Strength:  Not Performed    Estimated Daily Nutrient Needs:  Energy (kcal):  9603-4308 kcals (20-25); Weight Used for Energy Requirements:  (77kgm on 1/24)     Protein (g):   gm (1.2-2); Weight Used for Protein Requirements:  Ideal(59 kgm)        Fluid (ml/day):  ~1925ml (25ml/kgm); Method Used for Fluid Requirements:  (77kgm on 1/24)      Nutrition Related Findings:  Patient, nurse, and student nurse seen. Pt just had 2nd BM in the last 24 hours after having no documented BM x 10 days. Pt had been tolerating TF 's well at goal rate until last evening, had emesis x 3 after receiving blood. TF's held. This morning some nausea with morning meds, given zofran, and now feeling much better.   TF's to be resumed at 10 ml/hr, advance slowly as tolerated. Pt states feeling much better. KUB today-nonobstructive gas pattern. +2 generalized edema, albumin and lasix planned. Urine outpt 550 mls. 263 mls TF intake in the last 24 hours (only 22% of prescribed). 1/29/21: Sodium 138, Potassium 4.7, BUN 19, Creatinine 1.2, Glucose 97. Rx: lasix, colace BID, fleet enema x1, glycolax, vanc, zosyn, zofran, phenergan, culturelle, protonix. Coumadin held. Wounds:  (left heel: unstageable)       Current Nutrition Therapies:    DIET TUBE FEED CONTINUOUS/CYCLIC NPO; 1.5 Calorie with Fiber (Jevity 1.5); Gastrostomy  Current Tube Feeding (TF) Orders:  · Feeding Route: PEG(1/26/21 Dr. Danny Chavez), held 1/28/21 (emesis), restarted 1/29/21  · Formula: Standard w/Fiber(Jevity 1.5)  · Schedule: Continuous  · Water Flushes: 240 mls 4 times per day per MD order 1/27/21  · Goal TF & Flush Orders Provides: Jevity 1.5 goal 50 ml/hr (or 300 mls bolus, 4 times per day pending TF pump availability)~ 1200 mls, 1800 kcals, 77 grams protein, 259 grams CHO, 26 grams fiber, 1872 mls total water (TF+ flushes) per 24 hours      Anthropometric Measures:  · Height: 5' 4\" (162.6 cm)  · Current Body Weight: 183 lb 6.4 oz (83.2 kg)(1/28/21 +2 generalized edema, albumin and lasix today)   · Admission Body Weight: 152 lb 5.4 oz (69.1 kg)(1/17 +2 edema)    · Usual Body Weight: (per EMR: 2/25/20: 174# 11.2 oz, 12/9/20: 170# 11.2 oz)     · Ideal Body Weight: 130 lbs;   · BMI: 31.5  · Adjusted Body Weight:  ; No Adjustment   · BMI Categories: Obese Class 1 (BMI 30.0-34. 9)       Nutrition Diagnosis:   · Moderate malnutrition related to inadequate protein-energy intake as evidenced by poor intake prior to admission, mild muscle loss      Nutrition Interventions:   Food and/or Nutrient Delivery:  Continue NPO(Recommend resume tubefeeding, advance as tolerated back up to goal rate.)  Nutrition Education/Counseling:  Education not appropriate   Coordination of Nutrition Care: Continue to monitor while inpatient    Goals:  Patient will tolerate EN to provide 75% or more of estimated nutrient needs while NPO. Nutrition Monitoring and Evaluation:   Behavioral-Environmental Outcomes:  None Identified   Food/Nutrient Intake Outcomes:  Diet Advancement/Tolerance, Enteral Nutrition Intake/Tolerance, IVF Intake  Physical Signs/Symptoms Outcomes:  Biochemical Data, Chewing or Swallowing, GI Status, Fluid Status or Edema, Nutrition Focused Physical Findings, Skin, Weight     Discharge Planning:     Too soon to determine     Electronically signed by Shonda Maldonado RD, LD on 1/29/21 at 3:00 PM EST    Contact: [phone]

## 2021-01-29 NOTE — PROGRESS NOTES
[facility]  OCCUPATIONAL THERAPY MISSED TREATMENT NOTE  [facility] RENAL TELEMETRY 6K  6K-22/022-A      Date: 2021  Patient Name: Noemy Daley        CSN: [id number]   : 3/30/1932  (80 y.o.)  Gender: male   Referring Practitioner: Dr. Luis Miguel Dominguez  Diagnosis: hyponatremia         REASON FOR MISSED TREATMENT: Patient Refused Pt supine in bed upon arrival with slow response to therapist.  Pt states he is not feeling well this date and requested emesis bag. Pt O2 96 with 2L. Nursing notified of patient not feeling well. Will attempt at next available time.

## 2021-01-29 NOTE — PLAN OF CARE
Problem: Nutrition  Goal: Optimal nutrition therapy  Outcome: Ongoing   Nutrition Problem #1: Moderate malnutrition  Intervention: Food and/or Nutrient Delivery: Continue NPO(Recommend resume tubefeeding, advance as tolerated back up to goal rate.)  Nutritional Goals: Patient will tolerate EN to provide 75% or more of estimated nutrient needs while NPO.

## 2021-01-29 NOTE — CARE COORDINATION
DISASTER CHARTING    1/29/21, 8:40 AM EST    DISCHARGE ONGOING EVALUATION:     [address] day: 12  Location: [facility]22/022-A Reason for admit: Hyponatremia [E87.1]   Barriers to Discharge: Albumin 2.4, Hgb 8.4.  TF on hold due to dark brown emesis and c/o nausea. Weight up 25 lbs. Duonebs, IV Vanc, IV zosyn. PICC placed 1/28. Cardiology has signed off. PT/OT- did not participate this a.m due to nausea. PCP: Ernesto Bateman DO  Readmission Risk Score: 29%  Patient Goals/Plan/Treatment Preferences: [place] [facility] placement.

## 2021-01-29 NOTE — PROGRESS NOTES
Patient nauseated and vomiting, very anxious and restless. Emesis is a dark brown color. Received blood transfusion today. Patient has not had a bm noted in chart. Discussed with hospitalist Dr. Jude Flores. Per Dr. Jude Flores, hold tube feed for now and monitor. TF stopped. Patient medicated per mar. Aspiration precautions in place.

## 2021-01-29 NOTE — FLOWSHEET NOTE
01/28/21 2210   Vital Signs   Temp 99.7 °F (37.6 °C)   Temp Source Oral   Pulse 64   Heart Rate Source Monitor   Resp 18   BP (!) 115/54   BP Location Left upper arm   MAP (mmHg) 85   Oxygen Therapy   SpO2 96 %       Blood transfusion completed at this time. Post transfusion vitals stable, listed above. NAD noted.  WCTM

## 2021-01-29 NOTE — PROGRESS NOTES
[facility]  PHYSICAL THERAPY MISSED TREATMENT NOTE  [facility] RENAL TELEMETRY 6K    Date: 2021  Patient Name: Nisa Atkins        MRN: [id number]   : 3/30/1932  (80 y.o.)  Gender: male   Referring Practitioner: Isrrael Richardson DO  Diagnosis: Hyponatremia         REASON FOR MISSED TREATMENT:  Missed treat. RN approved supine therapy stating pt had a really rough night and not feeling well today. Pt resting in bed, politely declines therapy stating he just is not feeling well. Will try back on next scheduled visit.

## 2021-01-30 LAB
ALBUMIN SERPL-MCNC: 2.5 G/DL (ref 3.5–5.1)
ALP BLD-CCNC: 103 U/L (ref 38–126)
ALT SERPL-CCNC: 24 U/L (ref 11–66)
ANION GAP SERPL CALCULATED.3IONS-SCNC: 4 MEQ/L (ref 8–16)
AST SERPL-CCNC: 23 U/L (ref 5–40)
BASOPHILS # BLD: 0.2 %
BASOPHILS ABSOLUTE: 0 THOU/MM3 (ref 0–0.1)
BILIRUB SERPL-MCNC: 0.6 MG/DL (ref 0.3–1.2)
BILIRUBIN DIRECT: 0.3 MG/DL (ref 0–0.3)
BUN BLDV-MCNC: 22 MG/DL (ref 7–22)
CALCIUM SERPL-MCNC: 7.7 MG/DL (ref 8.5–10.5)
CHLORIDE BLD-SCNC: 101 MEQ/L (ref 98–111)
CO2: 31 MEQ/L (ref 23–33)
CREAT SERPL-MCNC: 1.3 MG/DL (ref 0.4–1.2)
EOSINOPHIL # BLD: 1.2 %
EOSINOPHILS ABSOLUTE: 0.1 THOU/MM3 (ref 0–0.4)
ERYTHROCYTE [DISTWIDTH] IN BLOOD BY AUTOMATED COUNT: 18.8 % (ref 11.5–14.5)
ERYTHROCYTE [DISTWIDTH] IN BLOOD BY AUTOMATED COUNT: 61 FL (ref 35–45)
GFR SERPL CREATININE-BSD FRML MDRD: 52 ML/MIN/1.73M2
GLUCOSE BLD-MCNC: 121 MG/DL (ref 70–108)
HCT VFR BLD CALC: 25.3 % (ref 42–52)
HEMOGLOBIN: 7.9 GM/DL (ref 14–18)
IMMATURE GRANS (ABS): 0.05 THOU/MM3 (ref 0–0.07)
IMMATURE GRANULOCYTES: 0.5 %
INR BLD: 1.25 (ref 0.85–1.13)
LYMPHOCYTES # BLD: 5.8 %
LYMPHOCYTES ABSOLUTE: 0.6 THOU/MM3 (ref 1–4.8)
MCH RBC QN AUTO: 28.2 PG (ref 26–33)
MCHC RBC AUTO-ENTMCNC: 31.2 GM/DL (ref 32.2–35.5)
MCV RBC AUTO: 90.4 FL (ref 80–94)
MONOCYTES # BLD: 8.2 %
MONOCYTES ABSOLUTE: 0.9 THOU/MM3 (ref 0.4–1.3)
NUCLEATED RED BLOOD CELLS: 0 /100 WBC
PLATELET # BLD: 152 THOU/MM3 (ref 130–400)
PMV BLD AUTO: 10.6 FL (ref 9.4–12.4)
POTASSIUM SERPL-SCNC: 3.8 MEQ/L (ref 3.5–5.2)
RBC # BLD: 2.8 MILL/MM3 (ref 4.7–6.1)
SEG NEUTROPHILS: 84.1 %
SEGMENTED NEUTROPHILS ABSOLUTE COUNT: 9.3 THOU/MM3 (ref 1.8–7.7)
SODIUM BLD-SCNC: 136 MEQ/L (ref 135–145)
TOTAL PROTEIN: 5.3 G/DL (ref 6.1–8)
WBC # BLD: 11.1 THOU/MM3 (ref 4.8–10.8)

## 2021-01-30 PROCEDURE — 82248 BILIRUBIN DIRECT: CPT

## 2021-01-30 PROCEDURE — 99232 SBSQ HOSP IP/OBS MODERATE 35: CPT | Performed by: INTERNAL MEDICINE

## 2021-01-30 PROCEDURE — 6370000000 HC RX 637 (ALT 250 FOR IP): Performed by: INTERNAL MEDICINE

## 2021-01-30 PROCEDURE — 85610 PROTHROMBIN TIME: CPT

## 2021-01-30 PROCEDURE — 85025 COMPLETE CBC W/AUTO DIFF WBC: CPT

## 2021-01-30 PROCEDURE — 1200000003 HC TELEMETRY R&B

## 2021-01-30 PROCEDURE — 6370000000 HC RX 637 (ALT 250 FOR IP): Performed by: NURSE PRACTITIONER

## 2021-01-30 PROCEDURE — 94640 AIRWAY INHALATION TREATMENT: CPT

## 2021-01-30 PROCEDURE — 6360000002 HC RX W HCPCS: Performed by: PHYSICIAN ASSISTANT

## 2021-01-30 PROCEDURE — 36415 COLL VENOUS BLD VENIPUNCTURE: CPT

## 2021-01-30 PROCEDURE — 6370000000 HC RX 637 (ALT 250 FOR IP): Performed by: STUDENT IN AN ORGANIZED HEALTH CARE EDUCATION/TRAINING PROGRAM

## 2021-01-30 PROCEDURE — 94668 MNPJ CHEST WALL SBSQ: CPT

## 2021-01-30 PROCEDURE — 94760 N-INVAS EAR/PLS OXIMETRY 1: CPT

## 2021-01-30 PROCEDURE — 6360000002 HC RX W HCPCS: Performed by: INTERNAL MEDICINE

## 2021-01-30 PROCEDURE — 2580000003 HC RX 258: Performed by: STUDENT IN AN ORGANIZED HEALTH CARE EDUCATION/TRAINING PROGRAM

## 2021-01-30 PROCEDURE — 2580000003 HC RX 258: Performed by: PHYSICIAN ASSISTANT

## 2021-01-30 PROCEDURE — 2700000000 HC OXYGEN THERAPY PER DAY

## 2021-01-30 PROCEDURE — 80053 COMPREHEN METABOLIC PANEL: CPT

## 2021-01-30 RX ADMIN — Medication 1 CAPSULE: at 10:34

## 2021-01-30 RX ADMIN — SODIUM CHLORIDE, PRESERVATIVE FREE 10 ML: 5 INJECTION INTRAVENOUS at 20:56

## 2021-01-30 RX ADMIN — Medication 3 MG: at 20:56

## 2021-01-30 RX ADMIN — IPRATROPIUM BROMIDE AND ALBUTEROL SULFATE 1 AMPULE: .5; 3 SOLUTION RESPIRATORY (INHALATION) at 17:04

## 2021-01-30 RX ADMIN — TAMSULOSIN HYDROCHLORIDE 0.4 MG: 0.4 CAPSULE ORAL at 10:34

## 2021-01-30 RX ADMIN — VANCOMYCIN HYDROCHLORIDE 1250 MG: 5 INJECTION, POWDER, LYOPHILIZED, FOR SOLUTION INTRAVENOUS at 10:33

## 2021-01-30 RX ADMIN — IPRATROPIUM BROMIDE AND ALBUTEROL SULFATE 1 AMPULE: .5; 3 SOLUTION RESPIRATORY (INHALATION) at 09:21

## 2021-01-30 RX ADMIN — IPRATROPIUM BROMIDE AND ALBUTEROL SULFATE 1 AMPULE: .5; 3 SOLUTION RESPIRATORY (INHALATION) at 13:04

## 2021-01-30 RX ADMIN — SODIUM CHLORIDE, PRESERVATIVE FREE 10 ML: 5 INJECTION INTRAVENOUS at 20:57

## 2021-01-30 RX ADMIN — IPRATROPIUM BROMIDE AND ALBUTEROL SULFATE 1 AMPULE: .5; 3 SOLUTION RESPIRATORY (INHALATION) at 20:39

## 2021-01-30 RX ADMIN — SODIUM CHLORIDE, PRESERVATIVE FREE 10 ML: 5 INJECTION INTRAVENOUS at 10:34

## 2021-01-30 RX ADMIN — PIPERACILLIN AND TAZOBACTAM 3375 MG: 3; .375 INJECTION, POWDER, LYOPHILIZED, FOR SOLUTION INTRAVENOUS at 19:33

## 2021-01-30 RX ADMIN — PIPERACILLIN AND TAZOBACTAM 3375 MG: 3; .375 INJECTION, POWDER, LYOPHILIZED, FOR SOLUTION INTRAVENOUS at 10:33

## 2021-01-30 RX ADMIN — PANTOPRAZOLE SODIUM 40 MG: 40 TABLET, DELAYED RELEASE ORAL at 05:25

## 2021-01-30 RX ADMIN — PRAVASTATIN SODIUM 40 MG: 40 TABLET ORAL at 20:56

## 2021-01-30 RX ADMIN — FUROSEMIDE 40 MG: 10 INJECTION, SOLUTION INTRAMUSCULAR; INTRAVENOUS at 10:37

## 2021-01-30 RX ADMIN — PIPERACILLIN AND TAZOBACTAM 3375 MG: 3; .375 INJECTION, POWDER, LYOPHILIZED, FOR SOLUTION INTRAVENOUS at 04:24

## 2021-01-30 ASSESSMENT — PAIN SCALES - GENERAL
PAINLEVEL_OUTOF10: 0

## 2021-01-30 NOTE — PROGRESS NOTES
Hospitalist      Patient:  Mehul Ramírez    Unit/Bed:6K-22/022-A  YOB: 1932  MRN: [id number]   Acct: [de-identified]     PCP: Ubaldo Chisholm DO  Date of Admission: 1/17/2021        Assessment and Plan:        1. Decreased urine output notified nephrology will order serum osmolality start free water via PEG tube per dietitian's recommendation  2. Elevated WBC: We will obtain blood cultures  3. Worsening bilateral consolidation we will obtain a CT scan without contrast to evaluate may need to consider hospital-acquired pneumonia versus aspiration we will obtain blood cultures. 4. Dysphagia PEG tube placed patient is at goal with tube feeds will need free water with PEG tube feedings will discuss with dietitian  5. Chronic atrial fibrillation with SVR we will resume Coumadin and have pharmacy dose  6. Normocytic anemia hemoglobin currently at 7.3 we will type and screen  7. Chronic kidney disease stage III creatinine slightly elevated will discuss with nephrology will obtain a serum osmolality  8. Central line needs to be removed and replaced with peripheral IV or PICC line  9. Malnutrition: Nutritional status is poor PEG tube feedings are infusing without residuals,     1.28.2021 patient to undergo thoracentesis, PICC line placement, removal of IJ. Cultures to date are no growth. Will transfuse to keep hemoglobin greater then 7    1.29.2021 events noted during the night of dark brown emesis in color, will hold Coumadin dosing and hold Lovenox at this time, patient on Protonix twice daily, Will transfuse 25 g of albumin and follow it with Lasix 40 mg. No Bowel movements per nursing will start MiraLAX daily and Colace twice daily. Will resume tube feedings per protocol and check residuals. 1.30.2021 fecal occult blood positive, hemoglobin today is 7.9, Lovenox on hold along with Coumadin. Head of bed needs to be at 30 degrees.   Renal has added Lasix 40 mg IV daily for 3 days    CC: FHX:       Problem Relation Age of Onset    Cancer Mother      SOCHX:   Social History     Socioeconomic History    Marital status:      Spouse name: None    Number of children: None    Years of education: None    Highest education level: None   Occupational History    None   Social Needs    Financial resource strain: None    Food insecurity     Worry: None     Inability: None    Transportation needs     Medical: None     Non-medical: None   Tobacco Use    Smoking status: Former Smoker     Quit date: 1980     Years since quittin.1    Smokeless tobacco: Never Used   Substance and Sexual Activity    Alcohol use: Yes     Alcohol/week: 1.0 standard drinks     Types: 1 Cans of beer per week     Comment: occasionally    Drug use: No    Sexual activity: None   Lifestyle    Physical activity     Days per week: None     Minutes per session: None    Stress: None   Relationships    Social connections     Talks on phone: None     Gets together: None     Attends [employer] service: None     Active member of club or organization: None     Attends meetings of clubs or organizations: None     Relationship status: None    Intimate partner violence     Fear of current or ex partner: None     Emotionally abused: None     Physically abused: None     Forced sexual activity: None   Other Topics Concern    None   Social History Narrative    None      Allergies: Patient has no known allergies.   Medications:     sodium chloride Stopped (21 5998)      polyethylene glycol  17 g Oral Daily    sodium phosphate  1 enema Rectal Once    docusate  100 mg Oral BID    furosemide  40 mg Intravenous Daily    potassium chloride  40 mEq Oral Once    piperacillin-tazobactam  3,375 mg Intravenous Q8H    vancomycin (VANCOCIN) intermittent dosing (placeholder)   Other RX Placeholder    vancomycin  1,250 mg Intravenous Q24H    ipratropium-albuterol  1 ampule Inhalation Q4H WA    pravastatin  40 mg PEG Tube Nightly    lidocaine 1 % injection  5 mL Intradermal Once    sodium chloride flush  10 mL Intravenous 2 times per day    sodium chloride flush  10 mL Intravenous 2 times per day    [Held by provider] enoxaparin  80 mg Subcutaneous Q12H    melatonin  3 mg Oral Nightly    calcium replacement protocol   Other RX Placeholder    phosphorus replacement protocol   Other RX Placeholder    lactobacillus  1 capsule Oral Daily with breakfast    aspirin  81 mg Oral Daily    pantoprazole  40 mg Oral BID AC    tamsulosin  0.4 mg Oral Daily     Prior to Admission medications    Medication Sig Start Date End Date Taking? Authorizing Provider   hydrALAZINE (APRESOLINE) 25 MG tablet Take 1 tablet by mouth 3 times daily 12/12/20  Yes Diamante Oakes PA-C   tamsulosin [facility]) 0.4 MG capsule Take 1 capsule by mouth daily 1/16/20  Yes Urban Olivera MD   warfarin (COUMADIN) 3 MG tablet Take 1 tablet by mouth See Admin Instructions Indications: Anticoagulant Therapy, Atrial Fibrillation [facility] coumadin clinic to dose, #45=30 days supply 9/19/17  Yes Jameson Weber MD   furosemide (LASIX) 20 MG tablet Take 1 tablet by mouth daily 4/4/17  Yes Lexi Macias MD   pantoprazole (PROTONIX) 40 MG tablet Take 1 tablet by mouth 2 times daily (before meals). 3/1/15  Yes ROBBIE Tom - CNP   pravastatin (PRAVACHOL) 40 MG tablet Take 40 mg by mouth every evening. Indications: Blood Cholesterol Abnormal   Yes Historical Provider, MD   aspirin 81 MG EC tablet Take 81 mg by mouth daily Indications: Anticoagulant Therapy Take with food.    Yes Historical Provider, MD   cyclobenzaprine (FLEXERIL) 5 MG tablet Take 1 tablet by mouth 3 times daily as needed for Muscle spasms  Patient not taking: Reported on 1/6/2021 1/5/21   David Miller PA-C      PHYSICAL EXAM:    /64   Pulse 64   Temp 98.5 °F (36.9 °C) (Oral)   Resp 20   Ht 5' 4\" (1.626 m)   Wt 183 lb 6.4 oz (83.2 kg)   SpO2 94%   BMI 31.48 kg/m² General appearance:  No apparent distress, appears stated age and cooperative. Sitting comfortably in chair  HEENT:  Normal cephalic, atraumatic without obvious deformity. Pupils equal, round, and reactive to light. Extra ocular muscles intact. Conjunctivae/corneas clear. Neck: Supple, with full range of motion. no jugular venous distention. Trachea midline. no carotid bruits  Respiratory:  Normal respiratory effort. Clear to auscultation, upper airway wheezes, bases markedly decreased breath sounds right more so than the left no crackles or rails heard at this time  Cardiovascular:  Regular rate and rhythm with normal S1/S2 without murmurs, rubs or gallops. PMI non displaced  Abdomen: Soft, non-tender, non-distended with normal bowel sounds. No guarding, rebound. Musculoskeletal:  No clubbing, cyanosis. Trace pretibial edema right greater than left calves are soft full range of motion without deformity. Skin: Skin color, texture, turgor normal.  No rashes or lesions, or suspicious lesions. Neurologic:  Neurovascularly intact without any focal sensory/motor deficits. Cranial nerves: II-XII intact, grossly non-focal.  Psychiatric:  Alert and oriented, thought content appropriate, normal insight  Capillary Refill: Brisk,< 2 seconds   Peripheral Pulses: +2 palpable, equal bilaterally upper and lower extremities  Lymphatics: no lymphadenopathy    Data: (All radiographs, tracings, PFTs, and imaging are personally viewed and interpreted unless otherwise noted).     WBC 11.9   Hemoglobin 8.4   Platelets 082   Creatinine 1.1  Recent Labs     01/28/21  0400 01/29/21  0230 01/30/21  0420   WBC 8.4 11.9* 11.1*   HGB 6.8* 8.4* 7.9*   HCT 21.7* 26.2* 25.3*    163 152     Recent Labs     01/28/21  0400 01/29/21  1130    138   K 3.6 4.7    107   CO2 24 22*   BUN 18 19   CREATININE 1.1 1.2   CALCIUM 7.7* 8.0*     Recent Labs     01/28/21  0400 01/29/21  0230 01/30/21  0420   AST 20 27 23   ALT 28 29 24   BILIDIR <0.2 0.4* 0.3   BILITOT 0.4 0.9 0.6   ALKPHOS 65 93 103     Recent Labs     01/28/21  0815 01/29/21  0640 01/30/21  0420   INR 1.42* 1.20* 1.25*     No results for input(s): CKTOTAL, TROPONINI in the last 72 hours. Radiology reports-images reviewed in PACS  Xr Chest (2 Vw)    Result Date: 1/17/2021  PROCEDURE: XR CHEST (2 VW) CLINICAL INFORMATION: Found on the floor COMPARISON: 8/20/2016 TECHNIQUE:  AP and lateral chest x-ray  FINDINGS: There is at least moderate enlargement of the cardiomediastinal silhouette. There is evidence of prior median sternotomy. There is diffuse osteopenia noted. Mild focal eventration of the right hemidiaphragm is seen. No focal consolidation or pleural effusion is seen. No pneumothorax is demonstrated. 1. Moderate enlargement of the cardiomediastinal silhouette. 2. There is hyperinflation noted. 2. No focal consolidation, pleural effusion or pneumothorax is seen. **This report has been created using voice recognition software. It may contain minor errors which are inherent in voice recognition technology. ** Final report electronically signed by Dr. Magen López on 1/17/2021 3:54 PM    Xr Pelvis (1-2 Views)    Result Date: 1/17/2021  PROCEDURE: XR PELVIS (1-2 VIEWS) CLINICAL INFORMATION: Fall, weakness of left leg COMPARISON: 1/5/2021 TECHNIQUE:  AP pelvis single view  FINDINGS: The visualized bilateral obturator rings appear intact. There is spurring noted along the superior-lateral left acetabulum. No acute fractures identified. The femoral head contours appear intact bilaterally. There is increased sclerosis along the acetabular roofs bilaterally. 1. No acute fracture or malalignment. Degenerative changes are as described above. **This report has been created using voice recognition software. It may contain minor errors which are inherent in voice recognition technology. ** Final report electronically signed by Dr. Magen López on 1/17/2021 3:56 PM    Xr Femur Left (min 2 Views)    Result Date: 1/17/2021  PROCEDURE: XR FEMUR LEFT (MIN 2 VIEWS) CLINICAL INFORMATION: Fall, weakness of left leg COMPARISON: No prior study. TECHNIQUE:  Right femur 4 views  FINDINGS: There is spurring along the inferior pole of the articular surface of the patella. No acute fracture or malalignment is seen. The left femoral head contour appears intact. The visualized left obturator ring appears intact. Enthesophytes are noted along the greater trochanter. There is mild spurring along the superior-lateral acetabulum. There is increased sclerosis along the left acetabular roof. 1. No acute fracture or malalignment. Degenerative changes are as described above. **This report has been created using voice recognition software. It may contain minor errors which are inherent in voice recognition technology. ** Final report electronically signed by Dr. Jose Luis Feliz on 1/17/2021 3:50 PM    Ct Head Wo Contrast    Result Date: 1/17/2021  PROCEDURE: CT HEAD WO CONTRAST CLINICAL INFORMATION: fall, ams COMPARISON: 8/17/2015 TECHNIQUE:  Axial CT images were obtained through the head without contrast. Coronal and sagittal reformatted images were rendered. All CT scans at this facility use dose modulation, iterative reconstruction, and/or weight-based dosing when appropriate to reduce radiation dose to as low as reasonably achievable. 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 There is moderate diffuse atrophy. No acute intracranial hemorrhage or mass effect is seen. There is no midline shift. The basal cisterns and posterior fossa appear otherwise unremarkable. Parasellar carotid artery vascular calcifications are noted. The visualized paranasal sinuses and mastoid air cells appear clear. No acute abnormalities of the calvarium are seen. The visualized globes and orbits appear grossly intact. 1. No acute intracranial hemorrhage or mass effect.  2. Moderate diffuse atrophy is demonstrated. 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 **This report has been created using voice recognition software. It may contain minor errors which are inherent in voice recognition technology. ** Final report electronically signed by Dr. Aria Villegas on 1/17/2021 4:17 PM    Ct Chest Wo Contrast    Result Date: 1/17/2021  Noncontrast CT of the chest Technique: Axial CT images from the lung apices through the adrenal glands, no contrast.  Sagittal and coronal reconstruction images provided. Comparison:  CR,SR  - XR CHEST PORTABLE  - 01/17/2021 07:29 PM EST Findings: Mild atherosclerotic vessel disease within the thoracic aorta. Normal caliber thoracic aorta. No aneurysm. No mediastinal or axillary adenopathy. Normal thyroid. Moderate right pleural effusion. Associated volume loss. Mild opacity right lung base. This could represent mild pneumonia. No pulmonary nodules. No pneumothorax. Normal bones. Cholelithiasis present. No adjacent inflammation. A small hiatal hernia. Impression: Moderate right pleural effusion. Associated volume loss. Mild opacity right lung base. This could represent mild pneumonia. This document has been electronically signed by: Gladys Reinoso MD on 01/17/2021 09:10 PM All CT scans at this facility use dose modulation, iterative reconstruction, and/or weight-based dosing when appropriate to reduce radiation dose to as low as reasonably achievable. Ct Cervical Spine Wo Contrast    Result Date: 1/17/2021  PROCEDURE: CT CERVICAL SPINE WO CONTRAST CLINICAL INFORMATION: fall COMPARISON: No prior study. TECHNIQUE:  Axial CT images were obtained through the cervical spine without contrast. Coronal and sagittal reformatted images were rendered.  All CT scans at this facility use dose modulation, iterative reconstruction, and/or weight-based dosing when appropriate to reduce radiation dose to as low as reasonably achievable. FINDINGS: Evaluation is slightly limited due to motion artifact. No prevertebral soft tissue swelling is seen. There is normal alignment at the craniocervical junction. The dens appears intact. The dens interval appears within normal limits. The facets align normally. The spinous processes appear intact. Multilevel degenerative disc disease is demonstrated, most pronounced at the C4-C5 and C6-C7 levels where there is disc space narrowing, endplate sclerosis and hypertrophic endplate change. Multilevel uncovertebral and facet hypertrophy is demonstrated. There is mild to moderate right neural foraminal narrowing at C3-C4. Moderate bilateral neural foraminal narrowing at C4-C5 is noted. There is mild to moderate neural foraminal narrowing on the right at C5-C6. Moderate bilateral neural foraminal narrowing at C6-C7 is noted. The visualized lung apices appear unremarkable. 1. Slightly limited evaluation due to motion artifact. However, no definite acute fracture of the cervical spine is seen. 2. Straightening of the normal cervical lordosis is demonstrated. This may be related to muscle spasm. 3. Multilevel degenerative changes of the cervical spine are demonstrated. **This report has been created using voice recognition software. It may contain minor errors which are inherent in voice recognition technology. ** Final report electronically signed by Dr. Bia Macdonald on 1/17/2021 4:21 PM    Xr Chest Portable    Result Date: 1/26/2021  1 view chest x-ray. Comparison: 1/22/2021 Findings: Right central line unchanged. Bilateral consolidation has worsened somewhat. Cardiomegaly. No bony abnormality. Impression: Worsening bilateral consolidation.  This document has been electronically signed by: Peggy Bedoya MD on 01/26/2021 10:04 PM     Xr Chest Portable    Result Date: 1/22/2021  1 view chest x-ray Comparison:  01/22/2021 04:33 PM EST, 01/22/2021 10:47 PM EST Findings: Incomplete visualization of the lung apices and the lateral aspect of the right lung. Poor evaluation of lung parenchyma secondary to technique. No consolidative process within visualized portions. Mild cardiomegaly. Prior sternotomy. No acute fracture. Central venous line at the cavoatrial junction. The nasogastric tube is at the level of the left mainstem bronchus. Recommend removal and repositioning. This document has been electronically signed by: Olesya Saldivar MD on 01/22/2021 11:31 PM     Xr Chest Portable    Result Date: 1/22/2021  PROCEDURE: XR CHEST PORTABLE CLINICAL INFORMATION: suspected aspiration. COMPARISON: Chest radiograph dated 1/17/2021. TECHNIQUE: AP upright view of the chest. FINDINGS: There are stable median sternotomy wires and mediastinal surgical clips. There is stable right internal jugular catheter with tip in the superior vena cava. There is stable tapering opacity at the right infrahilar region compared to prior radiographs which corresponded to a pleural effusion on prior CT. The lungs otherwise appear clear. The cardiomediastinal silhouette is stable with mild cardiomegaly. As osseous structures appear grossly intact. Stable right basilar opacity. **This report has been created using voice recognition software. It may contain minor errors which are inherent in voice recognition technology. ** Final report electronically signed by Dr. Silvina Vaca MD on 1/22/2021 4:55 PM    Xr Chest Portable    Result Date: 1/17/2021  Single view of the chest Comparison:  CR,SR  - XR CHEST STANDARD (2 VW)  - 01/17/2021 03:32 PM EST Findings: Mild right infrahilar opacity. This could represent volume loss versus mild pneumonia. Cardiomegaly, no CHF. Right central line present with the tip in the mid superior vena cava. Status post median sternotomy. Impression: Cardiomegaly, no CHF. Mild right infrahilar opacity. This could represent mild pneumonia versus volume loss.  This document has been electronically signed by: César Su MD on 01/17/2021 09:11 PM     Xr Abdomen For Ng/og/ne Tube Placement    Result Date: 1/22/2021  ** ADDENDUM #1 ** This report was discussed with Antoine Kyle RN on Jan 22, 2021 23:28:00 EST. This document has been electronically signed by: Eduar Boateng on 01/22/2021 11:28 PM ** ORIGINAL REPORT ** 1 view abdomen Comparison:  CR,SR  - XR CHEST PORTABLE  - 01/22/2021 10:47 PM EST Findings: No pneumoperitoneum or pneumatosis. No abnormal calcifications. No acute fractures. Status post sternotomy. 1. No visible nasogastric tube on the abdominal x-ray. On correlation with the concurrent chest x-ray, the nasogastric tube is at the level of the left mainstem bronchus. Recommend removal and repositioning. 2. Extensive gaseous distention of bowel suggesting possible ileus. This document has been electronically signed by: Carmelo Moran MD on 01/22/2021 11:22 PM     Mri Brain Wo Contrast    Result Date: 1/19/2021  Noncontrast MRI of the brain. Technique: Sagittal T1, coronal T2, axial T1, T2, FLAIR and diffusion-weighted imaging. Comparison:  CT,SR  - CT HEAD WO CONTRAST  - 01/17/2021 03:49 PM EST Findings: Normal signal on diffusion-weighted imaging. No evidence for acute ischemic event. Moderate nonspecific periventricular deep white matter disease. This most often can be ascribed to chronic small vessel ischemic change. Prominent central and cortical volume loss. Old ischemic event left temporal lobe. There are no intracranial masses. No evidence for hemorrhage. The ventricles are normal size, no hydrocephalus. Contents of the posterior fossa including brainstem and cerebellum are unremarkable. Normal vascular flow voids. Normal aeration of paranasal sinuses. Impression: Moderate nonspecific periventricular deep white matter disease. This most often can be ascribed to chronic small vessel ischemic change. Prominent central and cortical volume loss.  Old ischemic event left temporal lobe. This document has been electronically signed by: Govind Mosquera MD on 01/19/2021 12:59 AM     Xr Hip 2-3 Vw W Pelvis Left    Result Date: 1/5/2021  PROCEDURE: XR HIP 2-3 VW W PELVIS LEFT CLINICAL INFORMATION: pain . COMPARISON: No prior study. TECHNIQUE: AP pelvis AP and frog-leg projections of the left hip. FINDINGS: No acute fracture or bone destruction. Hip joint spaces are preserved. SI joints are preserved. Bones are osteopenic and some mild degenerative changes seen in both iliac wings. Vascular calcifications are noted. No acute abnormality. **This report has been created using voice recognition software. It may contain minor errors which are inherent in voice recognition technology. ** Final report electronically signed by Dr. Sagrario Kohli on 1/5/2021 11:35 AM      Electronically signed by Aleksander Albarran DO on 1/30/2021 at 9:19 AM

## 2021-01-30 NOTE — PLAN OF CARE
Problem: Impaired respiratory status  Goal: Clear lung sounds  Description: Clear lung sounds  Outcome: Ongoing   Continue aerosol therapy as ordered to improve aeration throughout all lung fields.

## 2021-01-30 NOTE — PROGRESS NOTES
[facility]         Renal Inpatient Follow-Up note         1/30/2021 12:44 PM    Pt Name:   Kayode Paulino:   3/30/1932  Attending:   Rosana Max DO    Chief Complaint : Aidan Tadeo is a 80 y.o. male being followed by nephrology for hyponatremia Lelan Kal    Interval History :   Patient seen and examined by me. No distress  Feels ok. No cp or SOB.       Scheduled Medications :    polyethylene glycol  17 g Oral Daily    sodium phosphate  1 enema Rectal Once    docusate  100 mg Oral BID    furosemide  40 mg Intravenous Daily    potassium chloride  40 mEq Oral Once    piperacillin-tazobactam  3,375 mg Intravenous Q8H    vancomycin (VANCOCIN) intermittent dosing (placeholder)   Other RX Placeholder    vancomycin  1,250 mg Intravenous Q24H    ipratropium-albuterol  1 ampule Inhalation Q4H WA    pravastatin  40 mg PEG Tube Nightly    lidocaine 1 % injection  5 mL Intradermal Once    sodium chloride flush  10 mL Intravenous 2 times per day    sodium chloride flush  10 mL Intravenous 2 times per day    [Held by provider] enoxaparin  80 mg Subcutaneous Q12H    melatonin  3 mg Oral Nightly    calcium replacement protocol   Other RX Placeholder    phosphorus replacement protocol   Other RX Placeholder    lactobacillus  1 capsule Oral Daily with breakfast    aspirin  81 mg Oral Daily    pantoprazole  40 mg Oral BID AC    tamsulosin  0.4 mg Oral Daily      sodium chloride Stopped (01/28/21 2215)       Vitals :  BP (!) 100/49   Pulse 54   Temp 97.6 °F (36.4 °C) (Oral)   Resp 18   Ht 5' 4\" (1.626 m)   Wt 183 lb 6.4 oz (83.2 kg)   SpO2 96%   BMI 31.48 kg/m²     24HR INTAKE/OUTPUT:      Intake/Output Summary (Last 24 hours) at 1/30/2021 1244  Last data filed at 1/30/2021 0309  Gross per 24 hour   Intake 1967.7 ml   Output 2955 ml   Net -987.3 ml     Last 3 weights  Wt Readings from Last 3 Encounters:   01/28/21 183 lb 6.4 oz (83.2 kg)   01/06/21 162 lb (73.5

## 2021-01-31 ENCOUNTER — APPOINTMENT (OUTPATIENT)
Dept: GENERAL RADIOLOGY | Age: 86
DRG: 177 | End: 2021-01-31
Payer: OTHER GOVERNMENT

## 2021-01-31 LAB
ALBUMIN SERPL-MCNC: 2.4 G/DL (ref 3.5–5.1)
ALP BLD-CCNC: 111 U/L (ref 38–126)
ALT SERPL-CCNC: 22 U/L (ref 11–66)
ANION GAP SERPL CALCULATED.3IONS-SCNC: 8 MEQ/L (ref 8–16)
AST SERPL-CCNC: 26 U/L (ref 5–40)
BASOPHILS # BLD: 0.4 %
BASOPHILS ABSOLUTE: 0 THOU/MM3 (ref 0–0.1)
BILIRUB SERPL-MCNC: 0.3 MG/DL (ref 0.3–1.2)
BILIRUBIN DIRECT: < 0.2 MG/DL (ref 0–0.3)
BUN BLDV-MCNC: 23 MG/DL (ref 7–22)
CALCIUM SERPL-MCNC: 7.9 MG/DL (ref 8.5–10.5)
CHLORIDE BLD-SCNC: 104 MEQ/L (ref 98–111)
CO2: 29 MEQ/L (ref 23–33)
CREAT SERPL-MCNC: 1.3 MG/DL (ref 0.4–1.2)
EOSINOPHIL # BLD: 2.1 %
EOSINOPHILS ABSOLUTE: 0.2 THOU/MM3 (ref 0–0.4)
ERYTHROCYTE [DISTWIDTH] IN BLOOD BY AUTOMATED COUNT: 18.9 % (ref 11.5–14.5)
ERYTHROCYTE [DISTWIDTH] IN BLOOD BY AUTOMATED COUNT: 61.1 FL (ref 35–45)
GFR SERPL CREATININE-BSD FRML MDRD: 52 ML/MIN/1.73M2
GLUCOSE BLD-MCNC: 130 MG/DL (ref 70–108)
HCT VFR BLD CALC: 24 % (ref 42–52)
HEMOGLOBIN: 7.4 GM/DL (ref 14–18)
IMMATURE GRANS (ABS): 0.02 THOU/MM3 (ref 0–0.07)
IMMATURE GRANULOCYTES: 0.2 %
INR BLD: 1.13 (ref 0.85–1.13)
LYMPHOCYTES # BLD: 8.7 %
LYMPHOCYTES ABSOLUTE: 0.7 THOU/MM3 (ref 1–4.8)
MCH RBC QN AUTO: 27.9 PG (ref 26–33)
MCHC RBC AUTO-ENTMCNC: 30.8 GM/DL (ref 32.2–35.5)
MCV RBC AUTO: 90.6 FL (ref 80–94)
MONOCYTES # BLD: 10 %
MONOCYTES ABSOLUTE: 0.8 THOU/MM3 (ref 0.4–1.3)
NUCLEATED RED BLOOD CELLS: 0 /100 WBC
PLATELET # BLD: 155 THOU/MM3 (ref 130–400)
PMV BLD AUTO: 11.1 FL (ref 9.4–12.4)
POTASSIUM SERPL-SCNC: 3.8 MEQ/L (ref 3.5–5.2)
RBC # BLD: 2.65 MILL/MM3 (ref 4.7–6.1)
SEG NEUTROPHILS: 78.6 %
SEGMENTED NEUTROPHILS ABSOLUTE COUNT: 6.4 THOU/MM3 (ref 1.8–7.7)
SODIUM BLD-SCNC: 141 MEQ/L (ref 135–145)
TOTAL PROTEIN: 5.1 G/DL (ref 6.1–8)
WBC # BLD: 8.1 THOU/MM3 (ref 4.8–10.8)

## 2021-01-31 PROCEDURE — 6360000002 HC RX W HCPCS: Performed by: PHYSICIAN ASSISTANT

## 2021-01-31 PROCEDURE — 6360000002 HC RX W HCPCS: Performed by: NURSE PRACTITIONER

## 2021-01-31 PROCEDURE — 85025 COMPLETE CBC W/AUTO DIFF WBC: CPT

## 2021-01-31 PROCEDURE — 85610 PROTHROMBIN TIME: CPT

## 2021-01-31 PROCEDURE — 94668 MNPJ CHEST WALL SBSQ: CPT

## 2021-01-31 PROCEDURE — 6370000000 HC RX 637 (ALT 250 FOR IP): Performed by: INTERNAL MEDICINE

## 2021-01-31 PROCEDURE — 6370000000 HC RX 637 (ALT 250 FOR IP): Performed by: NURSE PRACTITIONER

## 2021-01-31 PROCEDURE — C9113 INJ PANTOPRAZOLE SODIUM, VIA: HCPCS | Performed by: NURSE PRACTITIONER

## 2021-01-31 PROCEDURE — 6360000002 HC RX W HCPCS: Performed by: INTERNAL MEDICINE

## 2021-01-31 PROCEDURE — 94760 N-INVAS EAR/PLS OXIMETRY 1: CPT

## 2021-01-31 PROCEDURE — 99232 SBSQ HOSP IP/OBS MODERATE 35: CPT | Performed by: INTERNAL MEDICINE

## 2021-01-31 PROCEDURE — 71045 X-RAY EXAM CHEST 1 VIEW: CPT

## 2021-01-31 PROCEDURE — 2580000003 HC RX 258: Performed by: PHYSICIAN ASSISTANT

## 2021-01-31 PROCEDURE — 36415 COLL VENOUS BLD VENIPUNCTURE: CPT

## 2021-01-31 PROCEDURE — 80053 COMPREHEN METABOLIC PANEL: CPT

## 2021-01-31 PROCEDURE — 2580000003 HC RX 258: Performed by: STUDENT IN AN ORGANIZED HEALTH CARE EDUCATION/TRAINING PROGRAM

## 2021-01-31 PROCEDURE — 6370000000 HC RX 637 (ALT 250 FOR IP): Performed by: STUDENT IN AN ORGANIZED HEALTH CARE EDUCATION/TRAINING PROGRAM

## 2021-01-31 PROCEDURE — 2700000000 HC OXYGEN THERAPY PER DAY

## 2021-01-31 PROCEDURE — 82248 BILIRUBIN DIRECT: CPT

## 2021-01-31 PROCEDURE — 94640 AIRWAY INHALATION TREATMENT: CPT

## 2021-01-31 PROCEDURE — 1200000003 HC TELEMETRY R&B

## 2021-01-31 RX ORDER — PANTOPRAZOLE SODIUM 40 MG/10ML
40 INJECTION, POWDER, LYOPHILIZED, FOR SOLUTION INTRAVENOUS 2 TIMES DAILY
Status: DISCONTINUED | OUTPATIENT
Start: 2021-01-31 | End: 2021-02-05 | Stop reason: HOSPADM

## 2021-01-31 RX ADMIN — VANCOMYCIN HYDROCHLORIDE 1250 MG: 5 INJECTION, POWDER, LYOPHILIZED, FOR SOLUTION INTRAVENOUS at 08:39

## 2021-01-31 RX ADMIN — SODIUM CHLORIDE, PRESERVATIVE FREE 10 ML: 5 INJECTION INTRAVENOUS at 08:38

## 2021-01-31 RX ADMIN — IPRATROPIUM BROMIDE AND ALBUTEROL SULFATE 1 AMPULE: .5; 3 SOLUTION RESPIRATORY (INHALATION) at 15:36

## 2021-01-31 RX ADMIN — FUROSEMIDE 40 MG: 10 INJECTION, SOLUTION INTRAMUSCULAR; INTRAVENOUS at 08:41

## 2021-01-31 RX ADMIN — ACETAMINOPHEN 650 MG: 650 SUPPOSITORY RECTAL at 12:30

## 2021-01-31 RX ADMIN — IPRATROPIUM BROMIDE AND ALBUTEROL SULFATE 1 AMPULE: .5; 3 SOLUTION RESPIRATORY (INHALATION) at 11:44

## 2021-01-31 RX ADMIN — SODIUM CHLORIDE, PRESERVATIVE FREE 10 ML: 5 INJECTION INTRAVENOUS at 20:57

## 2021-01-31 RX ADMIN — PRAVASTATIN SODIUM 40 MG: 40 TABLET ORAL at 20:55

## 2021-01-31 RX ADMIN — PIPERACILLIN AND TAZOBACTAM 3375 MG: 3; .375 INJECTION, POWDER, LYOPHILIZED, FOR SOLUTION INTRAVENOUS at 11:32

## 2021-01-31 RX ADMIN — SODIUM CHLORIDE, PRESERVATIVE FREE 10 ML: 5 INJECTION INTRAVENOUS at 08:37

## 2021-01-31 RX ADMIN — PIPERACILLIN AND TAZOBACTAM 3375 MG: 3; .375 INJECTION, POWDER, LYOPHILIZED, FOR SOLUTION INTRAVENOUS at 03:47

## 2021-01-31 RX ADMIN — IPRATROPIUM BROMIDE AND ALBUTEROL SULFATE 1 AMPULE: .5; 3 SOLUTION RESPIRATORY (INHALATION) at 07:43

## 2021-01-31 RX ADMIN — Medication 3 MG: at 20:55

## 2021-01-31 RX ADMIN — POLYETHYLENE GLYCOL 3350 17 G: 17 POWDER, FOR SOLUTION ORAL at 08:41

## 2021-01-31 RX ADMIN — IPRATROPIUM BROMIDE AND ALBUTEROL SULFATE 1 AMPULE: .5; 3 SOLUTION RESPIRATORY (INHALATION) at 20:24

## 2021-01-31 RX ADMIN — PIPERACILLIN AND TAZOBACTAM 3375 MG: 3; .375 INJECTION, POWDER, LYOPHILIZED, FOR SOLUTION INTRAVENOUS at 20:54

## 2021-01-31 RX ADMIN — DOCUSATE SODIUM 100 MG: 50 LIQUID ORAL at 08:41

## 2021-01-31 RX ADMIN — PANTOPRAZOLE SODIUM 40 MG: 40 INJECTION, POWDER, FOR SOLUTION INTRAVENOUS at 08:48

## 2021-01-31 RX ADMIN — PANTOPRAZOLE SODIUM 40 MG: 40 INJECTION, POWDER, FOR SOLUTION INTRAVENOUS at 20:54

## 2021-01-31 RX ADMIN — Medication 1 CAPSULE: at 08:38

## 2021-01-31 ASSESSMENT — PAIN SCALES - GENERAL
PAINLEVEL_OUTOF10: 0
PAINLEVEL_OUTOF10: 2

## 2021-01-31 ASSESSMENT — PAIN SCALES - WONG BAKER
WONGBAKER_NUMERICALRESPONSE: 0
WONGBAKER_NUMERICALRESPONSE: 0

## 2021-01-31 NOTE — PROGRESS NOTES
[facility]         Renal Inpatient Follow-Up note         1/31/2021 2:05 PM    Pt Name:   Clayton Kanner:   3/30/1932  Attending:   Ivy Townsend DO    Chief Complaint : Ernst Burroughs is a 80 y.o. male being followed by nephrology for hyponatremia Henry Jimenez    Interval History :   Patient seen and examined by me. No distress  Feels ok. No cp or SOB.    C/O some leg pain     Scheduled Medications :    pantoprazole  40 mg Intravenous BID    polyethylene glycol  17 g Oral Daily    sodium phosphate  1 enema Rectal Once    docusate  100 mg Oral BID    furosemide  40 mg Intravenous Daily    potassium chloride  40 mEq Oral Once    piperacillin-tazobactam  3,375 mg Intravenous Q8H    vancomycin (VANCOCIN) intermittent dosing (placeholder)   Other RX Placeholder    vancomycin  1,250 mg Intravenous Q24H    ipratropium-albuterol  1 ampule Inhalation Q4H WA    pravastatin  40 mg PEG Tube Nightly    lidocaine 1 % injection  5 mL Intradermal Once    sodium chloride flush  10 mL Intravenous 2 times per day    sodium chloride flush  10 mL Intravenous 2 times per day    [Held by provider] enoxaparin  80 mg Subcutaneous Q12H    melatonin  3 mg Oral Nightly    calcium replacement protocol   Other RX Placeholder    phosphorus replacement protocol   Other RX Placeholder    lactobacillus  1 capsule Oral Daily with breakfast    aspirin  81 mg Oral Daily    tamsulosin  0.4 mg Oral Daily      sodium chloride Stopped (01/28/21 2215)       Vitals :  BP (!) 121/59   Pulse 57   Temp 97.9 °F (36.6 °C) (Oral)   Resp 20   Ht 5' 4\" (1.626 m)   Wt 181 lb 8 oz (82.3 kg)   SpO2 96%   BMI 31.15 kg/m²     24HR INTAKE/OUTPUT:      Intake/Output Summary (Last 24 hours) at 1/31/2021 1405  Last data filed at 1/31/2021 1242  Gross per 24 hour   Intake 1632 ml   Output 3125 ml   Net -1493 ml     Last 3 weights  Wt Readings from Last 3 Encounters:   01/31/21 181 lb 8 oz (82.3 kg)   01/06/21 162 lb (73.5 kg)   01/05/21 160 lb (72.6 kg)           Physical Exam :  General Appearance:  Well developed. No distress  Mouth/Throat:  Oral mucosa moist  Neck:  Supple, no JVD  Lungs:  Breath sounds: clear  Heart[de-identified]  S1,S2 heard  Abdomen:  Soft, non - tender  Musculoskeletal:  Edema - not significant         Last 3 CBC   Recent Labs     01/29/21  0230 01/30/21  0420 01/31/21  0620   WBC 11.9* 11.1* 8.1   RBC 2.96* 2.80* 2.65*   HGB 8.4* 7.9* 7.4*   HCT 26.2* 25.3* 24.0*    152 155     Last 3 CMP  Recent Labs     01/29/21  0230 01/29/21  1130 01/30/21  0420 01/30/21  1705 01/31/21  0620   NA  --  138  --  136 141   K  --  4.7  --  3.8 3.8   CL  --  107  --  101 104   CO2  --  22*  --  31 29   BUN  --  19  --  22 23*   CREATININE  --  1.2  --  1.3* 1.3*   CALCIUM  --  8.0*  --  7.7* 7.9*   LABALBU 2.4*  --  2.5*  --  2.4*   BILITOT 0.9  --  0.6  --  0.3             ASSESSMENT / Plan   1 Renal -acute kidney injury overall renal function appears to be stable  ? craet stabilized. encourage oral intake of fluids. On lasix as well. ? Check a BMP in the morning    2 Electrolytes -within normal limits normal sodium is stable  3 Possible pneumonia on antibiotics  4 Blood pressure - improved . 5 Peripheral edema-on IV diuretics better. Can switch to oral.   6 Meds reviewed and discussed with patient    BOOM Swain D.  [facility].

## 2021-01-31 NOTE — PROGRESS NOTES
Hospitalist      Patient:  Joshua Nolasco    Unit/Bed:6K-22/022-A  YOB: 1932  MRN: [id number]   Acct: [de-identified]     PCP: Eric Zabala DO  Date of Admission: 1/17/2021        Assessment and Plan:        1. Decreased urine output notified nephrology will order serum osmolality start free water via PEG tube per dietitian's recommendation  2. Elevated WBC: We will obtain blood cultures  3. Worsening bilateral consolidation we will obtain a CT scan without contrast to evaluate may need to consider hospital-acquired pneumonia versus aspiration we will obtain blood cultures. 4. Dysphagia PEG tube placed patient is at goal with tube feeds will need free water with PEG tube feedings will discuss with dietitian  5. Chronic atrial fibrillation with SVR we will resume Coumadin and have pharmacy dose  6. Normocytic anemia hemoglobin currently at 7.3 we will type and screen  7. Chronic kidney disease stage III creatinine slightly elevated will discuss with nephrology will obtain a serum osmolality  8. Central line needs to be removed and replaced with peripheral IV or PICC line  9. Malnutrition: Nutritional status is poor PEG tube feedings are infusing without residuals,     1.28.2021 patient to undergo thoracentesis, PICC line placement, removal of IJ. Cultures to date are no growth. Will transfuse to keep hemoglobin greater then 7    1.29.2021 events noted during the night of dark brown emesis in color, will hold Coumadin dosing and hold Lovenox at this time, patient on Protonix twice daily, Will transfuse 25 g of albumin and follow it with Lasix 40 mg. No Bowel movements per nursing will start MiraLAX daily and Colace twice daily. Will resume tube feedings per protocol and check residuals. 1.30.2021 fecal occult blood positive, hemoglobin today is 7.9, Lovenox on hold along with Coumadin. Head of bed needs to be at 30 degrees.   Renal has added Lasix 40 mg IV daily for 3 days    1.31.2021 hemoglobin 7.4 today down to 1 g and 48 hours, WBC 8.1, will obtain chest x-ray to evaluate pneumonia. All anticoagulation has been on hold, may need to transfuse 1 unit of packed RBCs. Tolerating tube feeds, creatinine at 1.3, total protein 5.1 and albumin 2.4    CC: Fall    HPI: 60-year-old  male presents to [facility] via EMS as after sustaining an unwitnessed fall from a standing position. He was found on the ground and unknown length of time. Per son reports that patient was progressively more weak over the last few weeks and acting like himself. On the day of the arrival son states they were unable to get a hold of him therefore they went to his house where they found him on the bathroom floor. 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 While in the emergency room the patient was found to have hyponatremia and elevated CPK there is mild opacity in the right lung base patient was Covid positive at that time    ROS (14 point review of systems completed. Pertinent positives noted.  Otherwise ROS is negative) : Poor historian    PMH:  Per HPI and       Diagnosis Date    Actinic keratoses 8/21/2013    Adenomatous polyp 2008    Atrial fibrillation ([facility] [address]     CAD (coronary artery disease) 2009    Carotid artery disease ([facility] [address] 2009    left    Hyperlipidemia 2004    Hypertension 2004    Inguinal hernia, left 2/19/2014    Unspecified cerebral artery occlusion with cerebral infarction      SHX:        Procedure Laterality Date    ABDOMEN SURGERY      ABDOMINAL ADHESION SURGERY  6/2012    Dr Diogo Stockton  2004    right    COLONOSCOPY      [facility]    CORONARY ARTERY BYPASS GRAFT  2009    SMALL INTESTINE SURGERY      SMALL INTESTINE SURGERY  2015    Exploratory Laparotomy    UPPER GASTROINTESTINAL ENDOSCOPY N/A 2021    EGD ESOPHAGOGASTRODUODENOSCOPY PEG TUBE INSERTION performed by Elsie Duron MD at [facility]     FHX:       Problem Relation Age of Onset    Cancer Mother      [facility]:   Social History     Socioeconomic History    Marital status:      Spouse name: None    Number of children: None    Years of education: None    Highest education level: None   Occupational History    None   Social Needs    Financial resource strain: None    Food insecurity     Worry: None     Inability: None    Transportation needs     Medical: None     Non-medical: None   Tobacco Use    Smoking status: Former Smoker     Quit date: 1980     Years since quittin.1    Smokeless tobacco: Never Used   Substance and Sexual Activity    Alcohol use: Yes     Alcohol/week: 1.0 standard drinks     Types: 1 Cans of beer per week     Comment: occasionally    Drug use: No    Sexual activity: None   Lifestyle    Physical activity     Days per week: None     Minutes per session: None    Stress: None   Relationships    Social connections     Talks on phone: None     Gets together: None     Attends Cheondoism service: None     Active member of club or organization: None     Attends meetings of clubs or organizations: None     Relationship status: None    Intimate partner violence     Fear of current or ex partner: None     Emotionally abused: None     Physically abused: None     Forced sexual activity: None   Other Topics Concern    None   Social History Narrative    None      Allergies: Patient has no known allergies.   Medications:     sodium chloride Stopped (21 6576)      pantoprazole  40 mg Intravenous BID    polyethylene glycol  17 g Oral Daily    sodium phosphate  1 enema Rectal Once    docusate  100 mg Oral BID    furosemide  40 mg Intravenous Daily    potassium chloride  40 mEq Oral Once    piperacillin-tazobactam  3,375 mg Intravenous Q8H    vancomycin (VANCOCIN) intermittent dosing (placeholder)   Other RX Placeholder    vancomycin  1,250 mg Intravenous Q24H    ipratropium-albuterol  1 ampule Inhalation Q4H WA    pravastatin  40 mg PEG Tube Nightly    lidocaine 1 % injection  5 mL Intradermal Once    sodium chloride flush  10 mL Intravenous 2 times per day    sodium chloride flush  10 mL Intravenous 2 times per day    [Held by provider] enoxaparin  80 mg Subcutaneous Q12H    melatonin  3 mg Oral Nightly    calcium replacement protocol   Other RX Placeholder    phosphorus replacement protocol   Other RX Placeholder    lactobacillus  1 capsule Oral Daily with breakfast    aspirin  81 mg Oral Daily    tamsulosin  0.4 mg Oral Daily     Prior to Admission medications    Medication Sig Start Date End Date Taking? Authorizing Provider   hydrALAZINE (APRESOLINE) 25 MG tablet Take 1 tablet by mouth 3 times daily 12/12/20  Yes Von Eisenmenger, PA-C   tamsulosin [facility]) 0.4 MG capsule Take 1 capsule by mouth daily 1/16/20  Yes Vinicio Balderas MD   warfarin (COUMADIN) 3 MG tablet Take 1 tablet by mouth See Admin Instructions Indications: Anticoagulant Therapy, Atrial Fibrillation [facility] coumadin clinic to dose, #45=30 days supply 9/19/17  Yes Keegan Quinn MD   furosemide (LASIX) 20 MG tablet Take 1 tablet by mouth daily 4/4/17  Yes Ananth Lujan MD   pantoprazole (PROTONIX) 40 MG tablet Take 1 tablet by mouth 2 times daily (before meals). 3/1/15  Yes ROBBIE Tom - CNP   pravastatin (PRAVACHOL) 40 MG tablet Take 40 mg by mouth every evening. Indications: Blood Cholesterol Abnormal   Yes Historical Provider, MD   aspirin 81 MG EC tablet Take 81 mg by mouth daily Indications: Anticoagulant Therapy Take with food.    Yes Historical Provider, MD   cyclobenzaprine (FLEXERIL) 5 MG tablet Take 1 tablet by mouth 3 times daily as 01/29/21  1130 01/30/21  1705 01/31/21  0620    136 141   K 4.7 3.8 3.8    101 104   CO2 22* 31 29   BUN 19 22 23*   CREATININE 1.2 1.3* 1.3*   CALCIUM 8.0* 7.7* 7.9*     Recent Labs     01/29/21  0230 01/30/21  0420 01/31/21  0620   AST 27 23 26   ALT 29 24 22   BILIDIR 0.4* 0.3 <0.2   BILITOT 0.9 0.6 0.3   ALKPHOS 93 103 111     Recent Labs     01/29/21  0640 01/30/21  0420 01/31/21  0620   INR 1.20* 1.25* 1.13     No results for input(s): CKTOTAL, TROPONINI in the last 72 hours. Radiology reports-images reviewed in PACS  Xr Chest (2 Vw)    Result Date: 1/17/2021  PROCEDURE: XR CHEST (2 VW) CLINICAL INFORMATION: Found on the floor COMPARISON: 8/20/2016 TECHNIQUE:  AP and lateral chest x-ray  FINDINGS: There is at least moderate enlargement of the cardiomediastinal silhouette. There is evidence of prior median sternotomy. There is diffuse osteopenia noted. Mild focal eventration of the right hemidiaphragm is seen. No focal consolidation or pleural effusion is seen. No pneumothorax is demonstrated. 1. Moderate enlargement of the cardiomediastinal silhouette. 2. There is hyperinflation noted. 2. No focal consolidation, pleural effusion or pneumothorax is seen. **This report has been created using voice recognition software. It may contain minor errors which are inherent in voice recognition technology. ** Final report electronically signed by Dr. Severo Lain on 1/17/2021 3:54 PM    Xr Pelvis (1-2 Views)    Result Date: 1/17/2021  PROCEDURE: XR PELVIS (1-2 VIEWS) CLINICAL INFORMATION: Fall, weakness of left leg COMPARISON: 1/5/2021 TECHNIQUE:  AP pelvis single view  FINDINGS: The visualized bilateral obturator rings appear intact. There is spurring noted along the superior-lateral left acetabulum. No acute fractures identified. The femoral head contours appear intact bilaterally. There is increased sclerosis along the acetabular roofs bilaterally. 1. No acute fracture or malalignment. Degenerative changes are as described above. **This report has been created using voice recognition software. It may contain minor errors which are inherent in voice recognition technology. ** Final report electronically signed by Dr. Bia Macdonald on 1/17/2021 3:56 PM    Xr Femur Left (min 2 Views)    Result Date: 1/17/2021  PROCEDURE: XR FEMUR LEFT (MIN 2 VIEWS) CLINICAL INFORMATION: Fall, weakness of left leg COMPARISON: No prior study. TECHNIQUE:  Right femur 4 views  FINDINGS: There is spurring along the inferior pole of the articular surface of the patella. No acute fracture or malalignment is seen. The left femoral head contour appears intact. The visualized left obturator ring appears intact. Enthesophytes are noted along the greater trochanter. There is mild spurring along the superior-lateral acetabulum. There is increased sclerosis along the left acetabular roof. 1. No acute fracture or malalignment. Degenerative changes are as described above. **This report has been created using voice recognition software. It may contain minor errors which are inherent in voice recognition technology. ** Final report electronically signed by Dr. Bia Macdonald on 1/17/2021 3:50 PM    Ct Head Wo Contrast    Result Date: 1/17/2021  PROCEDURE: CT HEAD WO CONTRAST CLINICAL INFORMATION: fall, ams COMPARISON: 8/17/2015 TECHNIQUE:  Axial CT images were obtained through the head without contrast. Coronal and sagittal reformatted images were rendered. All CT scans at this facility use dose modulation, iterative reconstruction, and/or weight-based dosing when appropriate to reduce radiation dose to as low as reasonably achievable. 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 There is moderate diffuse atrophy. No acute intracranial hemorrhage or mass effect is seen. There is no midline shift. The basal cisterns and posterior fossa appear otherwise unremarkable.  Parasellar carotid artery vascular calcifications are noted. The visualized paranasal sinuses and mastoid air cells appear clear. No acute abnormalities of the calvarium are seen. The visualized globes and orbits appear grossly intact. 1. No acute intracranial hemorrhage or mass effect. 2. Moderate diffuse atrophy is demonstrated. 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 **This report has been created using voice recognition software. It may contain minor errors which are inherent in voice recognition technology. ** Final report electronically signed by Dr. Jose Luis Feliz on 1/17/2021 4:17 PM    Ct Chest Wo Contrast    Result Date: 1/17/2021  Noncontrast CT of the chest Technique: Axial CT images from the lung apices through the adrenal glands, no contrast.  Sagittal and coronal reconstruction images provided. Comparison:  CR,SR  - XR CHEST PORTABLE  - 01/17/2021 07:29 PM EST Findings: Mild atherosclerotic vessel disease within the thoracic aorta. Normal caliber thoracic aorta. No aneurysm. No mediastinal or axillary adenopathy. Normal thyroid. Moderate right pleural effusion. Associated volume loss. Mild opacity right lung base. This could represent mild pneumonia. No pulmonary nodules. No pneumothorax. Normal bones. Cholelithiasis present. No adjacent inflammation. A small hiatal hernia. Impression: Moderate right pleural effusion. Associated volume loss. Mild opacity right lung base. This could represent mild pneumonia. This document has been electronically signed by: Molly Peterson MD on 01/17/2021 09:10 PM All CT scans at this facility use dose modulation, iterative reconstruction, and/or weight-based dosing when appropriate to reduce radiation dose to as low as reasonably achievable. Ct Cervical Spine Wo Contrast    Result Date: 1/17/2021  PROCEDURE: CT CERVICAL SPINE WO CONTRAST CLINICAL INFORMATION: fall COMPARISON: No prior study.  TECHNIQUE:  Axial CT images were obtained through the cervical spine without contrast. Coronal and sagittal reformatted images were rendered. All CT scans at this facility use dose modulation, iterative reconstruction, and/or weight-based dosing when appropriate to reduce radiation dose to as low as reasonably achievable. FINDINGS: Evaluation is slightly limited due to motion artifact. No prevertebral soft tissue swelling is seen. There is normal alignment at the craniocervical junction. The dens appears intact. The dens interval appears within normal limits. The facets align normally. The spinous processes appear intact. Multilevel degenerative disc disease is demonstrated, most pronounced at the C4-C5 and C6-C7 levels where there is disc space narrowing, endplate sclerosis and hypertrophic endplate change. Multilevel uncovertebral and facet hypertrophy is demonstrated. There is mild to moderate right neural foraminal narrowing at C3-C4. Moderate bilateral neural foraminal narrowing at C4-C5 is noted. There is mild to moderate neural foraminal narrowing on the right at C5-C6. Moderate bilateral neural foraminal narrowing at C6-C7 is noted. The visualized lung apices appear unremarkable. 1. Slightly limited evaluation due to motion artifact. However, no definite acute fracture of the cervical spine is seen. 2. Straightening of the normal cervical lordosis is demonstrated. This may be related to muscle spasm. 3. Multilevel degenerative changes of the cervical spine are demonstrated. **This report has been created using voice recognition software. It may contain minor errors which are inherent in voice recognition technology. ** Final report electronically signed by Dr. Sb Troy on 1/17/2021 4:21 PM    Xr Chest Portable    Result Date: 1/26/2021  1 view chest x-ray. Comparison: 1/22/2021 Findings: Right central line unchanged. Bilateral consolidation has worsened somewhat. Cardiomegaly. No bony abnormality.      Impression: Worsening bilateral consolidation. This document has been electronically signed by: Constance Frank MD on 01/26/2021 10:04 PM     Xr Chest Portable    Result Date: 1/22/2021  1 view chest x-ray Comparison:  01/22/2021 04:33 PM EST, 01/22/2021 10:47 PM EST Findings: Incomplete visualization of the lung apices and the lateral aspect of the right lung. Poor evaluation of lung parenchyma secondary to technique. No consolidative process within visualized portions. Mild cardiomegaly. Prior sternotomy. No acute fracture. Central venous line at the cavoatrial junction. The nasogastric tube is at the level of the left mainstem bronchus. Recommend removal and repositioning. This document has been electronically signed by: Jos Castañeda MD on 01/22/2021 11:31 PM     Xr Chest Portable    Result Date: 1/22/2021  PROCEDURE: XR CHEST PORTABLE CLINICAL INFORMATION: suspected aspiration. COMPARISON: Chest radiograph dated 1/17/2021. TECHNIQUE: AP upright view of the chest. FINDINGS: There are stable median sternotomy wires and mediastinal surgical clips. There is stable right internal jugular catheter with tip in the superior vena cava. There is stable tapering opacity at the right infrahilar region compared to prior radiographs which corresponded to a pleural effusion on prior CT. The lungs otherwise appear clear. The cardiomediastinal silhouette is stable with mild cardiomegaly. As osseous structures appear grossly intact. Stable right basilar opacity. **This report has been created using voice recognition software. It may contain minor errors which are inherent in voice recognition technology. ** Final report electronically signed by Dr. Rosalva Bowen MD on 1/22/2021 4:55 PM    Xr Chest Portable    Result Date: 1/17/2021  Single view of the chest Comparison:  CR,SR  - XR CHEST STANDARD (2 VW)  - 01/17/2021 03:32 PM EST Findings: Mild right infrahilar opacity.   This could represent volume loss versus mild pneumonia. Cardiomegaly, no CHF. Right central line present with the tip in the mid superior vena cava. Status post median sternotomy. Impression: Cardiomegaly, no CHF. Mild right infrahilar opacity. This could represent mild pneumonia versus volume loss. This document has been electronically signed by: Rick Velasquez MD on 01/17/2021 09:11 PM     Xr Abdomen For Ng/og/ne Tube Placement    Result Date: 1/22/2021  ** ADDENDUM #1 ** This report was discussed with Yeny Tavares RN on Jan 22, 2021 23:28:00 EST. This document has been electronically signed by: Slade Fisher on 01/22/2021 11:28 PM ** ORIGINAL REPORT ** 1 view abdomen Comparison:  CR,SR  - XR CHEST PORTABLE  - 01/22/2021 10:47 PM EST Findings: No pneumoperitoneum or pneumatosis. No abnormal calcifications. No acute fractures. Status post sternotomy. 1. No visible nasogastric tube on the abdominal x-ray. On correlation with the concurrent chest x-ray, the nasogastric tube is at the level of the left mainstem bronchus. Recommend removal and repositioning. 2. Extensive gaseous distention of bowel suggesting possible ileus. This document has been electronically signed by: Mirian Ivy MD on 01/22/2021 11:22 PM     Mri Brain Wo Contrast    Result Date: 1/19/2021  Noncontrast MRI of the brain. Technique: Sagittal T1, coronal T2, axial T1, T2, FLAIR and diffusion-weighted imaging. Comparison:  CT,SR  - CT HEAD WO CONTRAST  - 01/17/2021 03:49 PM EST Findings: Normal signal on diffusion-weighted imaging. No evidence for acute ischemic event. Moderate nonspecific periventricular deep white matter disease. This most often can be ascribed to chronic small vessel ischemic change. Prominent central and cortical volume loss. Old ischemic event left temporal lobe. There are no intracranial masses. No evidence for hemorrhage. The ventricles are normal size, no hydrocephalus.  Contents of the posterior fossa including brainstem and cerebellum are unremarkable. Normal vascular flow voids. Normal aeration of paranasal sinuses. Impression: Moderate nonspecific periventricular deep white matter disease. This most often can be ascribed to chronic small vessel ischemic change. Prominent central and cortical volume loss. Old ischemic event left temporal lobe. This document has been electronically signed by: Alejandra Bradley MD on 01/19/2021 12:59 AM     Xr Hip 2-3 Vw W Pelvis Left    Result Date: 1/5/2021  PROCEDURE: XR HIP 2-3 VW W PELVIS LEFT CLINICAL INFORMATION: pain . COMPARISON: No prior study. TECHNIQUE: AP pelvis AP and frog-leg projections of the left hip. FINDINGS: No acute fracture or bone destruction. Hip joint spaces are preserved. SI joints are preserved. Bones are osteopenic and some mild degenerative changes seen in both iliac wings. Vascular calcifications are noted. No acute abnormality. **This report has been created using voice recognition software. It may contain minor errors which are inherent in voice recognition technology. ** Final report electronically signed by Dr. Nasrin Riggins on 1/5/2021 11:35 AM      Electronically signed by Jabari Iniguez DO on 1/31/2021 at 8:30 AM

## 2021-01-31 NOTE — PLAN OF CARE
Patient continues on breathing txs and percussion tolerating well.   Will continue to monitor patients respiratory status

## 2021-02-01 LAB
ALBUMIN SERPL-MCNC: 2.2 G/DL (ref 3.5–5.1)
ALP BLD-CCNC: 113 U/L (ref 38–126)
ALT SERPL-CCNC: 23 U/L (ref 11–66)
ANION GAP SERPL CALCULATED.3IONS-SCNC: 6 MEQ/L (ref 8–16)
AST SERPL-CCNC: 26 U/L (ref 5–40)
BASOPHILS # BLD: 0.4 %
BASOPHILS ABSOLUTE: 0 THOU/MM3 (ref 0–0.1)
BILIRUB SERPL-MCNC: 0.3 MG/DL (ref 0.3–1.2)
BILIRUBIN DIRECT: < 0.2 MG/DL (ref 0–0.3)
BUN BLDV-MCNC: 21 MG/DL (ref 7–22)
CALCIUM IONIZED: 1.14 MMOL/L (ref 1.12–1.32)
CALCIUM SERPL-MCNC: 7.8 MG/DL (ref 8.5–10.5)
CHLORIDE BLD-SCNC: 102 MEQ/L (ref 98–111)
CO2: 31 MEQ/L (ref 23–33)
CREAT SERPL-MCNC: 1.3 MG/DL (ref 0.4–1.2)
EOSINOPHIL # BLD: 2.4 %
EOSINOPHILS ABSOLUTE: 0.2 THOU/MM3 (ref 0–0.4)
ERYTHROCYTE [DISTWIDTH] IN BLOOD BY AUTOMATED COUNT: 19.3 % (ref 11.5–14.5)
ERYTHROCYTE [DISTWIDTH] IN BLOOD BY AUTOMATED COUNT: 64.1 FL (ref 35–45)
GFR SERPL CREATININE-BSD FRML MDRD: 52 ML/MIN/1.73M2
GLUCOSE BLD-MCNC: 124 MG/DL (ref 70–108)
HCT VFR BLD CALC: 24.8 % (ref 42–52)
HEMOGLOBIN: 7.5 GM/DL (ref 14–18)
IMMATURE GRANS (ABS): 0.03 THOU/MM3 (ref 0–0.07)
IMMATURE GRANULOCYTES: 0.4 %
INR BLD: 1.13 (ref 0.85–1.13)
LYMPHOCYTES # BLD: 5.3 %
LYMPHOCYTES ABSOLUTE: 0.4 THOU/MM3 (ref 1–4.8)
MCH RBC QN AUTO: 28.2 PG (ref 26–33)
MCHC RBC AUTO-ENTMCNC: 30.2 GM/DL (ref 32.2–35.5)
MCV RBC AUTO: 93.2 FL (ref 80–94)
MONOCYTES # BLD: 10.2 %
MONOCYTES ABSOLUTE: 0.8 THOU/MM3 (ref 0.4–1.3)
NUCLEATED RED BLOOD CELLS: 0 /100 WBC
PLATELET # BLD: 145 THOU/MM3 (ref 130–400)
PMV BLD AUTO: 11 FL (ref 9.4–12.4)
POTASSIUM SERPL-SCNC: 3.8 MEQ/L (ref 3.5–5.2)
RBC # BLD: 2.66 MILL/MM3 (ref 4.7–6.1)
SEG NEUTROPHILS: 81.3 %
SEGMENTED NEUTROPHILS ABSOLUTE COUNT: 6 THOU/MM3 (ref 1.8–7.7)
SODIUM BLD-SCNC: 139 MEQ/L (ref 135–145)
TOTAL PROTEIN: 5.1 G/DL (ref 6.1–8)
WBC # BLD: 7.4 THOU/MM3 (ref 4.8–10.8)

## 2021-02-01 PROCEDURE — 6370000000 HC RX 637 (ALT 250 FOR IP): Performed by: INTERNAL MEDICINE

## 2021-02-01 PROCEDURE — 6370000000 HC RX 637 (ALT 250 FOR IP): Performed by: STUDENT IN AN ORGANIZED HEALTH CARE EDUCATION/TRAINING PROGRAM

## 2021-02-01 PROCEDURE — 97116 GAIT TRAINING THERAPY: CPT

## 2021-02-01 PROCEDURE — 99232 SBSQ HOSP IP/OBS MODERATE 35: CPT | Performed by: INTERNAL MEDICINE

## 2021-02-01 PROCEDURE — 2580000003 HC RX 258: Performed by: STUDENT IN AN ORGANIZED HEALTH CARE EDUCATION/TRAINING PROGRAM

## 2021-02-01 PROCEDURE — C9113 INJ PANTOPRAZOLE SODIUM, VIA: HCPCS | Performed by: NURSE PRACTITIONER

## 2021-02-01 PROCEDURE — 2580000003 HC RX 258: Performed by: PHYSICIAN ASSISTANT

## 2021-02-01 PROCEDURE — 51798 US URINE CAPACITY MEASURE: CPT

## 2021-02-01 PROCEDURE — 97110 THERAPEUTIC EXERCISES: CPT

## 2021-02-01 PROCEDURE — 6360000002 HC RX W HCPCS: Performed by: PHYSICIAN ASSISTANT

## 2021-02-01 PROCEDURE — 6370000000 HC RX 637 (ALT 250 FOR IP): Performed by: NURSE PRACTITIONER

## 2021-02-01 PROCEDURE — 94640 AIRWAY INHALATION TREATMENT: CPT

## 2021-02-01 PROCEDURE — 6360000002 HC RX W HCPCS: Performed by: NURSE PRACTITIONER

## 2021-02-01 PROCEDURE — 2700000000 HC OXYGEN THERAPY PER DAY

## 2021-02-01 PROCEDURE — 85025 COMPLETE CBC W/AUTO DIFF WBC: CPT

## 2021-02-01 PROCEDURE — 85610 PROTHROMBIN TIME: CPT

## 2021-02-01 PROCEDURE — 82248 BILIRUBIN DIRECT: CPT

## 2021-02-01 PROCEDURE — 80053 COMPREHEN METABOLIC PANEL: CPT

## 2021-02-01 PROCEDURE — 1200000003 HC TELEMETRY R&B

## 2021-02-01 PROCEDURE — 36415 COLL VENOUS BLD VENIPUNCTURE: CPT

## 2021-02-01 PROCEDURE — 82330 ASSAY OF CALCIUM: CPT

## 2021-02-01 RX ADMIN — IPRATROPIUM BROMIDE AND ALBUTEROL SULFATE 1 AMPULE: .5; 3 SOLUTION RESPIRATORY (INHALATION) at 16:36

## 2021-02-01 RX ADMIN — TAMSULOSIN HYDROCHLORIDE 0.4 MG: 0.4 CAPSULE ORAL at 09:25

## 2021-02-01 RX ADMIN — PIPERACILLIN AND TAZOBACTAM 3375 MG: 3; .375 INJECTION, POWDER, LYOPHILIZED, FOR SOLUTION INTRAVENOUS at 11:00

## 2021-02-01 RX ADMIN — Medication 3 MG: at 21:36

## 2021-02-01 RX ADMIN — ASPIRIN 81 MG: 81 TABLET, COATED ORAL at 09:27

## 2021-02-01 RX ADMIN — PIPERACILLIN AND TAZOBACTAM 3375 MG: 3; .375 INJECTION, POWDER, LYOPHILIZED, FOR SOLUTION INTRAVENOUS at 21:38

## 2021-02-01 RX ADMIN — SODIUM CHLORIDE, PRESERVATIVE FREE 10 ML: 5 INJECTION INTRAVENOUS at 21:36

## 2021-02-01 RX ADMIN — IPRATROPIUM BROMIDE AND ALBUTEROL SULFATE 1 AMPULE: .5; 3 SOLUTION RESPIRATORY (INHALATION) at 13:25

## 2021-02-01 RX ADMIN — PIPERACILLIN AND TAZOBACTAM 3375 MG: 3; .375 INJECTION, POWDER, LYOPHILIZED, FOR SOLUTION INTRAVENOUS at 02:46

## 2021-02-01 RX ADMIN — SODIUM CHLORIDE, PRESERVATIVE FREE 10 ML: 5 INJECTION INTRAVENOUS at 10:09

## 2021-02-01 RX ADMIN — IPRATROPIUM BROMIDE AND ALBUTEROL SULFATE 1 AMPULE: .5; 3 SOLUTION RESPIRATORY (INHALATION) at 20:42

## 2021-02-01 RX ADMIN — IPRATROPIUM BROMIDE AND ALBUTEROL SULFATE 1 AMPULE: .5; 3 SOLUTION RESPIRATORY (INHALATION) at 09:05

## 2021-02-01 RX ADMIN — PANTOPRAZOLE SODIUM 40 MG: 40 INJECTION, POWDER, FOR SOLUTION INTRAVENOUS at 09:26

## 2021-02-01 RX ADMIN — DOCUSATE SODIUM 100 MG: 50 LIQUID ORAL at 09:27

## 2021-02-01 RX ADMIN — POLYETHYLENE GLYCOL 3350 17 G: 17 POWDER, FOR SOLUTION ORAL at 09:26

## 2021-02-01 RX ADMIN — DOCUSATE SODIUM 100 MG: 50 LIQUID ORAL at 21:43

## 2021-02-01 RX ADMIN — Medication 1 CAPSULE: at 09:26

## 2021-02-01 RX ADMIN — PRAVASTATIN SODIUM 40 MG: 40 TABLET ORAL at 21:36

## 2021-02-01 RX ADMIN — PANTOPRAZOLE SODIUM 40 MG: 40 INJECTION, POWDER, FOR SOLUTION INTRAVENOUS at 21:36

## 2021-02-01 RX ADMIN — SODIUM CHLORIDE, PRESERVATIVE FREE 10 ML: 5 INJECTION INTRAVENOUS at 21:37

## 2021-02-01 ASSESSMENT — PAIN SCALES - GENERAL: PAINLEVEL_OUTOF10: 0

## 2021-02-01 ASSESSMENT — PAIN SCALES - WONG BAKER
WONGBAKER_NUMERICALRESPONSE: 0

## 2021-02-01 NOTE — PROGRESS NOTES
[facility]   Nephrology progress note  2/1/2021, 11:50 AM      Pt Name:    Bhavesh Aguillon  MRN:     [id number]     YOB: 1932  Admit Date:    1/17/2021  2:31 PM  Primary Care Physician:  Hilario Bowen DO   Room number  2U-86/125-E    Chief Complaint: Nephrology was following for hyponatremia but now for recurrent ANNIKA  Subjective:  Patient seen and examined  Comfortable   No specific complaints  Has a Duarte catheter in place  Discussed with RN  Patient is resting comfortably    Objective:  24HR INTAKE/OUTPUT:      Intake/Output Summary (Last 24 hours) at 2/1/2021 1150  Last data filed at 2/1/2021 0518  Gross per 24 hour   Intake 240 ml   Output 1075 ml   Net -835 ml     I/O last 3 completed shifts: In: 480 [NG/GT:480]  Out: 2025 [Urine:2025]  No intake/output data recorded. Admission weight: 162 lb (73.5 kg)  Wt Readings from Last 3 Encounters:   02/01/21 180 lb 6.4 oz (81.8 kg)   01/06/21 162 lb (73.5 kg)   01/05/21 160 lb (72.6 kg)     Body mass index is 30.97 kg/m².     Physical examination  VITALS:     Vitals:    01/31/21 2315 02/01/21 0302 02/01/21 0348 02/01/21 0920   BP: 129/61 (!) 120/57  120/61   Pulse: 67 58  58   Resp: 24 20  18   Temp: 98.8 °F (37.1 °C) 98.6 °F (37 °C)  98.6 °F (37 °C)   TempSrc: Oral Oral  Oral   SpO2: 95% 96%  96%   Weight:   180 lb 6.4 oz (81.8 kg)    Height:         Limited examination done  General Appearance: appears comfortable, no distress  Neck: No JVD noted  Lungs:  no use of accessory muscles, no labored breathing  Ext: Trace lower extremity edema      Lab Data  CBC:   Recent Labs     01/30/21  0420 01/31/21  0620 02/01/21  0550   WBC 11.1* 8.1 7.4   HGB 7.9* 7.4* 7.5*   HCT 25.3* 24.0* 24.8*    155 145     BMP:  Recent Labs     01/30/21  1705 01/31/21  0620 02/01/21  0550    141 139   K 3.8 3.8 3.8    104 102   CO2 31 29 31   BUN 22 23* 21   CREATININE 1.3* 1.3* 1.3*   GLUCOSE 121* 130* 124*   CALCIUM 7.7* 7.9* 7.8*     Hepatic:   Recent Labs     01/30/21  0420 01/31/21  0620 02/01/21  0550   LABALBU 2.5* 2.4* 2.2*   AST 23 26 26   ALT 24 22 23   BILITOT 0.6 0.3 0.3   ALKPHOS 103 111 113         Meds:  Infusion:    sodium chloride Stopped (01/28/21 2215)     Meds:    pantoprazole  40 mg Intravenous BID    polyethylene glycol  17 g Oral Daily    sodium phosphate  1 enema Rectal Once    docusate  100 mg Oral BID    [Held by provider] furosemide  40 mg Intravenous Daily    piperacillin-tazobactam  3,375 mg Intravenous Q8H    ipratropium-albuterol  1 ampule Inhalation Q4H WA    pravastatin  40 mg PEG Tube Nightly    lidocaine 1 % injection  5 mL Intradermal Once    sodium chloride flush  10 mL Intravenous 2 times per day    sodium chloride flush  10 mL Intravenous 2 times per day    [Held by provider] enoxaparin  80 mg Subcutaneous Q12H    melatonin  3 mg Oral Nightly    calcium replacement protocol   Other RX Placeholder    phosphorus replacement protocol   Other RX Placeholder    lactobacillus  1 capsule Oral Daily with breakfast    aspirin  81 mg Oral Daily    tamsulosin  0.4 mg Oral Daily     Meds prn: sodium chloride, sodium chloride flush, sodium chloride flush, promethazine **OR** ondansetron, morphine, potassium chloride **OR** potassium alternative oral replacement **OR** potassium chloride, potassium chloride, potassium chloride, acetaminophen **OR** acetaminophen, cyclobenzaprine, magnesium sulfate, mupirocin       Impression and Plan:  1. Acute Kidney injury. Creatinine 1.3 and overall stable  Okay to remove Duarte catheter and give voiding trial  Check labs in a.m. 2.  Recent hyponatremia. Resolved  3. Rhabdomyolysis. Resolved  4. Status post feeding tube placement  5. Leukocytosis and low-grade fever. On IV antibiotics. WBC improving  6. Anemia  7. Peripheral edema. Diuretics if needed  8.   Okay to remove Duarte catheter and give voiding trial      Pari Fulton MD  [facility]

## 2021-02-01 NOTE — PROGRESS NOTES
Hospitalist      Patient:  Stafford Bernheim    Unit/Bed:6K-22/022-A  YOB: 1932  MRN: [id number]   Acct: [de-identified]     PCP: Carly Marin DO  Date of Admission: 1/17/2021        Assessment and Plan:        1. Decreased urine output notified nephrology will order serum osmolality start free water via PEG tube per dietitian's recommendation  2. Elevated WBC: We will obtain blood cultures  3. Worsening bilateral consolidation we will obtain a CT scan without contrast to evaluate may need to consider hospital-acquired pneumonia versus aspiration we will obtain blood cultures. 4. Dysphagia PEG tube placed patient is at goal with tube feeds will need free water with PEG tube feedings will discuss with dietitian  5. Chronic atrial fibrillation with SVR we will resume Coumadin and have pharmacy dose  6. Normocytic anemia hemoglobin currently at 7.3 we will type and screen  7. Chronic kidney disease stage III creatinine slightly elevated will discuss with nephrology will obtain a serum osmolality  8. Central line needs to be removed and replaced with peripheral IV or PICC line  9. Malnutrition: Nutritional status is poor PEG tube feedings are infusing without residuals,     1.28.2021 patient to undergo thoracentesis, PICC line placement, removal of IJ. Cultures to date are no growth. Will transfuse to keep hemoglobin greater then 7    1.29.2021 events noted during the night of dark brown emesis in color, will hold Coumadin dosing and hold Lovenox at this time, patient on Protonix twice daily, Will transfuse 25 g of albumin and follow it with Lasix 40 mg. No Bowel movements per nursing will start MiraLAX daily and Colace twice daily. Will resume tube feedings per protocol and check residuals. 1.30.2021 fecal occult blood positive, hemoglobin today is 7.9, Lovenox on hold along with Coumadin. Head of bed needs to be at 30 degrees.   Renal has added Lasix 40 mg IV daily for 3 days    1.31.2021 hemoglobin 7.4 today down to 1 g and 48 hours, WBC 8.1, will obtain chest x-ray to evaluate pneumonia. All anticoagulation has been on hold, may need to transfuse 1 unit of packed RBCs. Tolerating tube feeds, creatinine at 1.3, total protein 5.1 and albumin 2.4    2.1.2021 we consulted dietary to evaluate the addition of a protein powder/liquid to the current Jevity 1.5 at 50 mL's per hour. Patient's total protein has stabilized but his albumin continues to drop. Duarte has been removed with a voiding trial.  Creatinine is stabilized at 1.3 and hemoglobin has stabilized at 7.5 consider restarting Coumadin, will consult pharmacy. CC: Fall    HPI: 55-year-old  male presents to [facility] via EMS as after sustaining an unwitnessed fall from a standing position. He was found on the ground and unknown length of time. Per son reports that patient was progressively more weak over the last few weeks and acting like himself. On the day of the arrival son states they were unable to get a hold of him therefore they went to his house where they found him on the bathroom floor. 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 While in the emergency room the patient was found to have hyponatremia and elevated CPK there is mild opacity in the right lung base patient was Covid positive at that time    ROS (14 point review of systems completed. Pertinent positives noted.  Otherwise ROS is negative) : Poor historian    PMH:  Per HPI and       Diagnosis Date    Actinic keratoses 8/21/2013    Adenomatous polyp 2008    Atrial fibrillation ([facility] [address]     CAD (coronary artery disease) 2009    Carotid artery disease ([facility] [address] 2009 left    Hyperlipidemia 2004    Hypertension 2004    Inguinal hernia, left 2014    Unspecified cerebral artery occlusion with cerebral infarction      SHX:        Procedure Laterality Date    ABDOMEN SURGERY      ABDOMINAL ADHESION SURGERY  2012    Dr Yue Waite REMOVAL  2004    right    COLONOSCOPY      [facility]    CORONARY ARTERY BYPASS GRAFT  2009    SMALL INTESTINE SURGERY      SMALL INTESTINE SURGERY  2015    Exploratory Laparotomy    UPPER GASTROINTESTINAL ENDOSCOPY N/A 2021    EGD ESOPHAGOGASTRODUODENOSCOPY PEG TUBE INSERTION performed by Ed Teague MD at [facility]     FHX:       Problem Relation Age of Onset    Cancer Mother      [facility]:   Social History     Socioeconomic History    Marital status:      Spouse name: None    Number of children: None    Years of education: None    Highest education level: None   Occupational History    None   Social Needs    Financial resource strain: None    Food insecurity     Worry: None     Inability: None    Transportation needs     Medical: None     Non-medical: None   Tobacco Use    Smoking status: Former Smoker     Quit date: 1980     Years since quittin.1    Smokeless tobacco: Never Used   Substance and Sexual Activity    Alcohol use:  Yes     Alcohol/week: 1.0 standard drinks     Types: 1 Cans of beer per week     Comment: occasionally    Drug use: No    Sexual activity: None   Lifestyle    Physical activity     Days per week: None     Minutes per session: None    Stress: None   Relationships    Social connections     Talks on phone: None     Gets together: None     Attends Gnosticist service: None     Active member of club or organization: None     Attends meetings of clubs or organizations: None     Relationship status: None    Intimate partner violence     Fear of current or ex partner: None     Emotionally abused: None     Physically abused: None     Forced sexual activity: None   Other Topics Concern    None   Social History Narrative    None      Allergies: Patient has no known allergies. Medications:     sodium chloride Stopped (01/28/21 7775)      pantoprazole  40 mg Intravenous BID    polyethylene glycol  17 g Oral Daily    sodium phosphate  1 enema Rectal Once    docusate  100 mg Oral BID    [Held by provider] furosemide  40 mg Intravenous Daily    piperacillin-tazobactam  3,375 mg Intravenous Q8H    ipratropium-albuterol  1 ampule Inhalation Q4H WA    pravastatin  40 mg PEG Tube Nightly    lidocaine 1 % injection  5 mL Intradermal Once    sodium chloride flush  10 mL Intravenous 2 times per day    sodium chloride flush  10 mL Intravenous 2 times per day    [Held by provider] enoxaparin  80 mg Subcutaneous Q12H    melatonin  3 mg Oral Nightly    calcium replacement protocol   Other RX Placeholder    phosphorus replacement protocol   Other RX Placeholder    lactobacillus  1 capsule Oral Daily with breakfast    aspirin  81 mg Oral Daily    tamsulosin  0.4 mg Oral Daily     Prior to Admission medications    Medication Sig Start Date End Date Taking? Authorizing Provider   hydrALAZINE (APRESOLINE) 25 MG tablet Take 1 tablet by mouth 3 times daily 12/12/20  Yes Josue Driscoll PA-C   tamsulosin [facility]) 0.4 MG capsule Take 1 capsule by mouth daily 1/16/20  Yes Maribel Willoughby MD   warfarin (COUMADIN) 3 MG tablet Take 1 tablet by mouth See Admin Instructions Indications: Anticoagulant Therapy, Atrial Fibrillation [facility] coumadin clinic to dose, #45=30 days supply 9/19/17  Yes Lisa Sapp MD   furosemide (LASIX) 20 MG tablet Take 1 tablet by mouth daily 4/4/17  Yes Charissa Nobles MD   pantoprazole (PROTONIX) 40 MG tablet Take 1 tablet by mouth 2 times daily (before meals). 3/1/15  Yes ROBBIE Tom - CNP   pravastatin (PRAVACHOL) 40 MG tablet Take 40 mg by mouth every evening.  Indications: Blood Cholesterol Abnormal   Yes Historical Provider, MD   aspirin 81 MG EC tablet Take 81 mg by mouth daily Indications: Anticoagulant Therapy Take with food. Yes Historical Provider, MD   cyclobenzaprine (FLEXERIL) 5 MG tablet Take 1 tablet by mouth 3 times daily as needed for Muscle spasms  Patient not taking: Reported on 1/6/2021 1/5/21   Lucia Desai PA-C      PHYSICAL EXAM:    BP (!) 130/56   Pulse 68   Temp 98.3 °F (36.8 °C) (Oral)   Resp 18   Ht 5' 4\" (1.626 m)   Wt 180 lb 6.4 oz (81.8 kg)   SpO2 92%   BMI 30.97 kg/m²     General appearance:  No apparent distress, appears stated age and cooperative. Sitting comfortably in chair  HEENT:  Normal cephalic, atraumatic without obvious deformity. Pupils equal, round, and reactive to light. Extra ocular muscles intact. Conjunctivae/corneas clear. Neck: Supple, with full range of motion. no jugular venous distention. Trachea midline. no carotid bruits  Respiratory:  Normal respiratory effort. Clear to auscultation, upper airway wheezes, bases markedly decreased breath sounds right more so than the left no crackles or rails heard at this time  Cardiovascular:  Regular rate and rhythm with normal S1/S2 without murmurs, rubs or gallops. PMI non displaced  Abdomen: Soft, non-tender, non-distended with normal bowel sounds. No guarding, rebound. Musculoskeletal:  No clubbing, cyanosis. Trace pretibial edema right greater than left calves are soft full range of motion without deformity. Skin: Skin color, texture, turgor normal.  No rashes or lesions, or suspicious lesions. Neurologic:  Neurovascularly intact without any focal sensory/motor deficits.  Cranial nerves: II-XII intact, grossly non-focal.  Psychiatric:  Alert and oriented, thought content appropriate, normal insight  Capillary Refill: Brisk,< 2 seconds   Peripheral Pulses: +2 palpable, equal bilaterally upper and lower extremities  Lymphatics: no lymphadenopathy    Data: (All radiographs, tracings, PFTs, and imaging are personally viewed and interpreted unless otherwise noted).    Recent Labs     01/30/21  0420 01/31/21  0620 02/01/21  0550   WBC 11.1* 8.1 7.4   HGB 7.9* 7.4* 7.5*   HCT 25.3* 24.0* 24.8*    155 145     Recent Labs     01/30/21  1705 01/31/21  0620 02/01/21  0550    141 139   K 3.8 3.8 3.8    104 102   CO2 31 29 31   BUN 22 23* 21   CREATININE 1.3* 1.3* 1.3*   CALCIUM 7.7* 7.9* 7.8*     Recent Labs     01/30/21  0420 01/31/21  0620 02/01/21  0550   AST 23 26 26   ALT 24 22 23   BILIDIR 0.3 <0.2 <0.2   BILITOT 0.6 0.3 0.3   ALKPHOS 103 111 113     Recent Labs     01/30/21  0420 01/31/21  0620 02/01/21  0550   INR 1.25* 1.13 1.13     No results for input(s): Antwan Ye in the last 72 hours. Radiology reports-images reviewed in PACS  Xr Chest (2 Vw)    Result Date: 1/17/2021  PROCEDURE: XR CHEST (2 VW) CLINICAL INFORMATION: Found on the floor COMPARISON: 8/20/2016 TECHNIQUE:  AP and lateral chest x-ray  FINDINGS: There is at least moderate enlargement of the cardiomediastinal silhouette. There is evidence of prior median sternotomy. There is diffuse osteopenia noted. Mild focal eventration of the right hemidiaphragm is seen. No focal consolidation or pleural effusion is seen. No pneumothorax is demonstrated. 1. Moderate enlargement of the cardiomediastinal silhouette. 2. There is hyperinflation noted. 2. No focal consolidation, pleural effusion or pneumothorax is seen. **This report has been created using voice recognition software. It may contain minor errors which are inherent in voice recognition technology. ** Final report electronically signed by Dr. Gina Sabillon on 1/17/2021 3:54 PM    Xr Pelvis (1-2 Views)    Result Date: 1/17/2021  PROCEDURE: XR PELVIS (1-2 VIEWS) CLINICAL INFORMATION: Fall, weakness of left leg COMPARISON: 1/5/2021 TECHNIQUE:  AP pelvis single view  FINDINGS: The visualized bilateral obturator rings appear intact.  There is spurring noted along the superior-lateral left acetabulum. No acute fractures identified. The femoral head contours appear intact bilaterally. There is increased sclerosis along the acetabular roofs bilaterally. 1. No acute fracture or malalignment. Degenerative changes are as described above. **This report has been created using voice recognition software. It may contain minor errors which are inherent in voice recognition technology. ** Final report electronically signed by Dr. Jarvis Gaucher on 1/17/2021 3:56 PM    Xr Femur Left (min 2 Views)    Result Date: 1/17/2021  PROCEDURE: XR FEMUR LEFT (MIN 2 VIEWS) CLINICAL INFORMATION: Fall, weakness of left leg COMPARISON: No prior study. TECHNIQUE:  Right femur 4 views  FINDINGS: There is spurring along the inferior pole of the articular surface of the patella. No acute fracture or malalignment is seen. The left femoral head contour appears intact. The visualized left obturator ring appears intact. Enthesophytes are noted along the greater trochanter. There is mild spurring along the superior-lateral acetabulum. There is increased sclerosis along the left acetabular roof. 1. No acute fracture or malalignment. Degenerative changes are as described above. **This report has been created using voice recognition software. It may contain minor errors which are inherent in voice recognition technology. ** Final report electronically signed by Dr. Jarvis Gaucher on 1/17/2021 3:50 PM    Ct Head Wo Contrast    Result Date: 1/17/2021  PROCEDURE: CT HEAD WO CONTRAST CLINICAL INFORMATION: fall, ams COMPARISON: 8/17/2015 TECHNIQUE:  Axial CT images were obtained through the head without contrast. Coronal and sagittal reformatted images were rendered. All CT scans at this facility use dose modulation, iterative reconstruction, and/or weight-based dosing when appropriate to reduce radiation dose to as low as reasonably achievable. 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  There is moderate diffuse atrophy. No acute intracranial hemorrhage or mass effect is seen. There is no midline shift. The basal cisterns and posterior fossa appear otherwise unremarkable. Parasellar carotid artery vascular calcifications are noted. The visualized paranasal sinuses and mastoid air cells appear clear. No acute abnormalities of the calvarium are seen. The visualized globes and orbits appear grossly intact. 1. No acute intracranial hemorrhage or mass effect. 2. Moderate diffuse atrophy is demonstrated. 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 **This report has been created using voice recognition software. It may contain minor errors which are inherent in voice recognition technology. ** Final report electronically signed by Dr. Refugio Santana on 1/17/2021 4:17 PM    Ct Chest Wo Contrast    Result Date: 1/17/2021  Noncontrast CT of the chest Technique: Axial CT images from the lung apices through the adrenal glands, no contrast.  Sagittal and coronal reconstruction images provided. Comparison:  CR,SR  - XR CHEST PORTABLE  - 01/17/2021 07:29 PM EST Findings: Mild atherosclerotic vessel disease within the thoracic aorta. Normal caliber thoracic aorta. No aneurysm. No mediastinal or axillary adenopathy. Normal thyroid. Moderate right pleural effusion. Associated volume loss. Mild opacity right lung base. This could represent mild pneumonia. No pulmonary nodules. No pneumothorax. Normal bones. Cholelithiasis present. No adjacent inflammation. A small hiatal hernia. Impression: Moderate right pleural effusion. Associated volume loss. Mild opacity right lung base. This could represent mild pneumonia.  This document has been electronically signed by: Charo Crawofrd MD on 01/17/2021 09:10 PM All CT scans at this facility use dose modulation, iterative reconstruction, and/or weight-based dosing when appropriate to reduce radiation dose to as low as reasonably achievable. Ct Cervical Spine Wo Contrast    Result Date: 1/17/2021  PROCEDURE: CT CERVICAL SPINE WO CONTRAST CLINICAL INFORMATION: fall COMPARISON: No prior study. TECHNIQUE:  Axial CT images were obtained through the cervical spine without contrast. Coronal and sagittal reformatted images were rendered. All CT scans at this facility use dose modulation, iterative reconstruction, and/or weight-based dosing when appropriate to reduce radiation dose to as low as reasonably achievable. FINDINGS: Evaluation is slightly limited due to motion artifact. No prevertebral soft tissue swelling is seen. There is normal alignment at the craniocervical junction. The dens appears intact. The dens interval appears within normal limits. The facets align normally. The spinous processes appear intact. Multilevel degenerative disc disease is demonstrated, most pronounced at the C4-C5 and C6-C7 levels where there is disc space narrowing, endplate sclerosis and hypertrophic endplate change. Multilevel uncovertebral and facet hypertrophy is demonstrated. There is mild to moderate right neural foraminal narrowing at C3-C4. Moderate bilateral neural foraminal narrowing at C4-C5 is noted. There is mild to moderate neural foraminal narrowing on the right at C5-C6. Moderate bilateral neural foraminal narrowing at C6-C7 is noted. The visualized lung apices appear unremarkable. 1. Slightly limited evaluation due to motion artifact. However, no definite acute fracture of the cervical spine is seen. 2. Straightening of the normal cervical lordosis is demonstrated. This may be related to muscle spasm. 3. Multilevel degenerative changes of the cervical spine are demonstrated. **This report has been created using voice recognition software. It may contain minor errors which are inherent in voice recognition technology. ** Final report electronically signed by Dr. Omkar Heard on 1/17/2021 4:21 PM    Xr Chest Portable    Result Date: 1/26/2021  1 view chest x-ray. Comparison: 1/22/2021 Findings: Right central line unchanged. Bilateral consolidation has worsened somewhat. Cardiomegaly. No bony abnormality. Impression: Worsening bilateral consolidation. This document has been electronically signed by: Meeta Su MD on 01/26/2021 10:04 PM     Xr Chest Portable    Result Date: 1/22/2021  1 view chest x-ray Comparison:  01/22/2021 04:33 PM EST, 01/22/2021 10:47 PM EST Findings: Incomplete visualization of the lung apices and the lateral aspect of the right lung. Poor evaluation of lung parenchyma secondary to technique. No consolidative process within visualized portions. Mild cardiomegaly. Prior sternotomy. No acute fracture. Central venous line at the cavoatrial junction. The nasogastric tube is at the level of the left mainstem bronchus. Recommend removal and repositioning. This document has been electronically signed by: Nakita Escalante MD on 01/22/2021 11:31 PM     Xr Chest Portable    Result Date: 1/22/2021  PROCEDURE: XR CHEST PORTABLE CLINICAL INFORMATION: suspected aspiration. COMPARISON: Chest radiograph dated 1/17/2021. TECHNIQUE: AP upright view of the chest. FINDINGS: There are stable median sternotomy wires and mediastinal surgical clips. There is stable right internal jugular catheter with tip in the superior vena cava. There is stable tapering opacity at the right infrahilar region compared to prior radiographs which corresponded to a pleural effusion on prior CT. The lungs otherwise appear clear. The cardiomediastinal silhouette is stable with mild cardiomegaly. As osseous structures appear grossly intact. Stable right basilar opacity. **This report has been created using voice recognition software. It may contain minor errors which are inherent in voice recognition technology. ** Final report electronically signed by Dr. Erik Neville MD on 1/22/2021 4:55 PM    Xr Chest Portable    Result Date: 1/17/2021  Single view of the chest Comparison:  CR,SR  - XR CHEST STANDARD (2 VW)  - 01/17/2021 03:32 PM EST Findings: Mild right infrahilar opacity. This could represent volume loss versus mild pneumonia. Cardiomegaly, no CHF. Right central line present with the tip in the mid superior vena cava. Status post median sternotomy. Impression: Cardiomegaly, no CHF. Mild right infrahilar opacity. This could represent mild pneumonia versus volume loss. This document has been electronically signed by: El Fisher MD on 01/17/2021 09:11 PM     Xr Abdomen For Ng/og/ne Tube Placement    Result Date: 1/22/2021  ** ADDENDUM #1 ** This report was discussed with Kiley Aguilar RN on Jan 22, 2021 23:28:00 EST. This document has been electronically signed by: Nelsy Chong on 01/22/2021 11:28 PM ** ORIGINAL REPORT ** 1 view abdomen Comparison:  CR,SR  - XR CHEST PORTABLE  - 01/22/2021 10:47 PM EST Findings: No pneumoperitoneum or pneumatosis. No abnormal calcifications. No acute fractures. Status post sternotomy. 1. No visible nasogastric tube on the abdominal x-ray. On correlation with the concurrent chest x-ray, the nasogastric tube is at the level of the left mainstem bronchus. Recommend removal and repositioning. 2. Extensive gaseous distention of bowel suggesting possible ileus. This document has been electronically signed by: Wallace Meckel, MD on 01/22/2021 11:22 PM     Mri Brain Wo Contrast    Result Date: 1/19/2021  Noncontrast MRI of the brain. Technique: Sagittal T1, coronal T2, axial T1, T2, FLAIR and diffusion-weighted imaging. Comparison:  CT,SR  - CT HEAD WO CONTRAST  - 01/17/2021 03:49 PM EST Findings: Normal signal on diffusion-weighted imaging. No evidence for acute ischemic event. Moderate nonspecific periventricular deep white matter disease. This most often can be ascribed to chronic small vessel ischemic change. Prominent central and cortical volume loss.  Old ischemic event left temporal lobe. There are no intracranial masses. No evidence for hemorrhage. The ventricles are normal size, no hydrocephalus. Contents of the posterior fossa including brainstem and cerebellum are unremarkable. Normal vascular flow voids. Normal aeration of paranasal sinuses. Impression: Moderate nonspecific periventricular deep white matter disease. This most often can be ascribed to chronic small vessel ischemic change. Prominent central and cortical volume loss. Old ischemic event left temporal lobe. This document has been electronically signed by: Rohit Joe MD on 01/19/2021 12:59 AM     Xr Hip 2-3 Vw W Pelvis Left    Result Date: 1/5/2021  PROCEDURE: XR HIP 2-3 VW W PELVIS LEFT CLINICAL INFORMATION: pain . COMPARISON: No prior study. TECHNIQUE: AP pelvis AP and frog-leg projections of the left hip. FINDINGS: No acute fracture or bone destruction. Hip joint spaces are preserved. SI joints are preserved. Bones are osteopenic and some mild degenerative changes seen in both iliac wings. Vascular calcifications are noted. No acute abnormality. **This report has been created using voice recognition software. It may contain minor errors which are inherent in voice recognition technology. ** Final report electronically signed by Dr. Nati De Los Santos on 1/5/2021 11:35 AM      Electronically signed by Ivy Townsend DO on 2/1/2021 at 12:50 PM

## 2021-02-01 NOTE — PLAN OF CARE
Problem: Nutrition  Goal: Optimal nutrition therapy  Outcome: Ongoing   Nutrition Problem #1: Moderate malnutrition  Intervention: Food and/or Nutrient Delivery: Continue NPO, Modify Tube Feeding(continue Jevity 1.5 at 50ml/ hour, adding protein modular: one (2.5 ounce) liquid protein bottle)  Nutritional Goals: Patient will tolerate EN to provide 75% or more of estimated nutrient needs while NPO.

## 2021-02-01 NOTE — FLOWSHEET NOTE
02/01/21 8510   Encounter Summary   Services provided to: Patient   Referral/Consult From: Christophe   Continue Visiting Yes  (2/1)   Complexity of Encounter Low   Length of Encounter 15 minutes   Routine   Type Follow up   Obdulia West   During my encounter with the 80 yr old Palliative Care patient, I attempted to visit with the pt on 6K. The patient appears to be resting now and I didnt want to disturb the patient. I or another Syeda Heaps will attempt to visit the patient or the family at another time.

## 2021-02-01 NOTE — PLAN OF CARE
Problem: Impaired respiratory status  Goal: Clear lung sounds  Description: Clear lung sounds  Outcome: Ongoing   Continue therapy to improve lung sounds. Present, unchanged

## 2021-02-01 NOTE — PROGRESS NOTES
tube feeding, note patient had been constipated, BM x 2 (1/31); colace, glycolax, zosyn, culturelle, protonix, prn zofran, prn glycolax; BUN 21, Creatinine 1.3, glucose 124      Wounds:  (left heel: unstageable)       Current Nutrition Therapies:    DIET TUBE FEED CONTINUOUS/CYCLIC NPO; 1.5 Calorie with Fiber (Jevity 1.5); Gastrostomy  Current Tube Feeding (TF) Orders:  · Feeding Route: PEG(1/26/21 Dr. Lissa Javier)  · Formula: Standard w/Fiber(Jevity 1.5)  · Schedule: Continuous  · Additives/Modulars: Protein(one (2.5 ounce) liquid protein bottle daily)  · Water Flushes: 240 mls 4 times per day per MD order 1/27/21  · Goal TF & Flush Orders Provides: Jevity 1.5 goal 50ml/ hour (or 300ml bolus 4 times per day pending TF pump availability) and one Ghkkrlkzy5mr daily ~1275ml, 1904 kcals, 103 grams protein, 259 grams CHO, 26 grams fiber, 1872mls total water (TF+flushes) per 24 hours      Anthropometric Measures:  · Height: 5' 4\" (162.6 cm)  · Current Body Weight: 180 lb 6.4 oz (81.8 kg)(2/1; +2 generalized edema)   · Admission Body Weight: 152 lb 5.4 oz (69.1 kg)(1/17 +2 edema)    · Usual Body Weight: (per EMR: 2/25/20: 174# 11.2 oz, 12/9/20: 170# 11.2 oz)     · Ideal Body Weight: 130 lbs;   · BMI: 31  · Adjusted Body Weight:  ; No Adjustment   · BMI Categories: Obese Class 1 (BMI 30.0-34. 9)       Nutrition Diagnosis:   · Moderate malnutrition related to inadequate protein-energy intake as evidenced by poor intake prior to admission, mild muscle loss      Nutrition Interventions:   Food and/or Nutrient Delivery:  Continue NPO, Modify Tube Feeding(continue Jevity 1.5 at 50ml/ hour, adding protein modular: one (2.5 ounce) liquid protein bottle)  Nutrition Education/Counseling:  Education not appropriate   Coordination of Nutrition Care:  Continue to monitor while inpatient    Goals:  Patient will tolerate EN to provide 75% or more of estimated nutrient needs while NPO.        Nutrition Monitoring and Evaluation: Behavioral-Environmental Outcomes:  None Identified   Food/Nutrient Intake Outcomes:  Diet Advancement/Tolerance, Enteral Nutrition Intake/Tolerance, IVF Intake  Physical Signs/Symptoms Outcomes:  Biochemical Data, Chewing or Swallowing, GI Status, Fluid Status or Edema, Nutrition Focused Physical Findings, Skin, Weight     Discharge Planning:     Too soon to determine     Electronically signed by Stanley Barrera RD, LD on 2/1/21 at 12:20 PM EST    Contact: [phone]

## 2021-02-01 NOTE — PROGRESS NOTES
[address] [address]  INPATIENT PHYSICAL THERAPY  DAILY NOTE  [facility] RENAL TELEMETRY 6K - 6K-22/022-A    Time In: 9595  Time Out: 1046  Timed Code Treatment Minutes: 26 Minutes  Minutes: 26          Date: 2021  Patient Name: Viv Murray,  Gender:  male        MRN: [id number]  : 3/30/1932  (80 y.o.)     Referring Practitioner: Marcella Otero DO  Diagnosis: Hyponatremia  Additional Pertinent Hx: Per ER note on 2021:88 y.o. male history of CVA, CAD, hypertension, hyperlipidemia, chronic atrial fibrillation on anticoagulation with Coumadin was recently admitted and discharged from [facility] for asymptomatic bradycardia management. He also had a history of recent falls. He presented to the emergency department for evaluation of unwitnessed fall at home today morning in his bathroom by his family members. He was found on the ground and was down for unknown length of time. Prior Level of Function:  Lives With: Alone  Type of Home: House  Home Layout: One level  Home Access: Level entry  Home Equipment: Cane   Bathroom Shower/Tub: Walk-in shower  Bathroom Toilet: Handicap height  Bathroom Equipment: Shower chair, Grab bars in shower    ADL Assistance: [address]: Independent  Transfer Assistance: Independent  Additional Comments: Pt reports fully indep PLOF without AD. Restrictions/Precautions:  Restrictions/Precautions: Contact Precautions, Fall Risk  Position Activity Restriction  Other position/activity restrictions: PEG tube, very [place]; on room air on . ..on 1L on      SUBJECTIVE: RN approved session. Pt resting in bedside chair upon arrival, pleasant and agreeable to therapy.      PAIN: 0/10: Denies pain    OBJECTIVE:  Bed Mobility:  Not Tested    Transfers:  Sit to Stand: Minimal Assistance, with increased time for completion, cues for hand placement  Stand to Sit:Minimal Assistance, with verbal cues, RN pulling to get on and off various surfaces  Short term goal 3: Pt to ambulate with RW 30 feet with CGA to walk safely to bathroom  Long term goals  Time Frame for Long term goals : NA due to short ELOS    Following session, patient left in safe position with all fall risk precautions in place.

## 2021-02-01 NOTE — CARE COORDINATION
DISASTER CHARTING    2/1/21, 10:02 AM EST    DISCHARGE ONGOING EVALUATION:     [address] day: 15  Location: Count includes the [facility]22/022-A Reason for admit: Hyponatremia [E87.1]   Barriers to Discharge: Ca+7.8, creat 1.3, Hgb 7.5, total protein 5.1, albumin 2.2. PEG inserted 1/26, dietitian assisting with TF. Placed call to Dietitian Jeannine Perez, notified her of Dr. Evonne Lozoya request for added protein to TF. Remains on IV Zosyn. PCP: Vivi Arguello DO  Readmission Risk Score: 29%  Patient Goals/Plan/Treatment Preferences: rosanna Bolanos.

## 2021-02-02 LAB
ANAEROBIC CULTURE: NORMAL
ANION GAP SERPL CALCULATED.3IONS-SCNC: 10 MEQ/L (ref 8–16)
BACTERIA: ABNORMAL
BASOPHILS # BLD: 0.5 %
BASOPHILS ABSOLUTE: 0 THOU/MM3 (ref 0–0.1)
BILIRUBIN URINE: NEGATIVE
BLOOD CULTURE, ROUTINE: NORMAL
BLOOD CULTURE, ROUTINE: NORMAL
BLOOD, URINE: NEGATIVE
BODY FLUID CULTURE, STERILE: NORMAL
BUN BLDV-MCNC: 19 MG/DL (ref 7–22)
CALCIUM SERPL-MCNC: 8.2 MG/DL (ref 8.5–10.5)
CASTS: ABNORMAL /LPF
CASTS: ABNORMAL /LPF
CHARACTER, URINE: CLEAR
CHLORIDE BLD-SCNC: 106 MEQ/L (ref 98–111)
CO2: 29 MEQ/L (ref 23–33)
COLOR: YELLOW
CREAT SERPL-MCNC: 1.1 MG/DL (ref 0.4–1.2)
CRYSTALS: ABNORMAL
EOSINOPHIL # BLD: 1.7 %
EOSINOPHILS ABSOLUTE: 0.1 THOU/MM3 (ref 0–0.4)
EPITHELIAL CELLS, UA: ABNORMAL /HPF
ERYTHROCYTE [DISTWIDTH] IN BLOOD BY AUTOMATED COUNT: 19.6 % (ref 11.5–14.5)
ERYTHROCYTE [DISTWIDTH] IN BLOOD BY AUTOMATED COUNT: 63.7 FL (ref 35–45)
GFR SERPL CREATININE-BSD FRML MDRD: 63 ML/MIN/1.73M2
GLUCOSE BLD-MCNC: 114 MG/DL (ref 70–108)
GLUCOSE, URINE: NEGATIVE MG/DL
GRAM STAIN RESULT: NORMAL
HCT VFR BLD CALC: 24.9 % (ref 42–52)
HEMOGLOBIN: 7.8 GM/DL (ref 14–18)
IMMATURE GRANS (ABS): 0.02 THOU/MM3 (ref 0–0.07)
IMMATURE GRANULOCYTES: 0.3 %
INR BLD: 1.19 (ref 0.85–1.13)
KETONES, URINE: NEGATIVE
LEUKOCYTE EST, POC: NEGATIVE
LYMPHOCYTES # BLD: 9 %
LYMPHOCYTES ABSOLUTE: 0.7 THOU/MM3 (ref 1–4.8)
MCH RBC QN AUTO: 28.7 PG (ref 26–33)
MCHC RBC AUTO-ENTMCNC: 31.3 GM/DL (ref 32.2–35.5)
MCV RBC AUTO: 91.5 FL (ref 80–94)
MISCELLANEOUS LAB TEST RESULT: ABNORMAL
MONOCYTES # BLD: 10.3 %
MONOCYTES ABSOLUTE: 0.8 THOU/MM3 (ref 0.4–1.3)
NITRITE, URINE: NEGATIVE
NUCLEATED RED BLOOD CELLS: 0 /100 WBC
PH UA: >= 9 (ref 5–9)
PLATELET # BLD: 164 THOU/MM3 (ref 130–400)
PMV BLD AUTO: 11.3 FL (ref 9.4–12.4)
POTASSIUM SERPL-SCNC: 3.9 MEQ/L (ref 3.5–5.2)
PROTEIN UA: 30 MG/DL
RBC # BLD: 2.72 MILL/MM3 (ref 4.7–6.1)
RBC URINE: ABNORMAL /HPF
RENAL EPITHELIAL, UA: ABNORMAL
SEG NEUTROPHILS: 78.2 %
SEGMENTED NEUTROPHILS ABSOLUTE COUNT: 6 THOU/MM3 (ref 1.8–7.7)
SODIUM BLD-SCNC: 145 MEQ/L (ref 135–145)
SPECIFIC GRAVITY UA: 1.02 (ref 1–1.03)
UROBILINOGEN, URINE: 0.2 EU/DL (ref 0–1)
WBC # BLD: 7.7 THOU/MM3 (ref 4.8–10.8)
WBC UA: ABNORMAL /HPF
YEAST: ABNORMAL

## 2021-02-02 PROCEDURE — 81001 URINALYSIS AUTO W/SCOPE: CPT

## 2021-02-02 PROCEDURE — 6370000000 HC RX 637 (ALT 250 FOR IP): Performed by: STUDENT IN AN ORGANIZED HEALTH CARE EDUCATION/TRAINING PROGRAM

## 2021-02-02 PROCEDURE — 51798 US URINE CAPACITY MEASURE: CPT

## 2021-02-02 PROCEDURE — 2580000003 HC RX 258: Performed by: PHYSICIAN ASSISTANT

## 2021-02-02 PROCEDURE — 99232 SBSQ HOSP IP/OBS MODERATE 35: CPT | Performed by: INTERNAL MEDICINE

## 2021-02-02 PROCEDURE — 2700000000 HC OXYGEN THERAPY PER DAY

## 2021-02-02 PROCEDURE — 6370000000 HC RX 637 (ALT 250 FOR IP): Performed by: NURSE PRACTITIONER

## 2021-02-02 PROCEDURE — 36415 COLL VENOUS BLD VENIPUNCTURE: CPT

## 2021-02-02 PROCEDURE — 94760 N-INVAS EAR/PLS OXIMETRY 1: CPT

## 2021-02-02 PROCEDURE — 97530 THERAPEUTIC ACTIVITIES: CPT

## 2021-02-02 PROCEDURE — 85610 PROTHROMBIN TIME: CPT

## 2021-02-02 PROCEDURE — C9113 INJ PANTOPRAZOLE SODIUM, VIA: HCPCS | Performed by: NURSE PRACTITIONER

## 2021-02-02 PROCEDURE — 97535 SELF CARE MNGMENT TRAINING: CPT

## 2021-02-02 PROCEDURE — 80048 BASIC METABOLIC PNL TOTAL CA: CPT

## 2021-02-02 PROCEDURE — 6360000002 HC RX W HCPCS: Performed by: PHYSICIAN ASSISTANT

## 2021-02-02 PROCEDURE — 1200000003 HC TELEMETRY R&B

## 2021-02-02 PROCEDURE — 2580000003 HC RX 258: Performed by: STUDENT IN AN ORGANIZED HEALTH CARE EDUCATION/TRAINING PROGRAM

## 2021-02-02 PROCEDURE — 92523 SPEECH SOUND LANG COMPREHEN: CPT

## 2021-02-02 PROCEDURE — 6360000002 HC RX W HCPCS: Performed by: NURSE PRACTITIONER

## 2021-02-02 PROCEDURE — 94640 AIRWAY INHALATION TREATMENT: CPT

## 2021-02-02 PROCEDURE — 92526 ORAL FUNCTION THERAPY: CPT

## 2021-02-02 PROCEDURE — 6370000000 HC RX 637 (ALT 250 FOR IP): Performed by: INTERNAL MEDICINE

## 2021-02-02 PROCEDURE — 85025 COMPLETE CBC W/AUTO DIFF WBC: CPT

## 2021-02-02 RX ORDER — PANTOPRAZOLE SODIUM 40 MG/1
40 TABLET, DELAYED RELEASE ORAL
Qty: 60 TABLET | Refills: 0 | Status: CANCELLED | OUTPATIENT
Start: 2021-02-02

## 2021-02-02 RX ORDER — LACTOBACILLUS RHAMNOSUS GG 10B CELL
1 CAPSULE ORAL
Status: DISCONTINUED | OUTPATIENT
Start: 2021-02-03 | End: 2021-02-05 | Stop reason: HOSPADM

## 2021-02-02 RX ORDER — CYCLOBENZAPRINE HCL 10 MG
5 TABLET ORAL 3 TIMES DAILY PRN
Status: DISCONTINUED | OUTPATIENT
Start: 2021-02-02 | End: 2021-02-05 | Stop reason: HOSPADM

## 2021-02-02 RX ORDER — PRAVASTATIN SODIUM 40 MG
40 TABLET ORAL NIGHTLY
Qty: 30 TABLET | Refills: 3 | Status: SHIPPED | OUTPATIENT
Start: 2021-02-02 | End: 2021-02-05

## 2021-02-02 RX ORDER — TAMSULOSIN HYDROCHLORIDE 0.4 MG/1
0.4 CAPSULE ORAL DAILY
Qty: 30 CAPSULE | Refills: 3 | Status: SHIPPED | OUTPATIENT
Start: 2021-02-03 | End: 2021-02-05

## 2021-02-02 RX ORDER — LANSOPRAZOLE 30 MG/1
30 CAPSULE, DELAYED RELEASE ORAL DAILY
Qty: 30 CAPSULE | Refills: 3 | Status: SHIPPED | OUTPATIENT
Start: 2021-02-02 | End: 2021-02-05 | Stop reason: SDUPTHER

## 2021-02-02 RX ORDER — WARFARIN SODIUM 5 MG/1
5 TABLET ORAL ONCE
Status: COMPLETED | OUTPATIENT
Start: 2021-02-02 | End: 2021-02-02

## 2021-02-02 RX ORDER — LANOLIN ALCOHOL/MO/W.PET/CERES
3 CREAM (GRAM) TOPICAL NIGHTLY
Qty: 30 TABLET | Refills: 1 | Status: SHIPPED | OUTPATIENT
Start: 2021-02-02 | End: 2021-02-05

## 2021-02-02 RX ORDER — LANOLIN ALCOHOL/MO/W.PET/CERES
3 CREAM (GRAM) TOPICAL NIGHTLY
Status: DISCONTINUED | OUTPATIENT
Start: 2021-02-02 | End: 2021-02-05 | Stop reason: HOSPADM

## 2021-02-02 RX ORDER — POLYETHYLENE GLYCOL 3350 17 G/17G
17 POWDER, FOR SOLUTION ORAL DAILY
Qty: 527 G | Refills: 1 | Status: SHIPPED | OUTPATIENT
Start: 2021-02-03 | End: 2021-02-05

## 2021-02-02 RX ORDER — ASPIRIN 81 MG/1
81 TABLET, CHEWABLE ORAL DAILY
Status: DISCONTINUED | OUTPATIENT
Start: 2021-02-02 | End: 2021-02-05 | Stop reason: HOSPADM

## 2021-02-02 RX ORDER — IPRATROPIUM BROMIDE AND ALBUTEROL SULFATE 2.5; .5 MG/3ML; MG/3ML
3 SOLUTION RESPIRATORY (INHALATION)
Qty: 360 ML | Refills: 1 | Status: SHIPPED | OUTPATIENT
Start: 2021-02-02 | End: 2021-02-05

## 2021-02-02 RX ORDER — CEFDINIR 300 MG/1
300 CAPSULE ORAL 2 TIMES DAILY
Qty: 20 CAPSULE | Refills: 0 | Status: SHIPPED | OUTPATIENT
Start: 2021-02-02 | End: 2021-02-05 | Stop reason: HOSPADM

## 2021-02-02 RX ADMIN — PIPERACILLIN AND TAZOBACTAM 3375 MG: 3; .375 INJECTION, POWDER, LYOPHILIZED, FOR SOLUTION INTRAVENOUS at 04:05

## 2021-02-02 RX ADMIN — DOCUSATE SODIUM 100 MG: 50 LIQUID ORAL at 22:23

## 2021-02-02 RX ADMIN — PANTOPRAZOLE SODIUM 40 MG: 40 INJECTION, POWDER, FOR SOLUTION INTRAVENOUS at 09:17

## 2021-02-02 RX ADMIN — PRAVASTATIN SODIUM 40 MG: 40 TABLET ORAL at 22:23

## 2021-02-02 RX ADMIN — SODIUM CHLORIDE, PRESERVATIVE FREE 10 ML: 5 INJECTION INTRAVENOUS at 09:18

## 2021-02-02 RX ADMIN — PANTOPRAZOLE SODIUM 40 MG: 40 INJECTION, POWDER, FOR SOLUTION INTRAVENOUS at 23:16

## 2021-02-02 RX ADMIN — Medication 1 CAPSULE: at 09:17

## 2021-02-02 RX ADMIN — PIPERACILLIN AND TAZOBACTAM 3375 MG: 3; .375 INJECTION, POWDER, LYOPHILIZED, FOR SOLUTION INTRAVENOUS at 18:32

## 2021-02-02 RX ADMIN — IPRATROPIUM BROMIDE AND ALBUTEROL SULFATE 1 AMPULE: .5; 3 SOLUTION RESPIRATORY (INHALATION) at 17:05

## 2021-02-02 RX ADMIN — SODIUM CHLORIDE, PRESERVATIVE FREE 10 ML: 5 INJECTION INTRAVENOUS at 22:35

## 2021-02-02 RX ADMIN — WARFARIN SODIUM 5 MG: 5 TABLET ORAL at 18:32

## 2021-02-02 RX ADMIN — SODIUM CHLORIDE, PRESERVATIVE FREE 10 ML: 5 INJECTION INTRAVENOUS at 22:34

## 2021-02-02 RX ADMIN — PIPERACILLIN AND TAZOBACTAM 3375 MG: 3; .375 INJECTION, POWDER, LYOPHILIZED, FOR SOLUTION INTRAVENOUS at 11:44

## 2021-02-02 RX ADMIN — IPRATROPIUM BROMIDE AND ALBUTEROL SULFATE 1 AMPULE: .5; 3 SOLUTION RESPIRATORY (INHALATION) at 08:30

## 2021-02-02 RX ADMIN — ASPIRIN 81 MG: 81 TABLET, CHEWABLE ORAL at 18:29

## 2021-02-02 RX ADMIN — Medication 3 MG: at 22:23

## 2021-02-02 RX ADMIN — SODIUM CHLORIDE, PRESERVATIVE FREE 10 ML: 5 INJECTION INTRAVENOUS at 09:46

## 2021-02-02 RX ADMIN — DOCUSATE SODIUM 100 MG: 50 LIQUID ORAL at 09:17

## 2021-02-02 RX ADMIN — TAMSULOSIN HYDROCHLORIDE 0.4 MG: 0.4 CAPSULE ORAL at 09:17

## 2021-02-02 RX ADMIN — IPRATROPIUM BROMIDE AND ALBUTEROL SULFATE 1 AMPULE: .5; 3 SOLUTION RESPIRATORY (INHALATION) at 21:25

## 2021-02-02 ASSESSMENT — PAIN SCALES - GENERAL
PAINLEVEL_OUTOF10: 0

## 2021-02-02 NOTE — PROGRESS NOTES
Hospitalist      Patient:  Bri Pollard    Unit/Bed:6K-22/022-A  YOB: 1932  MRN: [id number]   Acct: [de-identified]     PCP: Lorin Curry DO  Date of Admission: 1/17/2021        Assessment and Plan:        1. Decreased urine output notified nephrology will order serum osmolality start free water via PEG tube per dietitian's recommendation  2. Elevated WBC: We will obtain blood cultures  3. Worsening bilateral consolidation we will obtain a CT scan without contrast to evaluate may need to consider hospital-acquired pneumonia versus aspiration we will obtain blood cultures. 4. Dysphagia PEG tube placed patient is at goal with tube feeds will need free water with PEG tube feedings will discuss with dietitian  5. Chronic atrial fibrillation with SVR we will resume Coumadin and have pharmacy dose  6. Normocytic anemia hemoglobin currently at 7.3 we will type and screen  7. Chronic kidney disease stage III creatinine slightly elevated will discuss with nephrology will obtain a serum osmolality  8. Central line needs to be removed and replaced with peripheral IV or PICC line  9. Malnutrition: Nutritional status is poor PEG tube feedings are infusing without residuals,     1.28.2021 patient to undergo thoracentesis, PICC line placement, removal of IJ. Cultures to date are no growth. Will transfuse to keep hemoglobin greater then 7    1.29.2021 events noted during the night of dark brown emesis in color, will hold Coumadin dosing and hold Lovenox at this time, patient on Protonix twice daily, Will transfuse 25 g of albumin and follow it with Lasix 40 mg. No Bowel movements per nursing will start MiraLAX daily and Colace twice daily. Will resume tube feedings per protocol and check residuals. 1.30.2021 fecal occult blood positive, hemoglobin today is 7.9, Lovenox on hold along with Coumadin. Head of bed needs to be at 30 degrees.   Renal has added Lasix 40 mg IV daily for 3 days    1.31.2021 hemoglobin 7.4 today down to 1 g and 48 hours, WBC 8.1, will obtain chest x-ray to evaluate pneumonia. All anticoagulation has been on hold, may need to transfuse 1 unit of packed RBCs. Tolerating tube feeds, creatinine at 1.3, total protein 5.1 and albumin 2.4    2.1.2021 we consulted dietary to evaluate the addition of a protein powder/liquid to the current Jevity 1.5 at 50 mL's per hour. Patient's total protein has stabilized but his albumin continues to drop. Duarte has been removed with a voiding trial.  Creatinine is stabilized at 1.3 and hemoglobin has stabilized at 7.5 consider restarting Coumadin, will consult pharmacy. 2.2.2021 patient seen this a.m. discussed in huddle about possible discharge tomorrow  2/3/2021. Will ask pharmacy to start Coumadin dosing since hemoglobin is actually increasing we can allow his INR to drift up slowly and have nursing home monitor hemoglobin and INR on a daily basis. To clarify with nephrology if to continue diuretics  CC: Fall    HPI: 60-year-old  male presents to [facility] via EMS as after sustaining an unwitnessed fall from a standing position. He was found on the ground and unknown length of time. Per son reports that patient was progressively more weak over the last few weeks and acting like himself. On the day of the arrival son states they were unable to get a hold of him therefore they went to his house where they found him on the bathroom floor. 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   While in the emergency room the patient was found to have hyponatremia and elevated CPK there is mild opacity in the right lung base patient was Covid positive at that time    ROS (14 point review of systems completed. Pertinent positives noted. Otherwise ROS is negative) : Poor historian    PMH:  Per HPI and       Diagnosis Date    Actinic keratoses 2013    Adenomatous polyp 2008    Atrial fibrillation ([address]     CAD (coronary artery disease) 2009    Carotid artery disease ([address] 2009    left    Hyperlipidemia 2004    Hypertension 2004    Inguinal hernia, left 2014    Unspecified cerebral artery occlusion with cerebral infarction      SHX:        Procedure Laterality Date    ABDOMEN SURGERY      ABDOMINAL ADHESION SURGERY  2012    Dr Patricia Conrad  2004    right    COLONOSCOPY      [facility]    CORONARY ARTERY BYPASS GRAFT  2009    SMALL INTESTINE SURGERY      SMALL INTESTINE SURGERY  2015    Exploratory Laparotomy    UPPER GASTROINTESTINAL ENDOSCOPY N/A 2021    EGD ESOPHAGOGASTRODUODENOSCOPY PEG TUBE INSERTION performed by Anita Bacon MD at [facility]     FHX:       Problem Relation Age of Onset    Cancer Mother      [facility]:   Social History     Socioeconomic History    Marital status:      Spouse name: None    Number of children: None    Years of education: None    Highest education level: None   Occupational History    None   Social Needs    Financial resource strain: None    Food insecurity     Worry: None     Inability: None    Transportation needs     Medical: None     Non-medical: None   Tobacco Use    Smoking status: Former Smoker     Quit date: 1980     Years since quittin.1    Smokeless tobacco: Never Used   Substance and Sexual Activity    Alcohol use:  Yes     Alcohol/week: 1.0 standard drinks     Types: 1 Cans of beer per week     Comment: occasionally    Drug use: No    Sexual activity: None   Lifestyle    Physical activity     Days per week: None     Minutes per session: None    Stress: None   Relationships    Social connections     Talks on phone: None     Gets together: None     Attends [employer] service: None     Active member of club or organization: None     Attends meetings of clubs or organizations: None     Relationship status: None    Intimate partner violence     Fear of current or ex partner: None     Emotionally abused: None     Physically abused: None     Forced sexual activity: None   Other Topics Concern    None   Social History Narrative    None      Allergies: Patient has no known allergies. Medications:     sodium chloride Stopped (01/28/21 1591)      warfarin  5 mg Oral Once    pantoprazole  40 mg Intravenous BID    polyethylene glycol  17 g Oral Daily    sodium phosphate  1 enema Rectal Once    docusate  100 mg Oral BID    piperacillin-tazobactam  3,375 mg Intravenous Q8H    ipratropium-albuterol  1 ampule Inhalation Q4H WA    pravastatin  40 mg PEG Tube Nightly    lidocaine 1 % injection  5 mL Intradermal Once    sodium chloride flush  10 mL Intravenous 2 times per day    sodium chloride flush  10 mL Intravenous 2 times per day    melatonin  3 mg Oral Nightly    calcium replacement protocol   Other RX Placeholder    phosphorus replacement protocol   Other RX Placeholder    lactobacillus  1 capsule Oral Daily with breakfast    aspirin  81 mg Oral Daily    tamsulosin  0.4 mg Oral Daily     Prior to Admission medications    Medication Sig Start Date End Date Taking?  Authorizing Provider   hydrALAZINE (APRESOLINE) 25 MG tablet Take 1 tablet by mouth 3 times daily 12/12/20  Yes Diamante Oakes PA-C   tamsulosin (FLOMAX) 0.4 MG capsule Take 1 capsule by mouth daily 1/16/20  Yes Urban Olivera MD   warfarin (COUMADIN) 3 MG tablet Take 1 tablet by mouth See Admin Instructions Indications: Anticoagulant Therapy, Atrial Fibrillation [facility] coumadin clinic to dose, #45=30 days supply 9/19/17  Yes Jameson Weber MD   furosemide (LASIX) 20 MG tablet Take 1 tablet by mouth daily 4/4/17  Yes Lexi Macias MD   pantoprazole (PROTONIX) 40 MG tablet Take 1 tablet by mouth 2 times daily (before meals). 3/1/15  Yes ROBBIE Tom - CNP   pravastatin (PRAVACHOL) 40 MG tablet Take 40 mg by mouth every evening. Indications: Blood Cholesterol Abnormal   Yes Historical Provider, MD   aspirin 81 MG EC tablet Take 81 mg by mouth daily Indications: Anticoagulant Therapy Take with food. Yes Historical Provider, MD   cyclobenzaprine (FLEXERIL) 5 MG tablet Take 1 tablet by mouth 3 times daily as needed for Muscle spasms  Patient not taking: Reported on 1/6/2021 1/5/21   Lata Paredes PA-C      PHYSICAL EXAM:    /61   Pulse 72   Temp 97.6 °F (36.4 °C) (Oral)   Resp 22   Ht 5' 4\" (1.626 m)   Wt 169 lb 12.8 oz (77 kg)   SpO2 95%   BMI 29.15 kg/m²     General appearance:  No apparent distress, appears stated age and cooperative. Sitting comfortably in chair  HEENT:  Normal cephalic, atraumatic without obvious deformity. Pupils equal, round, and reactive to light. Extra ocular muscles intact. Conjunctivae/corneas clear. Neck: Supple, with full range of motion. no jugular venous distention. Trachea midline. no carotid bruits  Respiratory:  Normal respiratory effort. Clear to auscultation, upper airway wheezes, bases markedly decreased breath sounds right more so than the left no crackles or rails heard at this time  Cardiovascular:  Regular rate and rhythm with normal S1/S2 without murmurs, rubs or gallops. PMI non displaced  Abdomen: Soft, non-tender, non-distended with normal bowel sounds. No guarding, rebound. Musculoskeletal:  No clubbing, cyanosis. Trace pretibial edema right greater than left calves are soft full range of motion without deformity. Skin: Skin color, texture, turgor normal.  No rashes or lesions, or suspicious lesions. Neurologic:  Neurovascularly intact without any focal sensory/motor deficits.  Cranial nerves: II-XII intact, grossly non-focal.  Psychiatric:  Alert and oriented, thought content appropriate, normal insight  Capillary Refill: Brisk,< 2 seconds   Peripheral Pulses: +2 palpable, equal bilaterally upper and lower extremities  Lymphatics: no lymphadenopathy    Data: (All radiographs, tracings, PFTs, and imaging are personally viewed and interpreted unless otherwise noted).    Recent Labs     01/31/21 0620 02/01/21  0550 02/02/21  0545   WBC 8.1 7.4 7.7   HGB 7.4* 7.5* 7.8*   HCT 24.0* 24.8* 24.9*    145 164     Recent Labs     01/31/21  0620 02/01/21  0550 02/02/21  0545    139 145   K 3.8 3.8 3.9    102 106   CO2 29 31 29   BUN 23* 21 19   CREATININE 1.3* 1.3* 1.1   CALCIUM 7.9* 7.8* 8.2*     Recent Labs     01/31/21  0620 02/01/21  0550   AST 26 26   ALT 22 23   BILIDIR <0.2 <0.2   BILITOT 0.3 0.3   ALKPHOS 111 113     Recent Labs     01/31/21  0620 02/01/21  0550 02/02/21  0545   INR 1.13 1.13 1.19*     No results for input(s): Bryson Luh in the last 72 hours. Radiology reports-images reviewed in PACS  Xr Chest (2 Vw)    Result Date: 1/17/2021  PROCEDURE: XR CHEST (2 VW) CLINICAL INFORMATION: Found on the floor COMPARISON: 8/20/2016 TECHNIQUE:  AP and lateral chest x-ray  FINDINGS: There is at least moderate enlargement of the cardiomediastinal silhouette. There is evidence of prior median sternotomy. There is diffuse osteopenia noted. Mild focal eventration of the right hemidiaphragm is seen. No focal consolidation or pleural effusion is seen. No pneumothorax is demonstrated. 1. Moderate enlargement of the cardiomediastinal silhouette. 2. There is hyperinflation noted. 2. No focal consolidation, pleural effusion or pneumothorax is seen. **This report has been created using voice recognition software. It may contain minor errors which are inherent in voice recognition technology. ** Final report electronically signed by Dr. Gunjan Fung on 1/17/2021 3:54 PM    Xr Pelvis (1-2 Views)    Result Date: 1/17/2021  PROCEDURE: XR PELVIS (1-2 VIEWS) iterative reconstruction, and/or weight-based dosing when appropriate to reduce radiation dose to as low as reasonably achievable. 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 There is moderate diffuse atrophy. No acute intracranial hemorrhage or mass effect is seen. There is no midline shift. The basal cisterns and posterior fossa appear otherwise unremarkable. Parasellar carotid artery vascular calcifications are noted. The visualized paranasal sinuses and mastoid air cells appear clear. No acute abnormalities of the calvarium are seen. The visualized globes and orbits appear grossly intact. 1. No acute intracranial hemorrhage or mass effect. 2. Moderate diffuse atrophy is demonstrated. 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 **This report has been created using voice recognition software. It may contain minor errors which are inherent in voice recognition technology. ** Final report electronically signed by Dr. Saige Ortiz on 1/17/2021 4:17 PM    Ct Chest Wo Contrast    Result Date: 1/17/2021  Noncontrast CT of the chest Technique: Axial CT images from the lung apices through the adrenal glands, no contrast.  Sagittal and coronal reconstruction images provided. Comparison:  CR,SR  - XR CHEST PORTABLE  - 01/17/2021 07:29 PM EST Findings: Mild atherosclerotic vessel disease within the thoracic aorta. Normal caliber thoracic aorta. No aneurysm. No mediastinal or axillary adenopathy. Normal thyroid. Moderate right pleural effusion. Associated volume loss. Mild opacity right lung base. This could represent mild pneumonia. No pulmonary nodules. No pneumothorax. Normal bones. Cholelithiasis present. No adjacent inflammation. A small hiatal hernia. Impression: Moderate right pleural effusion. Associated volume loss. Mild opacity right lung base. This could represent mild pneumonia.  This document has recognition software. It may contain minor errors which are inherent in voice recognition technology. ** Final report electronically signed by Dr. Jacquelin Padilla MD on 1/22/2021 4:55 PM    Xr Chest Portable    Result Date: 1/17/2021  Single view of the chest Comparison:  CR,SR  - XR CHEST STANDARD (2 VW)  - 01/17/2021 03:32 PM EST Findings: Mild right infrahilar opacity. This could represent volume loss versus mild pneumonia. Cardiomegaly, no CHF. Right central line present with the tip in the mid superior vena cava. Status post median sternotomy. Impression: Cardiomegaly, no CHF. Mild right infrahilar opacity. This could represent mild pneumonia versus volume loss. This document has been electronically signed by: Mikhail Jones MD on 01/17/2021 09:11 PM     Xr Abdomen For Ng/og/ne Tube Placement    Result Date: 1/22/2021  ** ADDENDUM #1 ** This report was discussed with Roberta Goode RN on Jan 22, 2021 23:28:00 EST. This document has been electronically signed by: Cora Calvert on 01/22/2021 11:28 PM ** ORIGINAL REPORT ** 1 view abdomen Comparison:  CR,SR  - XR CHEST PORTABLE  - 01/22/2021 10:47 PM EST Findings: No pneumoperitoneum or pneumatosis. No abnormal calcifications. No acute fractures. Status post sternotomy. 1. No visible nasogastric tube on the abdominal x-ray. On correlation with the concurrent chest x-ray, the nasogastric tube is at the level of the left mainstem bronchus. Recommend removal and repositioning. 2. Extensive gaseous distention of bowel suggesting possible ileus. This document has been electronically signed by: Malcom Soares MD on 01/22/2021 11:22 PM     Mri Brain Wo Contrast    Result Date: 1/19/2021  Noncontrast MRI of the brain. Technique: Sagittal T1, coronal T2, axial T1, T2, FLAIR and diffusion-weighted imaging. Comparison:  CT,SR  - CT HEAD WO CONTRAST  - 01/17/2021 03:49 PM EST Findings: Normal signal on diffusion-weighted imaging.   No evidence for

## 2021-02-02 NOTE — PROGRESS NOTES
Patient appears restless and impulsive . VS stable. Notified Dr. Dena Gaspar he has been having periods of confusion , patient is alert and oriented X4. Per physician order STAT UA and bladder scan. Bladder scan showed 314 ml. Patient was straight cath for urinalysis, urine return of 275 ml. UA sent to lab. Patient resting in bed,call light within reach, no concerns voiced at this time.

## 2021-02-02 NOTE — PROGRESS NOTES
[address] [address]  SPEECH THERAPY  [facility] RENAL TELEMETRY 6K  Speech - Language - Cognitive Evaluation + Dysphagia Therapy    SLP Individual Minutes  Time In: 4793  Time Out: 0955  Minutes: 37  Timed Code Treatment Minutes: 0 Minutes   Hrmupp-Wemzwpuw-Thbabfiwh Eval: 29 minutes  Dysphagia Therapy: 8 minutes    Date: 2021  Patient Name: Bhavesh Aguillon      CSN: [id number]   : 3/30/1932  (80 y.o.)  Gender: male   Referring Physician: Amber Rodas MD  Diagnosis: Hyponatremia  Secondary Diagnosis: Dysphagia, cognitive linguistic deficits  Precautions: fall risk, aspiration,  History of Present Illness/Injury: Patient admitted to [facility] with above dx. Per chart review, \"Velasquez Vyas is a 80 y. o. male history of CVA, CAD, hypertension, hyperlipidemia, chronic atrial fibrillation on anticoagulation with Coumadin was recently admitted and discharged from [facility] for asymptomatic bradycardia management. James Guerrero also had a history of recent falls.  He presented to the emergency department for evaluation of unwitnessed fall at home today (21)  in his bathroom by his family members. James Guerrero was found on the ground and was down for unknown length of time.  Per his son Dr. David Cabrera. MD Asael he was in his normal state of health until yesterday 3 PM when he was seen by him for the last time.  Patient lives alone at his home.  Monet Zunigaots very chatty when he was visiting. Carlito Armijo, when they tried to reach him they were unable to get a hold of him therefore they went to his home to check on him and found him on the floor in the bathroom. James Guerrero was brought via EMS to [facility] emergency department. James Guerrero does not remember what happened prior to the incident.  Denies chest pain, shortness of breath, abdominal pain.  He reports pain in his right leg and he is also complaining of generalized muscle aches- this is chronic for him according to his son  Dr. Tori Medina at the bedside. \" ST consulted to assess cognitive domains and ascertain need for goal development/ POC. Past Medical History:   Diagnosis Date    Actinic keratoses 2013    Adenomatous polyp 2008    Atrial fibrillation ([facility] [address]     CAD (coronary artery disease) 2009    Carotid artery disease ([facility] [address] 2009    left    Hyperlipidemia 2004    Hypertension 2004    Inguinal hernia, left 2014    Unspecified cerebral artery occlusion with cerebral infarction        Pain: No pain reported. Subjective:  Pt was sitting upright in chair, with nursing staff present upon [address] [place] [address] Dr arrival. Pt was alert, albeit confused mentation evidenced by frequent communicational breakdowns caused by perseveration. No family member present at bedside. SOCIAL HISTORY: Unable to validate social history d/t pt's confused mentation. Will f/u with family members to confirm social history. Living Arrangements: Pt reported living in a house; unable to identify where he lives. Work History: Retired  Education Level: College degree  Driving Status: Active   Finance Management: Independent  Medication Management: Independent  ADL's: Independent. Hobbies: Football  Vision Status: Wears glasses  Hearing: Pt reported wearing hearing aids, not present at bedside. Type of Home: House  Home Layout: One level  Home Access: Level entry  Home Equipment: Cane    ORAL MOTOR:  Facial / Labial WFL    Lingual WFL    Dentition WFL    Velum Not Tested    Vocal Quality WFL    Sensation Not Tested    Cough Not Tested      SPEECH / VOICE:  Speech: Slow, dysarthric speech  Voice: WFL    LANGUAGE:  Receptive:  1 Step Commands: 3/3  2 Step Commands: 1/2  Simple Yes/No Questions: 2/3  Complex Yes/No Questions: 3/3    Expressive:  Responsive Namin/3  Paraphasias: phonemic and semantic     COGNITION:  [place] Cognitive Assessment (MOCA) version 7.2 completed. Pt scored 7/30. Normal is greater than or equal to 26/30.     Orientation: /6  Immediate Recall: 1/5  Short-Term Recall: 1/5  Divergent Naming: Named 1 item within 1 minute time frame (target=11)  Problem Solvin/3  Reasonin/2 verbal, 0/2 visual  Sequencing: Unable to sequence task appropriately; missing specific details  Thought Organization: impaired  Insight: impaired  Attention: impaired at basic level; required frequent redirection to tasks  Math Computation: 0/3  Executive Functionin/3 clock drawing    SWALLOWING:  Current Diet: NPO  Impaired:    Dysphagia Therapy:  Goal #1: Pt will consume PO trials of ice chips and thin liquids via spoon with ST only with improved mentation, demonstration of improved swallow timeliness and with absence of s/s of aspiration to determine pt appropriateness for repeat instrumental assessment (I.e FEES vs MBS). Interventions:   ST provided oral care hygiene in the following oral regions: upper/lower gums, upper/lower teeth, R & L buccal cavity, hard palate, and lingual dorsum. Pt tolerated oral care; however required frequent cues to maintain an open mouth. PO trial of ice chip x2: Pt demonstrated good lip closure and mastication, however double swallow was noted on both trials. PO trial thin liquid via spoon x2: Pt demonstrated audible swallow for both trials and throat clear on 1 trial.  *Pt appeared to tolerate PO trials without overt distress. However cannot r/o airway invasion/pharyngeal dysfunction at bedside alone. RECOMMENDATIONS/ASSESSMENT:  DIAGNOSTIC IMPRESSIONS:  Pt presents with symptoms suspected of severe cognitive impairment specifically with orientation, attention, problem solving, visual/verbal reasoning, sequencing, thought organization and executive functioning skills. Pt perseverating on old war stories, required frequent redirection with fair success.  Pt presents with mild receptive language impairment specifically with following 2-step directions, and a mild expressive language impairment specifically with confrontational naming, divergent naming, responsive naming, instances of phonetic/semantic paraphasias. Pt demonstrated slow dysarthric speech quality with occasional disfluency consisting of sound prolongations. Pt's speech intelligibility is judged to be ~90-95% intelligible at the conversational level. Will continue to monitor speech and address as clinically indicated. No dysphonia was observed. Recommend skilled ST intervention to target aforementioned impairments to improve functionality with ADL/IADL contribution, as well as increase efficacy within overall communication. Rehabilitation Potential: fair, obvious confused mentation requires frequent redirection to tasks. EDUCATION:  Learner: Patient  Education:  Reviewed results and recommendations of this evaluation, Reviewed ST goals and Plan of Care and Reviewed recommendations for follow-up  Evaluation of Education: Rodrigo Blake understanding, Needs further instruction and Family not present    PLAN:  Skilled SLP intervention on acute care 3-5 x per week or until goals met and/or pt plateaus in function. Specific interventions for next session may include: dysphagia tx and cognitive-linguistic tasks. PATIENT GOAL:    Did not state. Will further assess during treatment. SHORT TERM GOALS:  Short-term Goals  Timeframe for Short-term Goals: 2 weeks  Goal 1: Pt will consume PO TRIALS of ice chips and thin liquids by spoon with ST only with improved mentation, demonstration of improved swallow timeliness and with absence of s/s of aspiration to determine pt appropriateness for repeat instrumental assessment (i.e. FEES vs MBS). Goal 2: Pt will complete functional orientation tasks (i.e call light, basic demongraphic) with 65% accuracy, max cues to improve retention of pertinent information. Goal 3: Pt will complete confrontational naming, divergent naming, and responsive naming tasks with 60% accuracy, max cues to reduce number of communicational breakdowns.   Goal 4: Pt will  complete  attention tasks with less than 5 redirections until task completion to improve functionality with ADL completion. Goal 5: Pt will complete functional problem solving, reasoning, sequencing, thought organization and executive functioning tasks (i.e telling time, managing medication)  with 60% accuracy, max cues to improve functionality in the current/home environment. LONG TERM GOALS:  No LTG given short ELOS.     CHANDU Shukla, Student Intern  Nasra Stewart M.A., [address]

## 2021-02-02 NOTE — PROGRESS NOTES
[facility]   Nephrology progress note  2/2/2021, 2:19 PM      Pt Name:    Fabio Smith  MRN:     [id number]     [place]:    3/30/1932  Admit Date:    1/17/2021  2:31 PM  Primary Care Physician:  Conner Oden DO   Room number  7Y-31/383-L    Chief Complaint: Nephrology was following for hyponatremia but now for recurrent ANNIKA  Subjective:  Patient seen and examined  Awake and alert  Sitting at bedside chair   no acute distress    Objective:  24HR INTAKE/OUTPUT:      Intake/Output Summary (Last 24 hours) at 2/2/2021 1419  Last data filed at 2/2/2021 1312  Gross per 24 hour   Intake 1371 ml   Output 550 ml   Net 821 ml     I/O last 3 completed shifts:  In: -   Out: 250 [Urine:250]  I/O this shift:  In: 1371 [I.V.:10; NG/GT:1361]  Out: 300 [Urine:300]  Admission weight: 162 lb (73.5 kg)  Wt Readings from Last 3 Encounters:   02/02/21 169 lb 12.8 oz (77 kg)   01/06/21 162 lb (73.5 kg)   01/05/21 160 lb (72.6 kg)     Body mass index is 29.15 kg/m².     Physical examination  VITALS:     Vitals:    02/02/21 0607 02/02/21 0830 02/02/21 0845 02/02/21 1217   BP:   138/61 (!) 147/67   Pulse:   72 80   Resp:   22 16   Temp:   97.6 °F (36.4 °C) 97.7 °F (36.5 °C)   TempSrc:   Oral Oral   SpO2:  96% 95% 93%   Weight: 169 lb 12.8 oz (77 kg)      Height:           General Appearance: appears comfortable, no distress  Neck: No JVD noted  Heart: S1, S2  Lungs:  no use of accessory muscles, no labored breathing  Abd: Soft, nontender  Ext: Trace lower extremity edema      Lab Data  CBC:   Recent Labs     01/31/21  0620 02/01/21  0550 02/02/21  0545   WBC 8.1 7.4 7.7   HGB 7.4* 7.5* 7.8*   HCT 24.0* 24.8* 24.9*    145 164     BMP:  Recent Labs     01/31/21  0620 02/01/21  0550 02/02/21  0545    139 145   K 3.8 3.8 3.9    102 106   CO2 29 31 29   BUN 23* 21 19   CREATININE 1.3* 1.3* 1.1   GLUCOSE 130* 124* 114*   CALCIUM 7.9* 7.8* 8.2*     Hepatic:   Recent Labs     01/31/21  0620 02/01/21  0550   LABALBU 2.4* 2.2*   AST 26 26   ALT 22 23   BILITOT 0.3 0.3   ALKPHOS 111 113         Meds:  Infusion:    sodium chloride Stopped (01/28/21 2215)     Meds:    warfarin  5 mg Oral Once    aspirin  81 mg PEG Tube Daily    [START ON 2/3/2021] lactobacillus  1 capsule PEG Tube Daily with breakfast    melatonin  3 mg PEG Tube Nightly    pantoprazole  40 mg Intravenous BID    polyethylene glycol  17 g Oral Daily    sodium phosphate  1 enema Rectal Once    docusate  100 mg Oral BID    piperacillin-tazobactam  3,375 mg Intravenous Q8H    ipratropium-albuterol  1 ampule Inhalation Q4H WA    pravastatin  40 mg PEG Tube Nightly    lidocaine 1 % injection  5 mL Intradermal Once    sodium chloride flush  10 mL Intravenous 2 times per day    sodium chloride flush  10 mL Intravenous 2 times per day    calcium replacement protocol   Other RX Placeholder    phosphorus replacement protocol   Other RX Placeholder    tamsulosin  0.4 mg Oral Daily     Meds prn: cyclobenzaprine, sodium chloride, sodium chloride flush, sodium chloride flush, promethazine **OR** ondansetron, morphine, potassium chloride **OR** potassium alternative oral replacement **OR** potassium chloride, potassium chloride, potassium chloride, acetaminophen **OR** acetaminophen, magnesium sulfate, mupirocin       Impression and Plan:  1. Acute Kidney injury: Renal function improving  Creatinine is down to 1.1  We will recheck labs in the morning  Avoid nephrotoxins  Avoid hypotension    2. Recent hyponatremia. Resolved  3. Rhabdomyolysis. Resolved  4. Status post feeding tube placement  5. Leukocytosis and low-grade fever. On IV antibiotics  6. Anemia  7. Peripheral edema.   Diuretics if needed      Adelso Cheney MD  [facility]

## 2021-02-02 NOTE — PROGRESS NOTES
Clinical Pharmacy Note    Warfarin consult follow-up    Recent Labs     02/02/21  0545   INR 1.19*     Recent Labs     01/31/21  0620 02/01/21  0550 02/02/21  0545   HGB 7.4* 7.5* 7.8*   HCT 24.0* 24.8* 24.9*    145 164       Significant Drug-Drug Interactions:  New warfarin drug-drug interactions: none  Discontinued drug-drug interactions: none  Current warfarin drug-drug interactions: aspirin     Date INR Warfarin Dose   1/17/2021 5.19 No Coumadin   1/18/2021 4.97 No Coumadin  Vitamin K 2 mg IV given   1/19/2021 1.73 3 mg    1/20/2021 1.32 3 mg   1/21/2021 1.39 5 mg   1/22/2021 1.51  - not given   1/23/2021 1.94  - not given   1/24-2/1  On hold - FOB positive on 1/29 2/2/2021 1.19 5 mg       Notes:                     Daily PT/INR until stable within therapeutic range.

## 2021-02-02 NOTE — PROCEDURES
A Bladder scan was performed at 1530 . The residual amount was measured to be 314 ML. Report of results was given to [facility].

## 2021-02-02 NOTE — CARE COORDINATION
2/2/21, 11:00 AM EST    DISCHARGE PLANNING EVALUATION    Discussed during morning huddle with Dr. Tory Calvert anticipated discharge tomorrow. MAZIN updated Dexter Mcdonnell with [facility].   [facility] transport forms faxed for  2/3/21 @ 2:00pm.

## 2021-02-02 NOTE — CARE COORDINATION
DISASTER CHARTING    2/2/21, 10:02 AM EST    DISCHARGE ONGOING EVALUATION:     [address] day: 16  Location: [facility]22/022-A Reason for admit: Hyponatremia [E87.1]   Barriers to Discharge: Start coumadin today. Plan discharge tomorrow if stable. TF, IV Zosydafne. PCP: Denia Romero, DO  Readmission Risk Score: 30%  Patient Goals/Plan/Treatment Preferences: New admission to [facility].

## 2021-02-02 NOTE — PROGRESS NOTES
[address] RENAL TELEMETRY 6K  Occupational Therapy  Daily Note  Time:   Time In: 8511  Time Out: 1125  Timed Code Treatment Minutes: 30 Minutes  Minutes: 30          Date: 2021  Patient Name: Ian Lewis,   Gender: male      Room: [facility]  MRN: [id number]  : 3/30/1932  (80 y.o.)  Referring Practitioner: Dr. Sunday Brownlee  Diagnosis: hyponatremia  Additional Pertinent Hx: Per ER note on 2021:88 y.o. male history of CVA, CAD, hypertension, hyperlipidemia, chronic atrial fibrillation on anticoagulation with Coumadin was recently admitted and discharged from [facility] for asymptomatic bradycardia management. He also had a history of recent falls. He presented to the emergency department for evaluation of unwitnessed fall at home today morning in his bathroom by his family members. He was found on the ground and was down for unknown length of time. Restrictions/Precautions:  Restrictions/Precautions: Contact Precautions, Fall Risk  Position Activity Restriction  Other position/activity restrictions: PEG tube, very [place]; on room air on . ..on 1L on ; on room air     SUBJECTIVE: Pleasant and willing to do self care after getting up out of the bed. Pt had heel protector boots in place at the start of session. PAIN:  Denies. COGNITION: Decreased Recall    ADL:   Grooming: Stand By Assistance. Brushing his teeth while sitting up in the chair; while rinsing out his mouth, pt shows a slight cough  Lower Extremity Dressing: Maximum Assistance. donning slipper socks . BALANCE:  Sitting Balance:  Stand By Assistance. scooting to the edge of bed  Standing Balance: Minimal Assistance. preparing to walk    BED MOBILITY:  Supine to Sit: Minimal Assistance, X 2, with head of bed raised    Scooting: Minimal Assistance, X 1, with verbal cues       TRANSFERS:  Sit to Stand:  Contact guard Assistance.  From the edge of bed  Stand to Sit: Contact Guard Assistance. To the recliner chair  Pt scooted back in the chair with SBA and verbal cues provided. FUNCTIONAL MOBILITY:  Assistive Device: Rolling Walker  Assist Level:  Minimal Assistance, X 1 and with verbal cues . Distance: 3 ft from bed to chair  Help needed to steer the walker and cues for steady breathing. Short shuffling steps noted. ADDITIONAL ACTIVITIES:   Pt talking about his stay in the hospital and the plans to be discharged to [facility]. His nurse confirms that the plan is for him to be discharged tomorrow. Pt also talked about his family. He enjoyed bragging about grandchildren. ASSESSMENT:     Activity Tolerance:  Patient tolerance of  treatment: fair. Pt was on room air. Mild SOB noted when he first sat up at the edge of bed. Pt needed cues for pursed lip breathing to help slow down his breaths. Pt also needed a rest break while in the chair before he could start his grooming activity. Discharge Recommendations: Continue to assess pending progress(may be TCU candidate)  OT recommended while pt is at [facility]. Equipment Recommendations:    Plan: Times per week: 5x  Current Treatment Recommendations: Balance Training, Self-Care / ADL, Endurance Training, Functional Mobility Training, Strengthening, Safety Education & Training    Patient Education  Patient Education: Role of OT, Plan of Care and Importance of Increasing Activity    Goals  Short term goals  Time Frame for Short term goals: until discharge  Short term goal 1: Complete various sit-stand t/fs with min A x 1 for increased ease of BSC t/fs. Met. Revise goal.  Short term goal 2: Complete 1-2 min standing ADL with min a X 1 with BUE support on RW. Partially met. Cont. Goal.   Short term goal 3: Complete BUE AROM/light strengthening exercises to increase UB endurance for BADL. Not met. Cont. Goal.   Short term goal 4: Complete BADL routine with min A & min vcs for safety & sequencing. Not met. Cont. Goal.  Short term goal 5: pt to complete stand pivot transfer with min x2 consistently to further progress his mobility to/from bathroom or BSC. Met. Revise Goal.  Long term goals  Time Frame for Long term goals : No LTG d/t short ELOS  Revised Short-Term Goals  Short term goals  Time Frame for Short term goals: until discharge  Short term goal 1: Pt will complete transfers to/from various surfaces including a bedside commode with CGA to increase his independence with toileting routine. Short term goal 2: Complete 1-2 min standing ADL with min a X 1 with BUE support on RW  Short term goal 3: Complete BUE AROM/light strengthening exercises to increase UB endurance for BADL  Short term goal 4: Complete BADL routine with min A & min vcs for safety & sequencing  Short term goal 5: Pt will demonstrate functional mobility walking to/from the bathroom or the commode with MIN A while using any AD needed to prepare for doing self care. Long term goals  Time Frame for Long term goals : No LTG d/t short ELOS    Following session, patient left in safe position with all fall risk precautions in place.

## 2021-02-03 ENCOUNTER — APPOINTMENT (OUTPATIENT)
Dept: GENERAL RADIOLOGY | Age: 86
DRG: 177 | End: 2021-02-03
Payer: OTHER GOVERNMENT

## 2021-02-03 LAB
ALBUMIN SERPL-MCNC: 2.5 G/DL (ref 3.5–5.1)
ALP BLD-CCNC: 135 U/L (ref 38–126)
ALT SERPL-CCNC: 22 U/L (ref 11–66)
ANION GAP SERPL CALCULATED.3IONS-SCNC: 9 MEQ/L (ref 8–16)
ANISOCYTOSIS: PRESENT
AST SERPL-CCNC: 25 U/L (ref 5–40)
BASOPHILS # BLD: 0.8 %
BASOPHILS ABSOLUTE: 0.1 THOU/MM3 (ref 0–0.1)
BILIRUB SERPL-MCNC: 0.5 MG/DL (ref 0.3–1.2)
BILIRUBIN DIRECT: < 0.2 MG/DL (ref 0–0.3)
BUN BLDV-MCNC: 18 MG/DL (ref 7–22)
CALCIUM SERPL-MCNC: 8.3 MG/DL (ref 8.5–10.5)
CHLORIDE BLD-SCNC: 105 MEQ/L (ref 98–111)
CO2: 28 MEQ/L (ref 23–33)
CREAT SERPL-MCNC: 1.2 MG/DL (ref 0.4–1.2)
EOSINOPHIL # BLD: 1.8 %
EOSINOPHILS ABSOLUTE: 0.1 THOU/MM3 (ref 0–0.4)
ERYTHROCYTE [DISTWIDTH] IN BLOOD BY AUTOMATED COUNT: 19.9 % (ref 11.5–14.5)
ERYTHROCYTE [DISTWIDTH] IN BLOOD BY AUTOMATED COUNT: 65.1 FL (ref 35–45)
GFR SERPL CREATININE-BSD FRML MDRD: 57 ML/MIN/1.73M2
GLUCOSE BLD-MCNC: 120 MG/DL (ref 70–108)
HCT VFR BLD CALC: 25.6 % (ref 42–52)
HEMOGLOBIN: 7.9 GM/DL (ref 14–18)
IMMATURE GRANS (ABS): 0.01 THOU/MM3 (ref 0–0.07)
IMMATURE GRANULOCYTES: 0.2 %
INR BLD: 1.23 (ref 0.85–1.13)
LYMPHOCYTES # BLD: 15.3 %
LYMPHOCYTES ABSOLUTE: 1 THOU/MM3 (ref 1–4.8)
MCH RBC QN AUTO: 28.1 PG (ref 26–33)
MCHC RBC AUTO-ENTMCNC: 30.9 GM/DL (ref 32.2–35.5)
MCV RBC AUTO: 91.1 FL (ref 80–94)
MONOCYTES # BLD: 10.3 %
MONOCYTES ABSOLUTE: 0.7 THOU/MM3 (ref 0.4–1.3)
NUCLEATED RED BLOOD CELLS: 0 /100 WBC
PLATELET # BLD: 179 THOU/MM3 (ref 130–400)
PMV BLD AUTO: 11.3 FL (ref 9.4–12.4)
POTASSIUM SERPL-SCNC: 4 MEQ/L (ref 3.5–5.2)
RBC # BLD: 2.81 MILL/MM3 (ref 4.7–6.1)
SEG NEUTROPHILS: 71.6 %
SEGMENTED NEUTROPHILS ABSOLUTE COUNT: 4.7 THOU/MM3 (ref 1.8–7.7)
SODIUM BLD-SCNC: 142 MEQ/L (ref 135–145)
TOTAL PROTEIN: 5.8 G/DL (ref 6.1–8)
WBC # BLD: 6.5 THOU/MM3 (ref 4.8–10.8)

## 2021-02-03 PROCEDURE — 6360000002 HC RX W HCPCS: Performed by: PHYSICIAN ASSISTANT

## 2021-02-03 PROCEDURE — 97110 THERAPEUTIC EXERCISES: CPT

## 2021-02-03 PROCEDURE — 51798 US URINE CAPACITY MEASURE: CPT

## 2021-02-03 PROCEDURE — 92526 ORAL FUNCTION THERAPY: CPT | Performed by: SPEECH-LANGUAGE PATHOLOGIST

## 2021-02-03 PROCEDURE — 85025 COMPLETE CBC W/AUTO DIFF WBC: CPT

## 2021-02-03 PROCEDURE — 71045 X-RAY EXAM CHEST 1 VIEW: CPT

## 2021-02-03 PROCEDURE — 2580000003 HC RX 258: Performed by: STUDENT IN AN ORGANIZED HEALTH CARE EDUCATION/TRAINING PROGRAM

## 2021-02-03 PROCEDURE — 6370000000 HC RX 637 (ALT 250 FOR IP): Performed by: INTERNAL MEDICINE

## 2021-02-03 PROCEDURE — 6360000002 HC RX W HCPCS: Performed by: NURSE PRACTITIONER

## 2021-02-03 PROCEDURE — 85610 PROTHROMBIN TIME: CPT

## 2021-02-03 PROCEDURE — 80053 COMPREHEN METABOLIC PANEL: CPT

## 2021-02-03 PROCEDURE — 6370000000 HC RX 637 (ALT 250 FOR IP): Performed by: NURSE PRACTITIONER

## 2021-02-03 PROCEDURE — 36415 COLL VENOUS BLD VENIPUNCTURE: CPT

## 2021-02-03 PROCEDURE — 6360000002 HC RX W HCPCS: Performed by: STUDENT IN AN ORGANIZED HEALTH CARE EDUCATION/TRAINING PROGRAM

## 2021-02-03 PROCEDURE — 99232 SBSQ HOSP IP/OBS MODERATE 35: CPT | Performed by: INTERNAL MEDICINE

## 2021-02-03 PROCEDURE — 94640 AIRWAY INHALATION TREATMENT: CPT

## 2021-02-03 PROCEDURE — 1200000003 HC TELEMETRY R&B

## 2021-02-03 PROCEDURE — 94761 N-INVAS EAR/PLS OXIMETRY MLT: CPT

## 2021-02-03 PROCEDURE — C9113 INJ PANTOPRAZOLE SODIUM, VIA: HCPCS | Performed by: NURSE PRACTITIONER

## 2021-02-03 PROCEDURE — 99221 1ST HOSP IP/OBS SF/LOW 40: CPT | Performed by: UROLOGY

## 2021-02-03 PROCEDURE — 2580000003 HC RX 258: Performed by: PHYSICIAN ASSISTANT

## 2021-02-03 PROCEDURE — 97530 THERAPEUTIC ACTIVITIES: CPT

## 2021-02-03 PROCEDURE — 97129 THER IVNTJ 1ST 15 MIN: CPT | Performed by: SPEECH-LANGUAGE PATHOLOGIST

## 2021-02-03 PROCEDURE — 6370000000 HC RX 637 (ALT 250 FOR IP): Performed by: STUDENT IN AN ORGANIZED HEALTH CARE EDUCATION/TRAINING PROGRAM

## 2021-02-03 PROCEDURE — 99232 SBSQ HOSP IP/OBS MODERATE 35: CPT | Performed by: FAMILY MEDICINE

## 2021-02-03 PROCEDURE — 99221 1ST HOSP IP/OBS SF/LOW 40: CPT | Performed by: PSYCHIATRY & NEUROLOGY

## 2021-02-03 PROCEDURE — 82248 BILIRUBIN DIRECT: CPT

## 2021-02-03 PROCEDURE — 6370000000 HC RX 637 (ALT 250 FOR IP): Performed by: PSYCHIATRY & NEUROLOGY

## 2021-02-03 RX ORDER — ZIPRASIDONE MESYLATE 20 MG/ML
20 INJECTION, POWDER, LYOPHILIZED, FOR SOLUTION INTRAMUSCULAR EVERY 12 HOURS PRN
Status: DISCONTINUED | OUTPATIENT
Start: 2021-02-03 | End: 2021-02-05 | Stop reason: HOSPADM

## 2021-02-03 RX ORDER — OLANZAPINE 10 MG/1
10 TABLET ORAL NIGHTLY
Status: DISCONTINUED | OUTPATIENT
Start: 2021-02-03 | End: 2021-02-04

## 2021-02-03 RX ORDER — QUETIAPINE FUMARATE 25 MG/1
12.5 TABLET, FILM COATED ORAL 2 TIMES DAILY
Status: DISCONTINUED | OUTPATIENT
Start: 2021-02-03 | End: 2021-02-03

## 2021-02-03 RX ORDER — TRAZODONE HYDROCHLORIDE 50 MG/1
50 TABLET ORAL NIGHTLY
Status: DISCONTINUED | OUTPATIENT
Start: 2021-02-03 | End: 2021-02-03

## 2021-02-03 RX ORDER — FUROSEMIDE 10 MG/ML
40 INJECTION INTRAMUSCULAR; INTRAVENOUS ONCE
Status: COMPLETED | OUTPATIENT
Start: 2021-02-03 | End: 2021-02-03

## 2021-02-03 RX ORDER — TRAZODONE HYDROCHLORIDE 50 MG/1
50 TABLET ORAL NIGHTLY
Status: DISCONTINUED | OUTPATIENT
Start: 2021-02-03 | End: 2021-02-05 | Stop reason: HOSPADM

## 2021-02-03 RX ADMIN — PIPERACILLIN AND TAZOBACTAM 3375 MG: 3; .375 INJECTION, POWDER, LYOPHILIZED, FOR SOLUTION INTRAVENOUS at 11:04

## 2021-02-03 RX ADMIN — Medication 4.5 MG: at 20:27

## 2021-02-03 RX ADMIN — PANTOPRAZOLE SODIUM 40 MG: 40 INJECTION, POWDER, FOR SOLUTION INTRAVENOUS at 08:33

## 2021-02-03 RX ADMIN — PRAVASTATIN SODIUM 40 MG: 40 TABLET ORAL at 20:27

## 2021-02-03 RX ADMIN — TRAZODONE HYDROCHLORIDE 50 MG: 50 TABLET ORAL at 20:27

## 2021-02-03 RX ADMIN — IPRATROPIUM BROMIDE AND ALBUTEROL SULFATE 1 AMPULE: .5; 3 SOLUTION RESPIRATORY (INHALATION) at 16:27

## 2021-02-03 RX ADMIN — ASPIRIN 81 MG: 81 TABLET, CHEWABLE ORAL at 10:45

## 2021-02-03 RX ADMIN — IPRATROPIUM BROMIDE AND ALBUTEROL SULFATE 1 AMPULE: .5; 3 SOLUTION RESPIRATORY (INHALATION) at 20:33

## 2021-02-03 RX ADMIN — Medication 3 MG: at 20:27

## 2021-02-03 RX ADMIN — DOCUSATE SODIUM 100 MG: 50 LIQUID ORAL at 20:27

## 2021-02-03 RX ADMIN — Medication 1 CAPSULE: at 08:33

## 2021-02-03 RX ADMIN — DOCUSATE SODIUM 100 MG: 50 LIQUID ORAL at 08:32

## 2021-02-03 RX ADMIN — PIPERACILLIN AND TAZOBACTAM 3375 MG: 3; .375 INJECTION, POWDER, LYOPHILIZED, FOR SOLUTION INTRAVENOUS at 03:24

## 2021-02-03 RX ADMIN — IPRATROPIUM BROMIDE AND ALBUTEROL SULFATE 1 AMPULE: .5; 3 SOLUTION RESPIRATORY (INHALATION) at 08:07

## 2021-02-03 RX ADMIN — PANTOPRAZOLE SODIUM 40 MG: 40 INJECTION, POWDER, FOR SOLUTION INTRAVENOUS at 20:27

## 2021-02-03 RX ADMIN — FUROSEMIDE 40 MG: 10 INJECTION, SOLUTION INTRAMUSCULAR; INTRAVENOUS at 20:27

## 2021-02-03 RX ADMIN — SODIUM CHLORIDE, PRESERVATIVE FREE 10 ML: 5 INJECTION INTRAVENOUS at 20:28

## 2021-02-03 RX ADMIN — PIPERACILLIN AND TAZOBACTAM 3375 MG: 3; .375 INJECTION, POWDER, LYOPHILIZED, FOR SOLUTION INTRAVENOUS at 18:00

## 2021-02-03 RX ADMIN — SODIUM CHLORIDE, PRESERVATIVE FREE 10 ML: 5 INJECTION INTRAVENOUS at 08:34

## 2021-02-03 RX ADMIN — TAMSULOSIN HYDROCHLORIDE 0.4 MG: 0.4 CAPSULE ORAL at 08:33

## 2021-02-03 RX ADMIN — OLANZAPINE 10 MG: 10 TABLET, FILM COATED ORAL at 16:49

## 2021-02-03 RX ADMIN — IPRATROPIUM BROMIDE AND ALBUTEROL SULFATE 1 AMPULE: .5; 3 SOLUTION RESPIRATORY (INHALATION) at 12:06

## 2021-02-03 NOTE — PROGRESS NOTES
Patient was disoriented and confused x3. Very lethargic but cooperative in assessment. Upper extremities warm, dry, and pink, no numbness or tingling. Skin turgor < 3 seconds. Capillary refill < 3 seconds. Hand grasp strong and equal bilaterally. Radial pulse was +3 bilaterally. Heart sounds S1 and S2 were heard. Heart sounds palpable over chest. Anterior and posterior lung sounds diminished over lower lobes. Bowel sounds heard X4 active <10 seconds. Lower extremities were dry, warm, and pink, no numbness or tingling. Pitting edema +2 in lower legs, bilaterally. Dorsal pedis and posterior tibialis pulses +3 bilaterally. Pedal push and pull was strong and equal bilaterally. Capillary refill was < 3 seconds.

## 2021-02-03 NOTE — CONSULTS
NEUROLOGY CONSULT NOTE      Requesting Physician: Miguel Wells MD    Reason for Consult:  Evaluate for encephalopathy    History of Present Illness:  Rupa Orlando is a 80 y.o. male admitted to [address] [place] [address] on 1/17/2021.       80year old man with afib on coumadin, CAD, mild carotid stenosis, hx of stroke without deficit, he was found falling and getting weaker over time. He was admitted on the 1/17/2021 and was found to have COVID19 pneumonia, of which he went through a relatively complicated course. Neurology consulted for encephalopathy. MRI was done on admission and didn't show any new stroke. Reports no chest pain. No shortness of breath with exertion. Reports no neck pain. No vision changes. No dysphagia. No fever. No rash. No weight loss.     Past Medical History:        Diagnosis Date    Actinic keratoses 8/21/2013    Adenomatous polyp 2008    Atrial fibrillation ([facility]     CAD (coronary artery disease) 2009    Carotid artery disease ([facility] [address] [facility] 2009    left    Hyperlipidemia 2004    Hypertension 2004    Inguinal hernia, left 2/19/2014    Unspecified cerebral artery occlusion with cerebral infarction            Procedure Laterality Date    ABDOMEN SURGERY      ABDOMINAL ADHESION SURGERY  6/2012    Dr Allyn Huynh  2004    right    COLONOSCOPY      [facility]    CORONARY ARTERY BYPASS GRAFT  2009    SMALL INTESTINE SURGERY      SMALL INTESTINE SURGERY  02/20/2015    Exploratory Laparotomy    UPPER GASTROINTESTINAL ENDOSCOPY N/A 1/26/2021    EGD ESOPHAGOGASTRODUODENOSCOPY PEG TUBE INSERTION performed by Meaghan Conn MD at [facility]       Allergies:    No Known Allergies     Current Medications:       warfarin (COUMADIN) split-tablet 4.5 mg, Once      traZODone (DESYREL) tablet 50 mg, Nightly      aspirin chewable tablet 81 mg, Daily      lactobacillus (CULTURELLE) capsule 1 capsule, Daily with breakfast      melatonin tablet 3 Smokeless Tobacco Never Used     Social History     Substance and Sexual Activity   Alcohol Use Yes    Alcohol/week: 1.0 standard drinks    Types: 1 Cans of beer per week    Comment: occasionally     Social History     Substance and Sexual Activity   Drug Use No         Family History:       Problem Relation Age of Onset    Cancer Mother        Review of Systems:  All systems reviewed and are all negative except what is mentioned in history of present illness. I reviewed the patient PMH,medications, family history, social history. Physical Exam:  BP (!) 143/69   Pulse 69   Temp 98.4 °F (36.9 °C) (Oral)   Resp 18   Ht 5' 4\" (1.626 m)   Wt 166 lb 6.4 oz (75.5 kg)   SpO2 92%   BMI 28.56 kg/m²  I Body mass index is 28.56 kg/m². I   Wt Readings from Last 1 Encounters:   02/03/21 166 lb 6.4 oz (75.5 kg)           General appearance - alert, in no distress, oriented to person, not place, and time and weight,follow 1 step command with coaching  Mental status -Level of Alertness: awake  Memory: abnormal - poor  FAttention/Concentration: normal  Language: normal. Mood is normal  Neck - supple, no neck adenopathy, carotids upstroke normal bilaterally, no carotid bruits. There is no LAP in the neck. There is no thyroid enlargement. Neurological -   Cranial Ncmuan-RY-INF:   Cranial nerve II: Normal. There is full visual fields  Cranial nerve III: Pupils: equal, round, reactive to light   Cranial nerves III, IV, VI: Extraocular Movements: intact   Cranial nerve V: Facial sensation: intact   Cranial nerve VII:Facial strength: intact   Cranial nerve VIII: Hearing: intact   Cranial nerve IX: Palate Elevation intact bilaterally  Cranial nerve XI: Shoulder shrug intact bilaterally  Cranial nerve XII: Tongue midline   neck supple without rigidity  Motor exam is 5/5 in the upper and lower extremities. Normal muscle tone. There is no muscle atrophy.  There is no muscle fasciculation   Sensory is intact Coordination: finger to nose intact  Gait and station not tested  Abnormal movement none. Long tracts are intact  Skin - no rashes or lesions, warm and dry to touch  Superficial temporal artery pulses are normal.   Musculoskeletal: Has no hand arthritis, no limitation of ROM in any of the four extremities, . no joint tenderness, deformity or swelling. There is no leg edema. The Heart was regular in rate and rhythm. No heart murmur    Abdomen: soft, intact bowel sounds. Labs:    CBC:   Recent Labs     02/01/21  0550 02/02/21  0545 02/03/21  0355   WBC 7.4 7.7 6.5   HGB 7.5* 7.8* 7.9*    164 179   MCV 93.2 91.5 91.1   MCH 28.2 28.7 28.1   MCHC 30.2* 31.3* 30.9*     CMP:  Recent Labs     02/01/21  0550 02/02/21  0545 02/03/21  0355    145 142   K 3.8 3.9 4.0    106 105   CO2 31 29 28   BUN 21 19 18   CREATININE 1.3* 1.1 1.2   LABGLOM 52* 63* 57*   GLUCOSE 124* 114* 120*   CALCIUM 7.8* 8.2* 8.3*     Liver:   Recent Labs     02/03/21  0355   AST 25   ALT 22   ALKPHOS 135*   PROT 5.8*   LABALBU 2.5*   BILITOT 0.5     MRI BRAIN:  No results found for this or any previous visit. No results found for this or any previous visit. No results found for this or any previous visit. No results found for this or any previous visit. Results for orders placed during the hospital encounter of 01/17/21   MRI BRAIN WO CONTRAST    Narrative Noncontrast MRI of the brain. Technique: Sagittal T1, coronal T2, axial T1, T2, FLAIR and   diffusion-weighted imaging. Comparison:   CT,SR  - CT HEAD WO CONTRAST  - 01/17/2021 03:49 PM EST    Findings: Normal signal on diffusion-weighted imaging. No evidence for   acute ischemic event. Moderate nonspecific periventricular deep white matter disease. This most   often can be ascribed to chronic small vessel ischemic change. Prominent   central and cortical volume loss. Old ischemic event left temporal lobe. There are no intracranial masses.     No evidence for hemorrhage. The ventricles are normal size, no   hydrocephalus. Contents of the posterior fossa including brainstem and cerebellum are   unremarkable. Normal vascular flow voids. Normal aeration of paranasal sinuses. Impression Impression:  Moderate nonspecific periventricular deep white matter disease. This most   often can be ascribed to chronic small vessel ischemic change. Prominent central and cortical volume loss. Old ischemic event left temporal lobe. This document has been electronically signed by: Mikhail Jones MD on   01/19/2021 12:59 AM       No results found for this or any previous visit. No results found for this or any previous visit. Results for orders placed during the hospital encounter of 01/17/21   CT Head WO Contrast    Narrative PROCEDURE: CT HEAD WO CONTRAST    CLINICAL INFORMATION: fall, ams     COMPARISON: 8/17/2015    TECHNIQUE:  Axial CT images were obtained through the head without contrast. Coronal and sagittal reformatted images were rendered. All CT scans at this facility use dose modulation, iterative reconstruction, and/or weight-based dosing when appropriate   to reduce radiation dose to as low as reasonably achievable. 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 There is moderate diffuse atrophy. No acute intracranial hemorrhage or mass effect is seen. There is no midline shift. The basal cisterns and posterior fossa appear otherwise unremarkable. Parasellar carotid artery vascular calcifications are noted. The visualized paranasal sinuses and mastoid air cells appear clear. No acute abnormalities of the calvarium are seen. The visualized globes and orbits appear grossly intact. Impression 1. No acute intracranial hemorrhage or mass effect. 2. Moderate diffuse atrophy is demonstrated.  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 **This report has been created using voice recognition software. It may contain minor errors which are inherent in voice recognition technology. **    Final report electronically signed by Dr. Fredy Foster on 1/17/2021 4:17 PM         We reviewed the patient records and available information in the EHR       Impression:    Active Problems:    Hyponatremia    Traumatic rhabdomyolysis ([address]    Fall    Supratherapeutic INR    Coagulopathy ([facility] [address]    COVID-19    Elevated CK  Resolved Problems:    * No resolved hospital problems. [de-identified]    80year old man with afib on coumadin, went through a COVID19 pneumonia course, now with encephalopathy since yesterday. Recommendations:    - his CXR looked slightly worse today than 3 days ago  - metabolic component certainly may play a role in his confusion  - COVID19 encephalopathy is also a possibility  - will place him on olanzepine 10mg for a short time to get his agitation under control, may lower dose if he needs it longer.  - keep sunlight in the room during the day time  - will f/u                                              1. Discussed with primary service. 2. Please call if any questions. Time spent was at least 65 min of time evaluating patient, discussion with staff,  planning for treatment and evaluation. The time was spent examining patient, reviewing the images personally, reviewing the chart, perform high complexity decision making and speaking with the nursing staff regarding recommendations.      Electronically signed by Ozzie Claire MD on 2/3/2021 at 2:01 Nan Amaya MD  Attending Neurologist/Neurointensivist

## 2021-02-03 NOTE — PROGRESS NOTES
[facility]   Nephrology progress note  2/3/2021, 8:29 AM      Pt Name:    Bri Pollard  MRN:     [id number]     YOB: 1932  Admit Date:    1/17/2021  2:31 PM  Primary Care Physician:  Lorni Curry DO   Room number  0V-76/810-G    Chief Complaint: Nephrology was following for hyponatremia but now for recurrent ANNIKA  Subjective:  Patient seen and examined  Doing better  Having recurrent urinary retention  Discussed with RN  Patient is not a very good historian      Objective:  24HR INTAKE/OUTPUT:      Intake/Output Summary (Last 24 hours) at 2/3/2021 0829  Last data filed at 2/3/2021 0325  Gross per 24 hour   Intake 2209.99 ml   Output 300 ml   Net 1909.99 ml     I/O last 3 completed shifts: In: 7847 [I.V.:780; NG/GT:2516]  Out: 300 [Urine:300]  No intake/output data recorded. Admission weight: 162 lb (73.5 kg)  Wt Readings from Last 3 Encounters:   02/03/21 166 lb 6.4 oz (75.5 kg)   01/06/21 162 lb (73.5 kg)   01/05/21 160 lb (72.6 kg)     Body mass index is 28.56 kg/m².     Physical examination  VITALS:     Vitals:    02/02/21 2000 02/02/21 2125 02/02/21 2315 02/03/21 0315   BP: (!) 151/60  (!) 144/65 (!) 166/73   Pulse: 62  76 84   Resp: 16 18 17 16   Temp: 98 °F (36.7 °C)  98.4 °F (36.9 °C) 97.4 °F (36.3 °C)   TempSrc: Oral  Oral Oral   SpO2: 90% 90% 91% 92%   Weight:    166 lb 6.4 oz (75.5 kg)   Height:           General Appearance: appears comfortable, no distress  Neck: No JVD noted  Heart: S1, S2  Lungs:  no use of accessory muscles, no labored breathing  Abd: Soft, nontender  Ext: Trace lower extremity edema      Lab Data  CBC:   Recent Labs     02/01/21  0550 02/02/21  0545 02/03/21  0355   WBC 7.4 7.7 6.5   HGB 7.5* 7.8* 7.9*   HCT 24.8* 24.9* 25.6*    164 179     BMP:  Recent Labs     02/01/21  0550 02/02/21  0545 02/03/21  0355    145 142   K 3.8 3.9 4.0    106 105   CO2 31 29 28   BUN 21 19 18   CREATININE 1.3* 1.1 1.2   GLUCOSE 124* 114* 120*   CALCIUM 7.8* 8.2* 8.3*     Hepatic:   Recent Labs     02/01/21  0550 02/03/21  0355   LABALBU 2.2* 2.5*   AST 26 25   ALT 23 22   BILITOT 0.3 0.5   ALKPHOS 113 135*         Meds:  Infusion:    sodium chloride Stopped (01/28/21 9064)     Meds:    aspirin  81 mg PEG Tube Daily    lactobacillus  1 capsule PEG Tube Daily with breakfast    melatonin  3 mg PEG Tube Nightly    warfarin (COUMADIN) daily dosing (placeholder)   Other RX Placeholder    pantoprazole  40 mg Intravenous BID    polyethylene glycol  17 g Oral Daily    sodium phosphate  1 enema Rectal Once    docusate  100 mg Oral BID    piperacillin-tazobactam  3,375 mg Intravenous Q8H    ipratropium-albuterol  1 ampule Inhalation Q4H WA    pravastatin  40 mg PEG Tube Nightly    lidocaine 1 % injection  5 mL Intradermal Once    sodium chloride flush  10 mL Intravenous 2 times per day    sodium chloride flush  10 mL Intravenous 2 times per day    calcium replacement protocol   Other RX Placeholder    phosphorus replacement protocol   Other RX Placeholder    tamsulosin  0.4 mg Oral Daily     Meds prn: cyclobenzaprine, sodium chloride, sodium chloride flush, sodium chloride flush, promethazine **OR** ondansetron, morphine, potassium chloride **OR** potassium alternative oral replacement **OR** potassium chloride, potassium chloride, potassium chloride, acetaminophen **OR** acetaminophen, magnesium sulfate, mupirocin       Impression and Plan:  1. Acute Kidney injury: Renal function improving. ANNIKA resolved and stable at this time  2. Recurrent urinary retention. Bladder scan shows greater than 500 mL. Will insert Duarte and consult urology. 3.  Recent hyponatremia. Resolved  3. Rhabdomyolysis. Resolved  4. Status post feeding tube placement  5. Anemia  7. Peripheral edema.   Diuretics prn    Discussed with GERARDO Raymond MD  [facility]

## 2021-02-03 NOTE — PROGRESS NOTES
Patient was cognition reassessed , Patient was able to answer all orientation questions correctly. Patient was able to ambulate to bedside commode 2 assist walker with minimal assistance. Will continue to monitor.

## 2021-02-03 NOTE — CARE COORDINATION
2/3/21, 1:57 PM EST    DISCHARGE PLANNING EVALUATION    SW updated Avel Christopher at [facility], no discharge today.

## 2021-02-03 NOTE — CONSULTS
[address] [facility] [address]  [address] [facility] [address]  Dept: [phone]  Loc: [phone]  Visit Date: 1/17/2021    Urology Consult Note    Reason for Consult:  Urinary Retention - Failed void trial  Requesting Physician:  Dr Ninetta Denver    History Obtained From:  patient    Chief Complaint: Fall    HISTORY OF PRESENT ILLNESS:                The patient is a 80 y.o. male with significant past medical history of see below who presented to ER after an unwitnessed fall, was found on the ground for an unknown period of time. He does not remember what happened. Boswell was placed in ER, removed yesterday. He failed void trial with bladder scan this AM 554ml, will insert boswell catheter this AM.    Urologic history:  Last seen Dr Freddy Zarate 2/25/20 for BPH, symptoms were improved per note. No UTI, was instructed to continue flomax, f/u in 1 year. Past Medical History:        Diagnosis Date    Actinic keratoses 8/21/2013    Adenomatous polyp 2008    Atrial fibrillation ([address]     CAD (coronary artery disease) 2009    Carotid artery disease ([address] 2009    left    Hyperlipidemia 2004    Hypertension 2004    Inguinal hernia, left 2/19/2014    Unspecified cerebral artery occlusion with cerebral infarction      Past Surgical History:        Procedure Laterality Date    ABDOMEN SURGERY      ABDOMINAL ADHESION SURGERY  6/2012    Dr Allyn Huynh  2004    right    [facility]ONOSCOPY      [facility]    CORONARY ARTERY BYPASS GRAFT  2009    SMALL INTESTINE SURGERY      SMALL INTESTINE SURGERY  02/20/2015    Exploratory Laparotomy    UPPER GASTROINTESTINAL ENDOSCOPY N/A 1/26/2021    EGD ESOPHAGOGASTRODUODENOSCOPY PEG TUBE INSERTION performed by Meaghan Conn MD at [facility]     Allergies:  Patient has no known allergies. Social History:  Social History     Socioeconomic History    Marital status:       Spouse name: Not on file  Number of children: Not on file    Years of education: Not on file    Highest education level: Not on file   Occupational History    Not on file   Social Needs    Financial resource strain: Not on file    Food insecurity     Worry: Not on file     Inability: Not on file    Transportation needs     Medical: Not on file     Non-medical: Not on file   Tobacco Use    Smoking status: Former Smoker     Quit date: 1980     Years since quittin.1    Smokeless tobacco: Never Used   Substance and Sexual Activity    Alcohol use: Yes     Alcohol/week: 1.0 standard drinks     Types: 1 Cans of beer per week     Comment: occasionally    Drug use: No    Sexual activity: Not on file   Lifestyle    Physical activity     Days per week: Not on file     Minutes per session: Not on file    Stress: Not on file   Relationships    Social connections     Talks on phone: Not on file     Gets together: Not on file     Attends [employer] service: Not on file     Active member of club or organization: Not on file     Attends meetings of clubs or organizations: Not on file     Relationship status: Not on file    Intimate partner violence     Fear of current or ex partner: Not on file     Emotionally abused: Not on file     Physically abused: Not on file     Forced sexual activity: Not on file   Other Topics Concern    Not on file   Social History Narrative    Not on file     Family History:       Problem Relation Age of Onset    Cancer Mother        ROS:  Constitutional: Negative for chills, fatigue, fever, or weight loss. Eyes: Denies reported visual changes. ENT: Denies headache, difficulty swallowing, earache, and nosebleeds. Cardiovascular: Negative for chest pain, palpitations, tachycardia or edema. Respiratory: Denies cough or SOB. GI:The patient denies abdominal or flank pain, anorexia, nausea or vomiting. : see HPI.   Musculoskeletal: Patient denies low back pain or painful or reduced range of ROM.  Neurological: The patient denies any symptoms of neurological impairment or TIA. Psychiatric: Denies anxiety or depression. Skin: Denies rash or lesions. All remaining ROS negative. PHYSICAL EXAM:  VITALS:  BP (!) 166/73   Pulse 84   Temp 97.4 °F (36.3 °C) (Oral)   Resp 16   Ht 5' 4\" (1.626 m)   Wt 166 lb 6.4 oz (75.5 kg)   SpO2 92%   BMI 28.56 kg/m² . Nursing note and vitals reviewed. Constitutional: Alert and oriented times x3, no acute distress, and cooperative to examination with appropriate mood and affect. HEENT:   Head:         Normocephalic and atraumatic. Mouth/Throat:          Mucous membranes are normal.   Eyes:         EOM are normal. No scleral icterus. Nose:    The external appearance of the nose is normal  Ears: The ears appear normal to external inspection. Cardiovascular:       Normal rate, regular rhythm. Pulmonary/Chest:  Normal respiratory rate and rhthym. No use of accessory muscles. Lungs clear bilaterally. Abdominal:          Soft. No tenderness. Active bowel sounds             Genitalia:    Normal circumcised penis  Urethral meatus is normal in size and location  Scrotal contents normal to inspection and palpation   Normal testes palpated bilaterally  Musculoskeletal:    Normal range of motion. He exhibits no edema or tenderness of lower extremities. Extremities:    No cyanosis, clubbing, or edema present. Neurological:    Alert and oriented.      DATA:  CBC:   Lab Results   Component Value Date    WBC 6.5 02/03/2021    RBC 2.81 02/03/2021    HGB 7.9 02/03/2021    HCT 25.6 02/03/2021    MCV 91.1 02/03/2021    MCH 28.1 02/03/2021    MCHC 30.9 02/03/2021    RDW 21.5 08/24/2016     02/03/2021    MPV 11.3 02/03/2021     BMP:    Lab Results   Component Value Date     02/03/2021    K 4.0 02/03/2021    K 4.6 01/20/2021     02/03/2021    CO2 28 02/03/2021    BUN 18 02/03/2021    CREATININE 1.2 02/03/2021    CALCIUM 8.3 02/03/2021    LABGLOM 57 02/03/2021    GLUCOSE 120 02/03/2021    GLUCOSE 87 01/09/2015     BUN/Creatinine:    Lab Results   Component Value Date    BUN 18 02/03/2021    CREATININE 1.2 02/03/2021     Magnesium:    Lab Results   Component Value Date    MG 2.1 01/28/2021     Phosphorus:    Lab Results   Component Value Date    PHOS 2.6 01/26/2021     PT/INR:    Lab Results   Component Value Date    INR 1.23 02/03/2021     U/A:    Lab Results   Component Value Date    COLORU YELLOW 02/02/2021    PHUR >=9.0 02/02/2021    LABCAST NONE SEEN 02/02/2021    LABCAST NONE SEEN 02/02/2021    WBCUA 0-2 02/02/2021    RBCUA 0-2 02/02/2021    MUCUS NONE SEEN 01/27/2021    YEAST NONE SEEN 02/02/2021    BACTERIA NONE SEEN 02/02/2021    SPECGRAV 1.019 02/02/2021    LEUKOCYTESUR NEGATIVE 02/02/2021    LEUKOCYTESUR SMALL 01/27/2021    UROBILINOGEN 0.2 02/02/2021    BILIRUBINUR NEGATIVE 02/02/2021    BLOODU NEGATIVE 02/02/2021    GLUCOSEU NEGATIVE 01/27/2021    AMORPHOUS URATES 01/27/2021         IMPRESSION:   BPH - on flomax  Urinary retention  ANNIKA - improved CRT 1.2 (0.9-1.2), 1.3 on admission      Plan:    UA does not appear infected  Bladder scan 554ml - insert boswell and keep in until his mobility improves, can do void trial then  Plan [facility] rehab at [facility]  Continue Flomax - can hold for low BP  Will need scheduled for in office cystoscopy - our office will coordinate    Discussed case with Dr Ernestina Gallardo  Thank you for including us in the care of ROBBIE Edward  02/03/21 9:05 AM  Urology

## 2021-02-03 NOTE — FLOWSHEET NOTE
02/03/21 1400   Provider Notification   Reason for Communication Evaluate   Provider Name Dr. Viveca Apley   Provider Notification Physician   Method of Communication Secure Message   Response See orders     Physician consulted for increased confusion, Dr. Viveca Apley Notified

## 2021-02-03 NOTE — PROGRESS NOTES
Received a phone call from primary RN, Royal Rivera. Royal Rivera states that the family have decided that they would like his code status changed to limited no x 4 and she will speak with the physician to change this code status. [place] DNR form and prescription to indicate do not intubate given to primary RN Royal Rivera.

## 2021-02-03 NOTE — FLOWSHEET NOTE
Prayer and encouragement      02/03/21 1651   Encounter Summary   Services provided to: Patient and family together   Referral/Consult From: [address] Visiting Yes  (2/3 )   Complexity of Encounter Low   Length of Encounter 15 minutes   Routine   Type Follow up   Assessment Approachable;Calm   Intervention [place];Prayer;Nurtured hope   Outcome Acceptance;Expressed gratitude;Encouraged; Hopeful;Receptive

## 2021-02-03 NOTE — PROGRESS NOTES
Patient was asleep in bed. Call light and bedside table was within reach with bed elevated at 30 degrees and bed alarm on.

## 2021-02-03 NOTE — PROGRESS NOTES
[address] [id number]  INPATIENT SPEECH THERAPY  [facility]  DAILY NOTE    TIME   SLP Individual Minutes  Time In: 5109  Time Out: 1003  Minutes: 26  Timed Code Treatment Minutes: 18 Minutes       Date: 2/3/2021  Patient Name: Steven Rodrigues      CSN: [id number]   : 3/30/1932  (80 y.o.)  Gender: male   Referring Physician:  Bertram Ya MD  Diagnosis: Hyponatremia  Secondary Diagnosis: Dysphagia, cognitive linguistic deficits  Precautions: fall risk, aspiration  Current Diet: NPO  Swallowing Strategies: Not Applicable  Date of Last FEES: 21    Pain:  No pain reported. Subjective:  Patient confused upon arrival stating that he needed to feed his dog. Patient also making other confused comments throughout this session. Patient's RN Mariano Hatfield, reports the patient is sleep deprived. Short-Term Goals:  SHORT TERM GOAL #1:  Goal 1: Pt will consume PO TRIALS of ice chips and thin liquids by spoon with ST only with improved mentation, demonstration of improved swallow timeliness and with absence of s/s of aspiration to determine pt appropriateness for repeat instrumental assessment (i.e. FEES vs MBS). INTERVENTIONS:  Completed good oral care with a toothette prior to trials of ice chips. Patient required max cues to attempt to keep his mouth open while cleaning his mouth. Patient wanting to suck the liquid out of the toothette. Then completed trials of ice chips x 5 without immediate overt difficulty on 5/5 trials. Patient completed x 1 swallow with the first 3 trials of ice chips, 2 swallows with the 4th trial and 3 swallows with the 5th trial.  Suspect the water that melted from the ice chips may have been building up in the valleculae, thus causing the patient to swallow more to attempt to clear it. Do not feel the patient is ready for a repeat instrumental assessment at this time d/t continued confusion and inconsistent alertness.     SHORT TERM GOAL #2:  Goal 2: Pt will complete functional orientation tasks (i.e call light, basic demongraphic) with 65% accuracy, max cues to improve retention of pertinent information. INTERVENTIONS:  Patient independently stated his first and last name, however, was unable to verbalize his  with max cues. Patient required max cues for his current location. Independently stated the month and date, but required mod cues for the year. SHORT TERM GOAL #3:  Goal 3: Pt will complete confrontational naming, divergent naming, and responsive naming tasks with 60% accuracy, max cues to reduce number of communicational breakdowns. INTERVENTIONS:did not test this date d/t focus on other goals    SHORT TERM GOAL #4:  Goal 4: Pt will  complete  attention tasks with less than 5 redirections until task completion to improve functionality with ADL completion. INTERVENTIONS: Sustained/selective attention task completed in which the patient was asked to identify 1 number throughout a string of numbers presented with auditory provision. Patient required 6 redirects throughout the 2 minute task. SHORT TERM GOAL #5:  Goal 5: Pt will complete functional problem solving, reasoning, sequencing, thought organization and executive functioning tasks (i.e telling time, managing medication)  with 60% accuracy, max cues to improve functionality in the current/home environment. INTERVENTIONS:  Patient required min cues to tell the time on the analog clock in his room. Patient required mod cues to verbalize the steps to make scrambled eggs (he reports this is something he made prior to his illness). Patient initially stated he puts them in the oven and puts water with them.     Long-Term Goals:   No LTG's d/t short ELOS       EDUCATION:  Learner: Patient  Education:  Reviewed signs, symptoms and risks of aspiration  Evaluation of Education: Verbalizes understanding, Needs further instruction and Family not present    ASSESSMENT/PLAN:  Activity Tolerance:  Patient tolerance of  treatment: fair. Assessment/Plan: Patient progressing toward established goals. Continues to require skilled care of licensed speech pathologist to progress toward achievement of established goals and plan of care. .     Plan for Next Session: trials of ice chips or thin liquids, naming tasks, attention task, functional problem solving       Rosa M Rocha M.S. [id number] [id number]

## 2021-02-03 NOTE — PLAN OF CARE
Problem: Impaired respiratory status  Goal: Clear lung sounds  Description: Clear lung sounds  Outcome: Met This Shift  Note:  Patient lung sounds are considered normal for their current lung condition. No signs of distress noted.  Current treatment regimen appropriate

## 2021-02-03 NOTE — PROGRESS NOTES
PROGRESS NOTE      Patient:  Stafford Bernheim      Unit/Bed:6K-22/022-A    YOB: 1932    MRN: [id number]       Acct: [de-identified]     PCP: Carly Marin DO    Date of Admission: 1/17/2021      Assessment/Plan:    Anticipated Discharge in : pending placement    Active Hospital Problems    Diagnosis Date Noted    COVID-19 [U07.1]     Elevated CK [R74.8]     Traumatic rhabdomyolysis ([facility] [address] Samuella [place]. 6XXA]     Fall [W19. XXXA]     Supratherapeutic INR [R79.1]     Coagulopathy ([address] [D68.9]     Hyponatremia [E87.1] 02/19/2015     Worsening bilateral consolidation- Fluid overload vs hospital prior pneumonia versus aspiration pneumonia. Patient had recent Covid infection. Vital signs have been stable on room air. Chest x-ray is worse in appearance when compared to previous, moderate pneumonia/pulmonary edema both mid and lower lung fields worse on the right.  -Continue Zosyn  -Will diurese with 40 mg of Lasix IV today  -Consider CT chest without contrast if respiratory status worsens  -Vital signs per unit protocol    Delirium-likely secondary to being hospitalized. Patient has reported history of delirium during past hospital admissions.   Patient was getting aggressive with nursing staff this afternoon.  -Telesitter has been placed.  -10 mg olanzapine nightly  -Geodon as needed for agitation  -Please try to avoid soft restraints or Geodon.  -Patient will need to be off telesitter and not requiring any as needed medications for 24 hours prior to discharge to facility    Dysphagia   - PEG tube placed patient is at goal with tube feeds   - will need free water with PEG tube feedings   - dietitian consulted    Chronic atrial fibrillation with SVR- telemetry today showed atrial fibrillation  - Continue Coumadin, Pharmacy to dose  - Avoid AV blocking agents  - No indication for PPM.     Normocytic anemia- hemoglobin currently at 7.9, no active signs of bleeding  -CBC in a.m.  -Transfuse if hemoglobin is less than 7    Chronic kidney disease stage III-   - Nephrology consulted, recommendations appreciated  - Creatinine today 1.2    Recurrent urinary retention  -Duarte catheter is in place  -Continue Flomax  -Urology is consulted, recommending keeping Duarte inserted until his mobility improves  -Patient will need outpatient follow-up with urology for cystoscopy per urology recommendations    Essential HTN    -Continue home medications. Hypocalcemia-  -replaced per protocol    Malnutrition- Nutritional status is poor   - PEG tube feedings are infusing without residuals     HFpEF, chronic- Latest echo (12/20) EF 55 to 60%.     BPH  Aware.      Hearing Loss  Hearing aids.     Deconditioning  Speech, Physical, Occupational Therapy. ECF placement (lima con)    Syncope & Collapse  Likely secondary to dehydration, hyponatremia and COVID infection  Pt was found unresponsive and down prior to admission. Cardiology and Neurology consulted. EEG was negative. MRI no acute CVA. Cardiology signed off.      Leukocytosis, resolved: ANNIKA resolved  Acute Hypoxic respiratory failure, resolved- on room air  Acute Traumatic Rhabdomyolysis, resolved -CK trended down from 9463 to 1111, No longer checking       Chief Complaint: [facility] Course:   79-year-old male presented to [facility] via EMS after sustaining an unwitnessed fall from standing position.  Patient was found on ground and unknown length of time spent on ground.  Per HPI patient son reports patient was progressively more weak over the last week and acting like himself up until the day before arrival.  On day of arrival patient's son states they were unable to get a hold of him therefore they went to his house where they found him on the bathroom floor. [facility] was brought in via EMS to [facility] emergency room department.     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     Of note patient was recently hospitalized at [facility] 12/9/2020 for asymptomatic bradycardia in which is beta-blocker and calcium blocker were stopped and follow-up with Dr. Afshan Dang and plans for 10-day event monitor were planned. Adriana Levy was seen by EPS clinic 1/16/2020 with no plans for permanent pacemaker at that time.     Upon arrival, patient does not member what happened and does not remember what happened prior to incident.  On arrival patient denies chest pain, shortness of breath, abdominal pain patient did report pain in his right leg and complaining of generalized muscle weakness and aches.  Patient lives at home alone.  Patient was found confused, confusion improved upon arrival to ED.  Patient had slight weakness in left lower extremity however this was limited by pain.  Patient had abrasions with different stages of ecchymosis to bilateral lower extremities.  While in the emergency room patient was found to have noted hyponatremia and elevated CPK.  CT of the head showed no acute intracranial hemorrhage or mass-effect.  Patient did have noted encephalomalacia within the left temporal lobe from prior remote infarct.  CT of cervical spine showed no acute fracture.  CT chest demonstrated moderate right pleural effusions, mild opacities right lung base.  CVC was placed and patient did receive 50 mL of 3% normal saline.  Covid positive.     Last week severe cramping upper thighs. Then did farely well. Friday cramping came back, Saturday went to football game.  Yesterday found him on bathroom floor.      1/18/21: Patient was not following commands and demonstrated pinpoint pupils.  Patient received 0.4 Narcan.  Patient did become more responsive and pupils were not pinpoint.  We will proceed with MRI of brain.     1/19/21: Patient appears significantly better. Sitting up in bed communicating and ate entire breakfast. Patient demonstrates difficulty hearing however patient denies pain anywhere. Patient still requiring significant amount of dopamine     1/20: Pt was transferred out of ICU. Pt did not eat much, he does not feel like eating or like the food. Sodium is stable around 122.      1/21: No acute events overnight.  Patient not eating well today. SLP saw and recommending NPO except for medications. Mental status waxes and wanes. HD stable on RA. No fevers or chills. No chest pain or sob.      1/22: Overnight more confused pulling on lines, restrains placed. Pt follows commands however confused. No fevers or chills. On RA. Not eating as pt is too confused.      1.28.2021 patient to undergo thoracentesis, PICC line placement, removal of IJ. Cultures to date are no growth. Will transfuse to keep hemoglobin greater then 7     1.29.2021 events noted during the night of dark brown emesis in color, will hold Coumadin dosing and hold Lovenox at this time, patient on Protonix twice daily, Will transfuse 25 g of albumin and follow it with Lasix 40 mg. No Bowel movements per nursing will start MiraLAX daily and Colace twice daily. Will resume tube feedings per protocol and check residuals.     1.30.2021 fecal occult blood positive, hemoglobin today is 7.9, Lovenox on hold along with Coumadin. Head of bed needs to be at 30 degrees. Renal has added Lasix 40 mg IV daily for 3 days     1.31.2021 hemoglobin 7.4 today down to 1 g and 48 hours, WBC 8.1, will obtain chest x-ray to evaluate pneumonia. All anticoagulation has been on hold, may need to transfuse 1 unit of packed RBCs. Tolerating tube feeds, creatinine at 1.3, total protein 5.1 and albumin 2.4     2.1.2021 we consulted dietary to evaluate the addition of a protein powder/liquid to the current Jevity 1.5 at 50 mL's per hour.   Patient's total protein has stabilized but his albumin continues to drop. Duarte has been removed with a voiding trial.  Creatinine is stabilized at 1.3 and hemoglobin has stabilized at 7.5 consider restarting Coumadin, will consult pharmacy.     2.2.2021 patient seen this a.m. discussed in huddle about possible discharge tomorrow  2/3/2021. Will ask pharmacy to start Coumadin dosing since hemoglobin is actually increasing we can allow his INR to drift up slowly and have nursing home monitor hemoglobin and INR on a daily basis.        Subjective: There were no acute events overnight. Patient was seen and examined in his room this afternoon. Patient had no complaints or concerns today. Patient denied headaches, dizziness, lightheadedness, nausea, vomiting, chest pain, shortness of breath, cough, or dysuria.     Medications:  Reviewed    Infusion Medications    sodium chloride Stopped (01/28/21 2215)     Scheduled Medications    warfarin  4.5 mg Oral Once    traZODone  50 mg Oral Nightly    aspirin  81 mg PEG Tube Daily    lactobacillus  1 capsule PEG Tube Daily with breakfast    melatonin  3 mg PEG Tube Nightly    warfarin (COUMADIN) daily dosing (placeholder)   Other RX Placeholder    pantoprazole  40 mg Intravenous BID    polyethylene glycol  17 g Oral Daily    sodium phosphate  1 enema Rectal Once    docusate  100 mg Oral BID    piperacillin-tazobactam  3,375 mg Intravenous Q8H    ipratropium-albuterol  1 ampule Inhalation Q4H WA    pravastatin  40 mg PEG Tube Nightly    lidocaine 1 % injection  5 mL Intradermal Once    sodium chloride flush  10 mL Intravenous 2 times per day    sodium chloride flush  10 mL Intravenous 2 times per day    calcium replacement protocol   Other RX Placeholder    phosphorus replacement protocol   Other RX Placeholder    tamsulosin  0.4 mg Oral Daily     PRN Meds: cyclobenzaprine, sodium chloride, sodium chloride flush, sodium chloride flush, promethazine **OR** ondansetron, morphine, potassium chloride **OR** potassium alternative oral replacement **OR** potassium chloride, potassium chloride, potassium chloride, acetaminophen **OR** acetaminophen, magnesium sulfate, mupirocin      Intake/Output Summary (Last 24 hours) at 2/3/2021 1432  Last data filed at 2/3/2021 1202  Gross per 24 hour   Intake 2335.8 ml   Output 0 ml   Net 2335.8 ml       Diet:  DIET TUBE FEED CONTINUOUS/CYCLIC NPO; 1.5 Calorie with Fiber (Jevity 1.5); Gastrostomy    Exam:  BP (!) 143/69   Pulse 69   Temp 98.4 °F (36.9 °C) (Oral)   Resp 18   Ht 5' 4\" (1.626 m)   Wt 166 lb 6.4 oz (75.5 kg)   SpO2 92%   BMI 28.56 kg/m²     Physical Exam  Vitals signs and nursing note reviewed. Constitutional:       General: He is sleeping. He is not in acute distress. Appearance: Normal appearance. He is ill-appearing. HENT:      Head: Normocephalic and atraumatic. Right Ear: External ear normal.      Left Ear: External ear normal.      Nose: Nose normal. No congestion or rhinorrhea. Mouth/Throat:      Mouth: Mucous membranes are moist.      Pharynx: Oropharynx is clear. No oropharyngeal exudate or posterior oropharyngeal erythema. Eyes:      General: No scleral icterus. Right eye: No discharge. Left eye: No discharge. Extraocular Movements: Extraocular movements intact. Conjunctiva/sclera: Conjunctivae normal.      Pupils: Pupils are equal, round, and reactive to light. Neck:      Musculoskeletal: Neck supple. No muscular tenderness. Cardiovascular:      Rate and Rhythm: Normal rate. Rhythm irregular. Pulses: Normal pulses. Heart sounds: Normal heart sounds. No murmur. Pulmonary:      Effort: Pulmonary effort is normal. No respiratory distress. Breath sounds: Rales present. No wheezing or rhonchi. Abdominal:      General: Abdomen is flat. Bowel sounds are normal. There is no distension. Palpations: Abdomen is soft. Tenderness: There is no abdominal tenderness. Musculoskeletal:      Right lower leg: Edema present. Left lower leg: No edema. Skin:     General: Skin is warm and dry. Capillary Refill: Capillary refill takes less than 2 seconds. Findings: No erythema or rash. Neurological:      Mental Status: Mental status is at baseline. He is disoriented. Psychiatric:         Behavior: Behavior is uncooperative, agitated and aggressive. Labs:   Recent Labs     02/01/21  0550 02/02/21  0545 02/03/21  0355   WBC 7.4 7.7 6.5   HGB 7.5* 7.8* 7.9*   HCT 24.8* 24.9* 25.6*    164 179     Recent Labs     02/01/21  0550 02/02/21  0545 02/03/21  0355    145 142   K 3.8 3.9 4.0    106 105   CO2 31 29 28   BUN 21 19 18   CREATININE 1.3* 1.1 1.2   CALCIUM 7.8* 8.2* 8.3*     Recent Labs     02/01/21  0550 02/03/21  0355   AST 26 25   ALT 23 22   BILIDIR <0.2 <0.2   BILITOT 0.3 0.5   ALKPHOS 113 135*     Recent Labs     02/01/21  0550 02/02/21  0545 02/03/21  0355   INR 1.13 1.19* 1.23*     No results for input(s): Molly Lin in the last 72 hours. Urinalysis:      Lab Results   Component Value Date    NITRU NEGATIVE 02/02/2021    WBCUA 0-2 02/02/2021    BACTERIA NONE SEEN 02/02/2021    RBCUA 0-2 02/02/2021    BLOODU NEGATIVE 02/02/2021    SPECGRAV 1.019 02/02/2021    GLUCOSEU NEGATIVE 01/27/2021       Radiology:  XR CHEST PORTABLE   Final Result   1. Moderate cardiac megaly. Metallic sternotomy sutures and vascular clips from prior surgery. Right arm PICC line, catheter tip in SVC. 2. Mild prominence of the central pulmonary vessels. Cannot exclude heart failure. 3. Soft tissue fullness right peritracheal region apparently related to tortuous brachycephalic vessels. No mass lesion seen in this region on recent CT thorax. 4. Moderate pneumonia/pulmonary edema both mid and lower lung fields, worse on the right. Small bilateral pleural effusions. 5. Overall appearance of chest has worsened since prior.             **This report has been created using voice recognition software. It may contain minor errors which are inherent in voice recognition technology. **      Final report electronically signed by Dr. Shonda Valdez on 2/3/2021 2:00 PM      XR CHEST PORTABLE   Final Result   1. Worsening aeration of the right lung base. This is likely due to accommodation of pleural effusion and pulmonary parenchymal opacities. 2. Very small left-sided pleural effusion with mild left basilar atelectasis. **This report has been created using voice recognition software. It may contain minor errors which are inherent in voice recognition technology. **      Final report electronically signed by Dr. Montrell Duggan on 1/31/2021 9:09 AM      XR ABDOMEN (KUB) (SINGLE AP VIEW)   Final Result   1. Nonobstructive bowel gas pattern. **This report has been created using voice recognition software. It may contain minor errors which are inherent in voice recognition technology. **      Final report electronically signed by Dr. Gustabo Atkins on 1/29/2021 2:17 PM      XR CHEST PORTABLE   Final Result      1. Interval placement of right-sided PICC line which terminates in the SVC. 2. Cardiomegaly with pulmonary vascular congestion. The appearance of the chest was worsened since the previous study. **This report has been created using voice recognition software. It may contain minor errors which are inherent in voice recognition technology. **      Final report electronically signed by Dr hCeo Roberts on 1/28/2021 4:45 PM      US THORACENTESIS Which side should the procedure be performed? Right   Final Result   1. Uncomplicated ultrasound guided diagnostic and therapeutic thoracentesis. **This report has been created using voice recognition software. It may contain minor errors which are inherent in voice recognition technology. **      Final report electronically signed by Dr. Seda Gregory on 1/28/2021 3:08 PM XR CHEST 1 VIEW   Final Result   1. The patient is status post ultrasound-guided right thoracentesis with complete aspiration of the right pleural effusion. 2. There are mild residual patchy opacities at both lung bases which have almost completely resolved. There is no pulmonary vascular congestion. 3. There is a small amount of free intraperitoneal air beneath the right hemidiaphragm which was also present on the CT chest examination dated 1/27/2021 and the chest radiograph dated 1/26/2021. Correlation with clinical findings recommended to    differentiate post procedural free intraperitoneal air from a perforated viscus. 4. There is a stable right jugular central venous catheter with the distal tip overlying the superior vena cava. 5. The clinical service was notified by Dr. Steven Fuentes on 1/28/2021 at 1320 hours. **This report has been created using voice recognition software. It may contain minor errors which are inherent in voice recognition technology. **      Final report electronically signed by Dr. Christiano Smith on 1/28/2021 1:21 PM      VL DUP UPPER EXTREMITY VENOUS BILATERAL   Final Result   No evidence of a DVT. **This report has been created using voice recognition software. It may contain minor errors which are inherent in voice recognition technology. **      Final report electronically signed by Dr Vic Ohara on 1/27/2021 4:35 PM      CT CHEST WO CONTRAST   Final Result   Addendum 1 of 1    ADDENDUM #1      Upon further review there is free air around the liver which appears    mildly decreased compared to chest radiograph dated 1/26/2021, though    difficult to compare between modalities. This is likely associated with    prior G-tube insertion on 1/22/2021. Addendum was discussed with Mark Lazcano RN via telephone at the time    of interpretation.       Final report electronically signed by Dr. Leopoldo Mins, MD on    1/28/2021 1:23 PM ORIGINAL REPORT   PROCEDURE: CT CHEST WO CONTRAST      CLINICAL INFORMATION: possible pna. COMPARISON: CT chest dated 1/17/2021. TECHNIQUE: 5 mm noncontrast axial images were obtained through the chest.    Sagittal and coronal reconstructions were created. All CT scans at this facility use dose modulation, iterative    reconstruction, and/or weight-based dosing when appropriate to reduce    radiation dose to as low as reasonably achievable. FINDINGS:   There are moderate right and small to moderate left pleural effusions    which have increased in the interval. There is adjacent atelectasis. There    are patchy groundglass opacities in the left lower lobe. There is a 6 mm    nodule in the anterior aspect of the    right upper lobe with adjacent groundglass opacity which has appeared in    the interval since prior exam. There is no axillary, mediastinal or hilar    lymphadenopathy identified. The heart is enlarged, increased in size    compared to prior exam. There is a    stable right internal jugular catheter with tip in the superior vena cava. Redemonstration of surgical changes from prior CABG. Visualized upper    abdominal solid organs are unremarkable. There are stable degenerative    changes of the thoracic spine. Final      XR CHEST PORTABLE   Final Result   Impression:      Worsening bilateral consolidation. This document has been electronically signed by: Angie Lam MD on    01/26/2021 10:04 PM         XR CHEST PORTABLE   Final Result   The nasogastric tube is at the level of the left mainstem bronchus. Recommend removal and repositioning. This document has been electronically signed by: Nicky Dobson MD on    01/22/2021 11:31 PM         XR ABDOMEN FOR NG/OG/NE TUBE PLACEMENT   Final Result   1. No visible nasogastric tube on the abdominal x-ray.  On correlation with    the concurrent chest x-ray, the nasogastric tube is at the level of the    left mainstem bronchus. Recommend removal and repositioning. 2. Extensive gaseous distention of bowel suggesting possible ileus. This document has been electronically signed by: Carmelo Moran MD on    01/22/2021 11:22 PM         XR CHEST PORTABLE   Final Result   Stable right basilar opacity. **This report has been created using voice recognition software. It may contain minor errors which are inherent in voice recognition technology. **      Final report electronically signed by Dr. Chavo Wu MD on 1/22/2021 4:55 PM      MRI BRAIN WO CONTRAST   Final Result   Impression:   Moderate nonspecific periventricular deep white matter disease. This most    often can be ascribed to chronic small vessel ischemic change. Prominent central and cortical volume loss. Old ischemic event left temporal lobe. This document has been electronically signed by: César Su MD on    01/19/2021 12:59 AM         CT CHEST WO CONTRAST   Final Result   Impression:   Moderate right pleural effusion. Associated volume loss. Mild opacity right lung base. This could represent mild pneumonia. This document has been electronically signed by: César Su MD on    01/17/2021 09:10 PM      All CT scans at this facility use dose modulation, iterative    reconstruction, and/or weight-based   dosing when appropriate to reduce radiation dose to as low as reasonably    achievable. XR CHEST PORTABLE   Final Result   Impression:   Cardiomegaly, no CHF. Mild right infrahilar opacity. This could represent mild pneumonia versus    volume loss. This document has been electronically signed by: César Su MD on    01/17/2021 09:11 PM         CT Head WO Contrast   Final Result   1. No acute intracranial hemorrhage or mass effect. 2. Moderate diffuse atrophy is demonstrated. 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 **This report has been created using voice recognition software. It may contain minor errors which are inherent in voice recognition technology. **      Final report electronically signed by Dr. Kvng Lafleur on 1/17/2021 4:17 PM      CT Cervical Spine WO Contrast   Final Result   1. Slightly limited evaluation due to motion artifact. However, no definite acute fracture of the cervical spine is seen. 2. Straightening of the normal cervical lordosis is demonstrated. This may be related to muscle spasm. 3. Multilevel degenerative changes of the cervical spine are demonstrated. **This report has been created using voice recognition software. It may contain minor errors which are inherent in voice recognition technology. **      Final report electronically signed by Dr. Kvng Lafleur on 1/17/2021 4:21 PM      XR PELVIS (1-2 VIEWS)   Final Result   1. No acute fracture or malalignment. Degenerative changes are as described above. **This report has been created using voice recognition software. It may contain minor errors which are inherent in voice recognition technology. **      Final report electronically signed by Dr. Kvng Lafleur on 1/17/2021 3:56 PM      XR FEMUR LEFT (MIN 2 VIEWS)   Final Result   1. No acute fracture or malalignment. Degenerative changes are as described above. **This report has been created using voice recognition software. It may contain minor errors which are inherent in voice recognition technology. **      Final report electronically signed by Dr. Kvng Lafleur on 1/17/2021 3:50 PM      XR CHEST (2 VW)   Final Result   1. Moderate enlargement of the cardiomediastinal silhouette. 2. There is hyperinflation noted. 2. No focal consolidation, pleural effusion or pneumothorax is seen. **This report has been created using voice recognition software. It may contain minor errors which are inherent in voice recognition technology. **      Final report electronically signed by Dr. Klarissa Graf on 1/17/2021 3:54 PM          Diet: DIET TUBE FEED CONTINUOUS/CYCLIC NPO; 1.5 Calorie with Fiber (Jevity 1.5); Gastrostomy    DVT prophylaxis: [] Lovenox                                 [x] SCDs                                 [] SQ Heparin                                 [] Encourage ambulation           [] Already on Anticoagulation     Disposition:    [] Home       [] TCU       [] Rehab       [] Psych       [] SNF       [] [place]       [x] Other- pending placement    Code Status: Limited    PT/OT Eval Status:  Following      Electronically signed by Isabela Perez MD on 2/3/2021 at 2:32 PM

## 2021-02-03 NOTE — CARE COORDINATION
DISASTER CHARTING    2/3/21, 2:23 PM EST    DISCHARGE ONGOING EVALUATION:     [address] day: 17  Location: [facility]22/022-A Reason for admit: Hyponatremia [E87.1]   Barriers to Discharge: Chiki Lemus has confusion and some combativeness. Planning to hold discharge today. PCP: Peri Jimenez DO  Readmission Risk Score: 32%  Patient Goals/Plan/Treatment Preferences: Plan L[place] Con when stable.

## 2021-02-03 NOTE — PROGRESS NOTES
[facility]  INPATIENT PHYSICAL THERAPY  DAILY NOTE  [facility] RENAL TELEMETRY 6K - 6K-22/022-A    Time In: 7351  Time Out: 1336  Timed Code Treatment Minutes: 23 Minutes  Minutes: 23          Date: 2/3/2021  Patient Name: Hina Loera,  Gender:  male        MRN: [id number]  : 3/30/1932  (80 y.o.)     Referring Practitioner: Bony Ward DO  Diagnosis: Hyponatremia  Additional Pertinent Hx: Per ER note on 2021:88 y.o. male history of CVA, CAD, hypertension, hyperlipidemia, chronic atrial fibrillation on anticoagulation with Coumadin was recently admitted and discharged from [facility] for asymptomatic bradycardia management. He also had a history of recent falls. He presented to the emergency department for evaluation of unwitnessed fall at home today morning in his bathroom by his family members. He was found on the ground and was down for unknown length of time. Prior Level of Function:  Lives With: Alone  Type of Home: House  Home Layout: One level  Home Access: Level entry  Home Equipment: Cane   Bathroom Shower/Tub: Walk-in shower  Bathroom Toilet: Handicap height  Bathroom Equipment: Shower chair, Grab bars in shower    ADL Assistance: [address]: Independent  Transfer Assistance: Independent  Additional Comments: Pt reports fully indep PLOF without AD. Restrictions/Precautions:  Restrictions/Precautions: Contact Precautions, Fall Risk  Position Activity Restriction  Other position/activity restrictions: PEG tube, very [place]; on room air on . ..on 1L on      SUBJECTIVE: Per RN, OK to see but do not leave up in chair. Pt has had increased confusion today and tried to get out of chair yesterday on own and today was trying to crawl OOB. Pt pleasant but nonsensicle.      PAIN: 0/10:     OBJECTIVE:  Bed Mobility:  Supine to Sit: Minimal Assistance, X 1  Sit to Supine: Minimal Assistance, Moderate Assistance, X 1   Scooting: Contact Guard Assistance    Transfers:  Sit to Stand: NT due to safety concerns, pt confused to place time and situation. Stand to Sit:NT  Not Tested    Ambulation:  Not Tested    Balance:  Static Sitting Balance:  Contact Guard Assistance    Exercise:  Patient was guided in 1 set(s) 10 reps of exercise to both lower extremities. Ankle pumps, Heelslides, Short arc quads, Hip abduction/adduction and Long arc quads. Exercises were completed for increased independence with functional mobility. Functional Outcome Measures: Completed  AM-PAC Inpatient Mobility Raw Score : 11  AM-PAC Inpatient T-Scale Score : 33.86    ASSESSMENT:  Assessment: Patient progressing toward established goals. Activity Tolerance:  Patient tolerance of  treatment: fair. More confused today       Equipment Recommendations:Equipment Needed: Yes(may need RW)  Discharge Recommendations:  [address], Patient would benefit from continued therapy after discharge    Plan: Times per week: 4-5 X GM  Current Treatment Recommendations: Strengthening, Transfer Training, Endurance Training, Balance Training, Functional Mobility Training, Gait Training, Home Exercise Program    Patient Education  Patient Education: Home Exercise Program, Bed Mobility    Goals:  Patient goals : Get stronger and return to prior level  Short term goals  Time Frame for Short term goals: By discharge  Short term goal 1: supine to sit and return with min of 1 to get in and out of bed  Short term goal 2: sit to stand with min of 1 consistently to get on and off various surfaces  Short term goal 3: Pt to ambulate with RW 30 feet with CGA to walk safely to bathroom  Long term goals  Time Frame for Long term goals : NA due to short ELOS    Following session, patient left in safe position with all fall risk precautions in place.

## 2021-02-03 NOTE — PROGRESS NOTES
Spoke with Dr. Rei Lemos regarding urinary retention. Per physician complete Post void bladder scan. Patient showed 554 ml post void. Per Dr. Rei Lemos place boswell catheter and consult urology. Nakita Millard CNP notified of consult, keep boswell at time of discharge.

## 2021-02-03 NOTE — PROGRESS NOTES
SBARR to primary RN Marjorie Haider and left the floor.  - Estefania Gruber [facility] SN

## 2021-02-04 ENCOUNTER — TELEPHONE (OUTPATIENT)
Dept: UROLOGY | Age: 86
End: 2021-02-04

## 2021-02-04 ENCOUNTER — APPOINTMENT (OUTPATIENT)
Dept: GENERAL RADIOLOGY | Age: 86
DRG: 177 | End: 2021-02-04
Payer: OTHER GOVERNMENT

## 2021-02-04 LAB
ANION GAP SERPL CALCULATED.3IONS-SCNC: 6 MEQ/L (ref 8–16)
BASOPHILS # BLD: 0.5 %
BASOPHILS ABSOLUTE: 0 THOU/MM3 (ref 0–0.1)
BUN BLDV-MCNC: 16 MG/DL (ref 7–22)
CALCIUM SERPL-MCNC: 8.1 MG/DL (ref 8.5–10.5)
CHLORIDE BLD-SCNC: 101 MEQ/L (ref 98–111)
CO2: 29 MEQ/L (ref 23–33)
CREAT SERPL-MCNC: 1.1 MG/DL (ref 0.4–1.2)
EOSINOPHIL # BLD: 2.7 %
EOSINOPHILS ABSOLUTE: 0.2 THOU/MM3 (ref 0–0.4)
ERYTHROCYTE [DISTWIDTH] IN BLOOD BY AUTOMATED COUNT: 20 % (ref 11.5–14.5)
ERYTHROCYTE [DISTWIDTH] IN BLOOD BY AUTOMATED COUNT: 65 FL (ref 35–45)
GFR SERPL CREATININE-BSD FRML MDRD: 63 ML/MIN/1.73M2
GLUCOSE BLD-MCNC: 119 MG/DL (ref 70–108)
HCT VFR BLD CALC: 26.7 % (ref 42–52)
HEMOGLOBIN: 8.3 GM/DL (ref 14–18)
IMMATURE GRANS (ABS): 0.02 THOU/MM3 (ref 0–0.07)
IMMATURE GRANULOCYTES: 0.4 %
INR BLD: 1.2 (ref 0.85–1.13)
LYMPHOCYTES # BLD: 8.2 %
LYMPHOCYTES ABSOLUTE: 0.5 THOU/MM3 (ref 1–4.8)
MCH RBC QN AUTO: 28.2 PG (ref 26–33)
MCHC RBC AUTO-ENTMCNC: 31.1 GM/DL (ref 32.2–35.5)
MCV RBC AUTO: 90.8 FL (ref 80–94)
MONOCYTES # BLD: 8.2 %
MONOCYTES ABSOLUTE: 0.5 THOU/MM3 (ref 0.4–1.3)
NUCLEATED RED BLOOD CELLS: 0 /100 WBC
PLATELET # BLD: 159 THOU/MM3 (ref 130–400)
PMV BLD AUTO: 11.6 FL (ref 9.4–12.4)
POTASSIUM SERPL-SCNC: 3.8 MEQ/L (ref 3.5–5.2)
RBC # BLD: 2.94 MILL/MM3 (ref 4.7–6.1)
SEG NEUTROPHILS: 80 %
SEGMENTED NEUTROPHILS ABSOLUTE COUNT: 4.5 THOU/MM3 (ref 1.8–7.7)
SODIUM BLD-SCNC: 136 MEQ/L (ref 135–145)
WBC # BLD: 5.6 THOU/MM3 (ref 4.8–10.8)

## 2021-02-04 PROCEDURE — 99232 SBSQ HOSP IP/OBS MODERATE 35: CPT | Performed by: FAMILY MEDICINE

## 2021-02-04 PROCEDURE — 85610 PROTHROMBIN TIME: CPT

## 2021-02-04 PROCEDURE — 2580000003 HC RX 258: Performed by: STUDENT IN AN ORGANIZED HEALTH CARE EDUCATION/TRAINING PROGRAM

## 2021-02-04 PROCEDURE — 2580000003 HC RX 258: Performed by: PHYSICIAN ASSISTANT

## 2021-02-04 PROCEDURE — 1200000003 HC TELEMETRY R&B

## 2021-02-04 PROCEDURE — 6370000000 HC RX 637 (ALT 250 FOR IP): Performed by: STUDENT IN AN ORGANIZED HEALTH CARE EDUCATION/TRAINING PROGRAM

## 2021-02-04 PROCEDURE — 6370000000 HC RX 637 (ALT 250 FOR IP): Performed by: PSYCHIATRY & NEUROLOGY

## 2021-02-04 PROCEDURE — 6370000000 HC RX 637 (ALT 250 FOR IP): Performed by: INTERNAL MEDICINE

## 2021-02-04 PROCEDURE — 6360000002 HC RX W HCPCS: Performed by: PHYSICIAN ASSISTANT

## 2021-02-04 PROCEDURE — 99232 SBSQ HOSP IP/OBS MODERATE 35: CPT | Performed by: PSYCHIATRY & NEUROLOGY

## 2021-02-04 PROCEDURE — 85025 COMPLETE CBC W/AUTO DIFF WBC: CPT

## 2021-02-04 PROCEDURE — 80048 BASIC METABOLIC PNL TOTAL CA: CPT

## 2021-02-04 PROCEDURE — 99232 SBSQ HOSP IP/OBS MODERATE 35: CPT | Performed by: UROLOGY

## 2021-02-04 PROCEDURE — 6360000002 HC RX W HCPCS: Performed by: STUDENT IN AN ORGANIZED HEALTH CARE EDUCATION/TRAINING PROGRAM

## 2021-02-04 PROCEDURE — 6370000000 HC RX 637 (ALT 250 FOR IP): Performed by: NURSE PRACTITIONER

## 2021-02-04 PROCEDURE — 6370000000 HC RX 637 (ALT 250 FOR IP): Performed by: PHARMACIST

## 2021-02-04 PROCEDURE — C9113 INJ PANTOPRAZOLE SODIUM, VIA: HCPCS | Performed by: NURSE PRACTITIONER

## 2021-02-04 PROCEDURE — 36415 COLL VENOUS BLD VENIPUNCTURE: CPT

## 2021-02-04 PROCEDURE — 94760 N-INVAS EAR/PLS OXIMETRY 1: CPT

## 2021-02-04 PROCEDURE — 71045 X-RAY EXAM CHEST 1 VIEW: CPT

## 2021-02-04 PROCEDURE — 6360000002 HC RX W HCPCS: Performed by: NURSE PRACTITIONER

## 2021-02-04 PROCEDURE — 94640 AIRWAY INHALATION TREATMENT: CPT

## 2021-02-04 RX ORDER — OLANZAPINE 5 MG/1
5 TABLET ORAL NIGHTLY
Status: DISCONTINUED | OUTPATIENT
Start: 2021-02-04 | End: 2021-02-05 | Stop reason: HOSPADM

## 2021-02-04 RX ORDER — FUROSEMIDE 10 MG/ML
40 INJECTION INTRAMUSCULAR; INTRAVENOUS ONCE
Status: COMPLETED | OUTPATIENT
Start: 2021-02-04 | End: 2021-02-04

## 2021-02-04 RX ADMIN — Medication 1 CAPSULE: at 08:35

## 2021-02-04 RX ADMIN — SODIUM CHLORIDE, PRESERVATIVE FREE 10 ML: 5 INJECTION INTRAVENOUS at 08:36

## 2021-02-04 RX ADMIN — PIPERACILLIN AND TAZOBACTAM 3375 MG: 3; .375 INJECTION, POWDER, LYOPHILIZED, FOR SOLUTION INTRAVENOUS at 03:47

## 2021-02-04 RX ADMIN — PRAVASTATIN SODIUM 40 MG: 40 TABLET ORAL at 20:15

## 2021-02-04 RX ADMIN — IPRATROPIUM BROMIDE AND ALBUTEROL SULFATE 1 AMPULE: .5; 3 SOLUTION RESPIRATORY (INHALATION) at 17:26

## 2021-02-04 RX ADMIN — SODIUM CHLORIDE, PRESERVATIVE FREE 10 ML: 5 INJECTION INTRAVENOUS at 08:35

## 2021-02-04 RX ADMIN — IPRATROPIUM BROMIDE AND ALBUTEROL SULFATE 1 AMPULE: .5; 3 SOLUTION RESPIRATORY (INHALATION) at 09:30

## 2021-02-04 RX ADMIN — DOCUSATE SODIUM 100 MG: 50 LIQUID ORAL at 20:15

## 2021-02-04 RX ADMIN — PANTOPRAZOLE SODIUM 40 MG: 40 INJECTION, POWDER, FOR SOLUTION INTRAVENOUS at 20:15

## 2021-02-04 RX ADMIN — TRAZODONE HYDROCHLORIDE 50 MG: 50 TABLET ORAL at 20:15

## 2021-02-04 RX ADMIN — IPRATROPIUM BROMIDE AND ALBUTEROL SULFATE 1 AMPULE: .5; 3 SOLUTION RESPIRATORY (INHALATION) at 13:03

## 2021-02-04 RX ADMIN — OLANZAPINE 5 MG: 5 TABLET, FILM COATED ORAL at 20:15

## 2021-02-04 RX ADMIN — TAMSULOSIN HYDROCHLORIDE 0.4 MG: 0.4 CAPSULE ORAL at 08:35

## 2021-02-04 RX ADMIN — PIPERACILLIN AND TAZOBACTAM 3375 MG: 3; .375 INJECTION, POWDER, LYOPHILIZED, FOR SOLUTION INTRAVENOUS at 11:51

## 2021-02-04 RX ADMIN — SODIUM CHLORIDE, PRESERVATIVE FREE 10 ML: 5 INJECTION INTRAVENOUS at 20:15

## 2021-02-04 RX ADMIN — DOCUSATE SODIUM 100 MG: 50 LIQUID ORAL at 08:35

## 2021-02-04 RX ADMIN — Medication 3 MG: at 20:15

## 2021-02-04 RX ADMIN — Medication 4.5 MG: at 17:04

## 2021-02-04 RX ADMIN — ASPIRIN 81 MG: 81 TABLET, CHEWABLE ORAL at 08:35

## 2021-02-04 RX ADMIN — PANTOPRAZOLE SODIUM 40 MG: 40 INJECTION, POWDER, FOR SOLUTION INTRAVENOUS at 08:35

## 2021-02-04 RX ADMIN — FUROSEMIDE 40 MG: 10 INJECTION, SOLUTION INTRAMUSCULAR; INTRAVENOUS at 13:46

## 2021-02-04 NOTE — CARE COORDINATION
DISASTER CHARTING    2/4/21, 8:37 AM EST    DISCHARGE ONGOING EVALUATION:     [address] day: 18  Location: [facility]22/022-A Reason for admit: Hyponatremia [E87.1]   Barriers to Discharge: Meds adjusted by Dr. Christie Falcon. Confused and aggressive yesterday. Telesitter in room. Possible discharge today. PCP: Arron Alert, DO  Readmission Risk Score: 36%  Patient Goals/Plan/Treatment Preferences: STEVAN [place]aman

## 2021-02-04 NOTE — PROGRESS NOTES
PROGRESS NOTE      Patient:  Viv Murray      Unit/Bed:6K-22/022-A    YOB: 1932    MRN: [id number]       Acct: [de-identified]     PCP: Doug Johnson DO    Date of Admission: 1/17/2021      Assessment/Plan:    Anticipated Discharge in : pending placement    Active Hospital Problems    Diagnosis Date Noted    Acute metabolic encephalopathy [W48.35]     Pneumonia due to COVID-19 virus [U07.1, J12.82]     Elevated CK [R74.8]     Traumatic rhabdomyolysis ([address] [T79. 6XXA]     Fall [W19. XXXA]     Supratherapeutic INR [R79.1]     Coagulopathy ([address] [D68.9]     Atrial fibrillation with slow ventricular response ([address] [I48.91] 08/22/2016    Hyponatremia [E87.1] 02/19/2015     Worsening bilateral consolidation- Fluid overload vs hospital prior pneumonia versus aspiration pneumonia. Patient had recent Covid infection. Vital signs have been stable on room air. On exam today patient's lungs are clear to auscultation and sound better when compared to yesterday. Chest x-ray is worse in appearance today when compared to yesterday. Patient was given a one-time dose of Lasix yesterday. There is concerns of the patient aspirating his tube feeds.  -We will stop Zosyn today.    -Will give another 40 mg of Lasix today  -Consider CT chest without contrast if respiratory status worsens  -Will check pro calcitonin today  -Vital signs per unit protocol  -Monitor for signs of aspiration  -Consider repeat chest x-ray in the morning    Delirium-likely secondary to being hospitalized. Patient has reported history of delirium during past hospital admissions. Patient was sleeping comfortably this morning. He was not agitated or aggressive toward staff today.  -Telesitter has been placed. May discontinue this afternoon if patient remains calm  -10 mg olanzapine nightly  -Geodon as needed for agitation  -Please try to avoid soft restraints or Geodon.     Dysphagia   - PEG tube placed patient is at goal with tube feeds   - Speech therapy following  - dietitian consulted    Chronic atrial fibrillation with SVR- telemetry today showed atrial fibrillation  - Continue Coumadin, Pharmacy to dose  - Avoid AV blocking agents  - No indication for PPM.     Normocytic anemia- hemoglobin currently at 8.3, no active signs of bleeding  -CBC in a.m.  -Transfuse if hemoglobin is less than 7    Chronic kidney disease stage III-   - Nephrology consulted, recommendations appreciated  - Creatinine today 1.2    Recurrent urinary retention  -Duarte catheter is in place  -Continue Flomax  -Urology is consulted, recommending keeping Duarte inserted until his mobility improves  -Patient will need outpatient follow-up with urology for cystoscopy per urology recommendations    Essential HTN    -Continue home medications. Hypocalcemia-  -replaced per protocol    Malnutrition- Nutritional status is poor   - PEG tube feedings are infusing without residuals     HFpEF, chronic- Latest echo (12/20) EF 55 to 60%.     BPH  Aware.      Hearing Loss  Hearing aids.     Deconditioning  Speech, Physical, Occupational Therapy. ECF placement (lima con)    Syncope & Collapse  Likely secondary to dehydration, hyponatremia and COVID infection  Pt was found unresponsive and down prior to admission. Cardiology and Neurology consulted. EEG was negative. MRI no acute CVA. Cardiology signed off.      Leukocytosis, resolved: ANNIKA resolved  Acute Hypoxic respiratory failure, resolved- on room air  Acute Traumatic Rhabdomyolysis, resolved -CK trended down from 9463 to 1111, No longer checking       Chief Complaint: [facility] Course:   80-year-old male presented to [facility] via EMS after sustaining an unwitnessed fall from standing position.  Patient was found on ground and unknown length of time spent on ground.  Per HPI patient son reports patient was progressively more weak over the last week and acting like himself up until the day before arrival.  On day of arrival patient's son states they were unable to get a hold of him therefore they went to his house where they found him on the bathroom floor. Shahida Morocho was brought in via EMS to [facility] emergency room department.     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     Of note patient was recently hospitalized at [facility] 12/9/2020 for asymptomatic bradycardia in which is beta-blocker and calcium blocker were stopped and follow-up with Dr. Janet Sandra and plans for 10-day event monitor were planned. Tiffanie Tinoco was seen by EPS clinic 1/16/2020 with no plans for permanent pacemaker at that time.     Upon arrival, patient does not member what happened and does not remember what happened prior to incident.  On arrival patient denies chest pain, shortness of breath, abdominal pain patient did report pain in his right leg and complaining of generalized muscle weakness and aches.  Patient lives at home alone.  Patient was found confused, confusion improved upon arrival to ED.  Patient had slight weakness in left lower extremity however this was limited by pain.  Patient had abrasions with different stages of ecchymosis to bilateral lower extremities.  While in the emergency room patient was found to have noted hyponatremia and elevated CPK.  CT of the head showed no acute intracranial hemorrhage or mass-effect.  Patient did have noted encephalomalacia within the left temporal lobe from prior remote infarct.  CT of cervical spine showed no acute fracture.  CT chest demonstrated moderate right pleural effusions, mild opacities right lung base.  CVC was placed and patient did receive 50 mL of 3% normal saline.  Covid positive.     Last week severe cramping upper thighs. Then did farely well. Friday cramping came back, Saturday went to football game. Yesterday found him on bathroom floor.      1/18/21: Patient was not following commands and demonstrated pinpoint pupils.  Patient received 0.4 Narcan.  Patient did become more responsive and pupils were not pinpoint.  We will proceed with MRI of brain.     1/19/21: Patient appears significantly better. Sitting up in bed communicating and ate entire breakfast. Patient demonstrates difficulty hearing however patient denies pain anywhere. Patient still requiring significant amount of dopamine     1/20: Pt was transferred out of ICU. Pt did not eat much, he does not feel like eating or like the food. Sodium is stable around 122.      1/21: No acute events overnight.  Patient not eating well today. SLP saw and recommending NPO except for medications. Mental status waxes and wanes. HD stable on RA. No fevers or chills. No chest pain or sob.      1/22: Overnight more confused pulling on lines, restrains placed. Pt follows commands however confused. No fevers or chills. On RA. Not eating as pt is too confused.      1.28.2021 patient to undergo thoracentesis, PICC line placement, removal of IJ. Cultures to date are no growth. Will transfuse to keep hemoglobin greater then 7     1.29.2021 events noted during the night of dark brown emesis in color, will hold Coumadin dosing and hold Lovenox at this time, patient on Protonix twice daily, Will transfuse 25 g of albumin and follow it with Lasix 40 mg. No Bowel movements per nursing will start MiraLAX daily and Colace twice daily. Will resume tube feedings per protocol and check residuals.     1.30.2021 fecal occult blood positive, hemoglobin today is 7.9, Lovenox on hold along with Coumadin. Head of bed needs to be at 30 degrees.   Renal has added Lasix 40 mg IV daily for 3 days     1.31.2021 hemoglobin 7.4 today down to 1 g and 48 hours, WBC 8.1, will obtain chest x-ray to evaluate pneumonia. All anticoagulation has been on hold, may need to transfuse 1 unit of packed RBCs. Tolerating tube feeds, creatinine at 1.3, total protein 5.1 and albumin 2.4     2.1.2021 we consulted dietary to evaluate the addition of a protein powder/liquid to the current Jevity 1.5 at 50 mL's per hour. Patient's total protein has stabilized but his albumin continues to drop. Duarte has been removed with a voiding trial.  Creatinine is stabilized at 1.3 and hemoglobin has stabilized at 7.5 consider restarting Coumadin, will consult pharmacy.     2.2.2021 patient seen this a.m. discussed in huddle about possible discharge tomorrow  2/3/2021. Will ask pharmacy to start Coumadin dosing since hemoglobin is actually increasing we can allow his INR to drift up slowly and have nursing home monitor hemoglobin and INR on a daily basis.        Subjective: There were no acute events overnight. Patient was seen and examined in his room this morning. Debra Barrera was resting comfortably in bed in no acute distress. Debra Folds denied having any headaches, dizziness, lightheadedness, shortness of breath, cough, chest pain, abdominal pain, nausea, vomiting, constipation, diarrhea.     Medications:  Reviewed    Infusion Medications    sodium chloride Stopped (01/28/21 6411)     Scheduled Medications    warfarin  4.5 mg Oral Once    OLANZapine  5 mg Oral Nightly    traZODone  50 mg Oral Nightly    aspirin  81 mg PEG Tube Daily    lactobacillus  1 capsule PEG Tube Daily with breakfast    melatonin  3 mg PEG Tube Nightly    warfarin (COUMADIN) daily dosing (placeholder)   Other RX Placeholder    pantoprazole  40 mg Intravenous BID    polyethylene glycol  17 g Oral Daily    sodium phosphate  1 enema Rectal Once    docusate  100 mg Oral BID    ipratropium-albuterol  1 ampule Inhalation Q4H WA    pravastatin  40 mg PEG Tube Nightly    lidocaine 1 % injection  5 mL Intradermal Once    sodium chloride flush  10 mL Intravenous 2 times per day    sodium chloride flush  10 mL Intravenous 2 times per day    calcium replacement protocol   Other RX Placeholder    phosphorus replacement protocol   Other RX Placeholder    tamsulosin  0.4 mg Oral Daily     PRN Meds: ziprasidone **AND** sterile water, cyclobenzaprine, sodium chloride, sodium chloride flush, sodium chloride flush, promethazine **OR** ondansetron, morphine, potassium chloride **OR** potassium alternative oral replacement **OR** potassium chloride, potassium chloride, potassium chloride, acetaminophen **OR** acetaminophen, magnesium sulfate, mupirocin      Intake/Output Summary (Last 24 hours) at 2/4/2021 1442  Last data filed at 2/4/2021 1406  Gross per 24 hour   Intake 990.21 ml   Output 3800 ml   Net -2809.79 ml       Diet:  DIET TUBE FEED CONTINUOUS/CYCLIC NPO; 1.5 Calorie with Fiber (Jevity 1.5); Gastrostomy    Exam:  BP (!) 140/60   Pulse 55   Temp 97.9 °F (36.6 °C) (Axillary)   Resp 18   Ht 5' 4\" (1.626 m)   Wt 161 lb 3.2 oz (73.1 kg)   SpO2 92%   BMI 27.67 kg/m²     Physical Exam  Vitals signs and nursing note reviewed. Constitutional:       General: He is sleeping. He is not in acute distress. Appearance: Normal appearance. He is ill-appearing. HENT:      Head: Normocephalic and atraumatic. Right Ear: External ear normal.      Left Ear: External ear normal.      Nose: Nose normal. No congestion or rhinorrhea. Mouth/Throat:      Mouth: Mucous membranes are moist.      Pharynx: Oropharynx is clear. No oropharyngeal exudate or posterior oropharyngeal erythema. Eyes:      General: No scleral icterus. Right eye: No discharge. Left eye: No discharge. Extraocular Movements: Extraocular movements intact. Conjunctiva/sclera: Conjunctivae normal.      Pupils: Pupils are equal, round, and reactive to light. Neck:      Musculoskeletal: Neck supple. No muscular tenderness.    Cardiovascular:      Rate and Rhythm: Normal rate. Rhythm irregular. Pulses: Normal pulses. Heart sounds: Normal heart sounds. No murmur. Pulmonary:      Effort: Pulmonary effort is normal. No respiratory distress. Breath sounds: Normal breath sounds. No wheezing, rhonchi or rales. Abdominal:      General: Abdomen is flat. Bowel sounds are normal. There is no distension. Palpations: Abdomen is soft. Tenderness: There is no abdominal tenderness. Genitourinary:     Comments: Duarte catheter in place  Musculoskeletal:      Right lower leg: Edema present. Left lower leg: No edema. Skin:     General: Skin is warm and dry. Capillary Refill: Capillary refill takes less than 2 seconds. Findings: No erythema or rash. Neurological:      General: No focal deficit present. Mental Status: He is oriented to person, place, and time. Mental status is at baseline. Motor: No weakness. Psychiatric:         Mood and Affect: Mood and affect normal.         Speech: Speech normal.         Behavior: Behavior normal. Behavior is not agitated or aggressive. Behavior is cooperative. Labs:   Recent Labs     02/02/21  0545 02/03/21 0355 02/04/21 0350   WBC 7.7 6.5 5.6   HGB 7.8* 7.9* 8.3*   HCT 24.9* 25.6* 26.7*    179 159     Recent Labs     02/02/21  0545 02/03/21  0355 02/04/21 0350    142 136   K 3.9 4.0 3.8    105 101   CO2 29 28 29   BUN 19 18 16   CREATININE 1.1 1.2 1.1   CALCIUM 8.2* 8.3* 8.1*     Recent Labs     02/03/21 0355   AST 25   ALT 22   BILIDIR <0.2   BILITOT 0.5   ALKPHOS 135*     Recent Labs     02/02/21  0545 02/03/21  0355 02/04/21  0350   INR 1.19* 1.23* 1.20*     No results for input(s): Teryl Drown in the last 72 hours.     Urinalysis:      Lab Results   Component Value Date    NITRU NEGATIVE 02/02/2021    WBCUA 0-2 02/02/2021    BACTERIA NONE SEEN 02/02/2021    RBCUA 0-2 02/02/2021    BLOODU NEGATIVE 02/02/2021    SPECGRAV 1.019 02/02/2021    GLUCOSEU NEGATIVE 01/27/2021       Radiology:  XR CHEST PORTABLE   Final Result   1. Moderate cardiac megaly. Metallic sternotomy sutures and vascular clips from prior surgery. Right arm PICC line, catheter tip in SVC. 2. Mild prominence of the central pulmonary vessels. Cannot exclude heart failure. 3. Soft tissue fullness right peritracheal region apparently related to tortuous brachycephalic vessels. No mass lesion seen in this region on recent CT thorax. 4. Moderate pneumonia/pulmonary edema both mid and lower lung fields, worse on the right. Small bilateral pleural effusions. 5. Overall appearance of chest has worsened since prior. **This report has been created using voice recognition software. It may contain minor errors which are inherent in voice recognition technology. **      Final report electronically signed by Dr. Cheo Cash on 2/3/2021 2:00 PM      XR CHEST PORTABLE   Final Result   1. Worsening aeration of the right lung base. This is likely due to accommodation of pleural effusion and pulmonary parenchymal opacities. 2. Very small left-sided pleural effusion with mild left basilar atelectasis. **This report has been created using voice recognition software. It may contain minor errors which are inherent in voice recognition technology. **      Final report electronically signed by Dr. Claudine Fair on 1/31/2021 9:09 AM      XR ABDOMEN (KUB) (SINGLE AP VIEW)   Final Result   1. Nonobstructive bowel gas pattern. **This report has been created using voice recognition software. It may contain minor errors which are inherent in voice recognition technology. **      Final report electronically signed by Dr. Socorro Burnett on 1/29/2021 2:17 PM      XR CHEST PORTABLE   Final Result      1. Interval placement of right-sided PICC line which terminates in the SVC. 2. Cardiomegaly with pulmonary vascular congestion. The appearance of the chest was worsened since the previous study. **This report has been created using voice recognition software. It may contain minor errors which are inherent in voice recognition technology. **      Final report electronically signed by Dr Alexandra Shen on 1/28/2021 4:45 PM      US THORACENTESIS Which side should the procedure be performed? Right   Final Result   1. Uncomplicated ultrasound guided diagnostic and therapeutic thoracentesis. **This report has been created using voice recognition software. It may contain minor errors which are inherent in voice recognition technology. **      Final report electronically signed by Dr. Wei Ch on 1/28/2021 3:08 PM      XR CHEST 1 VIEW   Final Result   1. The patient is status post ultrasound-guided right thoracentesis with complete aspiration of the right pleural effusion. 2. There are mild residual patchy opacities at both lung bases which have almost completely resolved. There is no pulmonary vascular congestion. 3. There is a small amount of free intraperitoneal air beneath the right hemidiaphragm which was also present on the CT chest examination dated 1/27/2021 and the chest radiograph dated 1/26/2021. Correlation with clinical findings recommended to    differentiate post procedural free intraperitoneal air from a perforated viscus. 4. There is a stable right jugular central venous catheter with the distal tip overlying the superior vena cava. 5. The clinical service was notified by Dr. Tanisha South on 1/28/2021 at 1320 hours. **This report has been created using voice recognition software. It may contain minor errors which are inherent in voice recognition technology. **      Final report electronically signed by Dr. Wei Ch on 1/28/2021 1:21 PM      VL DUP UPPER EXTREMITY VENOUS BILATERAL   Final Result   No evidence of a DVT. **This report has been created using voice recognition software.  It may contain minor errors which are inherent in voice recognition technology. **      Final report electronically signed by Dr Osmar Lange on 1/27/2021 4:35 PM      CT CHEST WO CONTRAST   Final Result   Addendum 1 of 1    ADDENDUM #1      Upon further review there is free air around the liver which appears    mildly decreased compared to chest radiograph dated 1/26/2021, though    difficult to compare between modalities. This is likely associated with    prior G-tube insertion on 1/22/2021. Addendum was discussed with Cecille White RN via telephone at the time    of interpretation. Final report electronically signed by Dr. Lauren Barrientos MD on    1/28/2021 1:23 PM       ORIGINAL REPORT   PROCEDURE: CT CHEST WO CONTRAST      CLINICAL INFORMATION: possible pna. COMPARISON: CT chest dated 1/17/2021. TECHNIQUE: 5 mm noncontrast axial images were obtained through the chest.    Sagittal and coronal reconstructions were created. All CT scans at this facility use dose modulation, iterative    reconstruction, and/or weight-based dosing when appropriate to reduce    radiation dose to as low as reasonably achievable. FINDINGS:   There are moderate right and small to moderate left pleural effusions    which have increased in the interval. There is adjacent atelectasis. There    are patchy groundglass opacities in the left lower lobe. There is a 6 mm    nodule in the anterior aspect of the    right upper lobe with adjacent groundglass opacity which has appeared in    the interval since prior exam. There is no axillary, mediastinal or hilar    lymphadenopathy identified. The heart is enlarged, increased in size    compared to prior exam. There is a    stable right internal jugular catheter with tip in the superior vena cava. Redemonstration of surgical changes from prior CABG. Visualized upper    abdominal solid organs are unremarkable. There are stable degenerative    changes of the thoracic spine.           Final      XR CHEST PORTABLE   Final Result   Impression:      Worsening bilateral consolidation. This document has been electronically signed by: Kadeem Brooks MD on    01/26/2021 10:04 PM         XR CHEST PORTABLE   Final Result   The nasogastric tube is at the level of the left mainstem bronchus. Recommend removal and repositioning. This document has been electronically signed by: Danny Weathers MD on    01/22/2021 11:31 PM         XR ABDOMEN FOR NG/OG/NE TUBE PLACEMENT   Final Result   1. No visible nasogastric tube on the abdominal x-ray. On correlation with    the concurrent chest x-ray, the nasogastric tube is at the level of the    left mainstem bronchus. Recommend removal and repositioning. 2. Extensive gaseous distention of bowel suggesting possible ileus. This document has been electronically signed by: Danny Weathers MD on    01/22/2021 11:22 PM         XR CHEST PORTABLE   Final Result   Stable right basilar opacity. **This report has been created using voice recognition software. It may contain minor errors which are inherent in voice recognition technology. **      Final report electronically signed by Dr. Lyn Tavares MD on 1/22/2021 4:55 PM      MRI BRAIN WO CONTRAST   Final Result   Impression:   Moderate nonspecific periventricular deep white matter disease. This most    often can be ascribed to chronic small vessel ischemic change. Prominent central and cortical volume loss. Old ischemic event left temporal lobe. This document has been electronically signed by: Rohan Butler MD on    01/19/2021 12:59 AM         CT CHEST WO CONTRAST   Final Result   Impression:   Moderate right pleural effusion. Associated volume loss. Mild opacity right lung base. This could represent mild pneumonia.       This document has been electronically signed by: Rohan Butler MD on    01/17/2021 09:10 PM      All CT scans at this facility use dose modulation, iterative    reconstruction, and/or weight-based   dosing when appropriate to reduce radiation dose to as low as reasonably    achievable. XR CHEST PORTABLE   Final Result   Impression:   Cardiomegaly, no CHF. Mild right infrahilar opacity. This could represent mild pneumonia versus    volume loss. This document has been electronically signed by: Priscilla Brice MD on    01/17/2021 09:11 PM         CT Head WO Contrast   Final Result   1. No acute intracranial hemorrhage or mass effect. 2. Moderate diffuse atrophy is demonstrated. 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 **This report has been created using voice recognition software. It may contain minor errors which are inherent in voice recognition technology. **      Final report electronically signed by Dr. Jarvis Gaucher on 1/17/2021 4:17 PM      CT Cervical Spine WO Contrast   Final Result   1. Slightly limited evaluation due to motion artifact. However, no definite acute fracture of the cervical spine is seen. 2. Straightening of the normal cervical lordosis is demonstrated. This may be related to muscle spasm. 3. Multilevel degenerative changes of the cervical spine are demonstrated. **This report has been created using voice recognition software. It may contain minor errors which are inherent in voice recognition technology. **      Final report electronically signed by Dr. Jarvis Gaucher on 1/17/2021 4:21 PM      XR PELVIS (1-2 VIEWS)   Final Result   1. No acute fracture or malalignment. Degenerative changes are as described above. **This report has been created using voice recognition software. It may contain minor errors which are inherent in voice recognition technology. **      Final report electronically signed by Dr. Jarvis Gaucher on 1/17/2021 3:56 PM      XR FEMUR LEFT (MIN 2 VIEWS)   Final Result   1. No acute fracture or malalignment.

## 2021-02-04 NOTE — PROGRESS NOTES
Urology Progress Note    Chief Complaint: Fall  Reason for Consult:  Urinary Retention - Failed void trial  Subjective: \"    Patient is resting in bed, voiding yellow urine with 3300UO. He is more confused this AM, unable to answer questions. Denied pain on exam, no reaction when lower abdomen palpated. Duarte draining well          Vitals:  BP (!) 169/74   Pulse 79   Temp 97.5 °F (36.4 °C) (Axillary)   Resp 18   Ht 5' 4\" (1.626 m)   Wt 161 lb 3.2 oz (73.1 kg)   SpO2 93%   BMI 27.67 kg/m²   Temp  Av °F (36.7 °C)  Min: 97.3 °F (36.3 °C)  Max: 98.6 °F (37 °C)    Intake/Output Summary (Last 24 hours) at 2021 0936  Last data filed at 2021 0436  Gross per 24 hour   Intake 1341.33 ml   Output 3300 ml   Net -1958.67 ml       Social History     Socioeconomic History    Marital status:      Spouse name: Not on file    Number of children: Not on file    Years of education: Not on file    Highest education level: Not on file   Occupational History    Not on file   Social Needs    Financial resource strain: Not on file    Food insecurity     Worry: Not on file     Inability: Not on file    Transportation needs     Medical: Not on file     Non-medical: Not on file   Tobacco Use    Smoking status: Former Smoker     Quit date: 1980     Years since quittin.1    Smokeless tobacco: Never Used   Substance and Sexual Activity    Alcohol use:  Yes     Alcohol/week: 1.0 standard drinks     Types: 1 Cans of beer per week     Comment: occasionally    Drug use: No    Sexual activity: Not on file   Lifestyle    Physical activity     Days per week: Not on file     Minutes per session: Not on file    Stress: Not on file   Relationships    Social connections     Talks on phone: Not on file     Gets together: Not on file     Attends [employer] service: Not on file     Active member of club or organization: Not on file     Attends meetings of clubs or organizations: Not on file Relationship status: Not on file    Intimate partner violence     Fear of current or ex partner: Not on file     Emotionally abused: Not on file     Physically abused: Not on file     Forced sexual activity: Not on file   Other Topics Concern    Not on file   Social History Narrative    Not on file     Family History   Problem Relation Age of Onset    Cancer Mother      No Known Allergies      Constitutional: Confused, able to answer yes/no questions, no acute distress, and cooperative to examination with appropriate mood and affect. HEENT:   Head:         Normocephalic and atraumatic. Mucous membranes are normal.   Eyes:         EOM are normal.  Nose:    The external appearance of the nose is normal  Ears: The ears appear normal to external inspection. Cardiovascular:       Normal rate, regular rhythm. Pulmonary/Chest:  Normal respiratory rate and rhthym. No use of accessory muscles. Rales present, no wheezing or rhonchi. Abdominal:          Soft. No tenderness. hyperactive bowel sounds. Genitalia:    Udarte catheter draining yellow urine  Musculoskeletal:    Normal range of motion. He exhibits no edema or tenderness of lower extremities. Extremities:    No cyanosis, clubbing, some edema right lower leg  Neurological:    Confused, no seizure activity.   Disoriented     Labs:  WBC:    Lab Results   Component Value Date    WBC 5.6 02/04/2021     Hemoglobin/Hematocrit:    Lab Results   Component Value Date    HGB 8.3 02/04/2021    HCT 26.7 02/04/2021     BMP:    Lab Results   Component Value Date     02/04/2021    K 3.8 02/04/2021    K 4.6 01/20/2021     02/04/2021    CO2 29 02/04/2021    BUN 16 02/04/2021    LABALBU 2.5 02/03/2021    CREATININE 1.1 02/04/2021    CALCIUM 8.1 02/04/2021    LABGLOM 63 02/04/2021           IMPRESSION:   BPH - on flomax  Urinary retention  ANNIKA - improved CRT 1.1 (0.9-1.2), 1.3 on admission        Plan:    WBC 5.6  UA does not appear infected  Bladder scan 554ml - insert boswell and keep in until his mobility improves, can do void trial then  Plan for rehab at [facility]  Continue Flomax - can hold for low BP  Will need scheduled for in office cystoscopy - our office will coordinate     Discussed case with Dr Freddy Zarate  Thank you for including us in the care of Rupa Orlando    Urology signing off, please contact us with any questions or concerns    ROBBIE Sage  02/04/21 9:36 AM  Urology

## 2021-02-05 VITALS
TEMPERATURE: 98.2 F | RESPIRATION RATE: 18 BRPM | OXYGEN SATURATION: 91 % | HEIGHT: 64 IN | HEART RATE: 70 BPM | WEIGHT: 163.1 LBS | BODY MASS INDEX: 27.84 KG/M2 | SYSTOLIC BLOOD PRESSURE: 116 MMHG | DIASTOLIC BLOOD PRESSURE: 57 MMHG

## 2021-02-05 LAB
ANION GAP SERPL CALCULATED.3IONS-SCNC: 6 MEQ/L (ref 8–16)
ANISOCYTOSIS: PRESENT
BASOPHILS # BLD: 0.6 %
BASOPHILS ABSOLUTE: 0 THOU/MM3 (ref 0–0.1)
BUN BLDV-MCNC: 16 MG/DL (ref 7–22)
CALCIUM SERPL-MCNC: 8.1 MG/DL (ref 8.5–10.5)
CHLORIDE BLD-SCNC: 104 MEQ/L (ref 98–111)
CO2: 30 MEQ/L (ref 23–33)
CREAT SERPL-MCNC: 1.2 MG/DL (ref 0.4–1.2)
EOSINOPHIL # BLD: 4.3 %
EOSINOPHILS ABSOLUTE: 0.2 THOU/MM3 (ref 0–0.4)
ERYTHROCYTE [DISTWIDTH] IN BLOOD BY AUTOMATED COUNT: 20.2 % (ref 11.5–14.5)
ERYTHROCYTE [DISTWIDTH] IN BLOOD BY AUTOMATED COUNT: 65.4 FL (ref 35–45)
GFR SERPL CREATININE-BSD FRML MDRD: 57 ML/MIN/1.73M2
GLUCOSE BLD-MCNC: 108 MG/DL (ref 70–108)
HCT VFR BLD CALC: 25.1 % (ref 42–52)
HEMOGLOBIN: 7.8 GM/DL (ref 14–18)
IMMATURE GRANS (ABS): 0.02 THOU/MM3 (ref 0–0.07)
IMMATURE GRANULOCYTES: 0.4 %
INR BLD: 1.2 (ref 0.85–1.13)
LYMPHOCYTES # BLD: 8.9 %
LYMPHOCYTES ABSOLUTE: 0.5 THOU/MM3 (ref 1–4.8)
MCH RBC QN AUTO: 28.5 PG (ref 26–33)
MCHC RBC AUTO-ENTMCNC: 31.1 GM/DL (ref 32.2–35.5)
MCV RBC AUTO: 91.6 FL (ref 80–94)
MONOCYTES # BLD: 9.1 %
MONOCYTES ABSOLUTE: 0.5 THOU/MM3 (ref 0.4–1.3)
NUCLEATED RED BLOOD CELLS: 0 /100 WBC
PLATELET # BLD: 195 THOU/MM3 (ref 130–400)
PMV BLD AUTO: 11.9 FL (ref 9.4–12.4)
POTASSIUM SERPL-SCNC: 4.2 MEQ/L (ref 3.5–5.2)
PROCALCITONIN: 0.19 NG/ML (ref 0.01–0.09)
RBC # BLD: 2.74 MILL/MM3 (ref 4.7–6.1)
SEG NEUTROPHILS: 76.7 %
SEGMENTED NEUTROPHILS ABSOLUTE COUNT: 4.1 THOU/MM3 (ref 1.8–7.7)
SODIUM BLD-SCNC: 140 MEQ/L (ref 135–145)
WBC # BLD: 5.4 THOU/MM3 (ref 4.8–10.8)

## 2021-02-05 PROCEDURE — 99231 SBSQ HOSP IP/OBS SF/LOW 25: CPT | Performed by: INTERNAL MEDICINE

## 2021-02-05 PROCEDURE — 80048 BASIC METABOLIC PNL TOTAL CA: CPT

## 2021-02-05 PROCEDURE — 94668 MNPJ CHEST WALL SBSQ: CPT

## 2021-02-05 PROCEDURE — 6370000000 HC RX 637 (ALT 250 FOR IP): Performed by: INTERNAL MEDICINE

## 2021-02-05 PROCEDURE — 6370000000 HC RX 637 (ALT 250 FOR IP): Performed by: NURSE PRACTITIONER

## 2021-02-05 PROCEDURE — 6360000002 HC RX W HCPCS: Performed by: STUDENT IN AN ORGANIZED HEALTH CARE EDUCATION/TRAINING PROGRAM

## 2021-02-05 PROCEDURE — 6370000000 HC RX 637 (ALT 250 FOR IP): Performed by: PHARMACIST

## 2021-02-05 PROCEDURE — 2580000003 HC RX 258: Performed by: STUDENT IN AN ORGANIZED HEALTH CARE EDUCATION/TRAINING PROGRAM

## 2021-02-05 PROCEDURE — 99232 SBSQ HOSP IP/OBS MODERATE 35: CPT | Performed by: PSYCHIATRY & NEUROLOGY

## 2021-02-05 PROCEDURE — 84145 PROCALCITONIN (PCT): CPT

## 2021-02-05 PROCEDURE — 85025 COMPLETE CBC W/AUTO DIFF WBC: CPT

## 2021-02-05 PROCEDURE — 85610 PROTHROMBIN TIME: CPT

## 2021-02-05 PROCEDURE — 6360000002 HC RX W HCPCS: Performed by: NURSE PRACTITIONER

## 2021-02-05 PROCEDURE — 99239 HOSP IP/OBS DSCHRG MGMT >30: CPT | Performed by: FAMILY MEDICINE

## 2021-02-05 PROCEDURE — 97129 THER IVNTJ 1ST 15 MIN: CPT

## 2021-02-05 PROCEDURE — 94760 N-INVAS EAR/PLS OXIMETRY 1: CPT

## 2021-02-05 PROCEDURE — C9113 INJ PANTOPRAZOLE SODIUM, VIA: HCPCS | Performed by: NURSE PRACTITIONER

## 2021-02-05 PROCEDURE — 36415 COLL VENOUS BLD VENIPUNCTURE: CPT

## 2021-02-05 PROCEDURE — 94640 AIRWAY INHALATION TREATMENT: CPT

## 2021-02-05 PROCEDURE — 92526 ORAL FUNCTION THERAPY: CPT

## 2021-02-05 RX ORDER — IPRATROPIUM BROMIDE AND ALBUTEROL SULFATE 2.5; .5 MG/3ML; MG/3ML
3 SOLUTION RESPIRATORY (INHALATION)
Qty: 360 ML | Refills: 1 | DISCHARGE
Start: 2021-02-05 | End: 2021-10-18

## 2021-02-05 RX ORDER — TAMSULOSIN HYDROCHLORIDE 0.4 MG/1
0.4 CAPSULE ORAL DAILY
Qty: 30 CAPSULE | Refills: 3 | DISCHARGE
Start: 2021-02-05

## 2021-02-05 RX ORDER — LANSOPRAZOLE 30 MG/1
30 CAPSULE, DELAYED RELEASE ORAL DAILY
Qty: 30 CAPSULE | Refills: 3 | DISCHARGE
Start: 2021-02-05 | End: 2021-02-05 | Stop reason: HOSPADM

## 2021-02-05 RX ORDER — PRAVASTATIN SODIUM 40 MG
40 TABLET ORAL NIGHTLY
Qty: 30 TABLET | Refills: 3 | DISCHARGE
Start: 2021-02-05 | End: 2021-04-19

## 2021-02-05 RX ORDER — FUROSEMIDE 10 MG/ML
40 INJECTION INTRAMUSCULAR; INTRAVENOUS ONCE
Status: COMPLETED | OUTPATIENT
Start: 2021-02-05 | End: 2021-02-05

## 2021-02-05 RX ORDER — CYCLOBENZAPRINE HCL 5 MG
5 TABLET ORAL 3 TIMES DAILY PRN
DISCHARGE
Start: 2021-02-05 | End: 2021-02-15

## 2021-02-05 RX ORDER — LACTOBACILLUS RHAMNOSUS GG 10B CELL
1 CAPSULE ORAL
DISCHARGE
Start: 2021-02-05 | End: 2021-04-19

## 2021-02-05 RX ORDER — LANOLIN ALCOHOL/MO/W.PET/CERES
3 CREAM (GRAM) TOPICAL NIGHTLY
Qty: 30 TABLET | Refills: 1 | DISCHARGE
Start: 2021-02-05 | End: 2021-04-19

## 2021-02-05 RX ORDER — WARFARIN SODIUM 7.5 MG/1
7.5 TABLET ORAL ONCE
Status: COMPLETED | OUTPATIENT
Start: 2021-02-05 | End: 2021-02-05

## 2021-02-05 RX ORDER — TRAZODONE HYDROCHLORIDE 50 MG/1
50 TABLET ORAL NIGHTLY
DISCHARGE
Start: 2021-02-05 | End: 2021-04-19

## 2021-02-05 RX ORDER — OLANZAPINE 5 MG/1
5 TABLET ORAL NIGHTLY
Qty: 30 TABLET | Refills: 3 | DISCHARGE
Start: 2021-02-05 | End: 2021-04-19

## 2021-02-05 RX ORDER — POLYETHYLENE GLYCOL 3350 17 G/17G
17 POWDER, FOR SOLUTION ORAL DAILY
Qty: 527 G | Refills: 1 | DISCHARGE
Start: 2021-02-05 | End: 2021-03-07

## 2021-02-05 RX ADMIN — WARFARIN SODIUM 7.5 MG: 7.5 TABLET ORAL at 15:34

## 2021-02-05 RX ADMIN — IPRATROPIUM BROMIDE AND ALBUTEROL SULFATE 1 AMPULE: .5; 3 SOLUTION RESPIRATORY (INHALATION) at 09:41

## 2021-02-05 RX ADMIN — IPRATROPIUM BROMIDE AND ALBUTEROL SULFATE 1 AMPULE: .5; 3 SOLUTION RESPIRATORY (INHALATION) at 13:04

## 2021-02-05 RX ADMIN — Medication 1 CAPSULE: at 08:54

## 2021-02-05 RX ADMIN — SODIUM CHLORIDE, PRESERVATIVE FREE 10 ML: 5 INJECTION INTRAVENOUS at 09:14

## 2021-02-05 RX ADMIN — ASPIRIN 81 MG: 81 TABLET, CHEWABLE ORAL at 08:55

## 2021-02-05 RX ADMIN — DOCUSATE SODIUM 100 MG: 50 LIQUID ORAL at 08:55

## 2021-02-05 RX ADMIN — SODIUM CHLORIDE, PRESERVATIVE FREE 10 ML: 5 INJECTION INTRAVENOUS at 09:13

## 2021-02-05 RX ADMIN — FUROSEMIDE 40 MG: 10 INJECTION, SOLUTION INTRAMUSCULAR; INTRAVENOUS at 09:15

## 2021-02-05 RX ADMIN — TAMSULOSIN HYDROCHLORIDE 0.4 MG: 0.4 CAPSULE ORAL at 08:54

## 2021-02-05 RX ADMIN — PANTOPRAZOLE SODIUM 40 MG: 40 INJECTION, POWDER, FOR SOLUTION INTRAVENOUS at 08:54

## 2021-02-05 ASSESSMENT — PAIN SCALES - GENERAL: PAINLEVEL_OUTOF10: 0

## 2021-02-05 NOTE — CARE COORDINATION
2/5/21, 11:29 AM EST    DISCHARGE PLANNING EVALUATION    Patient is to be discharged today to [facility]. Call to [address] [facility] Kristina Alexis) and transport arranged for 4 pm-paperwork faxed. Call to Tahmina at [facility] with transport time. Numbers for report and to fax orders provided to Arelis Reynolds. Call to son Dr. Lewisla Jeans and advised him of transport time. He was already aware and plans to be at [facility] prior to patient's arrival to complete admission paperwork. No other needs at this time. 2/5/21, 12:10 PM EST    Patient goals/plan/ treatment preferences discussed by  and . Patient goals/plan/ treatment preferences reviewed with patient/ family. Patient/ family verbalize understanding of discharge plan and are in agreement with goal/plan/treatment preferences. Understanding was demonstrated using the teach back method. AVS provided by RN at time of discharge, which includes all necessary medical information pertaining to the patients current course of illness, treatment, post-discharge goals of care, and treatment preferences.         IMM Letter  IMM Letter given to Patient/Family/Significant other/Guardian/POA/by[de-identified] Abelardo Brown CM  IMM Letter date given[de-identified] 02/03/21  IMM Letter time given[de-identified] 2545

## 2021-02-05 NOTE — PROGRESS NOTES
Discharge teaching and instructions for diagnosis/procedure of hyponatremia completed with patient using teachback method. AVS reviewed. Printed prescriptions given to patient. Patient voiced understanding regarding prescriptions, follow up appointments, and care of self at home. Discharged via ambulance to [facility] per EMS transportation  Patients son, Dr White Pencil updated.

## 2021-02-05 NOTE — PROGRESS NOTES
[address] [address]  INPATIENT SPEECH THERAPY  [facility] RENAL TELEMETRY 6K  DAILY NOTE    TIME   SLP Individual Minutes  Time In: 1120  Time Out: 0560  Minutes: 24  Timed Code Treatment Minutes: 16 Minutes       Date: 2021  Patient Name: Anayeli Rodarte      CSN: [id number]   : 3/30/1932  (80 y.o.)  Gender: male   Referring Physician:  Edward Gamez MD  Diagnosis: Hyponatremia  Secondary Diagnosis: Dysphagia, cognitive linguistic deficits  Precautions: fall risk, aspiration  Current Diet: NPO  Swallowing Strategies: Not Applicable  Date of Last FEES: 21    Pain:  No pain reported. Subjective: RN Joey Escoto agreed that pt okay for therapy today. Also let therapist know that patient is being d/c today to [facility]. ST recommending continued ST services at facility after d/c from [facility]. Patient pleasant for therapy while seated in his bed. ST elevated bed to complete therapy today. Short-Term Goals:  SHORT TERM GOAL #1:  Goal 1: Pt will consume PO TRIALS of ice chips and thin liquids by spoon with ST only with improved mentation, demonstration of improved swallow timeliness and with absence of s/s of aspiration to determine pt appropriateness for repeat instrumental assessment (i.e. FEES vs MBS). INTERVENTIONS: PO trials of both ice chips (x3) and water (x2) via spoon with ST today. Good lip seal around spoon for all trials. Some difficulty with liquid management for thin water, ST observed pt to cough following 1 of the 2 trials but pt was observed to cough prior to beginning dysphagia therapy. Pt managing ice chips well- no observations of him biting or crunching the chips today. He held them in his mouth for a few seconds before swallowing down liquid and told therapist Sandra Salgado are gone\" and opened his mouth to show ice chips had been swallowed.  After trials, ST cued pt to cough and pointed to own abdomen to demonstrate forceful cough to clear throat/or expel liquid that might have traveled below VF. Cough was weak, but with some cues pt produced a more forceful cough. SHORT TERM GOAL #2:  Goal 2: Pt will complete functional orientation tasks (i.e call light, basic demongraphic) with 65% accuracy, max cues to improve retention of pertinent information. INTERVENTIONS:  Orientation: 3/4 indep (hosptial name, city, month)  For year patient stated \"201\" but did attempt to correct himself. ST mentioning his call light which had fallen on the floor- pt repeated \"what? \" many times not knowing what ST was talking about. ST picked it up and showed him that he needs to use this to get the nurse to come to his room. SHORT TERM GOAL #3:  Goal 3: Pt will complete confrontational naming, divergent naming, and responsive naming tasks with 60% accuracy, max cues to reduce number of communicational breakdowns. INTERVENTIONS: Confrontational namin/5 indep  Responsive namin/5 indep    When asked \"something that we drive\" he stated the make and model of his own car. SHORT TERM GOAL #4:  Goal 4: Pt will  complete  attention tasks with less than 5 redirections until task completion to improve functionality with ADL completion. INTERVENTIONS: Sustained/selective attention task completed in which the patient was asked to identify 1 number throughout a string of numbers presented visually. Pt pointed to the number in a row of numbers. When presented the string of numbers to him verbally/auditory task pt had more difficulty. He was able to recall the number he was listening for a few minutes after the task was completed. SHORT TERM GOAL #5:  Goal 5: Pt will complete functional problem solving, reasoning, sequencing, thought organization and executive functioning tasks (i.e telling time, managing medication)  with 60% accuracy, max cues to improve functionality in the current/home environment. INTERVENTIONS:  Patient needed some cues to find the clock when asked Tonia Apple is your clock? \".  ST using gesture cues, \"the thing that tells us what time it is\". He indep stated the time \"twenty before 12\". Long-Term Goals:   No LTG's d/t short ELOS       EDUCATION:  Learner: Patient  Education:  Reviewed signs, symptoms and risks of aspiration  Evaluation of Education: Verbalizes understanding, Needs further instruction and Family not present    ASSESSMENT/PLAN:  Activity Tolerance:  Patient tolerance of  treatment: good     Assessment/Plan: Patient progressing toward established goals. Continues to require skilled care of licensed speech pathologist to progress toward achievement of established goals and plan of care. .     Plan for Next Session: Patient being d/c today, but ST highly recommend continued ST services through new facility [facility])       Edna Morris M.A.  [facility] [phone]

## 2021-02-05 NOTE — CARE COORDINATION
DISASTER CHARTING    2/5/21, 7:59 AM EST    DISCHARGE ONGOING EVALUATION:     [address] day: 19  Location: 622/022-A Reason for admit: Hyponatremia [E87.1]   Barriers to Discharge: Patient did well overnight. Planning discharge today. PCP: Damian Bethea DO  Readmission Risk Score: 37%  Patient Goals/Plan/Treatment Preferences: [facility].

## 2021-02-05 NOTE — DISCHARGE SUMMARY
DISCHARGE SUMMARY      Patient Identification:   Nisa Atkins   : 3/30/1932  MRN: [id number]   Account: [de-identified]      Patient's PCP: Mic Latif DO    Admit Date: 2021     Discharge Date: 2021     Admitting Physician: Thor Juarez MD     Discharge Physician: Jason Purdy MD     Discharge Diagnoses: Active Hospital Problems    Diagnosis Date Noted    Acute pulmonary edema ([address] [J81.0]     Acute metabolic encephalopathy [J07.04]     Pneumonia due to COVID-19 virus [U07.1, J12.82]     Elevated CK [R74.8]     Traumatic rhabdomyolysis ([address] [T79. 6XXA]     Fall [W19. XXXA]     Supratherapeutic INR [R79.1]     Coagulopathy ([address] [D68.9]     Atrial fibrillation with slow ventricular response ([facility] [I48.91] 2016    Hyponatremia [E87.1] 2015   Worsening bilateral consolidation- Fluid overload vs hospital prior pneumonia versus aspiration pneumonia. Patient had recent Covid infection. Vital signs have been stable on room air. On exam today patient's lungs are clear to auscultation and sound better when compared to yesterday. There was concerns of the patient aspirating his tube feeds.  -Patient received Zosyn from -  -Will give another 40 mg of Lasix today  -Monitor for signs of aspiration  Delirium-likely secondary to being hospitalized. Patient has reported history of delirium during past hospital admissions. Patient was sleeping comfortably this morning.   He was not agitated or aggressive toward staff today.  -10 mg olanzapine and trazodone nightly  Dysphagia - PEG tube placed and patient is at goal with tube feeds    Chronic atrial fibrillation with SVR- telemetry today showed atrial fibrillation  - Continue Coumadin at discharge  - Avoid AV blocking agents  - No indication for PPM.   Normocytic anemia- hemoglobin currently at 7.8, no active signs of bleeding  Chronic kidney disease stage III- Nephrology consulted, pt to have OP followup  Recurrent urinary retention-Duarte catheter is in place, Continue Flomax  -Urology was consulted, recommending keeping Duarte inserted until his mobility improves  -Patient will need outpatient follow-up with urology for cystoscopy per urology recommendations  Essential HTN  -Continue home medications. Hypocalcemia-replaced per protocol  Malnutrition- Nutritional status is poor - PEG tube feedings and patient tolerated tube feeds   HFpEF, chronic- Latest echo (12/20) EF 55 to 60%.   BPH- Aware. Hearing Loss- Hearing aids. Deconditioning- Speech, Physical, Occupational Therapy. ECF placement (lima con)  Syncope & Collapse- Likely secondary to dehydration, hyponatremia and COVID infection. Pt was found unresponsive and down prior to admission. Cardiology and Neurology consulted. EEG was negative. MRI no acute CVA. Cardiology signed off.    Leukocytosis, resolved: ANNIKA resolved  Acute Hypoxic respiratory failure, resolved- on room air  Acute Traumatic Rhabdomyolysis, resolved -CK trended down from 9463 to 1111, No longer checking        The patient was seen and examined on day of discharge and this discharge summary is in conjunction with any daily progress note from day of discharge. Hospital Course:   Lloyd Elliott is a 80 y.o. male admitted to [facility] on 1/17/2021 following a Fall at home. Patient presented to the hospital following an unwitnessed fall at home. On presentation to the ED, patient was found to have an elevated white blood cell count, low sodium, elevated CK, elevated proBNP, and elevated troponin. Chest x-ray showed no acute fractures, CT head showed no acute intracranial abnormality. Patient's INR supratherapeutic. Patient was then admitted to the ICU for hyperosmolar hyponatremia in the setting of rhabdomyolysis. Cardiology was consulted on admission for the elevated troponin.   On the day of admission patient was found to be Covid positive, however did not meet SIRS/sepsis criteria at admission. Patient was started on remdesivir, Decadron vitamin C, vitamin D and zinc6. On 1/18/2021 patient was not following commands demonstrated pinpoint pupils patient received Narcan and became more responsive. An MRI of the brain was obtained and showed small vessel ischemic change, prominent central and cortical volume loss and an old ischemic event in the left temporal lobe. Patient mental status declined after transferring out of the ICU on 1/20/2021. Speech therapy saw the patient and recommended n.p.o. except for medications. After discussions with the patient's family, a PEG tube was placed on 1/26/2021. On 1/27/2021 patient had bilateral pleural extremity swelling, a venous duplex ultrasound did not demonstrate any DVT in the upper extremity. He also had a CT of the chest which showed interval development of nodule and patchy opacities in the right upper lobe and right lung base evidence for infectious inflammatory process he was started on Zosyn on 1/27/2021. On 1/28/2021 1 L of pleural fluid was removed via an ultrasound-guided thoracentesis. Patient underwent PICC line placement at this time. On 1/29/2021 patient had a dark brown emesis and was started on MiraLAX and Colace twice a day for constipation. On 1/31/2021 fecal occult blood was positive renal also added 40 mg of Lasix IV daily for 3 days. Urology was consulted on 2/15/2021 for urinary retention. They recommended inserted a Duarte catheter and keeping in place until his mobility improves and follow-up in office for a cystoscopy. Urology was consulted on 2-3 2021 for delirium. Neurology recommended olanzapine 10 mg to help keep his agitation under control, keeping the room bright during the day. He was also placed on trazodone to help with his sleep at night. His mental status improved throughout the day on 2-4-2021. On 2-5-2021 patient was deemed to be medically stable for discharge. On day of discharge patient was alert and oriented to person place and time. He was very cooperative and was having no issues with his behaviors. He had no complaints or concerns on day of discharge. Patient denied chest pain, shortness of breath, abdominal pain, headaches, nausea, vomiting on day of discharge. Patient will have outpatient follow-up with cardiology, urology, nephrology, neurology and his primary care physician. Patient was discharged to [facility]. Exam:     Vitals:  Vitals:    02/05/21 0411 02/05/21 0830 02/05/21 0943 02/05/21 1115   BP: (!) 177/55 114/60  (!) 116/57   Pulse: 63 59  70   Resp: 19 18  18   Temp: 97.4 °F (36.3 °C) 98.4 °F (36.9 °C)  98.2 °F (36.8 °C)   TempSrc: Oral Oral  Oral   SpO2: 95% 92% 93% 91%   Weight: 163 lb 1.6 oz (74 kg)      Height:         Weight: Weight: 163 lb 1.6 oz (74 kg)     24 hour intake/output:    Intake/Output Summary (Last 24 hours) at 2/5/2021 2228  Last data filed at 2/5/2021 1153  Gross per 24 hour   Intake 418 ml   Output 1900 ml   Net -1482 ml       Physical Exam  Vitals signs and nursing note reviewed. Constitutional:       General: He is sleeping. He is not in acute distress. Appearance: Normal appearance. He is ill-appearing. HENT:      Head: Normocephalic and atraumatic. Right Ear: External ear normal.      Left Ear: External ear normal.      Nose: Nose normal. No congestion or rhinorrhea. Mouth/Throat:      Mouth: Mucous membranes are moist.      Pharynx: Oropharynx is clear. No oropharyngeal exudate or posterior oropharyngeal erythema. Eyes:      General: No scleral icterus. Right eye: No discharge. Left eye: No discharge. Extraocular Movements: Extraocular movements intact. Conjunctiva/sclera: Conjunctivae normal.      Pupils: Pupils are equal, round, and reactive to light. Neck:      Musculoskeletal: Neck supple. No muscular tenderness.    Cardiovascular:      Rate and Rhythm: Overall appearance of chest has worsened since prior. **This report has been created using voice recognition software. It may contain minor errors which are inherent in voice recognition technology. **      Final report electronically signed by Dr. Desirae Barkley on 2/4/2021 9:35 AM      XR CHEST PORTABLE   Final Result   1. Moderate cardiac megaly. Metallic sternotomy sutures and vascular clips from prior surgery. Right arm PICC line, catheter tip in SVC. 2. Mild prominence of the central pulmonary vessels. Cannot exclude heart failure. 3. Soft tissue fullness right peritracheal region apparently related to tortuous brachycephalic vessels. No mass lesion seen in this region on recent CT thorax. 4. Moderate pneumonia/pulmonary edema both mid and lower lung fields, worse on the right. Small bilateral pleural effusions. 5. Overall appearance of chest has worsened since prior. **This report has been created using voice recognition software. It may contain minor errors which are inherent in voice recognition technology. **      Final report electronically signed by Dr. Desirae Barkley on 2/3/2021 2:00 PM      XR CHEST PORTABLE   Final Result   1. Worsening aeration of the right lung base. This is likely due to accommodation of pleural effusion and pulmonary parenchymal opacities. 2. Very small left-sided pleural effusion with mild left basilar atelectasis. **This report has been created using voice recognition software. It may contain minor errors which are inherent in voice recognition technology. **      Final report electronically signed by Dr. Evelyne Mcnamara on 1/31/2021 9:09 AM      XR ABDOMEN (KUB) (SINGLE AP VIEW)   Final Result   1. Nonobstructive bowel gas pattern. **This report has been created using voice recognition software. It may contain minor errors which are inherent in voice recognition technology. **      Final report electronically signed by Dr. Sarah HUDSON, [facility] on 1/29/2021 2:17 PM      XR CHEST PORTABLE   Final Result      1. Interval placement of right-sided PICC line which terminates in the SVC. 2. Cardiomegaly with pulmonary vascular congestion. The appearance of the chest was worsened since the previous study. **This report has been created using voice recognition software. It may contain minor errors which are inherent in voice recognition technology. **      Final report electronically signed by Dr Price Garcia on 1/28/2021 4:45 PM      US THORACENTESIS Which side should the procedure be performed? Right   Final Result   1. Uncomplicated ultrasound guided diagnostic and therapeutic thoracentesis. **This report has been created using voice recognition software. It may contain minor errors which are inherent in voice recognition technology. **      Final report electronically signed by Dr. Luis Armando Rey on 1/28/2021 3:08 PM      XR CHEST 1 VIEW   Final Result   1. The patient is status post ultrasound-guided right thoracentesis with complete aspiration of the right pleural effusion. 2. There are mild residual patchy opacities at both lung bases which have almost completely resolved. There is no pulmonary vascular congestion. 3. There is a small amount of free intraperitoneal air beneath the right hemidiaphragm which was also present on the CT chest examination dated 1/27/2021 and the chest radiograph dated 1/26/2021. Correlation with clinical findings recommended to    differentiate post procedural free intraperitoneal air from a perforated viscus. 4. There is a stable right jugular central venous catheter with the distal tip overlying the superior vena cava. 5. The clinical service was notified by Dr. Quinn Slaughter on 1/28/2021 at 1320 hours. **This report has been created using voice recognition software. It may contain minor errors which are inherent in voice recognition technology. **      Final report electronically signed by Dr. Ryan Diamond on 1/28/2021 1:21 PM      VL DUP UPPER EXTREMITY VENOUS BILATERAL   Final Result   No evidence of a DVT. **This report has been created using voice recognition software. It may contain minor errors which are inherent in voice recognition technology. **      Final report electronically signed by Dr Mariposa Garnica on 1/27/2021 4:35 PM      CT CHEST WO CONTRAST   Final Result   Addendum 1 of 1    ADDENDUM #1      Upon further review there is free air around the liver which appears    mildly decreased compared to chest radiograph dated 1/26/2021, though    difficult to compare between modalities. This is likely associated with    prior G-tube insertion on 1/22/2021. Addendum was discussed with Aneesh Chow RN via telephone at the time    of interpretation. Final report electronically signed by Dr. Lyric Garcia MD on    1/28/2021 1:23 PM      ORIGINAL REPORT    PROCEDURE: CT CHEST WO CONTRAST      CLINICAL INFORMATION: possible pna. COMPARISON: CT chest dated 1/17/2021. TECHNIQUE: 5 mm noncontrast axial images were obtained through the chest.    Sagittal and coronal reconstructions were created. All CT scans at this facility use dose modulation, iterative    reconstruction, and/or weight-based dosing when appropriate to reduce    radiation dose to as low as reasonably achievable. FINDINGS:   There are moderate right and small to moderate left pleural effusions    which have increased in the interval. There is adjacent atelectasis. There    are patchy groundglass opacities in the left lower lobe. There is a 6 mm    nodule in the anterior aspect of the    right upper lobe with adjacent groundglass opacity which has appeared in    the interval since prior exam. There is no axillary, mediastinal or hilar    lymphadenopathy identified.  The heart is enlarged, increased in size    compared to prior exam. There is a    stable right effusion. Associated volume loss. Mild opacity right lung base. This could represent mild pneumonia. This document has been electronically signed by: Ayesha Alex MD on    01/17/2021 09:10 PM      All CT scans at this facility use dose modulation, iterative    reconstruction, and/or weight-based   dosing when appropriate to reduce radiation dose to as low as reasonably    achievable. XR CHEST PORTABLE   Final Result   Impression:   Cardiomegaly, no CHF. Mild right infrahilar opacity. This could represent mild pneumonia versus    volume loss. This document has been electronically signed by: Ayesha Alex MD on    01/17/2021 09:11 PM         CT Head WO Contrast   Final Result   1. No acute intracranial hemorrhage or mass effect. 2. Moderate diffuse atrophy is demonstrated. 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 **This report has been created using voice recognition software. It may contain minor errors which are inherent in voice recognition technology. **      Final report electronically signed by Dr. Debbie Oseguera on 1/17/2021 4:17 PM      CT Cervical Spine WO Contrast   Final Result   1. Slightly limited evaluation due to motion artifact. However, no definite acute fracture of the cervical spine is seen. 2. Straightening of the normal cervical lordosis is demonstrated. This may be related to muscle spasm. 3. Multilevel degenerative changes of the cervical spine are demonstrated. **This report has been created using voice recognition software. It may contain minor errors which are inherent in voice recognition technology. **      Final report electronically signed by Dr. Debbie Oseguera on 1/17/2021 4:21 PM      XR PELVIS (1-2 VIEWS)   Final Result   1. No acute fracture or malalignment. Degenerative changes are as described above.             **This report has been created using voice recognition software. It may contain minor errors which are inherent in voice recognition technology. **      Final report electronically signed by Dr. Cory Raymond on 1/17/2021 3:56 PM      XR FEMUR LEFT (MIN 2 VIEWS)   Final Result   1. No acute fracture or malalignment. Degenerative changes are as described above. **This report has been created using voice recognition software. It may contain minor errors which are inherent in voice recognition technology. **      Final report electronically signed by Dr. Cory Raymond on 1/17/2021 3:50 PM      XR CHEST (2 VW)   Final Result   1. Moderate enlargement of the cardiomediastinal silhouette. 2. There is hyperinflation noted. 2. No focal consolidation, pleural effusion or pneumothorax is seen. **This report has been created using voice recognition software. It may contain minor errors which are inherent in voice recognition technology. **      Final report electronically signed by Dr. Cory Raymond on 1/17/2021 3:54 PM             Consults:     [address]  IP CONSULT TO REHAB/TCU ADMISSION COORDINATOR  PALLIATIVE CARE EVAL  IP CONSULT TO SOCIAL WORK  IP CONSULT TO GI  PHARMACY TO DOSE VANCOMYCIN  IP CONSULT TO PHARMACY  IP CONSULT TO UROLOGY  IP CONSULT TO NEUROLOGY    Disposition:    [] Home       [] TCU       [] Rehab       [] Psych       [x] SNF       [] [place]       [] Other-    Condition at Discharge: Stable    Code Status:  DNR CCA no intubation    Patient Instructions:    Discharge lab work: None  Activity: activity as tolerated  Diet: No diet orders on file      Follow-up visits:   CM [phone]  DO Jesus Chu [address]  [phone]    Schedule an appointment as soon as possible for a visit in 1 week      Dodie Anderson AM, [address]    Schedule an appointment as soon as possible for a visit in 2 weeks      Ben Engle MD  [address] [facility]    Schedule an appointment as soon as possible for a visit in 2 weeks      Jana Caraballo MD  [address] [address]  [phone]    Schedule an appointment as soon as possible for a visit in 2 weeks      Mathtew Mosquera, APRN - [facility]  [address] [facility] [phone]    Schedule an appointment as soon as possible for a visit      Jody Banks MD  [address] [facility]  [phone]    Schedule an appointment as soon as possible for a visit in 1 month           Discharge Medications:      Miguel Talamantes   Home Medication Instructions [id number]    Printed on:02/05/21 2228   Medication Information                      aspirin 81 MG EC tablet  Take 81 mg by mouth daily Indications: Anticoagulant Therapy Take with food.              cyclobenzaprine (FLEXERIL) 5 MG tablet  1 tablet by PEG Tube route 3 times daily as needed for Muscle spasms             docusate (COLACE) 50 MG/5ML liquid  10 mLs by Per G Tube route 2 times daily             furosemide (LASIX) 20 MG tablet  Take 1 tablet by mouth daily             ipratropium-albuterol (DUONEB) 0.5-2.5 (3) MG/3ML SOLN nebulizer solution  Inhale 3 mLs into the lungs every 4 hours (while awake)             lactobacillus (CULTURELLE) capsule  1 capsule by PEG Tube route daily (with breakfast)             lansoprazole (PREVACID) 3 mg/mL oral suspension  10 mLs by Per G Tube route every morning (before breakfast)             melatonin 3 MG TABS tablet  1 tablet by PEG Tube route nightly             OLANZapine (ZYPREXA) 5 MG tablet  Take 1 tablet by mouth nightly             polyethylene glycol (GLYCOLAX) 17 g packet  17 g by Per G Tube route daily             pravastatin (PRAVACHOL) 40 MG tablet  1 tablet by PEG Tube route nightly             tamsulosin (FLOMAX) 0.4 MG capsule  Take 1 capsule by mouth daily Capsule may be opened and contents dissolve for peg tube             traZODone (DESYREL) 50 MG tablet  Take 1 tablet by mouth nightly             warfarin (COUMADIN) 3 MG tablet  Take 1 tablet by mouth See Admin Instructions Indications: Anticoagulant Therapy, Atrial Fibrillation [facility] coumadin clinic to dose, #45=30 days supply                 Time Spent on discharge is more than 1.5 hours in the examination, evaluation, counseling and review of medications and discharge plan. Signed: Thank you Peri Jimenez DO for the opportunity to be involved in this patient's care.     Electronically signed by Merla Moritz, MD on 2/5/2021 at 10:28 PM

## 2021-02-05 NOTE — PROGRESS NOTES
NEUROLOGY INPATIENT PROGRESS NOTE    Lanette Franz    MRN -  [id number]   [facility]      - 3/30/1932    80 y.o. Subjective: The patient is seen as follow up for acute encephalopathy. Patient is alert at this time and able to hold conversation decently    Objective:   BP (!) 116/57   Pulse 70   Temp 98.2 °F (36.8 °C) (Oral)   Resp 18   Ht 5' 4\" (1.626 m)   Wt 163 lb 1.6 oz (74 kg)   SpO2 91%   BMI 28.00 kg/m²       Intake/Output Summary (Last 24 hours) at 2021 1422  Last data filed at 2021 1153  Gross per 24 hour   Intake 2304.34 ml   Output 3850 ml   Net -1545.66 ml       Physical Exam:  General:  alert, laying in bed,  not  in distress. Language is intact. He is AAO x 3 today  HEENT: pink conjunctiva, unicteric sclera, moist oral mucosa. There is no neck lymphadenopathy. Neck: There is no carotid bruits. The Neck is supple. Neuro: CN 2-12 grossly intact with no focal deficits. Power 5/5 Throughout symmetric, . Long tracts are intact. Cerebellar exam is Intact. Sensory exam is intact to light touch. Gait is not tested. No abnormal movement. Osteo: There is no LROM of any of the 4 extremity joints, no joint tenderness. Leg edema none  Skin: no lesions, no rash, warm and moist to touch. Abdomen is soft intact bowel sounds. ROS:    Cardiac: no chest pain. No palpitations.   Renal : no flank pain, no hematuria  Skin: no rash    Medications:     OLANZapine  5 mg Oral Nightly    traZODone  50 mg Oral Nightly    aspirin  81 mg PEG Tube Daily    lactobacillus  1 capsule PEG Tube Daily with breakfast    melatonin  3 mg PEG Tube Nightly    warfarin (COUMADIN) daily dosing (placeholder)   Other RX Placeholder    pantoprazole  40 mg Intravenous BID    polyethylene glycol  17 g Oral Daily    sodium phosphate  1 enema Rectal Once    docusate  100 mg Oral BID    ipratropium-albuterol  1 ampule Inhalation Q4H WA    pravastatin  40 vessel ischemic change. Prominent central and cortical volume loss. Old ischemic event left temporal lobe. This document has been electronically signed by: Nicole Villarreal MD on   01/19/2021 12:59 AM       No results found for this or any previous visit. No results found for this or any previous visit. Results for orders placed during the hospital encounter of 01/17/21   CT Head WO Contrast    Narrative PROCEDURE: CT HEAD WO CONTRAST    CLINICAL INFORMATION: fall, ams     COMPARISON: 8/17/2015    TECHNIQUE:  Axial CT images were obtained through the head without contrast. Coronal and sagittal reformatted images were rendered. All CT scans at this facility use dose modulation, iterative reconstruction, and/or weight-based dosing when appropriate   to reduce radiation dose to as low as reasonably achievable. 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 There is moderate diffuse atrophy. No acute intracranial hemorrhage or mass effect is seen. There is no midline shift. The basal cisterns and posterior fossa appear otherwise unremarkable. Parasellar carotid artery vascular calcifications are noted. The visualized paranasal sinuses and mastoid air cells appear clear. No acute abnormalities of the calvarium are seen. The visualized globes and orbits appear grossly intact. Impression 1. No acute intracranial hemorrhage or mass effect. 2. Moderate diffuse atrophy is demonstrated. 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 **This report has been created using voice recognition software. It may contain minor errors which are inherent in voice recognition technology. **    Final report electronically signed by Dr. Katherine Hanson on 1/17/2021 4:17 PM         Assessment:  Active Problems:    Hyponatremia    Atrial fibrillation with slow ventricular response (HCC)    Traumatic rhabdomyolysis ([facility] [address]    Fall    Supratherapeutic INR    Coagulopathy (HCC)    Pneumonia due to COVID-19 virus    Elevated CK    Acute metabolic encephalopathy    Acute pulmonary edema (HCC)  Resolved Problems:    * No resolved hospital problems. [de-identified]      80year old man with afib on coumadin, went through a COVID19 pneumonia course, now with encephalopathy and improving    Plan:    1. Improving since starting olanzepine   2. con't olanzepine at 5mg   3.   4. Sunlight during the day  5. Will f/u in the clinic in 1 month to see if the olanzepine can be discontinued.     Hildreth Lennox, MD, 2/5/2021 2:22 PM     Anam Vera MD  Attending Neurologist/Neurointensivist

## 2021-02-05 NOTE — PROGRESS NOTES
unstageable)       Current Nutrition Therapies:    DIET TUBE FEED CONTINUOUS/CYCLIC NPO; 1.5 Calorie with Fiber (Jevity 1.5); Gastrostomy  Current Tube Feeding (TF) Orders:  · Feeding Route: PEG(1/26/21 Dr. Caridad Hollis)  · Formula: Standard w/Fiber(Jevity 1.5)  · Schedule: Continuous  · Additives/Modulars: Protein(one (2.5 ounce) liquid protein bottle daily)  · Water Flushes: 240 mls 4 times per day per MD order 1/27/21  · Goal TF & Flush Orders Provides: Jevity 1.5 goal 50ml/ hour (or 300ml bolus 4 times per day pending TF pump availability) and one [id number] daily ~1275ml, 1904 kcals, 103 grams protein, 259 grams CHO, 26 grams fiber, 1872mls total water (TF+flushes) per 24 hours      Anthropometric Measures:  · Height: 5' 4\" (162.6 cm)  · Current Body Weight: 163 lb 1.6 oz (74 kg)(2/5/21 +2 LE edema)   · Admission Body Weight: 152 lb 5.4 oz (69.1 kg)(1/17 +2 edema)    · Usual Body Weight: (per EMR: 2/25/20: 174# 11.2 oz, 12/9/20: 170# 11.2 oz)     · Ideal Body Weight: 130 lbs;   · BMI: 28  · Adjusted Body Weight:  ; No Adjustment   · BMI Categories: Overweight (BMI 25.0-29. 9)       Nutrition Diagnosis:   · Moderate malnutrition related to inadequate protein-energy intake as evidenced by poor intake prior to admission, mild muscle loss      Nutrition Interventions:   Food and/or Nutrient Delivery:  Continue Current Diet, Continue Current Tube Feeding  Nutrition Education/Counseling:  Education not indicated   Coordination of Nutrition Care:  Continue to monitor while inpatient    Goals:  Patient will tolerate EN to provide 75% or more of estimated nutrient needs while NPO.        Nutrition Monitoring and Evaluation:   Behavioral-Environmental Outcomes:  None Identified   Food/Nutrient Intake Outcomes:  Diet Advancement/Tolerance, Enteral Nutrition Intake/Tolerance, IVF Intake  Physical Signs/Symptoms Outcomes:  Biochemical Data, Chewing or Swallowing, GI Status, Fluid Status or Edema, Nutrition Focused Physical Findings, Skin, Weight     Discharge Planning:    Enteral Nutrition     Electronically signed by Letty Cabrera RD, LD on 2/5/21 at 1:38 PM EST    Contact: [phone]

## 2021-02-05 NOTE — PROGRESS NOTES
NEUROLOGY INPATIENT PROGRESS NOTE    Dana Samuel    MRN -  [id number]   Acct # - [de-identified]      - 3/30/1932    80 y.o. Subjective: The patient is seen as follow up for acute encephalopathy. Patient is lethargic at this time but more coherent today, though he is sleeping most of the day    Objective:   BP (!) 127/58   Pulse 67   Temp 97.5 °F (36.4 °C) (Oral)   Resp 17   Ht 5' 4\" (1.626 m)   Wt 161 lb 3.2 oz (73.1 kg)   SpO2 93%   BMI 27.67 kg/m²       Intake/Output Summary (Last 24 hours) at 2021  Last data filed at 2021  Gross per 24 hour   Intake 1979.24 ml   Output 4850 ml   Net -2870.76 ml       Physical Exam:  General:  sleepy arousable, laying in bed,  not  in distress. Language is intact. He is AAO x 3 today, but too easily fall asleep  HEENT: pink conjunctiva, unicteric sclera, moist oral mucosa. There is no neck lymphadenopathy. Neck: There is no carotid bruits. The Neck is supple. Neuro: CN 2-12 grossly intact with no focal deficits. Power 5/5 Throughout symmetric, . Long tracts are intact. Cerebellar exam is Intact. Sensory exam is intact to light touch. Gait is not tested. No abnormal movement. Osteo: There is no LROM of any of the 4 extremity joints, no joint tenderness. Leg edema none  Skin: no lesions, no rash, warm and moist to touch. Abdomen is soft intact bowel sounds. ROS:    Cardiac: no chest pain. No palpitations.   Renal : no flank pain, no hematuria  Skin: no rash    Medications:     OLANZapine  5 mg Oral Nightly    traZODone  50 mg Oral Nightly    aspirin  81 mg PEG Tube Daily    lactobacillus  1 capsule PEG Tube Daily with breakfast    melatonin  3 mg PEG Tube Nightly    warfarin (COUMADIN) daily dosing (placeholder)   Other RX Placeholder    pantoprazole  40 mg Intravenous BID    polyethylene glycol  17 g Oral Daily    sodium phosphate  1 enema Rectal Once    docusate  100 mg Oral BID    ipratropium-albuterol  1 ampule Inhalation Q4H WA    pravastatin  40 mg PEG Tube Nightly    lidocaine 1 % injection  5 mL Intradermal Once    sodium chloride flush  10 mL Intravenous 2 times per day    sodium chloride flush  10 mL Intravenous 2 times per day    calcium replacement protocol   Other RX Placeholder    phosphorus replacement protocol   Other RX Placeholder    tamsulosin  0.4 mg Oral Daily       Data:   CBC:   Recent Labs     02/02/21  0545 02/03/21  0355 02/04/21  0350   WBC 7.7 6.5 5.6   HGB 7.8* 7.9* 8.3*    179 159     BMP:    Recent Labs     02/02/21  0545 02/03/21  0355 02/04/21  0350    142 136   K 3.9 4.0 3.8    105 101   CO2 29 28 29   BUN 19 18 16   CREATININE 1.1 1.2 1.1   GLUCOSE 114* 120* 119*           No results found for this or any previous visit. No results found for this or any previous visit. No results found for this or any previous visit. No results found for this or any previous visit. Results for orders placed during the hospital encounter of 01/17/21   MRI BRAIN WO CONTRAST    Narrative Noncontrast MRI of the brain. Technique: Sagittal T1, coronal T2, axial T1, T2, FLAIR and   diffusion-weighted imaging. Comparison:   CT,SR  - CT HEAD WO CONTRAST  - 01/17/2021 03:49 PM EST    Findings: Normal signal on diffusion-weighted imaging. No evidence for   acute ischemic event. Moderate nonspecific periventricular deep white matter disease. This most   often can be ascribed to chronic small vessel ischemic change. Prominent   central and cortical volume loss. Old ischemic event left temporal lobe. There are no intracranial masses. No evidence for hemorrhage. The ventricles are normal size, no   hydrocephalus. Contents of the posterior fossa including brainstem and cerebellum are   unremarkable. Normal vascular flow voids. Normal aeration of paranasal sinuses.       Impression Impression:  Moderate nonspecific periventricular deep white matter disease. This most   often can be ascribed to chronic small vessel ischemic change. Prominent central and cortical volume loss. Old ischemic event left temporal lobe. This document has been electronically signed by: Molly Peterson MD on   01/19/2021 12:59 AM       No results found for this or any previous visit. No results found for this or any previous visit. Results for orders placed during the hospital encounter of 01/17/21   CT Head WO Contrast    Narrative PROCEDURE: CT HEAD WO CONTRAST    CLINICAL INFORMATION: fall, ams     COMPARISON: 8/17/2015    TECHNIQUE:  Axial CT images were obtained through the head without contrast. Coronal and sagittal reformatted images were rendered. All CT scans at this facility use dose modulation, iterative reconstruction, and/or weight-based dosing when appropriate   to reduce radiation dose to as low as reasonably achievable. 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 There is moderate diffuse atrophy. No acute intracranial hemorrhage or mass effect is seen. There is no midline shift. The basal cisterns and posterior fossa appear otherwise unremarkable. Parasellar carotid artery vascular calcifications are noted. The visualized paranasal sinuses and mastoid air cells appear clear. No acute abnormalities of the calvarium are seen. The visualized globes and orbits appear grossly intact. Impression 1. No acute intracranial hemorrhage or mass effect. 2. Moderate diffuse atrophy is demonstrated. 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 **This report has been created using voice recognition software. It may contain minor errors which are inherent in voice recognition technology. **    Final report electronically signed by Dr. Jose Luis Feliz on 1/17/2021 4:17 PM         Assessment:  Active Problems:    Hyponatremia    Atrial fibrillation with slow ventricular response (HCC)    Traumatic rhabdomyolysis ([facility] [address]    Fall    Supratherapeutic INR    Coagulopathy (HCC)    Pneumonia due to COVID-19 virus    Elevated CK    Acute metabolic encephalopathy    Acute pulmonary edema (HCC)  Resolved Problems:    * No resolved hospital problems. [de-identified]      80year old man with afib on coumadin, went through a COVID19 pneumonia course, now with encephalopathy. Plan:    1. Improving since starting olanzepine   2. Will reduce olanzepine to 5mg to reduce his sleepiness  3. Agree with zosyn for his pneumonia  4. Sunlight during the day  5.  Will f/u    Silas Tao MD, 2/4/2021 8:32 Ollie Fontanez MD  Attending Neurologist/Neurointensivist

## 2021-02-05 NOTE — DISCHARGE INSTR - MEDS
Warfarin regimen included for continuity of care      Date INR Warfarin Dose   1/17/2021 5.19 No Coumadin   1/18/2021 4.97 No Coumadin  Vitamin K 2 mg IV given   1/19/2021 1.73 3 mg    1/20/2021 1.32 3 mg   1/21/2021 1.39 5 mg   1/22/2021 1.51  - not given   1/23/2021 1.94  - not given   1/24-2/1   On hold - FOB positive on 1/29 2/2/2021 1.19 5 mg   2/3/2021 1.23 4.5 mg    2/4/2021  1.20  4.5 mg   2/5/2021 1.20 7.5 mg prior to discharge      Interacting medications at discharge: Aspirin      INR goal during admission:2-3     Recommendations for discharge:   Date Warfarin Dose   02/06/21 6 mg   2/7/21 4.5 mg   2/8/21 INR      Provider dosing warfarin: [facility] physician      Recheck INR:  Monday, 2/8/2021 with results to [facility] physician

## 2021-02-05 NOTE — PROGRESS NOTES
CLINICAL PHARMACY: DISCHARGE MED RECONCILIATION/REVIEW    [facility]) Select Patient?: No  Total # of Interventions Recommended: 1 - Updated Order #: 1   -   Total # Interventions Accepted: 1  Intervention Severity:   - Level 1 Intervention Present?: No   - Level 2 #: 0   - Level 3 #: 1   Time Spent (min): 15    Dasha Albright PharmD 2/5/2021 3:05 PM

## 2021-02-05 NOTE — PLAN OF CARE
Problem: Nutrition  Goal: Optimal nutrition therapy  Outcome: Ongoing   Nutrition Problem #1: Moderate malnutrition  Intervention: Food and/or Nutrient Delivery: Continue Current Diet, Continue Current Tube Feeding  Nutritional Goals: Patient will tolerate EN to provide 75% or more of estimated nutrient needs while NPO.

## 2021-03-01 LAB
FUNGUS IDENTIFIED: NORMAL
FUNGUS SMEAR: NORMAL

## 2021-03-16 LAB — AFB CULTURE & SMEAR: NORMAL

## 2021-03-18 ENCOUNTER — HOSPITAL ENCOUNTER (OUTPATIENT)
Dept: GENERAL RADIOLOGY | Age: 86
Discharge: HOME | End: 2021-03-18
Payer: COMMERCIAL

## 2021-03-18 DIAGNOSIS — R13.12 DYSPHAGIA, OROPHARYNGEAL: ICD-10-CM

## 2021-03-18 PROCEDURE — 74230 X-RAY XM SWLNG FUNCJ C+: CPT

## 2021-03-18 PROCEDURE — 2500000003 HC RX 250 WO HCPCS: Performed by: FAMILY MEDICINE

## 2021-03-18 PROCEDURE — 92611 MOTION FLUOROSCOPY/SWALLOW: CPT

## 2021-03-18 RX ADMIN — BARIUM SULFATE 20 ML: 400 PASTE ORAL at 09:33

## 2021-03-18 RX ADMIN — BARIUM SULFATE 120 ML: 0.81 POWDER, FOR SUSPENSION ORAL at 09:33

## 2021-03-18 RX ADMIN — BARIUM SULFATE 30 ML: 400 SUSPENSION ORAL at 09:33

## 2021-03-18 NOTE — DISCHARGE SUMMARY
[address] [facility] RADIOLOGY  Modified Barium Swallow    SLP Individual Minutes  Time In: 0930  Time Out: 1000  Minutes: 30  Timed Code Treatment Minutes: 0 Minutes       Date: 3/18/2021  Patient Name: Jm Russell      CSN: [id number]   : 3/30/1932  (80 y.o.)  Gender: male   Referring Physician:  Pranay Mello MD   Diagnosis: Dysphagia   Secondary Diagnosis: Dysphagia   Precautions: aspiration precautions   History of Present Illness/Injury: Per chart review and pt/fammily interview; Adair De La Rosa is a 79 y/o male who was referred for a OP MBS d/t dx of dysphagia. Pt with a PMH of  CVA, CAD, hypertension, hyperlipidemia, chronic atrial fibrillation on anticoagulation with Coumadin was recently admitted and discharged from [facility] for asymptomatic bradycardia management, and COVID-19.  He also had a history of recent falls (21). At that time the pt was being followed by ST, completed a instrumental assessment -- FEES (21), recommending STRICT NPO. Pt has a PEG tube in pace and currently participating in skilled ST services at [facility]. has a past medical history of Actinic keratoses, Adenomatous polyp, Atrial fibrillation (HCC), CAD (coronary artery disease), Carotid artery disease ([address] Hyperlipidemia, Hypertension, Inguinal hernia, left, and Unspecified cerebral artery occlusion with cerebral infarction. Current Diet: NPO + PEG     Pain: No pain reported. SUBJECTIVE:  Patient is alert, pleasant, slightly confused, with obvious cognitive linguistic deficits, requiring his daughter in-law to assist with pt interview and obtaining recent ST hx since admission to SNF.       OBJECTIVE:    Respiratory Status:  Independent    Behavioral Observation:  Alert and Confused    PATIENT WAS EVALUATED USING:  BARIUM: thin via tsp, cup, straw, consecutive, nectar via cup drinks, puree, soft solids (peaches), advanced solids (sandee cracker)    ORAL PREPARATION PHASE:  Impaired:  Slow Mastication, Uncoordinated Mastication and Decreased Bolus Control    ORAL PHASE: Slow AP Movement, Decreased Lingual Elevation and Uncontrolled Bolus/Diffuse Fall Over Tongue Base     ORAL PHASE YAS SCORE: (Dysphagia outcome and severity scale)  5 = Mild Dysphagia - May have one diet consistency restricted - Mild oral residue but clears    PHARYNGEAL PHASE:  Impaired: Delayed Swallow, Decreased Airway Protection, Decreased Epiglottic Inversion, Decreased Hyolaryngeal Elevation, Decreased Tongue Based Retraction, Residue in the Valleculae and Residue in the Pyriform Sinus     PHARYNGEAL PHASE YAS SCORE: (Dysphagia outcome and severity scale)  3 = Moderate Dysphagia - Two or more diet consistencies restricted - May exhibit one or more of the following: Moderate residue clears with cue, Airway penetration to the level of the vocal folds wihtout cough with tow or more consistencies, Aspiration with two consistencies with weak or no reflexive cough, Aspiration of one consistency, no cough and airway penetration with one consistency, no cough    EVIDENCE FOR LARYNGEAL PENETRATION AND/OR ASPIRATION:  Laryngeal penetration evident with thin and nectar liquids. Inconsistent, occurring when pt working to clear residue in valleculae   Audible aspiration evident with large consecutive drinks in attempt to clear soft solid (sandee cracker) residue in valleculae    PENETRATION-ASPIRATION SCALE (PAS):   Thin Liquids single cup/straw drinks: 2 = Material enters the airway, remains above vocal folds, and is ejected from the airway  Thin Liquids large consecutive drinks via straw (x1): 6 = Material enters the airway, passes below the vocal folds, and is ejected into the larynx or out of the airway  Mildly Thick Liquids:  2 = Material enters the airway, remains above vocal folds, and is ejected from the airway  Puree:  1 = Material does not enter the airway  Soft Solid:  1 = Material does not enter the airway    ESOPHAGEAL PHASE:   No significant findings    ATTEMPTED TECHNIQUES:  Small Bolus Size Effective    Straw Effective    Cup Effective    Chin Tuck Effective and Ineffective Effective when completed in isolation; complete double swallow + chin tuck, after initial swallow to clear residue. Ineffective during drinks/bites   Head Turn Not Attempted    Spoon Presentations Effective    Volitional Cough Not Attempted    Spontaneous Cough Effective Throat clears - effective identifiers of residue and effective in clearing penetration. Coughing - effective in clearing trace aspiration on the VF (1/1 asp episodes)           DIAGNOSTIC IMPRESSIONS:  Patient presented with Mild Oral Dysphagia & Moderate Pharyngeal Dysphagia characterized by slightly prolonged and uncoordinated mastication patterns, uncoordinated AP movement of liquids and slow AP movement of textured solids. Pt also presenting with decreased posterior tongue base control and decreased tongue base retraction; resulting in uncontrolled, premature loss of liquids over BOT to the level of the valleculae and pyriform sinus'. Once liquids reach the pharyngeal cavity, the pt presented with a slight delayed onset ~1 second or less, suspected for decreased sensation levels compounded with mistimed swallow onset d/t premature loss. Moderate pharyngeal dysphagia is characterized by several contributing factors including; incomplete velopharyngeal closure (penetration into , but clears), reduced pharyngeal constriction, decreased hyolaryngeal elevation and anterior excursion, and severely decreased tongue base retraction. Pharyngeal deficits RESULTING IN, moderate to severe residue of liquids and solids in the valleculae after each swallow, (mild residue in the pyriform sinus'), requiring a double swallow with head in the CHIN TUCK positioning to clear 80-90% of residue. The pt is aware of residue with frequent throat clears in response.  Throat clears are also indicative of trace penetration, which is consistently cleared with throat clear. When presented with sandee cracker presentation, entire bolus got stuck in the valleculae, requiring consecutive drinks of thin liquids, pt immediately throat clearing, and eventual AUDIBLE, trace tracheal aspiration to the level of the VF, during the swallow, with immediate cough response. Aspiration CLEARED with pt cough response. Diet recommendations TBD by primary ST:  Conservative recommendations: remain NPO + PEG with introduction of meal treatments/dietary analysis with training of strategies. Aggressive recommendations: Puree solids with thin liquids via single drinks, and compensatory swallowing strategies. Strategies recommended:  -Single bites/drinks.  -Straw okay with single drinks. -After each bite and/or drink, cue the pt to complete a double swallow with head in CHIN tuck positioning. Alternate liquids/solids. Diet Recommendations:  NPO + PEG   Strategies:  Recommend NPO   Rehabilitation Potential: good    EDUCATION:  Learner: Patient and Family  Education:  Reviewed results and recommendations of this evaluation, Reviewed diet and strategies, Reviewed signs, symptoms and risks of aspiration, Reviewed ST goals and Plan of Care and Reviewed recommendations for follow-up  Evaluation of Education: Demonstrates with assistance and Needs further instruction    PLAN:  frequency adn duration TBD by primary ST    PATIENT GOAL:    Return to least restrictive diet. SHORT TERM GOALS/LONG TERM GOALS:  STG and LTG to be determined by primary STEmil Blandon AllegraDaiana Hutchinson MA., CCC-SLP

## 2021-04-06 LAB
HCT VFR BLD CALC: 30.9 % (ref 41–53)
HEMOGLOBIN: 10.3 G/DL (ref 13.5–17.5)

## 2021-04-19 ENCOUNTER — OFFICE VISIT (OUTPATIENT)
Dept: NEPHROLOGY | Age: 86
End: 2021-04-19
Payer: MEDICARE

## 2021-04-19 VITALS
TEMPERATURE: 96.9 F | DIASTOLIC BLOOD PRESSURE: 49 MMHG | OXYGEN SATURATION: 95 % | WEIGHT: 146.4 LBS | BODY MASS INDEX: 25.13 KG/M2 | SYSTOLIC BLOOD PRESSURE: 127 MMHG | HEART RATE: 58 BPM

## 2021-04-19 DIAGNOSIS — I10 ESSENTIAL HYPERTENSION: ICD-10-CM

## 2021-04-19 DIAGNOSIS — N18.2 CKD (CHRONIC KIDNEY DISEASE), STAGE II: Primary | ICD-10-CM

## 2021-04-19 LAB
BASOPHILS ABSOLUTE: ABNORMAL
BASOPHILS RELATIVE PERCENT: ABNORMAL
BUN BLDV-MCNC: 13 MG/DL
CALCIUM SERPL-MCNC: 9.4 MG/DL
CHLORIDE BLD-SCNC: 104 MMOL/L
CO2: 25 MMOL/L
CREAT SERPL-MCNC: 1.1 MG/DL
EOSINOPHILS ABSOLUTE: ABNORMAL
EOSINOPHILS RELATIVE PERCENT: ABNORMAL
GFR CALCULATED: 60
GLUCOSE BLD-MCNC: 102 MG/DL
HCT VFR BLD CALC: 32.9 % (ref 41–53)
HEMOGLOBIN: 10.7 G/DL (ref 13.5–17.5)
LYMPHOCYTES ABSOLUTE: ABNORMAL
LYMPHOCYTES RELATIVE PERCENT: ABNORMAL
MCH RBC QN AUTO: ABNORMAL PG
MCHC RBC AUTO-ENTMCNC: ABNORMAL G/DL
MCV RBC AUTO: ABNORMAL FL
MONOCYTES ABSOLUTE: ABNORMAL
MONOCYTES RELATIVE PERCENT: ABNORMAL
NEUTROPHILS ABSOLUTE: ABNORMAL
NEUTROPHILS RELATIVE PERCENT: ABNORMAL
PLATELET # BLD: 317 K/ΜL
PMV BLD AUTO: ABNORMAL FL
POTASSIUM SERPL-SCNC: 4 MMOL/L
RBC # BLD: 3.94 10^6/ΜL
SODIUM BLD-SCNC: 135 MMOL/L
WBC # BLD: 6.6 10^3/ML

## 2021-04-19 PROCEDURE — 1123F ACP DISCUSS/DSCN MKR DOCD: CPT | Performed by: INTERNAL MEDICINE

## 2021-04-19 PROCEDURE — 99213 OFFICE O/P EST LOW 20 MIN: CPT | Performed by: INTERNAL MEDICINE

## 2021-04-19 PROCEDURE — 1036F TOBACCO NON-USER: CPT | Performed by: INTERNAL MEDICINE

## 2021-04-19 PROCEDURE — G8417 CALC BMI ABV UP PARAM F/U: HCPCS | Performed by: INTERNAL MEDICINE

## 2021-04-19 PROCEDURE — G8427 DOCREV CUR MEDS BY ELIG CLIN: HCPCS | Performed by: INTERNAL MEDICINE

## 2021-04-19 PROCEDURE — 4040F PNEUMOC VAC/ADMIN/RCVD: CPT | Performed by: INTERNAL MEDICINE

## 2021-04-19 RX ORDER — LOSARTAN POTASSIUM 25 MG/1
25 TABLET ORAL DAILY
COMMUNITY
End: 2021-04-19

## 2021-04-19 RX ORDER — PANTOPRAZOLE SODIUM 40 MG/1
40 TABLET, DELAYED RELEASE ORAL 2 TIMES DAILY
COMMUNITY

## 2021-04-19 RX ORDER — POTASSIUM CHLORIDE 20 MEQ/1
20 TABLET, EXTENDED RELEASE ORAL DAILY
Qty: 60 TABLET | Refills: 5 | COMMUNITY
Start: 2021-04-19

## 2021-04-19 NOTE — PROGRESS NOTES
[address] [facility]  [address]  Dept: [phone]  Loc: [phone]  Office Progress Note  4/19/2021 2:25 PM      Pt Name:    Sunny Parnellr:    3/30/1932  Primary Care Physician:  Chito Kline DO     Chief Complaint:   Chief Complaint   Patient presents with    Follow-Up from Hospital: Hyponatremia        Background Information/Interval History:   79 yo with hx CAD, HTN, hx CVA, A. Fibrillation who was recently in hospital s/p fall and rhabdomyolysis. Nephrology saw him for hyponatremia. He was also COVID positive. Required feeding tube placement and was discharged to the nursing home. He is here for hospital follow-up. He is now residing at his home. Feeding tube was recently removed. No chest pain or shortness of breath. No hematuria. Pt brught his medication list: he is only taking aspirin, Lasix, Flomax, Potassium 20 meq po daily, protonix and eliquis. I have updated our epic med list. Discussed with daughter-in-law who confirmed this medication list. Daughter in law confirmed that he is not taking zyprexa or trazodone.       Past History:  Past Medical History:   Diagnosis Date    Actinic keratoses 8/21/2013    Adenomatous polyp 2008    Atrial fibrillation ([address]     CAD (coronary artery disease) 2009    Carotid [facility] disease [facility] [facility] 2009    left    Hyperlipidemia 2004    Hypertension 2004    Inguinal hernia, left 2/19/2014    Unspecified cerebral artery occlusion with cerebral infarction      Past Surgical History:   Procedure Laterality Date    ABDOMEN SURGERY      ABDOMINAL ADHESION SURGERY  6/2012    Dr Annamarie Hubbard  2004    right    COLONOSCOPY      [facility]    CORONARY ARTERY BYPASS GRAFT  2009    SMALL INTESTINE SURGERY      SMALL INTESTINE SURGERY  02/20/2015    Exploratory Laparotomy    UPPER GASTROINTESTINAL ENDOSCOPY N/A nightly 30 tablet 1    pravastatin (PRAVACHOL) 40 MG tablet 1 tablet by PEG Tube route nightly 30 tablet 3    lansoprazole (PREVACID) 3 mg/mL oral suspension 10 mLs by Per G Tube route every morning (before breakfast)  0    warfarin (COUMADIN) 3 MG tablet Take 1 tablet by mouth See Admin Instructions Indications: Anticoagulant Therapy, Atrial Fibrillation [facility] coumadin clinic to dose, #45=30 days supply 45 tablet 11     No current facility-administered medications for this visit. Laboratory & Diagnostics:  Old labs  Feb 2021: UA: no blood, trace protein  EF 55-60-%  Feb 2021; Na 140, Creat 1.2  April 2021: Hb 10.3     Impression/Plan:   1. CKd II/Borderline IIIA: very stable, creat is 1.2 (Feb 2021). Electrolytes are overall stable. 2. Recent hyponatremia: Resolved, but no recent labs. Sodium 140 (Feb 2021). Will repeat labs. 3. HTN: on cozaar. Stable. Also taking low dose lasix. 4. S/p feeding tube removal  5. Recent Rhabdo sec to fall  6. Recent Covid 19: doing well. On room air. Overall patient is doing well. Renal function was stable but will repeat labs. Discussed with patient and daughter-in-law. Orders Placed This Encounter   Procedures    Basic Metabolic Panel    Basic Metabolic Panel    Protein / creatinine ratio, urine     Return in about 6 months (around 10/19/2021).     Nataliia Antoine MD  [facility]

## 2021-08-04 ENCOUNTER — APPOINTMENT (OUTPATIENT)
Dept: GENERAL RADIOLOGY | Age: 86
DRG: 291 | End: 2021-08-04
Payer: OTHER GOVERNMENT

## 2021-08-04 ENCOUNTER — HOSPITAL ENCOUNTER (INPATIENT)
Age: 86
LOS: 2 days | Discharge: HOME | DRG: 291 | End: 2021-08-06
Attending: NURSE PRACTITIONER | Admitting: STUDENT IN AN ORGANIZED HEALTH CARE EDUCATION/TRAINING PROGRAM
Payer: OTHER GOVERNMENT

## 2021-08-04 ENCOUNTER — APPOINTMENT (OUTPATIENT)
Dept: ULTRASOUND IMAGING | Age: 86
DRG: 291 | End: 2021-08-04
Payer: OTHER GOVERNMENT

## 2021-08-04 DIAGNOSIS — N18.9 ACUTE RENAL FAILURE SUPERIMPOSED ON CHRONIC KIDNEY DISEASE, UNSPECIFIED CKD STAGE, UNSPECIFIED ACUTE RENAL FAILURE TYPE (HCC): ICD-10-CM

## 2021-08-04 DIAGNOSIS — N18.2 STAGE 2 CHRONIC KIDNEY DISEASE: ICD-10-CM

## 2021-08-04 DIAGNOSIS — Z97.8 FOLEY CATHETER IN PLACE: ICD-10-CM

## 2021-08-04 DIAGNOSIS — I50.33 ACUTE ON CHRONIC HEART FAILURE WITH PRESERVED EJECTION FRACTION (HCC): ICD-10-CM

## 2021-08-04 DIAGNOSIS — I50.9 ACUTE ON CHRONIC CONGESTIVE HEART FAILURE, UNSPECIFIED HEART FAILURE TYPE (HCC): Primary | ICD-10-CM

## 2021-08-04 DIAGNOSIS — N17.9 ACUTE RENAL FAILURE SUPERIMPOSED ON CHRONIC KIDNEY DISEASE, UNSPECIFIED CKD STAGE, UNSPECIFIED ACUTE RENAL FAILURE TYPE (HCC): ICD-10-CM

## 2021-08-04 PROBLEM — I50.1 PULMONARY EDEMA CARDIAC CAUSE (HCC): Status: ACTIVE | Noted: 2021-08-04

## 2021-08-04 LAB
ALBUMIN SERPL-MCNC: 4.2 G/DL (ref 3.5–5.1)
ALP BLD-CCNC: 124 U/L (ref 38–126)
ALT SERPL-CCNC: 15 U/L (ref 11–66)
ANION GAP SERPL CALCULATED.3IONS-SCNC: 11 MEQ/L (ref 8–16)
AST SERPL-CCNC: 24 U/L (ref 5–40)
BASOPHILS # BLD: 1 %
BASOPHILS ABSOLUTE: 0.1 THOU/MM3 (ref 0–0.1)
BILIRUB SERPL-MCNC: 0.3 MG/DL (ref 0.3–1.2)
BILIRUBIN DIRECT: < 0.2 MG/DL (ref 0–0.3)
BILIRUBIN URINE: NEGATIVE
BLOOD, URINE: NEGATIVE
BUN BLDV-MCNC: 24 MG/DL (ref 7–22)
CALCIUM SERPL-MCNC: 8.8 MG/DL (ref 8.5–10.5)
CHARACTER, URINE: CLEAR
CHLORIDE BLD-SCNC: 108 MEQ/L (ref 98–111)
CHLORIDE, URINE: 149 MEQ/L
CO2: 21 MEQ/L (ref 23–33)
COLOR: YELLOW
CREAT SERPL-MCNC: 1.6 MG/DL (ref 0.4–1.2)
EKG Q-T INTERVAL: 490 MS
EKG QRS DURATION: 124 MS
EKG QTC CALCULATION (BAZETT): 476 MS
EKG R AXIS: -55 DEGREES
EKG T AXIS: 6 DEGREES
EKG VENTRICULAR RATE: 57 BPM
EOSINOPHIL # BLD: 2.6 %
EOSINOPHILS ABSOLUTE: 0.2 THOU/MM3 (ref 0–0.4)
ERYTHROCYTE [DISTWIDTH] IN BLOOD BY AUTOMATED COUNT: 17.6 % (ref 11.5–14.5)
ERYTHROCYTE [DISTWIDTH] IN BLOOD BY AUTOMATED COUNT: 55.2 FL (ref 35–45)
GFR SERPL CREATININE-BSD FRML MDRD: 41 ML/MIN/1.73M2
GLUCOSE BLD-MCNC: 94 MG/DL (ref 70–108)
GLUCOSE URINE: NEGATIVE MG/DL
HCT VFR BLD CALC: 28.9 % (ref 42–52)
HEMOGLOBIN: 8.8 GM/DL (ref 14–18)
IMMATURE GRANS (ABS): 0.01 THOU/MM3 (ref 0–0.07)
IMMATURE GRANULOCYTES: 0.1 %
INR BLD: 1.54 (ref 0.85–1.13)
KETONES, URINE: NEGATIVE
LEUKOCYTE ESTERASE, URINE: NEGATIVE
LIPASE: 31.3 U/L (ref 5.6–51.3)
LYMPHOCYTES # BLD: 31.3 %
LYMPHOCYTES ABSOLUTE: 2.3 THOU/MM3 (ref 1–4.8)
MCH RBC QN AUTO: 26.2 PG (ref 26–33)
MCHC RBC AUTO-ENTMCNC: 30.4 GM/DL (ref 32.2–35.5)
MCV RBC AUTO: 86 FL (ref 80–94)
MONOCYTES # BLD: 14.7 %
MONOCYTES ABSOLUTE: 1.1 THOU/MM3 (ref 0.4–1.3)
NITRITE, URINE: NEGATIVE
NUCLEATED RED BLOOD CELLS: 0 /100 WBC
OSMOLALITY CALCULATION: 283.2 MOSMOL/KG (ref 275–300)
PH UA: 5.5 (ref 5–9)
PLATELET # BLD: 221 THOU/MM3 (ref 130–400)
PMV BLD AUTO: 11.3 FL (ref 9.4–12.4)
POTASSIUM SERPL-SCNC: 4.1 MEQ/L (ref 3.5–5.2)
PRO-BNP: 2255 PG/ML (ref 0–1800)
PROTEIN UA: NEGATIVE
RBC # BLD: 3.36 MILL/MM3 (ref 4.7–6.1)
REASON FOR REJECTION: NORMAL
REJECTED TEST: NORMAL
SEG NEUTROPHILS: 50.3 %
SEGMENTED NEUTROPHILS ABSOLUTE COUNT: 3.7 THOU/MM3 (ref 1.8–7.7)
SODIUM BLD-SCNC: 140 MEQ/L (ref 135–145)
SODIUM URINE: 141 MEQ/L
SPECIFIC GRAVITY, URINE: 1.01 (ref 1–1.03)
TOTAL PROTEIN: 7.2 G/DL (ref 6.1–8)
TROPONIN T: 0.03 NG/ML
UROBILINOGEN, URINE: 0.2 EU/DL (ref 0–1)
WBC # BLD: 7.3 THOU/MM3 (ref 4.8–10.8)

## 2021-08-04 PROCEDURE — 85025 COMPLETE CBC W/AUTO DIFF WBC: CPT

## 2021-08-04 PROCEDURE — 6360000002 HC RX W HCPCS: Performed by: NURSE PRACTITIONER

## 2021-08-04 PROCEDURE — 93307 TTE W/O DOPPLER COMPLETE: CPT

## 2021-08-04 PROCEDURE — 99222 1ST HOSP IP/OBS MODERATE 55: CPT | Performed by: INTERNAL MEDICINE

## 2021-08-04 PROCEDURE — 81003 URINALYSIS AUTO W/O SCOPE: CPT

## 2021-08-04 PROCEDURE — 84484 ASSAY OF TROPONIN QUANT: CPT

## 2021-08-04 PROCEDURE — 2580000003 HC RX 258: Performed by: NURSE PRACTITIONER

## 2021-08-04 PROCEDURE — 83880 ASSAY OF NATRIURETIC PEPTIDE: CPT

## 2021-08-04 PROCEDURE — 93005 ELECTROCARDIOGRAM TRACING: CPT | Performed by: NURSE PRACTITIONER

## 2021-08-04 PROCEDURE — 2500000003 HC RX 250 WO HCPCS: Performed by: NURSE PRACTITIONER

## 2021-08-04 PROCEDURE — 82436 ASSAY OF URINE CHLORIDE: CPT

## 2021-08-04 PROCEDURE — 96374 THER/PROPH/DIAG INJ IV PUSH: CPT

## 2021-08-04 PROCEDURE — 99285 EMERGENCY DEPT VISIT HI MDM: CPT

## 2021-08-04 PROCEDURE — 1200000003 HC TELEMETRY R&B

## 2021-08-04 PROCEDURE — 76770 US EXAM ABDO BACK WALL COMP: CPT

## 2021-08-04 PROCEDURE — 83690 ASSAY OF LIPASE: CPT

## 2021-08-04 PROCEDURE — 6370000000 HC RX 637 (ALT 250 FOR IP): Performed by: NURSE PRACTITIONER

## 2021-08-04 PROCEDURE — 84300 ASSAY OF URINE SODIUM: CPT

## 2021-08-04 PROCEDURE — 99223 1ST HOSP IP/OBS HIGH 75: CPT | Performed by: NURSE PRACTITIONER

## 2021-08-04 PROCEDURE — 51798 US URINE CAPACITY MEASURE: CPT

## 2021-08-04 PROCEDURE — 82248 BILIRUBIN DIRECT: CPT

## 2021-08-04 PROCEDURE — 85610 PROTHROMBIN TIME: CPT

## 2021-08-04 PROCEDURE — 80053 COMPREHEN METABOLIC PANEL: CPT

## 2021-08-04 PROCEDURE — 36415 COLL VENOUS BLD VENIPUNCTURE: CPT

## 2021-08-04 PROCEDURE — 71045 X-RAY EXAM CHEST 1 VIEW: CPT

## 2021-08-04 RX ORDER — SODIUM CHLORIDE 0.9 % (FLUSH) 0.9 %
5-40 SYRINGE (ML) INJECTION PRN
Status: DISCONTINUED | OUTPATIENT
Start: 2021-08-04 | End: 2021-08-06 | Stop reason: HOSPADM

## 2021-08-04 RX ORDER — IPRATROPIUM BROMIDE AND ALBUTEROL SULFATE 2.5; .5 MG/3ML; MG/3ML
3 SOLUTION RESPIRATORY (INHALATION)
Status: DISCONTINUED | OUTPATIENT
Start: 2021-08-04 | End: 2021-08-04

## 2021-08-04 RX ORDER — POLYETHYLENE GLYCOL 3350 17 G/17G
17 POWDER, FOR SOLUTION ORAL DAILY PRN
Status: DISCONTINUED | OUTPATIENT
Start: 2021-08-04 | End: 2021-08-06 | Stop reason: HOSPADM

## 2021-08-04 RX ORDER — ACETAMINOPHEN 650 MG/1
650 SUPPOSITORY RECTAL EVERY 6 HOURS PRN
Status: DISCONTINUED | OUTPATIENT
Start: 2021-08-04 | End: 2021-08-06 | Stop reason: HOSPADM

## 2021-08-04 RX ORDER — SODIUM CHLORIDE 0.9 % (FLUSH) 0.9 %
5-40 SYRINGE (ML) INJECTION EVERY 12 HOURS SCHEDULED
Status: DISCONTINUED | OUTPATIENT
Start: 2021-08-04 | End: 2021-08-06 | Stop reason: HOSPADM

## 2021-08-04 RX ORDER — ASPIRIN 81 MG/1
81 TABLET ORAL DAILY
Status: DISCONTINUED | OUTPATIENT
Start: 2021-08-04 | End: 2021-08-06 | Stop reason: HOSPADM

## 2021-08-04 RX ORDER — ACETAMINOPHEN 325 MG/1
650 TABLET ORAL EVERY 6 HOURS PRN
Status: DISCONTINUED | OUTPATIENT
Start: 2021-08-04 | End: 2021-08-06 | Stop reason: HOSPADM

## 2021-08-04 RX ORDER — ONDANSETRON 2 MG/ML
4 INJECTION INTRAMUSCULAR; INTRAVENOUS EVERY 6 HOURS PRN
Status: DISCONTINUED | OUTPATIENT
Start: 2021-08-04 | End: 2021-08-06 | Stop reason: HOSPADM

## 2021-08-04 RX ORDER — SODIUM CHLORIDE 9 MG/ML
25 INJECTION, SOLUTION INTRAVENOUS PRN
Status: DISCONTINUED | OUTPATIENT
Start: 2021-08-04 | End: 2021-08-06 | Stop reason: HOSPADM

## 2021-08-04 RX ORDER — BUMETANIDE 0.25 MG/ML
1 INJECTION, SOLUTION INTRAMUSCULAR; INTRAVENOUS 2 TIMES DAILY
Status: DISCONTINUED | OUTPATIENT
Start: 2021-08-04 | End: 2021-08-06

## 2021-08-04 RX ORDER — ONDANSETRON 4 MG/1
4 TABLET, ORALLY DISINTEGRATING ORAL EVERY 8 HOURS PRN
Status: DISCONTINUED | OUTPATIENT
Start: 2021-08-04 | End: 2021-08-06 | Stop reason: HOSPADM

## 2021-08-04 RX ORDER — FUROSEMIDE 10 MG/ML
40 INJECTION INTRAMUSCULAR; INTRAVENOUS ONCE
Status: COMPLETED | OUTPATIENT
Start: 2021-08-04 | End: 2021-08-04

## 2021-08-04 RX ORDER — TAMSULOSIN HYDROCHLORIDE 0.4 MG/1
0.4 CAPSULE ORAL DAILY
Status: DISCONTINUED | OUTPATIENT
Start: 2021-08-04 | End: 2021-08-05

## 2021-08-04 RX ORDER — PANTOPRAZOLE SODIUM 40 MG/1
40 TABLET, DELAYED RELEASE ORAL 2 TIMES DAILY
Status: DISCONTINUED | OUTPATIENT
Start: 2021-08-04 | End: 2021-08-06 | Stop reason: HOSPADM

## 2021-08-04 RX ADMIN — APIXABAN 2.5 MG: 2.5 TABLET, FILM COATED ORAL at 14:12

## 2021-08-04 RX ADMIN — SODIUM CHLORIDE, PRESERVATIVE FREE 10 ML: 5 INJECTION INTRAVENOUS at 14:19

## 2021-08-04 RX ADMIN — ASPIRIN 81 MG: 81 TABLET, COATED ORAL at 14:12

## 2021-08-04 RX ADMIN — PANTOPRAZOLE SODIUM 40 MG: 40 TABLET, DELAYED RELEASE ORAL at 21:14

## 2021-08-04 RX ADMIN — BUMETANIDE 1 MG: 0.25 INJECTION, SOLUTION INTRAMUSCULAR; INTRAVENOUS at 14:13

## 2021-08-04 RX ADMIN — TAMSULOSIN HYDROCHLORIDE 0.4 MG: 0.4 CAPSULE ORAL at 14:13

## 2021-08-04 RX ADMIN — FUROSEMIDE 40 MG: 10 INJECTION, SOLUTION INTRAMUSCULAR; INTRAVENOUS at 05:37

## 2021-08-04 RX ADMIN — BUMETANIDE 1 MG: 0.25 INJECTION, SOLUTION INTRAMUSCULAR; INTRAVENOUS at 21:14

## 2021-08-04 RX ADMIN — APIXABAN 2.5 MG: 2.5 TABLET, FILM COATED ORAL at 21:14

## 2021-08-04 RX ADMIN — SODIUM CHLORIDE, PRESERVATIVE FREE 10 ML: 5 INJECTION INTRAVENOUS at 21:14

## 2021-08-04 ASSESSMENT — ENCOUNTER SYMPTOMS
RHINORRHEA: 0
COUGH: 0
EYE REDNESS: 0
CHEST TIGHTNESS: 0
NAUSEA: 0
BACK PAIN: 0
SHORTNESS OF BREATH: 0
VOMITING: 0
ABDOMINAL PAIN: 0

## 2021-08-04 NOTE — ED TRIAGE NOTES
Pt presents to ED with c/o of edema below the abdomen. Pt states swelling in the scrotum and bilateral legs has gotten worse in the last 2-3 days. Pt states he takes his flomacx and lasix daily. Pt states he has had trouble urinating recently.  RR even and unlabored

## 2021-08-04 NOTE — ED NOTES
Upon first contact with patient this RN receives bedside shift report from [facility]. Patient resting on cot, respirations unlabored.  Given urinal. Denies further concerns at this time        Gómez Hernandez RN  08/04/21 0110

## 2021-08-04 NOTE — CONSULTS
[facility]    [address]  [facility]  8/4/2021 4:26 PM    Pt Name:    Amarilis Mcqueen  MRN:     [id number]   [id number]  YOB: 1932  Admit Date:    8/4/2021  4:26 AM  Primary Care Physician:  Adriana Pang DO    [facility] Number:   [id number]    Reason for Consult:  Acute kidney injury  Requesting provider:  Ms. Fiona Clifford    History:   The patient is a 80 y.o. pleasant white male with history of coronary artery disease, hypertension, history of CVA, atrial fibrillation who was in the hospital in February 2021 with rhabdomyolysis secondary to fall. Nephrology saw him at that time for hyponatremia. Patient also had acute kidney injury. This all got better. Patient was seen in follow-up in April 2021 at which time he had stable renal function. Patient does have history of enlarged prostate and takes Flomax. He has seen Dr. Mitch Connors in the past.  This time patient presents to the hospital with 2 days duration of worsening lower extremity edema associated with her difficulty urinating. Patient reports that yesterday he could not urinate at all. Therefore he decided to come to the emergency department. He did not notify anyone. In the emergency room patient was noted to have chest x-ray findings suggestive of some edema and pleural effusions. He was noted to have 2+ extremity pitting edema. His serum creatinine was 1.6. He was admitted to the medical services and nephrology has been consulted for management. Patient reports usual normal health until yesterday when he had difficulty emptying his bladder. No alleviating or aggravating factors. Otherwise he denies any hematuria or any dysuria. No fever or chills. No chest pain or any shortness of breath. Somewhat hard of hearing. Discussed with patient's son at bedside.     Past Medical History:  Past Medical History:   Diagnosis Date    Actinic keratoses 2013    Adenomatous polyp 2008    [facility]rial [facility]     CAD (coronary artery disease) 2009    Carotid artery disease ([facility] [address] 2009    left    Hyperlipidemia 2004    Hypertension 2004    Inguinal hernia, left 2014    Unspecified cerebral artery occlusion with cerebral infarction        Past Surgical History:  Past Surgical History:   Procedure Laterality Date    ABDOMEN SURGERY      ABDOMINAL ADHESION SURGERY  2012    Dr Sheela Riojas REMOVAL  2004    right    [facility]OSCOPY      [facility]    CORONARY ARTERY BYPASS GRAFT  2009    ENDOSCOPY, COLON, DIAGNOSTIC      EYE SURGERY      SMALL INTESTINE SURGERY      SMALL INTESTINE SURGERY  2015    Exploratory Laparotomy    UPPER GASTROINTESTINAL ENDOSCOPY N/A 2021    EGD ESOPHAGOGASTRODUODENOSCOPY PEG TUBE INSERTION performed by Clarence Reveles MD at [facility]    VASCULAR SURGERY      cabg harvests from right leg       Family History:  Family History   Problem Relation Age of Onset    Cancer Mother        Social History:  Social History     Socioeconomic History    Marital status:      Spouse name: Not on file    Number of children: 3    Years of education: Not on file    Highest education level: Not on file   Occupational History    Not on file   Tobacco Use    Smoking status: Former Smoker     Quit date: 1980     Years since quittin.6    Smokeless tobacco: Never Used   Vaping Use    Vaping Use: Never used   Substance and Sexual Activity    Alcohol use:  Yes     Alcohol/week: 1.0 standard drinks     Types: 1 Cans of beer per week     Comment: occasionally    Drug use: No    Sexual activity: Not on file   Other Topics Concern    Not on file   Social History Narrative    Not on file     Social Determinants of Health     Financial Resource Strain:     Difficulty of Paying Living Expenses:    Food Insecurity:     Worried About Running Out of Food in the Last Year:     Ran Out of Food in the Last Year:    Transportation Needs:     Lack of Transportation (Medical):  Lack of Transportation (Non-Medical):    Physical Activity:     Days of Exercise per Week:     Minutes of Exercise per Session:    Stress:     Feeling of Stress :    Social Connections:     Frequency of Communication with Friends and Family:     Frequency of Social Gatherings with Friends and Family:     Attends Confucianist Services:     Active Member of Clubs or Organizations:     Attends Club or Organization Meetings:     Marital Status:    Intimate Partner Violence:     Fear of Current or Ex-Partner:     Emotionally Abused:     Physically Abused:     Sexually Abused:        Home Meds:  Prior to Admission medications    Medication Sig Start Date End Date Taking? Authorizing Provider   pantoprazole (PROTONIX) 40 MG tablet Take 40 mg by mouth 2 times daily   Yes Historical Provider, MD   apixaban (ELIQUIS) 2.5 MG TABS tablet Take 5 mg by mouth 2 times daily    Yes Historical Provider, MD   potassium chloride (KLOR-CON M) 20 MEQ extended release tablet Take 1 tablet by mouth daily 4/19/21  Yes Abhishek Ramachandran MD   ipratropium-albuterol (DUONEB) 0.5-2.5 (3) MG/3ML SOLN nebulizer solution Inhale 3 mLs into the lungs every 4 hours (while awake) 2/5/21  Yes Pedro Romero MD   tamsulosin (FLOMAX) 0.4 MG capsule Take 1 capsule by mouth daily Capsule may be opened and contents dissolve for peg tube 2/5/21  Yes Pedro Romero MD   furosemide (LASIX) 20 MG tablet Take 1 tablet by mouth daily 4/4/17  Yes Sotero Daly MD   aspirin 81 MG EC tablet Take 81 mg by mouth daily Indications: Anticoagulant Therapy Take with food.    Yes Historical Provider, MD       Review of Systems:  Constitutional: Positive for shortness of breath, lower extremity edema, difficulty urinating  Head: Negative for headaches  Eyes: Negative for blurry vision or discharge  Ears: Negative for ear pain or hearing changes  Nose: Negative for runny nose or epistaxis  Respiratory: Positive for shortness of breath. Negative for cough or sputum production. Negative for hemoptysis  Cardiovascular: Negative for chest pain. Positive for lower extremity edema  GI: Negative for nausea, vomiting and diarrhea. Negative for hematochezia and melena  : Positive for difficulty urinating. Unable to completely empty his bladder. Skin: Negative for rash  Musculoskeletal: Negative for joint pain, moves all ext  Neuro: Negative for numbness or tingling, negative for slurred speech  Psychiatric: Reports stable mood, negative for depression or insomnia    All other review of systems were reviewed and negative    Current Meds:  Infusion:    sodium chloride       Meds:    apixaban  2.5 mg Oral BID    aspirin  81 mg Oral Daily    pantoprazole  40 mg Oral BID    tamsulosin  0.4 mg Oral Daily    bumetanide  1 mg Intravenous BID    sodium chloride flush  5-40 mL Intravenous 2 times per day     Meds prn: sodium chloride flush, sodium chloride, ondansetron **OR** ondansetron, polyethylene glycol, acetaminophen **OR** acetaminophen     Allergies/Intolerances: ALLERGIES: Patient has no known allergies. 24HR INTAKE/OUTPUT:      Intake/Output Summary (Last 24 hours) at 8/4/2021 1626  Last data filed at 8/4/2021 1246  Gross per 24 hour   Intake --   Output 100 ml   Net -100 ml     I/O last 3 completed shifts:  In: -   Out: 100 [Urine:100]  No intake/output data recorded. Admission weight: 160 lb (72.6 kg)  Wt Readings from Last 3 Encounters:   08/04/21 160 lb (72.6 kg)   04/19/21 146 lb 6.4 oz (66.4 kg)   02/05/21 163 lb 1.6 oz (74 kg)     Body mass index is 26.63 kg/m².     Physical Examination:  VITALS:   Vitals:    08/04/21 0730 08/04/21 0911 08/04/21 1013 08/04/21 1109   BP: (!) 135/53  (!) 148/68 (!) 155/71   Pulse: 66 58 58 60   Resp: 14 14 14 18   Temp:    97.5 °F (36.4 °C)   TempSrc:    Oral   SpO2: 98% 97% 96% 97%   Weight:       Height: Weight:   Wt Readings from Last 3 Encounters:   08/04/21 160 lb (72.6 kg)   04/19/21 146 lb 6.4 oz (66.4 kg)   02/05/21 163 lb 1.6 oz (74 kg)     Constitutional and General Appearance: alert and cooperative with exam, appears comfortable, no distress, not diaphoretic  Eyes: no icteric sclera in left eye or right eye  Ears and Nose: normal external appearance of left and right ear  Oral: moist oral mucus membranes  Neck: No jugular venous distention  Lungs: Air entry diminished, no rales, no rhonchi  Chest: No chest wall tenderness  Heart: S1, S2  Extremities: Bilateral 1+ LE edema, no tenderness  GI: soft, suprapubic distention. No guarding. Skin: no rash seen on exposed extremities, warm to touch  Musculo: moves all extremities  Neuro: no slurred speech, no facial drooping  Psychiatric: Normal mood and affect, Not agitated    Lab Data  CBC:   Recent Labs     08/04/21  0446   WBC 7.3   HGB 8.8*   HCT 28.9*        BMP:  Recent Labs     08/04/21  0446      K 4.1      CO2 21*   BUN 24*   CREATININE 1.6*   GLUCOSE 94   CALCIUM 8.8     Hepatic:   Recent Labs     08/04/21  0446   LABALBU 4.2   AST 24   ALT 15   BILITOT 0.3   ALKPHOS 124       Additional Labs: UA negative for any blood, negative for any protein  Diagnostics: Ultrasound shows right kidney 10.4 cm, 2.3 cm, right-sided simple cyst with urinary retention. Prevoid 594 mL, post void 407 mL. Prostatomegaly. Old labs and diagnostics reviewed. Echo: EF 55 to 60% December 2020  Labs: Baseline serum creatinine 1.1-1.2. Impression and Plan:  1. Acute kidney injury on underlying CKD stage IIIa. ANNIKA appears to be mostly secondary to urinary retention. Will place Duarte catheter for bladder decompression. Continue with Flomax. Will monitor urine output. We will follow clinical course. Continue with IV diuretics. May need to consider urology evaluation depending on clinical course. Monitor for postobstructive diuresis  2.  Mild

## 2021-08-04 NOTE — ED NOTES
Pt and vs reassessed./ RR even and unlabored. No distress noted. Pt medicated per MAR.  Will continue to monitor     Peter Quintero RN  08/04/21 9037

## 2021-08-04 NOTE — ED NOTES
ED nurse-to-nurse bedside report    Chief Complaint   Patient presents with    Leg Swelling      LOC: alert and orientated to name, place, date  Vital signs   Vitals:    08/04/21 0433 08/04/21 0536 08/04/21 0636 08/04/21 0730   BP: (!) 175/59 (!) 143/56 (!) 150/56 (!) 135/53   Pulse: 61 56 56 66   Resp: 21 17 15 14   Temp: 97.1 °F (36.2 °C)      TempSrc: Oral      SpO2: 96% 96% 97% 98%   Weight: 160 lb (72.6 kg)      Height: 5' 5\" (1.651 m)         Pain:    Pain Interventions: none  Pain Goal: 2/10  Oxygen: No    Current needs required room air   Telemetry: Yes  LDAs:   Peripheral IV 08/04/21 Left Antecubital (Active)   Site Assessment Clean;Dry; Intact 08/04/21 0637   Line Status Normal saline locked 08/04/21 0637   Dressing Status Clean;Dry; Intact 08/04/21 1773   Dressing Intervention New 08/04/21 0455     Continuous Infusions:   Mobility: Requires assistance * 1  [place] Fall Risk Score: No flowsheet data found. Fall Interventions: side rails up x 2, call light within reach, urinal at bedside.   Report given to: [facility] TAE Medina RN  08/04/21 9182

## 2021-08-04 NOTE — H&P
History & Physical        Patient:  Carlita aGrcia  YOB: 1932    MRN: [id number]     Acct: [de-identified]    PCP: Natalee Burnette DO    Date of Admission: 8/4/2021    Date of Service: Pt seen/examined on 08/04/21  and Admitted to Inpatient with expected LOS greater than two midnights due to medical therapy. Chief Complaint:  Edema      ASSESSMENT / PLAN:    1. Acute on chronic diastolic heart failure. proBNP 2255. CXR with edema. Significant lower ext edema. Hold home Lasix. Diuresis with IV Bumex. Daily weights. Strict intake and output. Low sodium diet. 2L fluid restriction. Last echo 12/2020 with preserved EF with severe left and right atrial dilation. 2. Acute kidney injury on CKD stage III. Obtain renal US. Check PVR, HX of urinary retention. Flomax resumed. Consult to Nephrology  to help with fluid balance. 3. Chronic AFIB. Rate controlled. Continue Eliquis. 4. Elevated troponin, chronic. No CP. Trend. EKG non acute. 5. HX CVA. 6. HX CAD s/p CABG. ASA resumed. 7. GERD PPI. 8. BPH. Flomax. History Of Present Illness:      80 y.o. male who presented to [facility] with lower extremity edema. Patient reports he is chronically on lasix. No recent changes in regimen. States he started with lower leg edema proximately 2 days ago that continues to worsen. Yas SOB/BRITTON/CP. States he decided to come to the ED when he could not urinate this AM. Reports normal oral intake.      Past Medical History:          Diagnosis Date    Actinic keratoses 8/21/2013    Adenomatous polyp 2008    Atrial fibrillation ([address]     CAD (coronary artery disease) 2009    Carotid artery disease ([facility] 2009    left    Hyperlipidemia 2004    Hypertension 2004    Inguinal hernia, left 2/19/2014    Unspecified cerebral artery occlusion with cerebral infarction        Past Surgical History:          Procedure Laterality Date    ABDOMEN SURGERY      ABDOMINAL ADHESION SURGERY negative. PHYSICAL EXAM:    BP (!) 150/56   Pulse 56   Temp 97.1 °F (36.2 °C) (Oral)   Resp 15   Ht 5' 5\" (1.651 m)   Wt 160 lb (72.6 kg)   SpO2 97%   BMI 26.63 kg/m²     General appearance:  No apparent distress, appears stated age and cooperative. HEENT:  Normal cephalic, atraumatic without obvious deformity. Pupils equal, round, and reactive to light. Extra ocular muscles intact. Conjunctivae/corneas clear. Neck: Supple, with full range of motion. No jugular venous distention. Trachea midline. Respiratory:  Normal respiratory effort. Diminished RLL. Cardiovascular:  Regular rate and rhythm with normal S1/S2 without murmurs, rubs or gallops. 2-3+ lower ext edema. Abdomen: Soft, non-tender, non-distended with normal bowel sounds. Musculoskeletal:  No clubbing, cyanosis or edema bilaterally. Full range of motion without deformity. Skin: Skin color, texture, turgor normal.  No rashes or lesions. Neurologic:  Neurovascularly intact without any focal sensory/motor deficits. Cranial nerves: II-XII intact, grossly non-focal.  Psychiatric:  Alert and oriented, thought content appropriate, normal insight  Capillary Refill: Brisk,< 3 seconds   Peripheral Pulses: +2 palpable, equal bilaterally       Labs:     Recent Labs     08/04/21  0446   WBC 7.3   HGB 8.8*   HCT 28.9*        Recent Labs     08/04/21  0446      K 4.1      CO2 21*   BUN 24*   CREATININE 1.6*   CALCIUM 8.8     Recent Labs     08/04/21  0446   AST 24   ALT 15   BILIDIR <0.2   BILITOT 0.3   ALKPHOS 124     Recent Labs     08/04/21  0715   INR 1.54*     No results for input(s): CKTOTAL, TROPONINI in the last 72 hours.     Urinalysis:      Lab Results   Component Value Date    NITRU NEGATIVE 08/04/2021    WBCUA 0-2 02/02/2021    BACTERIA NONE SEEN 02/02/2021    RBCUA 0-2 02/02/2021    BLOODU NEGATIVE 08/04/2021    SPECGRAV 1.019 02/02/2021    GLUCOSEU NEGATIVE 08/04/2021       Intake & Output:  No intake/output data recorded. No intake/output data recorded. Radiology:     EKG:  I have reviewed the EKG with the following interpretation: AFIB, rate controlled. XR CHEST PORTABLE   Final Result   1. Cardiomegaly with mild perihilar edema. Small right pleural effusion. This document has been electronically signed by: Dina Solares MD on    08/04/2021 05:17 AM           DVT prophylaxis: Eliquis    Code Status: DNR CCA    Disposition:Home    Active Hospital Problems    Diagnosis Date Noted    Pulmonary edema cardiac cause ([facility] [I50.1] 08/04/2021       Thank you Renan Dodge DO for the opportunity to be involved in this patient's care.     Electronically signed by ROBBIE Haas CNP on 8/4/2021 at 8:44 AM

## 2021-08-04 NOTE — ED NOTES
ED to inpatient nurses report    Chief Complaint   Patient presents with    Leg Swelling      Present to ED from home  LOC: alert and orientated to name, place, date  Vital signs   Vitals:    08/04/21 0433 08/04/21 0536 08/04/21 0636   BP: (!) 175/59 (!) 143/56 (!) 150/56   Pulse: 61 56 56   Resp: 21 17 15   Temp: 97.1 °F (36.2 °C)     TempSrc: Oral     SpO2: 96% 96% 97%   Weight: 160 lb (72.6 kg)     Height: 5' 5\" (1.651 m)        Oxygen Baseline 97%      Current needs required RA     LDAs:   Peripheral IV 08/04/21 Left Antecubital (Active)   Site Assessment Clean;Dry; Intact 08/04/21 0637   Line Status Normal saline locked 08/04/21 0637   Dressing Status Clean;Dry; Intact 08/04/21 0028   Dressing Intervention New 08/04/21 0455     Mobility: Requires assistance * 1  Pending ED orders: none    Present condition: VSS, pt has difficulty voiding      Electronically signed by Christen Christina RN on 8/4/2021 at 6:51 AM       Christen Christina RN  08/04/21 7818

## 2021-08-04 NOTE — FLOWSHEET NOTE
Pt was alone at the time of the visit. He was dealing with pulmonary edema cardic cause. He was hopeful and was anointed for healing. 08/04/21 1738   Encounter Summary   Services provided to: Patient   Referral/Consult From: [address] of [address] [place] [address] Visiting Yes  (8/4)   Complexity of Encounter Moderate   Length of Encounter 15 minutes   Spiritual/Yazidism   Type Spiritual support   Assessment Approachable;Calm   Intervention Prayer;Nurtured hope; Active listening; Anointing;Empowerment;Sustaining presence/ Ministry of presence   Outcome Connection/belonging;Expressed gratitude;Encouraged; Hopeful   Sacraments   Sacrament of Sick-Anointing Anointed

## 2021-08-04 NOTE — PROGRESS NOTES
Pt admitted to  6K23 from ED. Complaints: BLE edema. IV site free of s/s of infection or infiltration. Vital signs obtained. Assessment and data collection initiated. Two nurse skin assessment performed by ethan CARRILLO and Akhil Trujillo. Oriented to room. Policies and procedures for 6K explained. Ethan CARRILLO discussed hourly rounding with patient addressing 5 P's. Fall prevention and safety brochure discussed with patient. Bed alarm on. Call light in reach.

## 2021-08-04 NOTE — ED PROVIDER NOTES
[facility] Emergency Department    CHIEF COMPLAINT       Chief Complaint   Patient presents with    Leg Swelling       Nurses Notes reviewed and I agree except as noted in the HPI. HISTORY OF PRESENT ILLNESS    Amarilis Mcqueen maribel 80 y.o. male who presents to the ED for evaluation of bilateral leg swelling up to his waist.  Patient states that he has had more swelling in the bilateral lower extremities over the last 24 hours. No chest pain. HPI was provided by the patient    REVIEW OF SYSTEMS     Review of Systems   Constitutional: Negative for chills, fatigue and fever. HENT: Negative for congestion, ear discharge, ear pain, postnasal drip and rhinorrhea. Eyes: Negative for redness. Respiratory: Negative for cough, chest tightness and shortness of breath. Cardiovascular: Positive for leg swelling. Negative for chest pain. Gastrointestinal: Negative for abdominal pain, nausea and vomiting. Genitourinary: Negative for difficulty urinating, dysuria, enuresis, flank pain and hematuria. Musculoskeletal: Negative for back pain and joint swelling. Skin: Negative for rash. Neurological: Negative for dizziness, light-headedness, numbness and headaches. Psychiatric/Behavioral: Negative for agitation, behavioral problems and confusion. All other systems negative except as noted. PAST MEDICAL HISTORY     Past Medical History:   Diagnosis Date    Actinic keratoses 8/21/2013    Adenomatous polyp 2008    Atrial fibrillation [address] [address]     CAD (coronary artery disease) 2009    Carotid artery disease ([facility] [address] 2009    left    Hyperlipidemia 2004    Hypertension 2004    Inguinal hernia, left 2/19/2014    Unspecified cerebral artery occlusion with cerebral infarction        SURGICALHISTORY      has a past surgical history that includes Cataract removal (2004); Coronary artery bypass graft (2009); Colonoscopy; Abdominal adhesion surgery (6/2012);  Small intestine surgery; Small intestine surgery (2015); Cardiac surgery; Abdomen surgery; and Upper gastrointestinal endoscopy (N/A, 2021). CURRENT MEDICATIONS       Previous Medications    APIXABAN (ELIQUIS) 2.5 MG TABS TABLET    Take by mouth 2 times daily    ASPIRIN 81 MG EC TABLET    Take 81 mg by mouth daily Indications: Anticoagulant Therapy Take with food. FUROSEMIDE (LASIX) 20 MG TABLET    Take 1 tablet by mouth daily    IPRATROPIUM-ALBUTEROL (DUONEB) 0.5-2.5 (3) MG/3ML SOLN NEBULIZER SOLUTION    Inhale 3 mLs into the lungs every 4 hours (while awake)    PANTOPRAZOLE (PROTONIX) 40 MG TABLET    Take 40 mg by mouth 2 times daily    POTASSIUM CHLORIDE (KLOR-CON M) 20 MEQ EXTENDED RELEASE TABLET    Take 1 tablet by mouth daily    TAMSULOSIN (FLOMAX) 0.4 MG CAPSULE    Take 1 capsule by mouth daily Capsule may be opened and contents dissolve for peg tube       ALLERGIES     has No Known Allergies. FAMILY HISTORY     He indicated that his mother is . He indicated that his father is . family history includes Cancer in his mother. SOCIAL HISTORY       Social History     Socioeconomic History    Marital status:      Spouse name: Not on file    Number of children: Not on file    Years of education: Not on file    Highest education level: Not on file   Occupational History    Not on file   Tobacco Use    Smoking status: Former Smoker     Quit date: 1980     Years since quittin.6    Smokeless tobacco: Never Used   Substance and Sexual Activity    Alcohol use:  Yes     Alcohol/week: 1.0 standard drinks     Types: 1 Cans of beer per week     Comment: occasionally    Drug use: No    Sexual activity: Not on file   Other Topics Concern    Not on file   Social History Narrative    Not on file     Social Determinants of Health     Financial Resource Strain:     Difficulty of Paying Living Expenses:    Food Insecurity:     Worried About Running Out of Food in the Last Year:     Dary guzman Food in the Last Year:    Transportation Needs:     Lack of Transportation (Medical):  Lack of Transportation (Non-Medical):    Physical Activity:     Days of Exercise per Week:     Minutes of Exercise per Session:    Stress:     Feeling of Stress :    Social Connections:     Frequency of Communication with Friends and Family:     Frequency of Social Gatherings with Friends and Family:     Attends Church Services:     Active Member of Clubs or Organizations:     Attends Club or Organization Meetings:     Marital Status:    Intimate Partner Violence:     Fear of Current or Ex-Partner:     Emotionally Abused:     Physically Abused:     Sexually Abused:        PHYSICAL EXAM     INITIAL VITALS:  height is 5' 5\" (1.651 m) and weight is 160 lb (72.6 kg). His oral temperature is 97.1 °F (36.2 °C). His blood pressure is 143/56 (abnormal) and his pulse is 56. His respiration is 17 and oxygen saturation is 96%. Physical Exam  Vitals and nursing note reviewed. Constitutional:       General: He is not in acute distress. Appearance: He is well-developed. He is not diaphoretic. Comments: Hard of hearing   HENT:      Head: Normocephalic and atraumatic. Nose: Nose normal.      Mouth/Throat:      Mouth: Mucous membranes are moist.      Pharynx: Oropharynx is clear. Eyes:      General:         Right eye: No discharge. Left eye: No discharge. Conjunctiva/sclera: Conjunctivae normal.   Neck:      Trachea: No tracheal deviation. Cardiovascular:      Rate and Rhythm: Normal rate. Rhythm irregularly irregular. Heart sounds: Normal heart sounds. No murmur heard. No gallop. Comments: Normal capillary refill  Pulmonary:      Effort: Pulmonary effort is normal. No respiratory distress. Breath sounds: Normal breath sounds. No stridor. Abdominal:      General: Bowel sounds are normal.      Palpations: Abdomen is soft.    Musculoskeletal:         General: No tenderness or deformity. Normal range of motion. Cervical back: Normal range of motion. Right lower leg: Edema (3+) present. Left lower leg: Edema (3+) present. Skin:     General: Skin is warm and dry. Capillary Refill: Capillary refill takes less than 2 seconds. Coloration: Skin is not pale. Findings: No erythema or rash. Neurological:      General: No focal deficit present. Mental Status: He is alert and oriented to person, place, and time. Cranial Nerves: No cranial nerve deficit. Psychiatric:         Mood and Affect: Mood normal.         Behavior: Behavior normal.         DIFFERENTIAL DIAGNOSIS:   CHF, acs    DIAGNOSTIC RESULTS     EKG: All EKG's are interpreted by the Emergency Department Physician who eithersigns or Co-signs this chart in the absence of a cardiologist.        RADIOLOGY: non-plainfilm images(s) such as CT, Ultrasound and MRI are read by the radiologist.  Plain radiographic images are visualized and preliminarily interpreted by the emergency physician unless otherwise stated below. XR CHEST PORTABLE   Final Result   1. Cardiomegaly with mild perihilar edema. Small right pleural effusion.       This document has been electronically signed by: Beatris Valentine MD on    08/04/2021 05:17 AM            LABS:   Labs Reviewed   BASIC METABOLIC PANEL - Abnormal; Notable for the following components:       Result Value    CO2 21 (*)     BUN 24 (*)     CREATININE 1.6 (*)     All other components within normal limits   BRAIN NATRIURETIC PEPTIDE - Abnormal; Notable for the following components:    Pro-BNP 2255.0 (*)     All other components within normal limits   CBC WITH AUTO DIFFERENTIAL - Abnormal; Notable for the following components:    RBC 3.36 (*)     Hemoglobin 8.8 (*)     Hematocrit 28.9 (*)     MCHC 30.4 (*)     RDW-CV 17.6 (*)     RDW-SD 55.2 (*)     All other components within normal limits   TROPONIN - Abnormal; Notable for the following components:    Troponin T 0.030 (*)     All other components within normal limits   GLOMERULAR FILTRATION RATE, ESTIMATED - Abnormal; Notable for the following components:    Est, Glom Filt Rate 41 (*)     All other components within normal limits   HEPATIC FUNCTION PANEL   LIPASE   ANION GAP   OSMOLALITY   PROTIME-INR   URINE RT REFLEX TO CULTURE       EMERGENCY DEPARTMENT COURSE:   Vitals:    Vitals:    08/04/21 0433 08/04/21 0536   BP: (!) 175/59 (!) 143/56   Pulse: 61 56   Resp: 21 17   Temp: 97.1 °F (36.2 °C)    TempSrc: Oral    SpO2: 96% 96%   Weight: 160 lb (72.6 kg)    Height: 5' 5\" (1.651 m)           [facility]    Patient was seen and evaluated in the emergency department, patient appeared to be in stable condition. Vital signs assessed, no abnormality noted. Physical exam completed. Bilateral lower extremity swelling  noted. Appropriate labs and imaging and lasix IVP Ordered. Based on my physical exam and work up I believe the patient is having an acute CHF exacerbation and Acute on chronic kidney injury. I discussed my findings and plan of care with patient. They are amenable with POC. While here in the emergency department they maintained a stable course and were appropriate for admission. Medications   furosemide (LASIX) injection 40 mg (40 mg Intravenous Given 8/4/21 0537)         Patient was seen independently by myself. The patient's final impression and disposition and plan was determined by myself. Strict return precautions and follow up instructions were discussed with the patient prior to discharge, with which the patient agrees. Physical assessment findings, diagnostic testing(s) if applicable, and vital signs reviewed with patient/patient representative. Questions answered. Medications asdirected, including OTC medications for supportive care. Education provided on medications. Differential diagnosis(s) discussed with patient/patient representative.   Home care/self care instructions reviewed withpatient/patient representative. Patient is to follow-up with family care provider in 2-3 days if no improvement. Patient is to go to the emergency department if symptoms worsen. Patient/patient representative isaware of care plan, questions answered, verbalizes understanding and is in agreement. CRITICAL CARE:   None    CONSULTS:  Hospitalist--Dr. Albarran    PROCEDURES:  None    FINAL IMPRESSION     1. Acute on chronic congestive heart failure, unspecified heart failure type ([facility] [address]    2. Acute renal failure superimposed on chronic kidney disease, unspecified CKD stage, unspecified acute renal failure type [facility])          DISPOSITION/PLAN   DISPOSITION Decision To Admit 08/04/2021 06:03:30 AM      PATIENT REFERREDTO:  No follow-up provider specified.     DISCHARGE MEDICATIONS:  New Prescriptions    No medications on file       (Please note that portions of this note were completed with a voice recognition program.  Efforts were made to edit the dictations but occasionally words are mis-transcribed.)         ROBBIE Barrera - ROBBIE Montana CNP  08/04/21 0860

## 2021-08-04 NOTE — ED NOTES
Patient given urinal and updated on POC. Verbalized understanding and denies further needs or concerns.  Respirations unlabored      Valentino Em, RN  08/04/21 1010

## 2021-08-04 NOTE — ED NOTES
ED nurse-to-nurse bedside report    Chief Complaint   Patient presents with    Leg Swelling      LOC: alert and orientated to name, place, date  Vital signs   Vitals:    08/04/21 0433 08/04/21 0536 08/04/21 0636   BP: (!) 175/59 (!) 143/56 (!) 150/56   Pulse: 61 56 56   Resp: 21 17 15   Temp: 97.1 °F (36.2 °C)     TempSrc: Oral     SpO2: 96% 96% 97%   Weight: 160 lb (72.6 kg)     Height: 5' 5\" (1.651 m)        Pain:    Pain Interventions: none    Pain Goal: 0    Oxygen: No    Current needs required none     Telemetry: Yes  LDAs:   Peripheral IV 08/04/21 Left Antecubital (Active)   Site Assessment Clean;Dry; Intact 08/04/21 0637   Line Status Normal saline locked 08/04/21 0637   Dressing Status Clean;Dry; Intact 08/04/21 2117   Dressing Intervention New 08/04/21 0455     Continuous Infusions:   Mobility: Requires assistance * 1  [place] Fall Risk Score: No flowsheet data found.   Fall Interventions: siderails up, bed in lowest position, wheels locked, call light in reach    Report given to: Kaylene López RN  08/04/21 4770

## 2021-08-04 NOTE — PROCEDURES
Bladder scan completed and results given to Wan Laureano. Patient voided at 15:00. Scan showed 461 mL in bladder.  Virginia Ndiaye CET

## 2021-08-05 PROBLEM — N40.1 BPH WITH URINARY OBSTRUCTION: Status: ACTIVE | Noted: 2021-08-05

## 2021-08-05 PROBLEM — N18.2 STAGE 2 CHRONIC KIDNEY DISEASE: Status: ACTIVE | Noted: 2021-08-05

## 2021-08-05 PROBLEM — I50.33 ACUTE ON CHRONIC HEART FAILURE WITH PRESERVED EJECTION FRACTION (HCC): Status: ACTIVE | Noted: 2021-08-05

## 2021-08-05 PROBLEM — N13.8 BPH WITH URINARY OBSTRUCTION: Status: ACTIVE | Noted: 2021-08-05

## 2021-08-05 LAB
ANION GAP SERPL CALCULATED.3IONS-SCNC: 11 MEQ/L (ref 8–16)
BASOPHILS # BLD: 0.5 %
BASOPHILS ABSOLUTE: 0 THOU/MM3 (ref 0–0.1)
BUN BLDV-MCNC: 25 MG/DL (ref 7–22)
CALCIUM SERPL-MCNC: 8.5 MG/DL (ref 8.5–10.5)
CHLORIDE BLD-SCNC: 103 MEQ/L (ref 98–111)
CO2: 23 MEQ/L (ref 23–33)
CREAT SERPL-MCNC: 1.7 MG/DL (ref 0.4–1.2)
EOSINOPHIL # BLD: 2.3 %
EOSINOPHILS ABSOLUTE: 0.1 THOU/MM3 (ref 0–0.4)
ERYTHROCYTE [DISTWIDTH] IN BLOOD BY AUTOMATED COUNT: 17.2 % (ref 11.5–14.5)
ERYTHROCYTE [DISTWIDTH] IN BLOOD BY AUTOMATED COUNT: 52.6 FL (ref 35–45)
GFR SERPL CREATININE-BSD FRML MDRD: 38 ML/MIN/1.73M2
GLUCOSE BLD-MCNC: 86 MG/DL (ref 70–108)
HCT VFR BLD CALC: 27 % (ref 42–52)
HEMOGLOBIN: 8.5 GM/DL (ref 14–18)
IMMATURE GRANS (ABS): 0.01 THOU/MM3 (ref 0–0.07)
IMMATURE GRANULOCYTES: 0.2 %
LYMPHOCYTES # BLD: 19.4 %
LYMPHOCYTES ABSOLUTE: 1.2 THOU/MM3 (ref 1–4.8)
MCH RBC QN AUTO: 26.4 PG (ref 26–33)
MCHC RBC AUTO-ENTMCNC: 31.5 GM/DL (ref 32.2–35.5)
MCV RBC AUTO: 83.9 FL (ref 80–94)
MONOCYTES # BLD: 14.7 %
MONOCYTES ABSOLUTE: 0.9 THOU/MM3 (ref 0.4–1.3)
NUCLEATED RED BLOOD CELLS: 0 /100 WBC
PLATELET # BLD: 201 THOU/MM3 (ref 130–400)
PMV BLD AUTO: 10.6 FL (ref 9.4–12.4)
POTASSIUM REFLEX MAGNESIUM: 3.9 MEQ/L (ref 3.5–5.2)
RBC # BLD: 3.22 MILL/MM3 (ref 4.7–6.1)
SEG NEUTROPHILS: 62.9 %
SEGMENTED NEUTROPHILS ABSOLUTE COUNT: 3.9 THOU/MM3 (ref 1.8–7.7)
SODIUM BLD-SCNC: 137 MEQ/L (ref 135–145)
WBC # BLD: 6.2 THOU/MM3 (ref 4.8–10.8)

## 2021-08-05 PROCEDURE — 99232 SBSQ HOSP IP/OBS MODERATE 35: CPT | Performed by: INTERNAL MEDICINE

## 2021-08-05 PROCEDURE — 85025 COMPLETE CBC W/AUTO DIFF WBC: CPT

## 2021-08-05 PROCEDURE — 80048 BASIC METABOLIC PNL TOTAL CA: CPT

## 2021-08-05 PROCEDURE — 2500000003 HC RX 250 WO HCPCS: Performed by: NURSE PRACTITIONER

## 2021-08-05 PROCEDURE — 99221 1ST HOSP IP/OBS SF/LOW 40: CPT | Performed by: NURSE PRACTITIONER

## 2021-08-05 PROCEDURE — 6370000000 HC RX 637 (ALT 250 FOR IP): Performed by: NURSE PRACTITIONER

## 2021-08-05 PROCEDURE — 1200000003 HC TELEMETRY R&B

## 2021-08-05 PROCEDURE — 36415 COLL VENOUS BLD VENIPUNCTURE: CPT

## 2021-08-05 PROCEDURE — 99232 SBSQ HOSP IP/OBS MODERATE 35: CPT | Performed by: FAMILY MEDICINE

## 2021-08-05 PROCEDURE — 2580000003 HC RX 258: Performed by: NURSE PRACTITIONER

## 2021-08-05 RX ORDER — BACITRACIN, NEOMYCIN, POLYMYXIN B 400; 3.5; 5 [USP'U]/G; MG/G; [USP'U]/G
OINTMENT TOPICAL 2 TIMES DAILY
Status: DISCONTINUED | OUTPATIENT
Start: 2021-08-05 | End: 2021-08-06 | Stop reason: HOSPADM

## 2021-08-05 RX ORDER — FINASTERIDE 5 MG/1
5 TABLET, FILM COATED ORAL DAILY
Status: DISCONTINUED | OUTPATIENT
Start: 2021-08-06 | End: 2021-08-06 | Stop reason: HOSPADM

## 2021-08-05 RX ORDER — TAMSULOSIN HYDROCHLORIDE 0.4 MG/1
0.4 CAPSULE ORAL 2 TIMES DAILY
Status: DISCONTINUED | OUTPATIENT
Start: 2021-08-05 | End: 2021-08-06 | Stop reason: HOSPADM

## 2021-08-05 RX ADMIN — PANTOPRAZOLE SODIUM 40 MG: 40 TABLET, DELAYED RELEASE ORAL at 08:10

## 2021-08-05 RX ADMIN — ASPIRIN 81 MG: 81 TABLET, COATED ORAL at 08:10

## 2021-08-05 RX ADMIN — TAMSULOSIN HYDROCHLORIDE 0.4 MG: 0.4 CAPSULE ORAL at 08:09

## 2021-08-05 RX ADMIN — PANTOPRAZOLE SODIUM 40 MG: 40 TABLET, DELAYED RELEASE ORAL at 21:21

## 2021-08-05 RX ADMIN — BACITRACIN ZINC NEOMYCIN SULFATE POLYMYXIN B SULFATE: 400; 3.5; 5 OINTMENT TOPICAL at 21:21

## 2021-08-05 RX ADMIN — SODIUM CHLORIDE, PRESERVATIVE FREE 10 ML: 5 INJECTION INTRAVENOUS at 21:21

## 2021-08-05 RX ADMIN — BUMETANIDE 1 MG: 0.25 INJECTION, SOLUTION INTRAMUSCULAR; INTRAVENOUS at 08:10

## 2021-08-05 RX ADMIN — APIXABAN 2.5 MG: 2.5 TABLET, FILM COATED ORAL at 21:21

## 2021-08-05 RX ADMIN — APIXABAN 2.5 MG: 2.5 TABLET, FILM COATED ORAL at 08:09

## 2021-08-05 RX ADMIN — BACITRACIN ZINC NEOMYCIN SULFATE POLYMYXIN B SULFATE: 400; 3.5; 5 OINTMENT TOPICAL at 14:24

## 2021-08-05 RX ADMIN — TAMSULOSIN HYDROCHLORIDE 0.4 MG: 0.4 CAPSULE ORAL at 21:21

## 2021-08-05 NOTE — CARE COORDINATION
8/5/21, 11:07 AM EDT  DISCHARGE PLANNING EVALUATION:    Monserrat Forrest       Admitted: 8/4/2021/ 0426   Hospital day: 1   Location: [facility]/023-A Reason for admit: Pulmonary edema cardiac cause ([facility] [address] [I50.1]  Acute renal failure superimposed on chronic kidney disease, unspecified CKD stage, unspecified acute renal failure type ([facility] [address] [N17.9, N18.9]  Acute on chronic congestive heart failure, unspecified heart failure type ([address] [I50.9]   PMH:  has a past medical history of Actinic keratoses, Adenomatous polyp, Atrial fibrillation ([facility] [address] CAD (coronary artery disease), Carotid artery disease ([facility] [address] Hyperlipidemia, Hypertension, Inguinal hernia, left, and Unspecified cerebral artery occlusion with cerebral infarction. Procedure: none  Barriers to Discharge:  Creat 1.7, Hgb 8.5, BNP 2255, trops elev. IV Bumex bid. Urology consult pending. Nephrology following, boswell placed as ANNIKA is likely due to urinary retention. PT/OT. PCP: Nikki Galdamez DO  Readmission Risk Score: 23%    Patient Goals/Plan/Treatment Preferences: Mars Carlson is from home alone. He has family support. SW consulted. Transportation/Food Security/Housekeeping Addressed:  No issues identified.

## 2021-08-05 NOTE — CONSULTS
Urology Consult Note      Reason for Consult:  \"BPH causing ANNIKA requiring catheter\"  Requesting Physician:  Dr. Zoraida Mei    History Obtained From:  Patient, electronic medical record    Chief Complaint: Bilateral lower extremity swelling. HISTORY OF PRESENT ILLNESS:                The patient is a 80 y.o. male who presented to [facility] secondary to worsening swelling from abdomen to bilateral lower extremities and increased difficulty urinating. He was admitted for acute on chronic heart failure and is undergoing diuresis. He was also noted to have ANNIKA on CKD stage III and renal US noted a PVR of 407.7 mls.  mlos and boswell was placed with return of 475 mls. Urology has been consulted for retention. Mr. Broussard Adjutant has a hx of BPH w LUTS and prior recurrent UTIs. He follows with Dr. Nathalia Thomas and was started on Tamsulosin 0.4 mg daily 1/2020 with improvement in symptoms. Last seen in the office 2/2020. Pt was hospitalized in February of this year at which time he was noted to have urinary retention and boswell was inserted for bladder scan of 554 mls. Plan was for pt to undergo outpatient cystoscopy in follow-up but this did not occur. Pt is currently on Tamsulosin 0.4 mg daily. Urinalysis negative for infection. Creatinine 1.7 (baseline 1.1-1.2).       Past Medical History:        Diagnosis Date    Actinic keratoses 8/21/2013    Adenomatous polyp 2008    Atrial fibrillation ([facility] [address]     CAD (coronary artery disease) 2009    Carotid artery disease ([facility] [address] 2009    left    Hyperlipidemia 2004    Hypertension 2004    Inguinal hernia, left 2/19/2014    Unspecified cerebral artery occlusion with cerebral infarction      Past Surgical History:        Procedure Laterality Date    ABDOMEN SURGERY      ABDOMINAL ADHESION SURGERY  6/2012    Dr Fercho Fulton  2004    right    COLONOSCOPY      [facility]    CORONARY ARTERY BYPASS GRAFT  2009    ENDOSCOPY, COLON, DIAGNOSTIC  EYE SURGERY      SMALL INTESTINE SURGERY      SMALL INTESTINE SURGERY  2015    Exploratory Laparotomy    UPPER GASTROINTESTINAL ENDOSCOPY N/A 2021    EGD ESOPHAGOGASTRODUODENOSCOPY PEG TUBE INSERTION performed by Janey Espinosa MD at [facility]    VASCULAR SURGERY      cabg harvests from right leg     Allergies:  Patient has no known allergies. Social History     Socioeconomic History    Marital status:      Spouse name: Not on file    Number of children: 3    Years of education: Not on file    Highest education level: Not on file   Occupational History    Not on file   Tobacco Use    Smoking status: Former Smoker     Quit date: 1980     Years since quittin.6    Smokeless tobacco: Never Used   Vaping Use    Vaping Use: Never used   Substance and Sexual Activity    Alcohol use: Yes     Alcohol/week: 1.0 standard drinks     Types: 1 Cans of beer per week     Comment: occasionally    Drug use: No    Sexual activity: Not on file   Other Topics Concern    Not on file   Social History Narrative    Not on file     Social Determinants of Health     Financial Resource Strain:     Difficulty of Paying Living Expenses:    Food Insecurity:     Worried About Running Out of Food in the Last Year:     [address] in the Last Year:    Transportation Needs:     Lack of Transportation (Medical):      Lack of Transportation (Non-Medical):    Physical Activity:     Days of Exercise per Week:     Minutes of Exercise per Session:    Stress:     Feeling of Stress :    Social Connections:     Frequency of Communication with Friends and Family:     Frequency of Social Gatherings with Friends and Family:     Attends Jew Services:     Active Member of Clubs or Organizations:     Attends Club or Organization Meetings:     Marital Status:    Intimate Partner Violence:     Fear of Current or Ex-Partner:     Emotionally Abused:     Physically Abused:     Sexually Abused:        Family History:       Problem Relation Age of Onset    Cancer Mother        ROS:  Constitutional: Negative for chills, fatigue, fever, or weight loss. Eyes: Denies reported visual changes. ENT: Denies headache, difficulty swallowing, earache, and nosebleeds. Cardiovascular: Negative for chest pain, palpitations, tachycardia or edema. Respiratory: Denies cough or SOB. GI:The patient denies abdominal or flank pain, anorexia, nausea or vomiting. : see HPI. Musculoskeletal: Patient denies low back pain or painful or reduced range of ROM. Neurological: The patient denies any symptoms of neurological impairment or TIA. Psychiatric: Denies anxiety or depression. Skin: Denies rash or lesions. All remaining ROS negative. PHYSICAL EXAM:  VITALS:  BP (!) 120/43   Pulse 79   Temp 98.1 °F (36.7 °C) (Oral)   Resp 18   Ht 5' 5\" (1.651 m)   Wt 167 lb (75.8 kg)   SpO2 92%   BMI 27.79 kg/m² . Nursing note and vitals reviewed. Constitutional: Alert and oriented times x3, no acute distress, and cooperative to examination with appropriate mood and affect. HEENT:   Head:         Normocephalic and atraumatic. Mouth/Throat:          Mucous membranes are normal.   Eyes:         EOM are normal. No scleral icterus. Nose:    The external appearance of the nose is normal  Ears: The ears appear normal to external inspection. [place]  Cardiovascular:       Normal rate, regular rhythm. Pulmonary/Chest:  Normal respiratory rate and rhthym. No use of accessory muscles. Lungs clear bilaterally with diminished bases  Abdominal:          Soft. No tenderness. Active bowel sounds             Genitalia:   uncircumcised penis. Foreskin retractable  Urethral meatus is normal in size and location with minimal surrounding redness  Duarte draining clear light yellow urine  Scrotal swelling   Musculoskeletal:    Normal range of motion. He exhibits no edema or tenderness of lower extremities.    Extremities:    No cyanosis, clubbing, or edema present. Neurological:    Alert and oriented. DATA:  CBC:   Lab Results   Component Value Date    WBC 6.2 08/05/2021    RBC 3.22 08/05/2021    HGB 8.5 08/05/2021    HCT 27.0 08/05/2021    MCV 83.9 08/05/2021    MCH 26.4 08/05/2021    MCHC 31.5 08/05/2021    RDW 21.5 08/24/2016     08/05/2021    MPV 10.6 08/05/2021     BMP:    Lab Results   Component Value Date     08/05/2021    K 3.9 08/05/2021     08/05/2021    CO2 23 08/05/2021    BUN 25 08/05/2021    CREATININE 1.7 08/05/2021    CALCIUM 8.5 08/05/2021    LABGLOM 38 08/05/2021    GLUCOSE 86 08/05/2021    GLUCOSE 87 01/09/2015     BUN/Creatinine:    Lab Results   Component Value Date    BUN 25 08/05/2021    CREATININE 1.7 08/05/2021     Magnesium:    Lab Results   Component Value Date    MG 2.1 01/28/2021     Phosphorus:    Lab Results   Component Value Date    PHOS 2.6 01/26/2021     PT/INR:    Lab Results   Component Value Date    INR 1.54 08/04/2021     U/A:    Lab Results   Component Value Date    COLORU YELLOW 08/04/2021    PHUR 5.5 08/04/2021    LABCAST NONE SEEN 02/02/2021    LABCAST NONE SEEN 02/02/2021    WBCUA 0-2 02/02/2021    RBCUA 0-2 02/02/2021    MUCUS NONE SEEN 01/27/2021    YEAST NONE SEEN 02/02/2021    BACTERIA NONE SEEN 02/02/2021    SPECGRAV 1.019 02/02/2021    LEUKOCYTESUR NEGATIVE 08/04/2021    UROBILINOGEN 0.2 08/04/2021    BILIRUBINUR NEGATIVE 08/04/2021    BLOODU NEGATIVE 08/04/2021    GLUCOSEU NEGATIVE 08/04/2021    AMORPHOUS URATES 01/27/2021       Imaging: The patient has had a Renal Ultrasound which I have reviewed along with its accompanying report. The study demonstrates PVR of 407. 7. No hydronephrosis. R renal cysts. Enlarged prostate.     found for this patient   Narrative   PROCEDURE: US RENAL COMPLETE       CLINICAL INFORMATION: ANNIKA.       COMPARISON: CT abdomen and pelvis dated 8/20/2016.       TECHNIQUE: Grayscale and color images were obtained of both kidneys.       FINDINGS:   There is a 1.9 x 1.6 x 1.5 cm anechoic cyst at the superior pole of the right kidney, similar to prior CT. There is enhanced through transmission and a perceptible walls. There is a partially exophytic anechoic 1.1 x 1.1 x 1.0 cm cyst at the interpolar    region of the right kidney with enhanced through transmission and imperceptible walls. There is cortical thinning of both kidneys. Resistive indices are elevated bilaterally. No hydronephrosis is present. There is a large post void residual. The bladder    is otherwise unremarkable. Bilateral ureteral jets are present. The prostate is prominent. RIGHT KIDNEY - 10.4 x 5.1 x 4.6 cm   Resistive Index - 0.72   Cortical Thickness - 0.8 cm       LEFT KIDNEY - 10.3 x 4.2 x 4.6 cm   Resistive Index - 0.77   Cortical Thickness - 0.9 cm       URINARY BLADDER   Pre-Void - 594.3 mL   Post-Void - 407.7 mL           Impression       1. No hydronephrosis. 2. Chronic renal disease. 3. Right-sided simple renal cysts. 4. Prostatomegaly. IMPRESSION:   BPH with urinary retention  ANNIKA on CKD IIIa  R renal cysts  Lower extremity edema  Acute on chronic hf  Elevated troponin, chronic  Chronic Afib  Hx CVA, CAD s/p CABG, GERD    Plan:      Continue boswell catheter at least until creatinine nadirs. Increase tamsulosin to 0.4 mg BID and start finasteride.       Consider cystoscopy in follow-up if pt and family wish to proceed and pending results of voiding trial.      Thank you for including us in the care of [facility], [facility]  08/05/21 10:51 AM  Urology

## 2021-08-05 NOTE — PROGRESS NOTES
[facility]   Nephrology progress note  8/5/2021, 1:29 PM      Pt Name:    Francisco Contreras  MRN:     [id number]     YOB: 1932  Admit Date:    8/4/2021  4:26 AM  Primary Care Physician:  Geronimo Vance DO   Room number  7G-26/845-A    Chief Complaint: Nephrology following for ANNIKA/CKD    Subjective:  Patient seen and examined  No chest pain or shortness of breath  Feels okay  Hard of hearing  Excellent urine output    Objective:  24HR INTAKE/OUTPUT:      Intake/Output Summary (Last 24 hours) at 8/5/2021 1329  Last data filed at 8/5/2021 1215  Gross per 24 hour   Intake 1190 ml   Output 2325 ml   Net -1135 ml     I/O last 3 completed shifts: In: 1010 [P.O.:1000; I.V.:10]  Out: 1975 [HIHPW:2750]  I/O this shift:  In: 180 [P.O.:180]  Out: 500 [Urine:500]  Admission weight: 160 lb (72.6 kg)  Wt Readings from Last 3 Encounters:   08/05/21 167 lb (75.8 kg)   04/19/21 146 lb 6.4 oz (66.4 kg)   02/05/21 163 lb 1.6 oz (74 kg)     Body mass index is 27.79 kg/m².     Physical examination  VITALS:     Vitals:    08/04/21 2308 08/05/21 0428 08/05/21 0800 08/05/21 1215   BP: 133/60 137/62 (!) 120/43 127/62   Pulse: 55 63 79 89   Resp: 18 18 18 18   Temp: 98.5 °F (36.9 °C) 98.5 °F (36.9 °C) 98.1 °F (36.7 °C) 98 °F (36.7 °C)   TempSrc: Oral Oral Oral Oral   SpO2: 96% 92% 92% 95%   Weight:  167 lb (75.8 kg)     Height:         General Appearance: alert and cooperative with exam, appears comfortable, no distress  Mouth/Throat: Oral mucosa moist  Neck: No JVD  Lungs: Air entry B/L, no rales, no use of accessory muscles  Heart:  S1, S2 heard  GI: soft, non-tender, no guarding  Extremities: improved edema      Lab Data  CBC:   Recent Labs     08/04/21  0446 08/05/21 0449   WBC 7.3 6.2   HGB 8.8* 8.5*   HCT 28.9* 27.0*    201     BMP:  Recent Labs     08/04/21  0446 08/05/21 0449    137   K 4.1 3.9    103   CO2 21* 23   BUN 24* 25*   CREATININE 1.6* 1.7*   GLUCOSE 94 86 CALCIUM 8.8 8.5     Hepatic:   Recent Labs     08/04/21  0446   LABALBU 4.2   AST 24   ALT 15   BILITOT 0.3   ALKPHOS 124         Meds:  Infusion:    sodium chloride       Meds:    neomycin-bacitracin-polymyxin   Topical BID    tamsulosin  0.4 mg Oral BID    [START ON 8/6/2021] finasteride  5 mg Oral Daily    apixaban  2.5 mg Oral BID    aspirin  81 mg Oral Daily    pantoprazole  40 mg Oral BID    bumetanide  1 mg Intravenous BID    sodium chloride flush  5-40 mL Intravenous 2 times per day     Meds prn: sodium chloride flush, sodium chloride, ondansetron **OR** ondansetron, polyethylene glycol, acetaminophen **OR** acetaminophen       Impression and Plan:  1. ANNIKA in setting of urinary retention  Excellent urine output  Serum creatinine mostly stable in last 24 hours, will continue to monitor  Hold diuretics for now  2. Urinary retention secondary to BPH. Status post Duarte decompression. Continue with Flomax. Urology input appreciated  3. Mild lower extremity edema  4. History of BPH with LUTS  5. Chronic atrial fibrillation  6. History of CVA  7.   History of hypertension    D/W patient and RN    Judah Ordaz MD  [facility]

## 2021-08-05 NOTE — PLAN OF CARE
Problem: Falls - Risk of:  Goal: Will remain free from falls  Description: Will remain free from falls  Outcome: Ongoing  Note: Patient remained free from falls this shift. Used call light appropriately. Wore nonskid socks and used walker when ambulating with assistance. Bed alarm on. Call light in reach. Problem: Falls - Risk of:  Goal: Absence of physical injury  Description: Absence of physical injury  Outcome: Ongoing  Note: Patient remained free from physical injury this shift. Used call light appropriately. Wore nonskid socks when ambulating with assistance. Bed alarm on. Call light in reach. Pathway clear. Problem: Discharge Planning:  Goal: Discharged to appropriate level of care  Description: Discharged to appropriate level of care  Outcome: Ongoing  Note: Patient is from home with wife and plans to return there at discharge. Will continue to monitor for further needs. Problem: Safety:  Goal: Free from accidental physical injury  Description: Free from accidental physical injury  Outcome: Ongoing     Problem: Safety:  Goal: Free from intentional harm  Description: Free from intentional harm  Outcome: Ongoing     Problem: Daily Care:  Goal: Daily care needs are met  Description: Daily care needs are met  Outcome: Ongoing  Note: Patient's daily care needs are met. Problem: Pain:  Goal: Patient's pain/discomfort is manageable  Description: Patient's pain/discomfort is manageable  Outcome: Ongoing  Note: Ongoing assessment & interventions provided throughout shift. Reminded patient to report any pain, pressure, or shortness of breath to the nurse. Patient denies pain at this time. Patient participated in plan of care and contributed to goal setting.

## 2021-08-05 NOTE — PROGRESS NOTES
PROGRESS NOTE      Patient:  Mar Cart      Unit/Bed:6K-23/023-A    YOB: 1932    MRN: [id number]       Acct: [de-identified]     PCP: Candance Aase, DO    Date of Admission: 8/4/2021      Assessment/Plan:    Anticipated Discharge in : 2 days    Active Hospital Problems    Diagnosis Date Noted    Essential hypertension [I10]      Priority: Medium    BPH with urinary obstruction [N40.1, N13.8] 08/05/2021    Stage 2 chronic kidney disease [N18.2] 08/05/2021    Acute on chronic heart failure with preserved ejection fraction ([address] [I50.33] 08/05/2021    Pulmonary edema cardiac cause ([address] [I50.1] 08/04/2021    Atrial fibrillation ([facility] [I48.91] 11/22/2016    ANNIKA (acute kidney injury) ([facility] [N17.9]        1. BPH with urinary Obstruction  1. Presented to ED with inability to urinate. Has seen Dr. Ray Ricardo in past for this and did have catheter placed in February. Was able to have it removed and no problem since. On flomax for this. Was to have outpatient cystoscopy but lost to follow up   2. Currently has catheter in place without signs of UTI  3. Consulted urology and awaiting recommendations, likely will need cath on discharge with close followup with finasteride  2. ANNIKA on CKD3 2/2 obstruction   1. Cr was 1.7 on admission and baseline appears to be 1.1-1.2 in April  2. Renal US negative for hydronephrosis   3. This is 2/2 obstruction due to 900 mL being noted on catheterization. Has had good intake and no abnormal urinary studies currently  4. Nephrology on board  5. Will continue to monitor. Flomax. Consider finasteride at discharge  6. Plan to keep boswell in place until creatinine nadirs  3. Acute on Chronic HFpEF, resolving  1. Pulmonary edema likely 2/2 renal causes as above, but certainly some component of CHF on this due to elevated BNP and CXR. Was treated for this in February of this year as well, had improvement  2.  Leg swelling reportedly at baseline, no SOB, no crackles noted on exam  3. 1mg BID Bumex IV and daily weights  4. Afib w/ SVR  1. Currently in NSR, on eliquis 2.5 mg BID  5. GERD  1. Controlled, continue protonix  6. Elevated troponin  1. Chronic. No EKG changes continue to monitor  7. Chronic Normocytic Anemia  1. Baseline ~10.5 Likely secondary to chronic diseases and some component of hemodilution currently. Has had iron studies in past, low iron at that time and not on iron currently. Normal ferritin. Will continue to monitor. Will start iron for discharge. 8. Essential Hypertension  1. Currently controlled. Will continue to monitor    9. CAD/HLD/CVA history   1. On aspirin for this. Will reach out to family to discuss medical regimen and history further. Chief Complaint: Anuria    Hospital Course:     Patient seen for urinary retention. Patient does have history of enlarged prostate and takes Flomax. He has seen Dr. Ray Ricardo in the past.  This time patient presents to the hospital with 2 days duration of worsening lower extremity edema associated with his difficulty urinating. Patient reports that yesterday he could not urinate at all. Therefore he decided to come to the emergency department. He did not notify anyone. In the emergency room patient was noted to have chest x-ray findings suggestive of some edema and pleural effusions. He was noted to have 2+ extremity pitting edema. BNP elevated. His serum creatinine was 1.6. CXR showed mild perihilar edema. Had urinary cath and returned >900 mL. He was admitted to the medical services and nephrology has been consulted for management. Since admission, says LE edema has resolved and has had continued urine output. Creatinine has not yet improved. Not on fluids due to CHF concern. Urology consulted    Subjective:    No acute events overnight. Patient feels like he is at baseline. Catheter continues to drain well. Denying pain currently. Says LE edema is back to baseline.   Denying any other symptoms such as dizziness, headache, confusion, shortness of breath, N/V/D/C    Medications:  Reviewed    Infusion Medications    sodium chloride       Scheduled Medications    neomycin-bacitracin-polymyxin   Topical BID    tamsulosin  0.4 mg Oral BID    [START ON 8/6/2021] finasteride  5 mg Oral Daily    apixaban  2.5 mg Oral BID    aspirin  81 mg Oral Daily    pantoprazole  40 mg Oral BID    bumetanide  1 mg Intravenous BID    sodium chloride flush  5-40 mL Intravenous 2 times per day     PRN Meds: sodium chloride flush, sodium chloride, ondansetron **OR** ondansetron, polyethylene glycol, acetaminophen **OR** acetaminophen      Intake/Output Summary (Last 24 hours) at 8/5/2021 1252  Last data filed at 8/5/2021 1215  Gross per 24 hour   Intake 1190 ml   Output 2325 ml   Net -1135 ml       Diet:  ADULT DIET; Regular; Low Sodium (2 gm); 2000 ml    Exam:  /62   Pulse 89   Temp 98 °F (36.7 °C) (Oral)   Resp 18   Ht 5' 5\" (1.651 m)   Wt 167 lb (75.8 kg)   SpO2 95%   BMI 27.79 kg/m²     Physical Exam  Vitals and nursing note reviewed. Constitutional:       Appearance: Normal appearance. HENT:      Head: Normocephalic and atraumatic. Nose: Nose normal.      Mouth/Throat:      Mouth: Mucous membranes are moist.      Pharynx: Oropharynx is clear. Eyes:      Extraocular Movements: Extraocular movements intact. Pupils: Pupils are equal, round, and reactive to light. Cardiovascular:      Rate and Rhythm: Normal rate and regular rhythm. Pulses: Normal pulses. Heart sounds: Normal heart sounds. Pulmonary:      Effort: Pulmonary effort is normal.      Breath sounds: Normal breath sounds. Abdominal:      General: Abdomen is flat. Bowel sounds are normal.   Musculoskeletal:         General: No signs of injury. Normal range of motion. Cervical back: Normal range of motion and neck supple. Right lower leg: Edema present. Left lower leg: Edema present. Skin:     General: Skin is warm and dry. Capillary Refill: Capillary refill takes less than 2 seconds. Neurological:      General: No focal deficit present. Mental Status: He is alert and oriented to person, place, and time. Mental status is at baseline. Psychiatric:         Mood and Affect: Mood normal.         Behavior: Behavior normal.         Labs:   Recent Labs     08/04/21 0446 08/05/21 0449   WBC 7.3 6.2   HGB 8.8* 8.5*   HCT 28.9* 27.0*    201     Recent Labs     08/04/21  0446 08/05/21 0449    137   K 4.1 3.9    103   CO2 21* 23   BUN 24* 25*   CREATININE 1.6* 1.7*   CALCIUM 8.8 8.5     Recent Labs     08/04/21 0446   AST 24   ALT 15   BILIDIR <0.2   BILITOT 0.3   ALKPHOS 124     Recent Labs     08/04/21  0715   INR 1.54*     No results for input(s): Edita Bonilla in the last 72 hours. Urinalysis:      Lab Results   Component Value Date    NITRU NEGATIVE 08/04/2021    WBCUA 0-2 02/02/2021    BACTERIA NONE SEEN 02/02/2021    RBCUA 0-2 02/02/2021    BLOODU NEGATIVE 08/04/2021    SPECGRAV 1.019 02/02/2021    GLUCOSEU NEGATIVE 08/04/2021       Radiology:  US RENAL COMPLETE   Final Result       1. No hydronephrosis. 2. Chronic renal disease. 3. Right-sided simple renal cysts. 4. Prostatomegaly. **This report has been created using voice recognition software. It may contain minor errors which are inherent in voice recognition technology. **      Final report electronically signed by Dr. Charles Sullivan MD on 8/4/2021 3:04 PM      XR CHEST PORTABLE   Final Result   1. Cardiomegaly with mild perihilar edema. Small right pleural effusion. This document has been electronically signed by: Jalen Fischer MD on    08/04/2021 05:17 AM          Diet: ADULT DIET; Regular;  Low Sodium (2 gm); 2000 ml    DVT prophylaxis: [] Lovenox                                 [] SCDs                                 [] SQ Heparin                                 [] Encourage ambulation           [x] Already on Anticoagulation     Disposition:    [x] Home       [] TCU       [] Rehab       [] Psych       [] SNF       [] [place]       [] Other-    Code Status: Limited    PT/OT Eval Status: Ordered and pending      Electronically signed by Mikael Olmedo MD on 8/5/2021 at 12:52 PM

## 2021-08-06 ENCOUNTER — HOSPITAL ENCOUNTER (EMERGENCY)
Age: 86
Discharge: HOME | End: 2021-08-06
Payer: MEDICARE

## 2021-08-06 VITALS
DIASTOLIC BLOOD PRESSURE: 68 MMHG | SYSTOLIC BLOOD PRESSURE: 132 MMHG | HEART RATE: 65 BPM | RESPIRATION RATE: 20 BRPM | OXYGEN SATURATION: 96 %

## 2021-08-06 VITALS
OXYGEN SATURATION: 96 % | HEIGHT: 65 IN | WEIGHT: 161.3 LBS | HEART RATE: 49 BPM | SYSTOLIC BLOOD PRESSURE: 147 MMHG | RESPIRATION RATE: 15 BRPM | DIASTOLIC BLOOD PRESSURE: 64 MMHG | BODY MASS INDEX: 26.87 KG/M2 | TEMPERATURE: 97.9 F

## 2021-08-06 DIAGNOSIS — T83.011A MALFUNCTION OF FOLEY CATHETER, INITIAL ENCOUNTER (HCC): Primary | ICD-10-CM

## 2021-08-06 PROBLEM — Z97.8 FOLEY CATHETER IN PLACE: Status: ACTIVE | Noted: 2021-08-06

## 2021-08-06 LAB
ANION GAP SERPL CALCULATED.3IONS-SCNC: 10 MEQ/L (ref 8–16)
BASOPHILS # BLD: 0.7 %
BASOPHILS ABSOLUTE: 0 THOU/MM3 (ref 0–0.1)
BUN BLDV-MCNC: 24 MG/DL (ref 7–22)
CALCIUM SERPL-MCNC: 8.3 MG/DL (ref 8.5–10.5)
CHLORIDE BLD-SCNC: 104 MEQ/L (ref 98–111)
CO2: 21 MEQ/L (ref 23–33)
CREAT SERPL-MCNC: 1.6 MG/DL (ref 0.4–1.2)
EOSINOPHIL # BLD: 2.8 %
EOSINOPHILS ABSOLUTE: 0.2 THOU/MM3 (ref 0–0.4)
ERYTHROCYTE [DISTWIDTH] IN BLOOD BY AUTOMATED COUNT: 17.4 % (ref 11.5–14.5)
ERYTHROCYTE [DISTWIDTH] IN BLOOD BY AUTOMATED COUNT: 54.4 FL (ref 35–45)
GFR SERPL CREATININE-BSD FRML MDRD: 41 ML/MIN/1.73M2
GLUCOSE BLD-MCNC: 84 MG/DL (ref 70–108)
HCT VFR BLD CALC: 28.3 % (ref 42–52)
HEMOGLOBIN: 8.6 GM/DL (ref 14–18)
IMMATURE GRANS (ABS): 0.01 THOU/MM3 (ref 0–0.07)
IMMATURE GRANULOCYTES: 0.2 %
LYMPHOCYTES # BLD: 14.1 %
LYMPHOCYTES ABSOLUTE: 0.8 THOU/MM3 (ref 1–4.8)
MCH RBC QN AUTO: 26.2 PG (ref 26–33)
MCHC RBC AUTO-ENTMCNC: 30.4 GM/DL (ref 32.2–35.5)
MCV RBC AUTO: 86.3 FL (ref 80–94)
MONOCYTES # BLD: 16.4 %
MONOCYTES ABSOLUTE: 1 THOU/MM3 (ref 0.4–1.3)
NUCLEATED RED BLOOD CELLS: 0 /100 WBC
PLATELET # BLD: 197 THOU/MM3 (ref 130–400)
PMV BLD AUTO: 11 FL (ref 9.4–12.4)
POTASSIUM REFLEX MAGNESIUM: 3.7 MEQ/L (ref 3.5–5.2)
RBC # BLD: 3.28 MILL/MM3 (ref 4.7–6.1)
SEG NEUTROPHILS: 65.8 %
SEGMENTED NEUTROPHILS ABSOLUTE COUNT: 3.9 THOU/MM3 (ref 1.8–7.7)
SODIUM BLD-SCNC: 135 MEQ/L (ref 135–145)
WBC # BLD: 6 THOU/MM3 (ref 4.8–10.8)

## 2021-08-06 PROCEDURE — 97535 SELF CARE MNGMENT TRAINING: CPT

## 2021-08-06 PROCEDURE — 97165 OT EVAL LOW COMPLEX 30 MIN: CPT

## 2021-08-06 PROCEDURE — 6370000000 HC RX 637 (ALT 250 FOR IP): Performed by: NURSE PRACTITIONER

## 2021-08-06 PROCEDURE — 99239 HOSP IP/OBS DSCHRG MGMT >30: CPT | Performed by: FAMILY MEDICINE

## 2021-08-06 PROCEDURE — 85025 COMPLETE CBC W/AUTO DIFF WBC: CPT

## 2021-08-06 PROCEDURE — 99282 EMERGENCY DEPT VISIT SF MDM: CPT

## 2021-08-06 PROCEDURE — 2580000003 HC RX 258: Performed by: NURSE PRACTITIONER

## 2021-08-06 PROCEDURE — 80048 BASIC METABOLIC PNL TOTAL CA: CPT

## 2021-08-06 PROCEDURE — 99232 SBSQ HOSP IP/OBS MODERATE 35: CPT | Performed by: INTERNAL MEDICINE

## 2021-08-06 PROCEDURE — 36415 COLL VENOUS BLD VENIPUNCTURE: CPT

## 2021-08-06 PROCEDURE — 99232 SBSQ HOSP IP/OBS MODERATE 35: CPT | Performed by: UROLOGY

## 2021-08-06 PROCEDURE — 6370000000 HC RX 637 (ALT 250 FOR IP): Performed by: STUDENT IN AN ORGANIZED HEALTH CARE EDUCATION/TRAINING PROGRAM

## 2021-08-06 PROCEDURE — 97530 THERAPEUTIC ACTIVITIES: CPT

## 2021-08-06 PROCEDURE — 97162 PT EVAL MOD COMPLEX 30 MIN: CPT

## 2021-08-06 PROCEDURE — 97110 THERAPEUTIC EXERCISES: CPT

## 2021-08-06 RX ORDER — FINASTERIDE 5 MG/1
5 TABLET, FILM COATED ORAL DAILY
Qty: 30 TABLET | Refills: 3 | Status: SHIPPED | OUTPATIENT
Start: 2021-08-07 | End: 2021-08-18 | Stop reason: SDUPTHER

## 2021-08-06 RX ADMIN — TAMSULOSIN HYDROCHLORIDE 0.4 MG: 0.4 CAPSULE ORAL at 08:30

## 2021-08-06 RX ADMIN — SODIUM CHLORIDE, PRESERVATIVE FREE 10 ML: 5 INJECTION INTRAVENOUS at 08:31

## 2021-08-06 RX ADMIN — ASPIRIN 81 MG: 81 TABLET, COATED ORAL at 08:30

## 2021-08-06 RX ADMIN — BACITRACIN ZINC NEOMYCIN SULFATE POLYMYXIN B SULFATE: 400; 3.5; 5 OINTMENT TOPICAL at 08:31

## 2021-08-06 RX ADMIN — APIXABAN 2.5 MG: 2.5 TABLET, FILM COATED ORAL at 08:30

## 2021-08-06 RX ADMIN — FINASTERIDE 5 MG: 5 TABLET, FILM COATED ORAL at 08:30

## 2021-08-06 RX ADMIN — PANTOPRAZOLE SODIUM 40 MG: 40 TABLET, DELAYED RELEASE ORAL at 08:30

## 2021-08-06 ASSESSMENT — ENCOUNTER SYMPTOMS
SORE THROAT: 0
ABDOMINAL PAIN: 0
BLOOD IN STOOL: 0
CONSTIPATION: 0
NAUSEA: 0
DIARRHEA: 0
SINUS PRESSURE: 0
VOMITING: 0
EYE PAIN: 0
ABDOMINAL DISTENTION: 0
COUGH: 0
SHORTNESS OF BREATH: 0
SINUS PAIN: 0

## 2021-08-06 NOTE — CARE COORDINATION
8/6/21, 12:01 PM EDT    DISCHARGE ON [address] 917 day: 2  Location: [facility]23/023-A Reason for admit: Pulmonary edema cardiac cause ([facility] [I50.1]  Acute renal failure superimposed on chronic kidney disease, unspecified CKD stage, unspecified acute renal failure type ([facility] [N17.9, N18.9]  Acute on chronic congestive heart failure, unspecified heart failure type ([facility] [I50.9]   Procedure: none  Barriers to Discharge: Creat 1.6 (baseline 1.1-1.2), Ca 8.3, Hgb 8.6. Bumex on hold. Flomax initiated per urology. Working well with PT/OT. PCP: Sophia Huynh DO  Readmission Risk Score: 27%  Patient Goals/Plan/Treatment Preferences: Home independently. Denies needs to SW.

## 2021-08-06 NOTE — DISCHARGE SUMMARY
for management.         After admission, says LE edema has resolved and has had continued urine output. Creatinine has not yet improved. Not on fluids due to CHF concern. Urology consulted and agreed with current plan. Nephro saw patient and discontinued diuretics to improve ANNIKA. Patient was stable for discharge on 8/6. Continued to eat and drink well and at baseline. ANNIKA had not resolved but has close PCP followup and was educated on proper cath care. Exam:     Vitals:  Vitals:    08/06/21 0010 08/06/21 0410 08/06/21 0815 08/06/21 1202   BP: (!) 119/54 (!) 156/69 (!) 124/59 (!) 147/64   Pulse: 55 55 58 (!) 49   Resp: 16 16 16 15   Temp: 97.9 °F (36.6 °C) 98 °F (36.7 °C) 97.9 °F (36.6 °C) 97.9 °F (36.6 °C)   TempSrc: Oral Oral Oral Oral   SpO2: 93% 92% 95% 96%   Weight:  161 lb 4.8 oz (73.2 kg)     Height:         Weight: Weight: 161 lb 4.8 oz (73.2 kg)     24 hour intake/output:    Intake/Output Summary (Last 24 hours) at 8/6/2021 1610  Last data filed at 8/6/2021 1202  Gross per 24 hour   Intake 780 ml   Output 500 ml   Net 280 ml         Physical Exam  Vitals and nursing note reviewed. Constitutional:       Appearance: Normal appearance. HENT:      Head: Normocephalic and atraumatic. Nose: Nose normal.      Mouth/Throat:      Mouth: Mucous membranes are moist.      Pharynx: Oropharynx is clear. Eyes:      Extraocular Movements: Extraocular movements intact. Pupils: Pupils are equal, round, and reactive to light. Cardiovascular:      Rate and Rhythm: Normal rate and regular rhythm. Pulses: Normal pulses. Heart sounds: Normal heart sounds. Pulmonary:      Effort: Pulmonary effort is normal.      Breath sounds: Normal breath sounds. Abdominal:      General: Abdomen is flat. Bowel sounds are normal.   Musculoskeletal:         General: No signs of injury. Normal range of motion. Cervical back: Normal range of motion and neck supple.    Skin:     General: Skin is warm and dry. Capillary Refill: Capillary refill takes less than 2 seconds. Neurological:      General: No focal deficit present. Mental Status: He is alert and oriented to person, place, and time. Mental status is at baseline. Psychiatric:         Mood and Affect: Mood normal.         Behavior: Behavior normal.           Labs: For convenience and continuity at follow-up the following most recent labs are provided:      CBC:    Lab Results   Component Value Date    WBC 6.0 08/06/2021    HGB 8.6 08/06/2021    HCT 28.3 08/06/2021     08/06/2021       Renal:    Lab Results   Component Value Date     08/06/2021    K 3.7 08/06/2021     08/06/2021    CO2 21 08/06/2021    BUN 24 08/06/2021    CREATININE 1.6 08/06/2021    CALCIUM 8.3 08/06/2021    PHOS 2.6 01/26/2021         Significant Diagnostic Studies    Radiology:   US RENAL COMPLETE   Final Result       1. No hydronephrosis. 2. Chronic renal disease. 3. Right-sided simple renal cysts. 4. Prostatomegaly. **This report has been created using voice recognition software. It may contain minor errors which are inherent in voice recognition technology. **      Final report electronically signed by Dr. Tony Mckeon MD on 8/4/2021 3:04 PM      XR CHEST PORTABLE   Final Result   1. Cardiomegaly with mild perihilar edema. Small right pleural effusion. This document has been electronically signed by: Ashlyn Ferguson MD on    08/04/2021 05:17 AM             Consults:     IP CONSULT TO NEPHROLOGY  IP CONSULT TO SOCIAL WORK  IP CONSULT TO UROLOGY    Disposition:    [] Home       [] TCU       [] Rehab       [] Psych       [] SNF       [] [place]       [] Other-    Condition at Discharge: Stable    Code Status:  Limited     Patient Instructions:    Discharge lab work: BMP in 1 week  Activity: activity as tolerated  Diet: ADULT DIET; Regular;  Low Sodium (2 gm); 2000 ml      Follow-up visits:   No follow-up provider specified. Discharge Medications:      Nelwyn Rinne   Home Medication Instructions [id number]    Printed on:08/06/21 1610   Medication Information                      apixaban (ELIQUIS) 2.5 MG TABS tablet  Take 1 tablet by mouth 2 times daily             aspirin 81 MG EC tablet  Take 81 mg by mouth daily Indications: Anticoagulant Therapy Take with food. finasteride (PROSCAR) 5 MG tablet  Take 1 tablet by mouth daily             furosemide (LASIX) 20 MG tablet  Take 1 tablet by mouth daily             ipratropium-albuterol (DUONEB) 0.5-2.5 (3) MG/3ML SOLN nebulizer solution  Inhale 3 mLs into the lungs every 4 hours (while awake)             pantoprazole (PROTONIX) 40 MG tablet  Take 40 mg by mouth 2 times daily             potassium chloride (KLOR-CON M) 20 MEQ extended release tablet  Take 1 tablet by mouth daily             tamsulosin (FLOMAX) 0.4 MG capsule  Take 1 capsule by mouth daily Capsule may be opened and contents dissolve for peg tube                 Time Spent on discharge is more than 30 minutes in the examination, evaluation, counseling and review of medications and discharge plan. Signed: Thank you Tigre Blanchard DO for the opportunity to be involved in this patient's care.     Electronically signed by Jhon Mckoy MD on 8/6/2021 at 4:10 PM

## 2021-08-06 NOTE — CARE COORDINATION
DISCHARGE/PLANNING EVALUATION  8/6/21, 10:34 AM EDT    Reason for Referral: please assess for home/discharge needs  Mental Status: pt is alert and oriented   Decision Making: pt is capable of making own decisions, son Dr. Ana Lilia Mccullough: pt resides alone in a one story apartment. Pt reports being independent with personal care, cooking, light cleaning and driving. Pt is very active and goes to the [facility] several times a week. Current Services including food security, transportation and housekeeping: see above  Current Equipment:rolling walker, Life Alert Button  Payment Source:Medicare/[place]  Concerns or Barriers to Discharge: none noted  Post acute provider list with quality measures, geographic area and applicable managed care information provided. Questions regarding selection process answered:declined    Teach Back Method used with pt regarding care plan   Patient and son, Dr. Jodi Hill verbalize understanding of the plan of care and contribute to goal setting. Patient goals, treatment preferences and discharge plan: SW met with pt and spoke with son, Dr. Serenity Mahoney via phone. Discussed discharge needs. Both pt and son deny any need for equipment or home health.       Electronically signed by CASSI Pritchett on 8/6/2021 at 10:34 AM

## 2021-08-06 NOTE — PROGRESS NOTES
[facility]   Nephrology progress note  8/6/2021, 10:54 AM      Pt Name:    Melly Mireles  MRN:     [id number]     YOB: 1932  Admit Date:    8/4/2021  4:26 AM  Primary Care Physician:  Viktoriya Panchal DO   Room number  4A-17/686-N    Chief Complaint: Nephrology following for ANNIKA/CKD    Subjective:  Patient seen and examined  Seen and examined earlier today  Wants to go home  Has a Duarte catheter in place  No shortness of breath      Objective:  24HR INTAKE/OUTPUT:      Intake/Output Summary (Last 24 hours) at 8/6/2021 1054  Last data filed at 8/6/2021 0410  Gross per 24 hour   Intake 900 ml   Output 800 ml   Net 100 ml     I/O last 3 completed shifts: In: 900 [P.O.:900]  Out: 1300 [Urine:1300]  No intake/output data recorded. Admission weight: 160 lb (72.6 kg)  Wt Readings from Last 3 Encounters:   08/06/21 161 lb 4.8 oz (73.2 kg)   04/19/21 146 lb 6.4 oz (66.4 kg)   02/05/21 163 lb 1.6 oz (74 kg)     Body mass index is 26.84 kg/m².     Physical examination  VITALS:     Vitals:    08/05/21 2107 08/06/21 0010 08/06/21 0410 08/06/21 0815   BP: 130/68 (!) 119/54 (!) 156/69 (!) 124/59   Pulse: 51 55 55 58   Resp: 16 16 16 16   Temp: 97.6 °F (36.4 °C) 97.9 °F (36.6 °C) 98 °F (36.7 °C) 97.9 °F (36.6 °C)   TempSrc: Oral Oral Oral Oral   SpO2: 96% 93% 92% 95%   Weight:   161 lb 4.8 oz (73.2 kg)    Height:         General Appearance: alert and cooperative with exam, appears comfortable, no distress  Mouth/Throat: Oral mucosa moist  Neck: No JVD  Lungs: Air entry B/L, no rales, no use of accessory muscles  Heart:  S1, S2 heard  GI: soft, non-tender, no guarding  Extremities: improved edema      Lab Data  CBC:   Recent Labs     08/04/21  0446 08/05/21  0449 08/06/21  0509   WBC 7.3 6.2 6.0   HGB 8.8* 8.5* 8.6*   HCT 28.9* 27.0* 28.3*    201 197     BMP:  Recent Labs     08/04/21 0446 08/05/21  0449 08/06/21  0509    137 135   K 4.1 3.9 3.7    103 104   CO2 21* 23 21*   BUN 24* 25* 24*   CREATININE 1.6* 1.7* 1.6*   GLUCOSE 94 86 84   CALCIUM 8.8 8.5 8.3*     Hepatic:   Recent Labs     08/04/21  0446   LABALBU 4.2   AST 24   ALT 15   BILITOT 0.3   ALKPHOS 124         Meds:  Infusion:    sodium chloride       Meds:    neomycin-bacitracin-polymyxin   Topical BID    tamsulosin  0.4 mg Oral BID    finasteride  5 mg Oral Daily    apixaban  2.5 mg Oral BID    aspirin  81 mg Oral Daily    pantoprazole  40 mg Oral BID    [Held by provider] bumetanide  1 mg Intravenous BID    sodium chloride flush  5-40 mL Intravenous 2 times per day     Meds prn: sodium chloride flush, sodium chloride, ondansetron **OR** ondansetron, polyethylene glycol, acetaminophen **OR** acetaminophen       Impression and Plan:  1. ANNIKA in setting of urinary retention  Excellent urine output  Renal function overall stable in last 24 to 48 hours  Patient wants to go home  Urology recommending to keep Duarte in place  Okay to resume Lasix and potassium as outpatient    2. Urinary retention secondary to BPH. Status post Duarte decompression. Continue with Flomax. Urology input appreciated. Urology planning possible cystoscopy as outpatient depending on results of voiding trial  3. Mild lower extremity edema: Overall improved. Continue with Lasix home dose  4. History of BPH with LUTS  5. Chronic atrial fibrillation  6. History of CVA  7.   History of hypertension    D/W patient and hospitalist    Jerson Mix MD  [facility]

## 2021-08-06 NOTE — CARE COORDINATION
8/6/21, 1:38 PM EDT    Patient goals/plan/ treatment preferences discussed by  and . Patient goals/plan/ treatment preferences reviewed with patient/ family. Patient/ family verbalize understanding of discharge plan and are in agreement with goal/plan/treatment preferences. Understanding was demonstrated using the teach back method. AVS provided by RN at time of discharge, which includes all necessary medical information pertaining to the patients current course of illness, treatment, post-discharge goals of care, and treatment preferences. SW was advised of discharge leatha today and possible need for [facility] as pt will have a boswell. MAZIN spoke with pt and daughter-in-law Vito Siddiqi, discussed home bound status. Pt is not wanting to be hoe bound. Vito Siddiqi felt that they could be taught to care for the boswell until pts follow up appt next week. GERARDO Kendrick is aware, she will provide boswell care education to pt and Vito Siddiqi. No other needs identified at this time.

## 2021-08-06 NOTE — PROGRESS NOTES
[facility] 60  INPATIENT OCCUPATIONAL THERAPY  STRZ RENAL TELEMETRY 6K  EVALUATION    Time:   Time In: 3639  Time Out: 1116  Timed Code Treatment Minutes: 28 Minutes  Minutes: 36    Date: 2021  Patient Name: Bettina Mcmahon,   Gender: male      MRN: [id number]  : 3/30/1932  (80 y.o.)  Referring Practitioner: Jean Marie Do MD  Diagnosis: Pulmonary edema cardiac cause  Additional Pertinent Hx: Per ED note, \"pt is a 80 y.o. male who presents to the ED for evaluation of bilateral leg swelling up to his waist.  Patient states that he has had more swelling in the bilateral lower extremities over the last 24 hours. No chest pain. \"    Restrictions/Precautions:  Restrictions/Precautions: General Precautions, Fall Risk    Subjective  Chart Reviewed: Yes, Orders, History and Physical  Patient assessed for rehabilitation services?: Yes  Family / Caregiver Present: No    Subjective: RN approved OT session. Patient resting in recliner upon OT arrival, agreeable to participating in therapy. At end of session pt back in recliner with all needs met. Pain:  Pain Assessment  Patient Currently in Pain: Denies    Vitals: Oxygen: O2 WFL on RA    Social/Functional History:  Lives With: Alone  Type of Home: Apartment  Home Layout: One level  Home Access: Stairs to enter with rails  Entrance Stairs - Number of Steps: 2 small steps with rails  Home Equipment: Rolling walker, Alert Button   Bathroom Shower/Tub: Walk-in shower (reports has 2 benches but doesn't use)  Bathroom Toilet: Standard  Bathroom Equipment: Built-in shower seat       ADL Assistance: [address]: Independent  Homemaking Responsibilities: Yes  Ambulation Assistance: Independent  Transfer Assistance: Independent    Active : Yes     Additional Comments: Pt reports that he goes to the [facility] regularly and stays active. Local sons are very supportive.  Pt reports IND with all ADLs and IADLs with use of RW PTA.    VISION:Corrected - wears glasses at all times    HEARING:  Pt is very [place]    COGNITION: WFL    RANGE OF MOTION:  Bilateral Upper Extremity:  WFL    STRENGTH:  Bilateral Upper Extremity:  grossly deconditioned    SENSATION:   WFL    ADL:   Grooming: Supervision. to shave and brush teeth standing sinkside  Bathing: Stand By Assistance. for standing sponge bath sinkside. Upper Extremity Dressing: Stand By Assistance. hospital gown. **Patient with total standing endurance >10 minutes for grooming and bathing tasks. BALANCE:  Sitting Balance:  Modified Independent. Standing Balance: Supervision. BED MOBILITY:  Not Tested    TRANSFERS:  Sit to Stand:  Contact Guard Assistance. from recliner  Stand to Sit: Stand By Assistance. FUNCTIONAL MOBILITY:  Assistive Device: Rolling Walker  Assist Level:  Stand By Assistance. Distance: to/from bathroom and on unit  **Patient with steady pace, requiring one verbal cue to keep walker close as pt extending beyond PAMELA with pt able to correct and maintain throughout rest of mobility. Pt with slightly decreased endurance with min cues for rest breaks, pt reports feeling fine and continues ambulation. Exercise:  Patient completed BUE strengthening exercises with skilled education on HEP: completed x10 reps x1 set with a 1# weight in all joints and all planes in order to improve UE strength and activity tolerance required for BADL routine and toilet / shower transfers. Patient tolerated well, requiring brief rest breaks. Patient also required min cues for technique. Activity Tolerance:  Patient tolerance of  treatment: good. Patient with min decreased endurance and min cues required for rest breaks. Assessment:  Assessment: Patient presents with the following performance deficits resulting in overall decreased safety and independence in self care, transfer, and mobility tasks.  Patient appears to be functioning close to baseline function but would benefit from skilled OT services throughout admission in prep for safe and independent discharge in home environment. Performance deficits / Impairments: Decreased functional mobility , Decreased ADL status, Decreased balance, Decreased endurance  Prognosis: Good  REQUIRES OT FOLLOW UP: Yes    Treatment Initiated: Treatment and education initiated within context of evaluation. Evaluation time included review of current medical information, gathering information related to past medical, social and functional history, completion of standardized testing, formal and informal observation of tasks, assessment of data and development of plan of care and goals. Treatment time included skilled education and facilitation of tasks to increase safety and independence with ADL's for improved functional independence and quality of life. Discharge Recommendations:  Home independently, Home with assist PRN    Patient Education:  OT Education: OT Role, Plan of Care    Equipment Recommendations:  Equipment Needed: No    Plan:  Times per week: 5x  Current Treatment Recommendations: Functional Mobility Training, Endurance Training, Self-Care / ADL, Strengthening. See long-term goal time frame for expected duration of plan of care. If no long-term goals established, a short length of stay is anticipated. Goals:  Patient goals : did not state  Short term goals  Time Frame for Short term goals: By discharge  Short term goal 1: Patient to increase activity tolerance for [facility] and community distances with Marlon to increase indep in home environment. Short term goal 2: Patient to complete BADL routine with Marlon and good safety awareness to increase indep in ADL completion. Short term goal 3: Patient to tolerate dynamic standing task >10 minutes with 2UE release and Marlon to increase indep in sinkside grooming tasks.   Short term goal 4: Patient to complete BUE light resistive HEP with min cues for technique to increase strength and endurance needed for ADL participation. Following session, patient left in safe position with all fall risk precautions in place.

## 2021-08-06 NOTE — PROGRESS NOTES
Urology Progress Note    Reason for Consult:  \"BPH causing ANNIKA requiring catheter\"  Requesting Physician:  Dr. Rowan Harrell     History Obtained From:  Patient, electronic medical record     Chief Complaint: Bilateral lower extremity swelling. Subjective: \"    Patient is sitting up in chair, voiding yellow urine, +flatus, +BM, ambulating with assistance, tolerating regular diet, denies any nausea or vomiting. There are complaints of no pain at this time. Duarte draining 1300 UOP          Vitals:  BP (!) 147/64   Pulse (!) 49   Temp 97.9 °F (36.6 °C) (Oral)   Resp 15   Ht 5' 5\" (1.651 m)   Wt 161 lb 4.8 oz (73.2 kg)   SpO2 96%   BMI 26.84 kg/m²   Temp  Av.9 °F (36.6 °C)  Min: 97.6 °F (36.4 °C)  Max: 98 °F (36.7 °C)    Intake/Output Summary (Last 24 hours) at 2021 1617  Last data filed at 2021 1202  Gross per 24 hour   Intake 780 ml   Output 500 ml   Net 280 ml       Social History     Socioeconomic History    Marital status:      Spouse name: Not on file    Number of children: 3    Years of education: Not on file    Highest education level: Not on file   Occupational History    Not on file   Tobacco Use    Smoking status: Former Smoker     Quit date: 1980     Years since quittin.6    Smokeless tobacco: Never Used   Vaping Use    Vaping Use: Never used   Substance and Sexual Activity    Alcohol use: Yes     Alcohol/week: 1.0 standard drinks     Types: 1 Cans of beer per week     Comment: occasionally    Drug use: No    Sexual activity: Not on file   Other Topics Concern    Not on file   Social History Narrative    Not on file     Social Determinants of Health     Financial Resource Strain:     Difficulty of Paying Living Expenses:    Food Insecurity:     Worried About Running Out of Food in the Last Year:     [address] in the Last Year:    Transportation Needs:     Lack of Transportation (Medical):      Lack of Transportation (Non-Medical):    Physical Activity:     Days of Exercise per Week:     Minutes of Exercise per Session:    Stress:     Feeling of Stress :    Social Connections:     Frequency of Communication with Friends and Family:     Frequency of Social Gatherings with Friends and Family:     Attends [employer] Services:     Active Member of Clubs or Organizations:     Attends Club or Organization Meetings:     Marital Status:    Intimate Partner Violence:     Fear of Current or Ex-Partner:     Emotionally Abused:     Physically Abused:     Sexually Abused:      Family History   Problem Relation Age of Onset    Cancer Mother      No Known Allergies      Constitutional: Alert and oriented times x3, no acute distress, and cooperative to examination with appropriate mood and affect. HEENT:   Head:               Normocephalic and atraumatic. Mouth/Throat:                Mucous membranes are normal.   Eyes:               EOM are normal. No scleral icterus. Nose:               The external appearance of the nose is normal  Ears: The ears appear normal to external inspection. Telida  Cardiovascular:       Normal rate, regular rhythm. Pulmonary/Chest:  Normal respiratory rate and rhthym. No use of accessory muscles. Lungs clear bilaterally with diminished bases  Abdominal:               Soft. No tenderness. Active bowel sounds             Genitalia:   uncircumcised penis. Foreskin retractable  Urethral meatus is normal in size and location with minimal surrounding redness  Duarte draining clear light yellow urine  Scrotal swelling   Musculoskeletal:               Normal range of motion. He exhibits no edema or tenderness of lower extremities. Extremities:               No cyanosis, clubbing, or edema present. Neurological:               Alert and oriented.    Labs:  WBC:    Lab Results   Component Value Date    WBC 6.0 08/06/2021     Hemoglobin/Hematocrit:    Lab Results   Component Value Date    HGB 8.6 08/06/2021    HCT 28.3 08/06/2021     BMP:    Lab Results   Component Value Date     08/06/2021    K 3.7 08/06/2021     08/06/2021    CO2 21 08/06/2021    BUN 24 08/06/2021    LABALBU 4.2 08/04/2021    CREATININE 1.6 08/06/2021    CALCIUM 8.3 08/06/2021    LABGLOM 41 08/06/2021           Impression:  BPH with urinary retention  ANNIKA on CKD IIIa  R renal cysts  Lower extremity edema  Acute on chronic hf  Elevated troponin, chronic  Chronic Afib  Hx CVA, CAD s/p CABG, GERD      Plan:  CRT 1.6  Renal US without hydronephrosis, with enlarged prostate  Continue boswell catheter at least until creatinine nadirs.     Continue Flomax and Finasteride  Consider cystoscopy in follow-up if pt and family wish to proceed and pending results of voiding trial.      ROBBIE Hines - CNP, ROBBIE  08/06/21 4:17 PM  Urology

## 2021-08-06 NOTE — ED PROVIDER NOTES
[facility] [age] COMPLAINT       Chief Complaint   Patient presents with    Urinary Catheter Problem       Nurses Notes reviewed and I agree except as notedin the HPI. HISTORY OF PRESENT ILLNESS    Kesha Daly is a 80 y.o. male who presents was just discharged an hour ago from the hospital with a Duarte catheter. He states he been in fracture 2 days. He does have an enlarged prostate. It looks like his leaking around the insertion site the catheter is draining appropriately. There is no blood in the tubing. There is no issues with the tubing itself. REVIEW OF SYSTEMS     Review of Systems   Constitutional: Negative for chills and fever. HENT: Negative for congestion, ear pain, sinus pressure, sinus pain and sore throat. Eyes: Negative for pain. Respiratory: Negative for cough and shortness of breath. Cardiovascular: Negative for chest pain and leg swelling. Gastrointestinal: Negative for abdominal distention, abdominal pain, blood in stool, constipation, diarrhea, nausea and vomiting. Genitourinary: Negative for difficulty urinating, frequency and hematuria. Musculoskeletal: Negative for arthralgias. Leaking around Duarte catheter   Skin: Negative for rash. Neurological: Negative for dizziness and headaches. All other systems reviewed and are negative. PAST MEDICAL HISTORY    has a past medical history of Actinic keratoses, Adenomatous polyp, Atrial fibrillation (HCC), CAD (coronary artery disease), Carotid artery disease ([address] Hyperlipidemia, Hypertension, Inguinal hernia, left, and Unspecified cerebral artery occlusion with cerebral infarction. SURGICAL HISTORY      has a past surgical history that includes Cataract removal (2004); Coronary artery bypass graft (2009); Colonoscopy; Abdominal adhesion surgery (6/2012); Small intestine surgery; Small intestine surgery (02/20/2015);  Cardiac surgery; Abdomen surgery; Upper gastrointestinal endoscopy (N/A, 2021); eye surgery; vascular surgery; and Endoscopy, colon, diagnostic. CURRENT MEDICATIONS       Discharge Medication List as of 2021  6:15 PM      CONTINUE these medications which have NOT CHANGED    Details   apixaban (ELIQUIS) 2.5 MG TABS tablet Take 1 tablet by mouth 2 times daily, Disp-60 tablet, R-1Normal      finasteride (PROSCAR) 5 MG tablet Take 1 tablet by mouth daily, Disp-30 tablet, R-3Normal      pantoprazole (PROTONIX) 40 MG tablet Take 40 mg by mouth 2 times dailyHistorical Med      potassium chloride (KLOR-CON M) 20 MEQ extended release tablet Take 1 tablet by mouth daily, Disp-60 tablet, R-5Historical Med      ipratropium-albuterol (DUONEB) 0.5-2.5 (3) MG/3ML SOLN nebulizer solution Inhale 3 mLs into the lungs every 4 hours (while awake), Disp-360 mL, R-1DC to SNF      tamsulosin (FLOMAX) 0.4 MG capsule Take 1 capsule by mouth daily Capsule may be opened and contents dissolve for peg tube, Disp-30 capsule, R-3DC to SNF      furosemide (LASIX) 20 MG tablet Take 1 tablet by mouth daily, Disp-30 tablet, R-2Normal      aspirin 81 MG EC tablet Take 81 mg by mouth daily Indications: Anticoagulant Therapy Take with food. Historical Med             ALLERGIES     has No Known Allergies. HISTORY     He indicated that his mother is . He indicated that his father is . family history includes Cancer in his mother. SOCIALHISTORY      reports that he quit smoking about 41 years ago. He has never used smokeless tobacco. He reports current alcohol use of about 1.0 standard drinks of alcohol per week. He reports that he does not use drugs. PHYSICAL EXAM     INITIAL VITALS:  blood pressure is 132/68 and his pulse is 65. His respiration is 20 and oxygen saturation is 96%. Physical Exam  Vitals and nursing note reviewed. Constitutional:       Comments:  Well Developed Well Nourished Appearing     HENT:      Head: Normocephalic and atraumatic. Eyes:      Pupils: Pupils are equal, round, and reactive to light. Cardiovascular:      Rate and Rhythm: Normal rate and regular rhythm. Heart sounds: Normal heart sounds. Pulmonary:      Effort: Pulmonary effort is normal. No respiratory distress. Breath sounds: Normal breath sounds. No wheezing. Abdominal:      General: Bowel sounds are normal. There is no distension. Palpations: Abdomen is soft. Genitourinary:     Comments: Leaking around Duarte catheter. Musculoskeletal:      Cervical back: Normal range of motion and neck supple. DIFFERENTIAL DIAGNOSIS:   Leaking around Duarte catheter and my suspicion is that the catheter size is likely small. We will switch now to a 16    DIAGNOSTIC RESULTS     EKG: All EKG's are interpreted by the Emergency Department Physician who either signs or Co-signs this chart in the absence of a cardiologist.      RADIOLOGY: non-plain film images(s) such as CT, Ultrasound and MRI are read by the radiologist.  None      LABS:   Labs Reviewed - No data to display    EMERGENCY DEPARTMENT COURSE:   :    Vitals:    08/06/21 1724 08/06/21 1743   BP:  132/68   Pulse: 65    Resp: 20    SpO2: 94% 96%     Patient was seen history physical exam was performed. Catheter was switched per the nursing staff. We did reassess him afterwards and is no longer leaking. Patient doing well. Patient will be discharged home. See disposition below    CRITICAL CARE:  None    CONSULTS:  None    PROCEDURES:  None    FINAL IMPRESSION      1. Malfunction of Duarte catheter, initial encounter [facility])          DISPOSITION/PLAN   Discharge    PATIENT REFERRED TO:  STEVAN SIMMONS II.[facility]  [address]  [facility]  [address] [place] [address]  [phone]  In 2 days        DISCHARGE MEDICATIONS:  Discharge Medication List as of 8/6/2021  6:15 PM          (Please note that portions of this note were completed with a voice recognitionprogram.  Efforts were made to edit the dictations but occasionally words are mis-transcribed.)    Andrew Eason, [address], [place]  08/07/21 0616

## 2021-08-06 NOTE — PROGRESS NOTES
[facility] 83  INPATIENT PHYSICAL THERAPY  EVALUATION  STRZ RENAL TELEMETRY 6K - 6K-23/023-A    Time In: 7520  Time Out: 0940  Timed Code Treatment Minutes: 15 Minutes  Minutes: 28          Date: 2021  Patient Name: Kike Jacinto,  Gender:  male        MRN: [id number]  : 3/30/1932  (80 y.o.)      Referring Practitioner: Shannon Bridges MD  Diagnosis: Pulmonary edema cardiac cause  Additional Pertinent Hx: Per ED note, pt is a 80 y.o. male who presents to the ED for evaluation of bilateral leg swelling up to his waist.  Patient states that he has had more swelling in the bilateral lower extremities over the last 24 hours. No chest pain. Restrictions/Precautions:  Restrictions/Precautions: General Precautions, Fall Risk    Subjective:  Chart Reviewed: Yes  Patient assessed for rehabilitation services?: Yes  Subjective: Pt resting in bed and agrees to therapy. General:  Overall Orientation Status: Within Normal Limits    Vision: Impaired  Vision Exceptions: Wears glasses at all times    Hearing: Exceptions to [facility]  Hearing Exceptions: Hard of hearing/hearing concerns         Pain: denies      Vitals: Vitals not assessed per clinical judgement, see nursing flowsheet    Social/Functional History:    Lives With: Alone  Type of Home: Apartment  Home Layout: One level  Home Access: Stairs to enter with rails  Entrance Stairs - Number of Steps: 2 small steps with rails  Home Equipment: Rolling walker, Alert Button                   Ambulation Assistance: Independent  Transfer Assistance: Independent    Active :  Yes     Additional Comments: Pt reports that he goes to the [facility] regularly and stays active    OBJECTIVE:  Range of Motion:  Bilateral Lower Extremity: WFL    Strength:  Bilateral Lower Extremity: WFL    Balance:  Static Sitting Balance:  Modified Independent  Dynamic Sitting Balance: Modified Independent  Static Standing Balance: Supervision  Dynamic Standing Balance: Stand By Assistance    Bed Mobility:  Supine to Sit: Supervision    Transfers:  Sit to Stand: Stand By Assistance  Stand to Sit:Stand By Assistance    Ambulation:  Stand By Assistance  Distance: 240 ft  Surface: Level Tile  Device:Rolling Walker  Gait Deviations:  Decreased Step Length Bilaterally and Decreased Gait Speed    Exercise:  Patient was guided in 1 set(s) 15 reps of exercise to both lower extremities. Ankle pumps, Glut sets, Quad sets, Hip abduction/adduction, Seated marches and Long arc quads. Exercises were completed for increased independence with functional mobility. Functional Outcome Measures: Completed  AM-PAC Inpatient Mobility Raw Score : 19  AM-PAC Inpatient T-Scale Score : 45.44    ASSESSMENT:  Activity Tolerance:  Patient tolerance of  treatment: good. With an occasional rest break. Treatment Initiated: Treatment and education initiated within context of evaluation. Evaluation time included review of current medical information, gathering information related to past medical, social and functional history, completion of standardized testing, formal and informal observation of tasks, assessment of data and development of plan of care and goals. Treatment time included skilled education and facilitation of tasks to increase safety and independence with functional mobility for improved independence and quality of life. Assessment: Body structures, Functions, Activity limitations: Decreased functional mobility , Decreased endurance, Decreased strength  Assessment: Pt is a pleasant 81 yo male that is generally active and lives alone. Pt participated well with session and is moving with SBA and using RW. Pt did have some mild SOB noted. Pt would benefit from continued skilled PT to address endurance building and functional mobility.   Prognosis: Excellent    REQUIRES PT FOLLOW UP: Yes    Discharge Recommendations:  Discharge Recommendations: Continue to assess pending progress, Home independently    Patient Education:  PT Education: Goals, PT Role, Plan of Care, Home Exercise Program    Equipment Recommendations:  Equipment Needed: No    Plan:  Times per week: 5x GM  Current Treatment Recommendations: Strengthening, Home Exercise Program, Safety Education & Training, Endurance Training, Functional Mobility Training, Gait Training, Patient/Caregiver Education & Training    Goals:  Patient goals : go home  Short term goals  Time Frame for Short term goals: at discharge  Short term goal 1: Pt to be Mod I for supine <> sit to get in/out of bed  Short term goal 2: Pt to be Mod I for sit <> stand to get up to ambulate  Short term goal 3: Pt to ambulate > 250 ft with RW with Supervision to Mod I for household and community distances  Long term goals  Time Frame for Long term goals : not set due to short ELOS    Following session, patient left in safe position with all fall risk precautions in place. Kumar Rashid.  Travon Lynn, [place] [address]

## 2021-08-08 PROBLEM — I50.9 ACUTE ON CHRONIC CONGESTIVE HEART FAILURE (HCC): Status: ACTIVE | Noted: 2021-08-05

## 2021-08-09 ENCOUNTER — TELEPHONE (OUTPATIENT)
Dept: UROLOGY | Age: 86
End: 2021-08-09

## 2021-08-09 NOTE — TELEPHONE ENCOUNTER
Perfect serve sent to Dr. Alexandria Saenz MRN# [id number] (Dr. Lau [place] father) was at the ER on 8/6/21 for boswell catheter problems this is Gianni's discharge plan. Vickie Doyle Plan:  CRT 1.6  Renal US without hydronephrosis, with enlarged prostate  Continue boswell catheter at least until creatinine nadirs.    Continue Flomax and Finasteride  Consider cystoscopy in follow-up if pt and family wish to proceed and pending results of voiding trial.      Dr. Giorgi Norris called today and wants to know when the boswell can be removed and a follow up plan.      please advise thank you   [facility] urology

## 2021-08-10 ENCOUNTER — PROCEDURE VISIT (OUTPATIENT)
Dept: UROLOGY | Age: 86
End: 2021-08-10
Payer: OTHER GOVERNMENT

## 2021-08-10 VITALS
SYSTOLIC BLOOD PRESSURE: 144 MMHG | DIASTOLIC BLOOD PRESSURE: 68 MMHG | BODY MASS INDEX: 32.2 KG/M2 | WEIGHT: 164 LBS | HEIGHT: 60 IN

## 2021-08-10 DIAGNOSIS — N13.8 BPH WITH URINARY OBSTRUCTION: Primary | ICD-10-CM

## 2021-08-10 DIAGNOSIS — N40.1 BPH WITH URINARY OBSTRUCTION: Primary | ICD-10-CM

## 2021-08-10 PROCEDURE — 52000 CYSTOURETHROSCOPY: CPT | Performed by: UROLOGY

## 2021-08-10 RX ORDER — CEPHALEXIN 500 MG/1
500 CAPSULE ORAL 3 TIMES DAILY
Qty: 9 CAPSULE | Refills: 0 | Status: SHIPPED | OUTPATIENT
Start: 2021-08-10 | End: 2021-08-13

## 2021-08-10 NOTE — PROGRESS NOTES
Cystoscopy Operative Note    Patient:  Richy Proctor  MRN: [id number]  YOB: 1932    Date: 08/10/21  Surgeon: Gomez Jauregui MD  Anesthesia: Urethral 2% Xylocaine   Indications: BPH  Position: Supine    Findings:   The patient was prepped and draped in the usual sterile fashion. The flexible cystoscope was advanced through the urethra and into the bladder. The bladder was thoroughly inspected and the following was noted:    Residual Urine: Moderate  Urethra: No abnormalities of the urethra are noted. Prostate: lateral lobe hypertrophy++  Bladder: No tumors or CIS noted. No bladder diverticulum. moderate trabeculation noted. Ureters: Clear efflux from both ureters. Orifices with normal configuration and location. The cystoscope was removed. The patient tolerated the procedure well. Voided on his own  Follow up in a few weeks for telephone visit with son.   greenlight information given  Will give empiric abx

## 2021-08-10 NOTE — PROGRESS NOTES
30 cc of water deflated from boswell balloon. 18 Fr boswell removed without difficulty prior to cysto.      Foreskin reduced back down? yes

## 2021-08-18 DIAGNOSIS — N13.8 BPH WITH URINARY OBSTRUCTION: ICD-10-CM

## 2021-08-18 DIAGNOSIS — N40.1 BPH WITH URINARY OBSTRUCTION: ICD-10-CM

## 2021-08-18 DIAGNOSIS — I48.91 ATRIAL FIBRILLATION WITH SLOW VENTRICULAR RESPONSE (HCC): Primary | ICD-10-CM

## 2021-08-18 RX ORDER — FINASTERIDE 5 MG/1
5 TABLET, FILM COATED ORAL DAILY
Qty: 30 TABLET | Refills: 3 | Status: SHIPPED | OUTPATIENT
Start: 2021-08-18 | End: 2021-08-18

## 2021-08-18 RX ORDER — FINASTERIDE 5 MG/1
5 TABLET, FILM COATED ORAL DAILY
Qty: 30 TABLET | Refills: 3 | Status: SHIPPED | OUTPATIENT
Start: 2021-08-18

## 2021-08-31 ENCOUNTER — VIRTUAL VISIT (OUTPATIENT)
Dept: UROLOGY | Age: 86
End: 2021-08-31

## 2021-08-31 DIAGNOSIS — N40.1 BPH WITH URINARY OBSTRUCTION: Primary | ICD-10-CM

## 2021-08-31 DIAGNOSIS — N13.8 BPH WITH URINARY OBSTRUCTION: Primary | ICD-10-CM

## 2021-08-31 PROCEDURE — 99024 POSTOP FOLLOW-UP VISIT: CPT | Performed by: UROLOGY

## 2021-08-31 NOTE — PROGRESS NOTES
Olga Zamorano is a 80 y.o. male evaluated via telephone on 8/31/2021. Consent:  He and/or health care decision maker is aware that that he may receive a bill for this telephone service, depending on his insurance coverage, and has provided verbal consent to proceed: Yes      Documentation:  I communicated with the patient and/or health care decision maker about BPH. Details of this discussion including any medical advice provided:     bph  Spoke with son   Voiding well without flomax  Follow up in three months with PVR      I affirm this is a Patient Initiated Episode with a Patient who has not had a related appointment within my department in the past 7 days or scheduled within the next 24 hours. Patient identification was verified at the start of the visit: Yes    Total Time: minutes: <5 minutes (not billable)    The visit was conducted pursuant to the emergency declaration under the [facility], [address] [place] [address] authority and the [employer] [employer] [place] Act. Patient identification was verified, and a caregiver was present when appropriate. The patient was located in a state where the provider was credentialed to provide care.     Note: not billable if this call serves to triage the patient into an appointment for the relevant concern      Josefina Coy MD

## 2021-10-12 LAB
BASOPHILS ABSOLUTE: ABNORMAL
BASOPHILS RELATIVE PERCENT: ABNORMAL
BUN BLDV-MCNC: 21 MG/DL
CALCIUM SERPL-MCNC: 9 MG/DL
CHLORIDE BLD-SCNC: 108 MMOL/L
CO2: 23 MMOL/L
CREAT SERPL-MCNC: 1.7 MG/DL
EOSINOPHILS ABSOLUTE: ABNORMAL
EOSINOPHILS RELATIVE PERCENT: ABNORMAL
GFR CALCULATED: 38.7
GLUCOSE BLD-MCNC: 121 MG/DL
HCT VFR BLD CALC: 26.2 % (ref 41–53)
HEMOGLOBIN: 8.4 G/DL (ref 13.5–17.5)
LYMPHOCYTES ABSOLUTE: ABNORMAL
LYMPHOCYTES RELATIVE PERCENT: ABNORMAL
MCH RBC QN AUTO: ABNORMAL PG
MCHC RBC AUTO-ENTMCNC: ABNORMAL G/DL
MCV RBC AUTO: ABNORMAL FL
MONOCYTES ABSOLUTE: ABNORMAL
MONOCYTES RELATIVE PERCENT: ABNORMAL
NEUTROPHILS ABSOLUTE: ABNORMAL
NEUTROPHILS RELATIVE PERCENT: ABNORMAL
PLATELET # BLD: 257 K/ΜL
PMV BLD AUTO: ABNORMAL FL
POTASSIUM SERPL-SCNC: 3.9 MMOL/L
RBC # BLD: 3.37 10^6/ΜL
SODIUM BLD-SCNC: 141 MMOL/L
WBC # BLD: 5.9 10^3/ML

## 2021-10-13 NOTE — ED NOTES
Pt transported to [address], EMT-P  12/09/20 8178 Quality 226: Preventive Care And Screening: Tobacco Use: Screening And Cessation Intervention: Patient screened for tobacco use and is an ex/non-smoker Detail Level: Simple Quality 431: Preventive Care And Screening: Unhealthy Alcohol Use - Screening: Patient screened for unhealthy alcohol use using a single question and scores less than 2 times per year Quality 130: Documentation Of Current Medications In The Medical Record: Current Medications Documented

## 2021-10-18 ENCOUNTER — OFFICE VISIT (OUTPATIENT)
Dept: NEPHROLOGY | Age: 86
End: 2021-10-18
Payer: MEDICARE

## 2021-10-18 VITALS
HEART RATE: 63 BPM | WEIGHT: 158 LBS | OXYGEN SATURATION: 96 % | DIASTOLIC BLOOD PRESSURE: 47 MMHG | SYSTOLIC BLOOD PRESSURE: 142 MMHG | BODY MASS INDEX: 30.86 KG/M2 | TEMPERATURE: 97.8 F

## 2021-10-18 DIAGNOSIS — N18.31 ANEMIA IN STAGE 3A CHRONIC KIDNEY DISEASE (HCC): ICD-10-CM

## 2021-10-18 DIAGNOSIS — N18.31 STAGE 3A CHRONIC KIDNEY DISEASE (HCC): Primary | ICD-10-CM

## 2021-10-18 DIAGNOSIS — R60.9 PERIPHERAL EDEMA: ICD-10-CM

## 2021-10-18 DIAGNOSIS — D63.1 ANEMIA IN STAGE 3A CHRONIC KIDNEY DISEASE (HCC): ICD-10-CM

## 2021-10-18 PROCEDURE — 4040F PNEUMOC VAC/ADMIN/RCVD: CPT | Performed by: INTERNAL MEDICINE

## 2021-10-18 PROCEDURE — G8417 CALC BMI ABV UP PARAM F/U: HCPCS | Performed by: INTERNAL MEDICINE

## 2021-10-18 PROCEDURE — G8427 DOCREV CUR MEDS BY ELIG CLIN: HCPCS | Performed by: INTERNAL MEDICINE

## 2021-10-18 PROCEDURE — 1036F TOBACCO NON-USER: CPT | Performed by: INTERNAL MEDICINE

## 2021-10-18 PROCEDURE — G8484 FLU IMMUNIZE NO ADMIN: HCPCS | Performed by: INTERNAL MEDICINE

## 2021-10-18 PROCEDURE — 1123F ACP DISCUSS/DSCN MKR DOCD: CPT | Performed by: INTERNAL MEDICINE

## 2021-10-18 PROCEDURE — 99213 OFFICE O/P EST LOW 20 MIN: CPT | Performed by: INTERNAL MEDICINE

## 2021-10-18 RX ORDER — FUROSEMIDE 40 MG/1
40 TABLET ORAL DAILY
Qty: 90 TABLET | Refills: 1 | Status: SHIPPED | OUTPATIENT
Start: 2021-10-18 | End: 2022-04-21 | Stop reason: DRUGHIGH

## 2021-10-18 NOTE — PROGRESS NOTES
[address] [facility]  [address]  [facility] [address]  Dept: [phone]  Loc: [phone]  Office Progress Note  10/18/2021 2:12 PM      Pt Name:    Maliha Stephane:    3/30/1932  Primary Care Physician:  Natalia Redmond DO     Chief Complaint:   Chief Complaint   Patient presents with    CKD and B/L leg swelling        Background Information/Interval History:   81 yo with hx CAD, HTN, hx CVA, A. Fibrillation who was recently in hospital s/p fall and rhabdomyolysis. Recently admitted in Aug 2021 with urinary retention. Following with urology. No urinary catheter at this time. He has some B/L edema. Denies any shortness of breath. BP at home is in 120-130s. Following with urology. Hard of hearing. Daughter-in-law accompanied him to this visit. Patient reports okay appetite. No hematuria.       Past History:  Past Medical History:   Diagnosis Date    Actinic keratoses 8/21/2013    Adenomatous polyp 2008    Atrial fibrillation ([facility]     CAD (coronary artery disease) 2009    Carotid artery disease ([facility] 2009    left    Hyperlipidemia 2004    Hypertension 2004    Inguinal hernia, left 2/19/2014    Unspecified cerebral artery occlusion with cerebral infarction      Past Surgical History:   Procedure Laterality Date    ABDOMEN SURGERY      ABDOMINAL ADHESION SURGERY  6/2012    Dr Kenny Cervantes  2004    right    [facility]ONOSCOPY      [facility]    CORONARY ARTERY BYPASS GRAFT  2009    ENDOSCOPY, COLON, DIAGNOSTIC      EYE SURGERY      SMALL INTESTINE SURGERY      SMALL INTESTINE SURGERY  02/20/2015    Exploratory Laparotomy    UPPER GASTROINTESTINAL ENDOSCOPY N/A 1/26/2021    EGD ESOPHAGOGASTRODUODENOSCOPY PEG TUBE INSERTION performed by Gracie Izquierdo MD at [email or web address]    VASCULAR SURGERY      cabg harvests from right leg        VITALS:  BP (!) 142/47 (Site: Left Upper increase lasix 40 mg daily, continue with K-dur 20 meq po daily and repeat BMP in 2 weeks. If no improvement then pls call me. May need to increase diuretics further. 3. Anemia in CKD: check iron studies in 2 weeks. 4. HTN: no longer taking losartan at this time. BP is relatively stable. 5. Recent Covid 19: doing well. On room air. 6. BPH: seeing urology  7. Hyponatremia: resolved    D/W daughter in law  Increase lasix 40 mg daily  Repeat BMP and iron studies in 2 weeks    Orders Placed This Encounter   Procedures    Basic Metabolic Panel    Iron Binding Capacity    Ferritin    IRON SATURATION    Iron    Basic Metabolic Panel     Return in about 2 months (around 12/18/2021).       Zeb Phipps MD  [facility]

## 2021-11-01 LAB
IRON: 38
TOTAL IRON BINDING CAPACITY: 433

## 2021-11-17 ENCOUNTER — TELEPHONE (OUTPATIENT)
Dept: NEPHROLOGY | Age: 86
End: 2021-11-17

## 2021-11-17 NOTE — TELEPHONE ENCOUNTER
I called the [place] yesterday and they are supposed to be faxing all labs that he did on 11/1/21.

## 2021-11-19 ENCOUNTER — NURSE ONLY (OUTPATIENT)
Dept: LAB | Age: 86
End: 2021-11-19

## 2021-11-19 DIAGNOSIS — N18.31 STAGE 3A CHRONIC KIDNEY DISEASE (HCC): ICD-10-CM

## 2021-11-19 DIAGNOSIS — D63.1 ANEMIA IN STAGE 3A CHRONIC KIDNEY DISEASE (HCC): ICD-10-CM

## 2021-11-19 DIAGNOSIS — R60.9 PERIPHERAL EDEMA: ICD-10-CM

## 2021-11-19 DIAGNOSIS — N18.31 ANEMIA IN STAGE 3A CHRONIC KIDNEY DISEASE (HCC): ICD-10-CM

## 2021-11-19 LAB
ANION GAP SERPL CALCULATED.3IONS-SCNC: 14 MEQ/L (ref 8–16)
BUN BLDV-MCNC: 39 MG/DL (ref 7–22)
CALCIUM SERPL-MCNC: 9.1 MG/DL (ref 8.5–10.5)
CHLORIDE BLD-SCNC: 104 MEQ/L (ref 98–111)
CO2: 23 MEQ/L (ref 23–33)
CREAT SERPL-MCNC: 1.7 MG/DL (ref 0.4–1.2)
GFR SERPL CREATININE-BSD FRML MDRD: 38 ML/MIN/1.73M2
GLUCOSE BLD-MCNC: 80 MG/DL (ref 70–108)
POTASSIUM SERPL-SCNC: 4.6 MEQ/L (ref 3.5–5.2)
SODIUM BLD-SCNC: 141 MEQ/L (ref 135–145)

## 2021-12-07 ENCOUNTER — OFFICE VISIT (OUTPATIENT)
Dept: UROLOGY | Age: 86
End: 2021-12-07
Payer: MEDICARE

## 2021-12-07 VITALS
HEART RATE: 55 BPM | DIASTOLIC BLOOD PRESSURE: 62 MMHG | BODY MASS INDEX: 31.8 KG/M2 | WEIGHT: 162 LBS | HEIGHT: 60 IN | SYSTOLIC BLOOD PRESSURE: 114 MMHG

## 2021-12-07 DIAGNOSIS — N13.8 BPH WITH URINARY OBSTRUCTION: Primary | ICD-10-CM

## 2021-12-07 DIAGNOSIS — N40.1 BPH WITH URINARY OBSTRUCTION: Primary | ICD-10-CM

## 2021-12-07 LAB
BILIRUBIN URINE: NEGATIVE
BLOOD URINE, POC: NEGATIVE
CHARACTER, URINE: CLEAR
COLOR, URINE: YELLOW
GLUCOSE URINE: NEGATIVE MG/DL
KETONES, URINE: NEGATIVE
LEUKOCYTE CLUMPS, URINE: NEGATIVE
NITRITE, URINE: NEGATIVE
PH, URINE: 5 (ref 5–9)
POST VOID RESIDUAL (PVR): 27 ML
PROTEIN, URINE: NEGATIVE MG/DL
SPECIFIC GRAVITY, URINE: >= 1.03 (ref 1–1.03)
UROBILINOGEN, URINE: 0.2 EU/DL (ref 0–1)

## 2021-12-07 PROCEDURE — 1123F ACP DISCUSS/DSCN MKR DOCD: CPT | Performed by: UROLOGY

## 2021-12-07 PROCEDURE — G8417 CALC BMI ABV UP PARAM F/U: HCPCS | Performed by: UROLOGY

## 2021-12-07 PROCEDURE — G8427 DOCREV CUR MEDS BY ELIG CLIN: HCPCS | Performed by: UROLOGY

## 2021-12-07 PROCEDURE — 4040F PNEUMOC VAC/ADMIN/RCVD: CPT | Performed by: UROLOGY

## 2021-12-07 PROCEDURE — G8484 FLU IMMUNIZE NO ADMIN: HCPCS | Performed by: UROLOGY

## 2021-12-07 PROCEDURE — 81003 URINALYSIS AUTO W/O SCOPE: CPT | Performed by: UROLOGY

## 2021-12-07 PROCEDURE — 51798 US URINE CAPACITY MEASURE: CPT | Performed by: UROLOGY

## 2021-12-07 PROCEDURE — 99213 OFFICE O/P EST LOW 20 MIN: CPT | Performed by: UROLOGY

## 2021-12-07 PROCEDURE — 1036F TOBACCO NON-USER: CPT | Performed by: UROLOGY

## 2021-12-07 NOTE — PROGRESS NOTES
Mick Johnson MD        [address] [place] [address]  Dept: [phone]  Dept Fax: [phone]: [address] [address] Urology Office Note -     Patient:  Rica Ruiz  YOB: 1932  Date: 12/7/2021    The patient is a 80 y.o. male who presents today for evaluation of the following problems: persistent UTI  No chief complaint on file. referred/consultation requested by Kelley Pink DO.    HISTORY OF PRESENT ILLNESS:         BPH  flomax/finasteride  No new infections  Pt satisfied  Have discussed PVP in past but pt is doing great without it. pvr low        Summary of Previous Records:  Currently asymptomatic  Had persistent UTI- treated at urgent care and with PCP  Urine completely negative in office today  No previous hx of recurrent UTI  Does have LUTS at baseline. Worse since starting lasix.    Son is pathologist In hospital        Requested/reviewed records from Kelley Pink DO office and/or outside [de-identified]    (Patient's old records have been requested, reviewed and pertinent findings summarized in today's note.)    Procedures Today:         Last several PSA's:  No results found for: PSA    Last total testosterone:  No results found for: TESTOSTERONE    Urinalysis today:  Results for POC orders placed in visit on 12/07/21   POCT Urinalysis No Micro (Auto)   Result Value Ref Range    Glucose, Ur Negative NEGATIVE mg/dl    Bilirubin Urine Negative     Ketones, Urine Negative NEGATIVE    Specific Gravity, Urine >= 1.030 1.002 - 1.030    Blood, UA POC Negative NEGATIVE    pH, Urine 5.00 5.0 - 9.0    Protein, Urine Negative NEGATIVE mg/dl    Urobilinogen, Urine 0.20 0.0 - 1.0 eu/dl    Nitrite, Urine Negative NEGATIVE    Leukocyte Clumps, Urine Negative NEGATIVE    Color, Urine Yellow YELLOW-STRAW    Character, Urine Clear [id number].CLOUD   poct post void residual   Result Value Ref Range    post void residual 27 ml       Last BUN and creatinine:  Lab Results   Component Value Date    BUN 39 (H) 11/19/2021     Lab Results   Component Value Date    CREATININE 1.7 (H) 11/19/2021         Imaging Reviewed during this Office Visit:   Fredy Starkey MD independently reviewed the images and verified the radiology reports from:    CT 8/20/2016      1. The appearance of the small bowel is consistent with a small bowel enteritis. Infectious versus ischemic. The ascites is likely reactive. A partial small bowel obstruction is not completely excluded distally near the anastomosis with the colon. 2. There is a new moderate-sized right and scant left pleural effusion. 3. There is a moderate-sized hiatal hernia containing oral contrast.   4. Stable cardiomegaly. 5. Stable severe diverticular change of the sigmoid colon which is uncomplicated this time.    6. Most likely a small simple cyst in the right renal cortex.             PAST MEDICAL, FAMILY AND SOCIAL HISTORY:  Past Medical History:   Diagnosis Date    Actinic keratoses 8/21/2013    Adenomatous polyp 2008    Atrial fibrillation ([address]     CAD (coronary artery disease) 2009    Carotid artery disease ([address] 2009    left    Hyperlipidemia 2004    Hypertension 2004    Inguinal hernia, left 2/19/2014    Unspecified cerebral artery occlusion with cerebral infarction      Past Surgical History:   Procedure Laterality Date    ABDOMEN SURGERY      ABDOMINAL ADHESION SURGERY  6/2012    Dr Kinnie Gitelman  2004    right    [facility]    CORONARY ARTERY BYPASS GRAFT  2009    ENDOSCOPY, COLON, DIAGNOSTIC      EYE SURGERY      SMALL INTESTINE SURGERY      SMALL INTESTINE SURGERY  02/20/2015    Exploratory Laparotomy    UPPER GASTROINTESTINAL ENDOSCOPY N/A 1/26/2021    EGD ESOPHAGOGASTRODUODENOSCOPY PEG TUBE INSERTION performed by Lauren Rich MD at [address] [place] [address] harvests from right leg     Family History   Problem Relation Age of Onset    Cancer Mother      Outpatient Medications Marked as Taking for the 21 encounter (Office Visit) with Keiry Loera MD   Medication Sig Dispense Refill    furosemide (LASIX) 40 MG tablet Take 1 tablet by mouth daily 90 tablet 1    apixaban (ELIQUIS) 2.5 MG TABS tablet Take 1 tablet by mouth 2 times daily 60 tablet 1    finasteride (PROSCAR) 5 MG tablet Take 1 tablet by mouth daily 30 tablet 3    pantoprazole (PROTONIX) 40 MG tablet Take 40 mg by mouth 2 times daily      potassium chloride (KLOR-CON M) 20 MEQ extended release tablet Take 1 tablet by mouth daily 60 tablet 5    tamsulosin (FLOMAX) 0.4 MG capsule Take 1 capsule by mouth daily Capsule may be opened and contents dissolve for peg tube 30 capsule 3    aspirin 81 MG EC tablet Take 81 mg by mouth daily Indications: Anticoagulant Therapy Take with food. Patient has no known allergies. Social History     Tobacco Use   Smoking Status Former Smoker    Quit date: 1980    Years since quittin.9   Smokeless Tobacco Never Used      (If patient a smoker, smoking cessation counseling offered)   Social History     Substance and Sexual Activity   Alcohol Use Yes    Alcohol/week: 1.0 standard drink    Types: 1 Cans of beer per week    Comment: occasionally       REVIEW OF SYSTEMS:  Constitutional: negative  Eyes: negative  Respiratory: negative  Cardiovascular: negative  Gastrointestinal: negative  Genitourinary: see HPI  Musculoskeletal: negative  Skin: negative   Neurological: negative  Hematological/Lymphatic: negative  Psychological: negative        Physical Exam:    This a 80 y.o. male  Vitals:    21 1448   BP: 114/62   Pulse: 55     Body mass index is 31.64 kg/m². Constitutional: Patient in no acute distress;       Assessment and Plan        1. BPH with urinary obstruction               Plan:      cont flomax/finasteride for LUTS, namely nocturia. Infections likely secondary to BPH  Discussed greenlight in past but patient doing well. Will manage conservatively  Patient very satisfied with voiding now      Cont flomax/finasteride  Follow up next year        Prescriptions Ordered:  No orders of the defined types were placed in this encounter.      Orders Placed:  Orders Placed This Encounter   Procedures    POCT Urinalysis No Micro (Auto)    poct post void residual     Bladder scan            LIZETTE Garner MD

## 2021-12-13 LAB
BUN BLDV-MCNC: 27 MG/DL
CALCIUM SERPL-MCNC: 9 MG/DL
CHLORIDE BLD-SCNC: 109 MMOL/L
CO2: 26 MMOL/L
CREAT SERPL-MCNC: 1.9 MG/DL
GFR CALCULATED: 32.7
GLUCOSE BLD-MCNC: 110 MG/DL
POTASSIUM SERPL-SCNC: 4.4 MMOL/L
SODIUM BLD-SCNC: 143 MMOL/L

## 2021-12-20 ENCOUNTER — OFFICE VISIT (OUTPATIENT)
Dept: NEPHROLOGY | Age: 86
End: 2021-12-20
Payer: MEDICARE

## 2021-12-20 VITALS
SYSTOLIC BLOOD PRESSURE: 153 MMHG | BODY MASS INDEX: 31.83 KG/M2 | DIASTOLIC BLOOD PRESSURE: 51 MMHG | WEIGHT: 163 LBS | HEART RATE: 61 BPM | TEMPERATURE: 97.1 F | OXYGEN SATURATION: 97 %

## 2021-12-20 DIAGNOSIS — D63.1 ANEMIA IN STAGE 3B CHRONIC KIDNEY DISEASE (HCC): ICD-10-CM

## 2021-12-20 DIAGNOSIS — N18.32 ANEMIA IN STAGE 3B CHRONIC KIDNEY DISEASE (HCC): ICD-10-CM

## 2021-12-20 DIAGNOSIS — N18.32 STAGE 3B CHRONIC KIDNEY DISEASE (HCC): Primary | ICD-10-CM

## 2021-12-20 PROCEDURE — 99213 OFFICE O/P EST LOW 20 MIN: CPT | Performed by: INTERNAL MEDICINE

## 2021-12-20 PROCEDURE — 1036F TOBACCO NON-USER: CPT | Performed by: INTERNAL MEDICINE

## 2021-12-20 PROCEDURE — G8417 CALC BMI ABV UP PARAM F/U: HCPCS | Performed by: INTERNAL MEDICINE

## 2021-12-20 PROCEDURE — 1123F ACP DISCUSS/DSCN MKR DOCD: CPT | Performed by: INTERNAL MEDICINE

## 2021-12-20 PROCEDURE — G8427 DOCREV CUR MEDS BY ELIG CLIN: HCPCS | Performed by: INTERNAL MEDICINE

## 2021-12-20 PROCEDURE — 4040F PNEUMOC VAC/ADMIN/RCVD: CPT | Performed by: INTERNAL MEDICINE

## 2021-12-20 PROCEDURE — G8484 FLU IMMUNIZE NO ADMIN: HCPCS | Performed by: INTERNAL MEDICINE

## 2021-12-20 NOTE — PROGRESS NOTES
[address] [facility]  [address]  [facility] [address]  Dept: [phone]  Loc: [phone]  Office Progress Note  12/20/2021 1:18 PM      Pt Name:    Cami Leigh:    3/30/1932  Primary Care Physician:  Katharine Berry DO     Chief Complaint:   Chief Complaint   Patient presents with    CKD and B/L leg swelling        Background Information/Interval History:   79 yo with hx CAD, HTN, hx CVA, A. Fibrillation who was recently in hospital s/p fall and rhabdomyolysis. Recently admitted in Aug 2021 with urinary retention. Follows with urology. Hard of hearing. Here for follow-up. Here with his daughter in law. Higher dose of lasix is helping him. No chest pain. No shortness of breath. Has sig improvement in  B/L Leg swelling. Feels well. Sees urology. Patient is emptying properly per urology. No complaints per patient.       Past History:  Past Medical History:   Diagnosis Date    Actinic keratoses 8/21/2013    Adenomatous polyp 2008    Atrial fibrillation ([address]     CAD (coronary artery disease) 2009    Carotid artery disease ([address] 2009    left    Hyperlipidemia 2004    Hypertension 2004    Inguinal hernia, left 2/19/2014    Unspecified cerebral artery occlusion with cerebral infarction      Past Surgical History:   Procedure Laterality Date    ABDOMEN SURGERY      ABDOMINAL ADHESION SURGERY  6/2012    Dr Garcia Him  2004    right    [facility]    CORONARY ARTERY BYPASS GRAFT  2009    ENDOSCOPY, COLON, DIAGNOSTIC      EYE SURGERY      SMALL INTESTINE SURGERY      SMALL INTESTINE SURGERY  02/20/2015    Exploratory Laparotomy    UPPER GASTROINTESTINAL ENDOSCOPY N/A 1/26/2021    EGD ESOPHAGOGASTRODUODENOSCOPY PEG TUBE INSERTION performed by Shania Jeffers MD at [facility]    VASCULAR SURGERY      cabg harvests from right leg        VITALS: BP (!) 153/51 (Site: Right Upper Arm, Position: Sitting, Cuff Size: Medium Adult)   Pulse 61   Temp 97.1 °F (36.2 °C)   Wt 163 lb (73.9 kg)   SpO2 97%   BMI 31.83 kg/m²   Wt Readings from Last 3 Encounters:   12/20/21 163 lb (73.9 kg)   12/07/21 162 lb (73.5 kg)   10/18/21 158 lb (71.7 kg)     Body mass index is 31.83 kg/m². General Appearance: alert and cooperative with exam, appears comfortable, no distress  Lungs: Air entry B/L, no crackles or rales, no use of accessory muscles  Heart: S1, S2 heard  GI: soft, non-tender, no guarding  Extremities: mild leg edema but improved     Medications:  Current Outpatient Medications   Medication Sig Dispense Refill    furosemide (LASIX) 40 MG tablet Take 1 tablet by mouth daily 90 tablet 1    apixaban (ELIQUIS) 2.5 MG TABS tablet Take 1 tablet by mouth 2 times daily 60 tablet 1    finasteride (PROSCAR) 5 MG tablet Take 1 tablet by mouth daily 30 tablet 3    pantoprazole (PROTONIX) 40 MG tablet Take 40 mg by mouth 2 times daily      potassium chloride (KLOR-CON M) 20 MEQ extended release tablet Take 1 tablet by mouth daily 60 tablet 5    tamsulosin (FLOMAX) 0.4 MG capsule Take 1 capsule by mouth daily Capsule may be opened and contents dissolve for peg tube 30 capsule 3    aspirin 81 MG EC tablet Take 81 mg by mouth daily Indications: Anticoagulant Therapy Take with food. No current facility-administered medications for this visit. Laboratory & Diagnostics:  Feb 2021: UA: no blood, trace protein  EF 55-60-%  Feb 2021; Na 140, Creat 1.2  April 2021: Hb 10.3  Aug 2021: UA: no blood, no protein  Oct 2021: Creat 1.7, K 3.9, Hb 8.4  Dec 2021: K 4.4, Creat 1.9     Impression/Plan:   1. CKd IIIB: serum creatinine staying around 1.6 to 1.7. Creat is 1.9. Doing well. Leg swelling is better. Continue with lasix 40 mg daily and can take an extra 20-40 mg once or twice a week. Daughter in law voiced understanding. Patient feels well.  Advised low salt diet 2. B/L edema: improved, continue with lasix. Can take an extra 20-40 mg lasix once or twice a week. 3. Anemia in CKD: check iron studies in 1 month including ferritin, check H/H. May need iron and or FRAN depending on labs. 4. HTN: no longer taking losartan at this time. Will monitor BP for now. 5. Recent Covid 19: doing well. On room air. 6. BPH: seeing urology  7. Simple renal cyst    Orders Placed This Encounter   Procedures    Basic Metabolic Panel    CBC    Iron Binding Capacity    IRON SATURATION    Ferritin    Basic Metabolic Panel     Return in about 4 months (around 4/20/2022).     Delaney Upton MD  [facility]

## 2021-12-25 ENCOUNTER — APPOINTMENT (OUTPATIENT)
Dept: GENERAL RADIOLOGY | Age: 86
DRG: 389 | End: 2021-12-25
Payer: OTHER GOVERNMENT

## 2021-12-25 ENCOUNTER — APPOINTMENT (OUTPATIENT)
Dept: CT IMAGING | Age: 86
DRG: 389 | End: 2021-12-25
Payer: OTHER GOVERNMENT

## 2021-12-25 ENCOUNTER — HOSPITAL ENCOUNTER (INPATIENT)
Age: 86
LOS: 4 days | Discharge: HOME | DRG: 389 | End: 2021-12-29
Attending: INTERNAL MEDICINE | Admitting: INTERNAL MEDICINE
Payer: OTHER GOVERNMENT

## 2021-12-25 DIAGNOSIS — R00.1 BRADYCARDIA: ICD-10-CM

## 2021-12-25 DIAGNOSIS — K56.600 PARTIAL INTESTINAL OBSTRUCTION, UNSPECIFIED CAUSE (HCC): Primary | ICD-10-CM

## 2021-12-25 PROBLEM — R10.9 ABDOMINAL PAIN: Status: ACTIVE | Noted: 2021-12-25

## 2021-12-25 LAB
ALBUMIN SERPL-MCNC: 4.3 G/DL (ref 3.5–5.1)
ALP BLD-CCNC: 128 U/L (ref 38–126)
ALT SERPL-CCNC: 12 U/L (ref 11–66)
ANION GAP SERPL CALCULATED.3IONS-SCNC: 14 MEQ/L (ref 8–16)
AST SERPL-CCNC: 23 U/L (ref 5–40)
BASOPHILS # BLD: 0.6 %
BASOPHILS ABSOLUTE: 0.1 THOU/MM3 (ref 0–0.1)
BILIRUB SERPL-MCNC: 0.3 MG/DL (ref 0.3–1.2)
BUN BLDV-MCNC: 33 MG/DL (ref 7–22)
CALCIUM SERPL-MCNC: 9.5 MG/DL (ref 8.5–10.5)
CHLORIDE BLD-SCNC: 101 MEQ/L (ref 98–111)
CO2: 25 MEQ/L (ref 23–33)
CREAT SERPL-MCNC: 1.9 MG/DL (ref 0.4–1.2)
EKG Q-T INTERVAL: 488 MS
EKG QRS DURATION: 120 MS
EKG QTC CALCULATION (BAZETT): 457 MS
EKG R AXIS: 126 DEGREES
EKG T AXIS: -3 DEGREES
EKG VENTRICULAR RATE: 53 BPM
EOSINOPHIL # BLD: 1.1 %
EOSINOPHILS ABSOLUTE: 0.1 THOU/MM3 (ref 0–0.4)
ERYTHROCYTE [DISTWIDTH] IN BLOOD BY AUTOMATED COUNT: 20.7 % (ref 11.5–14.5)
ERYTHROCYTE [DISTWIDTH] IN BLOOD BY AUTOMATED COUNT: 61.7 FL (ref 35–45)
GFR SERPL CREATININE-BSD FRML MDRD: 33 ML/MIN/1.73M2
GLUCOSE BLD-MCNC: 134 MG/DL (ref 70–108)
HCT VFR BLD CALC: 31.8 % (ref 42–52)
HEMOGLOBIN: 10 GM/DL (ref 14–18)
IMMATURE GRANS (ABS): 0.03 THOU/MM3 (ref 0–0.07)
IMMATURE GRANULOCYTES: 0.3 %
INR BLD: 1.17 (ref 0.85–1.13)
LACTIC ACID: 1.5 MMOL/L (ref 0.5–2)
LIPASE: 45.9 U/L (ref 5.6–51.3)
LYMPHOCYTES # BLD: 16 %
LYMPHOCYTES ABSOLUTE: 1.7 THOU/MM3 (ref 1–4.8)
MCH RBC QN AUTO: 26 PG (ref 26–33)
MCHC RBC AUTO-ENTMCNC: 31.4 GM/DL (ref 32.2–35.5)
MCV RBC AUTO: 82.6 FL (ref 80–94)
MONOCYTES # BLD: 10.1 %
MONOCYTES ABSOLUTE: 1.1 THOU/MM3 (ref 0.4–1.3)
NUCLEATED RED BLOOD CELLS: 0 /100 WBC
OSMOLALITY CALCULATION: 288.6 MOSMOL/KG (ref 275–300)
PLATELET # BLD: 272 THOU/MM3 (ref 130–400)
PMV BLD AUTO: 10.2 FL (ref 9.4–12.4)
POTASSIUM SERPL-SCNC: 4.8 MEQ/L (ref 3.5–5.2)
RBC # BLD: 3.85 MILL/MM3 (ref 4.7–6.1)
SARS-COV-2, NAAT: NOT  DETECTED
SEG NEUTROPHILS: 71.9 %
SEGMENTED NEUTROPHILS ABSOLUTE COUNT: 7.8 THOU/MM3 (ref 1.8–7.7)
SODIUM BLD-SCNC: 140 MEQ/L (ref 135–145)
TOTAL PROTEIN: 8.4 G/DL (ref 6.1–8)
TROPONIN T: 0.02 NG/ML
WBC # BLD: 10.8 THOU/MM3 (ref 4.8–10.8)

## 2021-12-25 PROCEDURE — 6360000002 HC RX W HCPCS: Performed by: INTERNAL MEDICINE

## 2021-12-25 PROCEDURE — APPSS180 APP SPLIT SHARED TIME > 60 MINUTES: Performed by: NURSE PRACTITIONER

## 2021-12-25 PROCEDURE — 2580000003 HC RX 258: Performed by: INTERNAL MEDICINE

## 2021-12-25 PROCEDURE — 83690 ASSAY OF LIPASE: CPT

## 2021-12-25 PROCEDURE — 80053 COMPREHEN METABOLIC PANEL: CPT

## 2021-12-25 PROCEDURE — 1200000003 HC TELEMETRY R&B

## 2021-12-25 PROCEDURE — 87635 SARS-COV-2 COVID-19 AMP PRB: CPT

## 2021-12-25 PROCEDURE — 96361 HYDRATE IV INFUSION ADD-ON: CPT

## 2021-12-25 PROCEDURE — 74176 CT ABD & PELVIS W/O CONTRAST: CPT

## 2021-12-25 PROCEDURE — 85610 PROTHROMBIN TIME: CPT

## 2021-12-25 PROCEDURE — 96372 THER/PROPH/DIAG INJ SC/IM: CPT

## 2021-12-25 PROCEDURE — 96374 THER/PROPH/DIAG INJ IV PUSH: CPT

## 2021-12-25 PROCEDURE — 83605 ASSAY OF LACTIC ACID: CPT

## 2021-12-25 PROCEDURE — 71045 X-RAY EXAM CHEST 1 VIEW: CPT

## 2021-12-25 PROCEDURE — 96375 TX/PRO/DX INJ NEW DRUG ADDON: CPT

## 2021-12-25 PROCEDURE — 93005 ELECTROCARDIOGRAM TRACING: CPT

## 2021-12-25 PROCEDURE — 36415 COLL VENOUS BLD VENIPUNCTURE: CPT

## 2021-12-25 PROCEDURE — 84484 ASSAY OF TROPONIN QUANT: CPT

## 2021-12-25 PROCEDURE — 99285 EMERGENCY DEPT VISIT HI MDM: CPT

## 2021-12-25 PROCEDURE — 85025 COMPLETE CBC W/AUTO DIFF WBC: CPT

## 2021-12-25 RX ORDER — ONDANSETRON 2 MG/ML
4 INJECTION INTRAMUSCULAR; INTRAVENOUS ONCE
Status: COMPLETED | OUTPATIENT
Start: 2021-12-25 | End: 2021-12-25

## 2021-12-25 RX ORDER — MORPHINE SULFATE 2 MG/ML
2 INJECTION, SOLUTION INTRAMUSCULAR; INTRAVENOUS ONCE
Status: COMPLETED | OUTPATIENT
Start: 2021-12-25 | End: 2021-12-25

## 2021-12-25 RX ORDER — DICYCLOMINE HYDROCHLORIDE 10 MG/ML
20 INJECTION INTRAMUSCULAR ONCE
Status: COMPLETED | OUTPATIENT
Start: 2021-12-25 | End: 2021-12-25

## 2021-12-25 RX ORDER — FUROSEMIDE 10 MG/ML
20 INJECTION INTRAMUSCULAR; INTRAVENOUS ONCE
Status: COMPLETED | OUTPATIENT
Start: 2021-12-25 | End: 2021-12-25

## 2021-12-25 RX ORDER — 0.9 % SODIUM CHLORIDE 0.9 %
500 INTRAVENOUS SOLUTION INTRAVENOUS ONCE
Status: COMPLETED | OUTPATIENT
Start: 2021-12-25 | End: 2021-12-25

## 2021-12-25 RX ORDER — SODIUM CHLORIDE 9 MG/ML
INJECTION, SOLUTION INTRAVENOUS CONTINUOUS
Status: DISCONTINUED | OUTPATIENT
Start: 2021-12-26 | End: 2021-12-28

## 2021-12-25 RX ADMIN — ONDANSETRON 4 MG: 2 INJECTION INTRAMUSCULAR; INTRAVENOUS at 20:30

## 2021-12-25 RX ADMIN — SODIUM CHLORIDE 500 ML: 9 INJECTION, SOLUTION INTRAVENOUS at 20:27

## 2021-12-25 RX ADMIN — FUROSEMIDE 20 MG: 10 INJECTION, SOLUTION INTRAMUSCULAR; INTRAVENOUS at 21:30

## 2021-12-25 RX ADMIN — DICYCLOMINE HYDROCHLORIDE 20 MG: 10 INJECTION INTRAMUSCULAR at 20:28

## 2021-12-25 RX ADMIN — MORPHINE SULFATE 2 MG: 2 INJECTION, SOLUTION INTRAMUSCULAR; INTRAVENOUS at 20:29

## 2021-12-25 ASSESSMENT — PAIN SCALES - GENERAL
PAINLEVEL_OUTOF10: 0
PAINLEVEL_OUTOF10: 9

## 2021-12-26 ENCOUNTER — APPOINTMENT (OUTPATIENT)
Dept: GENERAL RADIOLOGY | Age: 86
DRG: 389 | End: 2021-12-26
Payer: OTHER GOVERNMENT

## 2021-12-26 LAB
ANION GAP SERPL CALCULATED.3IONS-SCNC: 9 MEQ/L (ref 8–16)
BASOPHILS # BLD: 0.4 %
BASOPHILS ABSOLUTE: 0 THOU/MM3 (ref 0–0.1)
BILIRUBIN URINE: NEGATIVE
BLOOD, URINE: NEGATIVE
BUN BLDV-MCNC: 32 MG/DL (ref 7–22)
CALCIUM SERPL-MCNC: 8.4 MG/DL (ref 8.5–10.5)
CHARACTER, URINE: CLEAR
CHLORIDE BLD-SCNC: 107 MEQ/L (ref 98–111)
CO2: 26 MEQ/L (ref 23–33)
COLOR: YELLOW
CREAT SERPL-MCNC: 1.8 MG/DL (ref 0.4–1.2)
EOSINOPHIL # BLD: 2 %
EOSINOPHILS ABSOLUTE: 0.1 THOU/MM3 (ref 0–0.4)
ERYTHROCYTE [DISTWIDTH] IN BLOOD BY AUTOMATED COUNT: 20.6 % (ref 11.5–14.5)
ERYTHROCYTE [DISTWIDTH] IN BLOOD BY AUTOMATED COUNT: 61.4 FL (ref 35–45)
GFR SERPL CREATININE-BSD FRML MDRD: 36 ML/MIN/1.73M2
GLUCOSE BLD-MCNC: 103 MG/DL (ref 70–108)
GLUCOSE URINE: NEGATIVE MG/DL
HCT VFR BLD CALC: 28.3 % (ref 42–52)
HEMOGLOBIN: 8.6 GM/DL (ref 14–18)
IMMATURE GRANS (ABS): 0.02 THOU/MM3 (ref 0–0.07)
IMMATURE GRANULOCYTES: 0.3 %
KETONES, URINE: NEGATIVE
LEUKOCYTE ESTERASE, URINE: NEGATIVE
LYMPHOCYTES # BLD: 13.5 %
LYMPHOCYTES ABSOLUTE: 0.9 THOU/MM3 (ref 1–4.8)
MCH RBC QN AUTO: 25.2 PG (ref 26–33)
MCHC RBC AUTO-ENTMCNC: 30.4 GM/DL (ref 32.2–35.5)
MCV RBC AUTO: 83 FL (ref 80–94)
MONOCYTES # BLD: 12.4 %
MONOCYTES ABSOLUTE: 0.9 THOU/MM3 (ref 0.4–1.3)
NITRITE, URINE: NEGATIVE
NUCLEATED RED BLOOD CELLS: 0 /100 WBC
PH UA: 5 (ref 5–9)
PLATELET # BLD: 219 THOU/MM3 (ref 130–400)
PMV BLD AUTO: 10.5 FL (ref 9.4–12.4)
POTASSIUM REFLEX MAGNESIUM: 4.5 MEQ/L (ref 3.5–5.2)
PROTEIN UA: NEGATIVE
RBC # BLD: 3.41 MILL/MM3 (ref 4.7–6.1)
SEG NEUTROPHILS: 71.4 %
SEGMENTED NEUTROPHILS ABSOLUTE COUNT: 5 THOU/MM3 (ref 1.8–7.7)
SODIUM BLD-SCNC: 142 MEQ/L (ref 135–145)
SPECIFIC GRAVITY, URINE: 1.02 (ref 1–1.03)
UROBILINOGEN, URINE: 0.2 EU/DL (ref 0–1)
WBC # BLD: 7 THOU/MM3 (ref 4.8–10.8)

## 2021-12-26 PROCEDURE — 99223 1ST HOSP IP/OBS HIGH 75: CPT | Performed by: SURGERY

## 2021-12-26 PROCEDURE — 99232 SBSQ HOSP IP/OBS MODERATE 35: CPT | Performed by: INTERNAL MEDICINE

## 2021-12-26 PROCEDURE — 85025 COMPLETE CBC W/AUTO DIFF WBC: CPT

## 2021-12-26 PROCEDURE — 2580000003 HC RX 258: Performed by: STUDENT IN AN ORGANIZED HEALTH CARE EDUCATION/TRAINING PROGRAM

## 2021-12-26 PROCEDURE — 80048 BASIC METABOLIC PNL TOTAL CA: CPT

## 2021-12-26 PROCEDURE — 6370000000 HC RX 637 (ALT 250 FOR IP): Performed by: SURGERY

## 2021-12-26 PROCEDURE — 6370000000 HC RX 637 (ALT 250 FOR IP): Performed by: STUDENT IN AN ORGANIZED HEALTH CARE EDUCATION/TRAINING PROGRAM

## 2021-12-26 PROCEDURE — 81003 URINALYSIS AUTO W/O SCOPE: CPT

## 2021-12-26 PROCEDURE — 36415 COLL VENOUS BLD VENIPUNCTURE: CPT

## 2021-12-26 PROCEDURE — 1200000003 HC TELEMETRY R&B

## 2021-12-26 PROCEDURE — 74018 RADEX ABDOMEN 1 VIEW: CPT

## 2021-12-26 RX ORDER — ACETAMINOPHEN 650 MG/1
650 SUPPOSITORY RECTAL EVERY 6 HOURS PRN
Status: DISCONTINUED | OUTPATIENT
Start: 2021-12-26 | End: 2021-12-29 | Stop reason: HOSPADM

## 2021-12-26 RX ORDER — MORPHINE SULFATE 2 MG/ML
2 INJECTION, SOLUTION INTRAMUSCULAR; INTRAVENOUS
Status: DISCONTINUED | OUTPATIENT
Start: 2021-12-26 | End: 2021-12-26

## 2021-12-26 RX ORDER — SODIUM CHLORIDE 0.9 % (FLUSH) 0.9 %
5-40 SYRINGE (ML) INJECTION EVERY 12 HOURS SCHEDULED
Status: DISCONTINUED | OUTPATIENT
Start: 2021-12-26 | End: 2021-12-29 | Stop reason: HOSPADM

## 2021-12-26 RX ORDER — POTASSIUM CHLORIDE 20 MEQ/1
20 TABLET, EXTENDED RELEASE ORAL DAILY
Status: DISCONTINUED | OUTPATIENT
Start: 2021-12-26 | End: 2021-12-29 | Stop reason: HOSPADM

## 2021-12-26 RX ORDER — TAMSULOSIN HYDROCHLORIDE 0.4 MG/1
0.4 CAPSULE ORAL DAILY
Status: DISCONTINUED | OUTPATIENT
Start: 2021-12-26 | End: 2021-12-29 | Stop reason: HOSPADM

## 2021-12-26 RX ORDER — SODIUM CHLORIDE 0.9 % (FLUSH) 0.9 %
5-40 SYRINGE (ML) INJECTION PRN
Status: DISCONTINUED | OUTPATIENT
Start: 2021-12-26 | End: 2021-12-29 | Stop reason: HOSPADM

## 2021-12-26 RX ORDER — ONDANSETRON 4 MG/1
4 TABLET, ORALLY DISINTEGRATING ORAL EVERY 8 HOURS PRN
Status: DISCONTINUED | OUTPATIENT
Start: 2021-12-26 | End: 2021-12-29 | Stop reason: HOSPADM

## 2021-12-26 RX ORDER — MORPHINE SULFATE 2 MG/ML
1 INJECTION, SOLUTION INTRAMUSCULAR; INTRAVENOUS EVERY 4 HOURS PRN
Status: DISCONTINUED | OUTPATIENT
Start: 2021-12-26 | End: 2021-12-29 | Stop reason: HOSPADM

## 2021-12-26 RX ORDER — PANTOPRAZOLE SODIUM 40 MG/1
40 TABLET, DELAYED RELEASE ORAL
Status: DISCONTINUED | OUTPATIENT
Start: 2021-12-26 | End: 2021-12-29 | Stop reason: HOSPADM

## 2021-12-26 RX ORDER — ACETAMINOPHEN 325 MG/1
650 TABLET ORAL EVERY 6 HOURS PRN
Status: DISCONTINUED | OUTPATIENT
Start: 2021-12-26 | End: 2021-12-29 | Stop reason: HOSPADM

## 2021-12-26 RX ORDER — POLYETHYLENE GLYCOL 3350 17 G/17G
17 POWDER, FOR SOLUTION ORAL DAILY PRN
Status: DISCONTINUED | OUTPATIENT
Start: 2021-12-26 | End: 2021-12-29 | Stop reason: HOSPADM

## 2021-12-26 RX ORDER — FINASTERIDE 5 MG/1
5 TABLET, FILM COATED ORAL DAILY
Status: DISCONTINUED | OUTPATIENT
Start: 2021-12-26 | End: 2021-12-29 | Stop reason: HOSPADM

## 2021-12-26 RX ORDER — ONDANSETRON 2 MG/ML
4 INJECTION INTRAMUSCULAR; INTRAVENOUS EVERY 6 HOURS PRN
Status: DISCONTINUED | OUTPATIENT
Start: 2021-12-26 | End: 2021-12-29 | Stop reason: HOSPADM

## 2021-12-26 RX ORDER — FUROSEMIDE 40 MG/1
40 TABLET ORAL DAILY
Status: DISCONTINUED | OUTPATIENT
Start: 2021-12-26 | End: 2021-12-29 | Stop reason: HOSPADM

## 2021-12-26 RX ORDER — MORPHINE SULFATE 4 MG/ML
4 INJECTION, SOLUTION INTRAMUSCULAR; INTRAVENOUS
Status: DISCONTINUED | OUTPATIENT
Start: 2021-12-26 | End: 2021-12-26

## 2021-12-26 RX ORDER — SODIUM CHLORIDE 9 MG/ML
25 INJECTION, SOLUTION INTRAVENOUS PRN
Status: DISCONTINUED | OUTPATIENT
Start: 2021-12-26 | End: 2021-12-29 | Stop reason: HOSPADM

## 2021-12-26 RX ORDER — ASPIRIN 81 MG/1
81 TABLET ORAL DAILY
Status: DISCONTINUED | OUTPATIENT
Start: 2021-12-26 | End: 2021-12-26

## 2021-12-26 RX ADMIN — MAGNESIUM HYDROXIDE 30 ML: 2400 SUSPENSION ORAL at 08:27

## 2021-12-26 RX ADMIN — POTASSIUM CHLORIDE 20 MEQ: 1500 TABLET, EXTENDED RELEASE ORAL at 08:26

## 2021-12-26 RX ADMIN — FINASTERIDE 5 MG: 5 TABLET, FILM COATED ORAL at 08:26

## 2021-12-26 RX ADMIN — FUROSEMIDE 40 MG: 40 TABLET ORAL at 08:26

## 2021-12-26 RX ADMIN — TAMSULOSIN HYDROCHLORIDE 0.4 MG: 0.4 CAPSULE ORAL at 08:26

## 2021-12-26 RX ADMIN — SODIUM CHLORIDE: 9 INJECTION, SOLUTION INTRAVENOUS at 01:24

## 2021-12-26 RX ADMIN — PANTOPRAZOLE SODIUM 40 MG: 40 TABLET, DELAYED RELEASE ORAL at 15:54

## 2021-12-26 ASSESSMENT — PAIN SCALES - GENERAL
PAINLEVEL_OUTOF10: 0

## 2021-12-26 NOTE — ED NOTES
Pt to ED c/o intermittent epigastric pain all day and new onsent atrial fibrillation. Pt denies any pain at this time. Pt shows no signs of distress. Respirations unlabored. EKG complete. Monitor applied.       Clarisa Ramirez RN  12/25/21 5464

## 2021-12-26 NOTE — ED NOTES
Patient transferred to [facility] room [facility] nurse informed.       Urbano Sees  12/25/21 8684

## 2021-12-26 NOTE — ED PROVIDER NOTES
eMERGENCY dEPARTMENT eNCOUnter      [address] [facility]    Chief Complaint   Patient presents with    Atrial Fibrillation       HPI    Felix Javier is a 80 y.o. male who presents to the emergency department because of abdominal pain. Patient is describing his abdominal pain to be mid abdomen and is 7-8 out of 10 and is in cramps. Patient also has been vomiting with that. There is no diarrhea. Flatus is negative. Patient does not have any fever or chills. There is no dysuria or frequency. Patient is not giving any history of eating anything unusual.  Patient is not complaining of any weakness tiredness or fatigue.     PAST MEDICAL HISTORY    Past Medical History:   Diagnosis Date    Actinic keratoses 8/21/2013    Adenomatous polyp 2008    Atrial fibrillation ([address]     CAD (coronary artery disease) 2009    Carotid artery disease ([address] 2009    left    Hyperlipidemia 2004    Hypertension 2004    Inguinal hernia, left 2/19/2014    Unspecified cerebral artery occlusion with cerebral infarction        SURGICAL HISTORY    Past Surgical History:   Procedure Laterality Date    ABDOMEN SURGERY      ABDOMINAL ADHESION SURGERY  6/2012    Dr [facility]  2004    right    COLONOSCOPY      [facility]    CORONARY ARTERY BYPASS GRAFT  2009    ENDOSCOPY, COLON, DIAGNOSTIC      EYE SURGERY      SMALL INTESTINE SURGERY      SMALL INTESTINE SURGERY  02/20/2015    Exploratory Laparotomy    UPPER GASTROINTESTINAL ENDOSCOPY N/A 1/26/2021    EGD ESOPHAGOGASTRODUODENOSCOPY PEG TUBE INSERTION performed by Shania Jeffers MD at [address] [facility]    VASCULAR SURGERY      cabg harvests from right leg       CURRENT MEDICATIONS    Current Outpatient Rx   Medication Sig Dispense Refill    furosemide (LASIX) 40 MG tablet Take 1 tablet by mouth daily 90 tablet 1    apixaban (ELIQUIS) 2.5 MG TABS tablet Take 1 tablet by mouth 2 times daily 60 tablet 1    finasteride (PROSCAR) 5 MG tablet Take 1 tablet by mouth daily 30 tablet 3    pantoprazole (PROTONIX) 40 MG tablet Take 40 mg by mouth 2 times daily      potassium chloride (KLOR-CON M) 20 MEQ extended release tablet Take 1 tablet by mouth daily 60 tablet 5    tamsulosin (FLOMAX) 0.4 MG capsule Take 1 capsule by mouth daily Capsule may be opened and contents dissolve for peg tube 30 capsule 3    aspirin 81 MG EC tablet Take 81 mg by mouth daily Indications: Anticoagulant Therapy Take with food. ALLERGIES    No Known Allergies    FAMILY HISTORY    Family History   Problem Relation Age of Onset    Cancer Mother        SOCIAL HISTORY    Social History     Socioeconomic History    Marital status:      Spouse name: None    Number of children: 3    Years of education: None    Highest education level: None   Occupational History    None   Tobacco Use    Smoking status: Former Smoker     Quit date: 1980     Years since quittin.0    Smokeless tobacco: Never Used   Vaping Use    Vaping Use: Never used   Substance and Sexual Activity    Alcohol use: Yes     Alcohol/week: 1.0 standard drink     Types: 1 Cans of beer per week     Comment: occasionally    Drug use: No    Sexual activity: None   Other Topics Concern    None   Social History Narrative    None     Social Determinants of Health     Financial Resource Strain:     Difficulty of Paying Living Expenses: Not on file   Food Insecurity:     Worried About Running Out of Food in the Last Year: Not on file    Dary of Food in the Last Year: Not on file   Transportation Needs:     Lack of Transportation (Medical): Not on file    Lack of Transportation (Non-Medical):  Not on file   Physical Activity:     Days of Exercise per Week: Not on file    Minutes of Exercise per Session: Not on file   Stress:     Feeling of Stress : Not on file   Social Connections:     Frequency of Communication with Friends and Family: Not on file    Frequency of Social Gatherings with Friends and Family: Not on file    Attends Voodoo Services: Not on file    Active Member of Clubs or Organizations: Not on file    Attends Club or Organization Meetings: Not on file    Marital Status: Not on file   Intimate Partner Violence:     Fear of Current or Ex-Partner: Not on file    Emotionally Abused: Not on file    Physically Abused: Not on file    Sexually Abused: Not on file   Housing Stability:     Unable to Pay for Housing in the Last Year: Not on file    Number of [place] in the Last Year: Not on file    Unstable Housing in the Last Year: Not on file       REVIEW OF SYSTEMS    Constitutional:  Denies fever, chills, weight loss or weakness   Eyes:  Denies photophobia or discharge   HENT:  Denies sore throat or ear pain   Respiratory:  Denies cough or shortness of breath   Cardiovascular:  Denies chest pain, palpitations or swelling   GI: Planing of abdominal pain, nausea, vomiting, no diarrhea   Musculoskeletal:  Denies back pain   Skin:  Denies rash   Neurologic:  Denies headache, focal weakness or sensory changes   Endocrine:  Denies polyuria or polydypsia   Lymphatic:  Denies swollen glands   Psychiatric:  Denies depression, suicidal ideation or homicidal ideation   All systems negative except as marked. PHYSICAL EXAM    VITAL SIGNS: BP (!) 145/57   Pulse 58   Temp 97.5 °F (36.4 °C) (Oral)   Resp 18   SpO2 94%    Constitutional:  Well developed, Well nourished, No acute distress, Non-toxic appearance. HENT:  Normocephalic, Atraumatic, Bilateral external ears normal, Oropharynx moist, No oral exudates, Nose normal. Neck- Normal range of motion, No tenderness, Supple, No stridor. Eyes:  PERRL, EOMI, Conjunctiva normal, No discharge. Respiratory:  Normal breath sounds, No respiratory distress, No wheezing, No chest tenderness. Cardiovascular:  Normal heart rate, Normal rhythm, No murmurs, No rubs, No gallops.    GI:  Bowel sounds normal, Soft, abdominal tenderness, No masses, No pulsatile masses. :  No CVA tenderness. Musculoskeletal:  Intact distal pulses, No edema, No tenderness, No cyanosis, No clubbing. Good range of motion in all major joints. No tenderness to palpation or major deformities noted. Back- No tenderness. Integument:  Warm, Dry, No erythema, No rash. Lymphatic:  No lymphadenopathy noted. Neurologic:  Alert & oriented x 3, Normal motor function, Normal sensory function, No focal deficits noted. Psychiatric:  Affect normal, Judgment normal, Mood normal.     EKG    Atrial fibrillation with slow ventricular response    LABS  Labs Reviewed   CBC WITH AUTO DIFFERENTIAL - Abnormal; Notable for the following components:       Result Value    RBC 3.85 (*)     Hemoglobin 10.0 (*)     Hematocrit 31.8 (*)     MCHC 31.4 (*)     RDW-CV 20.7 (*)     RDW-SD 61.7 (*)     Segs Absolute 7.8 (*)     All other components within normal limits   COMPREHENSIVE METABOLIC PANEL - Abnormal; Notable for the following components:    Glucose 134 (*)     CREATININE 1.9 (*)     BUN 33 (*)     Alkaline Phosphatase 128 (*)     Total Protein 8.4 (*)     All other components within normal limits   TROPONIN - Abnormal; Notable for the following components:    Troponin T 0.024 (*)     All other components within normal limits   PROTIME-INR - Abnormal; Notable for the following components:    INR 1.17 (*)     All other components within normal limits   GLOMERULAR FILTRATION RATE, ESTIMATED - Abnormal; Notable for the following components:    Est, Glom Filt Rate 33 (*)     All other components within normal limits   COVID-19, RAPID   LACTIC ACID, PLASMA   LIPASE   ANION GAP   OSMOLALITY   URINE RT REFLEX TO CULTURE         RADIOLOGY    CT ABDOMEN PELVIS WO CONTRAST Additional Contrast? None   Final Result   1. Right-sided pleural effusion with adjacent atelectasis/infiltrate.    2. Prominent fluid-filled small bowel loops with distal bowel gas and fluid, possibly secondary

## 2021-12-26 NOTE — PROGRESS NOTES
Hospitalist Progress Note      Patient:  Celestine Eason    Unit/Bed:7K-27/027-A  YOB: 1932  MRN: [id number]   Acct: [de-identified]     PCP: Alejandro Helton DO  Date of Admission: 12/25/2021    Ileus vs partial small bowel obstruction  Has history of multiple abdominal surgeries and multiple complete SBO in the past. Has seen Dr. Rodney Monk in the past.  NBNB vomiting x3 today; PRN antiemetics ordered  NPO at this time  No indication for NG decompression  Pain control as needed  General surgery team consulted in the ED    12/26 - pain better- passing gas- able to tolerate liquid diet- no more nausea and vomiting - KUB- looks better    Right sided heart failure  Cardiomegaly  Pulmonary edema  Right sided pleural effusion  Saw nephrology recently and was advised to increase lasix dose which we will continue at this time as kidney function is at baseline  Per OP nephrology documentation from 12/20: \"Continue with lasix 40 mg daily and can take an extra 20-40 mg once or twice a week. \"    Chronic Afib with bradycardia  [id number] score 5  On eliquis which is being held at this time by surgery team likely in anticipation of any possible surgical intervention.  Consider starting heparin gtt considering high risk for clotting as he is actively in afib  Not on any beta blocker d/t bradycardia  Bradycardia is stable and per baseline      Diverticulosis  Cholelithiasis  Incidental findings on CT A/P  These are chronic per pt's son/POA    Essential HTN  Goal BP is <150/90  No indication to aggressively control BP at this time d/t age and risk of SE    Chronic normocytic anemia  CKD  CAD  HLD  Hx of CVA  GERD  BPH  Continue home meds    Code status: Limited x4    Discharge planning: per general surgery- clinically improving- symptoms seems to be resolving currently    Chief Complaint:  Abdominal pain    History Of Present Illness:     80 y.o. male with past medical history of CKD, CAD, hyperlipidemia, CVA, GERD, BPH, multiple abdominal surgeries, recurrent SBO, who is being admitted to [facility] on 12/25/2021 for further evaluation and management of abdominal pain. Patient's son who is also his POA is present in the room during time of interview. Patient states that earlier today around noon, he developed lower abdominal/periumbilical abdominal pain that he describes as severe. He states he was able to tolerate breakfast this morning and was able to sleep for some time after that. He states that after his abdominal pain started, he vomited 3 times. Family believes vomitus was nonbilious nonbloody. Patient denies any other symptoms. Denies any chest pain, shortness of breath, nausea at this time, headaches, lightheadedness, dizziness, vision changes, gait instability, leg pain swelling or cramping. He reports that at this time, his abdominal pain has resolved. Subjective:- (Last 24 hours)    Feels better  Pleasant  No mor ein pain  No nausea or vomiting  Passing gas    Past medical history, family history, social history and allergies reviewed again and is unchanged since admission. ROS (All review of systems completed. Pertinent positives noted. Otherwise All other systems reviewed and negative.)     Scheduled Meds:   finasteride  5 mg Oral Daily    pantoprazole  40 mg Oral BID AC    potassium chloride  20 mEq Oral Daily    tamsulosin  0.4 mg Oral Daily    sodium chloride flush  5-40 mL IntraVENous 2 times per day    furosemide  40 mg Oral Daily     Continuous Infusions:   sodium chloride      sodium chloride 50 mL/hr at 12/26/21 0124     PRN Meds:.sodium chloride flush, sodium chloride, ondansetron **OR** ondansetron, polyethylene glycol, acetaminophen **OR** acetaminophen, morphine **OR** [DISCONTINUED] morphine     Diet:  ADULT DIET; Clear Liquid    REVIEW OF SYSTEMS:   Pertinent positives as noted in the HPI. All other systems reviewed and negative.     PHYSICAL EXAM:    BP (!) 104/51   Pulse (!) 48 Temp 98.4 °F (36.9 °C) (Oral)   Resp 16   SpO2 94%     General appearance:  No apparent distress, appears stated age and cooperative. HEENT:  Normal cephalic, atraumatic without obvious deformity. Pupils equal, round, and reactive to light. Extra ocular muscles intact. Conjunctivae/corneas clear. Neck: Supple, with full range of motion. No jugular venous distention. Trachea midline. Respiratory:  Normal respiratory effort. Clear to auscultation, bilaterally without Rales/Wheezes/Rhonchi. Cardiovascular:  Irregularly irregular, bradycardic, with normal S1/S2 without murmurs, rubs or gallops. Abdomen: Non-tender, non-distended. No bowel sounds appreciable on auscultation  Musculoskeletal:  No clubbing, cyanosis or edema bilaterally. Full range of motion without deformity. Skin: Skin color, texture, turgor normal.  No rashes or lesions. Neurologic:  Neurovascularly intact without any focal sensory/motor deficits. Cranial nerves: II-XII intact, grossly non-focal.  Psychiatric:  Alert and oriented, thought content appropriate, normal insight  Capillary Refill: Brisk,< 3 seconds   Peripheral Pulses: +2 palpable, equal bilaterally       Labs:     Recent Labs     12/25/21 1928 12/26/21  0911   WBC 10.8 7.0   HGB 10.0* 8.6*   HCT 31.8* 28.3*    219     Recent Labs     12/25/21 1928 12/26/21  0911    142   K 4.8 4.5    107   CO2 25 26   BUN 33* 32*   CREATININE 1.9* 1.8*   CALCIUM 9.5 8.4*     Recent Labs     12/25/21 1928   AST 23   ALT 12   BILITOT 0.3   ALKPHOS 128*     Recent Labs     12/25/21 2024   INR 1.17*     No results for input(s): Meet Gey in the last 72 hours. Urinalysis:      Lab Results   Component Value Date    NITRU NEGATIVE 12/26/2021    WBCUA 0-2 02/02/2021    BACTERIA NONE SEEN 02/02/2021    RBCUA 0-2 02/02/2021    BLOODU NEGATIVE 12/26/2021    SPECGRAV 1.019 02/02/2021    GLUCOSEU NEGATIVE 12/26/2021       Intake & Output:  I/O last 3 completed shifts:   In: 200 [IV [id number]]  Out: 225 [Urine:225]  I/O this shift:  In: -   Out: 125 [Urine:125]      Radiology:       XR ABDOMEN (KUB) (SINGLE AP VIEW)   Final Result   Nonspecific, nonobstructive bowel gas pattern. **This report has been created using voice recognition software. It may contain minor errors which are inherent in voice recognition technology. **      Final report electronically signed by Dr. Miguel Truong MD on 12/26/2021 8:56 AM      CT ABDOMEN PELVIS WO CONTRAST Additional Contrast? None   Final Result   1. Right-sided pleural effusion with adjacent atelectasis/infiltrate. 2. Prominent fluid-filled small bowel loops with distal bowel gas and fluid, possibly secondary to ileus or partial obstruction. 3. Cholelithiasis. 4. Uncomplicated sigmoid colon diverticulosis. Final report electronically signed by Dr. Claudette Loth on 12/25/2021 9:23 PM      XR CHEST PORTABLE   Final Result   1. Moderate stable cardiomegaly with pulmonary vascular congestion suspicious for congestive heart failure. 2. Prominent pulmonary interstitium suspicious for pulmonary edema. 3. Possible small right-sided pleural effusion. **This report has been created using voice recognition software. It may contain minor errors which are inherent in voice recognition technology. **      Final report electronically signed by Dr. Claudette Loth on 12/25/2021 7:50 PM             Electronically signed by Anjali Aragon MD on 12/26/2021 at 1:30 PM

## 2021-12-26 NOTE — ED NOTES
Pt medicated per MAR. Pt tolerated well. Respirations unlabored.       Miguel HANSEN RN  12/25/21 1152

## 2021-12-26 NOTE — H&P
HISTORY & PHYSICAL       Patient:  Luisana Aceves  YOB: 1932    MRN: [id number]     Acct: [de-identified]    PCP: Codie Yusuf DO    Date of Admission: 12/25/2021    Date of Service: Pt seen/examined on 12/25/2021 and Admitted to Inpatient with expected LOS greater than two midnights due to medical therapy. ASSESSMENT and PLAN:    Active Hospital Problems    Diagnosis Date Noted    Abdominal pain [R10.9] 12/25/2021     Ileus vs partial small bowel obstruction  Has history of multiple abdominal surgeries and multiple complete SBO in the past. Has seen Dr. Alpesh Gregory in the past.  NBNB vomiting x3 today; PRN antiemetics ordered  NPO at this time  No indication for NG decompression  Pain control as needed  General surgery team consulted in the ED    Right sided heart failure  Cardiomegaly  Pulmonary edema  Right sided pleural effusion  Saw nephrology recently and was advised to increase lasix dose which we will continue at this time as kidney function is at baseline  Per OP nephrology documentation from 12/20: \"Continue with lasix 40 mg daily and can take an extra 20-40 mg once or twice a week. \"    Chronic Afib with bradycardia  [id number] score 5  On eliquis which is being held at this time by surgery team likely in anticipation of any possible surgical intervention.  Consider starting heparin gtt considering high risk for clotting as he is actively in afib  Not on any beta blocker d/t bradycardia  Bradycardia is stable and per baseline  EKG reviewed  Admit to tele    Diverticulosis  Cholelithiasis  Incidental findings on CT A/P  These are chronic per pt's son/POA    Essential HTN  Goal BP is <150/90  No indication to aggressively control BP at this time d/t age and risk of SE    Chronic normocytic anemia  CKD  CAD  HLD  Hx of CVA  GERD  BPH  Continue home meds    Code status: Limited x4    Discharge planning: pending clinical improvement, will be discharged to home, expected in about 2 days    Chief Complaint:  Abdominal pain    History Of Present Illness:     80 y.o. male with past medical history of CKD, CAD, hyperlipidemia, CVA, GERD, BPH, multiple abdominal surgeries, recurrent SBO, who is being admitted to [facility] on 12/25/2021 for further evaluation and management of abdominal pain. Patient's son who is also his POA is present in the room during time of interview. Patient states that earlier today around noon, he developed lower abdominal/periumbilical abdominal pain that he describes as severe. He states he was able to tolerate breakfast this morning and was able to sleep for some time after that. He states that after his abdominal pain started, he vomited 3 times. Family believes vomitus was nonbilious nonbloody. Patient denies any other symptoms. Denies any chest pain, shortness of breath, nausea at this time, headaches, lightheadedness, dizziness, vision changes, gait instability, leg pain swelling or cramping. He reports that at this time, his abdominal pain has resolved.     Past Medical History:          Diagnosis Date    Actinic keratoses 8/21/2013    Adenomatous polyp 2008    Atrial fibrillation [address] [address]     CAD (coronary artery disease) 2009    Carotid artery disease ([facility] [address] 2009    left    Hyperlipidemia 2004    Hypertension 2004    Inguinal hernia, left 2/19/2014    Unspecified cerebral artery occlusion with cerebral infarction        Past Surgical History:          Procedure Laterality Date    ABDOMEN SURGERY      ABDOMINAL ADHESION SURGERY  6/2012    Dr Delroy Gamez  2004    right    [facility]ONOSCOPY      [facility]    CORONARY ARTERY BYPASS GRAFT  2009    ENDOSCOPY, COLON, DIAGNOSTIC      EYE SURGERY      SMALL INTESTINE SURGERY      SMALL INTESTINE SURGERY  02/20/2015    Exploratory Laparotomy    UPPER GASTROINTESTINAL ENDOSCOPY N/A 1/26/2021    EGD ESOPHAGOGASTRODUODENOSCOPY PEG TUBE INSERTION performed by Yi Lee MD at [facility]    VASCULAR SURGERY      cabg harvests from right leg       Medications Prior to Admission:      Prior to Admission medications    Medication Sig Start Date End Date Taking? Authorizing Provider   furosemide (LASIX) 40 MG tablet Take 1 tablet by mouth daily 10/18/21   Nataliia Duncan MD   apixaban (ELIQUIS) 2.5 MG TABS tablet Take 1 tablet by mouth 2 times daily 8/18/21   Ezekiel Padilla MD   finasteride (PROSCAR) 5 MG tablet Take 1 tablet by mouth daily 8/18/21   Ezekiel Padilla MD   pantoprazole (PROTONIX) 40 MG tablet Take 40 mg by mouth 2 times daily    Historical Provider, MD   potassium chloride (KLOR-CON M) 20 MEQ extended release tablet Take 1 tablet by mouth daily 4/19/21   Nataliia Duncan MD   tamsulosin [facility]) 0.4 MG capsule Take 1 capsule by mouth daily Capsule may be opened and contents dissolve for peg tube 2/5/21   Megan Dale MD   aspirin 81 MG EC tablet Take 81 mg by mouth daily Indications: Anticoagulant Therapy Take with food. Historical Provider, MD       Allergies:  Patient has no known allergies. Social History:      The patient currently lives at home. TOBACCO:   reports that he quit smoking about 42 years ago. He has never used smokeless tobacco.  ETOH:   reports current alcohol use of about 1.0 standard drink of alcohol per week. Family History:      Positive as follows:        Problem Relation Age of Onset    Cancer Mother        Diet:  No diet orders on file    REVIEW OF SYSTEMS:   Pertinent positives as noted in the HPI. All other systems reviewed and negative. PHYSICAL EXAM:    /60   Pulse 54   Temp 97.5 °F (36.4 °C) (Oral)   Resp 20   SpO2 93%     General appearance:  No apparent distress, appears stated age and cooperative. HEENT:  Normal cephalic, atraumatic without obvious deformity. Pupils equal, round, and reactive to light. Extra ocular muscles intact. Conjunctivae/corneas clear. Neck: Supple, with full range of motion.  No jugular venous distention. Trachea midline. Respiratory:  Normal respiratory effort. Clear to auscultation, bilaterally without Rales/Wheezes/Rhonchi. Cardiovascular:  Irregularly irregular, bradycardic, with normal S1/S2 without murmurs, rubs or gallops. Abdomen: Non-tender, non-distended. No bowel sounds appreciable on auscultation  Musculoskeletal:  No clubbing, cyanosis or edema bilaterally. Full range of motion without deformity. Skin: Skin color, texture, turgor normal.  No rashes or lesions. Neurologic:  Neurovascularly intact without any focal sensory/motor deficits. Cranial nerves: II-XII intact, grossly non-focal.  Psychiatric:  Alert and oriented, thought content appropriate, normal insight  Capillary Refill: Brisk,< 3 seconds   Peripheral Pulses: +2 palpable, equal bilaterally       Labs:     Recent Labs     12/25/21 1928   WBC 10.8   HGB 10.0*   HCT 31.8*        Recent Labs     12/25/21 1928      K 4.8      CO2 25   BUN 33*   CREATININE 1.9*   CALCIUM 9.5     Recent Labs     12/25/21 1928   AST 23   ALT 12   BILITOT 0.3   ALKPHOS 128*     Recent Labs     12/25/21 2024   INR 1.17*     No results for input(s): Curlie Lat in the last 72 hours. Urinalysis:      Lab Results   Component Value Date    NITRU Negative 12/07/2021    WBCUA 0-2 02/02/2021    BACTERIA NONE SEEN 02/02/2021    RBCUA 0-2 02/02/2021    BLOODU Negative 12/07/2021    BLOODU NEGATIVE 08/04/2021    SPECGRAV 1.019 02/02/2021    GLUCOSEU Negative 12/07/2021       Intake & Output:  No intake/output data recorded. I/O this shift:  In: 200 [IV Piggyback:200]  Out: -       Radiology:       CT ABDOMEN PELVIS WO CONTRAST Additional Contrast? None   Final Result   1. Right-sided pleural effusion with adjacent atelectasis/infiltrate. 2. Prominent fluid-filled small bowel loops with distal bowel gas and fluid, possibly secondary to ileus or partial obstruction. 3. Cholelithiasis.    4. Uncomplicated sigmoid colon diverticulosis. Final report electronically signed by Dr. Francoise Dumont on 12/25/2021 9:23 PM      XR CHEST PORTABLE   Final Result   1. Moderate stable cardiomegaly with pulmonary vascular congestion suspicious for congestive heart failure. 2. Prominent pulmonary interstitium suspicious for pulmonary edema. 3. Possible small right-sided pleural effusion. **This report has been created using voice recognition software. It may contain minor errors which are inherent in voice recognition technology. **      Final report electronically signed by Dr. Francoise Dumont on 12/25/2021 7:50 PM               DVT prophylaxis: [] Lovenox                                 [] SCDs                                 [] SQ Heparin                                 [] Encourage ambulation           [x] Already on Anticoagulation    PT/OT Eval Status: n/a    Disposition:    [] Home       [] TCU       [] Rehab       [] Psych       [] SNF       [] [place]       [] Other-    Case discussed with Dr. Sonam Alexander    Thank you Zhang Valdez DO for the opportunity to be involved in this patient's care.     Electronically signed by Umer Tellez MD on 12/25/2021 at 10:53 PM

## 2021-12-26 NOTE — ED NOTES
ED to inpatient nurses report    Chief Complaint   Patient presents with    Atrial Fibrillation      Present to ED from home  LOC: alert and orientated to name, place, date  Vital signs   Vitals:    12/25/21 2020 12/25/21 2050 12/25/21 2131 12/25/21 2245   BP: (!) 150/53 138/80 (!) 145/57 126/60   Pulse: 52 56 58 54   Resp: 18 25 18 20   Temp:       TempSrc:       SpO2: 98% 94% 94% 93%      Oxygen Baseline room air    Current needs required room air Bipap/Cpap No  LDAs:   Peripheral IV 12/25/21 Left Antecubital (Active)   Site Assessment Clean;Dry; Intact 12/25/21 1938   Line Status Normal saline locked;Brisk blood return;Specimen collected 12/25/21 1938   Dressing Status Clean;Dry; Intact 12/25/21 1938   Dressing Intervention New 12/25/21 1938     Mobility: Requires assistance * 1  Pending ED orders: none  Present condition: pt resting on cot with unlabored respirations.        Electronically signed by Neal Redding RN on 12/25/2021 at 10:48 PM       723 Humberto Cox RN  12/25/21 [id number]

## 2021-12-26 NOTE — ACP (ADVANCE CARE PLANNING)
Advance Care Planning     Advance Care Planning Activator (Inpatient)  Conversation Note      Date of ACP Conversation: 12/25/2021     Conversation Conducted with:  Healthcare Decision Maker: Named in Advance Directive or Healthcare Power of  (name) janett Hill    ACP Activator: Chele Mesa RN, BSN, Xiomara Looney 83 Decision Maker:     Current Designated Health Care Decision Maker: Nena Swain [phone]    Today we documented Decision Maker(s). The patient will provide ACP documents. Care Preferences    Ventilation: \"If you were in your present state of health and suddenly became very ill and were unable to breathe on your own, what would your preference be about the use of a ventilator (breathing machine) if it were available to you? \"      Would the patient desire the use of ventilator (breathing machine)?: no    \"If your health worsens and it becomes clear that your chance of recovery is unlikely, what would your preference be about the use of a ventilator (breathing machine) if it were available to you? \"     Would the patient desire the use of ventilator (breathing machine)?: No      Resuscitation  \"CPR works best to restart the heart when there is a sudden event, like a heart attack, in someone who is otherwise healthy. Unfortunately, CPR does not typically restart the heart for people who have serious health conditions or who are very sick. \"    \"In the event your heart stopped as a result of an underlying serious health condition, would you want attempts to be made to restart your heart (answer \"yes\" for attempt to resuscitate) or would you prefer a natural death (answer \"no\" for do not attempt to resuscitate)? \" no       [x] Yes   [] No   Educated Patient / Hawk Godinez regarding differences between Advance Directives and portable DNR orders.     Length of ACP Conversation in minutes:  20  Conversation Outcomes:  [x] ACP discussion completed  [x] Existing advance directive reviewed with patient; no changes to patient's previously recorded wishes  [] New Advance Directive completed  [] Portable Do Not Rescitate prepared for Provider review and signature  [] POLST/POST/MOLST/MOST prepared for Provider review and signature      Follow-up plan:    [] Schedule follow-up conversation to continue planning  [] Referred individual to Provider for additional questions/concerns   [] Advised patient/agent/surrogate to review completed ACP document and update if needed with changes in condition, patient preferences or care setting    [] This note routed to one or more involved healthcare providers     Patient alert, did not participate in conversation. Son in room. It is listed on chart that patient's POA is active. Advance Directives are no longer on file. Son is employee and will send in. DNR-CCA and script for no intubation on file. I placed DNR in room and indicated that for an admit, he would want Limited x4 for no compressions, no shock, no resuscitative meds, no intubation. Routed to [employer].

## 2021-12-26 NOTE — ED NOTES
Pt medicated per MAR. Pt tolerated well. Respirations unlabored.       Gilford Hane LRDHKO, RN  12/25/21 2038

## 2021-12-26 NOTE — CONSULTS
[facility]  General Surgery Consultation - Court Teodoro, APRN - CNP  On behalf of Dr. Dee Dee Church    Pt Name: Mac Buckner  MRN: [id number]  YOB: 1932  Date of evaluation: 12/26/2021  Primary Care Physician: Zhang Valdez DO  Patient evaluated at the request of  Dr. Devan Morales  Reason for evaluation: abd pain  IMPRESSIONS:   1. Partial small bowel obstruction   2. Chronic intermittent atrial fibrillation on blood thinners  3. Hypertension  4. Prior small bowel resection for patchy ischemia   has a past medical history of Actinic keratoses, Adenomatous polyp, Atrial fibrillation ([address] CAD (coronary artery disease), Carotid artery disease ([address] Hyperlipidemia, Hypertension, Inguinal hernia, left, and Unspecified cerebral artery occlusion with cerebral infarction. RECOMMENDATIONS:   1. NPO  2. If develops nausea & vomiting, insert NGT to LIWS  3. Analgesics and antiemetics  4. KUB in AM  5. Hold Eliquis  SUBJECTIVE:   History of Chief Complaint:    Tyesha Hunt is a 80 y.o.male who presents with abdominal pain. The pain is described as cramping, and is moderate to severe in intensity. Pain is located diffusely without radiation. Onset was several hours ago. Symptoms have been gradually worsening since. Aggravating factors include eating. Alleviating factors include none. Associated symptoms include nausea and vomiting. He denies chills, constipation, diarrhea, fever and sweats. He admits to history of previous surgery including exploratory laparotomy in 2012 and 2015 and denies history of hepatitis, inflammatory bowel disease, pancreatitis, jaundice, colitis and ulcer disease. Previous studies include CT scan.     Past Medical History   has a past medical history of Actinic keratoses, Adenomatous polyp, Atrial fibrillation ([address] CAD (coronary artery disease), Carotid artery disease ([address] Hyperlipidemia, Hypertension, Inguinal hernia, left, and Unspecified cerebral artery occlusion with cerebral infarction. Past Surgical History   has a past surgical history that includes Cataract removal (2004); Coronary artery bypass graft (2009); Colonoscopy; Abdominal adhesion surgery (6/2012); Small intestine surgery; Small intestine surgery (02/20/2015); Cardiac surgery; Abdomen surgery; Upper gastrointestinal endoscopy (N/A, 1/26/2021); eye surgery; vascular surgery; and Endoscopy, colon, diagnostic. Medications  Prior to Admission medications    Medication Sig Start Date End Date Taking? Authorizing Provider   furosemide (LASIX) 40 MG tablet Take 1 tablet by mouth daily 10/18/21   Denise Lo MD   apixaban (ELIQUIS) 2.5 MG TABS tablet Take 1 tablet by mouth 2 times daily 8/18/21   Mouna Harris MD   finasteride (PROSCAR) 5 MG tablet Take 1 tablet by mouth daily 8/18/21   Mouna Harris MD   pantoprazole (PROTONIX) 40 MG tablet Take 40 mg by mouth 2 times daily    Historical Provider, MD   potassium chloride (KLOR-CON M) 20 MEQ extended release tablet Take 1 tablet by mouth daily 4/19/21   Denise Lo MD   tamsulosin [facility]) 0.4 MG capsule Take 1 capsule by mouth daily Capsule may be opened and contents dissolve for peg tube 2/5/21   Al Yap MD   aspirin 81 MG EC tablet Take 81 mg by mouth daily Indications: Anticoagulant Therapy Take with food. Historical Provider, MD    Scheduled Meds:  Continuous Infusions:  PRN Meds:. Allergies  has No Known Allergies. Family History  family history includes Cancer in his mother. Social History   reports that he quit smoking about 42 years ago. He has never used smokeless tobacco. He reports current alcohol use of about 1.0 standard drink of alcohol per week. He reports that he does not use drugs.   Review of Systems  General Denies any fever or chills  HEENT Denies any diplopia, tinnitus or vertigo  Resp Denies any shortness of breath, cough or wheezing  Cardiac Denies any chest pain, palpitations, claudication or edema  GI Positive for nausea and vomiting  Negative for diarrhea and constipation   Denies any frequency, urgency, hesitancy or incontinence  Heme Denies bruising or bleeding easily  Endocrine Denies any history of diabetes or thyroid disease  Neuro Denies any focal motor or sensory deficits  SUBJECTIVE:   CURRENT VITALS:  oral temperature is 97.5 °F (36.4 °C). His blood pressure is 126/60 and his pulse is 54. His respiration is 20 and oxygen saturation is 93%. There is no height or weight on file to calculate BMI. Temperature Range (24h):Temp: 97.5 °F (36.4 °C) Temp  Av.5 °F (36.4 °C)  Min: 97.5 °F (36.4 °C)  Max: 97.5 °F (36.4 °C)  BP Range (02I): Systolic (48BHD), BYN:977 , Min:126 , SKF:066     Diastolic (80OXV), DXB:53, Min:53, Max:80    Pulse Range (24h): Pulse  Av.8  Min: 52  Max: 58  Respiration Range (24h): Resp  Av.4  Min: 16  Max: 25  Current Pulse Ox (24h):  SpO2: 93 %  Pulse Ox Range (24h):  SpO2  Av.4 %  Min: 93 %  Max: 98 %  Oxygen Amount and Delivery:    CONSTITUTIONAL: Alert and oriented times 3, no acute distress and cooperative to examination with proper mood and affect. SKIN: Skin color, texture, turgor normal. No rashes or lesions. LYMPH: no cervical nodes, no inguinal nodes  HEENT: Head is normocephalic, atraumatic. EOMI, PERRLA. NECK: Supple, symmetrical, trachea midline, no adenopathy, thyroid symmetric, not enlarged and no tenderness, skin normal.  CHEST/LUNGS: chest symmetric with normal A/P diameter, normal respiratory rate and rhythm, lungs clear to auscultation without wheezes, rales or rhonchi. No accessory muscle use. Scars Median sternotomy   CARDIOVASCULAR: Heart sounds are normal.  Irregularly irregular rate and rhythm without murmur, gallop or rub. Normal S1 and S2. Carotid and femoral pulses 2+/4 and equal bilaterally. ABDOMEN: Mildly distended. Epigastric and large midline scars present. Hypoactive bowel sounds. No bruits. No masses or organomegaly.  Percussion: Normal without hepatosplenomegally. Tenderness: absent. RECTAL: deferred, not clinically indicated  NEUROLOGIC: There are no focalizing motor or sensory deficits. CN II-XII are grossly intact. Paullette Rakes EXTREMITIES: no cyanosis, no clubbing and no edema. LABS:     Recent Labs     12/25/21 1928 12/25/21 2024   WBC 10.8  --    HGB 10.0*  --    HCT 31.8*  --      --      --    K 4.8  --      --    CO2 25  --    BUN 33*  --    CREATININE 1.9*  --    CALCIUM 9.5  --    INR  --  1.17*   AST 23  --    ALT 12  --    BILITOT 0.3  --    LIPASE 45.9  --    LACTA  --  1.5     RADIOLOGY:     CT ABDOMEN PELVIS WO CONTRAST Additional Contrast? None   Final Result   1. Right-sided pleural effusion with adjacent atelectasis/infiltrate. 2. Prominent fluid-filled small bowel loops with distal bowel gas and fluid, possibly secondary to ileus or partial obstruction. 3. Cholelithiasis. 4. Uncomplicated sigmoid colon diverticulosis. Final report electronically signed by Dr. Wojciech Hernandez on 12/25/2021 9:23 PM      XR CHEST PORTABLE   Final Result   1. Moderate stable cardiomegaly with pulmonary vascular congestion suspicious for congestive heart failure. 2. Prominent pulmonary interstitium suspicious for pulmonary edema. 3. Possible small right-sided pleural effusion. **This report has been created using voice recognition software. It may contain minor errors which are inherent in voice recognition technology. **      Final report electronically signed by Dr. Wojciech Hernandez on 12/25/2021 7:50 PM          Thank you for the interesting evaluation. Further recommendations to follow. Electronically signed by ROBBIE Jimenez CNP on 12/26/2021 at 12:08 AM  Patient seen this a.m. independently. States since he had a Bentyl injection he has had no pain. No nausea or emesis. He would like to try clear liquids. Follow-up films appears more constipated than anything.   Loop of bowel in the left upper quadrant. Recommend trying clear liquids monitor clinically. If continues to do well and progresses may resume anticoagulation. All data and imaging reviewed and patient examined. Abdomen is benign. Bowel sounds are present. Patient reports passing flatus no bowel movement.

## 2021-12-27 PROCEDURE — 1200000003 HC TELEMETRY R&B

## 2021-12-27 PROCEDURE — 99232 SBSQ HOSP IP/OBS MODERATE 35: CPT | Performed by: SURGERY

## 2021-12-27 PROCEDURE — 2580000003 HC RX 258: Performed by: STUDENT IN AN ORGANIZED HEALTH CARE EDUCATION/TRAINING PROGRAM

## 2021-12-27 PROCEDURE — 99232 SBSQ HOSP IP/OBS MODERATE 35: CPT | Performed by: NURSE PRACTITIONER

## 2021-12-27 PROCEDURE — 6370000000 HC RX 637 (ALT 250 FOR IP): Performed by: STUDENT IN AN ORGANIZED HEALTH CARE EDUCATION/TRAINING PROGRAM

## 2021-12-27 PROCEDURE — APPSS30 APP SPLIT SHARED TIME 16-30 MINUTES: Performed by: NURSE PRACTITIONER

## 2021-12-27 RX ADMIN — FINASTERIDE 5 MG: 5 TABLET, FILM COATED ORAL at 08:51

## 2021-12-27 RX ADMIN — FUROSEMIDE 40 MG: 40 TABLET ORAL at 08:51

## 2021-12-27 RX ADMIN — PANTOPRAZOLE SODIUM 40 MG: 40 TABLET, DELAYED RELEASE ORAL at 15:38

## 2021-12-27 RX ADMIN — SODIUM CHLORIDE: 9 INJECTION, SOLUTION INTRAVENOUS at 00:02

## 2021-12-27 RX ADMIN — POTASSIUM CHLORIDE 20 MEQ: 1500 TABLET, EXTENDED RELEASE ORAL at 08:51

## 2021-12-27 RX ADMIN — SODIUM CHLORIDE: 9 INJECTION, SOLUTION INTRAVENOUS at 23:39

## 2021-12-27 RX ADMIN — PANTOPRAZOLE SODIUM 40 MG: 40 TABLET, DELAYED RELEASE ORAL at 08:55

## 2021-12-27 RX ADMIN — TAMSULOSIN HYDROCHLORIDE 0.4 MG: 0.4 CAPSULE ORAL at 08:51

## 2021-12-27 ASSESSMENT — PAIN SCALES - GENERAL
PAINLEVEL_OUTOF10: 0

## 2021-12-27 NOTE — CARE COORDINATION
12/27/21, 12:54 PM EST  DISCHARGE PLANNING EVALUATION:    Kari Cardenas       Admitted: 12/25/2021/ [facility] day: 2   Location: [facility]27/027-A Reason for admit: Bradycardia [R00.1]  Abdominal pain [R10.9]  Partial intestinal obstruction, unspecified cause ([facility] [address] [K56.600]   PMH:  has a past medical history of Actinic keratoses, Adenomatous polyp, Atrial fibrillation ([facility] [address] CAD (coronary artery disease), Carotid artery disease ([facility] [address] Hyperlipidemia, Hypertension, Inguinal hernia, left, and Unspecified cerebral artery occlusion with cerebral infarction. Procedure: no  Barriers to Discharge:  General surgery consult for Ileus vs SBO. Conservative treatment. No surgery recommended. Has Large BM yesterday. Improving. Increased to [facility] diet today. Up with assistance. PCP: Zuleika Weinberg DO  Readmission Risk Score: 19.3 ( )%    Patient Goals/Plan/Treatment Preferences: I spoke with John Lauren and his son. Planning home alone at discharge. Son is physician. Has family support. Transportation/Food Security/Housekeeping Addressed:  No issues identified.

## 2021-12-27 NOTE — PROGRESS NOTES
[facility] - Dr. Tamea Glenn  Daily Progress Note    Pt Name: Edmond Anderson Record Number: [id number]  Date of Birth 3/30/1932   Today's Date: 12/27/2021    Hospital day # 2     ASSESSMENT   1. Partial SBO  2. Sudden onset abdominal pain  3. Chronic atrial fibrillation  4. Bradycardia  5. CKD  6. Hypertension   7. Obesity (BMI 31)   has a past medical history of Actinic keratoses, Adenomatous polyp, Atrial fibrillation (HCC), CAD (coronary artery disease), Carotid artery disease ([address] Hyperlipidemia, Hypertension, Inguinal hernia, left, and Unspecified cerebral artery occlusion with cerebral infarction. PLAN   1. KUB yesterday with nonobstructive bowel gas pattern  2. Tolerating clear liquids. Okay for full liquids. 3. Having bowel function   4. Hold [address] today yet, but if doing well with diet tomorrow and abdomen remains benign then okay to resume Eliquis  5. Denies any abdominal pain  6. Up with assistance  7. Abdomen benign. Increase diet as tolerated as long as no abdominal pain and still having bowel function. No surgical intervention at this time. SUBJECTIVE   Chief complaint: No complaints    Patient was stable overnight. Chart reviewed. Updated by nursing staff. Bradycardic. No fevers. Denies chest discomfort or dyspnea. No N/V; (+) belching, flatus and having soft BMs. Tolerating clear liquid diet. Denies any abdominal pain. No urinary complaints.    CURRENT MEDICATIONS   Scheduled Meds:   finasteride  5 mg Oral Daily    pantoprazole  40 mg Oral BID AC    potassium chloride  20 mEq Oral Daily    tamsulosin  0.4 mg Oral Daily    sodium chloride flush  5-40 mL IntraVENous 2 times per day    furosemide  40 mg Oral Daily     Continuous Infusions:   sodium chloride      sodium chloride 50 mL/hr at 12/27/21 0422     PRN Meds:.sodium chloride flush, sodium chloride, ondansetron **OR** ondansetron, polyethylene glycol, acetaminophen **OR** acetaminophen, morphine **OR** [DISCONTINUED] morphine  OBJECTIVE   CURRENT VITALS:  oral temperature is 97.8 °F (36.6 °C). His blood pressure is 131/62 and his pulse is 48 (abnormal). His respiration is 16 and oxygen saturation is 96%. Temperature Range (24h):Temp: 97.8 °F (36.6 °C) Temp  Av.2 °F (36.8 °C)  Min: 97.8 °F (36.6 °C)  Max: 98.4 °F (36.9 °C)  BP Range (57F): Systolic (31LFR), WVC:516 , Min:104 , NZV:385     Diastolic (34YED), EDWIN:71, Min:51, Max:65    Pulse Range (24h): Pulse  Av  Min: 48  Max: 55  Respiration Range (24h): Resp  Av  Min: 16  Max: 18  Current Pulse Ox (24h):  SpO2: 96 %  Pulse Ox Range (24h):  SpO2  Av.8 %  Min: 93 %  Max: 96 %  Oxygen Amount and Delivery:    Incentive Spirometry Tx:            GENERAL: alert, cooperative, no distress  SKIN: Skin color, texture, turgor normal  HEENT: Head is normocephalic, atraumatic. NECK: Supple, symmetrical, trachea midline  LUNGS: clear to ausculation, without wheezes, rales or rhonci  HEART: bradycardic with irregular rhythm  ABDOMEN: soft & rounded, non-tender, non-distended, bowel sounds hypoactive. No guarding or peritoneal signs. NEUROLOGIC: There are no focalizing motor or sensory deficits. EXTREMITIES: no cyanosis, no clubbing and no edema. In: 1554.7 [P.O.:400;  I.V.:1154.7]  Out: 100 [Urine:100]  Date 21 0000 - 21   Shift 7758-2717 7281-9778 9229-4786 24 Hour Total   INTAKE   P.O. 300   300   I.V. 1154.7   1154.7   Shift Total 1454.7   1454.7   OUTPUT   Urine 100   100   Shift Total 100   100   Weight (kg)         LABS     Recent Labs     21  1928 21  0911   WBC 10.8 7.0   HGB 10.0* 8.6*   HCT 31.8* 28.3*    219    142   K 4.8 4.5    107   CO2 25 26   BUN 33* 32*   CREATININE 1.9* 1.8*   CALCIUM 9.5 8.4*      Recent Labs     21   INR 1.17*     Recent Labs     21   AST 23  --    ALT 12  --    BILITOT 0.3  --    LIPASE 45.9  --    LACTA  --  1.5 Recent Labs     12/25/21 1928   TROPONINT 0.024*     RADIOLOGY     PROCEDURE: XR ABDOMEN (KUB) (SINGLE AP VIEW)       CLINICAL INFORMATION: Follow up partial SBO.       COMPARISON: 1/29/2021.       TECHNIQUE: Single projection of the abdomen and pelvis.       FINDINGS:    There is scattered stool and gas throughout the large bowel. There is scattered gas in mildly prominent small bowel loops. No abnormal calcifications are identified. There are stable degenerative changes of the spine.               Impression   Nonspecific, nonobstructive bowel gas pattern.                   **This report has been created using voice recognition software. It may contain minor errors which are inherent in voice recognition technology. **       Final report electronically signed by Dr. Bebeto Lino MD on 12/26/2021 8:56 AM     Electronically signed by ROBBIE Medley CNP on 12/27/2021 at 11:23 AM     Patient seen and examined independently by me earlier this AM. Above discussed and I agree with Dio Jerez CNP. See my additional comments below for updated orders and plan. Labs, cultures, and radiographs where available were reviewed. I discussed patient concerns with the patient's nurse and instructions were given. Please see our orders for the updated patient care plan. -KUB yesterday reviewed and demonstrating no significant obstruction. Tolerating clear liquids. Advance to full liquids and if continuing to do well on bowel function returning then advance diet as tolerated. Okay to resume Eliquis tomorrow if doing well with diet today. Abdomen benign. Ambulate with assistance. Continue conservative treatment plan.     Electronically signed by Marcelle Green MD on 12/27/2021 at 11:48 AM

## 2021-12-27 NOTE — PROGRESS NOTES
Hospitalist Progress Note      Patient:  Penelope Read    Unit/Bed:7K-27/027-A  YOB: 1932  MRN: [id number]   Acct: [de-identified]     PCP: Alonzo Hedrick DO  Date of Admission: 12/25/2021    small bowel obstruction  Has history of multiple abdominal surgeries and multiple complete SBO in the past. Has seen Dr. Marcello Juarez in the past.  NBNB vomiting x3 on admission; PRN antiemetics ordered  No indication for NG decompression  + flatus and BM. Pain control as needed  Holding Eliquis. General surgery team following  KUB with non obstructing bowel pattern. Tolerating clears, plans to advance to full liquid diet today. .          Acute on chronic right sided heart failure  Cardiomegaly  Pulmonary edema  Right sided pleural effusion  Saw nephrology recently and was advised to increase lasix dose which we will continue at this time as kidney function is at baseline  Per OP nephrology documentation from 12/20: \"Continue with lasix 40 mg daily and can take an extra 20-40 mg once or twice a week. \"    Chronic Afib with bradycardia  [id number] score 5  On eliquis which is being held at this time by surgery team likely in anticipation of any possible surgical intervention. Not on any beta blocker d/t bradycardia  Bradycardia is stable and per baseline      Diverticulosis  Cholelithiasis  Incidental findings on CT A/P  These are chronic per pt's son/POA    Essential HTN  Goal BP is <150/90  No indication to aggressively control BP at this time d/t age and risk of SE    Chronic normocytic anemia  CKD  CAD  HLD  Hx of CVA  GERD  BPH  Continue home meds    Code status: Limited x4      Chief Complaint:  Abdominal pain    History Of Present Illness:     80 y.o. male with past medical history of CKD, CAD, hyperlipidemia, CVA, GERD, BPH, multiple abdominal surgeries, recurrent SBO, who is being admitted to [address] [place] [address] on 12/25/2021 for further evaluation and management of abdominal pain.   Patient's son who is also his POA is present in the room during time of interview. Patient states that earlier today around noon, he developed lower abdominal/periumbilical abdominal pain that he describes as severe. He states he was able to tolerate breakfast this morning and was able to sleep for some time after that. He states that after his abdominal pain started, he vomited 3 times. Family believes vomitus was nonbilious nonbloody. Patient denies any other symptoms. Denies any chest pain, shortness of breath, nausea at this time, headaches, lightheadedness, dizziness, vision changes, gait instability, leg pain swelling or cramping. He reports that at this time, his abdominal pain has resolved. Subjective:- (Last 24 hours)    Denies abdominal pain. Tolerating clears. Wants to eat more. + flatus and BM. Scheduled Meds:   finasteride  5 mg Oral Daily    pantoprazole  40 mg Oral BID AC    potassium chloride  20 mEq Oral Daily    tamsulosin  0.4 mg Oral Daily    sodium chloride flush  5-40 mL IntraVENous 2 times per day    furosemide  40 mg Oral Daily     Continuous Infusions:   sodium chloride      sodium chloride 50 mL/hr at 12/27/21 0422     PRN Meds:.sodium chloride flush, sodium chloride, ondansetron **OR** ondansetron, polyethylene glycol, acetaminophen **OR** acetaminophen, morphine **OR** [DISCONTINUED] morphine     Diet:  ADULT DIET; Full Liquid    REVIEW OF SYSTEMS:   Pertinent positives as noted in the HPI. All other systems reviewed and negative. PHYSICAL EXAM:    BP (!) 121/58   Pulse (!) 44   Temp 97.6 °F (36.4 °C) (Oral)   Resp 18   SpO2 95%     General appearance:  No apparent distress, appears stated age and cooperative. HEENT:  Normal cephalic, atraumatic without obvious deformity. Pupils equal, round, and reactive to light. Extra ocular muscles intact. Conjunctivae/corneas clear. Neck: Supple, with full range of motion. No jugular venous distention. Trachea midline.   Respiratory:  Normal respiratory effort. Clear to auscultation, bilaterally without Rales/Wheezes/Rhonchi. Cardiovascular:  Irregularly irregular, bradycardic, with normal S1/S2 without murmurs, rubs or gallops. Abdomen: Non-tender, non-distended. No bowel sounds appreciable on auscultation  Musculoskeletal:  No clubbing, cyanosis or edema bilaterally. Full range of motion without deformity. Skin: Skin color, texture, turgor normal.  No rashes or lesions. Neurologic:  Neurovascularly intact without any focal sensory/motor deficits. Cranial nerves: II-XII intact, grossly non-focal.  Psychiatric:  Alert and oriented, thought content appropriate, normal insight  Capillary Refill: Brisk,< 3 seconds   Peripheral Pulses: +2 palpable, equal bilaterally       Labs:     Recent Labs     12/25/21 1928 12/26/21  0911   WBC 10.8 7.0   HGB 10.0* 8.6*   HCT 31.8* 28.3*    219     Recent Labs     12/25/21 1928 12/26/21  0911    142   K 4.8 4.5    107   CO2 25 26   BUN 33* 32*   CREATININE 1.9* 1.8*   CALCIUM 9.5 8.4*     Recent Labs     12/25/21 1928   AST 23   ALT 12   BILITOT 0.3   ALKPHOS 128*     Recent Labs     12/25/21 2024   INR 1.17*     No results for input(s): Adonica Hoose in the last 72 hours. Urinalysis:      Lab Results   Component Value Date    NITRU NEGATIVE 12/26/2021    WBCUA 0-2 02/02/2021    BACTERIA NONE SEEN 02/02/2021    RBCUA 0-2 02/02/2021    BLOODU NEGATIVE 12/26/2021    SPECGRAV 1.019 02/02/2021    GLUCOSEU NEGATIVE 12/26/2021       Intake & Output:  I/O last 3 completed shifts: In: 1974.7 [P.O.:820; I.V.:1154.7]  Out: 475 [Urine:475]  I/O this shift: In: 700 [P.O.:700]  Out: -       Radiology:       XR ABDOMEN (KUB) (SINGLE AP VIEW)   Final Result   Nonspecific, nonobstructive bowel gas pattern. **This report has been created using voice recognition software. It may contain minor errors which are inherent in voice recognition technology. **      Final report electronically signed by Dr. Jacqueline Dumont MD on 12/26/2021 8:56 AM      CT ABDOMEN PELVIS WO CONTRAST Additional Contrast? None   Final Result   1. Right-sided pleural effusion with adjacent atelectasis/infiltrate. 2. Prominent fluid-filled small bowel loops with distal bowel gas and fluid, possibly secondary to ileus or partial obstruction. 3. Cholelithiasis. 4. Uncomplicated sigmoid colon diverticulosis. Final report electronically signed by Dr. Mitra Resendiz on 12/25/2021 9:23 PM      XR CHEST PORTABLE   Final Result   1. Moderate stable cardiomegaly with pulmonary vascular congestion suspicious for congestive heart failure. 2. Prominent pulmonary interstitium suspicious for pulmonary edema. 3. Possible small right-sided pleural effusion. **This report has been created using voice recognition software. It may contain minor errors which are inherent in voice recognition technology. **      Final report electronically signed by Dr. Mitra Resendiz on 12/25/2021 7:50 PM             Electronically signed by ROBBIE Guadalupe CNP on 12/27/2021 at 2:30 PM

## 2021-12-27 NOTE — PROGRESS NOTES
Physician Progress Note      PATIENT:               Rolan Soto  CSN #:                  [id number]  :                       3/30/1932  ADMIT DATE:       2021 7:21 PM  [address] [place] DATE:  RESPONDING  PROVIDER #:        Kg Spann CNP          QUERY TEXT:    Pt admitted with partial SBO and has right sided heart failure/pulmonary edema   documented. If possible, please document in progress notes and discharge   summary further specificity regarding the type and acuity of CHF:    The medical record reflects the following:    Risk Factors: HTN, HLD  Clinical Indicators: Echo 21 EF 50% right ventricle global systolic   function is severely reduced, CXR: Moderate stable cardiomegaly with pulmonary   vascular congestion suspicious for congestive heart failure. Treatment: IV Lasix x1 then switched to PO    Thank you!     Marco Ortiz, CRCR  RN Clinical   P: [phone]  Options provided:  -- Acute Diastolic CHF/HFpEF  -- Acute Systolic and Diastolic CHF  -- Chronic Diastolic CHF/HFpEF  -- Chronic Systolic and Diastolic CHF  -- Acute non cardiogenic pulmonary edema  -- Other - I will add my own diagnosis  -- Disagree - Not applicable / Not valid  -- Disagree - Clinically unable to determine / Unknown  -- Refer to Clinical Documentation Reviewer    PROVIDER RESPONSE TEXT:    Right sided heart failure    Query created by: Maria Antonia Henderson on 2021 12:58 PM      Electronically signed by:  Nick Spann CNP 2021 2:34   PM

## 2021-12-28 LAB
ANION GAP SERPL CALCULATED.3IONS-SCNC: 9 MEQ/L (ref 8–16)
BASOPHILS # BLD: 0.4 %
BASOPHILS ABSOLUTE: 0 THOU/MM3 (ref 0–0.1)
BUN BLDV-MCNC: 24 MG/DL (ref 7–22)
CALCIUM SERPL-MCNC: 8.2 MG/DL (ref 8.5–10.5)
CHLORIDE BLD-SCNC: 105 MEQ/L (ref 98–111)
CO2: 23 MEQ/L (ref 23–33)
CREAT SERPL-MCNC: 1.8 MG/DL (ref 0.4–1.2)
EOSINOPHIL # BLD: 3.4 %
EOSINOPHILS ABSOLUTE: 0.2 THOU/MM3 (ref 0–0.4)
ERYTHROCYTE [DISTWIDTH] IN BLOOD BY AUTOMATED COUNT: 20 % (ref 11.5–14.5)
ERYTHROCYTE [DISTWIDTH] IN BLOOD BY AUTOMATED COUNT: 61 FL (ref 35–45)
GFR SERPL CREATININE-BSD FRML MDRD: 36 ML/MIN/1.73M2
GLUCOSE BLD-MCNC: 88 MG/DL (ref 70–108)
HCT VFR BLD CALC: 26.8 % (ref 42–52)
HEMOGLOBIN: 8.2 GM/DL (ref 14–18)
IMMATURE GRANS (ABS): 0.01 THOU/MM3 (ref 0–0.07)
IMMATURE GRANULOCYTES: 0.2 %
LYMPHOCYTES # BLD: 21 %
LYMPHOCYTES ABSOLUTE: 1.1 THOU/MM3 (ref 1–4.8)
MCH RBC QN AUTO: 25.3 PG (ref 26–33)
MCHC RBC AUTO-ENTMCNC: 30.6 GM/DL (ref 32.2–35.5)
MCV RBC AUTO: 82.7 FL (ref 80–94)
MONOCYTES # BLD: 15.8 %
MONOCYTES ABSOLUTE: 0.8 THOU/MM3 (ref 0.4–1.3)
NUCLEATED RED BLOOD CELLS: 0 /100 WBC
PLATELET # BLD: 177 THOU/MM3 (ref 130–400)
PMV BLD AUTO: 10.3 FL (ref 9.4–12.4)
POTASSIUM SERPL-SCNC: 4.2 MEQ/L (ref 3.5–5.2)
RBC # BLD: 3.24 MILL/MM3 (ref 4.7–6.1)
SEG NEUTROPHILS: 59.2 %
SEGMENTED NEUTROPHILS ABSOLUTE COUNT: 3 THOU/MM3 (ref 1.8–7.7)
SODIUM BLD-SCNC: 137 MEQ/L (ref 135–145)
WBC # BLD: 5 THOU/MM3 (ref 4.8–10.8)

## 2021-12-28 PROCEDURE — 99232 SBSQ HOSP IP/OBS MODERATE 35: CPT | Performed by: NURSE PRACTITIONER

## 2021-12-28 PROCEDURE — 99232 SBSQ HOSP IP/OBS MODERATE 35: CPT | Performed by: SURGERY

## 2021-12-28 PROCEDURE — 36415 COLL VENOUS BLD VENIPUNCTURE: CPT

## 2021-12-28 PROCEDURE — 85025 COMPLETE CBC W/AUTO DIFF WBC: CPT

## 2021-12-28 PROCEDURE — 97116 GAIT TRAINING THERAPY: CPT

## 2021-12-28 PROCEDURE — 6370000000 HC RX 637 (ALT 250 FOR IP): Performed by: NURSE PRACTITIONER

## 2021-12-28 PROCEDURE — 80048 BASIC METABOLIC PNL TOTAL CA: CPT

## 2021-12-28 PROCEDURE — 97162 PT EVAL MOD COMPLEX 30 MIN: CPT

## 2021-12-28 PROCEDURE — 1200000003 HC TELEMETRY R&B

## 2021-12-28 PROCEDURE — 6370000000 HC RX 637 (ALT 250 FOR IP): Performed by: STUDENT IN AN ORGANIZED HEALTH CARE EDUCATION/TRAINING PROGRAM

## 2021-12-28 PROCEDURE — APPSS30 APP SPLIT SHARED TIME 16-30 MINUTES: Performed by: NURSE PRACTITIONER

## 2021-12-28 RX ADMIN — FINASTERIDE 5 MG: 5 TABLET, FILM COATED ORAL at 09:11

## 2021-12-28 RX ADMIN — APIXABAN 2.5 MG: 2.5 TABLET, FILM COATED ORAL at 21:03

## 2021-12-28 RX ADMIN — POTASSIUM CHLORIDE 20 MEQ: 1500 TABLET, EXTENDED RELEASE ORAL at 09:11

## 2021-12-28 RX ADMIN — PANTOPRAZOLE SODIUM 40 MG: 40 TABLET, DELAYED RELEASE ORAL at 15:10

## 2021-12-28 RX ADMIN — FUROSEMIDE 40 MG: 40 TABLET ORAL at 09:11

## 2021-12-28 RX ADMIN — PANTOPRAZOLE SODIUM 40 MG: 40 TABLET, DELAYED RELEASE ORAL at 05:28

## 2021-12-28 RX ADMIN — TAMSULOSIN HYDROCHLORIDE 0.4 MG: 0.4 CAPSULE ORAL at 09:11

## 2021-12-28 RX ADMIN — APIXABAN 2.5 MG: 2.5 TABLET, FILM COATED ORAL at 15:29

## 2021-12-28 ASSESSMENT — PAIN SCALES - GENERAL
PAINLEVEL_OUTOF10: 0

## 2021-12-28 NOTE — PROGRESS NOTES
Exceptions to [facility]  Hearing Exceptions: Hard of hearing/hearing concerns,Bilateral hearing aid         Pain: denies    Vitals: Vitals not assessed per clinical judgement, see nursing flowsheet  Nurse checked vitals prior to session    Social/Functional History:    Lives With: Alone  Type of Home: House (condo)  Home Layout: One level  Home Access: Stairs to enter with rails  Entrance Stairs - Number of Steps: 1 WAYNE     Bathroom Shower/Tub: Walk-in shower       ADL Assistance: Independent  Homemaking Assistance: Independent  Ambulation Assistance: Independent  Transfer Assistance: Independent    Active : Yes     Additional Comments: Pt reports he was IND with all tasks prior. Pt completed meal prep, IND with ADLs and mobility tasks with RW for support.     OBJECTIVE:  Range of Motion:  Bilateral Lower Extremity: WNL    Strength:  Bilateral Lower Extremity: Impaired - 4/5    Balance:  Static Sitting Balance:  Supervision  Static Standing Balance: Stand By Assistance, Contact Guard Assistance, X 1, with cues for safety, with verbal cues   RW for support once in standing, no LOB noted  Bed Mobility:  Rolling to Right: Stand By Assistance, X 1, with head of bed raised, with rail, with verbal cues    Supine to Sit: Stand By Assistance, X 1, with head of bed raised, with rail, with verbal cues   Cues for technique with good demo  Transfers:  Sit to Stand: Stand By Assistance, [employer] Assistance, X 1, cues for hand placement, with verbal cues  Stand to Sit:Stand By Assistance, Contact Guard Assistance, X 1, cues for hand placement, with verbal cues  RW for support once in standing with cues for safety, no LOB noted  Ambulation:  Stand By Assistance, [employer] Assistance, X 1, with cues for safety, with verbal cues   Distance: 200 feet  Surface: Level Tile  Device:Rolling Walker  Gait Deviations:  Slow Sol, Decreased Step Length on Left and Decreased Gait Speed  Cues for safety, no LOB noted  Stairs:  Contact Guard Assistance, X 1, with cues for safety, with verbal cues   Number of Steps: 4  Height: 6\" step with Bilateral Handrails  reciprocal step pattern with ascend, step to pattern with descend, no LOB noted    Functional Outcome Measures: Completed  AM-PAC Inpatient Mobility Raw Score : 18  AM-PAC Inpatient T-Scale Score : 43.63    ASSESSMENT:  Activity Tolerance:  Patient tolerance of  treatment: good. Treatment Initiated: Treatment and education initiated within context of evaluation. Evaluation time included review of current medical information, gathering information related to past medical, social and functional history, completion of standardized testing, formal and informal observation of tasks, assessment of data and development of plan of care and goals. Treatment time included skilled education and facilitation of tasks to increase safety and independence with functional mobility for improved independence and quality of life. Assessment: Body structures, Functions, Activity limitations: Decreased functional mobility ,Decreased balance,Decreased strength,Decreased endurance  Assessment: Pt cont to require skilled PT services to increase strength, progress with transfers and gait to increase IND with functional tasks to decrease falls and return home safely.   Prognosis: Good    REQUIRES PT FOLLOW UP: Yes    Discharge Recommendations:  Discharge Recommendations: Continue to assess pending progress,Patient would benefit from continued therapy after discharge,Home with Home health PT    Patient Education:  PT Education: Ebn Agent Training,PT Role,Functional Mobility Training,Plan of [facility] Training,Equipment  Patient Education: d/c planning    Equipment Recommendations:  Equipment Needed: No    Plan:  Times per week: 5x GM  Current Treatment Recommendations: Strengthening,Home Exercise Program,Neuromuscular Re-education,Safety Education & Training,Balance Training,Endurance Training,Patient/Caregiver Education & Training,Functional Mobility Training,Equipment Evaluation, Education, & procurement,Transfer Training,Gait Training,Stair training    Goals:  Patient goals : return home  Short term goals  Time Frame for Short term goals: by discharge  Short term goal 1: Pt will amb for 200 feet with RW for support with IND to progress towards PLOF safely. Short term goal 2: Pt will demo IND with transfers with RW for support with good safety to decrease falls. Short term goal 3: Pt will demo IND with bed mobility tasks with HOB flat to progress towards PLOF. Short term goal 4: Pt will demo IND with stair negotiation with B rail for support with good safety to return home safely. Long term goals  Time Frame for Long term goals : NA due to short ELOS    Following session, patient left in safe position with all fall risk precautions in place. Pt left in bedside chair with all needs and call light in reach following session.

## 2021-12-28 NOTE — PROGRESS NOTES
[facility] - Dr. Mariano Barrientos   covering for Dr Cr Quevedo  Daily Progress Note    Pt Name: Shital Villagomez Record Number: [id number]  Date of Birth 3/30/1932   Today's Date: 12/28/2021    Hospital day # 3     ASSESSMENT   1. Partial SBO resolved   2. Sudden onset abdominal pain improved  3. Chronic atrial fibrillation  4. Bradycardia  5. CKD  6. Hypertension   7. Obesity (BMI 31)   has a past medical history of Actinic keratoses, Adenomatous polyp, Atrial fibrillation (HCC), CAD (coronary artery disease), Carotid artery disease ([facility] [address] Hyperlipidemia, Hypertension, Inguinal hernia, left, and Unspecified cerebral artery occlusion with cerebral infarction. PLAN   1. KUB yesterday with nonobstructive bowel gas pattern  2. Tolerating  full liquids advance to soft diet   3. Having bowel function   4.  okay to resume Eliquis  5. Denies any abdominal pain  6. Up with assistance  7. Abdomen benign. No surgical intervention at this time. 8. If tolerates diet, okay for home tomorrow   SUBJECTIVE   Chief complaint: No complaints    Patient was stable overnight. Chart reviewed. Updated by nursing staff. Bradycardic. No fevers. Denies chest discomfort or dyspnea. No N/V; (+) belching, flatus and having soft BMs. Tolerating full liquid diet. Denies any abdominal pain. No urinary complaints.    CURRENT MEDICATIONS   Scheduled Meds:   finasteride  5 mg Oral Daily    pantoprazole  40 mg Oral BID AC    potassium chloride  20 mEq Oral Daily    tamsulosin  0.4 mg Oral Daily    sodium chloride flush  5-40 mL IntraVENous 2 times per day    furosemide  40 mg Oral Daily     Continuous Infusions:   sodium chloride      sodium chloride 50 mL/hr at 12/28/21 0333     PRN Meds:.sodium chloride flush, sodium chloride, ondansetron **OR** ondansetron, polyethylene glycol, acetaminophen **OR** acetaminophen, morphine **OR** [DISCONTINUED] morphine  OBJECTIVE   CURRENT VITALS:  oral temperature is 0.024*     RADIOLOGY     PROCEDURE: XR ABDOMEN (KUB) (SINGLE AP VIEW)       CLINICAL INFORMATION: Follow up partial SBO.       COMPARISON: 1/29/2021.       TECHNIQUE: Single projection of the abdomen and pelvis.       FINDINGS:    There is scattered stool and gas throughout the large bowel. There is scattered gas in mildly prominent small bowel loops. No abnormal calcifications are identified. There are stable degenerative changes of the spine.               Impression   Nonspecific, nonobstructive bowel gas pattern.                   **This report has been created using voice recognition software. It may contain minor errors which are inherent in voice recognition technology. **       Final report electronically signed by Dr. Ubaldo Banegas MD on 12/26/2021 8:56 AM     Electronically signed by ROBBIE Combs CNP on 12/28/2021 at 9:33 AM Patient seen and examined independently by me. Above discussed and I agree with CNP. Labs, cultures, and radiographs where available were reviewed. See orders for the updated patient care plan.     Prosper Soni MD MD, patient seen for  patient actually of  who is off at this time patient presented with a recurrent small bowel obstruction that is apparently resolved he has had bowel movements today he tolerated a soft diet for lunch and supper tonight he states his preadmission pain is gone he does appear slightly bloated tonight but has good bowel sounds he states he ate a little much is stable overnight likely okay for discharge tomorrow  12/28/2021   8:10 PM

## 2021-12-28 NOTE — PROGRESS NOTES
Hospitalist Progress Note      Patient:  Manuel Moody    Unit/Bed:7K-27/027-A  YOB: 1932  MRN: [id number]   Acct: [de-identified]     PCP: Jose Funez DO  Date of Admission: 12/25/2021    Small bowel obstruction, resolved. Has history of multiple abdominal surgeries and multiple complete SBO in the past. Has seen Dr. Zaira Garibay in the past.  NBNB vomiting x3 on admission; PRN antiemetics ordered  No indication for NG decompression  + flatus and BM. Pain control as needed  Holding Eliquis, ok to resume today. General surgery team following  KUB with non obstructing bowel pattern. Tolerating full liquids, advance to soft. Acute on chronic right sided heart failure  Cardiomegaly  Pulmonary edema  Right sided pleural effusion  Saw nephrology recently and was advised to increase lasix dose which we will continue at this time as kidney function is at baseline  Per OP nephrology documentation from 12/20: \"Continue with lasix 40 mg daily and can take an extra 20-40 mg once or twice a week. \"    Chronic Afib with bradycardia  [id number] score 5  On eliquis which is being held at this time by surgery team likely in anticipation of any possible surgical intervention. Ok to resume today. Not on any beta blocker d/t bradycardia  Bradycardia is stable and per baseline      Diverticulosis  Cholelithiasis  Incidental findings on CT A/P  These are chronic per pt's son/POA    Essential HTN  Goal BP is <150/90  No indication to aggressively control BP at this time d/t age and risk of SE    Chronic normocytic anemia/ Hbg stable at 8.2. Dilutional component. CKD, creatinine at baseline. CAD  HLD  Hx of CVA  GERD  BPH  Continue home meds    Code status: Limited x4    Dispo: discussed case with general surgery. Home in AM if tolerates diet advancement.        Chief Complaint:  Abdominal pain    History Of Present Illness:     80 y.o. male with past medical history of CKD, CAD, hyperlipidemia, CVA, GERD, BPH, multiple abdominal surgeries, recurrent SBO, who is being admitted to [facility] on 12/25/2021 for further evaluation and management of abdominal pain. Patient's son who is also his POA is present in the room during time of interview. Patient states that earlier today around noon, he developed lower abdominal/periumbilical abdominal pain that he describes as severe. He states he was able to tolerate breakfast this morning and was able to sleep for some time after that. He states that after his abdominal pain started, he vomited 3 times. Family believes vomitus was nonbilious nonbloody. Patient denies any other symptoms. Denies any chest pain, shortness of breath, nausea at this time, headaches, lightheadedness, dizziness, vision changes, gait instability, leg pain swelling or cramping. He reports that at this time, his abdominal pain has resolved. Subjective:- (Last 24 hours)  Tolerating full liquid. Wants to eat. No abdominal pain, nausea or vomiting. Scheduled Meds:   finasteride  5 mg Oral Daily    pantoprazole  40 mg Oral BID AC    potassium chloride  20 mEq Oral Daily    tamsulosin  0.4 mg Oral Daily    sodium chloride flush  5-40 mL IntraVENous 2 times per day    furosemide  40 mg Oral Daily     Continuous Infusions:   sodium chloride      sodium chloride 50 mL/hr at 12/28/21 0333     PRN Meds:.sodium chloride flush, sodium chloride, ondansetron **OR** ondansetron, polyethylene glycol, acetaminophen **OR** acetaminophen, morphine **OR** [DISCONTINUED] morphine     Diet:  ADULT DIET; Regular    REVIEW OF SYSTEMS:   Pertinent positives as noted in the HPI. All other systems reviewed and negative. PHYSICAL EXAM:    /62   Pulse 51   Temp 97.7 °F (36.5 °C) (Oral)   Resp 16   SpO2 97%     General appearance:  No apparent distress, appears stated age and cooperative. HEENT:  Normal cephalic, atraumatic without obvious deformity. Pupils equal, round, and reactive to light. Extra ocular muscles intact. Conjunctivae/corneas clear. Neck: Supple, with full range of motion. No jugular venous distention. Trachea midline. Respiratory:  Normal respiratory effort. Clear to auscultation, bilaterally without Rales/Wheezes/Rhonchi. Cardiovascular:  Irregularly irregular, bradycardic, with normal S1/S2 without murmurs, rubs or gallops. Abdomen: Non-tender, non-distended. No bowel sounds appreciable on auscultation  Musculoskeletal:  No clubbing, cyanosis or edema bilaterally. Full range of motion without deformity. Skin: Skin color, texture, turgor normal.  No rashes or lesions. Neurologic:  Neurovascularly intact without any focal sensory/motor deficits. Cranial nerves: II-XII intact, grossly non-focal.  Psychiatric:  Alert and oriented, thought content appropriate, normal insight  Capillary Refill: Brisk,< 3 seconds   Peripheral Pulses: +2 palpable, equal bilaterally       Labs:     Recent Labs     12/25/21 1928 12/26/21  0911 12/28/21  0609   WBC 10.8 7.0 5.0   HGB 10.0* 8.6* 8.2*   HCT 31.8* 28.3* 26.8*    219 177     Recent Labs     12/25/21 1928 12/26/21  0911 12/28/21  0609    142 137   K 4.8 4.5 4.2    107 105   CO2 25 26 23   BUN 33* 32* 24*   CREATININE 1.9* 1.8* 1.8*   CALCIUM 9.5 8.4* 8.2*     Recent Labs     12/25/21 1928   AST 23   ALT 12   BILITOT 0.3   ALKPHOS 128*     Recent Labs     12/25/21 2024   INR 1.17*     No results for input(s): Nan Leach in the last 72 hours. Urinalysis:      Lab Results   Component Value Date    NITRU NEGATIVE 12/26/2021    WBCUA 0-2 02/02/2021    BACTERIA NONE SEEN 02/02/2021    RBCUA 0-2 02/02/2021    BLOODU NEGATIVE 12/26/2021    SPECGRAV 1.019 02/02/2021    GLUCOSEU NEGATIVE 12/26/2021       Intake & Output:  I/O last 3 completed shifts:   In: 1902.5 [P.O.:940; I.V.:962.5]  Out: -   I/O this shift:  In: -   Out: 120 [Urine:120]      Radiology:       XR ABDOMEN (KUB) (SINGLE AP VIEW)   Final Result Nonspecific, nonobstructive bowel gas pattern. **This report has been created using voice recognition software. It may contain minor errors which are inherent in voice recognition technology. **      Final report electronically signed by Dr. Mango Gallardo MD on 12/26/2021 8:56 AM      CT ABDOMEN PELVIS WO CONTRAST Additional Contrast? None   Final Result   1. Right-sided pleural effusion with adjacent atelectasis/infiltrate. 2. Prominent fluid-filled small bowel loops with distal bowel gas and fluid, possibly secondary to ileus or partial obstruction. 3. Cholelithiasis. 4. Uncomplicated sigmoid colon diverticulosis. Final report electronically signed by Dr. Nicol Lopez on 12/25/2021 9:23 PM      XR CHEST PORTABLE   Final Result   1. Moderate stable cardiomegaly with pulmonary vascular congestion suspicious for congestive heart failure. 2. Prominent pulmonary interstitium suspicious for pulmonary edema. 3. Possible small right-sided pleural effusion. **This report has been created using voice recognition software. It may contain minor errors which are inherent in voice recognition technology. **      Final report electronically signed by Dr. Nicol Lopez on 12/25/2021 7:50 PM             Electronically signed by ROBBIE Rodriges CNP on 12/28/2021 at 1:26 PM

## 2021-12-28 NOTE — CARE COORDINATION
Discharge Planning Update Note:   Radha Ramírez CNP, noted today: If tolerates diet, okay for home tomorrow. Planning home alone at discharge. Son is physician.  Has family support

## 2021-12-29 VITALS
RESPIRATION RATE: 16 BRPM | DIASTOLIC BLOOD PRESSURE: 79 MMHG | SYSTOLIC BLOOD PRESSURE: 177 MMHG | TEMPERATURE: 97.8 F | OXYGEN SATURATION: 93 % | HEART RATE: 58 BPM

## 2021-12-29 PROBLEM — K56.609 SBO (SMALL BOWEL OBSTRUCTION) (HCC): Status: ACTIVE | Noted: 2021-12-29

## 2021-12-29 PROCEDURE — 6370000000 HC RX 637 (ALT 250 FOR IP): Performed by: NURSE PRACTITIONER

## 2021-12-29 PROCEDURE — 6370000000 HC RX 637 (ALT 250 FOR IP): Performed by: STUDENT IN AN ORGANIZED HEALTH CARE EDUCATION/TRAINING PROGRAM

## 2021-12-29 PROCEDURE — 99239 HOSP IP/OBS DSCHRG MGMT >30: CPT | Performed by: INTERNAL MEDICINE

## 2021-12-29 RX ORDER — POLYETHYLENE GLYCOL 3350 17 G/17G
17 POWDER, FOR SOLUTION ORAL DAILY PRN
Qty: 527 G | Refills: 1 | COMMUNITY
Start: 2021-12-29 | End: 2022-01-28

## 2021-12-29 RX ADMIN — POTASSIUM CHLORIDE 20 MEQ: 1500 TABLET, EXTENDED RELEASE ORAL at 08:03

## 2021-12-29 RX ADMIN — TAMSULOSIN HYDROCHLORIDE 0.4 MG: 0.4 CAPSULE ORAL at 08:03

## 2021-12-29 RX ADMIN — FINASTERIDE 5 MG: 5 TABLET, FILM COATED ORAL at 08:03

## 2021-12-29 RX ADMIN — PANTOPRAZOLE SODIUM 40 MG: 40 TABLET, DELAYED RELEASE ORAL at 05:40

## 2021-12-29 RX ADMIN — APIXABAN 2.5 MG: 2.5 TABLET, FILM COATED ORAL at 08:03

## 2021-12-29 RX ADMIN — FUROSEMIDE 40 MG: 40 TABLET ORAL at 08:03

## 2021-12-29 ASSESSMENT — PAIN SCALES - GENERAL
PAINLEVEL_OUTOF10: 0
PAINLEVEL_OUTOF10: 0

## 2021-12-29 NOTE — PROGRESS NOTES
Declined evening medications and meal. Discharge teaching and instructions for diagnosis/procedure of abdominal pain/bowel obstruction completed with patient using teachback method. AVS reviewed. No new  prescriptions given to patient. Instructed to  stool softener as needed. Son voiced understanding regarding medications,need to schedule follow up appointment with Dr. Bonds, and care of self at home.  Discharged in a wheelchair to  home with support per family,

## 2021-12-29 NOTE — DISCHARGE SUMMARY
Discharge Summary    Patient:  Kassandra Cooper  YOB: 1932    MRN: [id number]   Acct: [de-identified]    Primary Care Physician: Phillip Powell DO    Admit date:  12/25/2021    Discharge date:  12/29/2021       Discharge Diagnoses:   SBO (small bowel obstruction) ([facility] [address]  Principal Problem:    SBO (small bowel obstruction) ([facility] [address]  Active Problems:    Atrial fibrillation with slow ventricular response (HCC)    Abdominal pain  Resolved Problems:    * No resolved hospital problems. *        Admitted for: (HPI) abdominal pain, emesis    Hospital Course: this is a pt with ckd, atrial fibrillation and prior hx of small bowel obstruction. He developed the sudden onset of abdominal pain and emesis and came to ER where a CT of the abdomen was c/w SBO and [facility] Hospitalist Service was asked to admit the patient. He was treated with IV fluids and kept npo. His sxs and images improved over the course of his stay and he was discharged. He will follow up with his primary care. He has a known right pleural effusion which was tapped a year ago and was consistent with a transudate. This can be followed as an outpt. It appeared small on his portable chest.  If it increases, thoracentesis could be proposed to him. The patient will follow up with pcp and consultants as appropriate.       Consultants:  Surgery    Discharge Medications:       Medication List      START taking these medications    polyethylene glycol 17 g packet  Commonly known as: GLYCOLAX  Take 17 g by mouth daily as needed for Constipation        CONTINUE taking these medications    apixaban 2.5 MG Tabs tablet  Commonly known as: ELIQUIS  Take 1 tablet by mouth 2 times daily     finasteride 5 MG tablet  Commonly known as: PROSCAR  Take 1 tablet by mouth daily     furosemide 40 MG tablet  Commonly known as: LASIX  Take 1 tablet by mouth daily     potassium chloride 20 MEQ extended release tablet  Commonly known as: Tony Guillermo Protonix 40 MG tablet  Generic drug: pantoprazole     tamsulosin 0.4 MG capsule  Commonly known as: FLOMAX  Take 1 capsule by mouth daily Capsule may be opened and contents dissolve for peg tube        STOP taking these medications    aspirin 81 MG EC tablet           Where to Get Your Medications      You can get these medications from any pharmacy    You don't need a prescription for these medications  · polyethylene glycol 17 g packet           Physical Exam:    Vitals:  Vitals:    12/28/21 2059 12/29/21 0310 12/29/21 0800 12/29/21 1130   BP: (!) 148/66 (!) 135/58 (!) 146/69 (!) 177/79   Pulse: 58 51 55 58   Resp: 18 18 16 16   Temp: 97.8 °F (36.6 °C) 98.5 °F (36.9 °C) 98 °F (36.7 °C) 97.8 °F (36.6 °C)   TempSrc: Oral Oral Axillary Oral   SpO2: 95% 94% 92% 93%     Weight:       24 hour intake/output:    Intake/Output Summary (Last 24 hours) at 12/29/2021 1731  Last data filed at 12/29/2021 0915  Gross per 24 hour   Intake 2191.6 ml   Output 200 ml   Net 1991.6 ml       General appearance - chronically ill appearing  Chest - clear to auscultation, no wheezes, rales or rhonchi, symmetric air entry  Heart - irregularly irregular rhythm with rate 50  Abdomen - soft, nontender, nondistended, no masses or organomegaly  Obese: No; Protuberant: No   Neurological - alert, oriented, normal speech, no focal findings or movement disorder noted  Extremities - no pedal edema noted  Skin - normal coloration and turgor, no rashes, no suspicious skin lesions noted        Labs can be found in the Lab tab of Chart Review    Diet:  ADULT DIET;  Regular    Activity:  Activity as tolerated (Patient may move about with assist as indicated or with supervision.)    Follow-up:  in 2 weeks with Ceasar Roper DO,  in the next few weeks with Dr Nick Monday per her discretion    Disposition: home    Condition: Stable      Time Spent: 35 minutes    Electronically signed by Mag Blanchard MD on 12/29/2021 at 5:31 PM    Discharging Hospitalist

## 2021-12-29 NOTE — CARE COORDINATION
Plans home alone today if tolerated meals. Has support from family/son a physician. 12/29/21, 12:06 PM EST    Patient goals/plan/ treatment preferences discussed by  and . Patient goals/plan/ treatment preferences reviewed with patient/ family. Patient/ family verbalize understanding of discharge plan and are in agreement with goal/plan/treatment preferences. Understanding was demonstrated using the teach back method. AVS provided by RN at time of discharge, which includes all necessary medical information pertaining to the patients current course of illness, treatment, post-discharge goals of care, and treatment preferences.         IMM Letter  IMM Letter given to Patient/Family/Significant other/Guardian/POA/by[de-identified] cm  IMM Letter date given[de-identified] 12/27/21  IMM Letter time given[de-identified] 0900

## 2022-04-13 ENCOUNTER — NURSE ONLY (OUTPATIENT)
Dept: LAB | Age: 87
End: 2022-04-13

## 2022-04-13 DIAGNOSIS — N18.32 STAGE 3B CHRONIC KIDNEY DISEASE (HCC): ICD-10-CM

## 2022-04-13 DIAGNOSIS — N18.32 ANEMIA IN STAGE 3B CHRONIC KIDNEY DISEASE (HCC): ICD-10-CM

## 2022-04-13 DIAGNOSIS — D63.1 ANEMIA IN STAGE 3B CHRONIC KIDNEY DISEASE (HCC): ICD-10-CM

## 2022-04-13 LAB
ANION GAP SERPL CALCULATED.3IONS-SCNC: 16 MEQ/L (ref 8–16)
BUN BLDV-MCNC: 24 MG/DL (ref 7–22)
CALCIUM SERPL-MCNC: 8.8 MG/DL (ref 8.5–10.5)
CHLORIDE BLD-SCNC: 103 MEQ/L (ref 98–111)
CO2: 21 MEQ/L (ref 23–33)
CREAT SERPL-MCNC: 2 MG/DL (ref 0.4–1.2)
GFR SERPL CREATININE-BSD FRML MDRD: 32 ML/MIN/1.73M2
GLUCOSE BLD-MCNC: 98 MG/DL (ref 70–108)
POTASSIUM SERPL-SCNC: 4.6 MEQ/L (ref 3.5–5.2)
SODIUM BLD-SCNC: 140 MEQ/L (ref 135–145)

## 2022-04-21 ENCOUNTER — OFFICE VISIT (OUTPATIENT)
Dept: NEPHROLOGY | Age: 87
End: 2022-04-21
Payer: OTHER GOVERNMENT

## 2022-04-21 VITALS
TEMPERATURE: 97.2 F | WEIGHT: 176 LBS | DIASTOLIC BLOOD PRESSURE: 64 MMHG | BODY MASS INDEX: 34.37 KG/M2 | SYSTOLIC BLOOD PRESSURE: 158 MMHG | OXYGEN SATURATION: 92 % | HEART RATE: 52 BPM

## 2022-04-21 DIAGNOSIS — N18.32 CHRONIC KIDNEY DISEASE, STAGE 3B (HCC): Primary | ICD-10-CM

## 2022-04-21 DIAGNOSIS — D63.1 ANEMIA IN STAGE 3B CHRONIC KIDNEY DISEASE (HCC): ICD-10-CM

## 2022-04-21 DIAGNOSIS — N18.32 ANEMIA IN STAGE 3B CHRONIC KIDNEY DISEASE (HCC): ICD-10-CM

## 2022-04-21 DIAGNOSIS — R60.9 PERIPHERAL EDEMA: ICD-10-CM

## 2022-04-21 PROCEDURE — 1123F ACP DISCUSS/DSCN MKR DOCD: CPT | Performed by: INTERNAL MEDICINE

## 2022-04-21 PROCEDURE — 1036F TOBACCO NON-USER: CPT | Performed by: INTERNAL MEDICINE

## 2022-04-21 PROCEDURE — 4040F PNEUMOC VAC/ADMIN/RCVD: CPT | Performed by: INTERNAL MEDICINE

## 2022-04-21 PROCEDURE — 99213 OFFICE O/P EST LOW 20 MIN: CPT | Performed by: INTERNAL MEDICINE

## 2022-04-21 PROCEDURE — G8417 CALC BMI ABV UP PARAM F/U: HCPCS | Performed by: INTERNAL MEDICINE

## 2022-04-21 PROCEDURE — G8427 DOCREV CUR MEDS BY ELIG CLIN: HCPCS | Performed by: INTERNAL MEDICINE

## 2022-04-21 RX ORDER — FUROSEMIDE 40 MG/1
60 TABLET ORAL DAILY
Qty: 90 TABLET | Refills: 1 | Status: ON HOLD
Start: 2022-04-21 | End: 2022-08-10 | Stop reason: SDUPTHER

## 2022-04-21 NOTE — PROGRESS NOTES
[address] [facility]  [address]  [facility] [address]  Dept: [phone]  Loc: [phone]  Office Progress Note  4/21/2022 11:45 AM      Pt Name:    Angie Hardin:    3/30/1932  Primary Care Physician:  Jeremiah Jordan DO     Chief Complaint:   Chief Complaint   Patient presents with    CKD and B/L leg swelling        Background Information/Interval History:   81 yo with hx CAD, HTN, hx CVA, A. Fibrillation, hx BPH who was recently in hospital s/p fall and rhabdomyolysis. Recently admitted in Aug 2021 with urinary retention. Follows with urology. Hard of hearing. Here for follow-up. I see him for CKD and diuretic mgmt. Patient is here with his daughter in law. Overall patient feels well. He denies any chest pain or shortness of breath. Denies any urinary complaints. No fever or chills reported. No dysuria. His only complaint is that he has to go to the bathroom multiple times because of diuretics. Patient is currently on Lasix 40 mg daily although he was supposed to be taking 60 mg. He reduced his dose himself.      Past History:  Past Medical History:   Diagnosis Date    Actinic keratoses 8/21/2013    Adenomatous polyp 2008    Atrial fibrillation ([address]     CAD (coronary artery disease) 2009    Carotid artery disease ([facility] [address] 2009    left    Hyperlipidemia 2004    Hypertension 2004    Inguinal hernia, left 2/19/2014    Unspecified cerebral artery occlusion with cerebral infarction      Past Surgical History:   Procedure Laterality Date    ABDOMEN SURGERY      ABDOMINAL ADHESION SURGERY  6/2012    Dr Vicki Saenz  2004    right    COLONOSCOPY      [facility]    CORONARY ARTERY BYPASS GRAFT  2009    ENDOSCOPY, COLON, DIAGNOSTIC      EYE SURGERY      SMALL INTESTINE SURGERY      SMALL INTESTINE SURGERY  02/20/2015    Exploratory Laparotomy    UPPER GASTROINTESTINAL ENDOSCOPY N/A 1/26/2021    EGD ESOPHAGOGASTRODUODENOSCOPY PEG TUBE INSERTION performed by Jared Bartholomew MD at [address] [place] [address]      cabg harvests from right leg        VITALS:  BP (!) 158/64 (Site: Left Upper Arm, Position: Sitting, Cuff Size: Medium Adult)   Pulse 52   Temp 97.2 °F (36.2 °C)   Wt 176 lb (79.8 kg)   SpO2 92%   BMI 34.37 kg/m²   Wt Readings from Last 3 Encounters:   04/21/22 176 lb (79.8 kg)   12/20/21 163 lb (73.9 kg)   12/07/21 162 lb (73.5 kg)     Body mass index is 34.37 kg/m². General Appearance: alert and cooperative with exam, appears comfortable, no distress  Lungs: Air entry B/L, no crackles or rales, no use of accessory muscles  Heart: S1, S2 heard  GI: soft, non-tender, no guarding  Extremities: Bilateral 2+ lower extremity pitting edema     Medications:  Current Outpatient Medications   Medication Sig Dispense Refill    furosemide (LASIX) 40 MG tablet Take 1 tablet by mouth daily 90 tablet 1    apixaban (ELIQUIS) 2.5 MG TABS tablet Take 1 tablet by mouth 2 times daily 60 tablet 1    finasteride (PROSCAR) 5 MG tablet Take 1 tablet by mouth daily 30 tablet 3    pantoprazole (PROTONIX) 40 MG tablet Take 40 mg by mouth 2 times daily      potassium chloride (KLOR-CON M) 20 MEQ extended release tablet Take 1 tablet by mouth daily 60 tablet 5    tamsulosin (FLOMAX) 0.4 MG capsule Take 1 capsule by mouth daily Capsule may be opened and contents dissolve for peg tube 30 capsule 3     No current facility-administered medications for this visit. Laboratory & Diagnostics:  Feb 2021: UA: no blood, trace protein  EF 55-60-%  Feb 2021; Na 140, Creat 1.2  April 2021: Hb 10.3  Aug 2021: UA: no blood, no protein  Oct 2021: Creat 1.7, K 3.9, Hb 8.4  Dec 2021: K 4.4, Creat 1.9, Hb 8.2  April 2022: K 4.6, Creat 2.0     Impression/Plan:   1. CKd IIIB: creat is close to 1.9 to 2.0 and overall stable but clinically fluid overloaded.   Patient has bilateral lower extremity pitting edema. Will need to continue with diuretics. Echo showed RV dysfunction with preserved LVEF. Pt says he reduced his diuretics to 40 mg daily. His legs are more swollen. I feel he needs higher doses of diuretics but he does not want to. I recommend he takes lasix 60 mg daily (if not 80 mg daily). Take in the morning around 8 AM. Will repeat labs in 3 weeks. Patient agrees with above plan. If on reevaluation in 3 weeks volume status improves then may consider as needed diuretics or at least lower dose of diuretics. Case discussed with his son, Dr. Corry Arora   2. B/L edema: as above, reduce salt intake. 3. Anemia in CKD: will need to repeat labs, check iron studies and H/H in 3 weeks. 4. HTN: stable. Advised low salt diet. 5. BPH: seeing urology  6. Simple renal cyst  7.  RV dysfunction per echo August 2021    Discussed with patient and daughter-in-law. Orders Placed This Encounter   Procedures    Basic Metabolic Panel    Hemoglobin and Hematocrit    PTH, Intact    Phosphorus    Iron Binding Capacity    Iron    Ferritin    IRON SATURATION     Return in about 3 weeks (around 5/12/2022).       Abiel Newton MD  [facility]

## 2022-05-05 ENCOUNTER — NURSE ONLY (OUTPATIENT)
Dept: LAB | Age: 87
End: 2022-05-05

## 2022-05-05 DIAGNOSIS — N18.32 CHRONIC KIDNEY DISEASE, STAGE 3B (HCC): ICD-10-CM

## 2022-05-05 DIAGNOSIS — D63.1 ANEMIA IN STAGE 3B CHRONIC KIDNEY DISEASE (HCC): ICD-10-CM

## 2022-05-05 DIAGNOSIS — N18.32 ANEMIA IN STAGE 3B CHRONIC KIDNEY DISEASE (HCC): ICD-10-CM

## 2022-05-05 LAB
ANION GAP SERPL CALCULATED.3IONS-SCNC: 13 MEQ/L (ref 8–16)
BUN BLDV-MCNC: 32 MG/DL (ref 7–22)
CALCIUM SERPL-MCNC: 9 MG/DL (ref 8.5–10.5)
CHLORIDE BLD-SCNC: 100 MEQ/L (ref 98–111)
CO2: 23 MEQ/L (ref 23–33)
CREAT SERPL-MCNC: 1.7 MG/DL (ref 0.4–1.2)
FERRITIN: 21 NG/ML (ref 22–322)
GFR SERPL CREATININE-BSD FRML MDRD: 38 ML/MIN/1.73M2
GLUCOSE BLD-MCNC: 93 MG/DL (ref 70–108)
HCT VFR BLD CALC: 31.6 % (ref 42–52)
HEMOGLOBIN: 9.5 GM/DL (ref 14–18)
IRON SATURATION: 10 % (ref 20–50)
IRON: 39 UG/DL (ref 65–195)
PHOSPHORUS: 3.3 MG/DL (ref 2.4–4.7)
POTASSIUM SERPL-SCNC: 4.2 MEQ/L (ref 3.5–5.2)
PTH INTACT: 96.5 PG/ML (ref 15–65)
SODIUM BLD-SCNC: 136 MEQ/L (ref 135–145)
TOTAL IRON BINDING CAPACITY: 401 UG/DL (ref 171–450)

## 2022-05-06 ENCOUNTER — TELEPHONE (OUTPATIENT)
Dept: NEPHROLOGY | Age: 87
End: 2022-05-06

## 2022-05-06 DIAGNOSIS — N18.32 CHRONIC KIDNEY DISEASE (CKD) STAGE G3B/A1, MODERATELY DECREASED GLOMERULAR FILTRATION RATE (GFR) BETWEEN 30-44 ML/MIN/1.73 SQUARE METER AND ALBUMINURIA CREATININE RATIO LESS THAN 30 MG/G (HCC): ICD-10-CM

## 2022-05-06 NOTE — TELEPHONE ENCOUNTER
Spoke to pt's son, he understood that pt needs iron infusion due lab results. He stated that Wednesdays are best and at [facility]. Spoke to [employer] at [facility], the infusion is scheduled for 5/11/22 at 9 am. Pt's son is aware. Order on desk for signature.

## 2022-05-06 NOTE — TELEPHONE ENCOUNTER
----- Message from Judah Ordaz MD sent at 5/5/2022  9:45 PM EDT -----  Ferritin is low at 21 and iron saturation is only 10%  Pls order injectafer 750 mg IV weekly x 2 doses

## 2022-05-06 NOTE — RESULT ENCOUNTER NOTE
Ferritin is low at 21 and iron saturation is only 10%  Pls order injectafer 750 mg IV weekly x 2 doses

## 2022-05-11 ENCOUNTER — HOSPITAL ENCOUNTER (OUTPATIENT)
Dept: NURSING | Age: 87
Discharge: HOME | End: 2022-05-11
Payer: MEDICARE

## 2022-05-11 VITALS
WEIGHT: 165 LBS | TEMPERATURE: 97.9 F | HEART RATE: 54 BPM | OXYGEN SATURATION: 96 % | BODY MASS INDEX: 32.22 KG/M2 | RESPIRATION RATE: 18 BRPM | SYSTOLIC BLOOD PRESSURE: 165 MMHG | DIASTOLIC BLOOD PRESSURE: 68 MMHG

## 2022-05-11 DIAGNOSIS — N18.32 CHRONIC KIDNEY DISEASE (CKD) STAGE G3B/A1, MODERATELY DECREASED GLOMERULAR FILTRATION RATE (GFR) BETWEEN 30-44 ML/MIN/1.73 SQUARE METER AND ALBUMINURIA CREATININE RATIO LESS THAN 30 MG/G (HCC): Primary | ICD-10-CM

## 2022-05-11 PROCEDURE — 6360000002 HC RX W HCPCS: Performed by: INTERNAL MEDICINE

## 2022-05-11 PROCEDURE — 2580000003 HC RX 258: Performed by: INTERNAL MEDICINE

## 2022-05-11 PROCEDURE — 96365 THER/PROPH/DIAG IV INF INIT: CPT

## 2022-05-11 RX ADMIN — FERRIC CARBOXYMALTOSE INJECTION 750 MG: 50 INJECTION, SOLUTION INTRAVENOUS at 09:08

## 2022-05-11 ASSESSMENT — PAIN - FUNCTIONAL ASSESSMENT: PAIN_FUNCTIONAL_ASSESSMENT: NONE - DENIES PAIN

## 2022-05-11 NOTE — PROGRESS NOTES
__M__ Safety:       (Environmental)   [place] to environment   Ensure ID band is correct and in place/ allergy band as needed   Assess for fall risk   Initiate fall precautions as applicable (fall band, side rails, etc.)   Call light within reach   Bed in low position/ wheels locked    __M__ Pain:        Assess pain level and characteristics   Administer analgesics as ordered   Assess effectiveness of pain management and report to MD as needed    __M__ Knowledge Deficit:   Assess baseline knowledge   Provide teaching at level of understanding   Provide teaching via preferred learning method   Evaluate teaching effectiveness    __M__ Hemodynamic/Respiratory Status:       (Pre and Post Procedure Monitoring)   Assess/Monitor vital signs and LOC   Assess Baseline SpO2 prior to any sedation   Obtain weight/height   Assess vital signs/ LOC until patient meets discharge criteria   Monitor procedure site and notify MD of any issues

## 2022-05-11 NOTE — PROGRESS NOTES
Patients infusion complete. He tolerated well. Immunizations reportedly UTD.  No vaccines in last 2 weeks.  No recent travel or known CoVid exposure. MOC noticed this lump since about 6 months of ae with slow increase in size.  No trauma or infections. Went home from hospital with mother.

## 2022-05-16 ENCOUNTER — OFFICE VISIT (OUTPATIENT)
Dept: NEPHROLOGY | Age: 87
End: 2022-05-16
Payer: MEDICARE

## 2022-05-16 VITALS
WEIGHT: 170 LBS | SYSTOLIC BLOOD PRESSURE: 164 MMHG | DIASTOLIC BLOOD PRESSURE: 63 MMHG | TEMPERATURE: 97.2 F | BODY MASS INDEX: 33.2 KG/M2 | OXYGEN SATURATION: 95 % | HEART RATE: 111 BPM

## 2022-05-16 DIAGNOSIS — N18.32 ANEMIA IN STAGE 3B CHRONIC KIDNEY DISEASE (HCC): ICD-10-CM

## 2022-05-16 DIAGNOSIS — D63.1 ANEMIA IN STAGE 3B CHRONIC KIDNEY DISEASE (HCC): ICD-10-CM

## 2022-05-16 DIAGNOSIS — N18.32 CHRONIC KIDNEY DISEASE, STAGE 3B (HCC): Primary | ICD-10-CM

## 2022-05-16 PROCEDURE — G8427 DOCREV CUR MEDS BY ELIG CLIN: HCPCS | Performed by: INTERNAL MEDICINE

## 2022-05-16 PROCEDURE — 1036F TOBACCO NON-USER: CPT | Performed by: INTERNAL MEDICINE

## 2022-05-16 PROCEDURE — G8417 CALC BMI ABV UP PARAM F/U: HCPCS | Performed by: INTERNAL MEDICINE

## 2022-05-16 PROCEDURE — 99213 OFFICE O/P EST LOW 20 MIN: CPT | Performed by: INTERNAL MEDICINE

## 2022-05-16 PROCEDURE — 4040F PNEUMOC VAC/ADMIN/RCVD: CPT | Performed by: INTERNAL MEDICINE

## 2022-05-16 PROCEDURE — 1123F ACP DISCUSS/DSCN MKR DOCD: CPT | Performed by: INTERNAL MEDICINE

## 2022-05-16 NOTE — PROGRESS NOTES
[address] [facility]  [address]  [facility] [address]  Dept: [phone]  Loc: [phone]  Office Progress Note  5/16/2022 1:11 PM      Pt Name:    Debra Duarte:    3/30/1932  Primary Care Physician:  Becky Coulter DO     Chief Complaint:   Chief Complaint   Patient presents with    CKD and B/L leg swelling        Background Information/Interval History:   81 yo with hx CAD, HTN, hx CVA, A. Fibrillation, hx BPH, history of urinary retention whom I am seeing for CKD and diuretic management. Patient is here with his daughter-in-law. Patient is hard of hearing. I saw him about 3 weeks ago and at that point we mutually agreed to continue Lasix 60 mg daily for volume overload. He is here for follow-up evaluation. He has lost abut 6 lbs in last 3 weeks. No shortness of breath at rest but has some with exertion. Taking lasix 60 mg daily. Has iron infusion recently. Daughter in law reports and patient confirmed that he likes it better now that he is taking lasix 60 mg daily in the morning.       Past History:  Past Medical History:   Diagnosis Date    Actinic keratoses 8/21/2013    Adenomatous polyp 2008    Atrial fibrillation ([address]     CAD (coronary artery disease) 2009    Carotid artery disease ([facility] [address] 2009    left    Hyperlipidemia 2004    Hypertension 2004    Inguinal hernia, left 2/19/2014    Unspecified cerebral artery occlusion with cerebral infarction      Past Surgical History:   Procedure Laterality Date    ABDOMEN SURGERY      ABDOMINAL ADHESION SURGERY  6/2012    Dr Daaynna Killian  2004    right    [facility]ONOSCOPY      [facility]    CORONARY ARTERY BYPASS GRAFT  2009    ENDOSCOPY, COLON, DIAGNOSTIC      EYE SURGERY      SMALL INTESTINE SURGERY      SMALL INTESTINE SURGERY  02/20/2015    Exploratory Laparotomy    UPPER GASTROINTESTINAL ENDOSCOPY N/A 1/26/2021 EGD ESOPHAGOGASTRODUODENOSCOPY PEG TUBE INSERTION performed by Demarco Gotti MD at [address] [place] [address]      cabg harvests from right leg        VITALS:  BP (!) 164/63 (Site: Left Upper Arm, Position: Sitting, Cuff Size: Medium Adult)   Pulse 111   Temp 97.2 °F (36.2 °C)   Wt 170 lb (77.1 kg)   SpO2 95%   BMI 33.20 kg/m²   Wt Readings from Last 3 Encounters:   05/16/22 170 lb (77.1 kg)   05/11/22 165 lb (74.8 kg)   04/21/22 176 lb (79.8 kg)     Body mass index is 33.2 kg/m². General Appearance: alert and cooperative with exam, appears comfortable, no distress  Lungs: Air entry B/L, no crackles or rales, no use of accessory muscles  Heart: S1, S2 heard  GI: soft, non-tender, no guarding  Extremities: Bilateral 2+ lower extremity pitting edema     Medications:  Current Outpatient Medications   Medication Sig Dispense Refill    furosemide (LASIX) 40 MG tablet Take 1.5 tablets by mouth daily 90 tablet 1    apixaban (ELIQUIS) 2.5 MG TABS tablet Take 1 tablet by mouth 2 times daily 60 tablet 1    finasteride (PROSCAR) 5 MG tablet Take 1 tablet by mouth daily 30 tablet 3    pantoprazole (PROTONIX) 40 MG tablet Take 40 mg by mouth 2 times daily      potassium chloride (KLOR-CON M) 20 MEQ extended release tablet Take 1 tablet by mouth daily 60 tablet 5    tamsulosin (FLOMAX) 0.4 MG capsule Take 1 capsule by mouth daily Capsule may be opened and contents dissolve for peg tube 30 capsule 3     No current facility-administered medications for this visit. Laboratory & Diagnostics:  Feb 2021: UA: no blood, trace protein  EF 55-60-%  Feb 2021; Na 140, Creat 1.2  April 2021: Hb 10.3  Aug 2021: UA: no blood, no protein, US: Right sided simple Renal cysts  Oct 2021: Creat 1.7, K 3.9, Hb 8.4  Dec 2021: K 4.4, Creat 1.9, Hb 8.2  April 2022: K 4.6, Creat 2.0    May 2022: K 4.2, Creat 1.7, Hb 9.5     Impression/Plan:   1. CKd IIIB: overall stable. At/near his new baseline.  Will continue with lasix 60 mg daily. Patient has bilateral lower extremity pitting edema. Echo showed RV dysfunction with preserved LVEF. Pt is okay continuing lasix 60 mg daily. Stable renal function. Monitor weights. Low-salt diet. Continue diuretics. 2. B/L edema: continue with lasix   3. Anemia in CKD: Hb improving slowly, continue with IV iron infusion  4. HTN: stable  5. BPH: seeing urology  6. Simple renal cyst  7.  RV dysfunction per echo August 2021    Orders Placed This Encounter   Procedures    Basic Metabolic Panel    Hemoglobin and Hematocrit    Vitamin D 25 Hydroxy     Return in about 3 months (around 8/16/2022).       Darek Quintana MD  [facility]

## 2022-05-18 ENCOUNTER — HOSPITAL ENCOUNTER (OUTPATIENT)
Dept: NURSING | Age: 87
Discharge: HOME | End: 2022-05-18
Payer: OTHER GOVERNMENT

## 2022-05-18 VITALS
HEART RATE: 63 BPM | RESPIRATION RATE: 18 BRPM | SYSTOLIC BLOOD PRESSURE: 141 MMHG | DIASTOLIC BLOOD PRESSURE: 64 MMHG | OXYGEN SATURATION: 96 % | TEMPERATURE: 96.7 F

## 2022-05-18 DIAGNOSIS — N18.32 CHRONIC KIDNEY DISEASE (CKD) STAGE G3B/A1, MODERATELY DECREASED GLOMERULAR FILTRATION RATE (GFR) BETWEEN 30-44 ML/MIN/1.73 SQUARE METER AND ALBUMINURIA CREATININE RATIO LESS THAN 30 MG/G (HCC): Primary | ICD-10-CM

## 2022-05-18 PROCEDURE — 2580000003 HC RX 258: Performed by: INTERNAL MEDICINE

## 2022-05-18 PROCEDURE — 96365 THER/PROPH/DIAG IV INF INIT: CPT

## 2022-05-18 PROCEDURE — 6360000002 HC RX W HCPCS: Performed by: INTERNAL MEDICINE

## 2022-05-18 RX ADMIN — FERRIC CARBOXYMALTOSE INJECTION 750 MG: 50 INJECTION, SOLUTION INTRAVENOUS at 09:08

## 2022-05-18 ASSESSMENT — PAIN - FUNCTIONAL ASSESSMENT: PAIN_FUNCTIONAL_ASSESSMENT: NONE - DENIES PAIN

## 2022-05-18 NOTE — PROGRESS NOTES
___m_ Safety:       (Environmental)   [place] to environment   Ensure ID band is correct and in place/ allergy band as needed   Assess for fall risk   Initiate fall precautions as applicable (fall band, side rails, etc.)   Call light within reach   Bed in low position/ wheels locked    __m__ Pain:        Assess pain level and characteristics   Administer analgesics as ordered   Assess effectiveness of pain management and report to MD as needed    __m__ Knowledge Deficit:   Assess baseline knowledge   Provide teaching at level of understanding   Provide teaching via preferred learning method   Evaluate teaching effectiveness    __m__ Hemodynamic/Respiratory Status:       (Pre and Post Procedure Monitoring)   Assess/Monitor vital signs and LOC   Assess Baseline SpO2 prior to any sedation   Obtain weight/height   Assess vital signs/ LOC until patient meets discharge criteria   Monitor procedure site and notify MD of any issues    _

## 2022-08-06 ENCOUNTER — APPOINTMENT (OUTPATIENT)
Dept: GENERAL RADIOLOGY | Age: 87
DRG: 871 | End: 2022-08-06
Payer: OTHER GOVERNMENT

## 2022-08-06 ENCOUNTER — HOSPITAL ENCOUNTER (INPATIENT)
Age: 87
LOS: 5 days | Discharge: HOME | DRG: 871 | End: 2022-08-11
Attending: EMERGENCY MEDICINE | Admitting: OPHTHALMOLOGY
Payer: OTHER GOVERNMENT

## 2022-08-06 DIAGNOSIS — J18.9 PNEUMONIA DUE TO INFECTIOUS ORGANISM, UNSPECIFIED LATERALITY, UNSPECIFIED PART OF LUNG: ICD-10-CM

## 2022-08-06 DIAGNOSIS — J96.01 ACUTE RESPIRATORY FAILURE WITH HYPOXIA (HCC): Primary | ICD-10-CM

## 2022-08-06 DIAGNOSIS — N17.9 AKI (ACUTE KIDNEY INJURY) (HCC): ICD-10-CM

## 2022-08-06 DIAGNOSIS — N18.32 CHRONIC KIDNEY DISEASE (CKD) STAGE G3B/A1, MODERATELY DECREASED GLOMERULAR FILTRATION RATE (GFR) BETWEEN 30-44 ML/MIN/1.73 SQUARE METER AND ALBUMINURIA CREATININE RATIO LESS THAN 30 MG/G (HCC): ICD-10-CM

## 2022-08-06 DIAGNOSIS — I50.23 ACUTE ON CHRONIC SYSTOLIC CHF (CONGESTIVE HEART FAILURE) (HCC): ICD-10-CM

## 2022-08-06 DIAGNOSIS — E87.1 HYPONATREMIA: ICD-10-CM

## 2022-08-06 PROBLEM — A41.9 SEPSIS (HCC): Status: ACTIVE | Noted: 2022-08-06

## 2022-08-06 LAB
ALBUMIN SERPL-MCNC: 3.4 G/DL (ref 3.5–5.1)
ALP BLD-CCNC: 118 U/L (ref 38–126)
ALT SERPL-CCNC: 13 U/L (ref 11–66)
ANION GAP SERPL CALCULATED.3IONS-SCNC: 12 MEQ/L (ref 8–16)
ANISOCYTOSIS: PRESENT
AST SERPL-CCNC: 23 U/L (ref 5–40)
BACTERIA: ABNORMAL
BASE EXCESS (CALCULATED): -0.9 MMOL/L (ref -2.5–2.5)
BASOPHILS # BLD: 0.3 %
BASOPHILS ABSOLUTE: 0 THOU/MM3 (ref 0–0.1)
BILIRUB SERPL-MCNC: 0.3 MG/DL (ref 0.3–1.2)
BILIRUBIN URINE: NEGATIVE
BLOOD, URINE: ABNORMAL
BUN BLDV-MCNC: 16 MG/DL (ref 7–22)
C-REACTIVE PROTEIN: 0.88 MG/DL (ref 0–1)
CALCIUM SERPL-MCNC: 8.4 MG/DL (ref 8.5–10.5)
CASTS: ABNORMAL /LPF
CASTS: ABNORMAL /LPF
CHARACTER, URINE: CLEAR
CHLORIDE BLD-SCNC: 109 MEQ/L (ref 98–111)
CO2: 22 MEQ/L (ref 23–33)
COLLECTED BY:: NORMAL
COLOR: YELLOW
CREAT SERPL-MCNC: 1.1 MG/DL (ref 0.4–1.2)
CRYSTALS: ABNORMAL
D-DIMER QUANTITATIVE: 3506 NG/ML FEU (ref 0–500)
DEVICE: NORMAL
EOSINOPHIL # BLD: 0.2 %
EOSINOPHILS ABSOLUTE: 0 THOU/MM3 (ref 0–0.4)
EPITHELIAL CELLS, UA: ABNORMAL /HPF
ERYTHROCYTE [DISTWIDTH] IN BLOOD BY AUTOMATED COUNT: 18 % (ref 11.5–14.5)
ERYTHROCYTE [DISTWIDTH] IN BLOOD BY AUTOMATED COUNT: 65.9 FL (ref 35–45)
FLU A ANTIGEN: NEGATIVE
FLU B ANTIGEN: NEGATIVE
GFR SERPL CREATININE-BSD FRML MDRD: 63 ML/MIN/1.73M2
GLUCOSE BLD-MCNC: 107 MG/DL (ref 70–108)
GLUCOSE, URINE: NEGATIVE MG/DL
HCO3: 24 MMOL/L (ref 23–28)
HCT VFR BLD CALC: 43.9 % (ref 42–52)
HEMOGLOBIN: 13.7 GM/DL (ref 14–18)
IFIO2: 100
IMMATURE GRANS (ABS): 0.05 THOU/MM3 (ref 0–0.07)
IMMATURE GRANULOCYTES: 0.4 %
KETONES, URINE: NEGATIVE
LACTIC ACID, SEPSIS: 1.4 MMOL/L (ref 0.5–1.9)
LACTIC ACID, SEPSIS: 2.7 MMOL/L (ref 0.5–1.9)
LACTIC ACID, SEPSIS: 3.6 MMOL/L (ref 0.5–1.9)
LEUKOCYTE ESTERASE, URINE: ABNORMAL
LYMPHOCYTES # BLD: 8.1 %
LYMPHOCYTES ABSOLUTE: 1 THOU/MM3 (ref 1–4.8)
MAGNESIUM: 1.4 MG/DL (ref 1.6–2.4)
MAGNESIUM: 1.7 MG/DL (ref 1.6–2.4)
MCH RBC QN AUTO: 30.9 PG (ref 26–33)
MCHC RBC AUTO-ENTMCNC: 31.2 GM/DL (ref 32.2–35.5)
MCV RBC AUTO: 98.9 FL (ref 80–94)
MISCELLANEOUS LAB TEST RESULT: ABNORMAL
MODE: NORMAL
MONOCYTES # BLD: 1.2 %
MONOCYTES ABSOLUTE: 0.1 THOU/MM3 (ref 0.4–1.3)
MRSA SCREEN RT-PCR: POSITIVE
NITRITE, URINE: NEGATIVE
NUCLEATED RED BLOOD CELLS: 0 /100 WBC
O2 SATURATION: 95 %
OSMOLALITY CALCULATION: 286.6 MOSMOL/KG (ref 275–300)
PCO2: 40 MMHG (ref 35–45)
PH BLOOD GAS: 7.39 (ref 7.35–7.45)
PH UA: 5.5 (ref 5–9)
PIP: 12 CMH2O
PLATELET # BLD: 202 THOU/MM3 (ref 130–400)
PLATELET ESTIMATE: ADEQUATE
PMV BLD AUTO: 10.7 FL (ref 9.4–12.4)
PO2: 76 MMHG (ref 71–104)
POIKILOCYTES: ABNORMAL
POTASSIUM REFLEX MAGNESIUM: 3.2 MEQ/L (ref 3.5–5.2)
POTASSIUM SERPL-SCNC: 3.4 MEQ/L (ref 3.5–5.2)
POTASSIUM SERPL-SCNC: 3.6 MEQ/L (ref 3.5–5.2)
PRO-BNP: 6663 PG/ML (ref 0–1800)
PROCALCITONIN: 0.61 NG/ML (ref 0.01–0.09)
PROTEIN UA: NEGATIVE MG/DL
RBC # BLD: 4.44 MILL/MM3 (ref 4.7–6.1)
RBC URINE: ABNORMAL /HPF
REASON FOR REJECTION: NORMAL
REJECTED TEST: NORMAL
RENAL EPITHELIAL, UA: ABNORMAL
SARS-COV-2, NAAT: NOT DETECTED
SCAN OF BLOOD SMEAR: NORMAL
SEG NEUTROPHILS: 89.8 %
SEGMENTED NEUTROPHILS ABSOLUTE COUNT: 11.1 THOU/MM3 (ref 1.8–7.7)
SET PEEP: 6 MMHG
SET PRESS SUPP: 8 CMH2O
SET RESPIRATORY RATE: 35 BPM
SODIUM BLD-SCNC: 143 MEQ/L (ref 135–145)
SOURCE, BLOOD GAS: NORMAL
SPECIFIC GRAVITY UA: 1.01 (ref 1–1.03)
TOTAL PROTEIN: 6.4 G/DL (ref 6.1–8)
TROPONIN T: < 0.01 NG/ML
UROBILINOGEN, URINE: 0.2 EU/DL (ref 0–1)
WBC # BLD: 12.4 THOU/MM3 (ref 4.8–10.8)
WBC UA: ABNORMAL /HPF
YEAST: ABNORMAL

## 2022-08-06 PROCEDURE — 86140 C-REACTIVE PROTEIN: CPT

## 2022-08-06 PROCEDURE — 87186 SC STD MICRODIL/AGAR DIL: CPT

## 2022-08-06 PROCEDURE — 2060000000 HC ICU INTERMEDIATE R&B

## 2022-08-06 PROCEDURE — 85025 COMPLETE CBC W/AUTO DIFF WBC: CPT

## 2022-08-06 PROCEDURE — 82803 BLOOD GASES ANY COMBINATION: CPT

## 2022-08-06 PROCEDURE — 6360000002 HC RX W HCPCS: Performed by: EMERGENCY MEDICINE

## 2022-08-06 PROCEDURE — 96365 THER/PROPH/DIAG IV INF INIT: CPT

## 2022-08-06 PROCEDURE — 87635 SARS-COV-2 COVID-19 AMP PRB: CPT

## 2022-08-06 PROCEDURE — 87804 INFLUENZA ASSAY W/OPTIC: CPT

## 2022-08-06 PROCEDURE — 99223 1ST HOSP IP/OBS HIGH 75: CPT | Performed by: OPHTHALMOLOGY

## 2022-08-06 PROCEDURE — 83880 ASSAY OF NATRIURETIC PEPTIDE: CPT

## 2022-08-06 PROCEDURE — 51701 INSERT BLADDER CATHETER: CPT

## 2022-08-06 PROCEDURE — 87641 MR-STAPH DNA AMP PROBE: CPT

## 2022-08-06 PROCEDURE — 99285 EMERGENCY DEPT VISIT HI MDM: CPT

## 2022-08-06 PROCEDURE — 36600 WITHDRAWAL OF ARTERIAL BLOOD: CPT

## 2022-08-06 PROCEDURE — 6370000000 HC RX 637 (ALT 250 FOR IP): Performed by: OPHTHALMOLOGY

## 2022-08-06 PROCEDURE — 85379 FIBRIN DEGRADATION QUANT: CPT

## 2022-08-06 PROCEDURE — 6360000002 HC RX W HCPCS: Performed by: STUDENT IN AN ORGANIZED HEALTH CARE EDUCATION/TRAINING PROGRAM

## 2022-08-06 PROCEDURE — 84484 ASSAY OF TROPONIN QUANT: CPT

## 2022-08-06 PROCEDURE — 71045 X-RAY EXAM CHEST 1 VIEW: CPT

## 2022-08-06 PROCEDURE — 87077 CULTURE AEROBIC IDENTIFY: CPT

## 2022-08-06 PROCEDURE — 2700000000 HC OXYGEN THERAPY PER DAY

## 2022-08-06 PROCEDURE — 2580000003 HC RX 258: Performed by: STUDENT IN AN ORGANIZED HEALTH CARE EDUCATION/TRAINING PROGRAM

## 2022-08-06 PROCEDURE — 96367 TX/PROPH/DG ADDL SEQ IV INF: CPT

## 2022-08-06 PROCEDURE — 81001 URINALYSIS AUTO W/SCOPE: CPT

## 2022-08-06 PROCEDURE — 80053 COMPREHEN METABOLIC PANEL: CPT

## 2022-08-06 PROCEDURE — 84132 ASSAY OF SERUM POTASSIUM: CPT

## 2022-08-06 PROCEDURE — 83605 ASSAY OF LACTIC ACID: CPT

## 2022-08-06 PROCEDURE — 36415 COLL VENOUS BLD VENIPUNCTURE: CPT

## 2022-08-06 PROCEDURE — 84145 PROCALCITONIN (PCT): CPT

## 2022-08-06 PROCEDURE — 87086 URINE CULTURE/COLONY COUNT: CPT

## 2022-08-06 PROCEDURE — 94660 CPAP INITIATION&MGMT: CPT

## 2022-08-06 PROCEDURE — 83735 ASSAY OF MAGNESIUM: CPT

## 2022-08-06 PROCEDURE — 2580000003 HC RX 258: Performed by: EMERGENCY MEDICINE

## 2022-08-06 PROCEDURE — 87040 BLOOD CULTURE FOR BACTERIA: CPT

## 2022-08-06 PROCEDURE — 94761 N-INVAS EAR/PLS OXIMETRY MLT: CPT

## 2022-08-06 PROCEDURE — 51798 US URINE CAPACITY MEASURE: CPT

## 2022-08-06 PROCEDURE — 6370000000 HC RX 637 (ALT 250 FOR IP): Performed by: STUDENT IN AN ORGANIZED HEALTH CARE EDUCATION/TRAINING PROGRAM

## 2022-08-06 RX ORDER — ENOXAPARIN SODIUM 100 MG/ML
40 INJECTION SUBCUTANEOUS DAILY
Status: DISCONTINUED | OUTPATIENT
Start: 2022-08-06 | End: 2022-08-11 | Stop reason: HOSPADM

## 2022-08-06 RX ORDER — ACETAMINOPHEN 650 MG/1
650 SUPPOSITORY RECTAL EVERY 6 HOURS PRN
Status: DISCONTINUED | OUTPATIENT
Start: 2022-08-06 | End: 2022-08-11 | Stop reason: HOSPADM

## 2022-08-06 RX ORDER — SODIUM CHLORIDE 9 MG/ML
INJECTION, SOLUTION INTRAVENOUS CONTINUOUS
Status: DISCONTINUED | OUTPATIENT
Start: 2022-08-06 | End: 2022-08-06

## 2022-08-06 RX ORDER — POTASSIUM CHLORIDE 7.45 MG/ML
10 INJECTION INTRAVENOUS ONCE
Status: COMPLETED | OUTPATIENT
Start: 2022-08-06 | End: 2022-08-06

## 2022-08-06 RX ORDER — ACETAMINOPHEN 325 MG/1
650 TABLET ORAL EVERY 6 HOURS PRN
Status: DISCONTINUED | OUTPATIENT
Start: 2022-08-06 | End: 2022-08-11 | Stop reason: HOSPADM

## 2022-08-06 RX ORDER — ONDANSETRON 2 MG/ML
4 INJECTION INTRAMUSCULAR; INTRAVENOUS EVERY 6 HOURS PRN
Status: DISCONTINUED | OUTPATIENT
Start: 2022-08-06 | End: 2022-08-11 | Stop reason: HOSPADM

## 2022-08-06 RX ORDER — M-VIT,TX,IRON,MINS/CALC/FOLIC 27MG-0.4MG
1 TABLET ORAL DAILY
COMMUNITY

## 2022-08-06 RX ORDER — POTASSIUM CHLORIDE 20 MEQ/1
40 TABLET, EXTENDED RELEASE ORAL PRN
Status: DISCONTINUED | OUTPATIENT
Start: 2022-08-06 | End: 2022-08-11 | Stop reason: HOSPADM

## 2022-08-06 RX ORDER — SODIUM CHLORIDE, SODIUM LACTATE, POTASSIUM CHLORIDE, AND CALCIUM CHLORIDE .6; .31; .03; .02 G/100ML; G/100ML; G/100ML; G/100ML
1000 INJECTION, SOLUTION INTRAVENOUS ONCE
Status: COMPLETED | OUTPATIENT
Start: 2022-08-06 | End: 2022-08-06

## 2022-08-06 RX ORDER — POTASSIUM CHLORIDE 7.45 MG/ML
10 INJECTION INTRAVENOUS PRN
Status: DISCONTINUED | OUTPATIENT
Start: 2022-08-06 | End: 2022-08-11 | Stop reason: HOSPADM

## 2022-08-06 RX ORDER — SODIUM CHLORIDE 0.9 % (FLUSH) 0.9 %
5-40 SYRINGE (ML) INJECTION PRN
Status: DISCONTINUED | OUTPATIENT
Start: 2022-08-06 | End: 2022-08-11 | Stop reason: HOSPADM

## 2022-08-06 RX ORDER — MAGNESIUM SULFATE IN WATER 40 MG/ML
2000 INJECTION, SOLUTION INTRAVENOUS ONCE
Status: COMPLETED | OUTPATIENT
Start: 2022-08-06 | End: 2022-08-06

## 2022-08-06 RX ORDER — SODIUM CHLORIDE 0.9 % (FLUSH) 0.9 %
5-40 SYRINGE (ML) INJECTION EVERY 12 HOURS SCHEDULED
Status: DISCONTINUED | OUTPATIENT
Start: 2022-08-06 | End: 2022-08-11 | Stop reason: HOSPADM

## 2022-08-06 RX ORDER — ONDANSETRON 4 MG/1
4 TABLET, ORALLY DISINTEGRATING ORAL EVERY 8 HOURS PRN
Status: DISCONTINUED | OUTPATIENT
Start: 2022-08-06 | End: 2022-08-11 | Stop reason: HOSPADM

## 2022-08-06 RX ORDER — PANTOPRAZOLE SODIUM 40 MG/1
40 TABLET, DELAYED RELEASE ORAL
Status: DISCONTINUED | OUTPATIENT
Start: 2022-08-06 | End: 2022-08-11 | Stop reason: HOSPADM

## 2022-08-06 RX ORDER — TAMSULOSIN HYDROCHLORIDE 0.4 MG/1
0.4 CAPSULE ORAL DAILY
Status: DISCONTINUED | OUTPATIENT
Start: 2022-08-06 | End: 2022-08-11 | Stop reason: HOSPADM

## 2022-08-06 RX ORDER — 0.9 % SODIUM CHLORIDE 0.9 %
30 INTRAVENOUS SOLUTION INTRAVENOUS ONCE
Status: DISCONTINUED | OUTPATIENT
Start: 2022-08-06 | End: 2022-08-06

## 2022-08-06 RX ORDER — SODIUM CHLORIDE 9 MG/ML
INJECTION, SOLUTION INTRAVENOUS PRN
Status: DISCONTINUED | OUTPATIENT
Start: 2022-08-06 | End: 2022-08-11 | Stop reason: HOSPADM

## 2022-08-06 RX ORDER — POLYETHYLENE GLYCOL 3350 17 G/17G
17 POWDER, FOR SOLUTION ORAL DAILY PRN
Status: DISCONTINUED | OUTPATIENT
Start: 2022-08-06 | End: 2022-08-11 | Stop reason: HOSPADM

## 2022-08-06 RX ADMIN — POTASSIUM CHLORIDE 10 MEQ: 7.46 INJECTION, SOLUTION INTRAVENOUS at 09:44

## 2022-08-06 RX ADMIN — VANCOMYCIN HYDROCHLORIDE 1500 MG: 5 INJECTION, POWDER, LYOPHILIZED, FOR SOLUTION INTRAVENOUS at 12:32

## 2022-08-06 RX ADMIN — MAGNESIUM SULFATE HEPTAHYDRATE 2000 MG: 40 INJECTION, SOLUTION INTRAVENOUS at 11:30

## 2022-08-06 RX ADMIN — TAMSULOSIN HYDROCHLORIDE 0.4 MG: 0.4 CAPSULE ORAL at 22:17

## 2022-08-06 RX ADMIN — PANTOPRAZOLE SODIUM 40 MG: 40 TABLET, DELAYED RELEASE ORAL at 22:15

## 2022-08-06 RX ADMIN — SODIUM CHLORIDE: 9 INJECTION, SOLUTION INTRAVENOUS at 09:43

## 2022-08-06 RX ADMIN — CEFTRIAXONE SODIUM 1000 MG: 1 INJECTION, POWDER, FOR SOLUTION INTRAMUSCULAR; INTRAVENOUS at 06:24

## 2022-08-06 RX ADMIN — SODIUM CHLORIDE: 9 INJECTION, SOLUTION INTRAVENOUS at 10:39

## 2022-08-06 RX ADMIN — AZITHROMYCIN MONOHYDRATE 500 MG: 500 INJECTION, POWDER, LYOPHILIZED, FOR SOLUTION INTRAVENOUS at 07:11

## 2022-08-06 RX ADMIN — SODIUM CHLORIDE, POTASSIUM CHLORIDE, SODIUM LACTATE AND CALCIUM CHLORIDE 1000 ML: 600; 310; 30; 20 INJECTION, SOLUTION INTRAVENOUS at 06:15

## 2022-08-06 RX ADMIN — POTASSIUM BICARBONATE 40 MEQ: 782 TABLET, EFFERVESCENT ORAL at 22:16

## 2022-08-06 RX ADMIN — PIPERACILLIN AND TAZOBACTAM 3375 MG: 3; .375 INJECTION, POWDER, FOR SOLUTION INTRAVENOUS at 10:41

## 2022-08-06 RX ADMIN — PIPERACILLIN AND TAZOBACTAM 3375 MG: 3; .375 INJECTION, POWDER, FOR SOLUTION INTRAVENOUS at 18:11

## 2022-08-06 RX ADMIN — ENOXAPARIN SODIUM 40 MG: 100 INJECTION SUBCUTANEOUS at 10:43

## 2022-08-06 RX ADMIN — SODIUM CHLORIDE, PRESERVATIVE FREE 10 ML: 5 INJECTION INTRAVENOUS at 10:49

## 2022-08-06 RX ADMIN — SODIUM CHLORIDE: 9 INJECTION, SOLUTION INTRAVENOUS at 18:10

## 2022-08-06 ASSESSMENT — PAIN - FUNCTIONAL ASSESSMENT
PAIN_FUNCTIONAL_ASSESSMENT: NONE - DENIES PAIN

## 2022-08-06 ASSESSMENT — ENCOUNTER SYMPTOMS
NAUSEA: 1
SHORTNESS OF BREATH: 1
TROUBLE SWALLOWING: 0
VOMITING: 0
ABDOMINAL PAIN: 0
DIARRHEA: 0
COUGH: 1
BACK PAIN: 0
CONSTIPATION: 0
EYE REDNESS: 0

## 2022-08-06 ASSESSMENT — PAIN SCALES - GENERAL
PAINLEVEL_OUTOF10: 0

## 2022-08-06 NOTE — PLAN OF CARE
Problem: Discharge Planning  Goal: Discharge to home or other facility with appropriate resources  Outcome: Progressing  Flowsheets  Taken 8/6/2022 0926 by Eder Roman RN  Discharge to home or other facility with appropriate resources:   Refer to discharge planning if patient needs post-hospital services based on physician order or complex needs related to functional status, cognitive ability or social support system   Identify discharge learning needs (meds, wound care, etc)   Identify barriers to discharge with patient and caregiver   Arrange for needed discharge resources and transportation as appropriate  Taken 8/6/2022 0900 by Sameer Zavaleta RN  Discharge to home or other facility with appropriate resources:   Identify barriers to discharge with patient and caregiver   Arrange for needed discharge resources and transportation as appropriate   Refer to discharge planning if patient needs post-hospital services based on physician order or complex needs related to functional status, cognitive ability or social support system

## 2022-08-06 NOTE — ED NOTES
Pt transported to [facility] on cart in stable condition. Floor contacted before transport and spoke with Julianna.      Renate Coreas  08/06/22 0857

## 2022-08-06 NOTE — ED NOTES
Pt resting on cot. Family at bedside.      Natalee Heck RN  08/06/22 [address], RN  08/06/22 1857

## 2022-08-06 NOTE — H&P
History & Physical      Patient: Danielle Mane  YOB: 1932    MRN: [id number]  Acct: [de-identified]    PCP: Navid Haskins DO    Date of Admission: 8/6/2022    Date of Service: Patient seen / examined on 08/06/22 and admitted to Inpatient with expected LOS greater than two midnights due to medical therapy. ASSESSMENT / PLAN:  Acute hypoxic respiratory failure - Secondary to PNA  - Maintain BiPAP and wean supplemental oxygen as tolerated    Sepsis - Suspected pneumonia source. POA SIRS 3/4 (tachycardia, tachypnea, leukocytosis), elevated lactic, elevated procalcitonin and CXR with consolidation of right lung. Flu A/B and COVID-19 negative. Received Azithromycin and Ceftriaxone within the ED  - Blood cultures x2  - 30 cc/hr LR of ideal body weight  - Lactic every 2 hours until normalize  - Pharmacy to dose Vanc/zosyn  - Urinalysis with reflex to culture    Decompensated systolic heart failure NYHA III - Elevated Pro-BNP from baseline with evidence of pulmonary edema on CXR in the setting of right lung consolidation. Appears euvolemic. ECHO 8/4/202 EF=50% with severely dilated LA and enlarged RA and RV. Global systolic function severely reduced. - Hold home diuretics in setting of hypokalemia, hypomagnesium, and sepsis  - Continue BiPAP therapy as above    Pulmonary hypertension Who Class II - ECHO 12/10/2020 RVSP=44mmHg    Mild hypokalemia - K=3.2 Given 10mEq replacement in ED  - Recheck K+ in morning  - Continuous Telemetry    Hypomagnesemia - Mg=1.4  - Magnesium 2g now and recheck Mg in morning  - Maintain Mg >2 and K>4    CKD stage 3b - Improving from prior labs per chart review. Follows with Dr. Patricia Matt  - Avoid nephrotoxic agents and renally dose medications  - Daily standing weights, strict I/O  - Daily BMP  - Hold home diuretics    PAF (On Eliquis) - NSR and rate controlled. CHADSVASC2=5, HAS-BLED=4  - Hold Eliquis; uncertain if still taking.  Pharmacy stated last fill was greater than 1 month ago. Confirm prior to restart. CAD - s/p CABG 2009    BPH - noted  - Resume Flomax    GERD - Noted  - Resume Protonix    Chief Complaint: Shortness of breath    History of Present Illness:  Brayden Medeiros is a 80 y.o. male with PMHx of CAD s/p CABG, BPH, GERD, PAF, CKD, HLD and HTN who presented to [facility] with complaint of chills, shortness of breath, fatigue, and malaise that started this morning around 0430. The patient was brought to the ED via family who stated he was feeling well the day prior and has not been around any known sick contacts. He denies fever, headache, lightheadedness, change in vision, rhinorrhea, congestion, sore throat, cough, hemoptysis, chest pain, palpitations, [place], PND, abdominal pain, nausea, vomiting, hematemesis, change in bowel/bladder habits, hematochezia, hematuria, weakness, difficulty with ambulation, numbness/tingling, or known exposure to sick contacts. ED course: Patient started BiPAP for respiratory distress, administered 10mEq of potassium, 1g Ceftriaxone, 500mg azithromycin. Workup revealed: Sodium 143, potassium 3.2, chloride 109, CO2 22, BUN 16, creatinine 1.1, anion gap 12, EGFR 63, magnesium 1.4, lactic acid 2.7, glucose 107, calcium 8.4, procalcitonin 0.61, CRP 0.88, proBNP 6663, troponin <0.010, albumin 3.4, alk phos 118, ALT 13 AST 23, bilirubin 0.3, WBC 12.4, hemoglobin 13.7, MCV 98.9, platelet count 446. Flu A/B-negative  COVID-19 negative    The patient was admitted to the hospital service for further care and management. Please see A&P above for additional information.     Past Medical History:  Past Medical History:   Diagnosis Date    Actinic keratoses 8/21/2013    Adenomatous polyp 2008    Atrial fibrillation ([facility] [address]     CAD (coronary artery disease) 2009    Carotid artery disease ([facility] [address] 2009    left    Hyperlipidemia 2004    Hypertension 2004    Inguinal hernia, left 2/19/2014    Unspecified cerebral artery occlusion with cerebral infarction        Past Surgical History:  Past Surgical History:   Procedure Laterality Date    ABDOMEN SURGERY      ABDOMINAL ADHESION SURGERY  2012    Dr Phillips Cyrus REMOVAL  2004    right    COLONOSCOPY      [facility]    CORONARY ARTERY BYPASS GRAFT  2009    ENDOSCOPY, COLON, DIAGNOSTIC      EYE SURGERY      SMALL INTESTINE SURGERY      SMALL INTESTINE SURGERY  2015    Exploratory Laparotomy    UPPER GASTROINTESTINAL ENDOSCOPY N/A 2021    EGD ESOPHAGOGASTRODUODENOSCOPY PEG TUBE INSERTION performed by Kimberly Power MD at [facility]      cabg harvests from right leg       Medications Prior to Admission:  No current facility-administered medications on file prior to encounter. Current Outpatient Medications on File Prior to Encounter   Medication Sig Dispense Refill    furosemide (LASIX) 40 MG tablet Take 1.5 tablets by mouth daily 90 tablet 1    apixaban (ELIQUIS) 2.5 MG TABS tablet Take 1 tablet by mouth 2 times daily 60 tablet 1    finasteride (PROSCAR) 5 MG tablet Take 1 tablet by mouth daily 30 tablet 3    pantoprazole (PROTONIX) 40 MG tablet Take 40 mg by mouth 2 times daily      potassium chloride (KLOR-CON M) 20 MEQ extended release tablet Take 1 tablet by mouth daily 60 tablet 5    tamsulosin (FLOMAX) 0.4 MG capsule Take 1 capsule by mouth daily Capsule may be opened and contents dissolve for peg tube 30 capsule 3       Allergies:  Patient has no known allergies. Social History:  Social History     Socioeconomic History    Marital status:       Spouse name: Not on file    Number of children: 4    Years of education: Not on file    Highest education level: Not on file   Occupational History    Not on file   Tobacco Use    Smoking status: Former     Types: Cigarettes     Quit date: 1980     Years since quittin.6    Smokeless tobacco: Never   Vaping Use    Vaping Use: Never used   Substance and Sexual Activity    Alcohol use: Yes     Alcohol/week: 1.0 standard drink     Types: 1 Cans of beer per week     Comment: occasionally    Drug use: No    Sexual activity: Not on file   Other Topics Concern    Not on file   Social History Narrative    Not on file     Social Determinants of Health     Financial Resource Strain: Not on file   Food Insecurity: Not on file   Transportation Needs: Not on file   Physical Activity: Not on file   Stress: Not on file   Social Connections: Not on file   Intimate Partner Violence: Not on file   Housing Stability: Not on file       Family History:   Family History   Problem Relation Age of Onset    Cancer Mother        REVIEW OF SYSTEMS:  A 14-point ROS was obtained and negative, with the exception of pertinent positives as listed above in HPI. PHYSICAL EXAM:  Vitals:    08/06/22 0558 08/06/22 0711 08/06/22 0746 08/06/22 0800   BP: (!) 133/51 125/63 (!) 138/55    Pulse: 89 61 66    Resp: 22 18 18 24   Temp: 98.5 °F (36.9 °C)      SpO2: 100% 100% 97%    Weight: 170 lb (77.1 kg)      Height: 5' (1.524 m)        Physical Exam  Vitals and nursing note reviewed. Constitutional:       General: He is awake. He is not in acute distress. Appearance: Normal appearance. He is normal weight. He is not ill-appearing. Interventions: Face mask in place. HENT:      Head: Normocephalic and atraumatic. Right Ear: External ear normal.      Left Ear: External ear normal.      Nose: Nose normal.      Mouth/Throat:      Mouth: Mucous membranes are moist.      Pharynx: Oropharynx is clear. Eyes:      Conjunctiva/sclera: Conjunctivae normal.      Pupils: Pupils are equal, round, and reactive to light. Cardiovascular:      Rate and Rhythm: Normal rate and regular rhythm. Pulses: Normal pulses. Dorsalis pedis pulses are 2+ on the right side and 2+ on the left side. Posterior tibial pulses are 2+ on the right side and 2+ on the left side.       Heart sounds: Sepsis   Result Value Ref Range    Lactic Acid, Sepsis 3.6 (H) 0.5 - 1.9 mmol/L   Lactate, Sepsis   Result Value Ref Range    Lactic Acid, Sepsis 2.7 (H) 0.5 - 1.9 mmol/L   CBC with Auto Differential   Result Value Ref Range    WBC 12.4 (H) 4.8 - 10.8 thou/mm3    RBC 4.44 (L) 4.70 - 6.10 mill/mm3    Hemoglobin 13.7 (L) 14.0 - 18.0 gm/dl    Hematocrit 43.9 42.0 - 52.0 %    MCV 98.9 (H) 80.0 - 94.0 fL    MCH 30.9 26.0 - 33.0 pg    MCHC 31.2 (L) 32.2 - 35.5 gm/dl    RDW-CV 18.0 (H) 11.5 - 14.5 %    RDW-SD 65.9 (H) 35.0 - 45.0 fL    Platelets 238 274 - 697 thou/mm3    MPV 10.7 9.4 - 12.4 fL    Seg Neutrophils 89.8 %    Lymphocytes 8.1 %    Monocytes 1.2 %    Eosinophils 0.2 %    Basophils 0.3 %    Immature Granulocytes 0.4 %    Platelet Estimate ADEQUATE Adequate    Segs Absolute 11.1 (H) 1.8 - 7.7 thou/mm3    Lymphocytes Absolute 1.0 1.0 - 4.8 thou/mm3    Monocytes Absolute 0.1 (L) 0.4 - 1.3 thou/mm3    Eosinophils Absolute 0.0 0.0 - 0.4 thou/mm3    Basophils Absolute 0.0 0.0 - 0.1 thou/mm3    Immature Grans (Abs) 0.05 0.00 - 0.07 thou/mm3    nRBC 0 /100 wbc    Anisocytosis PRESENT Absent    Poikilocytes 1+ Absent   SPECIMEN REJECTION   Result Value Ref Range    Rejected Test chemistries     Reason for Rejection see below    Troponin   Result Value Ref Range    Troponin T < 0.010 ng/ml   Brain Natriuretic Peptide   Result Value Ref Range    Pro-BNP 6663.0 (H) 0.0 - 1800.0 pg/mL   C-Reactive Protein   Result Value Ref Range    CRP 0.88 0.00 - 1.00 mg/dl   Procalcitonin   Result Value Ref Range    Procalcitonin 0.61 (H) 0.01 - 0.09 ng/mL   Comprehensive Metabolic Panel w/ Reflex to MG   Result Value Ref Range    Glucose 107 70 - 108 mg/dL    Creatinine 1.1 0.4 - 1.2 mg/dL    BUN 16 7 - 22 mg/dL    Sodium 143 135 - 145 meq/L    Potassium reflex Magnesium 3.2 (L) 3.5 - 5.2 meq/L    Chloride 109 98 - 111 meq/L    CO2 22 (L) 23 - 33 meq/L    Calcium 8.4 (L) 8.5 - 10.5 mg/dL    AST 23 5 - 40 U/L    Alkaline Phosphatase 118 38 - 126 U/L    Total Protein 6.4 6.1 - 8.0 g/dL    Albumin 3.4 (L) 3.5 - 5.1 g/dL    Total Bilirubin 0.3 0.3 - 1.2 mg/dL    ALT 13 11 - 66 U/L   Scan of Blood Smear   Result Value Ref Range    SCAN OF BLOOD SMEAR see below    Anion Gap   Result Value Ref Range    Anion Gap 12.0 8.0 - 16.0 meq/L   Glomerular Filtration Rate, Estimated   Result Value Ref Range    Est, Glom Filt Rate 63 (A) ml/min/1.73m2   Magnesium   Result Value Ref Range    Magnesium 1.4 (L) 1.6 - 2.4 mg/dL   Osmolality   Result Value Ref Range    Osmolality Calc 286.6 275.0 - 300.0 mOsmol/kg       EKG / Radiology:    EKG:  Reviewed by me --    CXR:  Reviewed by me --    XR CHEST PORTABLE    Result Date: 8/6/2022  Chest X-ray, 1 View COMPARISON: CR,SR - XR CHEST PORTABLE - 12/25/2021 07:41 PM EST  CR,SR - XR CHEST PORTABLE - 08/04/2021 04:46 AM EDT FINDINGS: Diffuse bilateral airspace disease with patchy consolidation throughout the right lung. A skinfold projects over the lateral right chest. Blunting of the right costophrenic angle. No pneumothorax. Stable cardiomegaly. No acute fracture. Status post median sternotomy. Bilateral airspace disease with areas of consolidation in the right lung, which could be due to pneumonia or pulmonary edema. Right pleural effusion. This document has been electronically signed by: Jana Manuel MD on 08/06/2022 06:56 AM      FEN/GI/DVT:  IVF:  30cc/kg  Electrolytes: Monitor and replace per protocols  Diet: NPO  GI PPX: Yes  DVT Prophylaxis: Lovenox    CODE STATUS:  Limited - x4    Active Hospital Problems    Diagnosis Date Noted    Sepsis [facility]) [A41.9] 08/06/2022     Priority: Medium     Thank you Toby Payne DO for the opportunity to be involved in this patient's care.     Electronically signed by Penny Arreaga DO, MBA on 8/6/2022 at 8:16 AM

## 2022-08-06 NOTE — ED PROVIDER NOTES
[facility]    EMERGENCY MEDICINE     Pt Name: Terri Malagon  MRN: [id number]  [place] 3/30/1932  Date of evaluation: 8/6/2022  Provider: Lili Bradley MD,     CHIEF COMPLAINT     No chief complaint on file. HISTORY OF PRESENT ILLNESS    Terri Malagon is a pleasant 80 y.o. male who presents to the emergency department from home for evaluation of difficulty breathing. Patient called his son and stated that he was having difficulty breathing and needed to be taken to the emergency room. He complained of chills and nausea. He has a productive cough and finds it difficult to breathe. He denies any chest pain, vomiting, abdominal pain, or diarrhea. Son states that he does have a history of right-sided heart failure and has been taking his medications as prescribed. They deny any history of COPD. He has not had any sick contacts that they are aware of. Triage notes and Nursing notes were reviewed by myself. Any discrepancies are addressed above.     PAST MEDICAL HISTORY     Past Medical History:   Diagnosis Date    Actinic keratoses 8/21/2013    Adenomatous polyp 2008    Atrial fibrillation ([address]     CAD (coronary artery disease) 2009    Carotid artery disease ([address] 2009    left    Hyperlipidemia 2004    Hypertension 2004    Inguinal hernia, left 2/19/2014    Unspecified cerebral artery occlusion with cerebral infarction        SURGICAL HISTORY       Past Surgical History:   Procedure Laterality Date    ABDOMEN SURGERY      ABDOMINAL ADHESION SURGERY  6/2012    Dr Beverli Riedel REMOVAL  2004    right    COLONOSCOPY      [facility]    CORONARY ARTERY BYPASS GRAFT  2009    ENDOSCOPY, COLON, DIAGNOSTIC      EYE SURGERY      SMALL INTESTINE SURGERY      SMALL INTESTINE SURGERY  02/20/2015    Exploratory Laparotomy    UPPER GASTROINTESTINAL ENDOSCOPY N/A 1/26/2021    EGD ESOPHAGOGASTRODUODENOSCOPY PEG TUBE INSERTION performed by Jane Lange MD at [facility]    VASCULAR SURGERY      cabg harvests from right leg       CURRENT MEDICATIONS       Current Discharge Medication List        CONTINUE these medications which have NOT CHANGED    Details   Multiple Vitamins-Minerals (THERAPEUTIC MULTIVITAMIN-MINERALS) tablet Take 1 tablet by mouth in the morning. furosemide (LASIX) 40 MG tablet Take 1.5 tablets by mouth daily  Qty: 90 tablet, Refills: 1      apixaban (ELIQUIS) 2.5 MG TABS tablet Take 1 tablet by mouth 2 times daily  Qty: 60 tablet, Refills: 1    Associated Diagnoses: Atrial fibrillation with slow ventricular response (HCC)      finasteride (PROSCAR) 5 MG tablet Take 1 tablet by mouth daily  Qty: 30 tablet, Refills: 3    Associated Diagnoses: BPH with urinary obstruction      pantoprazole (PROTONIX) 40 MG tablet Take 40 mg by mouth 2 times daily      potassium chloride (KLOR-CON M) 20 MEQ extended release tablet Take 1 tablet by mouth daily  Qty: 60 tablet, Refills: 5      tamsulosin (FLOMAX) 0.4 MG capsule Take 1 capsule by mouth daily Capsule may be opened and contents dissolve for peg tube  Qty: 30 capsule, Refills: 3             ALLERGIES     Patient has no known allergies. FAMILY HISTORY       Family History   Problem Relation Age of Onset    Cancer Mother         SOCIAL HISTORY       Social History     Socioeconomic History    Marital status:      Spouse name: None    Number of children: 4    Years of education: None    Highest education level: None   Tobacco Use    Smoking status: Former     Types: Cigarettes     Quit date: 1980     Years since quittin.6    Smokeless tobacco: Never   Vaping Use    Vaping Use: Never used   Substance and Sexual Activity    Alcohol use: Yes     Alcohol/week: 1.0 standard drink     Types: 1 Cans of beer per week     Comment: occasionally    Drug use: No       REVIEW OF SYSTEMS     Review of Systems   Constitutional:  Positive for chills. Negative for fatigue and fever.    HENT:  Negative for congestion and trouble swallowing. Eyes:  Negative for redness. Respiratory:  Positive for cough and shortness of breath. Cardiovascular:  Positive for leg swelling. Negative for chest pain. Gastrointestinal:  Positive for nausea. Negative for abdominal pain, constipation, diarrhea and vomiting. Genitourinary:  Negative for difficulty urinating. Musculoskeletal:  Negative for back pain. Skin:  Negative for rash. Allergic/Immunologic: Negative for immunocompromised state. Neurological:  Negative for light-headedness and headaches. Hematological:  Does not bruise/bleed easily. Except as noted above the remainder of the review of systems was reviewed and is. PHYSICAL EXAM    (up to 7 for level 4, 8 or more for level 5)     ED Triage Vitals [08/06/22 0503]   BP Temp Temp src Heart Rate Resp SpO2 Height Weight   (!) 163/79 -- -- (!) 106 30 (!) 52 % -- --       Physical Exam  Vitals and nursing note reviewed. Constitutional:       General: He is in acute distress. Appearance: He is normal weight. He is ill-appearing. HENT:      Head: Normocephalic and atraumatic. Mouth/Throat:      Mouth: Mucous membranes are moist.      Pharynx: Oropharynx is clear. Eyes:      Extraocular Movements: Extraocular movements intact. Pupils: Pupils are equal, round, and reactive to light. Neck:      Vascular: No JVD. Trachea: Trachea normal.   Cardiovascular:      Rate and Rhythm: Regular rhythm. Tachycardia present. Pulses: Normal pulses. Heart sounds: No murmur heard. Pulmonary:      Effort: Tachypnea, accessory muscle usage, respiratory distress and retractions present. Breath sounds: Examination of the right-upper field reveals rhonchi. Examination of the left-upper field reveals rhonchi. Examination of the right-middle field reveals rhonchi. Examination of the left-middle field reveals rhonchi. Examination of the right-lower field reveals rhonchi.  Examination of the left-lower field reveals rhonchi. Rhonchi present. No decreased breath sounds. Chest:      Chest wall: No crepitus. Abdominal:      General: Bowel sounds are normal.      Palpations: Abdomen is soft. Tenderness: There is no abdominal tenderness. There is no guarding or rebound. Musculoskeletal:      Cervical back: Neck supple. Right lower leg: No edema. Left lower leg: No edema. Skin:     General: Skin is warm and dry. Capillary Refill: Capillary refill takes less than 2 seconds. Neurological:      General: No focal deficit present. Mental Status: He is alert. DIAGNOSTIC RESULTS     EKG:(none if blank)  All EKG's are interpreted [facility] who either signs or Co-signs this chart in the absence of a cardiologist.        RADIOLOGY: (none if blank)   Interpretation per the Radiologistbelow, if available at the time of this note:    XR CHEST (2 VW)   Final Result   Suspected moderate-sized right pleural effusion. There are right lung infiltrates, suggesting pneumonia. **This report has been created using voice recognition software. It may contain minor errors which are inherent in voice recognition technology. **      Final report electronically signed by Dr. Candace Vazquez on 8/7/2022 1:45 PM      XR CHEST PORTABLE   Final Result   Bilateral airspace disease with areas of consolidation in the right lung,    which could be due to pneumonia or pulmonary edema. Right pleural effusion. This document has been electronically signed by: Mona Lee MD on    08/06/2022 06:56 AM          LABS:  Labs Reviewed   CULTURE, URINE - Abnormal; Notable for the following components:       Result Value    Urine Culture, Routine   (*)     Value: One isolate is resistant in vitro to current regimen.      Organism Escherichia coli (*)     Organism Enterococcus faecalis - (Group D) (*)     All other components within normal limits    Narrative:     Source: urine       Site: unknown collect          Current Antibiotics: Vancomycin,   Piperacillin/Tazobactam   D-DIMER, QUANTITATIVE - Abnormal; Notable for the following components:    D-Dimer, Quant 3506.00 (*)     All other components within normal limits   LACTATE, SEPSIS - Abnormal; Notable for the following components:    Lactic Acid, Sepsis 3.6 (*)     All other components within normal limits   LACTATE, SEPSIS - Abnormal; Notable for the following components:    Lactic Acid, Sepsis 2.7 (*)     All other components within normal limits   CBC WITH AUTO DIFFERENTIAL - Abnormal; Notable for the following components:    WBC 12.4 (*)     RBC 4.44 (*)     Hemoglobin 13.7 (*)     MCV 98.9 (*)     MCHC 31.2 (*)     RDW-CV 18.0 (*)     RDW-SD 65.9 (*)     Segs Absolute 11.1 (*)     Monocytes Absolute 0.1 (*)     All other components within normal limits   BRAIN NATRIURETIC PEPTIDE - Abnormal; Notable for the following components:    Pro-BNP 6663.0 (*)     All other components within normal limits   PROCALCITONIN - Abnormal; Notable for the following components:    Procalcitonin 0.61 (*)     All other components within normal limits   COMPREHENSIVE METABOLIC PANEL W/ REFLEX TO MG FOR LOW K - Abnormal; Notable for the following components:    Potassium reflex Magnesium 3.2 (*)     CO2 22 (*)     Calcium 8.4 (*)     Albumin 3.4 (*)     All other components within normal limits   GLOMERULAR FILTRATION RATE, ESTIMATED - Abnormal; Notable for the following components:    Est, Glom Filt Rate 63 (*)     All other components within normal limits   MAGNESIUM - Abnormal; Notable for the following components:    Magnesium 1.4 (*)     All other components within normal limits   URINALYSIS WITH MICROSCOPIC - Abnormal; Notable for the following components:    Blood, Urine SMALL (*)     Leukocyte Esterase, Urine TRACE (*)     All other components within normal limits   MRSA BY PCR - Abnormal; Notable for the following components:    MRSA SCREEN RT-PCR POSITIVE (*)     All other components within normal limits   BASIC METABOLIC PANEL W/ REFLEX TO MG FOR LOW K - Abnormal; Notable for the following components:    Calcium 7.8 (*)     All other components within normal limits   CBC - Abnormal; Notable for the following components:    WBC 17.3 (*)     RBC 3.54 (*)     Hemoglobin 11.3 (*)     Hematocrit 35.5 (*)     .3 (*)     MCHC 31.8 (*)     RDW-CV 17.7 (*)     RDW-SD 65.8 (*)     All other components within normal limits   POTASSIUM - Abnormal; Notable for the following components:    Potassium 3.4 (*)     All other components within normal limits   GLOMERULAR FILTRATION RATE, ESTIMATED - Abnormal; Notable for the following components:    Est, Glom Filt Rate 57 (*)     All other components within normal limits   BASIC METABOLIC PANEL W/ REFLEX TO MG FOR LOW K - Abnormal; Notable for the following components:    Potassium reflex Magnesium 3.4 (*)     Creatinine 1.3 (*)     Calcium 8.0 (*)     All other components within normal limits   CBC - Abnormal; Notable for the following components:    WBC 13.9 (*)     RBC 3.27 (*)     Hemoglobin 10.4 (*)     Hematocrit 32.7 (*)     .0 (*)     MCHC 31.8 (*)     RDW-CV 17.7 (*)     RDW-SD 66.5 (*)     All other components within normal limits   GLOMERULAR FILTRATION RATE, ESTIMATED - Abnormal; Notable for the following components:    Est, Glom Filt Rate 52 (*)     All other components within normal limits   GLOMERULAR FILTRATION RATE, ESTIMATED - Abnormal; Notable for the following components:    Est, Glom Filt Rate 57 (*)     All other components within normal limits   CBC - Abnormal; Notable for the following components:    RBC 3.65 (*)     Hemoglobin 11.5 (*)     Hematocrit 36.8 (*)     .8 (*)     MCHC 31.3 (*)     RDW-CV 17.3 (*)     RDW-SD 65.0 (*)     All other components within normal limits   CULTURE, BLOOD 1    Narrative:     Source: blood-Adult-suboptimal <5.5oz./set volume Site: R. wrist          Current   Antibiotics: not stated   CULTURE, BLOOD 2    Narrative:     Source: blood-Adult-suboptimal <5.5oz./set volume       Site: Peripheral Vein            Current Antibiotics: not stated   COVID-19, RAPID   RAPID INFLUENZA A/B ANTIGENS   CULTURE, BLOOD 1    Narrative:     Source: blood-Adult-suboptimal <5.5oz./set volume       Site: Peripheral Vein            Current Antibiotics: not stated   CULTURE, BLOOD 1    Narrative:     Source: blood-Adult-suboptimal <5.5oz./set volume       Site: Peripheral Vein            Current Antibiotics: not stated   BLOOD GAS, ARTERIAL   SPECIMEN REJECTION   TROPONIN   C-REACTIVE PROTEIN   SCAN OF BLOOD SMEAR   ANION GAP   OSMOLALITY   LACTATE, SEPSIS   POTASSIUM   MAGNESIUM   ANION GAP   VANCOMYCIN LEVEL, RANDOM   ANION GAP   MAGNESIUM   VITAMIN B12 & FOLATE   CALCIUM, IONIZED   BASIC METABOLIC PANEL W/ REFLEX TO MG FOR LOW K   ANION GAP       All other labs were within normal range or not returned as of this dictation. Please note, any cultures that may have been sent were not resulted at the time of this patient visit. EMERGENCY DEPARTMENT COURSE andMedical Decision Making:     MDM  Number of Diagnoses or Management Options  Acute on chronic systolic CHF (congestive heart failure) (HCC)  Acute respiratory failure with hypoxia (HCC)  Pneumonia due to infectious organism, unspecified laterality, unspecified part of lung  Diagnosis management comments: 20-year-old male presents emergency room for difficulty breathing. Differential includes pneumonia, pulmonary edema, bronchitis, pneumothorax, COVID, influenza, sepsis, PE, electrolyte abnormality. Patient was initially hypoxic into distress upon arrival into the emergency department. He was placed on BiPAP with 100% oxygen. Patient was diffusely rhonchorous. Will evaluate with CBC, CMP, troponin, BNP, procalcitonin, lactate, blood cultures, influenza swab, COVID swab, chest x-ray, D-dimer.     /  ED Course as of 08/09/22 1548   Sat Aug 06, 2022   0604 Patient has had improvement on the BiPAP. [DD]      ED Course User Index  [DD] Sherryle Martini, MD         The patient was evaluated during the global COVID-19 pandemic, and that diagnosis was considered upon their initial presentation. Their evaluation, treatment and testing was consistent with current guidelines for patients who present with complaints or symptoms that may be related to COVID-19.     Strict returnprecautions and follow up instructions were discussed with the patient with which the patient agrees        ED Medications administered this visit:    Medications   piperacillin-tazobactam (ZOSYN) 3,375 mg in dextrose 5 % 50 mL IVPB extended infusion (mini-bag) ( IntraVENous Rate/Dose Verify 8/9/22 1355)   sodium chloride flush 0.9 % injection 5-40 mL (10 mLs IntraVENous Given 8/9/22 0819)   sodium chloride flush 0.9 % injection 5-40 mL (10 mLs IntraVENous Given 8/9/22 1131)   0.9 % sodium chloride infusion ( IntraVENous Stopped 8/8/22 1811)   enoxaparin (LOVENOX) injection 40 mg (40 mg SubCUTAneous Given 8/9/22 0821)   ondansetron (ZOFRAN-ODT) disintegrating tablet 4 mg (has no administration in time range)     Or   ondansetron (ZOFRAN) injection 4 mg (has no administration in time range)   polyethylene glycol (GLYCOLAX) packet 17 g (has no administration in time range)   acetaminophen (TYLENOL) tablet 650 mg (has no administration in time range)     Or   acetaminophen (TYLENOL) suppository 650 mg (has no administration in time range)   pantoprazole (PROTONIX) tablet 40 mg (40 mg Oral Given 8/9/22 0822)   vancomycin (VANCOCIN) 1500 mg in dextrose 5% 500 mL IVPB (0 mg IntraVENous Stopped 8/6/22 1456)     Followed by   vancomycin (VANCOCIN) 1,000 mg in sodium chloride 0.9 % 250 mL IVPB ([id number]) (0 mg IntraVENous Stopped 8/9/22 1120)   vancomycin (VANCOCIN) intermittent dosing (placeholder) ( Other Not Given 8/6/22 1154)   potassium chloride (KLOR-CON M) extended release tablet 40 mEq ( Oral See Alternative 8/6/22 2216)     Or   potassium bicarb-citric acid (EFFER-K) effervescent tablet 40 mEq (40 mEq Oral Given 8/6/22 2216)     Or   potassium chloride 10 mEq/100 mL IVPB (Peripheral Line) ( IntraVENous See Alternative 8/6/22 2216)   tamsulosin (FLOMAX) capsule 0.4 mg (0.4 mg Oral Given 8/9/22 0822)   magnesium sulfate 2000 mg in 50 mL IVPB premix (has no administration in time range)   furosemide (LASIX) tablet 60 mg (60 mg Oral Given 8/9/22 1319)   azithromycin (ZITHROMAX) 500 mg in sodium chloride 0.9 % 250 mL IVPB ([id number]) (0 mg IntraVENous Stopped 8/6/22 1148)     And   cefTRIAXone (ROCEPHIN) 1,000 mg in dextrose 5 % 50 mL IVPB mini-bag (0 mg IntraVENous Stopped 8/6/22 0654)   lactated ringers bolus (1,000 mLs IntraVENous New Bag 8/6/22 0615)   potassium chloride 10 mEq/100 mL IVPB (Peripheral Line) ( IntraVENous Stopped 8/6/22 1122)   magnesium sulfate 2000 mg in 50 mL IVPB premix (0 mg IntraVENous Stopped 8/6/22 1333)   potassium chloride (KLOR-CON M) extended release tablet 40 mEq (40 mEq Oral Given 8/8/22 0817)   furosemide (LASIX) injection 40 mg (40 mg IntraVENous Given 8/8/22 1046)   magnesium sulfate 2000 mg in 50 mL IVPB premix (0 mg IntraVENous Stopped 8/8/22 1810)         Critical Care    Date/Time: 8/9/2022 7:48 AM  Performed by: Blaise Hummel MD  Authorized by: Sharon Chisholm MD     Critical care provider statement:     Critical care time (minutes):  30    Critical care time was exclusive of:  Separately billable procedures and treating other patients and teaching time    Critical care was necessary to treat or prevent imminent or life-threatening deterioration of the following conditions:  Respiratory failure    Critical care was time spent personally by me on the following activities:  Blood draw for specimens, development of treatment plan with patient or surrogate, evaluation of patient's response to treatment, examination of patient, obtaining history from patient or surrogate, ordering and performing treatments and interventions, ordering and review of laboratory studies, ordering and review of radiographic studies, pulse oximetry, re-evaluation of patient's condition, review of old charts and ventilator management: (None if blank)       CLINICAL       1. Acute respiratory failure with hypoxia ([facility]    2. Acute on chronic systolic CHF (congestive heart failure) ([facility]    3. Pneumonia due to infectious organism, unspecified laterality, unspecified part of lung          DISPOSITION/PLAN   DISPOSITION Admitted 08/06/2022 08:16:10 AM      PATIENT REFERRED TO:  No follow-up provider specified.     DISCHARGE MEDICATIONS:  Current Discharge Medication List                 (Please note that portions of this note were completed with a voice recognition program.  Efforts were made to edit the dictations but occasionallywords are mis-transcribed.)      Electronically signed by Kathe Crockett MD on 8/6/22 at 5:34 AM EDT    Attending Physician, Emergency Department         Sherryle Martini, MD  08/09/22 2489

## 2022-08-06 NOTE — ED NOTES
Upon first contact pt is resting on cot. Lights off for comfort.      Espinoza Villarreal RN  08/06/22 5921

## 2022-08-06 NOTE — ED TRIAGE NOTES
Pt reports to the ED with complaints of SOB, chills, and diarrhea. Upon initial encounter this RN can hear audible crackles. Pt saturation 52% room air.

## 2022-08-06 NOTE — ED NOTES
ED nurse-to-nurse bedside report    No chief complaint on file. LOC: alert and orientated to name, place, date  Vital signs   Vitals:    08/06/22 0542 08/06/22 0543 08/06/22 0558 08/06/22 0711   BP:   (!) 133/51 125/63   Pulse: 93  89 61   Resp: (!) 31 28 22 18   Temp:   98.5 °F (36.9 °C)    SpO2: 98%  100% 100%   Weight:   170 lb (77.1 kg)    Height:   5' (1.524 m)       Pain:    Pain Interventions: NA  Pain Goal: 0  Oxygen: Yes    Current needs required Bipap   Telemetry: Yes  LDAs:   Peripheral IV 08/06/22 Right Forearm (Active)   Site Assessment Clean, dry & intact 08/06/22 0712   Line Status Infusing 08/06/22 0712   Phlebitis Assessment No symptoms 08/06/22 0712   Infiltration Assessment 0 08/06/22 0712   Dressing Status Clean, dry & intact 08/06/22 0712       Peripheral IV 08/06/22 Left Forearm (Active)   Site Assessment Clean, dry & intact 08/06/22 0712   Line Status Infusing 08/06/22 0712   Phlebitis Assessment No symptoms 08/06/22 0712   Infiltration Assessment 0 08/06/22 0712   Dressing Status Clean, dry & intact 08/06/22 0712     Continuous Infusions:   Mobility: Requires assistance * 2  [place] Fall Risk Score: No flowsheet data found.   Fall Interventions: Side rails x2, call light in reach  Report given to: Marialuisa Wallace RN  08/06/22 4819

## 2022-08-06 NOTE — PROGRESS NOTES
[address] [facility] Pharmacokinetic Monitoring Service - Vancomycin     Elizabeth Perez is a 80 y.o. male starting on vancomycin therapy for Sepsis/PNA. Pharmacy consulted by Dr. Huey Gaucher for monitoring and adjustment. Target Concentration: Goal AUC/ROBERT 400-600 mg*hr/L    Additional Antimicrobials: Zosyn    Pertinent Laboratory Values: Wt Readings from Last 1 Encounters:   08/06/22 170 lb (77.1 kg)     Temp Readings from Last 1 Encounters:   08/06/22 97.7 °F (36.5 °C) (Axillary)     Estimated Creatinine Clearance: 38 mL/min (based on SCr of 1.1 mg/dL). Recent Labs     08/06/22  0531 08/06/22  0610   CREATININE  --  1.1   WBC 12.4*  --      Procalcitonin: 0.61    Pertinent Cultures:  Culture Date Source Results   8/6/22 Blood x 2  Sent   MRSA Nasal Swab: not ordered. Order placed by pharmacy.     Plan:  Dosing recommendations based on Bayesian software  Start Vancomycin 1500 mg IVPB x 1 dose (loading) followed by Vancomycin 1000 mg IVPB Q24H  Anticipated AUC of 444 and trough concentration of 13.8 mcg/mL at steady state  Renal labs as indicated   Vancomycin concentration to be ordered within 48 hours  Pharmacy will continue to monitor patient and adjust therapy as indicated    Thank you for the consult,  Suraj Acevedo, [facility]  8/6/2022 9:51 AM

## 2022-08-07 ENCOUNTER — APPOINTMENT (OUTPATIENT)
Dept: GENERAL RADIOLOGY | Age: 87
DRG: 871 | End: 2022-08-07
Payer: OTHER GOVERNMENT

## 2022-08-07 LAB
ANION GAP SERPL CALCULATED.3IONS-SCNC: 9 MEQ/L (ref 8–16)
BUN BLDV-MCNC: 16 MG/DL (ref 7–22)
CALCIUM SERPL-MCNC: 7.8 MG/DL (ref 8.5–10.5)
CHLORIDE BLD-SCNC: 105 MEQ/L (ref 98–111)
CO2: 24 MEQ/L (ref 23–33)
CREAT SERPL-MCNC: 1.2 MG/DL (ref 0.4–1.2)
ERYTHROCYTE [DISTWIDTH] IN BLOOD BY AUTOMATED COUNT: 17.7 % (ref 11.5–14.5)
ERYTHROCYTE [DISTWIDTH] IN BLOOD BY AUTOMATED COUNT: 65.8 FL (ref 35–45)
GFR SERPL CREATININE-BSD FRML MDRD: 57 ML/MIN/1.73M2
GLUCOSE BLD-MCNC: 87 MG/DL (ref 70–108)
HCT VFR BLD CALC: 35.5 % (ref 42–52)
HEMOGLOBIN: 11.3 GM/DL (ref 14–18)
MCH RBC QN AUTO: 31.9 PG (ref 26–33)
MCHC RBC AUTO-ENTMCNC: 31.8 GM/DL (ref 32.2–35.5)
MCV RBC AUTO: 100.3 FL (ref 80–94)
PLATELET # BLD: 137 THOU/MM3 (ref 130–400)
PMV BLD AUTO: 11.8 FL (ref 9.4–12.4)
POTASSIUM REFLEX MAGNESIUM: 3.6 MEQ/L (ref 3.5–5.2)
RBC # BLD: 3.54 MILL/MM3 (ref 4.7–6.1)
SODIUM BLD-SCNC: 138 MEQ/L (ref 135–145)
WBC # BLD: 17.3 THOU/MM3 (ref 4.8–10.8)

## 2022-08-07 PROCEDURE — 80048 BASIC METABOLIC PNL TOTAL CA: CPT

## 2022-08-07 PROCEDURE — 85027 COMPLETE CBC AUTOMATED: CPT

## 2022-08-07 PROCEDURE — 99233 SBSQ HOSP IP/OBS HIGH 50: CPT | Performed by: OPHTHALMOLOGY

## 2022-08-07 PROCEDURE — 71046 X-RAY EXAM CHEST 2 VIEWS: CPT

## 2022-08-07 PROCEDURE — 36415 COLL VENOUS BLD VENIPUNCTURE: CPT

## 2022-08-07 PROCEDURE — 2580000003 HC RX 258: Performed by: STUDENT IN AN ORGANIZED HEALTH CARE EDUCATION/TRAINING PROGRAM

## 2022-08-07 PROCEDURE — 6370000000 HC RX 637 (ALT 250 FOR IP): Performed by: STUDENT IN AN ORGANIZED HEALTH CARE EDUCATION/TRAINING PROGRAM

## 2022-08-07 PROCEDURE — 2060000000 HC ICU INTERMEDIATE R&B

## 2022-08-07 PROCEDURE — 6360000002 HC RX W HCPCS: Performed by: STUDENT IN AN ORGANIZED HEALTH CARE EDUCATION/TRAINING PROGRAM

## 2022-08-07 RX ORDER — MAGNESIUM SULFATE IN WATER 40 MG/ML
2000 INJECTION, SOLUTION INTRAVENOUS PRN
Status: DISCONTINUED | OUTPATIENT
Start: 2022-08-07 | End: 2022-08-11 | Stop reason: HOSPADM

## 2022-08-07 RX ADMIN — VANCOMYCIN HYDROCHLORIDE 1000 MG: 1 INJECTION, POWDER, LYOPHILIZED, FOR SOLUTION INTRAVENOUS at 12:22

## 2022-08-07 RX ADMIN — PANTOPRAZOLE SODIUM 40 MG: 40 TABLET, DELAYED RELEASE ORAL at 06:48

## 2022-08-07 RX ADMIN — SODIUM CHLORIDE, PRESERVATIVE FREE 10 ML: 5 INJECTION INTRAVENOUS at 20:17

## 2022-08-07 RX ADMIN — PANTOPRAZOLE SODIUM 40 MG: 40 TABLET, DELAYED RELEASE ORAL at 16:27

## 2022-08-07 RX ADMIN — SODIUM CHLORIDE: 9 INJECTION, SOLUTION INTRAVENOUS at 18:11

## 2022-08-07 RX ADMIN — PIPERACILLIN AND TAZOBACTAM 3375 MG: 3; .375 INJECTION, POWDER, FOR SOLUTION INTRAVENOUS at 10:39

## 2022-08-07 RX ADMIN — TAMSULOSIN HYDROCHLORIDE 0.4 MG: 0.4 CAPSULE ORAL at 08:43

## 2022-08-07 RX ADMIN — PIPERACILLIN AND TAZOBACTAM 3375 MG: 3; .375 INJECTION, POWDER, FOR SOLUTION INTRAVENOUS at 18:12

## 2022-08-07 RX ADMIN — ENOXAPARIN SODIUM 40 MG: 100 INJECTION SUBCUTANEOUS at 08:43

## 2022-08-07 RX ADMIN — PIPERACILLIN AND TAZOBACTAM 3375 MG: 3; .375 INJECTION, POWDER, FOR SOLUTION INTRAVENOUS at 03:55

## 2022-08-07 RX ADMIN — SODIUM CHLORIDE, PRESERVATIVE FREE 10 ML: 5 INJECTION INTRAVENOUS at 08:43

## 2022-08-07 ASSESSMENT — PAIN SCALES - GENERAL
PAINLEVEL_OUTOF10: 0
PAINLEVEL_OUTOF10: 0

## 2022-08-07 NOTE — PLAN OF CARE
Problem: Discharge Planning  Goal: Discharge to home or other facility with appropriate resources  Outcome: Progressing  Flowsheets  Taken 8/7/2022 0725 by Lovely Mendoza RN  Discharge to home or other facility with appropriate resources:   Refer to discharge planning if patient needs post-hospital services based on physician order or complex needs related to functional status, cognitive ability or social support system   Identify barriers to discharge with patient and caregiver   Arrange for needed discharge resources and transportation as appropriate  Taken 8/6/2022 2200 by Jonatan Villela RN  Discharge to home or other facility with appropriate resources:   Identify barriers to discharge with patient and caregiver   Arrange for needed discharge resources and transportation as appropriate   Identify discharge learning needs (meds, wound care, etc)   Refer to discharge planning if patient needs post-hospital services based on physician order or complex needs related to functional status, cognitive ability or social support system     Problem: Pain  Goal: Verbalizes/displays adequate comfort level or baseline comfort level  Outcome: Progressing  Flowsheets (Taken 8/6/2022 1522)  Verbalizes/displays adequate comfort level or baseline comfort level:   Encourage patient to monitor pain and request assistance   Assess pain using appropriate pain scale   Implement non-pharmacological measures as appropriate and evaluate response     Problem: Safety - Adult  Goal: Free from fall injury  Outcome: Progressing  Flowsheets (Taken 8/6/2022 1522)  Free From Fall Injury:   Instruct family/caregiver on patient safety   Based on caregiver fall risk screen, instruct family/caregiver to ask for assistance with transferring infant if caregiver noted to have fall risk factors     Problem: ABCDS Injury Assessment  Goal: Absence of physical injury  Outcome: Progressing  Flowsheets (Taken 8/7/2022 1057)  Absence of Physical Injury: Implement safety measures based on patient assessment     Problem: Skin/Tissue Integrity  Goal: Absence of new skin breakdown  Description: 1. Monitor for areas of redness and/or skin breakdown  2. Assess vascular access sites hourly  3. Every 4-6 hours minimum:  Change oxygen saturation probe site  4. Every 4-6 hours:  If on nasal continuous positive airway pressure, respiratory therapy assess nares and determine need for appliance change or resting period.   Outcome: Progressing

## 2022-08-07 NOTE — PROGRESS NOTES
Hospitalist Progress Note    Patient:  Melina Mcdowell      Unit/Bed:4K-11/011-A    YOB: 1932    MRN: [id number]       Acct: [de-identified]     PCP: Loren Oconnor DO    Date of Admission: 8/6/2022    Chief complaint: SOB    Admission history:(per H&P note)  Melina Mcdowell is a 80 y.o. male with PMHx of CAD s/p CABG, BPH, GERD, PAF, CKD, HLD and HTN who presented to [address] [place] [address] with complaint of chills, shortness of breath, fatigue, and malaise that started this morning around 0430. The patient was brought to the ED via family who stated he was feeling well the day prior and has not been around any known sick contacts. He denies fever, headache, lightheadedness, change in vision, rhinorrhea, congestion, sore throat, cough, hemoptysis, chest pain, palpitations, [place], PND, abdominal pain, nausea, vomiting, hematemesis, change in bowel/bladder habits, hematochezia, hematuria, weakness, difficulty with ambulation, numbness/tingling, or known exposure to sick contacts. ED course: Patient started BiPAP for respiratory distress, administered 10mEq of potassium, 1g Ceftriaxone, 500mg azithromycin. Workup revealed: Sodium 143, potassium 3.2, chloride 109, CO2 22, BUN 16, creatinine 1.1, anion gap 12, EGFR 63, magnesium 1.4, lactic acid 2.7, glucose 107, calcium 8.4, procalcitonin 0.61, CRP 0.88, proBNP 6663, troponin <0.010, albumin 3.4, alk phos 118, ALT 13 AST 23, bilirubin 0.3, WBC 12.4, hemoglobin 13.7, MCV 98.9, platelet count 377. Flu A/B-negative  COVID-19 negative     The patient was admitted to the hospital service for further care and management. Please see A&P above for additional information. ASSESSMENT / PLAN:  Acute hypoxic respiratory failure - Secondary to PNA  - Maintain BiPAP and wean supplemental oxygen as tolerated     Sepsis - Suspected pneumonia source.  POA SIRS 3/4 (tachycardia, tachypnea, leukocytosis), elevated lactic, elevated procalcitonin and CXR with consolidation of right lung. Flu A/B and COVID-19 negative. Received Azithromycin and Ceftriaxone within the ED  - Blood cultures x2  - 30 cc/hr LR of ideal body weight  - Lactic was initially elevated at 2.7: normal at repeat 1.4  - Pharmacy to dose Vanc/zosyn  - Urinalysis with reflex to culture     Decompensated systolic heart failure NYHA III - Elevated Pro-BNP from baseline with evidence of pulmonary edema on CXR in the setting of right lung consolidation. Appears euvolemic. ECHO 8/4/202 EF=50% with severely dilated LA and enlarged RA and RV. Global systolic function severely reduced. - Hold home diuretics in setting of hypokalemia, hypomagnesium, and sepsis  - Continue BiPAP therapy as above     Pulmonary hypertension Who Class II - ECHO 12/10/2020 RVSP=44mmHg     Mild hypokalemia - K=3.2 Given 10mEq replacement in ED  - Recheck K+ in morning  - Continuous Telemetry     Hypomagnesemia - Mg=1.4  - Magnesium 2g now and recheck Mg in morning  - Maintain Mg >2 and K>4     CKD stage 3b - Improving from prior labs per chart review. Follows with Dr. Dasia Hanley  - Avoid nephrotoxic agents and renally dose medications  - Daily standing weights, strict I/O  - Daily BMP  - Hold home diuretics     PAF (On Eliquis) - NSR and rate controlled. CHADSVASC2=5, HAS-BLED=4  - Hold Eliquis; uncertain if still taking. Pharmacy stated last fill was greater than 1 month ago. Confirm prior to restart.      CAD - s/p CABG 2009     BPH - noted  - Resume Flomax: straight cath prn     GERD - Noted  - Resume Protonix  Assessment/Plan:    Subjective (past 24 hours)  Still SOB  On home O2  Diet:  Diet NPO Exceptions are: Sips of Water with Meds      Medications:  Scheduled Meds:   piperacillin-tazobactam  3,375 mg IntraVENous Q8H    sodium chloride flush  5-40 mL IntraVENous 2 times per day    enoxaparin  40 mg SubCUTAneous Daily    pantoprazole  40 mg Oral BID AC    vancomycin  1,000 mg IntraVENous Q24H    vancomycin (VANCOCIN) intermittent dosing (placeholder)   Other RX Placeholder    tamsulosin  0.4 mg Oral Daily     Continuous Infusions:   sodium chloride Stopped (08/07/22 0355)     PRN Meds:sodium chloride flush, sodium chloride, ondansetron **OR** ondansetron, polyethylene glycol, acetaminophen **OR** acetaminophen, potassium chloride **OR** potassium alternative oral replacement **OR** potassium chloride    Objective:    Review of Labs and Diagnostic Testing:  Labs:     Recent Labs     08/06/22  0531 08/07/22  0435   WBC 12.4* 17.3*   HGB 13.7* 11.3*   HCT 43.9 35.5*    137     Recent Labs     08/06/22  0610 08/06/22  1753 08/06/22  1929 08/07/22  0435     --   --  138   K 3.2* 3.6 3.4* 3.6     --   --  105   CO2 22*  --   --  24   BUN 16  --   --  16   CREATININE 1.1  --   --  1.2   CALCIUM 8.4*  --   --  7.8*     Recent Labs     08/06/22  0610   AST 23   ALT 13   BILITOT 0.3   ALKPHOS 118     No results found for: AMYLASE  No results for input(s): INR in the last 72 hours. Recent Labs     08/06/22  0610   TROPONINT < 0.010     No results for input(s): BNP in the last 72 hours. No results for input(s): LACTA in the last 72 hours.   Lab Results   Component Value Date/Time    PROCAL 0.61 08/06/2022 06:10 AM    PROCAL 0.19 02/05/2021 04:30 AM    PROCAL 1.44 01/26/2021 11:45 PM     Lab Results   Component Value Date/Time    LABA1C 5.9 09/13/2019 12:00 AM       Urinalysis:   Lab Results   Component Value Date/Time    NITRU NEGATIVE 08/06/2022 09:45 AM    WBCUA 5-9 08/06/2022 09:45 AM    BACTERIA NONE SEEN 08/06/2022 09:45 AM    RBCUA 0-2 08/06/2022 09:45 AM    BLOODU SMALL 08/06/2022 09:45 AM    SPECGRAV 1.013 08/06/2022 09:45 AM    GLUCOSEU NEGATIVE 12/26/2021 05:20 AM     Radiology:   Reviewed: see report for details  CXR: I have reviewed the CXR  EKG:  No acute changes:  XR CHEST PORTABLE   Final Result   Bilateral airspace disease with areas of consolidation in the right lung,    which could be due to pneumonia or pulmonary edema. Right pleural effusion. This document has been electronically signed by: Yee Campbell MD on    08/06/2022 06:56 AM          Physical Exam:  BP (!) 121/45   Pulse (!) 49   Temp 98 °F (36.7 °C) (Oral)   Resp 20   Ht 5' (1.524 m)   Wt 170 lb (77.1 kg)   SpO2 97%   BMI 33.20 kg/m²   General appearance - alert, well appearing, and in no distress   Chest - clear to auscultation, no wheezes, rales or rhonchi, symmetric air entry   Distant Breath Sounds: No   Heart - normal rate, regular rhythm, normal S1, S2, no murmurs, rubs, clicks or gallops   Abdomen - soft, not tender, nondistended, no masses or organomegaly   Obese: No; Protuberant: Bowel sounds heard  Neurological - A&O 3 , normal speech, no focal findings or movement disorder noted   Musculoskeletal - no joint tenderness, deformity or swelling   Extremities - peripheral pulses normal, no pedal edema, no clubbing or cyanosis  24 hour intake/output:  Intake/Output Summary (Last 24 hours) at 8/7/2022 0912  Last data filed at 8/7/2022 9365  Gross per 24 hour   Intake 1216.99 ml   Output 1090 ml   Net 126.99 ml     Assessment/Plan:    Principal Problem:    Sepsis ([address]  Resolved Problems:    * No resolved hospital problems.  *        PT/OT Eval Status: consult  DVT prophylaxis: [] Lovenox                                 [] SCDs                                 [] SQ Heparin                                 [] Encourage ambulation           [x] Already on Anticoagulation  Code Status: Limited    Gracie Ashford MD on 8/7/2022 at 9:12 AM  Rounding Hospitalist

## 2022-08-07 NOTE — ED PROVIDER NOTES
Transfer of Care Note:   I have personally performed a face to face diagnostic evaluation on this patient. I have personally performed a face to face diagnostic evaluation on this patient. The patient's initial evaluation and plan have been discussed with the prior provider who initially evaluated the patient. Nursing Notes, Past Medical Hx, Past Surgical Hx, Social Hx, Allergies, and Family Hx were all reviewed. (Please note that portions of this note were completed with a voice recognition program.  Efforts were made to edit the dictations but occasionally words are mis-transcribed.)    8:31 AM EDT: The patient was evaluated. Quinton Joe is a 80 y.o. male who presents to the Emergency Department for the evaluation of shortness of breath, cough, difficulty breathing. Exam: Refer to Dr. Brandt Caal note. EKG: All EKG's are interpreted by the Emergency Department Physician who either signs or Co-signs this chart in the absence of a cardiologist.      RADIOLOGY: non-plain film images(s) such as CT, Ultrasound and MRI are read by the radiologist.  XR CHEST PORTABLE   Final Result   Bilateral airspace disease with areas of consolidation in the right lung,    which could be due to pneumonia or pulmonary edema. Right pleural effusion.       This document has been electronically signed by: Chris Van MD on    08/06/2022 06:56 AM          ED LABS:  Labs Reviewed   D-DIMER, QUANTITATIVE - Abnormal; Notable for the following components:       Result Value    D-Dimer, Quant 3506.00 (*)     All other components within normal limits   LACTATE, SEPSIS - Abnormal; Notable for the following components:    Lactic Acid, Sepsis 3.6 (*)     All other components within normal limits   LACTATE, SEPSIS - Abnormal; Notable for the following components:    Lactic Acid, Sepsis 2.7 (*)     All other components within normal limits   CBC WITH AUTO DIFFERENTIAL - Abnormal; Notable for the following components:    WBC 12.4 (*)     RBC 4.44 (*)     Hemoglobin 13.7 (*)     MCV 98.9 (*)     MCHC 31.2 (*)     RDW-CV 18.0 (*)     RDW-SD 65.9 (*)     Segs Absolute 11.1 (*)     Monocytes Absolute 0.1 (*)     All other components within normal limits   BRAIN NATRIURETIC PEPTIDE - Abnormal; Notable for the following components:    Pro-BNP 6663.0 (*)     All other components within normal limits   PROCALCITONIN - Abnormal; Notable for the following components:    Procalcitonin 0.61 (*)     All other components within normal limits   COMPREHENSIVE METABOLIC PANEL W/ REFLEX TO MG FOR LOW K - Abnormal; Notable for the following components:    Potassium reflex Magnesium 3.2 (*)     CO2 22 (*)     Calcium 8.4 (*)     Albumin 3.4 (*)     All other components within normal limits   GLOMERULAR FILTRATION RATE, ESTIMATED - Abnormal; Notable for the following components:    Est, Glom Filt Rate 63 (*)     All other components within normal limits   MAGNESIUM - Abnormal; Notable for the following components:    Magnesium 1.4 (*)     All other components within normal limits   URINALYSIS WITH MICROSCOPIC - Abnormal; Notable for the following components:    Blood, Urine SMALL (*)     Leukocyte Esterase, Urine TRACE (*)     All other components within normal limits   MRSA BY PCR - Abnormal; Notable for the following components:    MRSA SCREEN RT-PCR POSITIVE (*)     All other components within normal limits   BASIC METABOLIC PANEL W/ REFLEX TO MG FOR LOW K - Abnormal; Notable for the following components:    Calcium 7.8 (*)     All other components within normal limits   CBC - Abnormal; Notable for the following components:    WBC 17.3 (*)     RBC 3.54 (*)     Hemoglobin 11.3 (*)     Hematocrit 35.5 (*)     .3 (*)     MCHC 31.8 (*)     RDW-CV 17.7 (*)     RDW-SD 65.8 (*)     All other components within normal limits   POTASSIUM - Abnormal; Notable for the following components:    Potassium 3.4 (*)     All other components within normal limits GLOMERULAR FILTRATION RATE, ESTIMATED - Abnormal; Notable for the following components:    Est, Glom Filt Rate 57 (*)     All other components within normal limits   CULTURE, BLOOD 1    Narrative:     Source: blood-Adult-suboptimal <5.5oz./set volume       Site: R. wrist          Current   Antibiotics: not stated   CULTURE, BLOOD 2    Narrative:     Source: blood-Adult-suboptimal <5.5oz./set volume       Site: Peripheral Vein            Current Antibiotics: not stated   COVID-19, RAPID   RAPID INFLUENZA A/B ANTIGENS   CULTURE, URINE   CULTURE, BLOOD 1   CULTURE, BLOOD 1   BLOOD GAS, ARTERIAL   SPECIMEN REJECTION   TROPONIN   C-REACTIVE PROTEIN   SCAN OF BLOOD SMEAR   ANION GAP   OSMOLALITY   LACTATE, SEPSIS   POTASSIUM   MAGNESIUM   ANION GAP       MDM:  Patient presented there with complaint of cough and shortness of breath, found to be hypoxic, pulmonary edema, history of right-sided heart failure. Patient has infiltrates on chest x-ray suspicious for pneumonia. Initially covered empirically for community-acquired pneumonia. Patient on BiPAP stable, mentally intact, no work of breathing. Case discussed with hospitalist, admitted for the treatment. FINAL IMPRESSION      1. Acute respiratory failure with hypoxia ([address]    2. Acute on chronic systolic CHF (congestive heart failure) ([address]    3. Pneumonia due to infectious organism, unspecified laterality, unspecified part of lung        Care of this patient was transferred from DR TORO to myself at shift change.     (Please note that portions of this note were completed with a voice recognition program.  Efforts were made to edit the dictations but occasionally words are mis-transcribed.)       Mark DO  08/07/22 6823

## 2022-08-08 PROBLEM — J96.01 ACUTE RESPIRATORY FAILURE WITH HYPOXIA (HCC): Status: ACTIVE | Noted: 2022-08-08

## 2022-08-08 LAB
ANION GAP SERPL CALCULATED.3IONS-SCNC: 8 MEQ/L (ref 8–16)
BUN BLDV-MCNC: 17 MG/DL (ref 7–22)
CALCIUM IONIZED: 1.12 MMOL/L (ref 1.12–1.32)
CALCIUM SERPL-MCNC: 8 MG/DL (ref 8.5–10.5)
CHLORIDE BLD-SCNC: 105 MEQ/L (ref 98–111)
CO2: 27 MEQ/L (ref 23–33)
CREAT SERPL-MCNC: 1.3 MG/DL (ref 0.4–1.2)
ERYTHROCYTE [DISTWIDTH] IN BLOOD BY AUTOMATED COUNT: 17.7 % (ref 11.5–14.5)
ERYTHROCYTE [DISTWIDTH] IN BLOOD BY AUTOMATED COUNT: 66.5 FL (ref 35–45)
FOLATE: > 20 NG/ML (ref 4.8–24.2)
GFR SERPL CREATININE-BSD FRML MDRD: 52 ML/MIN/1.73M2
GLUCOSE BLD-MCNC: 94 MG/DL (ref 70–108)
HCT VFR BLD CALC: 32.7 % (ref 42–52)
HEMOGLOBIN: 10.4 GM/DL (ref 14–18)
MAGNESIUM: 1.7 MG/DL (ref 1.6–2.4)
MCH RBC QN AUTO: 31.8 PG (ref 26–33)
MCHC RBC AUTO-ENTMCNC: 31.8 GM/DL (ref 32.2–35.5)
MCV RBC AUTO: 100 FL (ref 80–94)
PLATELET # BLD: 140 THOU/MM3 (ref 130–400)
PMV BLD AUTO: 11.6 FL (ref 9.4–12.4)
POTASSIUM REFLEX MAGNESIUM: 3.4 MEQ/L (ref 3.5–5.2)
RBC # BLD: 3.27 MILL/MM3 (ref 4.7–6.1)
SODIUM BLD-SCNC: 140 MEQ/L (ref 135–145)
VANCOMYCIN RANDOM: 10.5 UG/ML (ref 0.1–39.9)
VITAMIN B-12: 576 PG/ML (ref 211–911)
WBC # BLD: 13.9 THOU/MM3 (ref 4.8–10.8)

## 2022-08-08 PROCEDURE — 2580000003 HC RX 258: Performed by: STUDENT IN AN ORGANIZED HEALTH CARE EDUCATION/TRAINING PROGRAM

## 2022-08-08 PROCEDURE — 6370000000 HC RX 637 (ALT 250 FOR IP): Performed by: STUDENT IN AN ORGANIZED HEALTH CARE EDUCATION/TRAINING PROGRAM

## 2022-08-08 PROCEDURE — 82607 VITAMIN B-12: CPT

## 2022-08-08 PROCEDURE — 80202 ASSAY OF VANCOMYCIN: CPT

## 2022-08-08 PROCEDURE — 82746 ASSAY OF FOLIC ACID SERUM: CPT

## 2022-08-08 PROCEDURE — 93005 ELECTROCARDIOGRAM TRACING: CPT

## 2022-08-08 PROCEDURE — 83735 ASSAY OF MAGNESIUM: CPT

## 2022-08-08 PROCEDURE — 6370000000 HC RX 637 (ALT 250 FOR IP)

## 2022-08-08 PROCEDURE — 85027 COMPLETE CBC AUTOMATED: CPT

## 2022-08-08 PROCEDURE — 36415 COLL VENOUS BLD VENIPUNCTURE: CPT

## 2022-08-08 PROCEDURE — 6360000002 HC RX W HCPCS

## 2022-08-08 PROCEDURE — 99232 SBSQ HOSP IP/OBS MODERATE 35: CPT | Performed by: INTERNAL MEDICINE

## 2022-08-08 PROCEDURE — 82330 ASSAY OF CALCIUM: CPT

## 2022-08-08 PROCEDURE — 97166 OT EVAL MOD COMPLEX 45 MIN: CPT

## 2022-08-08 PROCEDURE — 2060000000 HC ICU INTERMEDIATE R&B

## 2022-08-08 PROCEDURE — 6360000002 HC RX W HCPCS: Performed by: STUDENT IN AN ORGANIZED HEALTH CARE EDUCATION/TRAINING PROGRAM

## 2022-08-08 PROCEDURE — 97162 PT EVAL MOD COMPLEX 30 MIN: CPT

## 2022-08-08 PROCEDURE — 97535 SELF CARE MNGMENT TRAINING: CPT

## 2022-08-08 PROCEDURE — 97530 THERAPEUTIC ACTIVITIES: CPT

## 2022-08-08 PROCEDURE — 80048 BASIC METABOLIC PNL TOTAL CA: CPT

## 2022-08-08 RX ORDER — FUROSEMIDE 10 MG/ML
40 INJECTION INTRAMUSCULAR; INTRAVENOUS ONCE
Status: COMPLETED | OUTPATIENT
Start: 2022-08-08 | End: 2022-08-08

## 2022-08-08 RX ORDER — MAGNESIUM SULFATE IN WATER 40 MG/ML
2000 INJECTION, SOLUTION INTRAVENOUS ONCE
Status: COMPLETED | OUTPATIENT
Start: 2022-08-08 | End: 2022-08-08

## 2022-08-08 RX ORDER — POTASSIUM CHLORIDE 20 MEQ/1
40 TABLET, EXTENDED RELEASE ORAL ONCE
Status: COMPLETED | OUTPATIENT
Start: 2022-08-08 | End: 2022-08-08

## 2022-08-08 RX ADMIN — PIPERACILLIN AND TAZOBACTAM 3375 MG: 3; .375 INJECTION, POWDER, FOR SOLUTION INTRAVENOUS at 21:33

## 2022-08-08 RX ADMIN — VANCOMYCIN HYDROCHLORIDE 1000 MG: 1 INJECTION, POWDER, LYOPHILIZED, FOR SOLUTION INTRAVENOUS at 11:12

## 2022-08-08 RX ADMIN — PANTOPRAZOLE SODIUM 40 MG: 40 TABLET, DELAYED RELEASE ORAL at 08:17

## 2022-08-08 RX ADMIN — PANTOPRAZOLE SODIUM 40 MG: 40 TABLET, DELAYED RELEASE ORAL at 16:08

## 2022-08-08 RX ADMIN — PIPERACILLIN AND TAZOBACTAM 3375 MG: 3; .375 INJECTION, POWDER, FOR SOLUTION INTRAVENOUS at 02:46

## 2022-08-08 RX ADMIN — ENOXAPARIN SODIUM 40 MG: 100 INJECTION SUBCUTANEOUS at 08:16

## 2022-08-08 RX ADMIN — POTASSIUM CHLORIDE 40 MEQ: 1500 TABLET, EXTENDED RELEASE ORAL at 08:17

## 2022-08-08 RX ADMIN — TAMSULOSIN HYDROCHLORIDE 0.4 MG: 0.4 CAPSULE ORAL at 08:18

## 2022-08-08 RX ADMIN — MAGNESIUM SULFATE HEPTAHYDRATE 2000 MG: 40 INJECTION, SOLUTION INTRAVENOUS at 16:05

## 2022-08-08 RX ADMIN — SODIUM CHLORIDE, PRESERVATIVE FREE 10 ML: 5 INJECTION INTRAVENOUS at 08:17

## 2022-08-08 RX ADMIN — SODIUM CHLORIDE, PRESERVATIVE FREE 10 ML: 5 INJECTION INTRAVENOUS at 21:34

## 2022-08-08 RX ADMIN — PIPERACILLIN AND TAZOBACTAM 3375 MG: 3; .375 INJECTION, POWDER, FOR SOLUTION INTRAVENOUS at 10:45

## 2022-08-08 RX ADMIN — FUROSEMIDE 40 MG: 10 INJECTION, SOLUTION INTRAMUSCULAR; INTRAVENOUS at 10:46

## 2022-08-08 ASSESSMENT — PAIN SCALES - GENERAL
PAINLEVEL_OUTOF10: 0

## 2022-08-08 NOTE — PROGRESS NOTES
[facility] Pharmacokinetic Monitoring Service - Vancomycin    Consulting Provider: Dr. Amina Shi   Indication: sepsis/PNA  Target Concentration: Goal AUC/ROBERT 400-600 mg*hr/L  Day of Therapy: 3  Additional Antimicrobials: Zosyn    Pertinent Laboratory Values: Wt Readings from Last 1 Encounters:   08/08/22 160 lb 8 oz (72.8 kg)     Temp Readings from Last 1 Encounters:   08/08/22 97.8 °F (36.6 °C) (Oral)     Estimated Creatinine Clearance: 32 mL/min (A) (based on SCr of 1.3 mg/dL (H)). Recent Labs     08/07/22  0435 08/08/22  0513   CREATININE 1.2 1.3*   WBC 17.3* 13.9*     Pertinent Cultures:  Culture Date Source Results   8/6/22 urine E. Coli  Enterococcus faecalis (CFU [id number])    8/6/22 Blood x 2  NGTD   MRSA Nasal Swab: showed MRSA positive result on 8/6/22    Recent vancomycin administrations                     vancomycin (VANCOCIN) 1,000 mg in sodium chloride 0.9 % 250 mL IVPB ([id number]) (mg) 1,000 mg New Bag 08/07/22 1222    vancomycin (VANCOCIN) 1500 mg in dextrose 5% 500 mL IVPB (mg) 1,500 mg New Bag 08/06/22 1232                    Assessment:  Date/Time Current Dose Concentration Timing of Concentration (h) AUC   8/8/22 0513 1000 mg q24h 10.5 16 hour 51 min 466   Note: Serum concentrations collected for AUC dosing may appear elevated if collected in close proximity to the dose administered, this is not necessarily an indication of toxicity    Plan:  Current dosing regimen is therapeutic  Continue current dose.  Vancomycin 1000 mg IV q24h  Repeat vancomycin concentration in 48 hours  Pharmacy will continue to monitor patient and adjust therapy as indicated    Thank you for the consult,  FALLON Velez [facility]  8/8/2022 9:17 AM

## 2022-08-08 NOTE — FLOWSHEET NOTE
08/08/22 1128   Safe Environment   Safety Measures   (virtual safety rounds complete)   Virtual RN rounds, patient up in chair. Call light within reach.   staff at bedside with patient

## 2022-08-08 NOTE — PROGRESS NOTES
patient has been taking 60mg Lasix in the AM, held on admission.  -Continue with daily Lasix 60 mg  -Daily standing weights, strict I/O  -Daily BMP  #CAD: S/p CABG 2009. Noted. Patient does not appear to be on aspirin at home. #pAF: [id number]: 5, HAS BLED: 4. Unclear if on Eliquis at home, patient denies taking AC at home. Discussed case with pharmacy, patient noted to not be getting Eliquis filled outside of [place].  -Continue to hold Eliquis  #BPH: Noted. Home Flowmax resumed   #GERD:  Noted. Continue PPI. #Sepsis: Resolved. 3/4 criteria present on admission. Did not receive 30cc/kg IVF bolus on admission. CXR demonstrated R-sided consoldiation. Urine culture growing E coli and enterococcus faecalis, low colony count and patient denies urinary symptoms. Blood cultures NGTD. Chief Complaint: SOB    Initial H and P:-    \"Velasquez Romeo is a 80 y.o. male with PMHx of CAD s/p CABG, BPH, GERD, PAF, CKD, HLD and HTN who presented to [address] [place] [address] with complaint of chills, shortness of breath, fatigue, and malaise that started this morning around 0430. The patient was brought to the ED via family who stated he was feeling well the day prior and has not been around any known sick contacts. He denies fever, headache, lightheadedness, change in vision, rhinorrhea, congestion, sore throat, cough, hemoptysis, chest pain, palpitations, BRITTON, PND, abdominal pain, nausea, vomiting, hematemesis, change in bowel/bladder habits, hematochezia, hematuria, weakness, difficulty with ambulation, numbness/tingling, or known exposure to sick contacts. \" - Per HPI    Subjective (past 24 hours):   Patient seen and examined this AM.  Denies any acute complaints, including no chest pain, abdominal pain, dysuria. Is having mild shortness of breath. Patient states he would refuse home oxygen discharged with O2 requirement. He reports he has had increased urinary output since receiving diuretic.       Past medical history, family history, social history and allergies reviewed again and is unchanged since admission. ROS (12 point review of systems completed. Pertinent positives noted. Otherwise ROS is negative)     Medications:  Reviewed    Infusion Medications    sodium chloride 10 mL/hr at 08/07/22 1811     Scheduled Medications    piperacillin-tazobactam  3,375 mg IntraVENous Q8H    sodium chloride flush  5-40 mL IntraVENous 2 times per day    enoxaparin  40 mg SubCUTAneous Daily    pantoprazole  40 mg Oral BID AC    vancomycin  1,000 mg IntraVENous Q24H    vancomycin (VANCOCIN) intermittent dosing (placeholder)   Other RX Placeholder    tamsulosin  0.4 mg Oral Daily     PRN Meds: magnesium sulfate, sodium chloride flush, sodium chloride, ondansetron **OR** ondansetron, polyethylene glycol, acetaminophen **OR** acetaminophen, potassium chloride **OR** potassium alternative oral replacement **OR** potassium chloride      Intake/Output Summary (Last 24 hours) at 8/8/2022 1342  Last data filed at 8/8/2022 1124  Gross per 24 hour   Intake --   Output 600 ml   Net -600 ml       Diet:  ADULT DIET; Regular    Exam:    BP (!) 163/57   Pulse (!) 44   Temp 97 °F (36.1 °C) (Oral)   Resp 18   Ht 5' (1.524 m)   Wt 160 lb 8 oz (72.8 kg)   SpO2 97%   BMI 31.35 kg/m²     General appearance: No apparent distress, appears stated age and cooperative. HEENT: Pupils equal, round, and reactive to light. Conjunctivae/corneas clear. Neck: Supple, with full range of motion. No jugular venous distention. Trachea midline. Respiratory:  Normal respiratory effort. Bilateral decreased breath sounds in the bases, no noted wheezing or rhonchi  Cardiovascular: Regular rate and rhythm with normal S1/S2 without murmurs, rubs or gallops. Abdomen: Soft, non-tender, non-distended with normal bowel sounds. Musculoskeletal: passive and active ROM x 4 extremities.   Extremities: No peripheral edema noted  Skin: Skin color, texture, turgor normal. Anaerobic :  No results found for: LABAERO  Lab Results   Component Value Date/Time    LABANAE No growth-preliminary No growth  01/28/2021 12:40 PM       Urinalysis:      Lab Results   Component Value Date/Time    NITRU NEGATIVE 08/06/2022 09:45 AM    WBCUA 5-9 08/06/2022 09:45 AM    BACTERIA NONE SEEN 08/06/2022 09:45 AM    RBCUA 0-2 08/06/2022 09:45 AM    BLOODU SMALL 08/06/2022 09:45 AM    SPECGRAV 1.013 08/06/2022 09:45 AM    GLUCOSEU NEGATIVE 12/26/2021 05:20 AM       Radiology:  XR CHEST (2 VW)   Final Result   Suspected moderate-sized right pleural effusion. There are right lung infiltrates, suggesting pneumonia. **This report has been created using voice recognition software. It may contain minor errors which are inherent in voice recognition technology. **      Final report electronically signed by Dr. Keiry Lockwood on 8/7/2022 1:45 PM      XR CHEST PORTABLE   Final Result   Bilateral airspace disease with areas of consolidation in the right lung,    which could be due to pneumonia or pulmonary edema. Right pleural effusion. This document has been electronically signed by: Jeniffer Raymond MD on    08/06/2022 06:56 AM        XR CHEST (2 VW)    Result Date: 8/7/2022  PROCEDURE: XR CHEST (2 VW) CLINICAL INFORMATION: shortness of breath. Shortness of breath, fever, sepsis. COMPARISON: Chest x-ray 8/6/2022. TECHNIQUE: AP upright view of the chest and a lateral view were obtained. FINDINGS: The patient has had a prior median sternotomy. The heart size is the upper limits of normal.  The mediastinum is not widened. There appears to be a moderate-sized layering right pleural effusion. There is a tiny left-sided pleural effusion. There are infiltrates in the right upper and right lower lobe. This can indicate pneumonia. There is some prominence the pulmonary interstitium in the left lung without focal infiltrate.  The pulmonary vessels do appear normal. There are degenerative changes in the thoracic spine.      Suspected moderate-sized right pleural effusion. There are right lung infiltrates, suggesting pneumonia. **This report has been created using voice recognition software. It may contain minor errors which are inherent in voice recognition technology. ** Final report electronically signed by Dr. Vern Liang on 8/7/2022 1:45 PM    XR CHEST PORTABLE    Result Date: 8/6/2022  Chest X-ray, 1 View COMPARISON: CR,SR - XR CHEST PORTABLE - 12/25/2021 07:41 PM EST  CR,SR - XR CHEST PORTABLE - 08/04/2021 04:46 AM EDT FINDINGS: Diffuse bilateral airspace disease with patchy consolidation throughout the right lung. A skinfold projects over the lateral right chest. Blunting of the right costophrenic angle. No pneumothorax. Stable cardiomegaly. No acute fracture. Status post median sternotomy. Bilateral airspace disease with areas of consolidation in the right lung, which could be due to pneumonia or pulmonary edema. Right pleural effusion.  This document has been electronically signed by: Ciro Conner MD on 08/06/2022 06:56 AM      Electronically signed by Vic Prajapati MD on 8/8/2022 at 1:42 PM

## 2022-08-08 NOTE — FLOWSHEET NOTE
08/08/22 1433   Safe Environment   Safety Measures   (virtual safety rounds complete)   Virtual nurse rounds. Staff member at bedside stated patient is sleeping. Camera not turned on.

## 2022-08-08 NOTE — PROGRESS NOTES
[facility] ICU STEPDOWN TELEMETRY 4K  EVALUATION    Time:   Time In: 1671  Time Out: 1118  Timed Code Treatment Minutes: 13 Minutes  Minutes: 21          Date: 2022  Patient Name: Nic Moeller,   Gender: male      MRN: [id number]  : 3/30/1932  (80 y.o.)  Referring Practitioner: Erinn Mcdonald MD  Diagnosis: Sepsis  Additional Pertinent Hx: Nic Moeller is a 80 y.o. male with PMHx of CAD s/p CABG, BPH, GERD, PAF, CKD, HLD and HTN who presented to [address] [place] [address] with complaint of chills, shortness of breath, fatigue, and malaise that started this morning around 0430. The patient was brought to the ED via family who stated he was feeling well the day prior and has not been around any known sick contacts. He denies fever, headache, lightheadedness, change in vision, rhinorrhea, congestion, sore throat, cough, hemoptysis, chest pain, palpitations, BRITTON, PND, abdominal pain, nausea, vomiting, hematemesis, change in bowel/bladder habits, hematochezia, hematuria, weakness, difficulty with ambulation, numbness/tingling, or known exposure to sick contacts. ED course: Patient started BiPAP for respiratory distress, administered 10mEq of potassium, 1g Ceftriaxone, 500mg azithromycin. Workup revealed: Sodium 143, potassium 3.2, chloride 109, CO2 22, BUN 16, creatinine 1.1, anion gap 12, EGFR 63, magnesium 1.4, lactic acid 2.7, glucose 107, calcium 8.4, procalcitonin 0.61, CRP 0.88, proBNP 6663, troponin <0.010, albumin 3.4, alk phos 118, ALT 13 AST 23, bilirubin 0.3, WBC 12.4, hemoglobin 13.7, MCV 98.9, platelet count 204.      Flu A/B-negative  COVID-19 negative    Restrictions/Precautions:  Restrictions/Precautions: Fall Risk  Position Activity Restriction  Other position/activity restrictions: confused and impulsive    Subjective  Chart Reviewed: Yes, Progress Notes, History and Physical, Orders  Patient assessed for rehabilitation Recommendations:  Equipment Needed: No    Plan:  Times per Week: 5x  Current Treatment Recommendations: Strengthening, Balance training, Functional mobility training, Endurance training, Safety education & training, Self-Care / ADL, Patient/Caregiver education & training, Equipment evaluation, education, & procurement. See long-term goal time frame for expected duration of plan of care. If no long-term goals established, a short length of stay is anticipated. Goals:  Patient goals : Go Home  Short Term Goals  Time Frame for Short term goals: Until discharge  Short Term Goal 1: Pt kacie complete BUE stregthening exercises with min vcs for technique to increase indep and endurance with self cares. Short Term Goal 2: Pt will complete dynamic standing task x 5 minutes with 2 UE release and S to increase indep with grooming. Short Term Goal 3: pt will complete functional mobility to/from BR and HH distances with S and min vcs for safety to increase indep with toileting. Short Term Goal 4: Pt will complete LB dressing with S and 0 vcs for safety. Additional Goals?: No         Following session, patient left in safe position with all fall risk precautions in place.

## 2022-08-08 NOTE — PROGRESS NOTES
[address] [place] [address]  INPATIENT PHYSICAL THERAPY  EVALUATION  [facility] ICU STEPDOWN TELEMETRY 4K - 4K-11/011-A    Time In: 09  Time Out: 3330  Timed Code Treatment Minutes: 25 Minutes  Minutes: 36          Date: 2022  Patient Name: Celestine Eason,  Gender:  male        MRN: [id number]  : 3/30/1932  ([address]      Referring Practitioner: Parmjit Rodriguez MD  Diagnosis: Sepsis  Additional Pertinent Hx: Celestine Eason is a [address] male with PMHx of CAD s/p CABG, BPH, GERD, PAF, CKD, HLD and HTN who presented to [address] [place] [address] with complaint of chills, shortness of breath, fatigue, and malaise that started this morning around 0430. The patient was brought to the ED via family who stated he was feeling well the day prior and has not been around any known sick contacts. Pt admitted for sepsis due to pneumonia. Restrictions/Precautions:  Restrictions/Precautions: Fall Risk    Subjective:  Chart Reviewed: Yes  Patient assessed for rehabilitation services?: Yes  Family / Caregiver Present: No  Subjective: RN approved session, pt attempting to get out of bed upon entry to room, stating he wants to leave. Pt impulsive, unsafe, agitated with orientation questions and PLOF questions, refuses gait belt when up. Pt oriented to person, place and situation, agitated when asked what month and year it is, did not answer. Audible wheezing heard when up, pt on 4 L O2.     General:  Overall Orientation Status: Impaired  Orientation Level: Oriented to place, Oriented to situation, Oriented to person  Vision: Within Functional Limits  Hearing: Exceptions to [facility]  Hearing Exceptions: Bilateral hearing aid       Pain: denies    Vitals: Oxygen: upper 90's on 4 L during session    Social/Functional History:    Lives With: Alone  Type of Home: Condo  Home Layout: One level  Home Access: Stairs to enter with rails  Entrance Stairs - Number of Steps: 2  Home Equipment: Rollator     Ambulation Assistance: Independent  Transfer Assistance: Independent    Active : Yes  Occupation: Retired  Additional Comments: Pt initially stating he ambulates without AD, then stating he uses rollator at all times at home    OBJECTIVE:    Balance:  Static Sitting Balance:  Stand By Assistance  Static Standing Balance: Contact Guard Assistance  Dynamic Standing Balance: Minimal Assistance; pt brushes teeth and shaves at sink with min A at times due to posterior sway    Bed Mobility:  Supine to Sit: Stand By Assistance, X 1, with head of bed raised, with rail  **Pt impulsive to transfer out of bed    Transfers:  Sit to Stand: Stand By Assistance, with increased time for completion  Stand to Sit:Stand By Assistance, with increased time for completion  **Pt impulsive to transfer from EOB, wants to ambulate to the bathroom, however, O2 tubing too short, max cues to direct pt to recliner to allow PT to obtain longer tubing, pt stating several times he's fine, he doesn't need it, education on the importance of O2 continuously at this time; once obtained pt assisted to/from bathroom    Ambulation:  Contact Guard Assistance, Minimal Assistance  Distance: 3 feet to recliner, 10 feet x 2 to/from bathroom  Surface: Level Tile  Device:Rolling Walker  Gait Deviations: Forward Flexed Posture, Slow Sol, Decreased Step Length Bilaterally, and Decreased Gait Speed  **Pt ambulates initially without AD to bathroom, then pt telling PT he uses walker at home, provided with walker to use to ambulate back to recliner; min A for safety and balance, no awareness to lines and O2 tubing, pt impulsive    Functional Outcome Measures: Completed  AM-PAC Inpatient Mobility without Stair Climbing Raw Score : 15  AM-PAC Inpatient without Stair Climbing T-Scale Score : 43.03    ASSESSMENT:  Activity Tolerance:  Patient tolerance of  treatment: fair. Treatment Initiated: Treatment and education initiated within context of evaluation.   Evaluation time included review of current medical information, gathering information related to past medical, social and functional history, completion of standardized testing, formal and informal observation of tasks, assessment of data and development of plan of care and goals. Treatment time included skilled education and facilitation of tasks to increase safety and independence with functional mobility for improved independence and quality of life. Assessment: Body Structures, Functions, Activity Limitations Requiring Skilled Therapeutic Intervention: Decreased functional mobility , Decreased safe awareness, Decreased cognition, Decreased endurance, Decreased balance, Decreased strength  Assessment: Pt tolerates session fair, limited by impaired endurance, weakness, impaired safety awareness and impulsive when up. PT to continue to progress strength and functional mobility. Therapy Prognosis: Good    Requires PT Follow-Up: Yes    Discharge Recommendations:  Discharge Recommendations: Home with Home health PT (Pt currently refusing [facility] services)    Patient Education:      . Patient Education  Education Given To: Patient  Education Provided: Role of Therapy, Plan of Care, Precautions, Fall Prevention Strategies, Orientation  Education Method: Verbal  Barriers to Learning: Cognition, Hearing  Education Outcome: Continued education needed       Equipment Recommendations:  Equipment Needed: No  Other: has rollator    Plan:  Current Treatment Recommendations: Strengthening, Balance training, Functional mobility training, Transfer training, Neuromuscular re-education, Gait training, Stair training, Endurance training, Safety education & training, Therapeutic activities, Patient/Caregiver education & training  Plan:  (3-5x GM)    Goals:  Patient goals : to go home  Short Term Goals  Time Frame for Short term goals: by discharge  Short term goal 1: Pt to transfer supine <--> sit mod I to enable pt to get in/out of bed.   Short term goal 2: Pt to transfer sit <--> stand mod I for increased functional mobility. Short term goal 3: Pt to ambulate >150 feet with RW SBA for household ambulation. Long Term Goals  Time Frame for Long term goals : NA due to short length of stay. Following session, patient left in safe position with all fall risk precautions in place.

## 2022-08-09 LAB
ANION GAP SERPL CALCULATED.3IONS-SCNC: 10 MEQ/L (ref 8–16)
BUN BLDV-MCNC: 16 MG/DL (ref 7–22)
CALCIUM SERPL-MCNC: 8.5 MG/DL (ref 8.5–10.5)
CHLORIDE BLD-SCNC: 105 MEQ/L (ref 98–111)
CO2: 23 MEQ/L (ref 23–33)
CREAT SERPL-MCNC: 1.2 MG/DL (ref 0.4–1.2)
EKG ATRIAL RATE: 46 BPM
EKG Q-T INTERVAL: 504 MS
EKG QRS DURATION: 124 MS
EKG QTC CALCULATION (BAZETT): 468 MS
EKG R AXIS: -50 DEGREES
EKG T AXIS: -179 DEGREES
EKG VENTRICULAR RATE: 52 BPM
ERYTHROCYTE [DISTWIDTH] IN BLOOD BY AUTOMATED COUNT: 17.3 % (ref 11.5–14.5)
ERYTHROCYTE [DISTWIDTH] IN BLOOD BY AUTOMATED COUNT: 65 FL (ref 35–45)
GFR SERPL CREATININE-BSD FRML MDRD: 57 ML/MIN/1.73M2
GLUCOSE BLD-MCNC: 94 MG/DL (ref 70–108)
HCT VFR BLD CALC: 36.8 % (ref 42–52)
HEMOGLOBIN: 11.5 GM/DL (ref 14–18)
MAGNESIUM: 2 MG/DL (ref 1.6–2.4)
MCH RBC QN AUTO: 31.5 PG (ref 26–33)
MCHC RBC AUTO-ENTMCNC: 31.3 GM/DL (ref 32.2–35.5)
MCV RBC AUTO: 100.8 FL (ref 80–94)
ORGANISM: ABNORMAL
ORGANISM: ABNORMAL
PLATELET # BLD: 152 THOU/MM3 (ref 130–400)
PMV BLD AUTO: 11.3 FL (ref 9.4–12.4)
POTASSIUM REFLEX MAGNESIUM: 3.8 MEQ/L (ref 3.5–5.2)
POTASSIUM SERPL-SCNC: 4.1 MEQ/L (ref 3.5–5.2)
RBC # BLD: 3.65 MILL/MM3 (ref 4.7–6.1)
SODIUM BLD-SCNC: 138 MEQ/L (ref 135–145)
URINE CULTURE, ROUTINE: ABNORMAL
WBC # BLD: 10.3 THOU/MM3 (ref 4.8–10.8)

## 2022-08-09 PROCEDURE — 84132 ASSAY OF SERUM POTASSIUM: CPT

## 2022-08-09 PROCEDURE — 6370000000 HC RX 637 (ALT 250 FOR IP): Performed by: STUDENT IN AN ORGANIZED HEALTH CARE EDUCATION/TRAINING PROGRAM

## 2022-08-09 PROCEDURE — 99232 SBSQ HOSP IP/OBS MODERATE 35: CPT | Performed by: INTERNAL MEDICINE

## 2022-08-09 PROCEDURE — 2580000003 HC RX 258: Performed by: STUDENT IN AN ORGANIZED HEALTH CARE EDUCATION/TRAINING PROGRAM

## 2022-08-09 PROCEDURE — 6360000002 HC RX W HCPCS: Performed by: STUDENT IN AN ORGANIZED HEALTH CARE EDUCATION/TRAINING PROGRAM

## 2022-08-09 PROCEDURE — 97535 SELF CARE MNGMENT TRAINING: CPT

## 2022-08-09 PROCEDURE — 85027 COMPLETE CBC AUTOMATED: CPT

## 2022-08-09 PROCEDURE — 83735 ASSAY OF MAGNESIUM: CPT

## 2022-08-09 PROCEDURE — 2060000000 HC ICU INTERMEDIATE R&B

## 2022-08-09 PROCEDURE — 2500000003 HC RX 250 WO HCPCS

## 2022-08-09 PROCEDURE — 80048 BASIC METABOLIC PNL TOTAL CA: CPT

## 2022-08-09 PROCEDURE — 6370000000 HC RX 637 (ALT 250 FOR IP)

## 2022-08-09 PROCEDURE — 36415 COLL VENOUS BLD VENIPUNCTURE: CPT

## 2022-08-09 PROCEDURE — 93010 ELECTROCARDIOGRAM REPORT: CPT | Performed by: INTERNAL MEDICINE

## 2022-08-09 PROCEDURE — 51798 US URINE CAPACITY MEASURE: CPT

## 2022-08-09 PROCEDURE — 51701 INSERT BLADDER CATHETER: CPT

## 2022-08-09 RX ORDER — LOPERAMIDE HYDROCHLORIDE 2 MG/1
2 CAPSULE ORAL 4 TIMES DAILY PRN
Status: DISCONTINUED | OUTPATIENT
Start: 2022-08-09 | End: 2022-08-11 | Stop reason: HOSPADM

## 2022-08-09 RX ADMIN — SODIUM CHLORIDE, PRESERVATIVE FREE 10 ML: 5 INJECTION INTRAVENOUS at 10:20

## 2022-08-09 RX ADMIN — PIPERACILLIN AND TAZOBACTAM 3375 MG: 3; .375 INJECTION, POWDER, FOR SOLUTION INTRAVENOUS at 18:54

## 2022-08-09 RX ADMIN — PANTOPRAZOLE SODIUM 40 MG: 40 TABLET, DELAYED RELEASE ORAL at 15:49

## 2022-08-09 RX ADMIN — SODIUM CHLORIDE, PRESERVATIVE FREE 10 ML: 5 INJECTION INTRAVENOUS at 18:52

## 2022-08-09 RX ADMIN — SODIUM CHLORIDE, PRESERVATIVE FREE 10 ML: 5 INJECTION INTRAVENOUS at 08:19

## 2022-08-09 RX ADMIN — SODIUM CHLORIDE, PRESERVATIVE FREE 10 ML: 5 INJECTION INTRAVENOUS at 23:09

## 2022-08-09 RX ADMIN — ENOXAPARIN SODIUM 40 MG: 100 INJECTION SUBCUTANEOUS at 08:21

## 2022-08-09 RX ADMIN — PIPERACILLIN AND TAZOBACTAM 3375 MG: 3; .375 INJECTION, POWDER, FOR SOLUTION INTRAVENOUS at 11:32

## 2022-08-09 RX ADMIN — FUROSEMIDE 60 MG: 40 TABLET ORAL at 13:19

## 2022-08-09 RX ADMIN — SODIUM CHLORIDE, PRESERVATIVE FREE 10 ML: 5 INJECTION INTRAVENOUS at 11:31

## 2022-08-09 RX ADMIN — SODIUM CHLORIDE, PRESERVATIVE FREE 10 ML: 5 INJECTION INTRAVENOUS at 15:49

## 2022-08-09 RX ADMIN — PIPERACILLIN AND TAZOBACTAM 3375 MG: 3; .375 INJECTION, POWDER, FOR SOLUTION INTRAVENOUS at 03:30

## 2022-08-09 RX ADMIN — LOPERAMIDE HYDROCHLORIDE 2 MG: 2 CAPSULE ORAL at 23:06

## 2022-08-09 RX ADMIN — PANTOPRAZOLE SODIUM 40 MG: 40 TABLET, DELAYED RELEASE ORAL at 08:22

## 2022-08-09 RX ADMIN — VANCOMYCIN HYDROCHLORIDE 1000 MG: 1 INJECTION, POWDER, LYOPHILIZED, FOR SOLUTION INTRAVENOUS at 10:19

## 2022-08-09 RX ADMIN — MICONAZOLE NITRATE: 20 POWDER TOPICAL at 23:06

## 2022-08-09 RX ADMIN — TAMSULOSIN HYDROCHLORIDE 0.4 MG: 0.4 CAPSULE ORAL at 08:22

## 2022-08-09 ASSESSMENT — PAIN SCALES - GENERAL
PAINLEVEL_OUTOF10: 0

## 2022-08-09 NOTE — PROGRESS NOTES
[facility] ICU STEPDOWN TELEMETRY 4K  Occupational Therapy  Daily Note  Time:   Time In: 7516  Time Out: 1020  Minutes: 24          Date: 2022  Patient Name: Gordy Rizo,   Gender: male      Room: [facility]011-A  MRN: [id number]  : 3/30/1932  (80 y.o.)  Referring Practitioner: Loree Nyhan, MD  Diagnosis: Sepsis  Additional Pertinent Hx: Gordy Rizo is a 80 y.o. male with PMHx of CAD s/p CABG, BPH, GERD, PAF, CKD, HLD and HTN who presented to [address] [place] [address] with complaint of chills, shortness of breath, fatigue, and malaise that started this morning around 0430. The patient was brought to the ED via family who stated he was feeling well the day prior and has not been around any known sick contacts. He denies fever, headache, lightheadedness, change in vision, rhinorrhea, congestion, sore throat, cough, hemoptysis, chest pain, palpitations, BRITTON, PND, abdominal pain, nausea, vomiting, hematemesis, change in bowel/bladder habits, hematochezia, hematuria, weakness, difficulty with ambulation, numbness/tingling, or known exposure to sick contacts. ED course: Patient started BiPAP for respiratory distress, administered 10mEq of potassium, 1g Ceftriaxone, 500mg azithromycin. Workup revealed: Sodium 143, potassium 3.2, chloride 109, CO2 22, BUN 16, creatinine 1.1, anion gap 12, EGFR 63, magnesium 1.4, lactic acid 2.7, glucose 107, calcium 8.4, procalcitonin 0.61, CRP 0.88, proBNP 6663, troponin <0.010, albumin 3.4, alk phos 118, ALT 13 AST 23, bilirubin 0.3, WBC 12.4, hemoglobin 13.7, MCV 98.9, platelet count 464. Flu A/B-negative  COVID-19 negative    Restrictions/Precautions:  Restrictions/Precautions: Fall Risk  Position Activity Restriction  Other position/activity restrictions: confused and impulsive on Room air this session     SUBJECTIVE: RN approved OT treatment session. Pt supine in bed upon OT arrival and asleep, difficult to arouse.  Pt requires several cues to wake from sleep. Nursing requesting for pt to sit up on chair. PAIN: 0/10: No complaints or signs of pain with or without movement. Vitals: Oxygen: 91-97% throughout session with and without mobility on room air. COGNITION: Pt requires several safety cues throughout session to avoid obstacles and assist for clean up after toileting required due to pt urinating down leg without awareness to episode. Slow Processing, Decreased Recall, Decreased Insight, Decreased Problem Solving, Decreased Safety Awareness, Impaired Attention, Decreased Arousal, Difficulty Following Commands, and Impulsive    ADL:   Upper Extremity Dressing: Moderate Assistance. Lower Extremity Dressing: Maximum Assistance. Toileting: Maximum Assistance. Pt attempts to stand at toilet to urinate despite cues from therapist to turn and sit on toilet. Pt begins urinating down his leg and requires total assist to clean self and floor to prevent falls. Toilet Transfer: Minimal Assistance. With cues required for hand placement. BALANCE:  Sitting Balance:  Stand By Assistance. Standing Balance: Contact Guard Assistance. With RW    BED MOBILITY:  Supine to Sit: Min A     TRANSFERS:  Sit to Stand:  Minimal Assistance, cues for hand placement, with verbal cues, From EOB and from toilet. To/From Bed and Chair: Contact Guard Assistance. With RW    FUNCTIONAL MOBILITY:  Assistive Device: Rolling Walker  Assist Level:  Contact Guard Assistance. Distance: To and from bathroom       ADDITIONAL ACTIVITIES:  Pt instructed on performing AAROM at B shoulders x10 repetitions to challenge direction following, improve level of arousal and improve activity tolerance. Pt in recliner at end of session, nursing present. ASSESSMENT:     Activity Tolerance:  Patient tolerance of  treatment: fair.        Discharge Recommendations: 24 hour assistance or supervision and Home with Home Health OT  Equipment Recommendations:

## 2022-08-09 NOTE — CARE COORDINATION
Collaborative Discharge Planning    Kari Cardenas  :  3/30/1932  MRN:  [id number]    ADMIT DATE:  2022      Discharge Planning Discharge Planning  Type of Residence: Apartment  Living Arrangements: Alone  Support Systems: Children  Current Services Prior To Admission: None  Potential Assistance Needed: N/A  DME Ordered?: No  Potential Assistance Purchasing Medications: No  Meds-to-Beds: Does the patient want to have any new prescriptions delivered to bedside prior to discharge?: No  Type of Home Care Services: None  Patient expects to be discharged to[de-identified] Apartment  One/Two Story Residence: One story  History of falls?: No  White Board Notes /Social Work Whiteboard Notes  /Social Work Whiteboard: ; SW - Denies needs. Home with family support. Son to transport home at [place]. Discharge Plan Home alone  lives alone; son/PARVIN Bedoya is G[employer] physician; checks on patient; therapy recommends 24h care; patient/family refusing HH/SNF; tele-sitter in room  Discharge Milestones and Delays: Clinical status  E.COLI/E.  Faecalis UTI  Elevated WBC  HR 40/s  IV AB    SIGNED:  Mary Peck RN   2022, 12:39 PM

## 2022-08-09 NOTE — PLAN OF CARE
Problem: Discharge Planning  Goal: Discharge to home or other facility with appropriate resources  8/9/2022 1607 by Martinez Velasquez RN  Outcome: Progressing  Flowsheets (Taken 8/9/2022 1607)  Discharge to home or other facility with appropriate resources:   Identify barriers to discharge with patient and caregiver   Refer to discharge planning if patient needs post-hospital services based on physician order or complex needs related to functional status, cognitive ability or social support system   Identify discharge learning needs (meds, wound care, etc)   Arrange for needed discharge resources and transportation as appropriate     Problem: Pain  Goal: Verbalizes/displays adequate comfort level or baseline comfort level  Outcome: Progressing  Flowsheets (Taken 8/9/2022 1607)  Verbalizes/displays adequate comfort level or baseline comfort level:   Encourage patient to monitor pain and request assistance   Assess pain using appropriate pain scale   Implement non-pharmacological measures as appropriate and evaluate response  Note: Pain goal zero      Problem: Safety - Adult  Goal: Free from fall injury  Outcome: Progressing  Flowsheets (Taken 8/9/2022 1607)  Free From Fall Injury:   Instruct family/caregiver on patient safety   Based on caregiver fall risk screen, instruct family/caregiver to ask for assistance with transferring infant if caregiver noted to have fall risk factors  Note: Hourly rounding, call light within reach, bed and chair alarm in use, tele sitter in room      Problem: Skin/Tissue Integrity  Goal: Absence of new skin breakdown  Description: 1. Monitor for areas of redness and/or skin breakdown  2. Assess vascular access sites hourly  3. Every 4-6 hours minimum:  Change oxygen saturation probe site  4. Every 4-6 hours:  If on nasal continuous positive airway pressure, respiratory therapy assess nares and determine need for appliance change or resting period.   Outcome: Progressing  Note: Larry Foot turns, incontinent care prn      Problem: Chronic Conditions and Co-morbidities  Goal: Patient's chronic conditions and co-morbidity symptoms are monitored and maintained or improved  Outcome: Progressing  Flowsheets (Taken 8/9/2022 1607)  Care Plan - Patient's Chronic Conditions and Co-Morbidity Symptoms are Monitored and Maintained or Improved:   Monitor and assess patient's chronic conditions and comorbid symptoms for stability, deterioration, or improvement   Collaborate with multidisciplinary team to address chronic and comorbid conditions and prevent exacerbation or deterioration   Update acute care plan with appropriate goals if chronic or comorbid symptoms are exacerbated and prevent overall improvement and discharge   Care plan reviewed with patient. Patient verbalize understanding of the plan of care and contribute to goal setting.

## 2022-08-09 NOTE — CARE COORDINATION
DISCHARGE/PLANNING EVALUATION  8/9/22, 8:55 AM EDT    Reason for Referral: Discharge planning  Mental Status: Alert and Oriented  Decision Making: Self  Family/Social/Home Environment: Patient lives alone. Patient has supportive family and friends that assist and check on him frequently. Current Services including food security, transportation and housekeeping: Patient was independent prior to admission. Family goes to patient's home regularly to assist as needed. Denied any current services prior to admission. Current Equipment: walker  Payment Source: VA and Medicare  Concerns or Barriers to Discharge: None  Post acute provider list with quality measures, geographic area and applicable managed care information provided. Questions regarding selection process answered: Offered    Teach Back Method used with patient and son regarding care plan and needs. Patient and son verbalize understanding of the plan of care and contribute to goal setting. Patient goals, treatment preferences and discharge plan: Patient plans to discharge home. Son Latrice Larkin to speak with patient about possible HH. Son to transport at discharge. 8/9/22, 10:49 AM EDT  DISCHARGE PLANNING EVALUATION    SW spoke with son Latrice Larkin. He reported that patient does not want HH. Patient to discharge back home with family support.        Electronically signed by CASSI Kelly on 8/9/2022 at 8:55 AM

## 2022-08-09 NOTE — PROGRESS NOTES
Patient incontinent of urine and stool, bladder scan showing only 153 ml residual.  Depends changed with barrier cream applied to buttocks.

## 2022-08-09 NOTE — PROGRESS NOTES
Attending supervising physicians attestation statement:       I Discussed the findings and plans with the resident physician  personally   and agree with the IM resident'S note as outlined . Also spoke with the staff  Regarding care plans and recommendations. Electronically signed by Avril Bledsoe MD on 8/10/2022 at 8:01 AM                                                             Hospitalist Progress Note      Patient:  Kristy Christian    Unit/Bed:4K-11/011-A  YOB: 1932  MRN: [id number]   Acct: [de-identified]   PCP: Garifeld iWnn DO  Date of Admission: 8/6/2022    Assessment/Plan:    #Acute hypoxic respiratory failure: Suspected secondary to CAP and decompensated systolic heart failure with pleural effusion. -CAP/HF therapy per below  #CAP: Presented with chills, shortness of breath, fatigue day of presentation. No fever, sputum production noted. SIRS 3/4 POA, elevated lactic/Pro-Santos.  CXR demonstrated consolidation of right lung. Flu A/B and COVID-negative. Received azithromycin and ceftriaxone in the ED. Patient transition to vancomycin and Zosyn. Blood cultures NGTD. Leukocytosis resolved, will continue on IV abx with plans to switch to PO abx at discharge  -Continue on vancomycin and zosyn   #Decompensated systolic heart faulure, NYHA III: Patient presented with elevated BNP from baseline with CXR demonstrating pulmonary edema. No noted peripheral edema. Echo 8/4/2021: EF 50%, severely dilated LA, mod-severe RV enlargement, RV global systolic function severely reduced. Patient has not received diuresis up to this point due to electrolyte derangements and sepsis. Repeat CXR 8/8/2022 demonstrating moderate right pleural effusion.  Received 40mg IV lasix 8/8/22.   -Lasix 60mg PO daily re-started  -We will hold on diagnostic and therapeutic thoracentesis after discussing with the patient, who verbalizes understanding and agreement with plan  - BiPAP therapy as needed for shortness of breath and increased work of breathing  #Pulmonary hypertension, WHO class II: ECHO 2021 RVSP 44 mmHg.   -Noted. Continue with therapy per above   #Hypokalemia, mild: K 3.2 on arrival.  -Continue with K repletion  #Hypomagnesemia: Mg 1.4 on arrival.  -Continue repletion   #CKDIIIb: Follows with Dr. Raffi Abdullahi. Baseline Cr appears 1.7-2.0. Per last nephrology note patient has been taking 60mg Lasix in the AM, held on admission.  -Continue with daily Lasix 60 mg  -Daily standing weights, strict I/O  -Daily BMP  #CAD: S/p CABG 2009. Noted. Patient does not appear to be on aspirin at home. #pAF: [id number]: 5, HAS BLED: 4. Unclear if on Eliquis at home, patient denies taking AC at home. Discussed case with pharmacy, patient noted to not be getting Eliquis filled outside of [place].  -Continue to hold Eliquis  #BPH: Noted. Home Flowmax resumed   #GERD:  Noted. Continue PPI. #Sepsis: Resolved. 3/4 criteria present on admission. Did not receive 30cc/kg IVF bolus on admission. CXR demonstrated R-sided consoldiation. Urine culture growing E coli and enterococcus faecalis, low colony count and patient denies urinary symptoms. Blood cultures NGTD. Chief Complaint: SOB    Initial H and P:-    \"Velasquez Jasmine is a 80 y.o. male with PMHx of CAD s/p CABG, BPH, GERD, PAF, CKD, HLD and HTN who presented to [facility] with complaint of chills, shortness of breath, fatigue, and malaise that started this morning around 0430. The patient was brought to the ED via family who stated he was feeling well the day prior and has not been around any known sick contacts.      He denies fever, headache, lightheadedness, change in vision, rhinorrhea, congestion, sore throat, cough, hemoptysis, chest pain, palpitations, BRITTON, PND, abdominal pain, nausea, vomiting, hematemesis, change in bowel/bladder habits, hematochezia, hematuria, weakness, difficulty with ambulation, numbness/tingling, or known exposure to sick contacts. \" - Per HPI    Subjective (past 24 hours):   Patient seen and examined this AM. Doing well, off oxygen maintaining O2 sats >90% on room air resting in bed. Good urine output on lasix. Feels much improved. Will monitor overnight. Past medical history, family history, social history and allergies reviewed again and is unchanged since admission. ROS (12 point review of systems completed. Pertinent positives noted. Otherwise ROS is negative)     Medications:  Reviewed    Infusion Medications    sodium chloride Stopped (08/08/22 1811)     Scheduled Medications    furosemide  60 mg Oral Daily    piperacillin-tazobactam  3,375 mg IntraVENous Q8H    sodium chloride flush  5-40 mL IntraVENous 2 times per day    enoxaparin  40 mg SubCUTAneous Daily    pantoprazole  40 mg Oral BID AC    vancomycin  1,000 mg IntraVENous Q24H    vancomycin (VANCOCIN) intermittent dosing (placeholder)   Other RX Placeholder    tamsulosin  0.4 mg Oral Daily     PRN Meds: magnesium sulfate, sodium chloride flush, sodium chloride, ondansetron **OR** ondansetron, polyethylene glycol, acetaminophen **OR** acetaminophen, potassium chloride **OR** potassium alternative oral replacement **OR** potassium chloride      Intake/Output Summary (Last 24 hours) at 8/9/2022 1255  Last data filed at 8/9/2022 1138  Gross per 24 hour   Intake 1850.82 ml   Output 1850 ml   Net 0.82 ml       Diet:  ADULT DIET; Regular    Exam:    BP (!) 142/51   Pulse (!) 49   Temp 98.6 °F (37 °C) (Oral)   Resp 20   Ht 5' (1.524 m)   Wt 160 lb 8 oz (72.8 kg)   SpO2 92%   BMI 31.35 kg/m²     General appearance: No apparent distress, appears stated age and cooperative. HEENT: Pupils equal, round, and reactive to light. Conjunctivae/corneas clear. Neck: Supple, with full range of motion. No jugular venous distention. Trachea midline. Respiratory:  Normal respiratory effort.   Improved breath sounds in the bases, no noted wheezing or rhonchi  Cardiovascular: Regular rate and rhythm with normal S1/S2 without murmurs, rubs or gallops. Abdomen: Soft, non-tender, non-distended with normal bowel sounds. Musculoskeletal: passive and active ROM x 4 extremities. Extremities: No peripheral edema noted  Skin: Skin color, texture, turgor normal.  No rashes or lesions. Neurologic:  Neurovascularly intact without any focal sensory/motor deficits. Cranial nerves: II-XII intact, grossly non-focal.  Psychiatric: Alert and oriented, thought content appropriate, normal insight  Capillary Refill: Brisk,< 3 seconds   Peripheral Pulses: +2 palpable, equal bilaterally     Labs:   Recent Labs     08/07/22  0435 08/08/22  0513 08/09/22  1135   WBC 17.3* 13.9* 10.3   HGB 11.3* 10.4* 11.5*   HCT 35.5* 32.7* 36.8*    140 152     Recent Labs     08/07/22  0435 08/08/22  0513 08/09/22  0550    140 138   K 3.6 3.4* 3.8    105 105   CO2 24 27 23   BUN 16 17 16   CREATININE 1.2 1.3* 1.2   CALCIUM 7.8* 8.0* 8.5     No results for input(s): AST, ALT, BILIDIR, BILITOT, ALKPHOS in the last 72 hours. No results for input(s): INR in the last 72 hours. No results for input(s): [place] in the last 72 hours. Microbiology:    Blood culture #1:   Lab Results   Component Value Date/Time    BC No growth 24 hours. No growth 48 hours. 08/06/2022 10:16 AM    BC No growth 24 hours. No growth 48 hours. 08/06/2022 10:16 AM       Blood culture #2:No results found for: [facility]    Organism:  Lab Results   Component Value Date/Time    ORG Escherichia coli 08/06/2022 09:45 AM    ORG Enterococcus faecalis - (Group D) 08/06/2022 09:45 AM         Lab Results   Component Value Date/Time    LABGRAM  01/28/2021 12:40 PM     Few segmented neutrophils observed. No bacteria seen.  performed on cytospun specimen       MRSA culture only:No results found for: [facility]    Urine culture:   Lab Results   Component Value Date/Time    LABURIN [place] count: 10,000-50,000 CFU/mL  08/06/2022 09:45 AM LABURIN [place] count: 10,000-20,000 CFU/mL  08/06/2022 09:45 AM       Respiratory culture: No results found for: CULTRESP    Aerobic and Anaerobic :  No results found for: LABAERO  Lab Results   Component Value Date/Time    LABANAE No growth-preliminary No growth  01/28/2021 12:40 PM       Urinalysis:      Lab Results   Component Value Date/Time    NITRU NEGATIVE 08/06/2022 09:45 AM    WBCUA 5-9 08/06/2022 09:45 AM    BACTERIA NONE SEEN 08/06/2022 09:45 AM    RBCUA 0-2 08/06/2022 09:45 AM    BLOODU SMALL 08/06/2022 09:45 AM    SPECGRAV 1.013 08/06/2022 09:45 AM    GLUCOSEU NEGATIVE 12/26/2021 05:20 AM       Radiology:  XR CHEST (2 VW)   Final Result   Suspected moderate-sized right pleural effusion. There are right lung infiltrates, suggesting pneumonia. **This report has been created using voice recognition software. It may contain minor errors which are inherent in voice recognition technology. **      Final report electronically signed by Dr. Ketty Dubose on 8/7/2022 1:45 PM      XR CHEST PORTABLE   Final Result   Bilateral airspace disease with areas of consolidation in the right lung,    which could be due to pneumonia or pulmonary edema. Right pleural effusion. This document has been electronically signed by: Peggy Guevara MD on    08/06/2022 06:56 AM        XR CHEST (2 VW)    Result Date: 8/7/2022  PROCEDURE: XR CHEST (2 VW) CLINICAL INFORMATION: shortness of breath. Shortness of breath, fever, sepsis. COMPARISON: Chest x-ray 8/6/2022. TECHNIQUE: AP upright view of the chest and a lateral view were obtained. FINDINGS: The patient has had a prior median sternotomy. The heart size is the upper limits of normal.  The mediastinum is not widened. There appears to be a moderate-sized layering right pleural effusion. There is a tiny left-sided pleural effusion. There are infiltrates in the right upper and right lower lobe. This can indicate pneumonia.  There is some prominence the pulmonary interstitium in the left lung without focal infiltrate. The pulmonary vessels do appear normal. There are degenerative changes in the thoracic spine. Suspected moderate-sized right pleural effusion. There are right lung infiltrates, suggesting pneumonia. **This report has been created using voice recognition software. It may contain minor errors which are inherent in voice recognition technology. ** Final report electronically signed by Dr. Edie Goodwin on 8/7/2022 1:45 PM    XR CHEST PORTABLE    Result Date: 8/6/2022  Chest X-ray, 1 View COMPARISON: CR,SR - XR CHEST PORTABLE - 12/25/2021 07:41 PM EST  CR,SR - XR CHEST PORTABLE - 08/04/2021 04:46 AM EDT FINDINGS: Diffuse bilateral airspace disease with patchy consolidation throughout the right lung. A skinfold projects over the lateral right chest. Blunting of the right costophrenic angle. No pneumothorax. Stable cardiomegaly. No acute fracture. Status post median sternotomy. Bilateral airspace disease with areas of consolidation in the right lung, which could be due to pneumonia or pulmonary edema. Right pleural effusion.  This document has been electronically signed by: Wilner Powell MD on 08/06/2022 06:56 AM      Electronically signed by Greg Mejias MD on 8/9/2022 at 12:55 PM

## 2022-08-10 LAB
ANION GAP SERPL CALCULATED.3IONS-SCNC: 11 MEQ/L (ref 8–16)
BUN BLDV-MCNC: 12 MG/DL (ref 7–22)
CALCIUM SERPL-MCNC: 8.9 MG/DL (ref 8.5–10.5)
CHLORIDE BLD-SCNC: 104 MEQ/L (ref 98–111)
CO2: 26 MEQ/L (ref 23–33)
CREAT SERPL-MCNC: 1.3 MG/DL (ref 0.4–1.2)
ERYTHROCYTE [DISTWIDTH] IN BLOOD BY AUTOMATED COUNT: 16.9 % (ref 11.5–14.5)
ERYTHROCYTE [DISTWIDTH] IN BLOOD BY AUTOMATED COUNT: 60.3 FL (ref 35–45)
GFR SERPL CREATININE-BSD FRML MDRD: 52 ML/MIN/1.73M2
GLUCOSE BLD-MCNC: 82 MG/DL (ref 70–108)
HCT VFR BLD CALC: 37.3 % (ref 42–52)
HEMOGLOBIN: 12 GM/DL (ref 14–18)
MCH RBC QN AUTO: 31.7 PG (ref 26–33)
MCHC RBC AUTO-ENTMCNC: 32.2 GM/DL (ref 32.2–35.5)
MCV RBC AUTO: 98.7 FL (ref 80–94)
PLATELET # BLD: 161 THOU/MM3 (ref 130–400)
PMV BLD AUTO: 10.8 FL (ref 9.4–12.4)
POTASSIUM SERPL-SCNC: 3.6 MEQ/L (ref 3.5–5.2)
RBC # BLD: 3.78 MILL/MM3 (ref 4.7–6.1)
SODIUM BLD-SCNC: 141 MEQ/L (ref 135–145)
WBC # BLD: 9.1 THOU/MM3 (ref 4.8–10.8)

## 2022-08-10 PROCEDURE — 6370000000 HC RX 637 (ALT 250 FOR IP): Performed by: STUDENT IN AN ORGANIZED HEALTH CARE EDUCATION/TRAINING PROGRAM

## 2022-08-10 PROCEDURE — 94761 N-INVAS EAR/PLS OXIMETRY MLT: CPT

## 2022-08-10 PROCEDURE — 6370000000 HC RX 637 (ALT 250 FOR IP)

## 2022-08-10 PROCEDURE — 97116 GAIT TRAINING THERAPY: CPT

## 2022-08-10 PROCEDURE — 6370000000 HC RX 637 (ALT 250 FOR IP): Performed by: INTERNAL MEDICINE

## 2022-08-10 PROCEDURE — 36415 COLL VENOUS BLD VENIPUNCTURE: CPT

## 2022-08-10 PROCEDURE — 97530 THERAPEUTIC ACTIVITIES: CPT

## 2022-08-10 PROCEDURE — 6360000002 HC RX W HCPCS: Performed by: STUDENT IN AN ORGANIZED HEALTH CARE EDUCATION/TRAINING PROGRAM

## 2022-08-10 PROCEDURE — 2580000003 HC RX 258: Performed by: STUDENT IN AN ORGANIZED HEALTH CARE EDUCATION/TRAINING PROGRAM

## 2022-08-10 PROCEDURE — 6360000002 HC RX W HCPCS

## 2022-08-10 PROCEDURE — 97535 SELF CARE MNGMENT TRAINING: CPT

## 2022-08-10 PROCEDURE — 85027 COMPLETE CBC AUTOMATED: CPT

## 2022-08-10 PROCEDURE — 97110 THERAPEUTIC EXERCISES: CPT

## 2022-08-10 PROCEDURE — 2060000000 HC ICU INTERMEDIATE R&B

## 2022-08-10 PROCEDURE — 80048 BASIC METABOLIC PNL TOTAL CA: CPT

## 2022-08-10 RX ORDER — AMOXICILLIN AND CLAVULANATE POTASSIUM 875; 125 MG/1; MG/1
1 TABLET, FILM COATED ORAL 2 TIMES DAILY
Qty: 14 TABLET | Refills: 0 | Status: CANCELLED | OUTPATIENT
Start: 2022-08-10 | End: 2022-08-11

## 2022-08-10 RX ORDER — FUROSEMIDE 10 MG/ML
20 INJECTION INTRAMUSCULAR; INTRAVENOUS ONCE
Status: COMPLETED | OUTPATIENT
Start: 2022-08-10 | End: 2022-08-10

## 2022-08-10 RX ORDER — CEFUROXIME AXETIL 250 MG/1
500 TABLET ORAL EVERY 12 HOURS SCHEDULED
Status: DISCONTINUED | OUTPATIENT
Start: 2022-08-10 | End: 2022-08-11 | Stop reason: HOSPADM

## 2022-08-10 RX ADMIN — MICONAZOLE NITRATE: 20 POWDER TOPICAL at 21:38

## 2022-08-10 RX ADMIN — SODIUM CHLORIDE, PRESERVATIVE FREE 10 ML: 5 INJECTION INTRAVENOUS at 10:08

## 2022-08-10 RX ADMIN — CEFUROXIME AXETIL 500 MG: 250 TABLET ORAL at 23:00

## 2022-08-10 RX ADMIN — SODIUM CHLORIDE, PRESERVATIVE FREE 10 ML: 5 INJECTION INTRAVENOUS at 21:39

## 2022-08-10 RX ADMIN — FUROSEMIDE 60 MG: 40 TABLET ORAL at 10:08

## 2022-08-10 RX ADMIN — MICONAZOLE NITRATE: 20 POWDER TOPICAL at 10:08

## 2022-08-10 RX ADMIN — PANTOPRAZOLE SODIUM 40 MG: 40 TABLET, DELAYED RELEASE ORAL at 06:42

## 2022-08-10 RX ADMIN — FUROSEMIDE 20 MG: 10 INJECTION, SOLUTION INTRAMUSCULAR; INTRAVENOUS at 21:24

## 2022-08-10 RX ADMIN — PANTOPRAZOLE SODIUM 40 MG: 40 TABLET, DELAYED RELEASE ORAL at 16:27

## 2022-08-10 RX ADMIN — TAMSULOSIN HYDROCHLORIDE 0.4 MG: 0.4 CAPSULE ORAL at 10:08

## 2022-08-10 RX ADMIN — PIPERACILLIN AND TAZOBACTAM 3375 MG: 3; .375 INJECTION, POWDER, FOR SOLUTION INTRAVENOUS at 04:15

## 2022-08-10 ASSESSMENT — PAIN SCALES - GENERAL
PAINLEVEL_OUTOF10: 0
PAINLEVEL_OUTOF10: 0

## 2022-08-10 NOTE — PROGRESS NOTES
[facility] ICU STEPDOWN TELEMETRY 4K  Occupational Therapy  Daily Note  Time:   Time In: 0900  Time Out: 930  Timed Code Treatment Minutes: 30 Minutes  Minutes: 30          Date: 8/10/2022  Patient Name: Brayden Medeiros,   Gender: male      Room: [facility]011-A  MRN: [id number]  : 3/30/1932  (80 y.o.)  Referring Practitioner: Yogesh Spain MD  Diagnosis: Sepsis  Additional Pertinent Hx: Brayden Medeiros is a 80 y.o. male with PMHx of CAD s/p CABG, BPH, GERD, PAF, CKD, HLD and HTN who presented to [address] [place] [address] with complaint of chills, shortness of breath, fatigue, and malaise that started this morning around 0430. The patient was brought to the ED via family who stated he was feeling well the day prior and has not been around any known sick contacts. He denies fever, headache, lightheadedness, change in vision, rhinorrhea, congestion, sore throat, cough, hemoptysis, chest pain, palpitations, BRITTON, PND, abdominal pain, nausea, vomiting, hematemesis, change in bowel/bladder habits, hematochezia, hematuria, weakness, difficulty with ambulation, numbness/tingling, or known exposure to sick contacts. ED course: Patient started BiPAP for respiratory distress, administered 10mEq of potassium, 1g Ceftriaxone, 500mg azithromycin. Workup revealed: Sodium 143, potassium 3.2, chloride 109, CO2 22, BUN 16, creatinine 1.1, anion gap 12, EGFR 63, magnesium 1.4, lactic acid 2.7, glucose 107, calcium 8.4, procalcitonin 0.61, CRP 0.88, proBNP 6663, troponin <0.010, albumin 3.4, alk phos 118, ALT 13 AST 23, bilirubin 0.3, WBC 12.4, hemoglobin 13.7, MCV 98.9, platelet count 708. Flu A/B-negative  COVID-19 negative    Restrictions/Precautions:  Restrictions/Precautions: Fall Risk  Position Activity Restriction  Other position/activity restrictions: confused and impulsive     SUBJECTIVE: Patient seated in bedside chair upon arrival; agreeable to therapy this date.   Patient pleasant and cooperative throughout session. PAIN: 0/10:     Vitals: Vitals not assessed per clinical judgement, see nursing flowsheet    COGNITION: Decreased Problem Solving and Decreased Safety Awareness    ADL:   Grooming: Stand By Assistance. Standing at sink to shave, brush teeth  Bathing: Contact Guard Assistance. Verbal cues for thoroughness  Upper Extremity Dressing: Minimal Assistance. Patient able to doff shirt standing, assistance to tie hosptial gown . BALANCE:  Sitting Balance:  Modified Independent. Standing Balance: Stand By Assistance to [employer]. RW, no LOB    BED MOBILITY:  Not Tested    TRANSFERS:  Sit to Stand:  Contact Guard Assistance. Stand to Sit: Contact Guard Assistance. FUNCTIONAL MOBILITY:  Assistive Device: Rolling Walker  Assist Level:  Contact Guard Assistance. Distance: To and from bathroom  Slow pace, no LOB     ASSESSMENT:     Activity Tolerance:  Patient tolerance of  treatment: good. Discharge Recommendations: Subacute/skilled nursing facility  Equipment Recommendations: Equipment Needed: No  Plan: Times per Week: 5x  Current Treatment Recommendations: Strengthening, Balance training, Functional mobility training, Endurance training, Safety education & training, Self-Care / ADL, Patient/Caregiver education & training, Equipment evaluation, education, & procurement    Patient Education  Patient Education: Plan of Care, ADL's, Home Safety, Importance of Increasing Activity, and Assistive Device Safety    Goals  Short Term Goals  Time Frame for Short term goals: Until discharge  Short Term Goal 1: Pt kacie complete BUE stregthening exercises with min vcs for technique to increase indep and endurance with self cares. Short Term Goal 2: Pt will complete dynamic standing task x 5 minutes with 2 UE release and S to increase indep with grooming.   Short Term Goal 3: pt will complete functional mobility to/from BR and HH distances with S and min vcs for safety to increase indep with toileting. Short Term Goal 4: Pt will complete LB dressing with S and 0 vcs for safety. Additional Goals?: No    Following session, patient left in safe position with all fall risk precautions in place.

## 2022-08-10 NOTE — PROGRESS NOTES
Hospitalist Progress Note      Patient:  Trinity Livingston    Unit/Bed:4K-11/011-A  YOB: 1932  MRN: [id number]   Acct: [de-identified]   PCP: Pastor Brandie DO  Date of Admission: 8/6/2022    Assessment/Plan:    #Acute hypoxic respiratory failure: Suspected secondary to CAP and decompensated systolic heart failure with pleural effusion. -CAP/HF therapy per below  #CAP: Presented with chills, shortness of breath, fatigue day of presentation. No fever, sputum production noted. SIRS 3/4 POA, elevated lactic/Pro-Santos.  CXR demonstrated consolidation of right lung. Flu A/B and COVID-negative. Received azithromycin and ceftriaxone in the ED. Patient transition to vancomycin and Zosyn. Blood cultures NGTD. -Transitioned to Ceftin PO abx   #Decompensated systolic heart faulure, NYHA III: Patient presented with elevated BNP from baseline with CXR demonstrating pulmonary edema. No noted peripheral edema. Echo 8/4/2021: EF 50%, severely dilated LA, mod-severe RV enlargement, RV global systolic function severely reduced. Patient has not received diuresis up to this point due to electrolyte derangements and sepsis. Repeat CXR 8/8/2022 demonstrating moderate right pleural effusion. Patient resting comfortably on RA, noted desat to 80% with ambulation on 8/10/22. Will continue diuresis and likely d/c in AM.   -We will hold on diagnostic and therapeutic thoracentesis after discussing with the patient, who verbalizes understanding and agreement with plan  #Pulmonary hypertension, WHO class II: ECHO 2021 RVSP 44 mmHg.   -Noted. Continue with therapy per above   #Hypokalemia, mild: K 3.2 on arrival, has been receiving repletion.  -Continue repletion  #Hypomagnesemia: Mg 1.4 on arrival.  -Continue repletion   #CKDIIIb: Follows with Dr. Katerine Phan. Baseline Cr appears 1.7-2.0.  Per last nephrology note patient has been taking 60mg Lasix in the AM, held on admission.  -Continue with daily Lasix 60 mg  -Daily standing weights, strict I/O  -Daily BMP  #CAD: S/p CABG 2009. Noted. Patient does not appear to be on aspirin at home. #pAF: [id number]: 5, HAS BLED: 4. Unclear if on Eliquis at home, patient denies taking AC at home. Discussed case with pharmacy, patient noted to not be getting Eliquis filled outside of [place].  -Continue to hold Eliquis  #BPH: Noted. Home Flowmax resumed   #GERD:  Noted. Continue PPI. #Sepsis: Resolved. 3/4 criteria present on admission. Did not receive 30cc/kg IVF bolus on admission. CXR demonstrated R-sided consoldiation. Urine culture growing E coli and enterococcus faecalis, low colony count and patient denies urinary symptoms. Blood cultures NGTD. Chief Complaint: SOB    Initial H and P:-    \"Velasquez Singh is a 80 y.o. male with PMHx of CAD s/p CABG, BPH, GERD, PAF, CKD, HLD and HTN who presented to [address] [place] [address] with complaint of chills, shortness of breath, fatigue, and malaise that started this morning around 0430. The patient was brought to the ED via family who stated he was feeling well the day prior and has not been around any known sick contacts. He denies fever, headache, lightheadedness, change in vision, rhinorrhea, congestion, sore throat, cough, hemoptysis, chest pain, palpitations, BRITTON, PND, abdominal pain, nausea, vomiting, hematemesis, change in bowel/bladder habits, hematochezia, hematuria, weakness, difficulty with ambulation, numbness/tingling, or known exposure to sick contacts. \" - Per HPI    Subjective (past 24 hours):   Patient seen and examined this AM. Denies any acute complaints. Was ambulated with pulse ox earlier, noted desat to 80% requiring rest to improve. Discussed with patient, plan for continued diuresis overnight and hopeful discharge in AM.       Past medical history, family history, social history and allergies reviewed again and is unchanged since admission.     ROS (12 point review of systems completed. Pertinent positives noted. Otherwise ROS is negative)     Medications:  Reviewed    Infusion Medications    sodium chloride Stopped (08/08/22 1811)     Scheduled Medications    cefUROXime  500 mg Oral 2 times per day    furosemide  60 mg Oral Daily    miconazole   Topical BID    sodium chloride flush  5-40 mL IntraVENous 2 times per day    enoxaparin  40 mg SubCUTAneous Daily    pantoprazole  40 mg Oral BID AC    tamsulosin  0.4 mg Oral Daily     PRN Meds: loperamide, magnesium sulfate, sodium chloride flush, sodium chloride, ondansetron **OR** ondansetron, polyethylene glycol, acetaminophen **OR** acetaminophen, potassium chloride **OR** potassium alternative oral replacement **OR** potassium chloride      Intake/Output Summary (Last 24 hours) at 8/10/2022 1427  Last data filed at 8/10/2022 0302  Gross per 24 hour   Intake 323.78 ml   Output 950 ml   Net -626.22 ml       Diet:  ADULT DIET; Regular    Exam:    BP (!) 168/72   Pulse 57   Temp 98 °F (36.7 °C) (Oral)   Resp 18   Ht 5' (1.524 m)   Wt 160 lb 8 oz (72.8 kg)   SpO2 94%   BMI 31.35 kg/m²     General appearance: No apparent distress, appears stated age and cooperative. HEENT: Pupils equal, round, and reactive to light. Conjunctivae/corneas clear. Neck: Supple, with full range of motion. No jugular venous distention. Trachea midline. Respiratory:  Normal respiratory effort. Decreased R base breath sounds in the bases, no noted wheezing or rhonchi  Cardiovascular: Regular rate and rhythm with normal S1/S2 without murmurs, rubs or gallops. Abdomen: Soft, non-tender, non-distended with normal bowel sounds. Musculoskeletal: passive and active ROM x 4 extremities. Extremities: No peripheral edema noted  Skin: Skin color, texture, turgor normal.  No rashes or lesions. Neurologic:  Neurovascularly intact without any focal sensory/motor deficits.  Cranial nerves: II-XII intact, grossly non-focal.  Psychiatric: Alert and oriented, thought content appropriate, normal insight  Capillary Refill: Brisk,< 3 seconds   Peripheral Pulses: +2 palpable, equal bilaterally     Labs:   Recent Labs     08/08/22  0513 08/09/22  1135 08/10/22  0508   WBC 13.9* 10.3 9.1   HGB 10.4* 11.5* 12.0*   HCT 32.7* 36.8* 37.3*    152 161     Recent Labs     08/08/22  0513 08/09/22  0550 08/09/22  2310 08/10/22  0508    138  --  141   K 3.4* 3.8 4.1 3.6    105  --  104   CO2 27 23  --  26   BUN 17 16  --  12   CREATININE 1.3* 1.2  --  1.3*   CALCIUM 8.0* 8.5  --  8.9     No results for input(s): AST, ALT, BILIDIR, BILITOT, ALKPHOS in the last 72 hours. No results for input(s): INR in the last 72 hours. No results for input(s): Xenia Horn in the last 72 hours. Microbiology:    Blood culture #1:   Lab Results   Component Value Date/Time    BC No growth 24 hours. No growth 48 hours. 08/06/2022 10:16 AM    BC No growth 24 hours. No growth 48 hours. 08/06/2022 10:16 AM       Blood culture #2:No results found for: Narinder Mei    Organism:  Lab Results   Component Value Date/Time    ORG Escherichia coli 08/06/2022 09:45 AM    ORG Enterococcus faecalis - (Group D) 08/06/2022 09:45 AM         Lab Results   Component Value Date/Time    LABGRAM  01/28/2021 12:40 PM     Few segmented neutrophils observed. No bacteria seen. performed on cytospun specimen       MRSA culture only:No results found for: [facility]    Urine culture:   Lab Results   Component Value Date/Time    LABURIN  08/06/2022 09:45 AM     One isolate is resistant in vitro to current regimen. Hair Mayer  08/06/2022 09:45 AM     Sanger count: 10,000-50,000 CFU/mL This isolate is a probable ESBL . ID consultation may be helpful in some clinical circumstances. CONTACT isolation required.      LABURIN Sanger count: 10,000-20,000 CFU/mL  08/06/2022 09:45 AM       Respiratory culture: No results found for: CULTRESP    Aerobic and Anaerobic :  No results found for: LABAERO  Lab Results   Component Value Date/Time    LABANAE No growth-preliminary No growth  01/28/2021 12:40 PM       Urinalysis:      Lab Results   Component Value Date/Time    NITRU NEGATIVE 08/06/2022 09:45 AM    WBCUA 5-9 08/06/2022 09:45 AM    BACTERIA NONE SEEN 08/06/2022 09:45 AM    RBCUA 0-2 08/06/2022 09:45 AM    BLOODU SMALL 08/06/2022 09:45 AM    SPECGRAV 1.013 08/06/2022 09:45 AM    GLUCOSEU NEGATIVE 12/26/2021 05:20 AM       Radiology:  XR CHEST (2 VW)   Final Result   Suspected moderate-sized right pleural effusion. There are right lung infiltrates, suggesting pneumonia. **This report has been created using voice recognition software. It may contain minor errors which are inherent in voice recognition technology. **      Final report electronically signed by Dr. Maxine Woods on 8/7/2022 1:45 PM      XR CHEST PORTABLE   Final Result   Bilateral airspace disease with areas of consolidation in the right lung,    which could be due to pneumonia or pulmonary edema. Right pleural effusion. This document has been electronically signed by: Liz Almanza MD on    08/06/2022 06:56 AM        XR CHEST (2 VW)    Result Date: 8/7/2022  PROCEDURE: XR CHEST (2 VW) CLINICAL INFORMATION: shortness of breath. Shortness of breath, fever, sepsis. COMPARISON: Chest x-ray 8/6/2022. TECHNIQUE: AP upright view of the chest and a lateral view were obtained. FINDINGS: The patient has had a prior median sternotomy. The heart size is the upper limits of normal.  The mediastinum is not widened. There appears to be a moderate-sized layering right pleural effusion. There is a tiny left-sided pleural effusion. There are infiltrates in the right upper and right lower lobe. This can indicate pneumonia. There is some prominence the pulmonary interstitium in the left lung without focal infiltrate. The pulmonary vessels do appear normal. There are degenerative changes in the thoracic spine.       Suspected moderate-sized right pleural effusion. There are right lung infiltrates, suggesting pneumonia. **This report has been created using voice recognition software. It may contain minor errors which are inherent in voice recognition technology. ** Final report electronically signed by Dr. Vern Liang on 8/7/2022 1:45 PM    XR CHEST PORTABLE    Result Date: 8/6/2022  Chest X-ray, 1 View COMPARISON: CR,SR - XR CHEST PORTABLE - 12/25/2021 07:41 PM EST  CR,SR - XR CHEST PORTABLE - 08/04/2021 04:46 AM EDT FINDINGS: Diffuse bilateral airspace disease with patchy consolidation throughout the right lung. A skinfold projects over the lateral right chest. Blunting of the right costophrenic angle. No pneumothorax. Stable cardiomegaly. No acute fracture. Status post median sternotomy. Bilateral airspace disease with areas of consolidation in the right lung, which could be due to pneumonia or pulmonary edema. Right pleural effusion.  This document has been electronically signed by: Ciro Conner MD on 08/06/2022 06:56 AM      Electronically signed by Vic Prajapati MD on 8/10/2022 at 2:27 PM

## 2022-08-10 NOTE — PLAN OF CARE
Problem: Discharge Planning  Goal: Discharge to home or other facility with appropriate resources  Outcome: Progressing  Flowsheets  Taken 8/10/2022 1006 by Kayli Vázquez RN  Discharge to home or other facility with appropriate resources:   Identify barriers to discharge with patient and caregiver   Arrange for needed discharge resources and transportation as appropriate   Identify discharge learning needs (meds, wound care, etc)   Refer to discharge planning if patient needs post-hospital services based on physician order or complex needs related to functional status, cognitive ability or social support system  Taken 8/9/2022 2110 by Jacquelin Nation RN  Discharge to home or other facility with appropriate resources:   Identify barriers to discharge with patient and caregiver   Arrange for needed discharge resources and transportation as appropriate   Identify discharge learning needs (meds, wound care, etc)   Refer to discharge planning if patient needs post-hospital services based on physician order or complex needs related to functional status, cognitive ability or social support system     Problem: Pain  Goal: Verbalizes/displays adequate comfort level or baseline comfort level  Outcome: Progressing  Flowsheets (Taken 8/9/2022 1607 by Jadyn Panchal RN)  Verbalizes/displays adequate comfort level or baseline comfort level:   Encourage patient to monitor pain and request assistance   Assess pain using appropriate pain scale   Implement non-pharmacological measures as appropriate and evaluate response     Problem: Safety - Adult  Goal: Free from fall injury  Outcome: Progressing  Flowsheets (Taken 8/10/2022 0805)  Free From Fall Injury:   Instruct family/caregiver on patient safety   Based on caregiver fall risk screen, instruct family/caregiver to ask for assistance with transferring infant if caregiver noted to have fall risk factors     Problem: ABCDS Injury Assessment  Goal: Absence of physical injury  Outcome: Progressing  Flowsheets (Taken 8/10/2022 0805)  Absence of Physical Injury: Implement safety measures based on patient assessment     Problem: Skin/Tissue Integrity  Goal: Absence of new skin breakdown  Description: 1. Monitor for areas of redness and/or skin breakdown  2. Assess vascular access sites hourly  3. Every 4-6 hours minimum:  Change oxygen saturation probe site  4. Every 4-6 hours:  If on nasal continuous positive airway pressure, respiratory therapy assess nares and determine need for appliance change or resting period. Outcome: Progressing     Problem: Chronic Conditions and Co-morbidities  Goal: Patient's chronic conditions and co-morbidity symptoms are monitored and maintained or improved  Outcome: Progressing  Flowsheets  Taken 8/10/2022 1006 by Orly Vizcarra RN  Care Plan - Patient's Chronic Conditions and Co-Morbidity Symptoms are Monitored and Maintained or Improved:   Monitor and assess patient's chronic conditions and comorbid symptoms for stability, deterioration, or improvement   Collaborate with multidisciplinary team to address chronic and comorbid conditions and prevent exacerbation or deterioration   Update acute care plan with appropriate goals if chronic or comorbid symptoms are exacerbated and prevent overall improvement and discharge  Taken 8/9/2022 2110 by Nilda Dias 34 - Patient's Chronic Conditions and Co-Morbidity Symptoms are Monitored and Maintained or Improved:   Monitor and assess patient's chronic conditions and comorbid symptoms for stability, deterioration, or improvement   Collaborate with multidisciplinary team to address chronic and comorbid conditions and prevent exacerbation or deterioration   Update acute care plan with appropriate goals if chronic or comorbid symptoms are exacerbated and prevent overall improvement and discharge     Care plan reviewed with patient and family.   Patient and family verbalize understanding of the plan of care and contribute to goal setting.

## 2022-08-10 NOTE — DISCHARGE INSTRUCTIONS
-Continue Ceftin 500mg bid  -Continue Lasix 60mg daily at home  -Repeat BMP in 1 week  -Call PCP for appointment in 1 week

## 2022-08-10 NOTE — PROGRESS NOTES
[address] [place] [address]  INPATIENT PHYSICAL THERAPY  DAILY NOTE  [facility] ICU STEPDOWN TELEMETRY 4K - 4K-11/011-A      Time In: 1034  Time Out: 1113  Timed Code Treatment Minutes: 44 Minutes  Minutes: 39          Date: 8/10/2022  Patient Name: Rasta Jeffries,  Gender:  male        MRN: [id number]  : 3/30/1932  (80 y.o.)     Referring Practitioner: Ramone Gunn MD  Diagnosis: Sepsis  Additional Pertinent Hx: Rasta Jeffries is a 80 y.o. male with PMHx of CAD s/p CABG, BPH, GERD, PAF, CKD, HLD and HTN who presented to [address] [place] [address] with complaint of chills, shortness of breath, fatigue, and malaise that started this morning around 0430. The patient was brought to the ED via family who stated he was feeling well the day prior and has not been around any known sick contacts. Pt admitted for sepsis due to pneumonia. Prior Level of Function:  Lives With: Alone  Type of Home: Condo  Home Layout: One level  Home Access: Stairs to enter with rails  Entrance Stairs - Number of Steps: 2  Home Equipment: Rollator   Bathroom Shower/Tub: Walk-in shower  Bathroom Toilet: Handicap height  Bathroom Accessibility: Accessible    ADL Assistance: Independent  Homemaking Assistance: Needs assistance (family assists with laundry. Pt reports indep with simple meals.)  Ambulation Assistance: Independent  Transfer Assistance: Independent  Active :  Yes  Additional Comments: Pt initially stating he ambulates without AD, then stating he uses rollator at all times at home    Restrictions/Precautions:  Restrictions/Precautions: Fall Risk  Position Activity Restriction  Other position/activity restrictions: monitor O2 sats       SUBJECTIVE: nursing ok'd therapy- pt cooperative yet impulsive needing cues to slow down and for safety concerns- did monitor his o2 sats with activity see vitals below     PAIN: 0/10:       Vitals: Oxygen: pt on room air on arrival and sats at 96% but was hard to get a reading initially- pt took his first walk and it dropped but unable to get a good reading so had him sit back down- second walk was able to get a good reading but used 2 different monitors and he did drop in the mid 80's  the last 20 feet of his walk yet wasn't symptomatic - he require a sitting rest break and recovered to 97% with deep breathing - nursing aware     OBJECTIVE:  Bed Mobility:  Supine to Sit: Stand By Assistance  Sit to Supine: Stand By Assistance     Transfers:  Sit to Stand: Stand By Assistance  Stand to Sit:Stand By Assistance  Pt impulsive but no loss of balance   Ambulation:  Contact Guard Assistance to [employer]: 50x1, 140x1  Surface: Level Tile  Device:Rolling Walker  Gait Deviations:  cues to slow down and to work on posture and deep breathing, noted fair step length and heel strike at katherin LEs - pts O2 sats dropped the last 20 feet of his second walk and tried to stand to take deep breaths however still at 84-85% so had him sit down- pt was impulsive and was wanting to cont to walk     Balance:  Dynamic balance w/o UE at support pt reaching above head and to knee ht and ~ 2-3 in out of base of support with SBA   -standing w/o UE at support completed upper trunk rotations with CGA   Exercise:  Patient was guided in 1 set(s) 15 reps of exercise to both lower extremities. Long arc quads, Standing heel/toe raises, Standing marches, Standing hip extension, Standing hamstring curls, Standing hip flexion, Mini squats, and standing shoulder horz abd/add with breathing technique  . Exercises were completed for increased independence with functional mobility. Functional Outcome Measures: Completed  -PAC Inpatient Mobility without Stair Climbing Raw Score : 15  AM-PAC Inpatient without Stair Climbing T-Scale Score : 43.03    ASSESSMENT:  Assessment: Patient progressing toward established goals. Activity Tolerance:  Patient tolerance of  treatment: fair.  Rest breaks given due to decreased O2 sats      Equipment

## 2022-08-10 NOTE — PROGRESS NOTES
A home oxygen evaluation has been completed. [x]Patient is an inpatient. It is expected that the patient will be discharged within the next 48 hours. Qualified provider to write order for home prescription if patient qualifies. Social service/care managers will arrange for home oxygen. If patient is active, arrange for Home Medical supplier to assess for Oxygen Conserving Device per pulse oximetry. []Patient is an outpatient. Results will be faxed to the ordering provider. Qualified provider to write order for home prescription if patient qualifies and arranges for home oxygen. Patient was placed on room air. SpO2 was 94 % on room air at rest. Patients SpO2 was 89% or above and did not qualify for home oxygen. Patient was walked for 3 minutes. SpO2 was 83 % during walking. Patients SpO2 was below 89% and qualified for home oxygen. Patient refused to wear oxygen at this time. Patient returned to chair and SpO2  91%. Explained to patient the importance of O2 and he adamantly refused. Devonte Richter RN notified.

## 2022-08-10 NOTE — CARE COORDINATION
8/10/22, 9:55 AM EDT    Patient goals/plan/ treatment preferences discussed by  and . Patient goals/plan/ treatment preferences reviewed with patient/ family. Patient/ family verbalize understanding of discharge plan and are in agreement with goal/plan/treatment preferences. Understanding was demonstrated using the teach back method. AVS provided by RN at time of discharge, which includes all necessary medical information pertaining to the patients current course of illness, treatment, post-discharge goals of care, and treatment preferences. Services At/After Discharge: None    Patient to discharge home today with family support. RN made aware.         IMM Letter  IMM Letter given to Patient/Family/Significant other/Guardian/POA/by[de-identified] Patient Access  IMM Letter date given[de-identified] 08/06/22  IMM Letter time given[de-identified] 2077

## 2022-08-10 NOTE — FLOWSHEET NOTE
08/10/22 1136   Safe Environment   Safety Measures Other (comment)  (Virtual safety check complete)   Patient did not respond to virtual doorbell or voice. Camera turned on to verify safety. Pt up in chair. Call light in reach. No safety concerns.

## 2022-08-11 VITALS
RESPIRATION RATE: 18 BRPM | OXYGEN SATURATION: 93 % | TEMPERATURE: 97.9 F | HEIGHT: 60 IN | DIASTOLIC BLOOD PRESSURE: 63 MMHG | SYSTOLIC BLOOD PRESSURE: 134 MMHG | BODY MASS INDEX: 29.67 KG/M2 | HEART RATE: 63 BPM | WEIGHT: 151.13 LBS

## 2022-08-11 LAB
ANION GAP SERPL CALCULATED.3IONS-SCNC: 11 MEQ/L (ref 8–16)
BLOOD CULTURE, ROUTINE: NORMAL
BUN BLDV-MCNC: 12 MG/DL (ref 7–22)
CALCIUM SERPL-MCNC: 8.9 MG/DL (ref 8.5–10.5)
CHLORIDE BLD-SCNC: 99 MEQ/L (ref 98–111)
CO2: 28 MEQ/L (ref 23–33)
CREAT SERPL-MCNC: 1.3 MG/DL (ref 0.4–1.2)
GFR SERPL CREATININE-BSD FRML MDRD: 52 ML/MIN/1.73M2
GLUCOSE BLD-MCNC: 91 MG/DL (ref 70–108)
POTASSIUM SERPL-SCNC: 3 MEQ/L (ref 3.5–5.2)
SODIUM BLD-SCNC: 138 MEQ/L (ref 135–145)

## 2022-08-11 PROCEDURE — 6370000000 HC RX 637 (ALT 250 FOR IP): Performed by: INTERNAL MEDICINE

## 2022-08-11 PROCEDURE — 6370000000 HC RX 637 (ALT 250 FOR IP): Performed by: OPHTHALMOLOGY

## 2022-08-11 PROCEDURE — 97110 THERAPEUTIC EXERCISES: CPT

## 2022-08-11 PROCEDURE — 6370000000 HC RX 637 (ALT 250 FOR IP): Performed by: STUDENT IN AN ORGANIZED HEALTH CARE EDUCATION/TRAINING PROGRAM

## 2022-08-11 PROCEDURE — 80048 BASIC METABOLIC PNL TOTAL CA: CPT

## 2022-08-11 PROCEDURE — 2580000003 HC RX 258: Performed by: STUDENT IN AN ORGANIZED HEALTH CARE EDUCATION/TRAINING PROGRAM

## 2022-08-11 PROCEDURE — 99239 HOSP IP/OBS DSCHRG MGMT >30: CPT | Performed by: INTERNAL MEDICINE

## 2022-08-11 PROCEDURE — 36415 COLL VENOUS BLD VENIPUNCTURE: CPT

## 2022-08-11 PROCEDURE — 97530 THERAPEUTIC ACTIVITIES: CPT

## 2022-08-11 PROCEDURE — 6370000000 HC RX 637 (ALT 250 FOR IP)

## 2022-08-11 PROCEDURE — 97116 GAIT TRAINING THERAPY: CPT

## 2022-08-11 PROCEDURE — 6360000002 HC RX W HCPCS: Performed by: STUDENT IN AN ORGANIZED HEALTH CARE EDUCATION/TRAINING PROGRAM

## 2022-08-11 RX ORDER — FUROSEMIDE 40 MG/1
60 TABLET ORAL DAILY
Qty: 45 TABLET | Refills: 1 | Status: SHIPPED | OUTPATIENT
Start: 2022-08-11 | End: 2022-09-10

## 2022-08-11 RX ORDER — CEFUROXIME AXETIL 500 MG/1
500 TABLET ORAL EVERY 12 HOURS SCHEDULED
Qty: 4 TABLET | Refills: 0 | Status: SHIPPED | OUTPATIENT
Start: 2022-08-11 | End: 2022-08-13

## 2022-08-11 RX ORDER — POTASSIUM CHLORIDE 20 MEQ/1
40 TABLET, EXTENDED RELEASE ORAL ONCE
Status: COMPLETED | OUTPATIENT
Start: 2022-08-11 | End: 2022-08-11

## 2022-08-11 RX ADMIN — CEFUROXIME AXETIL 500 MG: 250 TABLET ORAL at 08:33

## 2022-08-11 RX ADMIN — ENOXAPARIN SODIUM 40 MG: 100 INJECTION SUBCUTANEOUS at 08:33

## 2022-08-11 RX ADMIN — MICONAZOLE NITRATE: 20 POWDER TOPICAL at 08:32

## 2022-08-11 RX ADMIN — POTASSIUM CHLORIDE 40 MEQ: 1500 TABLET, EXTENDED RELEASE ORAL at 10:36

## 2022-08-11 RX ADMIN — FUROSEMIDE 60 MG: 40 TABLET ORAL at 13:15

## 2022-08-11 RX ADMIN — PANTOPRAZOLE SODIUM 40 MG: 40 TABLET, DELAYED RELEASE ORAL at 08:33

## 2022-08-11 RX ADMIN — TAMSULOSIN HYDROCHLORIDE 0.4 MG: 0.4 CAPSULE ORAL at 08:33

## 2022-08-11 RX ADMIN — POTASSIUM CHLORIDE 40 MEQ: 1500 TABLET, EXTENDED RELEASE ORAL at 08:32

## 2022-08-11 RX ADMIN — SODIUM CHLORIDE, PRESERVATIVE FREE 10 ML: 5 INJECTION INTRAVENOUS at 08:33

## 2022-08-11 NOTE — CARE COORDINATION
8/11/22, 11:58 AM EDT    Patient goals/plan/ treatment preferences discussed by  and . Patient goals/plan/ treatment preferences reviewed with patient/ family. Patient/ family verbalize understanding of discharge plan and are in agreement with goal/plan/treatment preferences. Understanding was demonstrated using the teach back method. AVS provided by RN at time of discharge, which includes all necessary medical information pertaining to the patients current course of illness, treatment, post-discharge goals of care, and treatment preferences.      Services At/After Discharge: None       IMM Letter  IMM Letter given to Patient/Family/Significant other/Guardian/POA/by[de-identified] GERARDO Boss CM  IMM Letter date given[de-identified] 08/10/22  IMM Letter time given[de-identified] 8298

## 2022-08-11 NOTE — PROGRESS NOTES
[address] [place] [address]  INPATIENT PHYSICAL THERAPY  DAILY NOTE  [facility] ICU STEPDOWN TELEMETRY 4K - 4K-011-A      Time In: 6878  Time Out: 0802  Timed Code Treatment Minutes: 45 Minutes  Minutes: 38          Date: 2022  Patient Name: Manuel Moody,  Gender:  male        MRN: [id number]  : 3/30/1932  (80 y.o.)     Referring Practitioner: Danie García MD  Diagnosis: Sepsis  Additional Pertinent Hx: Manuel Moody is a 80 y.o. male with PMHx of CAD s/p CABG, BPH, GERD, PAF, CKD, HLD and HTN who presented to [address] [place] [address] with complaint of chills, shortness of breath, fatigue, and malaise that started this morning around 0430. The patient was brought to the ED via family who stated he was feeling well the day prior and has not been around any known sick contacts. Pt admitted for sepsis due to pneumonia. Prior Level of Function:  Lives With: Alone  Type of Home: Condo  Home Layout: One level  Home Access: Stairs to enter with rails  Entrance Stairs - Number of Steps: 2  Home Equipment: Rollator   Bathroom Shower/Tub: Walk-in shower  Bathroom Toilet: Handicap height  Bathroom Accessibility: Accessible    ADL Assistance: Independent  Homemaking Assistance: Needs assistance (family assists with laundry. Pt reports indep with simple meals.)  Ambulation Assistance: Independent  Transfer Assistance: Independent  Active :  Yes  Additional Comments: Pt initially stating he ambulates without AD, then stating he uses rollator at all times at home    Restrictions/Precautions:  Restrictions/Precautions: Fall Risk  Position Activity Restriction  Other position/activity restrictions: monitor O2 sats       SUBJECTIVE: nursing ok'd therapy reported that pt is refusing O2 use and plan is for discharge home today- pt in bed on arrival he had been incont of urine needing assist for change of depends- pt impulsive at times with decreased safety awareness- I did monitor his O2 sats and had him sit down and take breaks when his sats dropped and he demonstrated poor awareness of why we were taking breaks and wanted to keep going he is asymptomatic when his O2 sats drop     PAIN: 0/10:       Vitals: Oxygen: pt on room air he was 93% at rest and with activity he varied from 87-94% gave rest breaks when he would drop     OBJECTIVE:  Bed Mobility:  Supine to Sit: Stand By Assistance    Transfers:  Sit to Stand: [employer]- pt took more than one attempt to stand from bed due to post lean and loss of balance   Stand to Sit:Contact Guard Assistance to SBA   Cues for safety and for hand placement noted decreased carryover     Ambulation:  Stand By Assistance  Distance: 15x2, 40x1, 150x1  Surface: Level Tile  Device:Rolling Walker  Gait Deviations:  Slow Sol and with cues     Balance:  Pt standing to complete toileting task with SBA   -pt completed dynamic balance activity w/ one UE at support at times he needed cues to stay with int the walker     Exercise:  Patient was guided in 1 set(s) 10 reps of exercise to both lower extremities. Standing heel/toe raises, Standing marches, Standing hip abduction/adduction, Standing hamstring curls, Mini squats, and all with UE at support - standing shoulder horz abd/add with deep breathing  . Exercises were completed for increased independence with functional mobility. Functional Outcome Measures: Not completed- pt being discharged home later today      ASSESSMENT:  Assessment: Patient progressing toward established goals. Activity Tolerance:  Patient tolerance of  treatment: fair.  Rest breaks given, pt needed rest breaks due to low O2 sats     Equipment Recommendations:Equipment Needed: No  Other: has rollator  Discharge Recommendations:  pt would benefit from 24 hour supervision/assist at discharge and home health PT   Plan: Current Treatment Recommendations: Strengthening, Balance training, Functional mobility training, Transfer training, Neuromuscular re-education, Gait training, Stair training, Endurance training, Safety education & training, Therapeutic activities, Patient/Caregiver education & training  Plan:  (3-5x GM)    Patient Education  Patient Education: Plan of Care, safety concerns with home going     Goals:  Patient goals : to go home  Short Term Goals  Time Frame for Short term goals: by discharge  Short term goal 1: Pt to transfer supine <--> sit mod I to enable pt to get in/out of bed. Short term goal 2: Pt to transfer sit <--> stand mod I for increased functional mobility. Short term goal 3: Pt to ambulate >150 feet with RW SBA for household ambulation. Long Term Goals  Time Frame for Long term goals : NA due to short length of stay. Following session, patient left in safe position with all fall risk precautions in place.

## 2022-08-11 NOTE — PROGRESS NOTES
[facility] ICU STEPDOWN TELEMETRY 4K  Occupational Therapy  Daily Note  Time:   Time In: 6699  Time Out: 1114  Timed Code Treatment Minutes: 23 Minutes  Minutes: 23          Date: 2022  Patient Name: Elizabeth Perez,   Gender: male      Room: [facility]011-A  MRN: [id number]  : 3/30/1932  (80 y.o.)  Referring Practitioner: Yennifer Green MD  Diagnosis: Sepsis  Additional Pertinent Hx: Elizabeth Perez is a 80 y.o. male with PMHx of CAD s/p CABG, BPH, GERD, PAF, CKD, HLD and HTN who presented to [facility] with complaint of chills, shortness of breath, fatigue, and malaise that started this morning around 0430. The patient was brought to the ED via family who stated he was feeling well the day prior and has not been around any known sick contacts. He denies fever, headache, lightheadedness, change in vision, rhinorrhea, congestion, sore throat, cough, hemoptysis, chest pain, palpitations, BRITTON, PND, abdominal pain, nausea, vomiting, hematemesis, change in bowel/bladder habits, hematochezia, hematuria, weakness, difficulty with ambulation, numbness/tingling, or known exposure to sick contacts. ED course: Patient started BiPAP for respiratory distress, administered 10mEq of potassium, 1g Ceftriaxone, 500mg azithromycin. Workup revealed: Sodium 143, potassium 3.2, chloride 109, CO2 22, BUN 16, creatinine 1.1, anion gap 12, EGFR 63, magnesium 1.4, lactic acid 2.7, glucose 107, calcium 8.4, procalcitonin 0.61, CRP 0.88, proBNP 6663, troponin <0.010, albumin 3.4, alk phos 118, ALT 13 AST 23, bilirubin 0.3, WBC 12.4, hemoglobin 13.7, MCV 98.9, platelet count 500.      Flu A/B-negative  COVID-19 negative    Restrictions/Precautions:  Restrictions/Precautions: Fall Risk  Position Activity Restriction  Other position/activity restrictions: monitor O2 sats     SUBJECTIVE: Pt sitting in recliner upon arrival, pt agreeable to OT session, RN gave verbal approval for session    PAIN: 0/10:     Vitals: Nurse checked vitals prior to session    COGNITION: Slow Processing    ADL:   No ADL's completed this session. Bharath Redmond BALANCE:  Standing Balance: Supervision. With RW, and BUE support on the walker with pt standing for 2-3 minutes    BED MOBILITY:  Not Tested    TRANSFERS:  Sit to Stand:  Stand By Assistance. From the recliner  Stand to Sit: Stand By Assistance. Back to the recliner    FUNCTIONAL MOBILITY:  Assistive Device: Rolling Walker  Assist Level:  Stand By Assistance. Distance:  laps around the hallway       ADDITIONAL ACTIVITIES:  Patient completed BUE strengthening exercises with skilled education on HEP: completed x10 reps x1 set with a medium resistance band in all joints and all planes in order to improve UE strength and activity tolerance required for BADL routine and toilet / shower transfers. Patient tolerated good, requiring min rest breaks. Patient also required min cues for technique. ASSESSMENT:     Activity Tolerance:  Patient tolerance of  treatment: good. Discharge Recommendations: Home with Home Health OT  Equipment Recommendations: Equipment Needed: No  Plan: Times per Week: 5x  Current Treatment Recommendations: Strengthening, Balance training, Functional mobility training, Endurance training, Safety education & training, Self-Care / ADL, Patient/Caregiver education & training, Equipment evaluation, education, & procurement    Patient Education  Patient Education: Home Exercise Program    Goals  Short Term Goals  Time Frame for Short term goals: Until discharge  Short Term Goal 1: Pt kacie complete BUE stregthening exercises with min vcs for technique to increase indep and endurance with self cares. Short Term Goal 2: Pt will complete dynamic standing task x 5 minutes with 2 UE release and S to increase indep with grooming.   Short Term Goal 3: pt will complete functional mobility to/from BR and HH distances with S and min vcs for safety to increase indep with toileting. Short Term Goal 4: Pt will complete LB dressing with S and 0 vcs for safety. Additional Goals?: No    Following session, patient left in safe position with all fall risk precautions in place.

## 2022-08-11 NOTE — FLOWSHEET NOTE
08/11/22 1023   Safe Environment   Safety Measures Other (comment)  (virtual safety round complete)   Virtual Nurse introduced, pt up in chair, call light in reach, denies needs at this time.

## 2022-08-11 NOTE — DISCHARGE SUMMARY
Hospital Medicine Discharge Summary      Patient Identification:  Name: Manuel Moody  : 3/30/1932  MRN: [id number]  Account: [de-identified]    Patient's PCP: Jose Funez DO    Admit Date: 2022    Discharge Date:   22    Admitting Physician: Danie García MD    Discharge Physician: Reed Niño MD        HPI:  Manuel Moody is a 80 y.o. male with PMHx of CAD s/p CABG, BPH, GERD, PAF, CKD, HLD and HTN who was admitted to [facility] on 2022 for SOB, chills. Please see chart for more details regarding HPI. Hospital Course:   Manuel Moody is a 80 y.o. male who presented to [facility] due to fevers, shortness of breath, chills. Patient initially denied any fever, headache, lightheadedness, sore throat, cough, hemoptysis. No sick contacts noted. He was admitted and CXR demonstrated consolidation in the right lung. Flu A/B and COVID-negative. Initially received azithromycin and ceftriaxone in the ED for antibiotic therapy, was transitioned to vancomycin and Zosyn upon reaching the floor. Patient also noted to have decompensated systolic heart failure presenting with elevated BNP and pulmonary edema. Repeat CXR on admission demonstrated a moderate right pleural effusion for which thoracentesis was deferred and patient was adequately diuresed. He was noted to be comfortable resting on room air. Patient was subsequently transitioned from IV to p.o. antibiotics as his clinical course improved and he was discharged home to finish his p.o. ABX course. The patient was stable for discharge - all consultants were contacted and in agreement with plan for discharge. Appropriate follow up appointment was arranged prior to discharge. Please see below or view chart for more details from hospital course. Discharge Diagnoses:    #Acute hypoxic respiratory failure:  Suspected secondary to CAP and decompensated systolic heart failure with pleural effusion. Resolved. #CAP: Presented with chills, shortness of breath, fatigue day of presentation. No fever, sputum production noted. SIRS 3/4 POA, elevated lactic/Pro-Santos.  CXR demonstrated consolidation of right lung. Flu A/B and COVID-negative. Blood cultures NGTD. -Ceftin PO abx to complete 5 day typical coverage. Azithromycin x3 days received. #Decompensated systolic heart failure, NYHA III: Patient presented with elevated BNP from baseline with CXR demonstrating pulmonary edema. No noted peripheral edema. Echo 8/4/2021: EF 50%, severely dilated LA, mod-severe RV enlargement, RV global systolic function severely reduced. Repeat CXR 8/8/2022 demonstrating moderate right pleural effusion. Patient resting comfortably on RA, noted desat to 80% with ambulation on 8/10/22. Will continue diuresis and likely d/c in AM.   -Re-started home Lasix 60mg daily  #Pulmonary hypertension, WHO class II: ECHO 2021 RVSP 44 mmHg. #Hypokalemia, resolved: K 3.2 on arrival, active repletion. Received 80mEq K+ at discharge. -BMP in 1 week  #Hypomagnesemia, resolved  #CKDIIIb: Follows with Dr. Josee Coyne. Baseline Cr appears 1.7-2.0. Per last nephrology note patient has been taking 60mg Lasix in the AM, held on admission.  -Continue with daily Lasix 60 mg  #CAD: S/p CABG 2009. Noted. Patient does not appear to be on aspirin at home. #pAF: [id number]: 5, HAS BLED: 4. Unclear if on Eliquis at home, patient denies taking AC at home. Discussed case with pharmacy, patient noted to not be getting Eliquis filled outside of [place].  -Continue Eliquis at home following discussion with patient  #BPH: Noted. Home Flowmax resumed  #GERD:  Noted. Continue PPI. #Sepsis: Resolved. 3/4 criteria present on admission. Did not receive 30cc/kg IVF bolus on admission. CXR demonstrated R-sided consoldiation. Urine culture growing E coli and enterococcus faecalis, low colony count and patient denies urinary symptoms.  Blood cultures NGTD.      The patient was seen and examined on day of discharge and this discharge summary is in conjunction with any daily progress note from day of discharge. The patient understood, was in agreement, and verbalized the plan of care at time of discharge. Exam:    Vitals:   Vitals:    08/10/22 2307 08/11/22 0415 08/11/22 0430 08/11/22 0830   BP: (!) 158/63 (!) 163/68  (!) 157/70   Pulse: 52 (!) 46  65   Resp: 18 16  18   Temp: 98.3 °F (36.8 °C) 97.6 °F (36.4 °C)  98.1 °F (36.7 °C)   TempSrc: Oral Axillary  Oral   SpO2: 93% 94%  92%   Weight:   151 lb 2 oz (68.5 kg)    Height:         Weight: Weight: 151 lb 2 oz (68.5 kg)    24 hour intake/output:  Intake/Output Summary (Last 24 hours) at 8/11/2022 1120  Last data filed at 8/11/2022 0804  Gross per 24 hour   Intake 200 ml   Output 3025 ml   Net -2825 ml         General appearance: No apparent distress, appears stated age and cooperative. HEENT: Pupils equal, round, and reactive to light. Conjunctivae/corneas clear. Neck: Supple, with full range of motion. No jugular venous distention. Trachea midline. Respiratory:  Normal respiratory effort. Decreased R base breath sounds in the bases, no noted wheezing or rhonchi  Cardiovascular: Regular rate and rhythm with normal S1/S2 without murmurs, rubs or gallops. Abdomen: Soft, non-tender, non-distended with normal bowel sounds. Musculoskeletal: passive and active ROM x 4 extremities. Extremities: No peripheral edema noted  Skin: Skin color, texture, turgor normal.  No rashes or lesions. Neurologic:  Neurovascularly intact without any focal sensory/motor deficits. Cranial nerves: II-XII intact, grossly non-focal.  Psychiatric: Alert and oriented, thought content appropriate, normal insight  Capillary Refill: Brisk,< 3 seconds  Peripheral Pulses: +2 palpable, equal bilaterally        Labs:  For convenience and continuity at follow-up the following most recent labs are provided:      CBC:    Lab Results Component Value Date/Time    WBC 9.1 08/10/2022 05:08 AM    HGB 12.0 08/10/2022 05:08 AM    HCT 37.3 08/10/2022 05:08 AM     08/10/2022 05:08 AM       Renal:    Lab Results   Component Value Date/Time     08/11/2022 05:04 AM    K 3.0 08/11/2022 05:04 AM    K 3.8 08/09/2022 05:50 AM    CL 99 08/11/2022 05:04 AM    CO2 28 08/11/2022 05:04 AM    BUN 12 08/11/2022 05:04 AM    CREATININE 1.3 08/11/2022 05:04 AM    CALCIUM 8.9 08/11/2022 05:04 AM    PHOS 3.3 05/05/2022 01:20 PM         Significant Diagnostic Studies    Radiology:   XR CHEST (2 VW)   Final Result   Suspected moderate-sized right pleural effusion. There are right lung infiltrates, suggesting pneumonia. **This report has been created using voice recognition software. It may contain minor errors which are inherent in voice recognition technology. **      Final report electronically signed by Dr. Harpreet Saez on 8/7/2022 1:45 PM      XR CHEST PORTABLE   Final Result   Bilateral airspace disease with areas of consolidation in the right lung,    which could be due to pneumonia or pulmonary edema. Right pleural effusion. This document has been electronically signed by: Sumanth Wright MD on    08/06/2022 06:56 AM            Consults:     PHARMACY TO DOSE VANCOMYCIN  IP CONSULT TO SOCIAL WORK    Disposition:    [x] Home        [] TCU       [] Rehab       [] Psych       [] SNF       [] [place]       [] Other-    Condition at Discharge: stable    Code Status:  Limited  Admitted for: CHF, CAP, AHRF    Patient Instructions:  Discharge lab work: BMP 1 week  Activity: activity as tolerated  Diet: ADULT DIET;  Regular    Follow-up visits:   Momo Montgomery, [address] [place], [place] [id number] [phone]    Schedule an appointment as soon as possible for a visit on 8/26/2022  Follow up with primary care physician., Your appointment time is at 1:30pm        Discharge Medications:        Medication List START taking these medications      cefUROXime 500 MG tablet  Commonly known as: CEFTIN  Take 1 tablet by mouth in the morning and 1 tablet before bedtime. Do all this for 2 days. CONTINUE taking these medications      apixaban 2.5 MG Tabs tablet  Commonly known as: ELIQUIS  Take 1 tablet by mouth 2 times daily     finasteride 5 MG tablet  Commonly known as: PROSCAR  Take 1 tablet by mouth daily     furosemide 40 MG tablet  Commonly known as: LASIX  Take 1.5 tablets by mouth in the morning. Only takes when legs start swelling, then stops taking agin. pantoprazole 40 MG tablet  Commonly known as: PROTONIX     potassium chloride 20 MEQ extended release tablet  Commonly known as: KLOR-CON M     tamsulosin 0.4 MG capsule  Commonly known as: FLOMAX  Take 1 capsule by mouth daily Capsule may be opened and contents dissolve for peg tube     therapeutic multivitamin-minerals tablet               Where to Get Your Medications        These medications were sent to [facility] [address], [address], [facility] [address]      Phone: [phone]   cefUROXime 500 MG tablet  furosemide 40 MG tablet           Discharge Time:  Time spent on discharge is >35 minutes in the examination, evaluation, counseling, and review of medications and discharge plan. The hospital course was discussed with the patient and all questions and concerns were addressed at that time. The patient was in agreement with and verbalized understanding of the plan of care and had no additional questions or complaints. The patient was instructed to follow-up with any scheduled outpatient appointments or to report to the nearest Emergency Department if new or worsening symptoms should arise. Thank you Jose Funez DO for the opportunity to be involved in this patient's care.     Signed:    Electronically signed by Reed Niño MD, PGY-1 on 8/11/2022 at 11:20 AM

## 2022-08-15 ENCOUNTER — NURSE ONLY (OUTPATIENT)
Dept: LAB | Age: 87
End: 2022-08-15

## 2022-08-15 DIAGNOSIS — D63.1 ANEMIA IN STAGE 3B CHRONIC KIDNEY DISEASE (HCC): ICD-10-CM

## 2022-08-15 DIAGNOSIS — N18.32 CHRONIC KIDNEY DISEASE (CKD) STAGE G3B/A1, MODERATELY DECREASED GLOMERULAR FILTRATION RATE (GFR) BETWEEN 30-44 ML/MIN/1.73 SQUARE METER AND ALBUMINURIA CREATININE RATIO LESS THAN 30 MG/G (HCC): ICD-10-CM

## 2022-08-15 DIAGNOSIS — N17.9 AKI (ACUTE KIDNEY INJURY) (HCC): ICD-10-CM

## 2022-08-15 DIAGNOSIS — N18.32 CHRONIC KIDNEY DISEASE, STAGE 3B (HCC): ICD-10-CM

## 2022-08-15 DIAGNOSIS — N18.32 ANEMIA IN STAGE 3B CHRONIC KIDNEY DISEASE (HCC): ICD-10-CM

## 2022-08-15 DIAGNOSIS — E87.1 HYPONATREMIA: ICD-10-CM

## 2022-08-15 LAB
ANION GAP SERPL CALCULATED.3IONS-SCNC: 10 MEQ/L (ref 8–16)
BUN BLDV-MCNC: 15 MG/DL (ref 7–22)
CALCIUM SERPL-MCNC: 8.6 MG/DL (ref 8.5–10.5)
CHLORIDE BLD-SCNC: 99 MEQ/L (ref 98–111)
CO2: 27 MEQ/L (ref 23–33)
CREAT SERPL-MCNC: 1.4 MG/DL (ref 0.4–1.2)
GFR SERPL CREATININE-BSD FRML MDRD: 48 ML/MIN/1.73M2
GLUCOSE BLD-MCNC: 123 MG/DL (ref 70–108)
HCT VFR BLD CALC: 37.4 % (ref 42–52)
HEMOGLOBIN: 11.9 GM/DL (ref 14–18)
POTASSIUM SERPL-SCNC: 4.8 MEQ/L (ref 3.5–5.2)
SODIUM BLD-SCNC: 136 MEQ/L (ref 135–145)
VITAMIN D 25-HYDROXY: 38 NG/ML (ref 30–100)

## 2022-08-18 ENCOUNTER — OFFICE VISIT (OUTPATIENT)
Dept: NEPHROLOGY | Age: 87
End: 2022-08-18
Payer: MEDICARE

## 2022-08-18 VITALS
HEART RATE: 174 BPM | WEIGHT: 149 LBS | OXYGEN SATURATION: 95 % | DIASTOLIC BLOOD PRESSURE: 58 MMHG | BODY MASS INDEX: 29.1 KG/M2 | SYSTOLIC BLOOD PRESSURE: 157 MMHG

## 2022-08-18 DIAGNOSIS — D63.1 ANEMIA IN STAGE 3B CHRONIC KIDNEY DISEASE (HCC): ICD-10-CM

## 2022-08-18 DIAGNOSIS — N18.32 ANEMIA IN STAGE 3B CHRONIC KIDNEY DISEASE (HCC): ICD-10-CM

## 2022-08-18 DIAGNOSIS — N18.32 CHRONIC KIDNEY DISEASE, STAGE 3B (HCC): Primary | ICD-10-CM

## 2022-08-18 PROCEDURE — 1036F TOBACCO NON-USER: CPT | Performed by: INTERNAL MEDICINE

## 2022-08-18 PROCEDURE — 1123F ACP DISCUSS/DSCN MKR DOCD: CPT | Performed by: INTERNAL MEDICINE

## 2022-08-18 PROCEDURE — G8427 DOCREV CUR MEDS BY ELIG CLIN: HCPCS | Performed by: INTERNAL MEDICINE

## 2022-08-18 PROCEDURE — 1111F DSCHRG MED/CURRENT MED MERGE: CPT | Performed by: INTERNAL MEDICINE

## 2022-08-18 PROCEDURE — 99213 OFFICE O/P EST LOW 20 MIN: CPT | Performed by: INTERNAL MEDICINE

## 2022-08-18 PROCEDURE — G8417 CALC BMI ABV UP PARAM F/U: HCPCS | Performed by: INTERNAL MEDICINE

## 2022-08-18 NOTE — PROGRESS NOTES
[address] [facility]  [address]  [facility] [address]  Dept: [phone]  Loc: [phone]  Office Progress Note  8/18/2022 11:45 AM      Pt Name:    Cortez Diaz:    3/30/1932  Primary Care Physician:  Vivian Goddard DO     Chief Complaint:   Chief Complaint   Patient presents with    CKD and B/L leg swelling        Background Information/Interval History:   81 yo with hx CAD, HTN, hx CVA, A. Fibrillation, hx BPH, history of urinary retention whom I am seeing for CKD and diuretic management. Patient is hard of hearing. On Lasix 60 mg daily. Looking much better. Weights are improving. Has lost about 20 lbs. No urinary complaints. No nausea, vomiting or diarrhea.  .      Past History:  Past Medical History:   Diagnosis Date    Actinic keratoses 8/21/2013    Adenomatous polyp 2008    Atrial fibrillation ([address]     CAD (coronary artery disease) 2009    Carotid artery disease ([facility] [address] 2009    left    Hyperlipidemia 2004    Hypertension 2004    Inguinal hernia, left 2/19/2014    Unspecified cerebral artery occlusion with cerebral infarction      Past Surgical History:   Procedure Laterality Date    ABDOMEN SURGERY      ABDOMINAL ADHESION SURGERY  6/2012    Dr Jayleen Phan  2004    right    COLONOSCOPY      [facility]    CORONARY ARTERY BYPASS GRAFT  2009    ENDOSCOPY, COLON, DIAGNOSTIC      EYE SURGERY      SMALL INTESTINE SURGERY      SMALL INTESTINE SURGERY  02/20/2015    Exploratory Laparotomy    UPPER GASTROINTESTINAL ENDOSCOPY N/A 1/26/2021    EGD ESOPHAGOGASTRODUODENOSCOPY PEG TUBE INSERTION performed by Jersey Sanders MD at [facility]      cabg harvests from right leg        VITALS:  BP (!) 157/58 (Site: Left Upper Arm, Position: Sitting, Cuff Size: Medium Adult)   Pulse (!) 174   Wt 149 lb (67.6 kg)   SpO2 95%   BMI 29.10 kg/m²   Wt Readings from Last 3 Encounters:   08/18/22 149 lb (67.6 kg)   08/11/22 151 lb 2 oz (68.5 kg)   05/16/22 170 lb (77.1 kg)     Body mass index is 29.1 kg/m². General Appearance: alert and cooperative with exam, appears comfortable, no distress  Lungs: Air entry B/L, no crackles or rales, no use of accessory muscles  Heart: S1, S2 heard  GI: soft  Extremities: no appreciable leg or ankle edema at this time     Medications:  Current Outpatient Medications   Medication Sig Dispense Refill    furosemide (LASIX) 40 MG tablet Take 1.5 tablets by mouth in the morning. Only takes when legs start swelling, then stops taking agin. 45 tablet 1    Multiple Vitamins-Minerals (THERAPEUTIC MULTIVITAMIN-MINERALS) tablet Take 1 tablet by mouth in the morning. apixaban (ELIQUIS) 2.5 MG TABS tablet Take 1 tablet by mouth 2 times daily 60 tablet 1    finasteride (PROSCAR) 5 MG tablet Take 1 tablet by mouth daily 30 tablet 3    pantoprazole (PROTONIX) 40 MG tablet Take 40 mg by mouth 2 times daily      potassium chloride (KLOR-CON M) 20 MEQ extended release tablet Take 1 tablet by mouth daily 60 tablet 5    tamsulosin (FLOMAX) 0.4 MG capsule Take 1 capsule by mouth daily Capsule may be opened and contents dissolve for peg tube 30 capsule 3     No current facility-administered medications for this visit. Laboratory & Diagnostics:  Feb 2021: UA: no blood, trace protein  EF 55-60-%  Feb 2021; Na 140, Creat 1.2  April 2021: Hb 10.3  Aug 2021: UA: no blood, no protein, US: Right sided simple Renal cysts  Oct 2021: Creat 1.7, K 3.9, Hb 8.4  Dec 2021: K 4.4, Creat 1.9, Hb 8.2  April 2022: K 4.6, Creat 2.0    May 2022: K 4.2, Creat 1.7, Hb 9.5  Aug 2022; K 4.8, Creat 1.4, Hb 12.0, Hb 11.9     Impression/Plan:   1. CKd IIIB: overall stable. At/near his new baseline. Will continue with lasix 60 mg daily. Patient had bilateral lower extremity pitting edema which has since resolved. He is clinically doing very well. Has lost about 20 lbs. BP is stable. No symptoms of dizziness or lightheadedness. Will continue with lasix. D/W pt and son to monitor weights and BP and call if any sig changes from current levels. Echo showed RV dysfunction with preserved LVEF. Pt is okay continuing lasix 60 mg daily. 2. Electrolytes: stable. 3. B/L edema: continue with lasix, sig improved, plan as above. 3. Anemia in CKD: Hb improving slowly  4. HTN: stable  5. BPH: seeing urology  6. Simple renal cyst  7. RV dysfunction per echo August 2021    Orders Placed This Encounter   Procedures    Basic Metabolic Panel    Hemoglobin and Hematocrit     Return in about 3 months (around 11/18/2022).       Edilberto Solano MD  [facility]

## 2022-11-14 ENCOUNTER — NURSE ONLY (OUTPATIENT)
Dept: LAB | Age: 87
End: 2022-11-14

## 2022-11-14 DIAGNOSIS — N18.32 CHRONIC KIDNEY DISEASE, STAGE 3B (HCC): ICD-10-CM

## 2022-11-14 DIAGNOSIS — D63.1 ANEMIA IN STAGE 3B CHRONIC KIDNEY DISEASE (HCC): ICD-10-CM

## 2022-11-14 DIAGNOSIS — N18.32 ANEMIA IN STAGE 3B CHRONIC KIDNEY DISEASE (HCC): ICD-10-CM

## 2022-11-14 LAB
ANION GAP SERPL CALCULATED.3IONS-SCNC: 12 MEQ/L (ref 8–16)
BUN BLDV-MCNC: 39 MG/DL (ref 7–22)
CALCIUM SERPL-MCNC: 8.5 MG/DL (ref 8.5–10.5)
CHLORIDE BLD-SCNC: 99 MEQ/L (ref 98–111)
CO2: 23 MEQ/L (ref 23–33)
CREAT SERPL-MCNC: 2.3 MG/DL (ref 0.4–1.2)
GFR SERPL CREATININE-BSD FRML MDRD: 26 ML/MIN/1.73M2
GLUCOSE BLD-MCNC: 62 MG/DL (ref 70–108)
HCT VFR BLD CALC: 35.4 % (ref 42–52)
HEMOGLOBIN: 10.9 GM/DL (ref 14–18)
POTASSIUM SERPL-SCNC: 4.6 MEQ/L (ref 3.5–5.2)
SODIUM BLD-SCNC: 134 MEQ/L (ref 135–145)

## 2022-11-15 ENCOUNTER — TELEPHONE (OUTPATIENT)
Dept: NEPHROLOGY | Age: 87
End: 2022-11-15

## 2022-11-15 NOTE — RESULT ENCOUNTER NOTE
Sugar was little low  How is he doing? Creat is little higher-- hold lasix for 2 days then resume at lower dose of 40 mg daily.

## 2022-11-15 NOTE — TELEPHONE ENCOUNTER
Spoke to pt's son, he understood that pt's glucose was low and he stated that pt is feeling fine. His son also understands that pt's creatinine is higher and to stop taking lasix for 2 days and resume at 40 mg daily. Pt will start this tomorrow. No script is needed is at this time.     MAR updated

## 2022-11-15 NOTE — TELEPHONE ENCOUNTER
----- Message from Eron Jaquez MD sent at 11/14/2022  9:20 PM EST -----  Sugar was little low  How is he doing? Creat is little higher-- hold lasix for 2 days then resume at lower dose of 40 mg daily.

## 2022-11-17 ENCOUNTER — OFFICE VISIT (OUTPATIENT)
Dept: NEPHROLOGY | Age: 87
End: 2022-11-17
Payer: OTHER GOVERNMENT

## 2022-11-17 VITALS
DIASTOLIC BLOOD PRESSURE: 56 MMHG | WEIGHT: 147 LBS | SYSTOLIC BLOOD PRESSURE: 140 MMHG | BODY MASS INDEX: 28.71 KG/M2 | OXYGEN SATURATION: 97 % | HEART RATE: 53 BPM

## 2022-11-17 DIAGNOSIS — N28.1 RENAL CYST: ICD-10-CM

## 2022-11-17 DIAGNOSIS — N17.9 AKI (ACUTE KIDNEY INJURY) (HCC): Primary | ICD-10-CM

## 2022-11-17 DIAGNOSIS — N18.31 CHRONIC KIDNEY DISEASE, STAGE 3A (HCC): ICD-10-CM

## 2022-11-17 PROCEDURE — 99213 OFFICE O/P EST LOW 20 MIN: CPT | Performed by: INTERNAL MEDICINE

## 2022-11-17 PROCEDURE — 1123F ACP DISCUSS/DSCN MKR DOCD: CPT | Performed by: INTERNAL MEDICINE

## 2022-11-17 PROCEDURE — G8484 FLU IMMUNIZE NO ADMIN: HCPCS | Performed by: INTERNAL MEDICINE

## 2022-11-17 PROCEDURE — G8427 DOCREV CUR MEDS BY ELIG CLIN: HCPCS | Performed by: INTERNAL MEDICINE

## 2022-11-17 PROCEDURE — 1036F TOBACCO NON-USER: CPT | Performed by: INTERNAL MEDICINE

## 2022-11-17 PROCEDURE — G8417 CALC BMI ABV UP PARAM F/U: HCPCS | Performed by: INTERNAL MEDICINE

## 2022-11-17 RX ORDER — ASPIRIN 81 MG/1
81 TABLET ORAL DAILY
COMMUNITY

## 2022-11-17 RX ORDER — FUROSEMIDE 40 MG/1
40 TABLET ORAL DAILY
Qty: 60 TABLET | Refills: 3
Start: 2022-11-19

## 2022-11-17 NOTE — PROGRESS NOTES
[address] [facility]  [address]  [facility] [address]  Dept: [phone]  Loc: [phone]  Office Progress Note  11/17/2022 10:49 AM      Pt Name:    Chelly Abernathy:    3/30/1932  Primary Care Physician:  Mina Blair DO     Chief Complaint:   Chief Complaint   Patient presents with    CKD and B/L leg swelling        Background Information/Interval History:   79 yo with hx CAD, HTN, hx CVA, A. Fibrillation, hx BPH, history of urinary retention whom I am seeing for CKD and diuretic management. Patient is hard of hearing. Pt is here for 3 months follow-up. No new complaints. He feels good. No shortness of breath. No leg swelling. No urinary complaints. Volume status has definitely improved. However serum creatinine has gone up since last office visit.   Discussed with patient and son     Past History:  Past Medical History:   Diagnosis Date    Actinic keratoses 8/21/2013    Adenomatous polyp 2008    Atrial fibrillation (HCC)     CAD (coronary artery disease) 2009    Carotid artery disease ([facility] [address] 2009    left    Hyperlipidemia 2004    Hypertension 2004    Inguinal hernia, left 2/19/2014    Unspecified cerebral artery occlusion with cerebral infarction      Past Surgical History:   Procedure Laterality Date    ABDOMEN SURGERY      ABDOMINAL ADHESION SURGERY  6/2012    Dr Waqas Mccain  2004    right    COLONOSCOPY      [facility]    CORONARY ARTERY BYPASS GRAFT  2009    ENDOSCOPY, COLON, DIAGNOSTIC      EYE SURGERY      SMALL INTESTINE SURGERY      SMALL INTESTINE SURGERY  02/20/2015    Exploratory Laparotomy    UPPER GASTROINTESTINAL ENDOSCOPY N/A 1/26/2021    EGD ESOPHAGOGASTRODUODENOSCOPY PEG TUBE INSERTION performed by Harika Alba MD at [facility] 45      cabg harvests from right leg        VITALS:  BP (!) 140/56 (Site: Left Upper Arm, Position: Sitting, Cuff Size: Medium Adult)   Pulse 53   Wt 147 lb (66.7 kg)   SpO2 97%   BMI 28.71 kg/m²   Wt Readings from Last 3 Encounters:   11/17/22 147 lb (66.7 kg)   08/18/22 149 lb (67.6 kg)   08/11/22 151 lb 2 oz (68.5 kg)     Body mass index is 28.71 kg/m². General Appearance: alert and cooperative with exam, appears comfortable, no distress  Lungs: Air entry B/L, no crackles or rales, no use of accessory muscles  Extremities: no appreciable leg or ankle edema at this time  Skin: + warm to touch  No sig ankle edema     Medications:  Current Outpatient Medications   Medication Sig Dispense Refill    aspirin 81 MG EC tablet Take 81 mg by mouth daily      furosemide (LASIX) 40 MG tablet Take 1.5 tablets by mouth in the morning. Only takes when legs start swelling, then stops taking agin. (Patient taking differently: Take 40 mg by mouth daily Only takes when legs start swelling, then stops taking agin) 45 tablet 1    Multiple Vitamins-Minerals (THERAPEUTIC MULTIVITAMIN-MINERALS) tablet Take 1 tablet by mouth in the morning. apixaban (ELIQUIS) 2.5 MG TABS tablet Take 1 tablet by mouth 2 times daily 60 tablet 1    finasteride (PROSCAR) 5 MG tablet Take 1 tablet by mouth daily 30 tablet 3    pantoprazole (PROTONIX) 40 MG tablet Take 40 mg by mouth 2 times daily      potassium chloride (KLOR-CON M) 20 MEQ extended release tablet Take 1 tablet by mouth daily 60 tablet 5    tamsulosin (FLOMAX) 0.4 MG capsule Take 1 capsule by mouth daily Capsule may be opened and contents dissolve for peg tube 30 capsule 3     No current facility-administered medications for this visit.         Laboratory & Diagnostics:  Feb 2021: UA: no blood, trace protein  EF 55-60-%  Feb 2021; Na 140, Creat 1.2  April 2021: Hb 10.3  Aug 2021: UA: no blood, no protein, US: Right sided simple Renal cysts  Oct 2021: Creat 1.7, K 3.9, Hb 8.4  Dec 2021: K 4.4, Creat 1.9, Hb 8.2  April 2022: K 4.6, Creat 2.0    May 2022: K 4.2, Creat 1.7, Hb 9.5  Aug 2022; K 4.8, Creat 1.4, Hb 12.0, Hb 11.9  Nov 2022: K 4.6, Creat 2.3, Sodium 134, Hb 10.9       Impression/Plan:   1. CKd IIIa with ANNIKA: likely due to over-diuresis. Will reduce lasix 40 mg daily. Hold lasix for total three days. Start lasix 40 mg from Saturday. Check BMP on Monday. Echo showed RV dysfunction with preserved LVEF. 2. Electrolytes: stable. 3. B/L edema: continue with lasix, sig improved, plan as above. Holding lasix currently- will hold one more day. Check BMP on Monday. Resume Lasix starting Saturday Nov 19. D/W pt and son in detail   4. Anemia in CKD: Hb noted  5. HTN: stable, /56 today  7. BPH: seeing urology  7. Simple renal cyst  8. RV dysfunction per echo August 2021    Orders Placed This Encounter   Procedures    Basic Metabolic Panel    Basic Metabolic Panel    Protein / creatinine ratio, urine    Hemoglobin and Hematocrit     Return in about 3 months (around 2/17/2023).     Pj Gtz MD  [facility]

## 2022-11-21 ENCOUNTER — NURSE ONLY (OUTPATIENT)
Dept: LAB | Age: 87
End: 2022-11-21

## 2022-11-21 DIAGNOSIS — N17.9 AKI (ACUTE KIDNEY INJURY) (HCC): ICD-10-CM

## 2022-11-21 DIAGNOSIS — N28.1 RENAL CYST: ICD-10-CM

## 2022-11-21 DIAGNOSIS — N18.31 CHRONIC KIDNEY DISEASE, STAGE 3A (HCC): ICD-10-CM

## 2022-11-21 LAB
ANION GAP SERPL CALCULATED.3IONS-SCNC: 14 MEQ/L (ref 8–16)
BUN BLDV-MCNC: 31 MG/DL (ref 7–22)
CALCIUM SERPL-MCNC: 8.7 MG/DL (ref 8.5–10.5)
CHLORIDE BLD-SCNC: 103 MEQ/L (ref 98–111)
CO2: 22 MEQ/L (ref 23–33)
CREAT SERPL-MCNC: 2.1 MG/DL (ref 0.4–1.2)
GFR SERPL CREATININE-BSD FRML MDRD: 29 ML/MIN/1.73M2
GLUCOSE BLD-MCNC: 102 MG/DL (ref 70–108)
POTASSIUM SERPL-SCNC: 4.5 MEQ/L (ref 3.5–5.2)
SODIUM BLD-SCNC: 139 MEQ/L (ref 135–145)

## 2022-11-22 ENCOUNTER — TELEPHONE (OUTPATIENT)
Dept: NEPHROLOGY | Age: 87
End: 2022-11-22

## 2022-11-22 DIAGNOSIS — N18.31 CHRONIC KIDNEY DISEASE, STAGE 3A (HCC): Primary | ICD-10-CM

## 2022-11-22 NOTE — TELEPHONE ENCOUNTER
----- Message from Georgia Davison MD sent at 11/22/2022  3:50 PM EST -----  Creat is coming down but not back to baseline  I recommend repeating BMP in about 6-8 weeks

## 2022-11-22 NOTE — TELEPHONE ENCOUNTER
Left message informing pt's POA regarding lab results and repeating labs in 6 - 8 weeks. Lab order pending.

## 2022-12-06 ENCOUNTER — OFFICE VISIT (OUTPATIENT)
Dept: UROLOGY | Age: 87
End: 2022-12-06
Payer: OTHER GOVERNMENT

## 2022-12-06 VITALS — HEIGHT: 60 IN | BODY MASS INDEX: 28.47 KG/M2 | WEIGHT: 145 LBS | RESPIRATION RATE: 16 BRPM

## 2022-12-06 DIAGNOSIS — N40.1 BPH WITH URINARY OBSTRUCTION: Primary | ICD-10-CM

## 2022-12-06 DIAGNOSIS — N13.8 BPH WITH URINARY OBSTRUCTION: Primary | ICD-10-CM

## 2022-12-06 PROCEDURE — 99213 OFFICE O/P EST LOW 20 MIN: CPT | Performed by: UROLOGY

## 2022-12-06 PROCEDURE — 1123F ACP DISCUSS/DSCN MKR DOCD: CPT | Performed by: UROLOGY

## 2022-12-06 RX ORDER — TAMSULOSIN HYDROCHLORIDE 0.4 MG/1
0.4 CAPSULE ORAL DAILY
Qty: 90 CAPSULE | Refills: 3 | Status: SHIPPED | OUTPATIENT
Start: 2022-12-06 | End: 2023-03-06

## 2022-12-06 RX ORDER — FINASTERIDE 5 MG/1
5 TABLET, FILM COATED ORAL DAILY
Qty: 90 TABLET | Refills: 3 | Status: SHIPPED | OUTPATIENT
Start: 2022-12-06 | End: 2023-03-06

## 2022-12-06 NOTE — PROGRESS NOTES
Sydney Kapadia MD        [address] [place] [address]  Dept: [phone]  Dept Fax: [phone]: [address] [address] Urology Office Note -     Patient:  Tosin Wall  YOB: 1932  Date: 12/6/2022    The patient is a 80 y.o. male who presents today for evaluation of the following problems: persistent UTI  Chief Complaint   Patient presents with    Benign Prostatic Hypertrophy    referred/consultation requested by Dustin Britt DO.    HISTORY OF PRESENT ILLNESS:         BPH  flomax/finasteride  No new infections  Pt satisfied  Have discussed PVP in past but pt is doing great without it. pvr low        Summary of Previous Records:  Currently asymptomatic  Had persistent UTI- treated at urgent care and with PCP  Urine completely negative in office today  No previous hx of recurrent UTI  Does have LUTS at baseline. Worse since starting lasix. Son is pathologist In hospital        Requested/reviewed records from Dustin Britt [place] office and/or outside [de-identified]    (Patient's old records have been requested, reviewed and pertinent findings summarized in today's note.)    Procedures Today:         Last several PSA's:  No results found for: PSA    Last total testosterone:  No results found for: TESTOSTERONE    Urinalysis today:  No results found for this visit on 12/06/22.       Last BUN and creatinine:  Lab Results   Component Value Date    BUN 31 (H) 11/21/2022     Lab Results   Component Value Date    CREATININE 2.1 (H) 11/21/2022         Imaging Reviewed during this Office Visit:   Sydney Kapadia MD independently reviewed the images and verified the radiology reports from:          PAST MEDICAL, FAMILY AND SOCIAL HISTORY:  Past Medical History:   Diagnosis Date    Actinic keratoses 8/21/2013    Adenomatous polyp 2008    Atrial fibrillation ([facility] [address]     CAD (coronary artery disease) 2009 Carotid artery disease ([facility] [address] 2009    left    Hyperlipidemia 2004    Hypertension 2004    Inguinal hernia, left 2014    Unspecified cerebral artery occlusion with cerebral infarction      Past Surgical History:   Procedure Laterality Date    ABDOMEN SURGERY      ABDOMINAL ADHESION SURGERY  2012    Dr Elliot Rivera      CATARACT REMOVAL  2004    right    COLONOSCOPY      [facility]    CORONARY ARTERY BYPASS GRAFT  2009    ENDOSCOPY, COLON, DIAGNOSTIC      EYE SURGERY      SMALL INTESTINE SURGERY      SMALL INTESTINE SURGERY  2015    Exploratory Laparotomy    UPPER GASTROINTESTINAL ENDOSCOPY N/A 2021    EGD ESOPHAGOGASTRODUODENOSCOPY PEG TUBE INSERTION performed by Harika Alba MD at [facility] 45      cabg harvests from right leg     Family History   Problem Relation Age of Onset    Cancer Mother      Outpatient Medications Marked as Taking for the 22 encounter (Office Visit) with Beata Lynne MD   Medication Sig Dispense Refill    aspirin 81 MG EC tablet Take 81 mg by mouth daily      furosemide (LASIX) 40 MG tablet Take 1 tablet by mouth daily 60 tablet 3    Multiple Vitamins-Minerals (THERAPEUTIC MULTIVITAMIN-MINERALS) tablet Take 1 tablet by mouth in the morning. apixaban (ELIQUIS) 2.5 MG TABS tablet Take 1 tablet by mouth 2 times daily 60 tablet 1    pantoprazole (PROTONIX) 40 MG tablet Take 40 mg by mouth 2 times daily      potassium chloride (KLOR-CON M) 20 MEQ extended release tablet Take 1 tablet by mouth daily 60 tablet 5    finasteride (PROSCAR) 5 MG tablet Take 1 tablet by mouth daily 90 tablet 3    tamsulosin (FLOMAX) 0.4 MG capsule Take 1 capsule by mouth daily Capsule may be opened and contents dissolve for peg tube 90 capsule 3       Patient has no known allergies.   Social History     Tobacco Use   Smoking Status Former    Types: Cigarettes    Quit date: 1980    Years since quittin.9   Smokeless Tobacco Never      (If patient a smoker, smoking cessation counseling offered)   Social History     Substance and Sexual Activity   Alcohol Use Yes    Alcohol/week: 1.0 standard drink    Types: 1 Cans of beer per week    Comment: occasionally       REVIEW OF SYSTEMS:  Constitutional: negative  Eyes: negative  Respiratory: negative  Cardiovascular: negative  Gastrointestinal: negative  Genitourinary: see HPI  Musculoskeletal: negative  Skin: negative   Neurological: negative  Hematological/Lymphatic: negative  Psychological: negative        Physical Exam:    This a 80 y.o. male  Vitals:    12/06/22 1526   Resp: 16     Body mass index is 28.32 kg/m². Constitutional: Patient in no acute distress;       Assessment and Plan        1. BPH with urinary obstruction                 Plan:        BPH-cont flomax/finasteride for LUTS, namely nocturia. Infections likely secondary to BPH. Has not had new infections. Discussed greenlight in past but patient doing well. Will manage conservatively. Auass 11 mixed bother. Nocturia x 4        Prescriptions Ordered:  Orders Placed This Encounter   Medications    finasteride (PROSCAR) 5 MG tablet     Sig: Take 1 tablet by mouth daily     Dispense:  90 tablet     Refill:  3    tamsulosin (FLOMAX) 0.4 MG capsule     Sig: Take 1 capsule by mouth daily Capsule may be opened and contents dissolve for peg tube     Dispense:  90 capsule     Refill:  3        Orders Placed:  No orders of the defined types were placed in this encounter.            Marcella Schaumann, MD

## 2023-01-16 ENCOUNTER — NURSE ONLY (OUTPATIENT)
Dept: LAB | Age: 88
End: 2023-01-16

## 2023-01-16 DIAGNOSIS — N18.31 CHRONIC KIDNEY DISEASE, STAGE 3A (HCC): ICD-10-CM

## 2023-01-16 LAB
ANION GAP SERPL CALCULATED.3IONS-SCNC: 16 MEQ/L (ref 8–16)
BUN BLDV-MCNC: 36 MG/DL (ref 7–22)
CALCIUM SERPL-MCNC: 8.8 MG/DL (ref 8.5–10.5)
CHLORIDE BLD-SCNC: 100 MEQ/L (ref 98–111)
CO2: 21 MEQ/L (ref 23–33)
CREAT SERPL-MCNC: 2.7 MG/DL (ref 0.4–1.2)
GFR SERPL CREATININE-BSD FRML MDRD: 22 ML/MIN/1.73M2
GLUCOSE BLD-MCNC: 121 MG/DL (ref 70–108)
POTASSIUM SERPL-SCNC: 4.6 MEQ/L (ref 3.5–5.2)
SODIUM BLD-SCNC: 137 MEQ/L (ref 135–145)

## 2023-01-16 NOTE — RESULT ENCOUNTER NOTE
Creat is slightly higher   Please have patient hold lasix for 3 days only and then resume lasix. If possible repeat Creat level on Friday.

## 2023-01-17 ENCOUNTER — APPOINTMENT (OUTPATIENT)
Dept: GENERAL RADIOLOGY | Age: 88
DRG: 853 | End: 2023-01-17
Payer: OTHER GOVERNMENT

## 2023-01-17 ENCOUNTER — HOSPITAL ENCOUNTER (INPATIENT)
Age: 88
LOS: 9 days | Discharge: HOSPICE | DRG: 853 | End: 2023-01-26
Attending: STUDENT IN AN ORGANIZED HEALTH CARE EDUCATION/TRAINING PROGRAM | Admitting: HOSPITALIST
Payer: OTHER GOVERNMENT

## 2023-01-17 ENCOUNTER — TELEPHONE (OUTPATIENT)
Dept: NEPHROLOGY | Age: 88
End: 2023-01-17

## 2023-01-17 DIAGNOSIS — R09.02 HYPOXIA: ICD-10-CM

## 2023-01-17 DIAGNOSIS — E83.42 HYPOMAGNESEMIA: ICD-10-CM

## 2023-01-17 DIAGNOSIS — E83.51 HYPOCALCEMIA: ICD-10-CM

## 2023-01-17 DIAGNOSIS — J18.9 PNEUMONIA OF RIGHT LUNG DUE TO INFECTIOUS ORGANISM, UNSPECIFIED PART OF LUNG: Primary | ICD-10-CM

## 2023-01-17 DIAGNOSIS — R77.8 ELEVATED TROPONIN: ICD-10-CM

## 2023-01-17 PROBLEM — J96.01 ACUTE HYPOXEMIC RESPIRATORY FAILURE (HCC): Status: ACTIVE | Noted: 2023-01-17

## 2023-01-17 PROBLEM — R79.89 ELEVATED TROPONIN: Status: ACTIVE | Noted: 2023-01-17

## 2023-01-17 LAB
ALBUMIN SERPL-MCNC: 3.4 G/DL (ref 3.5–5.1)
ALP BLD-CCNC: 164 U/L (ref 38–126)
ALT SERPL-CCNC: 11 U/L (ref 11–66)
AMORPHOUS: ABNORMAL
ANION GAP SERPL CALCULATED.3IONS-SCNC: 14 MEQ/L (ref 8–16)
AST SERPL-CCNC: 22 U/L (ref 5–40)
BACTERIA: ABNORMAL
BASOPHILS # BLD: 0.2 %
BASOPHILS ABSOLUTE: 0 THOU/MM3 (ref 0–0.1)
BILIRUB SERPL-MCNC: 0.5 MG/DL (ref 0.3–1.2)
BILIRUBIN URINE: NEGATIVE
BLOOD, URINE: NEGATIVE
BUN BLDV-MCNC: 40 MG/DL (ref 7–22)
CALCIUM SERPL-MCNC: 8.5 MG/DL (ref 8.5–10.5)
CASTS: ABNORMAL /LPF
CASTS: ABNORMAL /LPF
CHARACTER, URINE: CLEAR
CHLORIDE BLD-SCNC: 108 MEQ/L (ref 98–111)
CHLORIDE, URINE: 79 MEQ/L
CO2: 20 MEQ/L (ref 23–33)
COLOR: YELLOW
CREAT SERPL-MCNC: 2.5 MG/DL (ref 0.4–1.2)
CRYSTALS: ABNORMAL
EKG Q-T INTERVAL: 352 MS
EKG Q-T INTERVAL: 454 MS
EKG QRS DURATION: 120 MS
EKG QRS DURATION: 124 MS
EKG QTC CALCULATION (BAZETT): 447 MS
EKG QTC CALCULATION (BAZETT): 468 MS
EKG R AXIS: -49 DEGREES
EKG R AXIS: -59 DEGREES
EKG T AXIS: -32 DEGREES
EKG T AXIS: -34 DEGREES
EKG VENTRICULAR RATE: 64 BPM
EKG VENTRICULAR RATE: 97 BPM
EOSINOPHIL # BLD: 0 %
EOSINOPHILS ABSOLUTE: 0 THOU/MM3 (ref 0–0.4)
EPITHELIAL CELLS, UA: ABNORMAL /HPF
ERYTHROCYTE [DISTWIDTH] IN BLOOD BY AUTOMATED COUNT: 12.9 % (ref 11.5–14.5)
ERYTHROCYTE [DISTWIDTH] IN BLOOD BY AUTOMATED COUNT: 45.5 FL (ref 35–45)
GFR SERPL CREATININE-BSD FRML MDRD: 24 ML/MIN/1.73M2
GLUCOSE BLD-MCNC: 110 MG/DL (ref 70–108)
GLUCOSE, URINE: NEGATIVE MG/DL
HCT VFR BLD CALC: 32.2 % (ref 42–52)
HEMOGLOBIN: 10.6 GM/DL (ref 14–18)
IMMATURE GRANS (ABS): 0.06 THOU/MM3 (ref 0–0.07)
IMMATURE GRANULOCYTES: 0.3 %
INFLUENZA A: NOT DETECTED
INFLUENZA B: NOT DETECTED
KETONES, URINE: NEGATIVE
LACTIC ACID, SEPSIS: 1.5 MMOL/L (ref 0.5–1.9)
LACTIC ACID, SEPSIS: 3.2 MMOL/L (ref 0.5–1.9)
LEUKOCYTE ESTERASE, URINE: NEGATIVE
LIPASE: 36.6 U/L (ref 5.6–51.3)
LV EF: 55 %
LVEF MODALITY: NORMAL
LYMPHOCYTES # BLD: 1.6 %
LYMPHOCYTES ABSOLUTE: 0.3 THOU/MM3 (ref 1–4.8)
MAGNESIUM: 1.4 MG/DL (ref 1.6–2.4)
MAGNESIUM: 2.1 MG/DL (ref 1.6–2.4)
MCH RBC QN AUTO: 31.5 PG (ref 26–33)
MCHC RBC AUTO-ENTMCNC: 32.9 GM/DL (ref 32.2–35.5)
MCV RBC AUTO: 95.8 FL (ref 80–94)
MISCELLANEOUS LAB TEST RESULT: ABNORMAL
MONOCYTES # BLD: 7.2 %
MONOCYTES ABSOLUTE: 1.3 THOU/MM3 (ref 0.4–1.3)
MRSA SCREEN RT-PCR: NEGATIVE
NITRITE, URINE: NEGATIVE
NUCLEATED RED BLOOD CELLS: 0 /100 WBC
PH UA: 5.5 (ref 5–9)
PLATELET # BLD: 215 THOU/MM3 (ref 130–400)
PMV BLD AUTO: 10.7 FL (ref 9.4–12.4)
POTASSIUM SERPL-SCNC: 3.9 MEQ/L (ref 3.5–5.2)
POTASSIUM, URINE: 50.4 MEQ/L
PRO-BNP: 9562 PG/ML (ref 0–449)
PROCALCITONIN: 0.69 NG/ML (ref 0.01–0.09)
PROTEIN UA: 30 MG/DL
RBC # BLD: 3.36 MILL/MM3 (ref 4.7–6.1)
RBC URINE: ABNORMAL /HPF
REASON FOR REJECTION: NORMAL
REJECTED TEST: NORMAL
RENAL EPITHELIAL, UA: ABNORMAL
SARS-COV-2 RNA, RT PCR: NOT DETECTED
SEG NEUTROPHILS: 90.7 %
SEGMENTED NEUTROPHILS ABSOLUTE COUNT: 15.8 THOU/MM3 (ref 1.8–7.7)
SODIUM BLD-SCNC: 142 MEQ/L (ref 135–145)
SODIUM URINE: 76 MEQ/L
SPECIFIC GRAVITY UA: 1.02 (ref 1–1.03)
TOTAL PROTEIN: 6.9 G/DL (ref 6.1–8)
TROPONIN T: 0.05 NG/ML
TROPONIN T: 0.21 NG/ML
TROPONIN T: 0.36 NG/ML
TROPONIN T: 0.39 NG/ML
UROBILINOGEN, URINE: 0.2 EU/DL (ref 0–1)
WBC # BLD: 17.4 THOU/MM3 (ref 4.8–10.8)
WBC UA: ABNORMAL /HPF
YEAST: ABNORMAL

## 2023-01-17 PROCEDURE — 93010 ELECTROCARDIOGRAM REPORT: CPT | Performed by: INTERNAL MEDICINE

## 2023-01-17 PROCEDURE — 85025 COMPLETE CBC W/AUTO DIFF WBC: CPT

## 2023-01-17 PROCEDURE — 84484 ASSAY OF TROPONIN QUANT: CPT

## 2023-01-17 PROCEDURE — 6360000002 HC RX W HCPCS

## 2023-01-17 PROCEDURE — 6370000000 HC RX 637 (ALT 250 FOR IP)

## 2023-01-17 PROCEDURE — 83605 ASSAY OF LACTIC ACID: CPT

## 2023-01-17 PROCEDURE — 84133 ASSAY OF URINE POTASSIUM: CPT

## 2023-01-17 PROCEDURE — 6360000002 HC RX W HCPCS: Performed by: STUDENT IN AN ORGANIZED HEALTH CARE EDUCATION/TRAINING PROGRAM

## 2023-01-17 PROCEDURE — 87899 AGENT NOS ASSAY W/OPTIC: CPT

## 2023-01-17 PROCEDURE — 83690 ASSAY OF LIPASE: CPT

## 2023-01-17 PROCEDURE — 84145 PROCALCITONIN (PCT): CPT

## 2023-01-17 PROCEDURE — 80053 COMPREHEN METABOLIC PANEL: CPT

## 2023-01-17 PROCEDURE — 87449 NOS EACH ORGANISM AG IA: CPT

## 2023-01-17 PROCEDURE — 96366 THER/PROPH/DIAG IV INF ADDON: CPT

## 2023-01-17 PROCEDURE — 96365 THER/PROPH/DIAG IV INF INIT: CPT

## 2023-01-17 PROCEDURE — 94660 CPAP INITIATION&MGMT: CPT

## 2023-01-17 PROCEDURE — 71045 X-RAY EXAM CHEST 1 VIEW: CPT

## 2023-01-17 PROCEDURE — 2580000003 HC RX 258

## 2023-01-17 PROCEDURE — 93005 ELECTROCARDIOGRAM TRACING: CPT | Performed by: STUDENT IN AN ORGANIZED HEALTH CARE EDUCATION/TRAINING PROGRAM

## 2023-01-17 PROCEDURE — 93005 ELECTROCARDIOGRAM TRACING: CPT

## 2023-01-17 PROCEDURE — 2580000003 HC RX 258: Performed by: STUDENT IN AN ORGANIZED HEALTH CARE EDUCATION/TRAINING PROGRAM

## 2023-01-17 PROCEDURE — 2140000000 HC CCU INTERMEDIATE R&B

## 2023-01-17 PROCEDURE — 87641 MR-STAPH DNA AMP PROBE: CPT

## 2023-01-17 PROCEDURE — 84300 ASSAY OF URINE SODIUM: CPT

## 2023-01-17 PROCEDURE — 36415 COLL VENOUS BLD VENIPUNCTURE: CPT

## 2023-01-17 PROCEDURE — 99223 1ST HOSP IP/OBS HIGH 75: CPT

## 2023-01-17 PROCEDURE — 83735 ASSAY OF MAGNESIUM: CPT

## 2023-01-17 PROCEDURE — 93306 TTE W/DOPPLER COMPLETE: CPT

## 2023-01-17 PROCEDURE — 81001 URINALYSIS AUTO W/SCOPE: CPT

## 2023-01-17 PROCEDURE — 96367 TX/PROPH/DG ADDL SEQ IV INF: CPT

## 2023-01-17 PROCEDURE — 96375 TX/PRO/DX INJ NEW DRUG ADDON: CPT

## 2023-01-17 PROCEDURE — 82436 ASSAY OF URINE CHLORIDE: CPT

## 2023-01-17 PROCEDURE — 87040 BLOOD CULTURE FOR BACTERIA: CPT

## 2023-01-17 PROCEDURE — 83880 ASSAY OF NATRIURETIC PEPTIDE: CPT

## 2023-01-17 PROCEDURE — 87636 SARSCOV2 & INF A&B AMP PRB: CPT

## 2023-01-17 PROCEDURE — 99285 EMERGENCY DEPT VISIT HI MDM: CPT

## 2023-01-17 RX ORDER — PANTOPRAZOLE SODIUM 40 MG/1
40 TABLET, DELAYED RELEASE ORAL 2 TIMES DAILY
Status: DISCONTINUED | OUTPATIENT
Start: 2023-01-17 | End: 2023-01-26 | Stop reason: HOSPADM

## 2023-01-17 RX ORDER — FUROSEMIDE 40 MG/1
40 TABLET ORAL DAILY
Status: DISCONTINUED | OUTPATIENT
Start: 2023-01-17 | End: 2023-01-26 | Stop reason: HOSPADM

## 2023-01-17 RX ORDER — FINASTERIDE 5 MG/1
5 TABLET, FILM COATED ORAL DAILY
Status: DISCONTINUED | OUTPATIENT
Start: 2023-01-17 | End: 2023-01-26 | Stop reason: HOSPADM

## 2023-01-17 RX ORDER — MULTIVITAMIN WITH IRON
1 TABLET ORAL DAILY
Status: DISCONTINUED | OUTPATIENT
Start: 2023-01-17 | End: 2023-01-26 | Stop reason: HOSPADM

## 2023-01-17 RX ORDER — SODIUM CHLORIDE 0.9 % (FLUSH) 0.9 %
10 SYRINGE (ML) INJECTION PRN
Status: DISCONTINUED | OUTPATIENT
Start: 2023-01-17 | End: 2023-01-26 | Stop reason: HOSPADM

## 2023-01-17 RX ORDER — MAGNESIUM SULFATE IN WATER 40 MG/ML
2000 INJECTION, SOLUTION INTRAVENOUS ONCE
Status: COMPLETED | OUTPATIENT
Start: 2023-01-17 | End: 2023-01-17

## 2023-01-17 RX ORDER — ASPIRIN 81 MG/1
324 TABLET, CHEWABLE ORAL ONCE
Status: COMPLETED | OUTPATIENT
Start: 2023-01-17 | End: 2023-01-17

## 2023-01-17 RX ORDER — TAMSULOSIN HYDROCHLORIDE 0.4 MG/1
0.4 CAPSULE ORAL DAILY
Status: DISCONTINUED | OUTPATIENT
Start: 2023-01-17 | End: 2023-01-26 | Stop reason: HOSPADM

## 2023-01-17 RX ORDER — ACETAMINOPHEN 650 MG/1
650 SUPPOSITORY RECTAL EVERY 6 HOURS PRN
Status: DISCONTINUED | OUTPATIENT
Start: 2023-01-17 | End: 2023-01-26 | Stop reason: HOSPADM

## 2023-01-17 RX ORDER — ASPIRIN 81 MG/1
81 TABLET ORAL DAILY
Status: DISCONTINUED | OUTPATIENT
Start: 2023-01-18 | End: 2023-01-26 | Stop reason: HOSPADM

## 2023-01-17 RX ORDER — POLYETHYLENE GLYCOL 3350 17 G/17G
17 POWDER, FOR SOLUTION ORAL DAILY PRN
Status: DISCONTINUED | OUTPATIENT
Start: 2023-01-17 | End: 2023-01-26 | Stop reason: HOSPADM

## 2023-01-17 RX ORDER — SODIUM CHLORIDE 0.9 % (FLUSH) 0.9 %
10 SYRINGE (ML) INJECTION EVERY 12 HOURS SCHEDULED
Status: DISCONTINUED | OUTPATIENT
Start: 2023-01-17 | End: 2023-01-26 | Stop reason: HOSPADM

## 2023-01-17 RX ORDER — SODIUM CHLORIDE, SODIUM LACTATE, POTASSIUM CHLORIDE, AND CALCIUM CHLORIDE .6; .31; .03; .02 G/100ML; G/100ML; G/100ML; G/100ML
500 INJECTION, SOLUTION INTRAVENOUS ONCE
Status: COMPLETED | OUTPATIENT
Start: 2023-01-17 | End: 2023-01-17

## 2023-01-17 RX ORDER — ONDANSETRON 4 MG/1
4 TABLET, ORALLY DISINTEGRATING ORAL EVERY 8 HOURS PRN
Status: DISCONTINUED | OUTPATIENT
Start: 2023-01-17 | End: 2023-01-26 | Stop reason: HOSPADM

## 2023-01-17 RX ORDER — SODIUM CHLORIDE 9 MG/ML
INJECTION, SOLUTION INTRAVENOUS PRN
Status: DISCONTINUED | OUTPATIENT
Start: 2023-01-17 | End: 2023-01-26 | Stop reason: HOSPADM

## 2023-01-17 RX ORDER — ACETAMINOPHEN 325 MG/1
650 TABLET ORAL EVERY 6 HOURS PRN
Status: DISCONTINUED | OUTPATIENT
Start: 2023-01-17 | End: 2023-01-26 | Stop reason: HOSPADM

## 2023-01-17 RX ORDER — ONDANSETRON 2 MG/ML
4 INJECTION INTRAMUSCULAR; INTRAVENOUS EVERY 6 HOURS PRN
Status: DISCONTINUED | OUTPATIENT
Start: 2023-01-17 | End: 2023-01-26 | Stop reason: HOSPADM

## 2023-01-17 RX ADMIN — PIPERACILLIN AND TAZOBACTAM 4500 MG: 4; .5 INJECTION, POWDER, FOR SOLUTION INTRAVENOUS at 16:40

## 2023-01-17 RX ADMIN — Medication 1 TABLET: at 16:15

## 2023-01-17 RX ADMIN — FINASTERIDE 5 MG: 5 TABLET, FILM COATED ORAL at 16:14

## 2023-01-17 RX ADMIN — VANCOMYCIN HYDROCHLORIDE 1250 MG: 5 INJECTION, POWDER, LYOPHILIZED, FOR SOLUTION INTRAVENOUS at 16:21

## 2023-01-17 RX ADMIN — SODIUM CHLORIDE, POTASSIUM CHLORIDE, SODIUM LACTATE AND CALCIUM CHLORIDE 500 ML: 600; 310; 30; 20 INJECTION, SOLUTION INTRAVENOUS at 14:06

## 2023-01-17 RX ADMIN — SODIUM CHLORIDE, PRESERVATIVE FREE 10 ML: 5 INJECTION INTRAVENOUS at 20:12

## 2023-01-17 RX ADMIN — APIXABAN 2.5 MG: 2.5 TABLET, FILM COATED ORAL at 20:12

## 2023-01-17 RX ADMIN — PIPERACILLIN AND TAZOBACTAM 3375 MG: 3; .375 INJECTION, POWDER, FOR SOLUTION INTRAVENOUS at 23:14

## 2023-01-17 RX ADMIN — CEFTRIAXONE SODIUM 1000 MG: 1 INJECTION, POWDER, FOR SOLUTION INTRAMUSCULAR; INTRAVENOUS at 05:37

## 2023-01-17 RX ADMIN — TAMSULOSIN HYDROCHLORIDE 0.4 MG: 0.4 CAPSULE ORAL at 16:14

## 2023-01-17 RX ADMIN — ASPIRIN 324 MG: 81 TABLET, CHEWABLE ORAL at 06:20

## 2023-01-17 RX ADMIN — AZITHROMYCIN MONOHYDRATE 500 MG: 500 INJECTION, POWDER, LYOPHILIZED, FOR SOLUTION INTRAVENOUS at 06:10

## 2023-01-17 RX ADMIN — APIXABAN 2.5 MG: 2.5 TABLET, FILM COATED ORAL at 16:14

## 2023-01-17 RX ADMIN — MAGNESIUM SULFATE HEPTAHYDRATE 2000 MG: 40 INJECTION, SOLUTION INTRAVENOUS at 07:56

## 2023-01-17 ASSESSMENT — ENCOUNTER SYMPTOMS
COUGH: 1
VOMITING: 0
SORE THROAT: 0
ABDOMINAL PAIN: 0
NAUSEA: 0
CONSTIPATION: 0
DIARRHEA: 0
RHINORRHEA: 0
SHORTNESS OF BREATH: 1

## 2023-01-17 ASSESSMENT — PAIN - FUNCTIONAL ASSESSMENT: PAIN_FUNCTIONAL_ASSESSMENT: NONE - DENIES PAIN

## 2023-01-17 NOTE — H&P
Hospitalist - History & Physical      Patient: Faviola Muñoz    Unit/Bed:22/022A  YOB: 1932  MRN: [id number]   Acct: [de-identified]   PCP: Milagros Gil DO    Date of Service: Pt seen/examined on 01/17/23  and Admitted to Inpatient with expected LOS greater than two midnights due to medical therapy. Chief Complaint:  Shortness of breath    Assessment and Plan:  Sepsis, secondary to CA-PNA:    Identified upon admission-6:50A.  lactic acidosis, which has resolved. Elevated Procal.  Noted to be hypoxic on RA, requiring 6 L O2 NC. CXR shows enlarged cardio pericardial silhouette, small right pleural effusion, right perihilar lower lung airspace opacities. COVID, Flu A/B negative. BC x 2 obtained. Started on Rocephin, Azithromycin, on Bipap at present due to work of breathing. Pt has history of MRSA, switch to Vancomycin and zosyn upon admission. 500cc IVF bolus now, will forego addition fluid resuscitation at this time due to patient being hemodynamically stable and concerns for decompensated HF. Repeat CBC in the AM. Wean O2 as tolerated. Acute on chronic HFpEF exacerbation, with pulmonary HTN:    Last limited ECHO 8/4/22  reveals EF 50% with right ventricle global systolic function. ECHO 12/9/20 reveals RVSP 44 mmHg. BNP chronically elevated. CXR shows enlargement of cardio pericardial silhouette and right small pleural effusion. Holding Lasix for #1 at this time. Pt appears euvolemic today. Repeat ECHO upon admission. Daily weights, I&Os. Telemetry. Elevated Troponin, with Hx of CAD:   Hx of CABG in 2006. Trop up-trending since admission. Pt denies chest pain, however endorses worsening shortness of breath. May have multifactorial etiology as patient came in hypoxic, has history of CKD. Will trend x 2. Repeat EKG upon admission. Consult cardiology placed- Dr Vic Sanchez made aware. Possible ANNIKA on CKD, stage 3b:    Cr. 2.5 today, baseline unclear. Follows with Dr. Jayesh Burgess outpatient. Currently hold lasix and receiving 500cc IVF bolus. UA shows protein 30. Urine electrolytes ordered. Can consider Nephrology consult. Hypomagnesemia:    Mag 1.4 today. 2g replacement ordered. Repeat Mag this afternoon. Essential HTN:    BP stable. Holding home Lasix at this time. Continue to monitor closely. Persistent A. Fib:    Pt remains rate controlled- does not appear to be on rate controlling medications at this time. On Eliquis. BPH:    Follows with Dr. Julio César Young outpatient- Hx of recurrent UTI. Continue flomax, finasteride. History Of Present Illness:    [facility] is a 79 y/o  male with a PMHx of CAD, HTN, pulmonary HTN who presents to [facility] ED today for the evaluation of shortness of breath. He states he called his son early this morning saying that he thinks he should come into the hospital for evaluation because of worsening symptoms. He states his shortness of breath is present at rest and with activity. He has an associated dry cough and chills. He denies chest pain, abdominal pain, LE swelling, unexpected weight gain, changes in bowel or urinary habits. Pt unable to provide additional history at this time due to being on bipap.        Past Medical History:        Diagnosis Date    Actinic keratoses 8/21/2013    Adenomatous polyp 2008    Atrial fibrillation (HCC)     CAD (coronary artery disease) 2009    Carotid artery disease ([address] 2009    left    Hyperlipidemia 2004    Hypertension 2004    Inguinal hernia, left 2/19/2014    Unspecified cerebral artery occlusion with cerebral infarction        Past Surgical History:        Procedure Laterality Date    ABDOMEN SURGERY      ABDOMINAL ADHESION SURGERY  6/2012    Dr Sharyon Hashimoto REMOVAL  2004    right    COLONOSCOPY      [facility]    CORONARY ARTERY BYPASS GRAFT  2009    ENDOSCOPY, COLON, DIAGNOSTIC      EYE SURGERY      SMALL INTESTINE SURGERY      SMALL INTESTINE SURGERY  02/20/2015    Exploratory Laparotomy UPPER GASTROINTESTINAL ENDOSCOPY N/A 1/26/2021    EGD ESOPHAGOGASTRODUODENOSCOPY PEG TUBE INSERTION performed by Jersey Sanders MD at [facility] 45      cabg harvests from right leg       Home Medications:   No current facility-administered medications on file prior to encounter. Current Outpatient Medications on File Prior to Encounter   Medication Sig Dispense Refill    finasteride (PROSCAR) 5 MG tablet Take 1 tablet by mouth daily 90 tablet 3    tamsulosin (FLOMAX) 0.4 MG capsule Take 1 capsule by mouth daily Capsule may be opened and contents dissolve for peg tube 90 capsule 3    aspirin 81 MG EC tablet Take 81 mg by mouth daily      furosemide (LASIX) 40 MG tablet Take 1 tablet by mouth daily 60 tablet 3    Multiple Vitamins-Minerals (THERAPEUTIC MULTIVITAMIN-MINERALS) tablet Take 1 tablet by mouth in the morning. apixaban (ELIQUIS) 2.5 MG TABS tablet Take 1 tablet by mouth 2 times daily 60 tablet 1    pantoprazole (PROTONIX) 40 MG tablet Take 40 mg by mouth 2 times daily      potassium chloride (KLOR-CON M) 20 MEQ extended release tablet Take 1 tablet by mouth daily 60 tablet 5       Allergies:    Patient has no known allergies. Social History:    reports that he quit smoking about 43 years ago. His smoking use included cigarettes. He has never used smokeless tobacco. He reports current alcohol use of about 1.0 standard drink per week. He reports that he does not use drugs. Family History:       Problem Relation Age of Onset    Cancer Mother        Diet:  No diet orders on file    Review of systems:     Review of Systems   Constitutional:  Positive for activity change, chills and fatigue. Negative for appetite change, fever and unexpected weight change. HENT:  Negative for congestion, rhinorrhea and sore throat. Respiratory:  Positive for cough and shortness of breath. Cardiovascular:  Negative for chest pain and leg swelling.    Gastrointestinal:  Negative for abdominal pain, constipation, diarrhea, nausea and vomiting. Genitourinary:  Negative for difficulty urinating, frequency and urgency. Musculoskeletal:  Negative for arthralgias and myalgias. Allergic/Immunologic: Negative for immunocompromised state. Neurological:  Negative for dizziness, weakness and light-headedness. Psychiatric/Behavioral:  The patient is not nervous/anxious. PHYSICAL EXAM:  /60   Pulse 78   Temp 98.3 °F (36.8 °C) (Oral)   Resp 22   SpO2 96%   General appearance: Appears fatigued. No apparent distress. Appears stated age and cooperative. Skin: Skin color, texture, turgor normal.  No rashes or lesions. HEENT: Normal cephalic, atraumatic without obvious deformity. Pupils equal, round, and reactive to light. Extra-ocular muscles intact. Conjunctivae/corneas clear. Neck: Trachea midline. Supple, with full range of motion. Cardiovascular: Regular rate and rhythm with normal S1/S2. No murmurs, rubs or gallops. Respiratory: On bipap currently. Inspiratory rhonchi noted bilaterally, diminished breath sounds diffusely. Normal respiratory effort. Abdomen: Soft, non-tender, non-distended. Normal bowel sounds. Musculoskeletal:  No weakness or instability noted. No edema, erythema, or gross deformity noted. Vascular:  Pulses +2 palpable, equal bilaterally. Neurologic:  Neurovascularly intact without any focal sensory/motor deficits. Cranial nerves: II-XII grossly intact. Psychiatric: Alert and oriented, thought content appropriate, normal insight      Labs:   No results for input(s): WBC, HGB, HCT, PLT in the last 72 hours. Recent Labs     01/16/23  1411 01/17/23  0500    142   K 4.6 3.9    108   CO2 21* 20*   BUN 36* 40*   CREATININE 2.7* 2.5*   CALCIUM 8.8 8.5     Recent Labs     01/17/23  0500   AST 22   ALT 11   BILITOT 0.5   ALKPHOS 164*     No results for input(s): INR in the last 72 hours.   No results for input(s): Patrisha Meigs in the last 72 hours. Urinalysis:    Lab Results   Component Value Date/Time    NITRU NEGATIVE 08/06/2022 09:45 AM    WBCUA 5-9 08/06/2022 09:45 AM    BACTERIA NONE SEEN 08/06/2022 09:45 AM    RBCUA 0-2 08/06/2022 09:45 AM    BLOODU SMALL 08/06/2022 09:45 AM    SPECGRAV 1.013 08/06/2022 09:45 AM    GLUCOSEU NEGATIVE 12/26/2021 05:20 AM       Radiology:   XR CHEST PORTABLE   Final Result   1. Enlargement of the cardiopericardial silhouette with small right    pleural effusion and right perihilar and lower lung airspace opacities    which could indicate pneumonia. This document has been electronically signed by: Mazin Rasheed MD on    01/17/2023 06:16 AM        XR CHEST PORTABLE    Result Date: 1/17/2023  1 view chest x-ray Comparison: CR/SR - XR CHEST PORTABLE - 08/06/2022 05:10 AM EDT Findings: Previous median sternotomy. Enlargement of the cardiopericardial silhouette. Right perihilar and lower lung consolidation. Probable small right pleural effusion. No pneumothorax. 1. Enlargement of the cardiopericardial silhouette with small right pleural effusion and right perihilar and lower lung airspace opacities which could indicate pneumonia.  This document has been electronically signed by: Mazin Rasheed MD on 01/17/2023 06:16 AM        EKG:  Atrial fibrillation with PVCs    Electronically signed by Tanika Baig PA-C on 1/17/2023 at 6:50 AM

## 2023-01-17 NOTE — ED PROVIDER NOTES
[address] [facility] [address] [address] EMERGENCY DEPT      EMERGENCY MEDICINE     Pt Name: Rasta Jeffries  MRN: [id number]  [place] 3/30/1932  Date of evaluation: 1/17/2023  Resident Physician: Alex Fofana MD  Supervising Physician: Charmaine Pino MD    CHIEF COMPLAINT       Chief Complaint   Patient presents with    Shortness of Breath     HISTORY OF PRESENT ILLNESS   Rasta Jeffries is a pleasant 80 y.o. male who presents to the emergency department from home via EMS for shortness of breath. Was having difficulty breathing. Patient called his son and told that he was not feeling well and needed to go to the hospital. Arrived via EMS. Does not wear oxygen at home but was having difficulty catching his breath and EMS put him on 15L. Was admitted in August for pneumonia, CHF, SOB. Denies fever, sore throat, chest pain, increased leg swelling, weight gain.     PASTMEDICAL HISTORY     Past Medical History:   Diagnosis Date    Actinic keratoses 8/21/2013    Adenomatous polyp 2008    Atrial fibrillation (HCC)     CAD (coronary artery disease) 2009    Carotid artery disease ([address] 2009    left    Hyperlipidemia 2004    Hypertension 2004    Inguinal hernia, left 2/19/2014    Unspecified cerebral artery occlusion with cerebral infarction        Patient Active Problem List   Diagnosis Code    Hyperlipidemia E78.5    Essential hypertension I10    Carotid artery disease (HCC) I77.9    Partial intestinal obstruction (HCC) K56.600    S/P CABG (coronary artery bypass graft) Z95.1    Inguinal hernia, left K40.90    Generalized abdominal pain R10.84    Hyponatremia E87.1    Leukocytosis D72.829    Enteritis K52.9    SIRS (systemic inflammatory response syndrome) (formerly [facility]) R65.10    ANNIKA (acute kidney injury) ([address] N17.9    Acidosis E87.20    Small bowel obstruction ([address] K56.609    Small bowel ischemia (HCC) K55.9    Anticoagulated on Coumadin Z79.01    History of cardioversion Z98.890    Hiatal hernia K44.9    Coronary artery disease involving native coronary artery of native heart without angina pectoris I25.10    History of stroke Z86.73    History of ischemic bowel disease Z87.19    Atrial fibrillation with slow ventricular response (HCC) I48.91    Bradycardia R00.1    Enteritis K52.9    Atrial fibrillation (HCC) I48.91    Symptomatic bradycardia R00.1    Traumatic rhabdomyolysis (HCC) T79. 6XXA    Supratherapeutic INR R79.1    Coagulopathy (HCC) D68.9    Pneumonia due to COVID-19 virus U07.1, J12.82    Elevated CK I32.2    Acute metabolic encephalopathy V49.05    Acute pulmonary edema (HCC) J81.0    Pulmonary edema cardiac cause ([facility]) I50.1    BPH with urinary obstruction N40.1, N13.8    Stage 2 chronic kidney disease N18.2    Acute on chronic congestive heart failure (HCC) I50.9    Duarte catheter in place Z97.8    Abdominal pain R10.9    SBO (small bowel obstruction) ([facility]) K56.609    Chronic kidney disease (CKD) stage G3b/A1, moderately decreased glomerular filtration rate (GFR) between 30-44 mL/min/1.73 square meter and albuminuria creatinine ratio less than 30 mg/g (HCC) N18.32    Sepsis ([address] A41.9    Acute respiratory failure with hypoxia ([address] J96.01    Acute hypoxemic respiratory failure ([address] J96.01     SURGICAL HISTORY       Past Surgical History:   Procedure Laterality Date    ABDOMEN SURGERY      ABDOMINAL ADHESION SURGERY  6/2012    Dr Odin Irving REMOVAL  2004    right    COLONOSCOPY      [facility]    CORONARY ARTERY BYPASS GRAFT  2009    ENDOSCOPY, COLON, DIAGNOSTIC      EYE SURGERY      SMALL INTESTINE SURGERY      SMALL INTESTINE SURGERY  02/20/2015    Exploratory Laparotomy    UPPER GASTROINTESTINAL ENDOSCOPY N/A 1/26/2021    EGD ESOPHAGOGASTRODUODENOSCOPY PEG TUBE INSERTION performed by Natalee Landeros MD at [facility]      cabg harvests from right leg       CURRENT MEDICATIONS       Previous Medications    APIXABAN (ELIQUIS) 2.5 MG TABS TABLET    Take 1 tablet by mouth 2 times daily    ASPIRIN 81 MG EC TABLET    Take 81 mg by mouth daily    FINASTERIDE (PROSCAR) 5 MG TABLET    Take 1 tablet by mouth daily    FUROSEMIDE (LASIX) 40 MG TABLET    Take 1 tablet by mouth daily    MULTIPLE VITAMINS-MINERALS (THERAPEUTIC MULTIVITAMIN-MINERALS) TABLET    Take 1 tablet by mouth in the morning. PANTOPRAZOLE (PROTONIX) 40 MG TABLET    Take 40 mg by mouth 2 times daily    POTASSIUM CHLORIDE (KLOR-CON M) 20 MEQ EXTENDED RELEASE TABLET    Take 1 tablet by mouth daily    TAMSULOSIN (FLOMAX) 0.4 MG CAPSULE    Take 1 capsule by mouth daily Capsule may be opened and contents dissolve for peg tube       ALLERGIES     has No Known Allergies. FAMILY HISTORY     He indicated that his mother is . He indicated that his father is . SOCIAL HISTORY       Social History     Tobacco Use    Smoking status: Former     Types: Cigarettes     Quit date: 1980     Years since quittin.0    Smokeless tobacco: Never   Vaping Use    Vaping Use: Never used   Substance Use Topics    Alcohol use: Yes     Alcohol/week: 1.0 standard drink     Types: 1 Cans of beer per week     Comment: occasionally    Drug use: No       PHYSICAL EXAM       ED Triage Vitals [23 0439]   BP Temp Temp Source Heart Rate Resp SpO2 Height Weight   125/66 98.3 °F (36.8 °C) Oral (!) 106 (!) 34 91 % -- --       Physical Exam  Vitals and nursing note reviewed. Constitutional:       General: He is not in acute distress. HENT:      Head: Normocephalic and atraumatic. Right Ear: External ear normal.      Left Ear: External ear normal.      Nose: Nose normal. No congestion. Mouth/Throat:      Mouth: Mucous membranes are moist.      Pharynx: No posterior oropharyngeal erythema. Eyes:      General: No scleral icterus. Extraocular Movements: Extraocular movements intact. Pupils: Pupils are equal, round, and reactive to light. Cardiovascular:      Rate and Rhythm: Normal rate and regular rhythm. Pulses: Normal pulses. Heart sounds: Normal heart sounds. No murmur heard. No friction rub. No gallop. Pulmonary:      Effort: Tachypnea present. Breath sounds: Decreased breath sounds present. No wheezing. Abdominal:      General: Abdomen is flat. There is no distension. Palpations: Abdomen is soft. Tenderness: There is no abdominal tenderness. There is no guarding or rebound. Musculoskeletal:      Cervical back: No rigidity. Skin:     General: Skin is warm. Capillary Refill: Capillary refill takes less than 2 seconds. Findings: No bruising, erythema or rash. Neurological:      General: No focal deficit present. Mental Status: He is alert and oriented to person, place, and time. Psychiatric:         Mood and Affect: Mood normal.         Behavior: Behavior normal.         Thought Content: Thought content normal.         Judgment: Judgment normal.       FORMAL DIAGNOSTIC RESULTS     RADIOLOGY: Interpretation per the Radiologist below, if available at the time of this note (none if blank):    XR CHEST PORTABLE   Final Result   1. Enlargement of the cardiopericardial silhouette with small right    pleural effusion and right perihilar and lower lung airspace opacities    which could indicate pneumonia.       This document has been electronically signed by: Ember Riggins MD on    01/17/2023 06:16 AM          LABS: (none if blank)  Labs Reviewed   COMPREHENSIVE METABOLIC PANEL - Abnormal; Notable for the following components:       Result Value    Glucose 110 (*)     Creatinine 2.5 (*)     BUN 40 (*)     CO2 20 (*)     Alkaline Phosphatase 164 (*)     Albumin 3.4 (*)     All other components within normal limits   MAGNESIUM - Abnormal; Notable for the following components:    Magnesium 1.4 (*)     All other components within normal limits   TROPONIN - Abnormal; Notable for the following components:    Troponin T 0.046 (*)     All other components within normal limits BRAIN NATRIURETIC PEPTIDE - Abnormal; Notable for the following components:    Pro-BNP 9562.0 (*)     All other components within normal limits   LACTATE, SEPSIS - Abnormal; Notable for the following components:    Lactic Acid, Sepsis 3.2 (*)     All other components within normal limits   PROCALCITONIN - Abnormal; Notable for the following components:    Procalcitonin 0.69 (*)     All other components within normal limits   GLOMERULAR FILTRATION RATE, ESTIMATED - Abnormal; Notable for the following components:    Est, Glom Filt Rate 24 (*)     All other components within normal limits   COVID-19 & INFLUENZA COMBO   CULTURE, BLOOD 1   CULTURE, BLOOD 1   MRSA BY PCR   LIPASE   ANION GAP   SPECIMEN REJECTION   LACTATE, SEPSIS   URINALYSIS WITH MICROSCOPIC   TROPONIN   CBC WITH AUTO DIFFERENTIAL       (Any cultures that may have been sent were not resulted at the time of this patient visit)    [facility] / ED COURSE:     1) Number and Complexity of Problems            Problem List This Visit:         Chief Complaint   Patient presents with    Shortness of Breath          Differential Diagnosis includes (but not limited to):  CHF, PNA, PTX, ACS, COVID, flu        Diagnoses Considered but I have low suspicion of:   Cholecystitis, UTI, pericarditis, myocarditis             Pertinent Comorbid Conditions:    CAD, CHF, history of CVA, A. fib, BPH, CKD    2)  Data Reviewed (none if left blank)          My Independent interpretations:     EKG:      A. fib, rate 97, , QTc 447. No ST elevations or depressions. Imaging: CXR with right lung infiltrate    Labs:      Creatinine 2.5 which seems similar to baseline. Elevated troponin, BNP, lactic, Pro-Santos.  Hypomagnesemia.                  Decision Rules/Clinical Scores utilized:  PSI score 180            External Documentation Reviewed:         Previous patient encounter documents & history available on EMR was reviewed including admission from August 2022             See Formal Diagnostic Results above for the lab and radiology tests and orders. 3)  Treatment and Disposition         ED Reassessment: Respiratory rate and saturations improved with BiPAP. Hold off on fluids and diuretics. Rocephin and azithromycin given for CAP coverage. Elevated trop, ASA given, likely type II. Case discussed with consulting clinician: Hospitalist, Dr. Zahida Workman who accepted the patient for admission         Shared Decision-Making was performed and disposition discussed with the patient and family and questions answered. Summary of Patient Presentation:  80-year-old male with past medical history CHF, CAD presenting with shortness of breath. Hypoxic on room air, placed on NC. Bedside U/S B-lines, placed on BiPAP. SIRS work-up, CXR with right lung infiltrate. Antibiotics for CAP. Case discussed with hospitalist and the patient was admitted for further management. MDM     Amount and/or Complexity of Data Reviewed  Clinical lab tests: ordered and reviewed  Tests in the radiology section of CPT®: ordered and reviewed  Tests in the medicine section of CPT®: ordered and reviewed  Decide to obtain previous medical records or to obtain history from someone other than the patient: yes  Obtain history from someone other than the patient: yes (son)  Review and summarize past medical records: yes  Discuss the patient with other providers: yes  Independent visualization of images, tracings, or specimens: yes    Risk of Complications, Morbidity, and/or Mortality  Presenting problems: high  Diagnostic procedures: high  Management options: high    /  ED Course as of 01/17/23 0703   Tue Jan 17, 2023   0430 Bedside ultrasound B lines in all lung fields. No pericardial effusion.  Irregular beat, no distinct wall abnormalities [RO]   0648 XR CHEST PORTABLE [AM]      ED Course User Index  [AM] Shaheen Haile MD  [RO] Wilder Salas MD     Vitals Reviewed:    Vitals: 01/17/23 0439 01/17/23 0500 01/17/23 0540 01/17/23 0616   BP: 125/66  (!) 116/58 112/60   Pulse: (!) 106 99 83 78   Resp: (!) 34 23 21 22   Temp: 98.3 °F (36.8 °C)      TempSrc: Oral      SpO2: 91% 98% 95% 96%       The patient was seen and examined. Appropriate diagnostic testing was performed and results reviewed with the patient. The results of pertinent diagnostic studies and exam findings were discussed. The patients provisional diagnosis and plan of care were discussed with the patient and present family who expressed understanding. Any medications were reviewed and indications and risks of medications were discussed with the patient /family present. ED Medications administered this visit:  (None if blank)  Medications   azithromycin (ZITHROMAX) 500 mg in sodium chloride 0.9 % 250 mL IVPB ([id number]) (500 mg IntraVENous New Bag 1/17/23 0610)   magnesium sulfate 2000 mg in 50 mL IVPB premix (has no administration in time range)   cefTRIAXone (ROCEPHIN) 1,000 mg in dextrose 5 % 50 mL IVPB mini-bag (0 mg IntraVENous Stopped 1/17/23 3404)   aspirin chewable tablet 324 mg (324 mg Oral Given 1/17/23 0620)         PROCEDURES: (None if blank)  Procedures:       DISCHARGE PRESCRIPTIONS: (None if blank)  New Prescriptions    No medications on file       FINAL IMPRESSION      1. Pneumonia of right lung due to infectious organism, unspecified part of lung    2. Hypoxia    3. Hypomagnesemia    4. Elevated troponin          DISPOSITION/PLAN   DISPOSITION Admitted 01/17/2023 06:51:06 AM      OUTPATIENT FOLLOW UP THE PATIENT:  No follow-up provider specified.     MD Virginia Chang MD  Resident  01/17/23 4477

## 2023-01-17 NOTE — ED NOTES
Pt transported to [facility]. Pt is in stable condition. Floor contacted before transport and spoke with Chrissy Amaya.      Samy Lau  01/17/23 1124

## 2023-01-17 NOTE — FLOWSHEET NOTE
01/17/23 1525   Treatment Team Notification   Reason for Communication Abnormal vitals  (Bradycardia (HR 40's-50's asymptomatic))   Team Member Name Dr. Geneva Gutiérrez Provider   Method of Communication Secure Message   Response Other (Comment)  (Hold any Beta Blockers for HR < 50)   Notification Time [id number]

## 2023-01-17 NOTE — ED NOTES
Bedside report received by Edie CARRILLO. Patient resting in bed. Patient pulled up in bed and given a drink of water.  No other concerns voiced at this time will continue to monitor      Raghav Davenport RN  01/17/23 0021

## 2023-01-17 NOTE — TELEPHONE ENCOUNTER
Left message informing pt to hold lasix for 3 days and then resume, and repeat labs on Friday.     Lab order pending

## 2023-01-17 NOTE — ED NOTES
ED to inpatient nurses report    Chief Complaint   Patient presents with    Shortness of Breath      Present to ED from home  LOC: alert and orientated to name and place  Vital signs   Vitals:    01/17/23 0500 01/17/23 0540 01/17/23 0616 01/17/23 0708   BP:  (!) 116/58 112/60 101/86   Pulse: 99 83 78 77   Resp: 23 21 22 22   Temp:       TempSrc:       SpO2: 98% 95% 96% 95%      Oxygen Baseline RA    Current needs required NA Bipap/Cpap Yes  LDAs:   Peripheral IV 01/17/23 Right Forearm (Active)     Mobility: Requires assistance * 2  Pending ED orders: NA  Present condition: STABLE  C-SSRS    Swallow Screening    Preferred Language: [place]     Electronically signed by Alessandro Stinson, RN on 1/17/2023 at 7:49 AM       Jennifer Rodas, RN  01/17/23 8061

## 2023-01-17 NOTE — CONSULTS
[facility]  Consult    Patient's Name/Date of Birth: Tana Denis / 3/30/1932 (89 y.o.)    Date: January 17, 2023     Referring Provider: Geoffrey Rangel PA-C    CHIEF COMPLAINT: Elevated troponin      HPI: This is a pleasant 80 y.o. male with PMH of HFpEF, CAD s/p CABG, CKD stage 3b, HTN, persistent atrial fibrillation, BPH presents with SOB. Per H&P, patient presented for SOB, dry cough, and chills. Patient denies any recent or current chest pain. Cardiology was consulted due to patient having elevated and rising troponin. Initial troponin 0.046. Most recent troponin 0.361. At time of my examination patient denies any chest pain/pressure and denies SOB. Patient denies all symptoms. Patient was unaware of the reason for his hospitalization. No family was at bedside to provide further history. Echo: 1/17/2023  Summary   Ejection fraction is visually estimated at 55%. Overall left ventricular function is normal.   Moderately dilated left atrium. Moderately enlarged right atrium size. Aortic valve appears tricuspid. Aortic valve leaflets are somewhat thickened. Aortic valve leaflets are Mildly calcified.        All labs, EKG's, diagnostic testing and images as well as cardiac cath, stress testing were reviewed during this encounter    Past Medical History:   Diagnosis Date    Actinic keratoses 8/21/2013    Adenomatous polyp 2008    Atrial fibrillation ([address]     CAD (coronary artery disease) 2009    Carotid artery disease ([address] 2009    left    Hyperlipidemia 2004    Hypertension 2004    Inguinal hernia, left 2/19/2014    Unspecified cerebral artery occlusion with cerebral infarction      Past Surgical History:   Procedure Laterality Date    ABDOMEN SURGERY      ABDOMINAL ADHESION SURGERY  6/2012    Dr Boogie Cheung REMOVAL  2004    right    COLONOSCOPY      [facility]    CORONARY ARTERY BYPASS GRAFT  2009    ENDOSCOPY, COLON, DIAGNOSTIC      EYE SURGERY      SMALL INTESTINE SURGERY      SMALL INTESTINE SURGERY  02/20/2015    Exploratory Laparotomy    UPPER GASTROINTESTINAL ENDOSCOPY N/A 1/26/2021    EGD ESOPHAGOGASTRODUODENOSCOPY PEG TUBE INSERTION performed by Arina Puckett MD at [facility]    VASCULAR SURGERY      cabg harvests from right leg     Current Facility-Administered Medications   Medication Dose Route Frequency Provider Last Rate Last Admin    apixaban (ELIQUIS) tablet 2.5 mg  2.5 mg Oral BID Ashley Keller PA-C        [START ON 1/18/2023] aspirin EC tablet 81 mg  81 mg Oral Daily Ashley Keller PA-C        finasteride (PROSCAR) tablet 5 mg  5 mg Oral Daily Ashley Keller PA-C        [Held by provider] furosemide (LASIX) tablet 40 mg  40 mg Oral Daily Ashley Keller PA-C        multivitamin 1 tablet  1 tablet Oral Daily Ashley Keller PA-C        pantoprazole (PROTONIX) tablet 40 mg  40 mg Oral BID Ashley Keller PA-C        tamsulosin (FLOMAX) capsule 0.4 mg  0.4 mg Oral Daily Ashley Keller PA-C        sodium chloride flush 0.9 % injection 10 mL  10 mL IntraVENous 2 times per day Ashley Keller PA-C        sodium chloride flush 0.9 % injection 10 mL  10 mL IntraVENous PRN Ashley Keller PA-C        0.9 % sodium chloride infusion   IntraVENous PRN Ashley Keller PA-C        ondansetron (ZOFRAN-ODT) disintegrating tablet 4 mg  4 mg Oral Q8H PRN Ashley Keller PA-C        Or    ondansetron (ZOFRAN) injection 4 mg  4 mg IntraVENous Q6H PRN Ashley Keller PA-C        polyethylene glycol (GLYCOLAX) packet 17 g  17 g Oral Daily PRN Ashley Keller PA-C        acetaminophen (TYLENOL) tablet 650 mg  650 mg Oral Q6H PRN Ashley Keller PA-C        Or    acetaminophen (TYLENOL) suppository 650 mg  650 mg Rectal Q6H PRN Ashley Keller PA-C        lactated ringers bolus  500 mL IntraVENous Once JESSICA Reynoso 200 mL/hr at 01/17/23 1406 500 mL at 01/17/23 1406    vancomycin (VANCOCIN) intermittent dosing (placeholder)   Other RX Placeholder Ashley Keller PA-C        piperacillin-tazobactam (ZOSYN) 4,500 mg in dextrose 5 % 100 mL IVPB (mini-bag)  4,500 mg IntraVENous Once JESSICA Reynoso        Followed by    piperacillin-tazobactam (ZOSYN) 3,375 mg in dextrose 5 % 50 mL IVPB (mini-bag)  3,375 mg IntraVENous Q12H Ashley Keller PA-C        vancomycin (VANCOCIN) 1250 mg in dextrose 5 % 250 mL IVPB  1,250 mg IntraVENous Once Nicole Mei [facility]         Prior to Admission medications    Medication Sig Start Date End Date Taking? Authorizing Provider   finasteride (PROSCAR) 5 MG tablet Take 1 tablet by mouth daily 12/6/22 3/6/23  Ella Smith MD   tamsulosin [facility]) 0.4 MG capsule Take 1 capsule by mouth daily Capsule may be opened and contents dissolve for peg tube 12/6/22 3/6/23  Ella Smith MD   aspirin 81 MG EC tablet Take 81 mg by mouth daily    Historical Provider, MD   furosemide (LASIX) 40 MG tablet Take 1 tablet by mouth daily 11/19/22   Rudy Roberts MD   Multiple Vitamins-Minerals (THERAPEUTIC MULTIVITAMIN-MINERALS) tablet Take 1 tablet by mouth in the morning.     Historical Provider, MD   apixaban (ELIQUIS) 2.5 MG TABS tablet Take 1 tablet by mouth 2 times daily 8/18/21   Areli Manning MD   pantoprazole (PROTONIX) 40 MG tablet Take 40 mg by mouth 2 times daily    Historical Provider, MD   potassium chloride (KLOR-CON M) 20 MEQ extended release tablet Take 1 tablet by mouth daily 4/19/21   Rudy Roberts MD   Scheduled Meds:   apixaban  2.5 mg Oral BID    [START ON 1/18/2023] aspirin  81 mg Oral Daily    finasteride  5 mg Oral Daily    [Held by provider] furosemide  40 mg Oral Daily    multivitamin  1 tablet Oral Daily    pantoprazole  40 mg Oral BID    tamsulosin  0.4 mg Oral Daily    sodium chloride flush  10 mL IntraVENous 2 times per day    lactated ringers bolus  500 mL IntraVENous Once    vancomycin (VANCOCIN) intermittent dosing (placeholder)   Other RX Placeholder    piperacillin-tazobactam 4,500 mg IntraVENous Once    Followed by    piperacillin-tazobactam  3,375 mg IntraVENous Q12H    vancomycin  1,250 mg IntraVENous Once     Continuous Infusions:   sodium chloride       PRN Meds:. No Known Allergies  Family History   Problem Relation Age of Onset    Cancer Mother      Social History     Socioeconomic History    Marital status:      Spouse name: Not on file    Number of children: 4    Years of education: Not on file    Highest education level: Not on file   Occupational History    Not on file   Tobacco Use    Smoking status: Former     Types: Cigarettes     Quit date: 1980     Years since quittin.0    Smokeless tobacco: Never   Vaping Use    Vaping Use: Never used   Substance and Sexual Activity    Alcohol use: Yes     Alcohol/week: 1.0 standard drink     Types: 1 Cans of beer per week     Comment: occasionally    Drug use: No    Sexual activity: Not on file   Other Topics Concern    Not on file   Social History Narrative    Not on file     Social Determinants of Health     Financial Resource Strain: Not on file   Food Insecurity: Not on file   Transportation Needs: Not on file   Physical Activity: Not on file   Stress: Not on file   Social Connections: Not on file   Intimate Partner Violence: Not on file   Housing Stability: Not on file     ROS:   Unable to accurately perform ROS due to patient being unaware of reason for hospitalization and denying all questions. Labs:  CBC:   Recent Labs     23  0640   WBC 17.4*   HGB 10.6*   HCT 32.2*   MCV 95.8*        BMP:   Recent Labs     23  1411 23  0500 23  1426    142  --    K 4.6 3.9  --     108  --    CO2 21* 20*  --    BUN 36* 40*  --    CREATININE 2.7* 2.5*  --    MG  --  1.4* 2.1     Accucheck Glucoses: No results for input(s): POCGLU in the last 72 hours. Cardiac Enzymes: No results for input(s): CKTOTAL, CKMB, CKMBINDEX, TROPONINI in the last 72 hours.   PT/INR: No results for input(s): PROTIME, INR in the last 72 hours. APTT: No results for input(s): APTT in the last 72 hours. Liver Profile:  Lab Results   Component Value Date/Time    AST 22 01/17/2023 05:00 AM    ALT 11 01/17/2023 05:00 AM    BILIDIR <0.2 08/04/2021 04:46 AM    BILITOT 0.5 01/17/2023 05:00 AM    ALKPHOS 164 01/17/2023 05:00 AM     Lab Results   Component Value Date/Time    CHOL 139 09/13/2019 12:00 AM    HDL 67 09/13/2019 12:00 AM    TRIG 62 09/13/2019 12:00 AM     TSH:   Lab Results   Component Value Date/Time    TSH 9.470 12/09/2020 06:39 PM     UA:   Lab Results   Component Value Date/Time    COLORU YELLOW 01/17/2023 06:30 AM    PHUR 5.5 01/17/2023 06:30 AM    LABCAST 0-4 C. GRAN 01/17/2023 06:30 AM    LABCAST NONE SEEN 01/17/2023 06:30 AM    WBCUA 0-2 01/17/2023 06:30 AM    RBCUA 0-2 01/17/2023 06:30 AM    MUCUS NONE SEEN 01/27/2021 11:30 AM    YEAST NONE SEEN 01/17/2023 06:30 AM    BACTERIA FEW 01/17/2023 06:30 AM    SPECGRAV 1.016 01/17/2023 06:30 AM    LEUKOCYTESUR NEGATIVE 01/17/2023 06:30 AM    UROBILINOGEN 0.2 01/17/2023 06:30 AM    BILIRUBINUR NEGATIVE 01/17/2023 06:30 AM    BLOODU NEGATIVE 01/17/2023 06:30 AM    GLUCOSEU NEGATIVE 12/26/2021 05:20 AM    AMORPHOUS URATES 01/17/2023 06:30 AM         Physical Exam:  Vitals:    01/17/23 1345   BP: 101/64   Pulse: 55   Resp: 16   Temp: 97.9 °F (36.6 °C)   SpO2: 98%      Intake/Output Summary (Last 24 hours) at 1/17/2023 1514  Last data filed at 1/17/2023 1346  Gross per 24 hour   Intake 240 ml   Output --   Net 240 ml      General:  Chronically ill appearing  Neck: Supple, no JVD, no carotid bruits  Heart: Regular rhythm normal S1 and S2, no rubs, murmurs or gallops  Lungs: Inspiratory rhonchi noted bilaterally.   Abdomen: positive bowel sounds, soft, non-tender, non-distended, no bruits, no masses  Extremities:no clubbing, cyanosis or edema  Neurologic: alert and oriented x 3, cranial nerves 2-12 grossly intact, motor and sensory intact, moving all extremities  Skin: No rashes  Lymph: No obvious LAD      Assessment & Plan:  Elevated troponin with no chest pain: Patient denies any current chest pain or any recent chest pain. Initial troponin 0.046 with repeat troponin 0.211 and 0.361. Heart Score 6. EKG shows atrial fibrillation with premature ventricular or aberrantly conducted complexes, left axis deviation, and right bundle branch block. Dr. Kary Garner spoke with the patient's family who would like to pursue conservative medical treatment due to risks associated with LHC due to patient's current kidney function, age. It is very likely that patient has underlying CAD and/or acute coronary event. Continue home medications. CAD s/p CABG in 2006: Patient reports previous CABG. No Cath in EMR. Lexiscan stress test 8/27/2015 is equivocal study with a small amount of ischemia in the anterior region. HFpEF: proBNP 9562.0. BNP chronically elevated. Patient appears euvolemic on examination. Echo 8/4/2021 shows EF 50%. Repeat Echo shows EF 5%. Atrial fibrillation, rate controlled: Rate controlled without rate controlling medications. On Eliquis. CKD, stage IIIb: Cr 2.5, baseline unclear. Follows with Dr. Jennifer Reynolds outpatient. Primary following. Acute hypoxic respiratory failure: Patient currently on 4 L NC. Likely due to suspected pneumonia. Primary following. Sepsis, secondary to CA-PNA: CXR shows small right pleural effusion, right perihilar lower lung airspace opacities. Primary following. Thank you for allowing us to participate in the care of this patient. Please do not hesitate to call us with questions.     Electronically signed by Sravan Sosa DO on 1/17/2023 at 3:14 PM    Interventional Cardiology - [facility]     Patient seen and examined  Discussed with the team on rounds  Possible pneumonia  Elevated WBC and abnormal CXR  Likely underlying CAD possible secondary rise in TN  Not the best candidate for a cath given creatinine 2.5 and over all condition   Discussed with his son over the phone  Plan for medical Rx from the cardiac stand point   Thank you for allowing me to participate in the care of your patient.  Please don't hesitate to contact me regarding any further issues related to the patient care    Jose Luis Dubon MD

## 2023-01-17 NOTE — ED NOTES
Patient requesting break from  Ppap. Patient placed on 4L NC.  Patient depends changed at this time      Alessandro Stinson RN  01/17/23 9718

## 2023-01-17 NOTE — ED NOTES
Patient medicated per MAR at this time. Patient resting in bed. No distress noted. Call light within reach.       Ramez Stokes RN  01/17/23 0648

## 2023-01-17 NOTE — PROGRESS NOTES
[address] [facility] Pharmacokinetic Monitoring Service - Vancomycin     Christopher Elkins is a 80 y.o. male starting on vancomycin therapy for CAP. Pharmacy consulted by Sabrina Rawls PA-C for monitoring and adjustment. Target Concentration: Dosing based on anticipated concentration <15 mg/L due to renal impairment/insufficiency    Additional Antimicrobials: zosyn    Pertinent Laboratory Values: Wt Readings from Last 1 Encounters:   01/17/23 138 lb (62.6 kg)     Temp Readings from Last 1 Encounters:   01/17/23 97.9 °F (36.6 °C) (Oral)     Estimated Creatinine Clearance: 15 mL/min (A) (based on SCr of 2.5 mg/dL (H)). Recent Labs     01/16/23  1411 01/17/23  0500 01/17/23  0640   CREATININE 2.7* 2.5*  --    WBC  --   --  17.4*     Procalcitonin: 0.69     Pertinent Cultures:  Culture Date Source Results   01/17/23 Blood x 2 sent   MRSA Nasal Swab: was ordered by provider, awaiting results.     Plan:  Concentration-guided dosing due to renal impairment/insufficiency  Start vancomycin 1250 mg IV x 1 (~20 mg/kg)  Renal labs as indicated   Vancomycin concentration ordered for 01/18/23 @ 1500    Pharmacy will continue to monitor patient and adjust therapy as indicated    Thank you for the consult,  [place], [facility]  1/17/2023 2:04 PM

## 2023-01-17 NOTE — TELEPHONE ENCOUNTER
----- Message from Melinda Ibarra MD sent at 1/16/2023  6:41 PM EST -----  Creat is slightly higher   Please have patient hold lasix for 3 days only and then resume lasix. If possible repeat Creat level on Friday.

## 2023-01-17 NOTE — ED NOTES
Patient resting in bed no concerns voiced.  Patient tolerating nasal cannula at this time      Jose iSbley RN  01/17/23 8240

## 2023-01-18 ENCOUNTER — APPOINTMENT (OUTPATIENT)
Dept: GENERAL RADIOLOGY | Age: 88
DRG: 853 | End: 2023-01-18
Payer: OTHER GOVERNMENT

## 2023-01-18 ENCOUNTER — APPOINTMENT (OUTPATIENT)
Dept: ULTRASOUND IMAGING | Age: 88
DRG: 853 | End: 2023-01-18
Payer: OTHER GOVERNMENT

## 2023-01-18 LAB
ABSOLUTE RETIC #: 18 THOU/MM3 (ref 20–115)
ANION GAP SERPL CALCULATED.3IONS-SCNC: 12 MEQ/L (ref 8–16)
ANION GAP SERPL CALCULATED.3IONS-SCNC: 14 MEQ/L (ref 8–16)
BACTERIA: ABNORMAL
BASOPHILS # BLD: 0.3 %
BASOPHILS ABSOLUTE: 0.1 THOU/MM3 (ref 0–0.1)
BILIRUBIN URINE: NEGATIVE
BLOOD, URINE: NEGATIVE
BUN BLDV-MCNC: 44 MG/DL (ref 7–22)
BUN BLDV-MCNC: 44 MG/DL (ref 7–22)
CALCIUM SERPL-MCNC: 7.9 MG/DL (ref 8.5–10.5)
CALCIUM SERPL-MCNC: 8.2 MG/DL (ref 8.5–10.5)
CASTS: ABNORMAL /LPF
CASTS: ABNORMAL /LPF
CHARACTER, URINE: CLEAR
CHLORIDE BLD-SCNC: 104 MEQ/L (ref 98–111)
CHLORIDE BLD-SCNC: 106 MEQ/L (ref 98–111)
CO2: 20 MEQ/L (ref 23–33)
CO2: 20 MEQ/L (ref 23–33)
COLOR: YELLOW
CREAT SERPL-MCNC: 2.6 MG/DL (ref 0.4–1.2)
CREAT SERPL-MCNC: 3 MG/DL (ref 0.4–1.2)
CREATININE URINE: 143.5 MG/DL
CREATININE, URINE: 143.5 MG/DL
CRYSTALS: ABNORMAL
EKG Q-T INTERVAL: 502 MS
EKG QRS DURATION: 128 MS
EKG QTC CALCULATION (BAZETT): 509 MS
EKG R AXIS: -34 DEGREES
EKG T AXIS: 139 DEGREES
EKG VENTRICULAR RATE: 62 BPM
EOSINOPHIL # BLD: 0.3 %
EOSINOPHIL SMEAR, URINE: NORMAL
EOSINOPHILS ABSOLUTE: 0.1 THOU/MM3 (ref 0–0.4)
EPITHELIAL CELLS, UA: ABNORMAL /HPF
ERYTHROCYTE [DISTWIDTH] IN BLOOD BY AUTOMATED COUNT: 13.2 % (ref 11.5–14.5)
ERYTHROCYTE [DISTWIDTH] IN BLOOD BY AUTOMATED COUNT: 47.5 FL (ref 35–45)
FOLATE: > 20 NG/ML (ref 4.8–24.2)
GFR SERPL CREATININE-BSD FRML MDRD: 19 ML/MIN/1.73M2
GFR SERPL CREATININE-BSD FRML MDRD: 23 ML/MIN/1.73M2
GLUCOSE BLD-MCNC: 107 MG/DL (ref 70–108)
GLUCOSE BLD-MCNC: 99 MG/DL (ref 70–108)
GLUCOSE, URINE: NEGATIVE MG/DL
HCT VFR BLD CALC: 29.3 % (ref 42–52)
HEMOGLOBIN: 9.4 GM/DL (ref 14–18)
IMMATURE GRANS (ABS): 0.08 THOU/MM3 (ref 0–0.07)
IMMATURE GRANULOCYTES: 0.5 %
IMMATURE RETIC FRACT: 5.9 % (ref 2.3–13.4)
IRON SATURATION: 17 % (ref 20–50)
IRON: 29 UG/DL (ref 65–195)
KETONES, URINE: NEGATIVE
LEUKOCYTE ESTERASE, URINE: NEGATIVE
LYMPHOCYTES # BLD: 5.8 %
LYMPHOCYTES ABSOLUTE: 1 THOU/MM3 (ref 1–4.8)
MCH RBC QN AUTO: 31.4 PG (ref 26–33)
MCHC RBC AUTO-ENTMCNC: 32.1 GM/DL (ref 32.2–35.5)
MCV RBC AUTO: 98 FL (ref 80–94)
MICROALBUMIN UR-MCNC: 2.42 MG/DL
MICROALBUMIN/CREAT UR-RTO: 17 MG/G (ref 0–30)
MISCELLANEOUS LAB TEST RESULT: ABNORMAL
MONOCYTES # BLD: 7.1 %
MONOCYTES ABSOLUTE: 1.2 THOU/MM3 (ref 0.4–1.3)
NITRITE, URINE: NEGATIVE
NUCLEATED RED BLOOD CELLS: 0 /100 WBC
OSMOLALITY URINE: 603 MOSMOL/KG (ref 250–750)
PH UA: 5.5 (ref 5–9)
PLATELET # BLD: 180 THOU/MM3 (ref 130–400)
PMV BLD AUTO: 11 FL (ref 9.4–12.4)
POTASSIUM REFLEX MAGNESIUM: 4.1 MEQ/L (ref 3.5–5.2)
POTASSIUM REFLEX MAGNESIUM: 4.4 MEQ/L (ref 3.5–5.2)
PROTEIN UA: 30 MG/DL
RBC # BLD: 2.99 MILL/MM3 (ref 4.7–6.1)
RBC URINE: ABNORMAL /HPF
RENAL EPITHELIAL, UA: ABNORMAL
RETIC HEMOGLOBIN: 30.2 PG (ref 28.2–35.7)
RETICULOCYTE ABSOLUTE COUNT: 0.6 % (ref 0.5–2)
SEG NEUTROPHILS: 86 %
SEGMENTED NEUTROPHILS ABSOLUTE COUNT: 14.8 THOU/MM3 (ref 1.8–7.7)
SODIUM BLD-SCNC: 138 MEQ/L (ref 135–145)
SODIUM BLD-SCNC: 138 MEQ/L (ref 135–145)
SODIUM URINE: 38 MEQ/L
SPECIFIC GRAVITY UA: 1.02 (ref 1–1.03)
TOTAL IRON BINDING CAPACITY: 168 UG/DL (ref 171–450)
TROPONIN T: 0.32 NG/ML
UROBILINOGEN, URINE: 0.2 EU/DL (ref 0–1)
VITAMIN B-12: 448 PG/ML (ref 211–911)
WBC # BLD: 17.2 THOU/MM3 (ref 4.8–10.8)
WBC UA: ABNORMAL /HPF
YEAST: ABNORMAL

## 2023-01-18 PROCEDURE — 82607 VITAMIN B-12: CPT

## 2023-01-18 PROCEDURE — 89190 NASAL SMEAR FOR EOSINOPHILS: CPT

## 2023-01-18 PROCEDURE — 99233 SBSQ HOSP IP/OBS HIGH 50: CPT | Performed by: HOSPITALIST

## 2023-01-18 PROCEDURE — 82746 ASSAY OF FOLIC ACID SERUM: CPT

## 2023-01-18 PROCEDURE — 93005 ELECTROCARDIOGRAM TRACING: CPT | Performed by: HOSPITALIST

## 2023-01-18 PROCEDURE — 2500000003 HC RX 250 WO HCPCS: Performed by: HOSPITALIST

## 2023-01-18 PROCEDURE — 84484 ASSAY OF TROPONIN QUANT: CPT

## 2023-01-18 PROCEDURE — 6370000000 HC RX 637 (ALT 250 FOR IP)

## 2023-01-18 PROCEDURE — 36415 COLL VENOUS BLD VENIPUNCTURE: CPT

## 2023-01-18 PROCEDURE — 71046 X-RAY EXAM CHEST 2 VIEWS: CPT

## 2023-01-18 PROCEDURE — 82043 UR ALBUMIN QUANTITATIVE: CPT

## 2023-01-18 PROCEDURE — 82570 ASSAY OF URINE CREATININE: CPT

## 2023-01-18 PROCEDURE — 94669 MECHANICAL CHEST WALL OSCILL: CPT

## 2023-01-18 PROCEDURE — 2580000003 HC RX 258

## 2023-01-18 PROCEDURE — 80048 BASIC METABOLIC PNL TOTAL CA: CPT

## 2023-01-18 PROCEDURE — 85046 RETICYTE/HGB CONCENTRATE: CPT

## 2023-01-18 PROCEDURE — 81001 URINALYSIS AUTO W/SCOPE: CPT

## 2023-01-18 PROCEDURE — 2580000003 HC RX 258: Performed by: HOSPITALIST

## 2023-01-18 PROCEDURE — 93010 ELECTROCARDIOGRAM REPORT: CPT | Performed by: INTERNAL MEDICINE

## 2023-01-18 PROCEDURE — 83540 ASSAY OF IRON: CPT

## 2023-01-18 PROCEDURE — 83550 IRON BINDING TEST: CPT

## 2023-01-18 PROCEDURE — 84540 ASSAY OF URINE/UREA-N: CPT

## 2023-01-18 PROCEDURE — 2140000000 HC CCU INTERMEDIATE R&B

## 2023-01-18 PROCEDURE — 76770 US EXAM ABDO BACK WALL COMP: CPT

## 2023-01-18 PROCEDURE — 83935 ASSAY OF URINE OSMOLALITY: CPT

## 2023-01-18 PROCEDURE — 84300 ASSAY OF URINE SODIUM: CPT

## 2023-01-18 PROCEDURE — 6360000002 HC RX W HCPCS: Performed by: HOSPITALIST

## 2023-01-18 PROCEDURE — 85025 COMPLETE CBC W/AUTO DIFF WBC: CPT

## 2023-01-18 RX ORDER — SODIUM CHLORIDE 9 MG/ML
INJECTION, SOLUTION INTRAVENOUS CONTINUOUS
Status: DISCONTINUED | OUTPATIENT
Start: 2023-01-18 | End: 2023-01-20

## 2023-01-18 RX ADMIN — Medication 1 TABLET: at 09:05

## 2023-01-18 RX ADMIN — ASPIRIN 81 MG: 81 TABLET, COATED ORAL at 09:00

## 2023-01-18 RX ADMIN — TAMSULOSIN HYDROCHLORIDE 0.4 MG: 0.4 CAPSULE ORAL at 09:06

## 2023-01-18 RX ADMIN — FINASTERIDE 5 MG: 5 TABLET, FILM COATED ORAL at 09:05

## 2023-01-18 RX ADMIN — DOXYCYCLINE 100 MG: 100 INJECTION, POWDER, LYOPHILIZED, FOR SOLUTION INTRAVENOUS at 20:56

## 2023-01-18 RX ADMIN — PANTOPRAZOLE SODIUM 40 MG: 40 TABLET, DELAYED RELEASE ORAL at 09:00

## 2023-01-18 RX ADMIN — APIXABAN 2.5 MG: 2.5 TABLET, FILM COATED ORAL at 19:46

## 2023-01-18 RX ADMIN — SODIUM CHLORIDE: 9 INJECTION, SOLUTION INTRAVENOUS at 09:00

## 2023-01-18 RX ADMIN — APIXABAN 2.5 MG: 2.5 TABLET, FILM COATED ORAL at 09:05

## 2023-01-18 RX ADMIN — PANTOPRAZOLE SODIUM 40 MG: 40 TABLET, DELAYED RELEASE ORAL at 19:46

## 2023-01-18 RX ADMIN — SODIUM CHLORIDE, PRESERVATIVE FREE 10 ML: 5 INJECTION INTRAVENOUS at 09:00

## 2023-01-18 RX ADMIN — CEFTRIAXONE SODIUM 1000 MG: 1 INJECTION, POWDER, FOR SOLUTION INTRAMUSCULAR; INTRAVENOUS at 10:52

## 2023-01-18 RX ADMIN — DOXYCYCLINE 100 MG: 100 INJECTION, POWDER, LYOPHILIZED, FOR SOLUTION INTRAVENOUS at 09:13

## 2023-01-18 RX ADMIN — SODIUM CHLORIDE: 9 INJECTION, SOLUTION INTRAVENOUS at 20:56

## 2023-01-18 ASSESSMENT — PAIN SCALES - GENERAL: PAINLEVEL_OUTOF10: 0

## 2023-01-18 NOTE — PLAN OF CARE
Problem: Discharge Planning  Goal: Discharge to home or other facility with appropriate resources  Outcome: Progressing  Flowsheets (Taken 1/18/2023 0442)  Discharge to home or other facility with appropriate resources:   Identify barriers to discharge with patient and caregiver   Arrange for needed discharge resources and transportation as appropriate     Problem: Safety - Adult  Goal: Free from fall injury  Outcome: Progressing  Flowsheets (Taken 1/18/2023 0442)  Free From Fall Injury: Instruct family/caregiver on patient safety     Problem: ABCDS Injury Assessment  Goal: Absence of physical injury  Outcome: Progressing  Flowsheets (Taken 1/18/2023 0442)  Absence of Physical Injury: Implement safety measures based on patient assessment     Problem: Chronic Conditions and Co-morbidities  Goal: Patient's chronic conditions and co-morbidity symptoms are monitored and maintained or improved  Outcome: Progressing  Flowsheets (Taken 1/18/2023 0442)  Care Plan - Patient's Chronic Conditions and Co-Morbidity Symptoms are Monitored and Maintained or Improved: Monitor and assess patient's chronic conditions and comorbid symptoms for stability, deterioration, or improvement     Problem: Pain  Goal: Verbalizes/displays adequate comfort level or baseline comfort level  Outcome: Progressing  Flowsheets (Taken 1/18/2023 0442)  Verbalizes/displays adequate comfort level or baseline comfort level:   Encourage patient to monitor pain and request assistance   Assess pain using appropriate pain scale     Care plan reviewed with patient. Patient verbalizes understanding of the care plan and contributed to goal setting.

## 2023-01-18 NOTE — PROGRESS NOTES
Hospitalist Progress Note      Patient:  Rasta Jeffries    Unit/Bed:3B-28/028-A  YOB: 1932  MRN: [id number]   Acct: [de-identified]   PCP: Danielle Manriquez DO  Date of Admission: 1/17/2023    Assessment/Plan:    Query Sepsis vs SIRS secondary to aspiration PNA vs pneumonitis:               started on Vancomycin and zosyn upon admission. No need for such broad coverage; presentation and findings on CXR not c/w bilat necrotizing PNA; narrowed coverage to Ceftriaxone and Doxy (for possible Atypical and MRSA); IS/Acapella/PT to see. Query acute hypoxic resp. Failure: likely 2/2 aspiration pneumonitis pt was successfully weaned off O2. Management above    Query Chronic HFpEF vs RV failure with pulmonary HTN:               Last limited ECHO 8/4/22  reveals EF 50% with right ventricle globally reduced systolic function. ECHO 12/9/20 reveals RVSP 44 mmHg. Repeat TTE showed NL RVSP and RV systolic function. Possible resolution s/p query prior RV MI or acute hypoxic resp failure during last admission. Even if RV failure w/ corpulmonale were present one would not anticipate pulmonary edema. I/O, daily wt, salt (2 gr) and fluid (2L) restriction. Hold diuretics d/t worsening ANNIKA. No GDMT. Elevated Troponin, with Hx of CAD:   Hx of CABG in 2006. Trop down-trending since admission. Pt denies chest pain, EKG negative for any new/dynamic ischemic changes; will monitor. Continue OMT      ANNIKA on CKD, stage 3b Etiology? Obstructive uropathy vs Hypertensive nephropathy vs others: likely 2/2 aggressive diuresis               Cr. 3.0 today, baseline around 1.5. held diuretics; avoid nephrotoxins. IVF while monitoring volume status; avoid nephrotoxins. Preliminary ANNIKA W/U initiated. Notified Nephrology consulted per pt's wish. Added to Care Teams    Hypomagnesemia (Resolved):                Mag 1.4 on admission; likely 2/2 diuretics and nutritional; replace and trend; repeat Mg 2.1     Query Hx of Essential HTN:  Well-controlled on current meds. CCM and monitor                    Persistent A. Fib:   Pt remains rate controlled w/o any AVB agents. On NOAC. continued      BPH:   Continue home Tamsulosin and Finasteride. monitor for urinary retention. Chronic normo/macrocytic anemia: Hb stable. Anemia W/U c/w RAZA; will place on replacement. PCP to consider GI referral for bidirectional endoscopy if warranted . Code status: Full code; palliative care to see                  Chief Complaint: SOB    Initial H and P:-    Admitted for evaluation and management of acute hypoxic resp failure. Subjective (past 24 hours):   Feeling well. Denies CP/worsening SOB/fever/chills/cough/sputum/presyncope/palpitation/GI or  symptoms. Past medical history, family history, social history and allergies reviewed again and is unchanged since admission. ROS (All review of systems completed. Pertinent positives noted. Otherwise All other systems reviewed and negative.)     Medications:  Reviewed    Infusion Medications    sodium chloride       Scheduled Medications    apixaban  2.5 mg Oral BID    aspirin  81 mg Oral Daily    finasteride  5 mg Oral Daily    [Held by provider] furosemide  40 mg Oral Daily    multivitamin  1 tablet Oral Daily    pantoprazole  40 mg Oral BID    tamsulosin  0.4 mg Oral Daily    sodium chloride flush  10 mL IntraVENous 2 times per day    vancomycin (VANCOCIN) intermittent dosing (placeholder)   Other RX Placeholder    piperacillin-tazobactam  3,375 mg IntraVENous Q12H     PRN Meds: sodium chloride flush, sodium chloride, ondansetron **OR** ondansetron, polyethylene glycol, acetaminophen **OR** acetaminophen      Intake/Output Summary (Last 24 hours) at 1/18/2023 0750  Last data filed at 1/18/2023 0211  Gross per 24 hour   Intake 840 ml   Output 0 ml   Net 840 ml       Diet:  ADULT DIET; Regular; 3 carb choices (45 gm/meal);  Low Sodium (2 gm); 2000 ml    Physical Exam:  /62   Pulse 50   Temp 97.5 °F (36.4 °C) (Oral)   Resp 16   Wt 149 lb 12.8 oz (67.9 kg)   SpO2 98%   BMI 29.26 kg/m²   General appearance: No apparent distress, appears stated age and cooperative. HEENT: Pupils equal, round, and reactive to light. Conjunctivae/corneas clear. Neck: Supple, with full range of motion. No jugular venous distention. Trachea midline. Respiratory:  Normal respiratory effort. Clear to auscultation, bilaterally without Rales/Wheezes/Rhonchi. Cardiovascular: Regular rate and rhythm with normal S1/S2 without murmurs, rubs or gallops. Abdomen: Soft, non-tender, non-distended with normal bowel sounds. Musculoskeletal: passive and active ROM x 4 extremities. Skin: Skin color, texture, turgor normal.  No rashes or lesions. Neurologic:  Neurovascularly intact without any focal sensory/motor deficits. Cranial nerves: II-XII intact, grossly non-focal.  Psychiatric: Alert and oriented, thought content appropriate, normal insight  Capillary Refill: Brisk,< 3 seconds   Peripheral Pulses: +2 palpable, equal bilaterally     Labs:   Recent Labs     01/17/23  0640 01/18/23  0405   WBC 17.4* 17.2*   HGB 10.6* 9.4*   HCT 32.2* 29.3*    180     Recent Labs     01/16/23  1411 01/17/23  0500 01/18/23  0405    142 138   K 4.6 3.9 4.4    108 104   CO2 21* 20* 20*   BUN 36* 40* 44*   CREATININE 2.7* 2.5* 3.0*   CALCIUM 8.8 8.5 8.2*     Recent Labs     01/17/23  0500   AST 22   ALT 11   BILITOT 0.5   ALKPHOS 164*     No results for input(s): INR in the last 72 hours. No results for input(s): [facility] in the last 72 hours. Microbiology:    Blood culture #1:   Lab Results   Component Value Date/Time    BC No growth 24 hours.  01/17/2023 05:07 AM       Blood culture #2:No results found for: BLOODCULT2    Organism:  Lab Results   Component Value Date/Time    ORG Escherichia coli 08/06/2022 09:45 AM    ORG Enterococcus faecalis - (Group D) 08/06/2022 09:45 AM         Lab Results   Component Value Date/Time    LABGRAM  01/28/2021 12:40 PM     Few segmented neutrophils observed. No bacteria seen. performed on cytospun specimen       MRSA culture only:No results found for: [facility]    Urine culture:   Lab Results   Component Value Date/Time    LABURIN  08/06/2022 09:45 AM     One isolate is resistant in vitro to current regimen. Tahmina Dust  08/06/2022 09:45 AM     [place] count: 10,000-50,000 CFU/mL This isolate is a probable ESBL . ID consultation may be helpful in some clinical circumstances. CONTACT isolation required. LABURIN [place] count: 10,000-20,000 CFU/mL 08/06/2022 09:45 AM       Respiratory culture: No results found for: CULTRESP    Aerobic and Anaerobic :  No results found for: LABAERO  Lab Results   Component Value Date/Time    LABANAE No growth-preliminary No growth 01/28/2021 12:40 PM       Urinalysis:      Lab Results   Component Value Date/Time    NITRU NEGATIVE 01/17/2023 06:30 AM    WBCUA 0-2 01/17/2023 06:30 AM    BACTERIA FEW 01/17/2023 06:30 AM    RBCUA 0-2 01/17/2023 06:30 AM    BLOODU NEGATIVE 01/17/2023 06:30 AM    SPECGRAV 1.016 01/17/2023 06:30 AM    GLUCOSEU NEGATIVE 12/26/2021 05:20 AM       Radiology:  XR CHEST PORTABLE   Final Result   1. Enlargement of the cardiopericardial silhouette with small right    pleural effusion and right perihilar and lower lung airspace opacities    which could indicate pneumonia. This document has been electronically signed by: Josefina Parker MD on    01/17/2023 06:16 AM        XR CHEST PORTABLE    Result Date: 1/17/2023  1 view chest x-ray Comparison: CR/SR - XR CHEST PORTABLE - 08/06/2022 05:10 AM EDT Findings: Previous median sternotomy. Enlargement of the cardiopericardial silhouette. Right perihilar and lower lung consolidation. Probable small right pleural effusion. No pneumothorax.      1. Enlargement of the cardiopericardial silhouette with small right pleural effusion and right perihilar and lower lung airspace opacities which could indicate pneumonia. This document has been electronically signed by: Jamshid Enriquez MD on 01/17/2023 06:16 AM    With RN in room, patient and son were updated about and agreed upon the treatment plan, all the questions and concerns were addressed. More than 30 minutes of time spent on counseling.        Electronically signed by Slade Jamil MD on 1/18/2023 at 7:58 AM
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
## 2023-01-18 NOTE — PROGRESS NOTES
01/18/23 0805   Encounter Summary   Encounter Overview/Reason  Spiritual/Emotional Needs;Initial Encounter   Service Provided For: Patient   Referral/Consult From: Christophe   Last Encounter  01/18/23  (ACP)   Complexity of Encounter Moderate   Begin Time 0755   End Time  0805   Total Time Calculated 10 min   Encounter    Type Initial Screen/Assessment   Spiritual/Emotional needs   Type Spiritual Support   Advance Care Planning   Type ACP conversation   Assessment/Intervention/Outcome   Assessment Calm;Coping   Intervention Nurtured Hope;Prayer (assurance of)/Ransom;Active listening   Outcome Comfort;Coping   Assessment:  During my encounter with the 89 yr old Palliative Care patient, I gave the patient a copy of the Advance Directive as requested. I assess the pt's spiritual needs. The pt was admitted due to acute hypoxemic respiratory failure.     Interventions:  I gave the pt the requested documents and gave a brochure and asked if the patient had any questions.   I offered words of comfort and prayer.  provided a listening presence and encouraged pt to share their beliefs and how they support him during their hospitalization.     Outcomes:  The pt was thankful for the spiritual support. The patient shared that they would complete the forms after they had the opportunity to review the documents.     Plan:  Chaplains will follow-up at a later time in order to assist the patient as they complete the Advance Directive documents.  The Chaplains will be available to provide further emotional support per request.

## 2023-01-18 NOTE — PALLIATIVE CARE
Follow Up / Progress Note        Patient:   Laly Horn  YOB: 1932  Age:  80 y.o. Room:  [facility]28/028-A  MRN:  [id number]              3138 On unit to speak with patient. Patient resting in bed. When this RN entered, patient stated \"I am waiting to get cleaned up, I puked\". Noted that patient had vomit on his gown. Will follow with patient later today. 1302 Returned to unit to speak with patient. Patient currently not in room.       Electronically signed by Roselia Pal RN on 1/18/2023 at 10:25 AM             Palliative Care Office: [phone]

## 2023-01-18 NOTE — CARE COORDINATION
Case Management Assessment  Initial Evaluation    Date/Time of Evaluation: 1/18/2023 1:05 PM  Assessment Completed by: Darby Stern RN    If patient is discharged prior to next notation, then this note serves as note for discharge by case management. Patient Name: Raymon Guerra                   YOB: 1932  Diagnosis: Hypomagnesemia [E83.42]  Hypoxia [R09.02]  Elevated troponin [R77.8]  Pneumonia of right lung due to infectious organism, unspecified part of lung [J18.9]  Acute hypoxemic respiratory failure ([address] [J96.01]                   Date / Time: 1/17/2023  4:32 AM  Location: [facility]28/028-     Patient Admission Status: Inpatient   Readmission Risk (Low < 19, Mod (19-27), High > 27): Readmission Risk Score: 18.6    Current PCP: Anastacia Thompson, DO  PCP verified by CM? Yes    Chart Reviewed: Yes      History Provided by: Patient  Patient Orientation: Alert and Oriented    Patient Cognition: Alert    Hospitalization in the last 30 days (Readmission):  No    If yes, Readmission Assessment in CM Navigator will be completed. Advance Directives:      Code Status: Full Code   Patient's Primary Decision Maker is: Named in Scanned ACP Document    Primary Decision Maker: Cindy Hernandezjoe - Child - [phone]    Primary Decision Maker: Aiyanaslim Hailee - Child - [phone]    Discharge Planning:    Patient lives with: Alone Type of Home: Apartment ([address])  Primary Care Giver: Self  Patient Support Systems include: Children, Family Members, Friends/Neighbors, Rastafari/[place] Community   Current Financial resources: [employer] ([place]), Yon Koenig  Current community resources: None  Current services prior to admission: Durable Medical Equipment            Current DME: Marysol Gordon, Bedside [employer], Other (Comment) (3:1; grab bars: 2 [employer])            Type of Home Care services:  None    ADLS  Prior functional level: Independent in ADLs/IADLs  Current functional level:  Independent in ADLs/IADLs    Family can provide assistance at DC: Yes  Would you like Case Management to discuss the discharge plan with any other family members/significant others, and if so, who? No  Plans to Return to Present Housing: Yes  Other Identified Issues/Barriers to RETURNING to current housing: None  Potential Assistance needed at discharge: N/A            Potential DME:    Patient expects to discharge to: Apartment ([address])  Plan for transportation at discharge: Family    Financial    Payor: Suzette Closs / Plan: Suzette Closs / Product Type: *No Product type* /     Does insurance require precert for SNF: No    Potential assistance Purchasing Medications: No  Meds-to-Beds request: No      [address] [facility] [place] [phone]  [address] [facility] [address]  Phone: [phone] Fax: [phone], [address] [phone] [place] [phone]  [address]  Phone: [phone] Fax: [phone]      Notes:    Factors facilitating achievement of predicted outcomes: Family support, Motivated, Cooperative, Pleasant and Has needed DME    Barriers to discharge: Medical complications    Additional Case Management Notes: Admitted through ED with SOB at rest and with exertion. Complains of dry cough and chills. Pt was on BiPAP in the ED, has been weaned down to 2L/nc this am. Sats 98%. WBC 17.4 and 17.2. Hgb 10.6 and 9.4. BUN 44 and creatinine 3.0. Troponins 0.211, 0.361 and 0.388. [address] Cardiology. Pt has bradycardia at times, down into the 50's. IVF. Eliquis, Baby ASA, Cefepime iv q12hr. Given Zithromax iv x1 and Vanc iv x1. Update: 1:09 PM EST  Antibiotics changed to Rocephin iv daily, Doxycycline iv q12hr. Procedure:   1/17 CXR: Enlargement of the cardiopericardial silhouette with small right pleural effusion and right perihilar and lower lung airspace opacities which could indicate pneumonia. 1/17 ECHO: EF 55%.  Moderately dilated left atrium. Moderately enlarged right atrium size. The Plan for Transition of Care is related to the following treatment goals of Hypomagnesemia [E83.42]  Hypoxia [R09.02]  Elevated troponin [R77.8]  Pneumonia of right lung due to infectious organism, unspecified part of lung [J18.9]  Acute hypoxemic respiratory failure ([facility] [address] [J96.01]    Patient Goals/Plan/Treatment Preferences: Spoke with Catalina Yi and his son  Alfonso Valle. Pt lives at home alone in his condo. Both sons live within 5-10 minutes of pt. Pt has needed DME (see above). Monitor for discharge needs, pt denies any needs. Transportation/Food Security/Housekeeping Addressed: No issues identified.      Darby Stern RN  Case Management Department

## 2023-01-18 NOTE — PROGRESS NOTES
Pt was alone and in bed at the time of the visit. He was dealing with acute hypoxemic respiratory. He was hopeful and wanted prayer to cope and heal. He was anointed. 01/18/23 1547   Encounter Summary   Service Provided For: Patient   Referral/Consult From: [facility] Family members   Last Encounter  01/18/23   Complexity of Encounter Low   Begin Time 1130   End Time  1135   Total Time Calculated 5 min   Spiritual/Emotional needs   Type Spiritual Support   Rituals, Rites and Sacraments   Type Anointing   Assessment/Intervention/Outcome   Assessment Calm   Intervention Empowerment; Active listening

## 2023-01-19 ENCOUNTER — APPOINTMENT (OUTPATIENT)
Dept: CT IMAGING | Age: 88
DRG: 853 | End: 2023-01-19
Payer: OTHER GOVERNMENT

## 2023-01-19 PROBLEM — N13.30 HYDRONEPHROSIS DETERMINED BY ULTRASOUND: Status: ACTIVE | Noted: 2023-01-19

## 2023-01-19 PROBLEM — N18.4 CKD (CHRONIC KIDNEY DISEASE), STAGE IV (HCC): Status: ACTIVE | Noted: 2023-01-19

## 2023-01-19 LAB
ANION GAP SERPL CALC-SCNC: 13 MEQ/L (ref 8–16)
ANION GAP SERPL CALCULATED.3IONS-SCNC: 14 MEQ/L (ref 8–16)
BASOPHILS # BLD: 0.3 %
BASOPHILS ABSOLUTE: 0 THOU/MM3 (ref 0–0.1)
BUN BLDV-MCNC: 41 MG/DL (ref 7–22)
BUN SERPL-MCNC: 38 MG/DL (ref 7–22)
CALCIUM SERPL-MCNC: 8.1 MG/DL (ref 8.5–10.5)
CALCIUM SERPL-MCNC: 8.3 MG/DL (ref 8.5–10.5)
CHLORIDE BLD-SCNC: 108 MEQ/L (ref 98–111)
CHLORIDE SERPL-SCNC: 108 MEQ/L (ref 98–111)
CO2 SERPL-SCNC: 19 MEQ/L (ref 23–33)
CO2: 18 MEQ/L (ref 23–33)
CREAT SERPL-MCNC: 2.4 MG/DL (ref 0.4–1.2)
CREAT SERPL-MCNC: 2.6 MG/DL (ref 0.4–1.2)
EOSINOPHIL # BLD: 0.9 %
EOSINOPHILS ABSOLUTE: 0.1 THOU/MM3 (ref 0–0.4)
ERYTHROCYTE [DISTWIDTH] IN BLOOD BY AUTOMATED COUNT: 13.2 % (ref 11.5–14.5)
ERYTHROCYTE [DISTWIDTH] IN BLOOD BY AUTOMATED COUNT: 47.1 FL (ref 35–45)
GFR SERPL CREATININE-BSD FRML MDRD: 23 ML/MIN/1.73M2
GFR SERPL CREATININE-BSD FRML MDRD: 25 ML/MIN/1.73M2
GLUCOSE BLD-MCNC: 92 MG/DL (ref 70–108)
GLUCOSE SERPL-MCNC: 93 MG/DL (ref 70–108)
HCT VFR BLD CALC: 29.9 % (ref 42–52)
HEMOGLOBIN: 9.7 GM/DL (ref 14–18)
IMMATURE GRANS (ABS): 0.04 THOU/MM3 (ref 0–0.07)
IMMATURE GRANULOCYTES: 0.3 %
LEGIONELLA PNEUMOPHILIA AG, URINE: NEGATIVE
LYMPHOCYTES # BLD: 8.3 %
LYMPHOCYTES ABSOLUTE: 1 THOU/MM3 (ref 1–4.8)
MCH RBC QN AUTO: 31.3 PG (ref 26–33)
MCHC RBC AUTO-ENTMCNC: 32.4 GM/DL (ref 32.2–35.5)
MCV RBC AUTO: 96.5 FL (ref 80–94)
MONOCYTES # BLD: 8.3 %
MONOCYTES ABSOLUTE: 1 THOU/MM3 (ref 0.4–1.3)
NUCLEATED RED BLOOD CELLS: 0 /100 WBC
PLATELET # BLD: 190 THOU/MM3 (ref 130–400)
PMV BLD AUTO: 11.2 FL (ref 9.4–12.4)
POTASSIUM REFLEX MAGNESIUM: 4.1 MEQ/L (ref 3.5–5.2)
POTASSIUM SERPL-SCNC: 4.3 MEQ/L (ref 3.5–5.2)
RBC # BLD: 3.1 MILL/MM3 (ref 4.7–6.1)
SEG NEUTROPHILS: 81.9 %
SEGMENTED NEUTROPHILS ABSOLUTE COUNT: 9.9 THOU/MM3 (ref 1.8–7.7)
SODIUM BLD-SCNC: 140 MEQ/L (ref 135–145)
SODIUM SERPL-SCNC: 140 MEQ/L (ref 135–145)
STREP PNEUMO AG, UR: NEGATIVE
UREA NITROGEN, UR: 1101 MG/DL
WBC # BLD: 12.1 THOU/MM3 (ref 4.8–10.8)

## 2023-01-19 PROCEDURE — 2140000000 HC CCU INTERMEDIATE R&B

## 2023-01-19 PROCEDURE — 80048 BASIC METABOLIC PNL TOTAL CA: CPT

## 2023-01-19 PROCEDURE — 99233 SBSQ HOSP IP/OBS HIGH 50: CPT | Performed by: HOSPITALIST

## 2023-01-19 PROCEDURE — 6360000002 HC RX W HCPCS: Performed by: HOSPITALIST

## 2023-01-19 PROCEDURE — 99222 1ST HOSP IP/OBS MODERATE 55: CPT | Performed by: INTERNAL MEDICINE

## 2023-01-19 PROCEDURE — 2580000003 HC RX 258

## 2023-01-19 PROCEDURE — 2500000003 HC RX 250 WO HCPCS: Performed by: HOSPITALIST

## 2023-01-19 PROCEDURE — 6370000000 HC RX 637 (ALT 250 FOR IP)

## 2023-01-19 PROCEDURE — 2580000003 HC RX 258: Performed by: HOSPITALIST

## 2023-01-19 PROCEDURE — 6370000000 HC RX 637 (ALT 250 FOR IP): Performed by: INTERNAL MEDICINE

## 2023-01-19 PROCEDURE — 74176 CT ABD & PELVIS W/O CONTRAST: CPT

## 2023-01-19 PROCEDURE — 85025 COMPLETE CBC W/AUTO DIFF WBC: CPT

## 2023-01-19 PROCEDURE — 36415 COLL VENOUS BLD VENIPUNCTURE: CPT

## 2023-01-19 RX ORDER — SODIUM BICARBONATE 650 MG/1
1300 TABLET ORAL DAILY
Status: DISCONTINUED | OUTPATIENT
Start: 2023-01-19 | End: 2023-01-19

## 2023-01-19 RX ORDER — AMOXICILLIN AND CLAVULANATE POTASSIUM 500; 125 MG/1; MG/1
1 TABLET, FILM COATED ORAL EVERY 12 HOURS SCHEDULED
Status: DISPENSED | OUTPATIENT
Start: 2023-01-20 | End: 2023-01-25

## 2023-01-19 RX ORDER — SODIUM BICARBONATE 650 MG/1
1300 TABLET ORAL 2 TIMES DAILY
Status: DISCONTINUED | OUTPATIENT
Start: 2023-01-19 | End: 2023-01-26 | Stop reason: HOSPADM

## 2023-01-19 RX ADMIN — CEFTRIAXONE SODIUM 1000 MG: 1 INJECTION, POWDER, FOR SOLUTION INTRAMUSCULAR; INTRAVENOUS at 10:03

## 2023-01-19 RX ADMIN — PANTOPRAZOLE SODIUM 40 MG: 40 TABLET, DELAYED RELEASE ORAL at 09:54

## 2023-01-19 RX ADMIN — ASPIRIN 81 MG: 81 TABLET, COATED ORAL at 09:52

## 2023-01-19 RX ADMIN — TAMSULOSIN HYDROCHLORIDE 0.4 MG: 0.4 CAPSULE ORAL at 09:55

## 2023-01-19 RX ADMIN — APIXABAN 2.5 MG: 2.5 TABLET, FILM COATED ORAL at 09:52

## 2023-01-19 RX ADMIN — SODIUM CHLORIDE: 9 INJECTION, SOLUTION INTRAVENOUS at 07:08

## 2023-01-19 RX ADMIN — SODIUM CHLORIDE: 9 INJECTION, SOLUTION INTRAVENOUS at 23:45

## 2023-01-19 RX ADMIN — SODIUM CHLORIDE, PRESERVATIVE FREE 10 ML: 5 INJECTION INTRAVENOUS at 09:56

## 2023-01-19 RX ADMIN — DOXYCYCLINE 100 MG: 100 INJECTION, POWDER, LYOPHILIZED, FOR SOLUTION INTRAVENOUS at 12:15

## 2023-01-19 RX ADMIN — SODIUM BICARBONATE 1300 MG: 650 TABLET ORAL at 20:21

## 2023-01-19 RX ADMIN — Medication 1 TABLET: at 09:54

## 2023-01-19 RX ADMIN — PANTOPRAZOLE SODIUM 40 MG: 40 TABLET, DELAYED RELEASE ORAL at 20:21

## 2023-01-19 RX ADMIN — FINASTERIDE 5 MG: 5 TABLET, FILM COATED ORAL at 09:53

## 2023-01-19 RX ADMIN — DOXYCYCLINE 100 MG: 100 INJECTION, POWDER, LYOPHILIZED, FOR SOLUTION INTRAVENOUS at 20:32

## 2023-01-19 NOTE — PROGRESS NOTES
Pt up to chair, reports poor appetite. Pt stood for rafa care, bath completed, oral care and hair care completed. Pt has no c/o pain. Pt left up in chair with all needs in reach and alarm on.   Electronically signed by Negar Harris on 1/19/2023 at 10:31 AM

## 2023-01-19 NOTE — PROGRESS NOTES
Hospitalist Progress Note      Patient:  Nic Moeller    Unit/Bed:3B-28/028-A  YOB: 1932  MRN: [id number]   Acct: [de-identified]   PCP: Shaheen Can DO  Date of Admission: 1/17/2023    Assessment/Plan:    Query Sepsis vs SIRS secondary to aspiration PNA vs pneumonitis (resolved):               started on Vancomycin and zosyn upon admission. No need for such broad coverage; presentation and findings on CXR not c/w bilat necrotizing PNA; narrowed coverage to Ceftriaxone and Doxy (for possible Atypical and MRSA); IS/Acapella/PT to see.  AVSS. Will consider stepping down to PO Augmentin for completion of 5-day course. Query acute hypoxic resp. Failure (resolved): likely 2/2 aspiration pneumonitis pt was successfully weaned off O2. Management above  On R/A now. Query Chronic HFpEF vs RV failure with pulmonary HTN:               Last limited ECHO 8/4/22  reveals EF 50% with right ventricle globally reduced systolic function. ECHO 12/9/20 reveals RVSP 44 mmHg. Repeat TTE showed NL RVSP and RV systolic function. Possible resolution s/p query prior RV MI or acute hypoxic resp failure during last admission. Even if RV failure w/ corpulmonale were present one would not anticipate pulmonary edema. I/O, daily wt, salt (2 gr) and fluid (2L) restriction. Hold diuretics d/t worsening ANNIKA. No GDMT. Elevated Troponin, with Hx of CAD:   Hx of CABG in 2006. Trop down-trending since admission. Pt denies chest pain, EKG negative for any new/dynamic ischemic changes; will monitor. Continue OMT      ANNIKA on CKD, stage 3b Etiology? Obstructive uropathy vs Hypertensive nephropathy vs others: likely 2/2 aggressive diuresis (Improved)              Cr. 3.0 today, baseline around 1.5. held diuretics; avoid nephrotoxins. IVF while monitoring volume status; avoid nephrotoxins. Preliminary ANNIKA W/U initiated. Notified Nephrology consulted per pt's wish.  Added to Care Teams    Cr downtrending on IVF. Cr 2.6 today; continue Ivf at reduced rate (75 cc/hr); FeNa (depsite being on diuretcis c/w pre-renal etiology); renal US showed R-sided hydronephrosis; will consider CT A/P w/o given concurrent ANNIKA on CKD) to assess for any pathology (mass, etc), stone was not noted on US; urology consulted for consideration of cystocscopy. Hypomagnesemia (Resolved): Mag 1.4 on admission; likely 2/2 diuretics and nutritional; replace and trend; repeat Mg 2.1     Query Hx of Essential HTN:  Well-controlled on current meds. CCM and monitor                    Persistent A. Fib:   Pt remains rate controlled w/o any AVB agents. On NOAC. continued      BPH:   Continue home Tamsulosin and Finasteride. monitor for urinary retention. Chronic normo/macrocytic anemia: Hb stable. Anemia W/U c/w RAZA; will place on replacement at discharge. PCP to consider GI referral for bidirectional endoscopy if warranted . Code status: Full code; per d/w palliative care pt wishes to be Limited x4. Code status changed to reflect pt's wishes. PT/OT to follow         Chief Complaint: SOB    Initial H and P:-    Admitted for evaluation and management of acute hypoxic resp failure. Subjective (past 24 hours):   Feeling improved. No new issues/events overnight. Denies CP/worsening SOB/fever/chills/cough/sputum/presyncope/palpitation/GI or  symptoms. Past medical history, family history, social history and allergies reviewed again and is unchanged since admission. ROS (All review of systems completed. Pertinent positives noted.  Otherwise All other systems reviewed and negative.)     Medications:  Reviewed    Infusion Medications    sodium chloride 75 mL/hr at 01/19/23 0708    sodium chloride       Scheduled Medications    cefTRIAXone (ROCEPHIN) IV  1,000 mg IntraVENous Q24H    doxycycline (VIBRAMYCIN) IV  100 mg IntraVENous Q12H    apixaban  2.5 mg Oral BID    aspirin  81 mg Oral Daily    finasteride  5 mg Oral Daily    [Held by provider] furosemide  40 mg Oral Daily    multivitamin  1 tablet Oral Daily    pantoprazole  40 mg Oral BID    tamsulosin  0.4 mg Oral Daily    sodium chloride flush  10 mL IntraVENous 2 times per day     PRN Meds: sodium chloride flush, sodium chloride, ondansetron **OR** ondansetron, polyethylene glycol, acetaminophen **OR** acetaminophen      Intake/Output Summary (Last 24 hours) at 1/19/2023 0806  Last data filed at 1/19/2023 0416  Gross per 24 hour   Intake 560 ml   Output 725 ml   Net -165 ml         Diet:  ADULT DIET; Regular; 3 carb choices (45 gm/meal); Low Sodium (2 gm); 2000 ml    Physical Exam:  /82   Pulse 58   Temp 98.2 °F (36.8 °C) (Oral)   Resp 22   Wt 152 lb 12.8 oz (69.3 kg)   SpO2 92%   BMI 29.84 kg/m²   General appearance: No apparent distress, appears stated age and cooperative. HEENT: Pupils equal, round, and reactive to light. Conjunctivae/corneas clear. Neck: Supple, with full range of motion. No jugular venous distention. Trachea midline. Respiratory:  Normal respiratory effort. Clear to auscultation, bilaterally without Rales/Wheezes/Rhonchi. Cardiovascular: Regular rate and rhythm with normal S1/S2 without murmurs, rubs or gallops. Abdomen: Soft, non-tender, non-distended with normal bowel sounds. Musculoskeletal: passive and active ROM x 4 extremities. Skin: Skin color, texture, turgor normal.  No rashes or lesions. Neurologic:  Neurovascularly intact without any focal sensory/motor deficits.  Cranial nerves: II-XII intact, grossly non-focal.  Psychiatric: Alert and oriented, thought content appropriate, normal insight  Capillary Refill: Brisk,< 3 seconds   Peripheral Pulses: +2 palpable, equal bilaterally     Labs:   Recent Labs     01/17/23  0640 01/18/23  0405 01/19/23  0337   WBC 17.4* 17.2* 12.1*   HGB 10.6* 9.4* 9.7*   HCT 32.2* 29.3* 29.9*    180 190       Recent Labs     01/18/23  0405 01/18/23  1842 01/19/23  0337    138 140   K 4.4 4.1 4.1    106 108   CO2 20* 20* 18*   BUN 44* 44* 41*   CREATININE 3.0* 2.6* 2.6*   CALCIUM 8.2* 7.9* 8.3*       Recent Labs     01/17/23  0500   AST 22   ALT 11   BILITOT 0.5   ALKPHOS 164*       No results for input(s): INR in the last 72 hours. No results for input(s): Hal Issacle in the last 72 hours. Microbiology:    Blood culture #1:   Lab Results   Component Value Date/Time    BC No growth 24 hours. No growth 48 hours. 01/17/2023 05:07 AM       Blood culture #2:No results found for: Alonso Be    Organism:  Lab Results   Component Value Date/Time    ORG Escherichia coli 08/06/2022 09:45 AM    ORG Enterococcus faecalis - (Group D) 08/06/2022 09:45 AM         Lab Results   Component Value Date/Time    LABGRAM  01/28/2021 12:40 PM     Few segmented neutrophils observed. No bacteria seen. performed on cytospun specimen       MRSA culture only:No results found for: [address] [place] [address]    Urine culture:   Lab Results   Component Value Date/Time    LABURIN  08/06/2022 09:45 AM     One isolate is resistant in vitro to current regimen. Nabor Edwards  08/06/2022 09:45 AM     [place] count: 10,000-50,000 CFU/mL This isolate is a probable ESBL . ID consultation may be helpful in some clinical circumstances. CONTACT isolation required.     LABURIN [place] count: 10,000-20,000 CFU/mL 08/06/2022 09:45 AM       Respiratory culture: No results found for: CULTRESP    Aerobic and Anaerobic :  No results found for: LABAERO  Lab Results   Component Value Date/Time    LABANAE No growth-preliminary No growth 01/28/2021 12:40 PM       Urinalysis:      Lab Results   Component Value Date/Time    NITRU NEGATIVE 01/18/2023 10:30 AM    WBCUA 0-2 01/18/2023 10:30 AM    BACTERIA NONE SEEN 01/18/2023 10:30 AM    RBCUA 5-10 01/18/2023 10:30 AM    BLOODU NEGATIVE 01/18/2023 10:30 AM    SPECGRAV 1.022 01/18/2023 10:30 AM    GLUCOSEU NEGATIVE 12/26/2021 05:20 AM       Radiology:  US RENAL COMPLETE Final Result   Mild right hydronephrosis. A right ureteral stone cannot be excluded. No left hydronephrosis. Cholelithiasis. This document has been electronically signed by: David Lincoln MD on    01/18/2023 09:19 PM      XR CHEST (2 VW)   Final Result   Cardiomegaly. Small bilateral pleural effusions and bilateral perihilar and lower lobe airspace opacities, right greater than left, can indicate CHF versus pneumonia in the appropriate clinical setting. Follow-up to ensure resolution is    advised. **This report has been created using voice recognition software. It may contain minor errors which are inherent in voice recognition technology. **      Final report electronically signed by Dr Cameron Novak on 1/18/2023 1:21 PM      XR CHEST PORTABLE   Final Result   1. Enlargement of the cardiopericardial silhouette with small right    pleural effusion and right perihilar and lower lung airspace opacities    which could indicate pneumonia. This document has been electronically signed by: Liya Dupree MD on    01/17/2023 06:16 AM        XR CHEST PORTABLE    Result Date: 1/17/2023  1 view chest x-ray Comparison: CR/SR - XR CHEST PORTABLE - 08/06/2022 05:10 AM EDT Findings: Previous median sternotomy. Enlargement of the cardiopericardial silhouette. Right perihilar and lower lung consolidation. Probable small right pleural effusion. No pneumothorax. 1. Enlargement of the cardiopericardial silhouette with small right pleural effusion and right perihilar and lower lung airspace opacities which could indicate pneumonia. This document has been electronically signed by: Liya Dupree MD on 01/17/2023 06:16 AM    With RN in room, patient and son were updated about and agreed upon the treatment plan, all the questions and concerns were addressed. More than 30 minutes of time spent on counseling.        Electronically signed by Ghazala Anton MD on 1/19/2023 at 8:06 AM

## 2023-01-19 NOTE — CONSULTS
[facility]    [address], [address]  [facility]  1/19/2023 8:21 AM    Pt Name:    Rasta Jeffries  MRN:     [id number]   [id number]  YOB: 1932  Admit Date:    1/17/2023  4:32 AM  Primary Care Physician:  Danielle Manriquez DO    CSN Number:   [id number]    Reason for Consult:  ANNIKA, CKD  Requesting provider:  Dr. Gustavo Richter    History:   The patient is a 80 y.o. pleasant white male with history of CAD, hypertension, history of CVA, atrial fibrillation, BPH, history of urinary retention, CKD, chronic volume overload requiring diuretics who follows with me for CKD 3 management. Patient's baseline serum creatinine typically ranges anywhere between 1.5-2.0 depending on his volume status and diuretic dose. Generally speaking he has done very well with diuretics. His previous echo showed right ventricular systolic dysfunction with preserved LVEF. He was last seen in office in November 2022. He was admitted to the hospital with complaints of shortness of breath worsening over time. Patient also had some associated cough and chills. No urinary complaints on admission. No chest pain. No fever or chills. Patient underwent further work-up in the emergency room and was noted to have pleural effusion with right lung opacities possibly indicating pneumonia. Serum creatinine back in November was 2.1. Creatinine went up to 2.7 on 16 January. It peaked at 3.0. Yesterday came down to 2.6. Nephrology has been consulted per patient/family's request.  Patient seen and examined this morning. Reporting to be feeling better. Currently on IV fluids. Somewhat hard of hearing. No family members present. Denies any dysuria or any gross hematuria. Patient underwent further work-up including an ultrasound which showed right sided hydronephrosis. Seen by urology.     Past Medical History:  Past Medical History:   Diagnosis Date    Actinic keratoses 2013    Adenomatous polyp 2008    Atrial fibrillation (HCC)     CAD (coronary artery disease) 2009    Carotid artery disease ([address] 2009    left    Hyperlipidemia 2004    Hypertension 2004    Inguinal hernia, left 2014    Unspecified cerebral artery occlusion with cerebral infarction        Past Surgical History:  Past Surgical History:   Procedure Laterality Date    ABDOMEN SURGERY      ABDOMINAL ADHESION SURGERY  2012    Dr Hyman Maze REMOVAL  2004    [facility]    [facility]    CORONARY ARTERY BYPASS GRAFT  2009    ENDOSCOPY, COLON, DIAGNOSTIC      EYE SURGERY      SMALL INTESTINE SURGERY      SMALL INTESTINE SURGERY  2015    Exploratory Laparotomy    UPPER GASTROINTESTINAL ENDOSCOPY N/A 2021    EGD ESOPHAGOGASTRODUODENOSCOPY PEG TUBE INSERTION performed by Shannan Ayers MD at [facility]      cabg harvests from right leg       Family History:  Family History   Problem Relation Age of Onset    Cancer Mother        Social History:  Social History     Socioeconomic History    Marital status:      Spouse name: Not on file    Number of children: 4    Years of education: Not on file    Highest education level: Not on file   Occupational History    Not on file   Tobacco Use    Smoking status: Former     Types: Cigarettes     Quit date: 1980     Years since quittin.0    Smokeless tobacco: Never   Vaping Use    Vaping Use: Never used   Substance and Sexual Activity    Alcohol use:  Yes     Alcohol/week: 1.0 standard drink     Types: 1 Cans of beer per week     Comment: occasionally    Drug use: No    Sexual activity: Not on file   Other Topics Concern    Not on file   Social History Narrative    Not on file     Social Determinants of Health     Financial Resource Strain: Not on file   Food Insecurity: Not on file   Transportation Needs: Not on file   Physical Activity: Not on file   Stress: Not on file   Social Connections: Not on file   Intimate Partner Violence: Not on file   Housing Stability: Not on file       Home Meds:  Prior to Admission medications    Medication Sig Start Date End Date Taking? Authorizing Provider   finasteride (PROSCAR) 5 MG tablet Take 1 tablet by mouth daily 12/6/22 3/6/23  Baldev Elder MD   tamsulosin [facility]) 0.4 MG capsule Take 1 capsule by mouth daily Capsule may be opened and contents dissolve for peg tube 12/6/22 3/6/23  Baldev Elder MD   aspirin 81 MG EC tablet Take 81 mg by mouth daily    Historical Provider, MD   furosemide (LASIX) 40 MG tablet Take 1 tablet by mouth daily 11/19/22   Carrie Valentin MD   Multiple Vitamins-Minerals (THERAPEUTIC MULTIVITAMIN-MINERALS) tablet Take 1 tablet by mouth in the morning. Historical Provider, MD   apixaban (ELIQUIS) 2.5 MG TABS tablet Take 1 tablet by mouth 2 times daily 8/18/21   Carlos Rangel MD   pantoprazole (PROTONIX) 40 MG tablet Take 40 mg by mouth 2 times daily    Historical Provider, MD   potassium chloride (KLOR-CON M) 20 MEQ extended release tablet Take 1 tablet by mouth daily 4/19/21   Carrie Valentin MD       Review of Systems:  Constitutional: Positive for shortness of breath on admission. Improved at this time  Head: Negative for headaches  Eyes: Negative for blurry vision or discharge  Ears: Negative for ear pain or hearing changes  Nose: Negative for runny nose or epistaxis  Respiratory:   Negative for cough or sputum production. Negative for hemoptysis  Cardiovascular: Negative for chest pain  GI: Negative for nausea, vomiting and diarrhea.   Negative for hematochezia and melena  : Negative for hematuria  Skin: Negative for rash  Musculoskeletal: Negative for joint pain, moves all ext  Neuro: Negative for numbness or tingling, negative for slurred speech  Psychiatric: Reports stable mood, negative for depression or insomnia    All other review of systems were reviewed and negative    Current Meds:  Infusion:    sodium chloride 75 mL/hr at 01/19/23 0708    sodium chloride       Meds:    cefTRIAXone (ROCEPHIN) IV  1,000 mg IntraVENous Q24H    doxycycline (VIBRAMYCIN) IV  100 mg IntraVENous Q12H    apixaban  2.5 mg Oral BID    aspirin  81 mg Oral Daily    finasteride  5 mg Oral Daily    [Held by provider] furosemide  40 mg Oral Daily    multivitamin  1 tablet Oral Daily    pantoprazole  40 mg Oral BID    tamsulosin  0.4 mg Oral Daily    sodium chloride flush  10 mL IntraVENous 2 times per day     Meds prn: sodium chloride flush, sodium chloride, ondansetron **OR** ondansetron, polyethylene glycol, acetaminophen **OR** acetaminophen     Allergies/Intolerances: ALLERGIES: Patient has no known allergies. 24HR INTAKE/OUTPUT:    Intake/Output Summary (Last 24 hours) at 1/19/2023 0821  Last data filed at 1/19/2023 0745  Gross per 24 hour   Intake 560 ml   Output 825 ml   Net -265 ml     I/O last 3 completed shifts: In: 1160 [P.O.:1160]  Out: 725 [Urine:725]  I/O this shift:  In: -   Out: 100 [Urine:100]  Admission weight: 138 lb (62.6 kg)  Wt Readings from Last 3 Encounters:   01/19/23 152 lb 12.8 oz (69.3 kg)   12/06/22 145 lb (65.8 kg)   11/17/22 147 lb (66.7 kg)     Body mass index is 29.84 kg/m².     Physical Examination:  VITALS:   Vitals:    01/18/23 1940 01/18/23 2320 01/19/23 0416 01/19/23 0745   BP: 139/60 127/67 (!) 126/54 138/82   Pulse: 59 56 67 58   Resp: 18 18 18 22   Temp: 98 °F (36.7 °C) 97.5 °F (36.4 °C) 97.6 °F (36.4 °C) 98.2 °F (36.8 °C)   TempSrc: Oral Oral Oral Oral   SpO2: 92% 92% 94% 92%   Weight:   152 lb 12.8 oz (69.3 kg)      Weight:   Wt Readings from Last 3 Encounters:   01/19/23 152 lb 12.8 oz (69.3 kg)   12/06/22 145 lb (65.8 kg)   11/17/22 147 lb (66.7 kg)     Constitutional and General Appearance: alert and cooperative with exam, appears comfortable, no distress, not diaphoretic, elderly and frail-appearing but no acute distress  Eyes: no icteric sclera in left eye or right eye, mild pallor conjunctiva both eyes  Ears and Nose: normal external appearance of left and right ear. Both ear lobules are nontender to palpation. Normal external appearance of nose. No active drainage from nose. Oral: moist oral mucus membranes  Neck: No jugular venous distention, appears symmetric, good ROM  Lungs: Air entry diminished, poor effort, no rhonchi or any rales  Heart: regular rate, S1, S2  Extremities: No significant LE edema, no tenderness  GI: soft, non-tender, no guarding, no distention  Skin: no rash seen on exposed extremities, warm to touch  Musculo: moves all extremities  Neuro: no slurred speech, no facial drooping, symmetric strength  Psychiatric: Normal mood and affect, Not agitated    Lab Data  CBC:   Recent Labs     01/17/23  0640 01/18/23  0405 01/19/23  0337   WBC 17.4* 17.2* 12.1*   HGB 10.6* 9.4* 9.7*   HCT 32.2* 29.3* 29.9*    180 190     BMP:  Recent Labs     01/17/23  0500 01/17/23  1426 01/18/23  0405 01/18/23  1842 01/19/23  0337     --  138 138 140   K 3.9  --  4.4 4.1 4.1     --  104 106 108   CO2 20*  --  20* 20* 18*   BUN 40*  --  44* 44* 41*   CREATININE 2.5*  --  3.0* 2.6* 2.6*   GLUCOSE 110*  --  107 99 92   CALCIUM 8.5  --  8.2* 7.9* 8.3*   MG 1.4* 2.1  --   --   --      Hepatic:   Recent Labs     01/17/23  0500   LABALBU 3.4*   AST 22   ALT 11   BILITOT 0.5   ALKPHOS 164*       Chest x-ray shows cardiomegaly, small bilateral pleural effusions, bilateral perihilar and lower lobe airspace opacities. Diagnostics:  EF 55% January 2023  Old tests reviewed. Labs: Baseline serum creatinine 1.5-2.0      Impression and Plan:  ANNIKA on CKD 3B/4.  UA is mostly benign without any evidence of microhematuria or proteinuria. Appears to be hemodynamic/prerenal.  Urine sodium is 38. Ultrasound shows mild right-sided hydronephrosis. Since it is unilateral and mild this should not cause ANNIKA. Serum creatinine is improving with IV fluids. We will continue and reduce IV fluid rate.   Diuretics currently on hold. Mild right-sided hydronephrosis. Being evaluated by urology. Soft tissue density seen. Urology planning cystoscopy with ureteroscopy. Renal cyst  Atrial fibrillation  History of coronary artery disease  Hx BPH  Mild metabolic acidosis. Add oral sodium bicarbonate    I updated Dr. Delgado Evelyn, patient's son, on the phone. Thank you for the consult. Please feel free to call me if you have any questions. Porfirio Denny MD  [facility]    This report has been created using voice recognition software. It may contain minor errors which are inherent in voice recognition technology.

## 2023-01-19 NOTE — CONSULTS
[facility]  [address] [address] [facility] [address]  Dept: [phone]  Loc: [phone]  Visit Date: 1/17/2023    Urology Consult Note    Reason for Consult:  R-sided hydro nephro sis  Requesting Physician:  medicine    History Obtained From:  patient, electronic medical record    Chief Complaint: sob    HISTORY OF PRESENT ILLNESS:                The patient is a 89 y.o. male with significant past medical history of CAD, HTN, pulmonary HTN who presents to [facility] ED today for the evaluation of shortness of breath.  He reports sob at rest and activity.  Being currently treated for sepsis 2/2 CA-PNA, Acute on chronic HFpEF exacerbation, with pulmonary HTN, elevated trop, handy on ckd  Urology consulted for right side hydronephrosis  Last OV 12/6 with Dr Carson for BPH, on Flomax/Finasteride, he was happy with voiding at that time    Past Medical History:        Diagnosis Date    Actinic keratoses 8/21/2013    Adenomatous polyp 2008    Atrial fibrillation (HCC)     CAD (coronary artery disease) 2009    Carotid artery disease (HCC) 2009    left    Hyperlipidemia 2004    Hypertension 2004    Inguinal hernia, left 2/19/2014    Unspecified cerebral artery occlusion with cerebral infarction      Past Surgical History:        Procedure Laterality Date    ABDOMEN SURGERY      ABDOMINAL ADHESION SURGERY  6/2012    Dr Lunsford    CARDIAC SURGERY      CATARACT REMOVAL  2004    right    COLONOSCOPY      [facility]    CORONARY ARTERY BYPASS GRAFT  2009    ENDOSCOPY, COLON, DIAGNOSTIC      EYE SURGERY      SMALL INTESTINE SURGERY      SMALL INTESTINE SURGERY  02/20/2015    Exploratory Laparotomy    UPPER GASTROINTESTINAL ENDOSCOPY N/A 1/26/2021    EGD ESOPHAGOGASTRODUODENOSCOPY PEG TUBE INSERTION performed by Mary Bhakta MD at [facility]    VASCULAR SURGERY      cabg harvests from right leg     Allergies:  Patient has no known allergies.  Social History:  Social History  Socioeconomic History    Marital status:      Spouse name: Not on file    Number of children: 4    Years of education: Not on file    Highest education level: Not on file   Occupational History    Not on file   Tobacco Use    Smoking status: Former     Types: Cigarettes     Quit date: 1980     Years since quittin.0    Smokeless tobacco: Never   Vaping Use    Vaping Use: Never used   Substance and Sexual Activity    Alcohol use: Yes     Alcohol/week: 1.0 standard drink     Types: 1 Cans of beer per week     Comment: occasionally    Drug use: No    Sexual activity: Not on file   Other Topics Concern    Not on file   Social History Narrative    Not on file     Social Determinants of Health     Financial Resource Strain: Not on file   Food Insecurity: Not on file   Transportation Needs: Not on file   Physical Activity: Not on file   Stress: Not on file   Social Connections: Not on file   Intimate Partner Violence: Not on file   Housing Stability: Not on file     Family History:       Problem Relation Age of Onset    Cancer Mother        ROS:  Constitutional: Negative for chills, fatigue, fever, or weight loss. Eyes: Denies reported visual changes. ENT: Denies headache, difficulty swallowing, earache, and nosebleeds. Cardiovascular: Negative for chest pain, palpitations, tachycardia or edema. Respiratory: Denies cough or SOB. GI:The patient denies abdominal or flank pain, anorexia, nausea or vomiting. : see HPI. Musculoskeletal: Patient denies low back pain or painful or reduced range of ROM. Neurological: The patient denies any symptoms of neurological impairment or TIA. Psychiatric: Denies anxiety or depression. Skin: Denies rash or lesions. All remaining ROS negative. PHYSICAL EXAM:  VITALS:  BP (!) 141/66   Pulse 74   Temp 97.6 °F (36.4 °C) (Oral)   Resp 20   Wt 152 lb 12.8 oz (69.3 kg)   SpO2 96%   BMI 29.84 kg/m² . Nursing note and vitals reviewed.     Constitutional: Alert and oriented times x3, no acute distress, and cooperative to examination with appropriate mood and affect. HEENT:   Head:         Normocephalic and atraumatic. Mouth/Throat:          Mucous membranes are normal.   Eyes:         EOM are normal. No scleral icterus. Nose:    The external appearance of the nose is normal  Ears: The ears appear normal to external inspection. Cardiovascular:       Normal rate, regular rhythm. Pulmonary/Chest:  Normal respiratory rate and rhthym. No use of accessory muscles. Lungs clear bilaterally. Abdominal:          Midly distended. Active bowel sounds             Genitalia:    Normal uncircumcised penis  Urethral meatus is normal in size and location  Scrotal contents normal to inspection and palpation   Normal testes palpated bilaterally  Voiding spontaneously  Musculoskeletal:    Normal range of motion. He exhibits no edema or tenderness of lower extremities. Extremities:    No cyanosis, clubbing, or edema present. Neurological:    Alert and oriented.      DATA:  CBC:   Lab Results   Component Value Date/Time    WBC 12.1 01/19/2023 03:37 AM    RBC 3.10 01/19/2023 03:37 AM    HGB 9.7 01/19/2023 03:37 AM    HCT 29.9 01/19/2023 03:37 AM    MCV 96.5 01/19/2023 03:37 AM    MCH 31.3 01/19/2023 03:37 AM    MCHC 32.4 01/19/2023 03:37 AM    RDW 21.5 08/24/2016 08:23 AM     01/19/2023 03:37 AM    MPV 11.2 01/19/2023 03:37 AM     BMP:    Lab Results   Component Value Date/Time     01/19/2023 03:37 AM    K 4.1 01/19/2023 03:37 AM     01/19/2023 03:37 AM    CO2 18 01/19/2023 03:37 AM    BUN 41 01/19/2023 03:37 AM    CREATININE 2.6 01/19/2023 03:37 AM    CALCIUM 8.3 01/19/2023 03:37 AM    LABGLOM 23 01/19/2023 03:37 AM    GLUCOSE 92 01/19/2023 03:37 AM    GLUCOSE 87 01/09/2015 07:20 AM     BUN/Creatinine:    Lab Results   Component Value Date/Time    BUN 41 01/19/2023 03:37 AM    CREATININE 2.6 01/19/2023 03:37 AM     Magnesium:    Lab Results   Component Value Date/Time    MG 2.1 01/17/2023 02:26 PM     Phosphorus:    Lab Results   Component Value Date/Time    PHOS 3.3 05/05/2022 01:20 PM     PT/INR:    Lab Results   Component Value Date/Time    INR 1.17 12/25/2021 08:24 PM     U/A:    Lab Results   Component Value Date/Time    COLORU YELLOW 01/18/2023 10:30 AM    PHUR 5.5 01/18/2023 10:30 AM    LABCAST NONE SEEN 01/18/2023 10:30 AM    LABCAST NONE SEEN 01/18/2023 10:30 AM    WBCUA 0-2 01/18/2023 10:30 AM    RBCUA 5-10 01/18/2023 10:30 AM    MUCUS NONE SEEN 01/27/2021 11:30 AM    YEAST NONE SEEN 01/18/2023 10:30 AM    BACTERIA NONE SEEN 01/18/2023 10:30 AM    SPECGRAV 1.022 01/18/2023 10:30 AM    LEUKOCYTESUR NEGATIVE 01/18/2023 10:30 AM    UROBILINOGEN 0.2 01/18/2023 10:30 AM    BILIRUBINUR NEGATIVE 01/18/2023 10:30 AM    BLOODU NEGATIVE 01/18/2023 10:30 AM    GLUCOSEU NEGATIVE 12/26/2021 05:20 AM    AMORPHOUS URATES 01/17/2023 06:30 AM       Imaging: The patient has had a Renal Ultrasound which I have independently reviewed along with its accompanying report. The study demonstrates   Narrative   US Renal       Comparison: US/SR - US RENAL COMPLETE - 08/04/2021 01:32 PM EDT       Findings:   Right kidney normal size and echotexture, 9.1 cm length. A 1.4 x 1.2 x 1.1    cm simple cyst at the upper pole of the right kidney. Mild right hydronephrosis. Left kidney normal size and echotexture, 10.5 cm length. No left    hydronephrosis. The prostate measures 25 mL in volume which is within normal limits. Prevoid bladder volume is 140 mL. Patient declined to void. Incidental finding of a stone in the gallbladder measuring 1 x 0.7 x 0.5    cm at the gallbladder neck. Impression   Mild right hydronephrosis. A right ureteral stone cannot be excluded. No left hydronephrosis. Cholelithiasis. Ct abd/pelvis wo shows   Narrative   PROCEDURE: CT ABDOMEN PELVIS WO CONTRAST       CLINICAL INFORMATION: Right-sided hydronephrosis. COMPARISON: CT abdomen and pelvis 12/25/2021. Chest x-ray 1/18/2023. TECHNIQUE:    5 mm axial imaging through the abdomen and pelvis without IV contrast.  Coronal and sagittal reconstructions were performed. All CT scans at this facility use dose modulation, iterative reconstruction, and/or weight based dosing when appropriate to reduce the radiation dose to as low as reasonably achievable. FINDINGS:    LIMITATIONS: Evaluation of the solid organs is limited without IV contrast.       Lung bases: Small bilateral pleural effusions, right greater than left is identified. Rounded atelectasis at the right lung base is incompletely visualized. Patchy groundglass opacities at the right lung base are incompletely visualized. Liver/gallbladder/bilary tree: The liver is enlarged and has a lobulated contour. Detailed characterization of the liver parenchyma is limited without IV contrast. Small dependent gallstones are stable. No biliary ductal dilatation or pericholecystic    inflammation is observed. Pancreas: Limited assessment without IV contrast.   Spleen : Not enlarged. Adrenal glands: Unremarkable contrast CT appearance. Kidneys/ ureters/ bladder: Moderate right-sided hydronephrosis and hydroureter is present. The right ureter appears dilated and the level of the mid ureter there is increased density within and adjacent to the right mid ureter (series 301, image 47). The    urinary bladder wall is thickened in part related to underdistention. No renal calculi are observed. The left kidney is unremarkable. Gastrointestinal:  Colonic diverticulosis without diverticulitis is present. A surgical anastomosis is noted in the bowel along the anterior abdominal wall to the right of the midline (series 301, image 40). Mild thickening of the rectosigmoid colon may    be artifactual related to underdistention. A small fat-containing left inguinal hernia appears stable.  A small amount of ascites within the hernia sac is observed. No protruding small or large bowel loops are identified. A large sliding-type hiatal    hernia is stable       Retroperitoneum / lymph nodes: Numerous enlarged mesenteric lymph nodes measure up to 14 mm in short axis. : Large right pelvic sidewall lymph nodes measure up to 15 mm in short axis. There are also enlarged lymph nodes surrounding the right ureter    measuring up to 12 mm (series 301, image 46). Right retroperitoneal and para-aortic lymphadenopathy measures up to 16 mm in short axis. Pelvis: The prostate gland is enlarged measuring 40 x 41 mm. Musculoskeletal: A lytic lesion in the T9 vertebral body measures 11 x 12 mm (series 602, image 39). There is a suggestion of lytic metastasis in the L4 vertebral body involving the posterior elements. Irregularity of the right seventh rib (series 301,    image 3) is mixed lytic and sclerotic, incompletely visualized. Chronic right-sided rib fracture deformities are observed                   Impression   1. Small to moderate right pleural effusion and small left pleural effusion with rounded atelectasis and patchy groundglass opacities at the right lung base are incompletely visualized. 2. Moderate right-sided hydronephrosis/hydroureter appears to be secondary to a right mid ureteral soft tissue density (series 301, image 48) resulting in either intrinsic or extrinsic obstruction. Right periureteral and right retroperitoneal    lymphadenopathy measures up to 16 mm in short axis. There are also numerous enlarged right pelvic sidewall lymph nodes as well as mesenteric lymphadenopathy suggesting metastatic disease. 3. 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 Additional ill-defined lytic lesions are noted within the L4 vertebral body and there is a suggestion of mixed lytic sclerotic bony metastasis in the right ribs as well as the bones of    the pelvis.        4. Prostatomegaly. Correlate with PSA levels. Mild bladder wall thickening may in part be artifactual related to underdistention. Correlate with urinalysis. 5. Colonic diverticulosis without diverticulitis. Nonspecific thickening of the rectosigmoid colon may be artifactual related to underdistention. Correlate with direct inspection. No bowel obstruction, perforation, or drainable fluid collection is    observed. 6. Hepatomegaly and lobulated contour of the liver parenchyma suggesting parenchymal liver disease. Correlate with liver function tests. Detailed characterization of the liver parenchyma is limited without IV contrast. Liver lesions are not excluded. IMPRESSION/Plan:    NPO midnight  Consent    Right side hydronephrosis - right mid ureteral soft tissue density likely cause of obstruction, not seen on CT 2021. Enlarged lyphadenapathy     Plan for URS with stent in AM, possible biopsy  ANNIKA on CKD - CRT 2.6, above baseline, sees nephrology  Enlarged lymadenopathy, vertebral lesion - discussed with medicine  Hx of Afib on Eliquis/ASA    Cystoscopy, right ureteroscopy, right retrograde pyelogram, right ureteral stent placement per Dr Acacia Cortez tomorrow AM     I described the procedure in detail and also described the associated risks and benefits at length. We discussed possible alternative therapies. We discussed the risks and benefits of not undergoing therapy. Patient understands these risks and benefits and desires to proceed. Post-op expectations were discussed; stent pain, urinary frequency and urgency secondary to the stent, dysuria which should improve 1-2 days after procedure, and intermittent hematuria can be expected as long as stent is in place.      Reviewed images with Dr Ulysses Valdez and Dr Acacia Cortez    Thank you for including us in the care of ROBBIE Pro - CNP, ROBBIE  01/19/23 11:59 AM  Urology

## 2023-01-19 NOTE — PROGRESS NOTES
Pt returned from CT, returned to chair. No c/o all needs left in reach. Chair alarm on.  Electronically signed by Pat Alexander on 1/19/2023 at 11:09 AM

## 2023-01-19 NOTE — CARE COORDINATION
1/19/23, 1:28 PM EST    DISCHARGE ON GOING EVALUATION    [address] day: 2  Location: [facility]28/028-A Reason for admit: Hypomagnesemia [E83.42]  Hypoxia [R09.02]  Elevated troponin [R77.8]  Pneumonia of right lung due to infectious organism, unspecified part of lung [J18.9]  Acute hypoxemic respiratory failure ([address] [J96.01]   Procedure:   1/17 CXR: Enlargement of the cardiopericardial silhouette with small right pleural effusion and right perihilar and lower lung airspace opacities which could indicate pneumonia. 1/17 ECHO: EF 55%. Moderately dilated left atrium. Moderately enlarged right atrium size. 1/18 CXR: Cardiomegaly. Small bilateral pleural effusions and bilateral perihilar and lower lobe airspace opacities, right greater than left, can indicate CHF versus pneumonia in the appropriate clinical setting. 1/18 Renal US: Mild right hydronephrosis. A right ureteral stone cannot be excluded. No left hydronephrosis. Cholelithiasis. 1/19 CT abd/pelvis:   1. Small to moderate right pleural effusion and small left pleural effusion with rounded atelectasis and patchy groundglass opacities at the right lung base are incompletely visualized. 2. Moderate right-sided hydronephrosis/hydroureter appears to be secondary to a right mid ureteral soft tissue density resulting in either intrinsic or extrinsic obstruction. Right periureteral and right retroperitoneal lymphadenopathy measures up to 16 mm in short axis. There are also numerous enlarged right pelvic sidewall lymph nodes as well as mesenteric lymphadenopathy suggesting metastatic disease. 3. 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 Additional ill-defined lytic lesions are noted within the L4 vertebral body and there is a suggestion of mixed lytic sclerotic bony metastasis in the right ribs as well as the bones of the pelvis. 4. Prostatomegaly. Correlate with PSA levels.  Mild bladder wall thickening may in part be artifactual related to underdistention. 5. Colonic diverticulosis without diverticulitis. Nonspecific thickening of the rectosigmoid colon may be artifactual related to underdistention. Correlate with direct inspection. No bowel obstruction, perforation, or drainable fluid collection is observed. 6. Hepatomegaly and lobulated contour of the liver parenchyma suggesting parenchymal liver disease. Correlate with liver function tests. Detailed characterization of the liver parenchyma is limited without IV contrast. Liver lesions are not excluded. Barriers to Discharge: Hospitalist, Cardiology, Nephrology following. Consulting Urology. Urology planning Cysto tomorrow. WBC 12.1. IVF. Rocephin iv daily. Doxycycline iv q12hr. Room air. PCP: Wendy Hurtado DO  Readmission Risk Score: 17.9%  Patient Goals/Plan/Treatment Preferences: From home alone. Monitor for needs. Await surgery.

## 2023-01-19 NOTE — PROGRESS NOTES
Pt sleeping in bed, doesn't want to eat his lunch at this time, states \"maybe later\". Hand off reported to Jesus Mayer RN covering for [employer]. Pt left with all needs in reach and bed alarm on.  Electronically signed by Monte Meyers on 1/19/2023 at 1:50 PM

## 2023-01-19 NOTE — PLAN OF CARE
Problem: Discharge Planning  Goal: Discharge to home or other facility with appropriate resources  Outcome: Progressing  Flowsheets (Taken 1/19/2023 0407)  Discharge to home or other facility with appropriate resources:   Identify barriers to discharge with patient and caregiver   Arrange for needed discharge resources and transportation as appropriate     Problem: Safety - Adult  Goal: Free from fall injury  Outcome: Progressing  Flowsheets (Taken 1/19/2023 0407)  Free From Fall Injury: Instruct family/caregiver on patient safety     Problem: ABCDS Injury Assessment  Goal: Absence of physical injury  Outcome: Progressing  Flowsheets (Taken 1/19/2023 0407)  Absence of Physical Injury: Implement safety measures based on patient assessment     Problem: Chronic Conditions and Co-morbidities  Goal: Patient's chronic conditions and co-morbidity symptoms are monitored and maintained or improved  Outcome: Progressing  Flowsheets (Taken 1/19/2023 0407)  Care Plan - Patient's Chronic Conditions and Co-Morbidity Symptoms are Monitored and Maintained or Improved: Monitor and assess patient's chronic conditions and comorbid symptoms for stability, deterioration, or improvement     Problem: Pain  Goal: Verbalizes/displays adequate comfort level or baseline comfort level  Outcome: Progressing  Flowsheets (Taken 1/19/2023 0407)  Verbalizes/displays adequate comfort level or baseline comfort level:   Encourage patient to monitor pain and request assistance   Assess pain using appropriate pain scale     Problem: Skin/Tissue Integrity  Goal: Absence of new skin breakdown  Description: 1. Monitor for areas of redness and/or skin breakdown  2. Assess vascular access sites hourly  3. Every 4-6 hours minimum:  Change oxygen saturation probe site  4. Every 4-6 hours:  If on nasal continuous positive airway pressure, respiratory therapy assess nares and determine need for appliance change or resting period.   Outcome: Progressing Care plan reviewed with patient. Patient verbalizes understanding of the care plan and contributed to goal setting.

## 2023-01-20 ENCOUNTER — APPOINTMENT (OUTPATIENT)
Dept: GENERAL RADIOLOGY | Age: 88
DRG: 853 | End: 2023-01-20
Payer: OTHER GOVERNMENT

## 2023-01-20 ENCOUNTER — ANESTHESIA EVENT (OUTPATIENT)
Dept: OPERATING ROOM | Age: 88
End: 2023-01-20
Payer: OTHER GOVERNMENT

## 2023-01-20 ENCOUNTER — APPOINTMENT (OUTPATIENT)
Dept: MRI IMAGING | Age: 88
DRG: 853 | End: 2023-01-20
Payer: OTHER GOVERNMENT

## 2023-01-20 ENCOUNTER — ANESTHESIA (OUTPATIENT)
Dept: OPERATING ROOM | Age: 88
End: 2023-01-20
Payer: OTHER GOVERNMENT

## 2023-01-20 LAB
ANION GAP SERPL CALC-SCNC: 14 MEQ/L (ref 8–16)
ANION GAP SERPL CALC-SCNC: 14 MEQ/L (ref 8–16)
BASOPHILS ABSOLUTE: 0.1 THOU/MM3 (ref 0–0.1)
BASOPHILS NFR BLD AUTO: 0.5 %
BUN SERPL-MCNC: 30 MG/DL (ref 7–22)
BUN SERPL-MCNC: 34 MG/DL (ref 7–22)
CALCIUM SERPL-MCNC: 8.3 MG/DL (ref 8.5–10.5)
CALCIUM SERPL-MCNC: 8.4 MG/DL (ref 8.5–10.5)
CHLORIDE SERPL-SCNC: 109 MEQ/L (ref 98–111)
CHLORIDE SERPL-SCNC: 111 MEQ/L (ref 98–111)
CO2 SERPL-SCNC: 17 MEQ/L (ref 23–33)
CO2 SERPL-SCNC: 19 MEQ/L (ref 23–33)
CREAT SERPL-MCNC: 2.2 MG/DL (ref 0.4–1.2)
CREAT SERPL-MCNC: 2.4 MG/DL (ref 0.4–1.2)
DEPRECATED RDW RBC AUTO: 46.1 FL (ref 35–45)
EOSINOPHIL NFR BLD AUTO: 0.7 %
EOSINOPHILS ABSOLUTE: 0.1 THOU/MM3 (ref 0–0.4)
ERYTHROCYTE [DISTWIDTH] IN BLOOD BY AUTOMATED COUNT: 13.2 % (ref 11.5–14.5)
GFR SERPL CREATININE-BSD FRML MDRD: 25 ML/MIN/1.73M2
GFR SERPL CREATININE-BSD FRML MDRD: 28 ML/MIN/1.73M2
GLUCOSE SERPL-MCNC: 92 MG/DL (ref 70–108)
GLUCOSE SERPL-MCNC: 97 MG/DL (ref 70–108)
HCT VFR BLD AUTO: 30.3 % (ref 42–52)
HGB BLD-MCNC: 9.7 GM/DL (ref 14–18)
IMM GRANULOCYTES # BLD AUTO: 0.03 THOU/MM3 (ref 0–0.07)
IMM GRANULOCYTES NFR BLD AUTO: 0.3 %
LYMPHOCYTES ABSOLUTE: 1.1 THOU/MM3 (ref 1–4.8)
LYMPHOCYTES NFR BLD AUTO: 10.8 %
MCH RBC QN AUTO: 30.8 PG (ref 26–33)
MCHC RBC AUTO-ENTMCNC: 32 GM/DL (ref 32.2–35.5)
MCV RBC AUTO: 96.2 FL (ref 80–94)
MONOCYTES ABSOLUTE: 1 THOU/MM3 (ref 0.4–1.3)
MONOCYTES NFR BLD AUTO: 9.9 %
NEUTROPHILS NFR BLD AUTO: 77.8 %
NRBC BLD AUTO-RTO: 0 /100 WBC
PLATELET # BLD AUTO: 184 THOU/MM3 (ref 130–400)
PMV BLD AUTO: 11.2 FL (ref 9.4–12.4)
POTASSIUM SERPL-SCNC: 4 MEQ/L (ref 3.5–5.2)
POTASSIUM SERPL-SCNC: 4.6 MEQ/L (ref 3.5–5.2)
RBC # BLD AUTO: 3.15 MILL/MM3 (ref 4.7–6.1)
SEGMENTED NEUTROPHILS ABSOLUTE COUNT: 8.1 THOU/MM3 (ref 1.8–7.7)
SODIUM SERPL-SCNC: 140 MEQ/L (ref 135–145)
SODIUM SERPL-SCNC: 144 MEQ/L (ref 135–145)
WBC # BLD AUTO: 10.4 THOU/MM3 (ref 4.8–10.8)

## 2023-01-20 PROCEDURE — 6360000004 HC RX CONTRAST MEDICATION: Performed by: UROLOGY

## 2023-01-20 PROCEDURE — 3700000000 HC ANESTHESIA ATTENDED CARE: Performed by: UROLOGY

## 2023-01-20 PROCEDURE — 3600000012 HC SURGERY LEVEL 2 ADDTL 15MIN: Performed by: UROLOGY

## 2023-01-20 PROCEDURE — 99232 SBSQ HOSP IP/OBS MODERATE 35: CPT | Performed by: INTERNAL MEDICINE

## 2023-01-20 PROCEDURE — 2500000003 HC RX 250 WO HCPCS: Performed by: NURSE ANESTHETIST, CERTIFIED REGISTERED

## 2023-01-20 PROCEDURE — C1747 HC ENDOSCOPE, SINGLE, URINARY TRACT: HCPCS | Performed by: UROLOGY

## 2023-01-20 PROCEDURE — 74181 MRI ABDOMEN W/O CONTRAST: CPT

## 2023-01-20 PROCEDURE — C2617 STENT, NON-COR, TEM W/O DEL: HCPCS | Performed by: UROLOGY

## 2023-01-20 PROCEDURE — 2709999900 HC NON-CHARGEABLE SUPPLY: Performed by: UROLOGY

## 2023-01-20 PROCEDURE — 6370000000 HC RX 637 (ALT 250 FOR IP): Performed by: INTERNAL MEDICINE

## 2023-01-20 PROCEDURE — 2580000003 HC RX 258: Performed by: INTERNAL MEDICINE

## 2023-01-20 PROCEDURE — BT1D0ZZ FLUOROSCOPY OF RIGHT KIDNEY, URETER AND BLADDER USING HIGH OSMOLAR CONTRAST: ICD-10-PCS | Performed by: UROLOGY

## 2023-01-20 PROCEDURE — 6360000002 HC RX W HCPCS

## 2023-01-20 PROCEDURE — 3600000002 HC SURGERY LEVEL 2 BASE: Performed by: UROLOGY

## 2023-01-20 PROCEDURE — C1769 GUIDE WIRE: HCPCS | Performed by: UROLOGY

## 2023-01-20 PROCEDURE — 6370000000 HC RX 637 (ALT 250 FOR IP)

## 2023-01-20 PROCEDURE — 6370000000 HC RX 637 (ALT 250 FOR IP): Performed by: HOSPITALIST

## 2023-01-20 PROCEDURE — 2140000000 HC CCU INTERMEDIATE R&B

## 2023-01-20 PROCEDURE — 2580000003 HC RX 258

## 2023-01-20 PROCEDURE — 6360000002 HC RX W HCPCS: Performed by: NURSE ANESTHETIST, CERTIFIED REGISTERED

## 2023-01-20 PROCEDURE — 74420 UROGRAPHY RTRGR +-KUB: CPT

## 2023-01-20 PROCEDURE — 2500000003 HC RX 250 WO HCPCS: Performed by: HOSPITALIST

## 2023-01-20 PROCEDURE — 2580000003 HC RX 258: Performed by: HOSPITALIST

## 2023-01-20 PROCEDURE — 80048 BASIC METABOLIC PNL TOTAL CA: CPT

## 2023-01-20 PROCEDURE — 85025 COMPLETE CBC W/AUTO DIFF WBC: CPT

## 2023-01-20 PROCEDURE — 36415 COLL VENOUS BLD VENIPUNCTURE: CPT

## 2023-01-20 PROCEDURE — 2500000003 HC RX 250 WO HCPCS: Performed by: INTERNAL MEDICINE

## 2023-01-20 PROCEDURE — 3700000001 HC ADD 15 MINUTES (ANESTHESIA): Performed by: UROLOGY

## 2023-01-20 PROCEDURE — 0T768DZ DILATION OF RIGHT URETER WITH INTRALUMINAL DEVICE, VIA NATURAL OR ARTIFICIAL OPENING ENDOSCOPIC: ICD-10-PCS | Performed by: UROLOGY

## 2023-01-20 PROCEDURE — 99233 SBSQ HOSP IP/OBS HIGH 50: CPT | Performed by: HOSPITALIST

## 2023-01-20 PROCEDURE — 94640 AIRWAY INHALATION TREATMENT: CPT

## 2023-01-20 DEVICE — URETERAL STENT
Type: IMPLANTABLE DEVICE | Status: FUNCTIONAL
Brand: PERCUFLEX™ PLUS

## 2023-01-20 RX ORDER — ALBUTEROL SULFATE 2.5 MG/3ML
2.5 SOLUTION RESPIRATORY (INHALATION) EVERY 6 HOURS PRN
Status: DISCONTINUED | OUTPATIENT
Start: 2023-01-20 | End: 2023-01-26 | Stop reason: HOSPADM

## 2023-01-20 RX ORDER — CEFAZOLIN SODIUM 1 G/3ML
INJECTION, POWDER, FOR SOLUTION INTRAMUSCULAR; INTRAVENOUS PRN
Status: DISCONTINUED | OUTPATIENT
Start: 2023-01-20 | End: 2023-01-20 | Stop reason: SDUPTHER

## 2023-01-20 RX ORDER — DOXYCYCLINE HYCLATE 100 MG
100 TABLET ORAL EVERY 12 HOURS SCHEDULED
Status: COMPLETED | OUTPATIENT
Start: 2023-01-20 | End: 2023-01-22

## 2023-01-20 RX ORDER — GLYCOPYRROLATE 1 MG/5 ML
SYRINGE (ML) INTRAVENOUS PRN
Status: DISCONTINUED | OUTPATIENT
Start: 2023-01-20 | End: 2023-01-20 | Stop reason: SDUPTHER

## 2023-01-20 RX ORDER — PROPOFOL 10 MG/ML
INJECTION, EMULSION INTRAVENOUS PRN
Status: DISCONTINUED | OUTPATIENT
Start: 2023-01-20 | End: 2023-01-20 | Stop reason: SDUPTHER

## 2023-01-20 RX ORDER — SODIUM CHLORIDE 9 MG/ML
INJECTION, SOLUTION INTRAVENOUS CONTINUOUS
Status: DISCONTINUED | OUTPATIENT
Start: 2023-01-20 | End: 2023-01-22

## 2023-01-20 RX ADMIN — SODIUM CHLORIDE: 9 INJECTION, SOLUTION INTRAVENOUS at 08:59

## 2023-01-20 RX ADMIN — SODIUM BICARBONATE 1300 MG: 650 TABLET ORAL at 09:01

## 2023-01-20 RX ADMIN — PROPOFOL 20 MG: 10 INJECTION, EMULSION INTRAVENOUS at 07:53

## 2023-01-20 RX ADMIN — DOXYCYCLINE 100 MG: 100 INJECTION, POWDER, LYOPHILIZED, FOR SOLUTION INTRAVENOUS at 09:07

## 2023-01-20 RX ADMIN — PROPOFOL 20 MG: 10 INJECTION, EMULSION INTRAVENOUS at 08:00

## 2023-01-20 RX ADMIN — PROPOFOL 20 MG: 10 INJECTION, EMULSION INTRAVENOUS at 08:04

## 2023-01-20 RX ADMIN — ALBUTEROL SULFATE 2.5 MG: 2.5 SOLUTION RESPIRATORY (INHALATION) at 20:22

## 2023-01-20 RX ADMIN — CEFAZOLIN 2 G: 1 INJECTION, POWDER, FOR SOLUTION INTRAMUSCULAR; INTRAVENOUS at 07:39

## 2023-01-20 RX ADMIN — Medication 60 MG: at 07:39

## 2023-01-20 RX ADMIN — PROPOFOL 20 MG: 10 INJECTION, EMULSION INTRAVENOUS at 07:39

## 2023-01-20 RX ADMIN — DOXYCYCLINE HYCLATE 100 MG: 100 TABLET, COATED ORAL at 19:58

## 2023-01-20 RX ADMIN — Medication 1 TABLET: at 09:02

## 2023-01-20 RX ADMIN — PROPOFOL 20 MG: 10 INJECTION, EMULSION INTRAVENOUS at 07:44

## 2023-01-20 RX ADMIN — PROPOFOL 20 MG: 10 INJECTION, EMULSION INTRAVENOUS at 07:57

## 2023-01-20 RX ADMIN — AMOXICILLIN AND CLAVULANATE POTASSIUM 1 TABLET: 500; 125 TABLET, FILM COATED ORAL at 09:02

## 2023-01-20 RX ADMIN — AMOXICILLIN AND CLAVULANATE POTASSIUM 1 TABLET: 500; 125 TABLET, FILM COATED ORAL at 19:58

## 2023-01-20 RX ADMIN — SODIUM BICARBONATE: 84 INJECTION, SOLUTION INTRAVENOUS at 20:09

## 2023-01-20 RX ADMIN — SODIUM BICARBONATE 1300 MG: 650 TABLET ORAL at 19:58

## 2023-01-20 RX ADMIN — PANTOPRAZOLE SODIUM 40 MG: 40 TABLET, DELAYED RELEASE ORAL at 09:01

## 2023-01-20 RX ADMIN — APIXABAN 2.5 MG: 2.5 TABLET, FILM COATED ORAL at 09:02

## 2023-01-20 RX ADMIN — SODIUM CHLORIDE: 9 INJECTION, SOLUTION INTRAVENOUS at 21:38

## 2023-01-20 RX ADMIN — PROPOFOL 20 MG: 10 INJECTION, EMULSION INTRAVENOUS at 07:48

## 2023-01-20 RX ADMIN — TAMSULOSIN HYDROCHLORIDE 0.4 MG: 0.4 CAPSULE ORAL at 09:02

## 2023-01-20 RX ADMIN — FINASTERIDE 5 MG: 5 TABLET, FILM COATED ORAL at 09:02

## 2023-01-20 RX ADMIN — SODIUM CHLORIDE, PRESERVATIVE FREE 10 ML: 5 INJECTION INTRAVENOUS at 19:58

## 2023-01-20 RX ADMIN — ASPIRIN 81 MG: 81 TABLET, COATED ORAL at 09:01

## 2023-01-20 RX ADMIN — Medication 0.2 MG: at 07:54

## 2023-01-20 RX ADMIN — APIXABAN 2.5 MG: 2.5 TABLET, FILM COATED ORAL at 19:58

## 2023-01-20 ASSESSMENT — PAIN SCALES - GENERAL
PAINLEVEL_OUTOF10: 1
PAINLEVEL_OUTOF10: 0

## 2023-01-20 ASSESSMENT — ENCOUNTER SYMPTOMS: SHORTNESS OF BREATH: 1

## 2023-01-20 ASSESSMENT — PAIN DESCRIPTION - PAIN TYPE: TYPE: ACUTE PAIN

## 2023-01-20 ASSESSMENT — PAIN - FUNCTIONAL ASSESSMENT: PAIN_FUNCTIONAL_ASSESSMENT: ACTIVITIES ARE NOT PREVENTED

## 2023-01-20 ASSESSMENT — PAIN DESCRIPTION - DESCRIPTORS: DESCRIPTORS: ACHING;SORE

## 2023-01-20 ASSESSMENT — PAIN DESCRIPTION - LOCATION: LOCATION: THROAT

## 2023-01-20 NOTE — PROGRESS NOTES
[facility]   Nephrology progress note  1/20/2023, 5:53 PM      Pt Name:    Felix Javier  MRN:     [id number]     YOB: 1932  Admit Date:    1/17/2023  4:32 AM    Chief Complaint: Nephrology following for ANNIKA/CKD    Subjective:  Patient was seen and examined   Went to the OR and had a right ureteral stent placed  No chest pain or shortness of breath  discussed with daughter-in-law    Objective:  24HR INTAKE/OUTPUT:    Intake/Output Summary (Last 24 hours) at 1/20/2023 1753  Last data filed at 1/20/2023 1607  Gross per 24 hour   Intake 915.12 ml   Output 1050 ml   Net -134.88 ml         I/O last 3 completed shifts: In: 3360.5 [P.O.:660; I.V.:2341.4; IV Piggyback:359.1]  Out: 1075 [VSPSZ:3794]  I/O this shift:   In: 915.1 [I.V.:734.6; IV Piggyback:180.6]  Out: 600 [Urine:600]   Admission weight: 138 lb (62.6 kg)  Wt Readings from Last 3 Encounters:   01/19/23 152 lb 12.8 oz (69.3 kg)   12/06/22 145 lb (65.8 kg)   11/17/22 147 lb (66.7 kg)        Vitals :   Vitals:    01/20/23 0835 01/20/23 0924 01/20/23 1109 01/20/23 1715   BP: (!) 141/70 (!) 161/69 (!) 143/64 (!) 178/79   Pulse: 73 68 61 73   Resp: 16 16 18 16   Temp: 98.1 °F (36.7 °C)  97.7 °F (36.5 °C) 98.4 °F (36.9 °C)   TempSrc: Oral  Oral Oral   SpO2: 92% 90% 98% 94%   Weight:           Physical examination  General Appearance: alert and cooperative with exam, appears comfortable, no distress  Neck: No JVD  Lungs: no use of accessory muscles  GI: soft, non-tender, no guarding  Extremities: No pitting LE edema    Medications:  Infusion:    sodium chloride 75 mL/hr at 01/20/23 1607    sodium chloride       Meds:    doxycycline hyclate  100 mg Oral 2 times per day    sodium bicarbonate  1,300 mg Oral BID    amoxicillin-clavulanate  1 tablet Oral 2 times per day    apixaban  2.5 mg Oral BID    aspirin  81 mg Oral Daily    finasteride  5 mg Oral Daily    [Held by provider] furosemide  40 mg Oral Daily    multivitamin  1 tablet Oral Daily    pantoprazole  40 mg Oral BID    tamsulosin  0.4 mg Oral Daily    sodium chloride flush  10 mL IntraVENous 2 times per day     Meds prn: sodium chloride flush, sodium chloride, ondansetron **OR** ondansetron, polyethylene glycol, acetaminophen **OR** acetaminophen     Lab Data :  CBC:   Recent Labs     01/18/23  0405 01/19/23  0337 01/20/23  0357   WBC 17.2* 12.1* 10.4   HGB 9.4* 9.7* 9.7*   HCT 29.3* 29.9* 30.3*    190 184     CMP:  Recent Labs     01/19/23  0337 01/19/23  1834 01/20/23  0357    140 140   K 4.1 4.3 4.0    108 109   CO2 18* 19* 17*   BUN 41* 38* 34*   CREATININE 2.6* 2.4* 2.4*   GLUCOSE 92 93 97   CALCIUM 8.3* 8.1* 8.3*     Hepatic: No results for input(s): LABALBU, AST, ALT, ALB, BILITOT, ALKPHOS in the last 72 hours. Assessment and Plan:  1. 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  UA is benign. Urine sodium 38. Creatinine stable, albeit slightly higher than baseline  Low-dose IV fluids  Status post right ureteral stent    2. Mild right-sided hydronephrosis. Procedure report reviewed. Appears per urology there is extrinsic compression on the ureter. Follow MRI of the abdomen without contrast.  May need further evaluation with oncology depending on MRI findings. 3.  Renal cyst  4. Atrial fibrillation  5. Metabolic acidosis. Worsening. Will change fluids to include IV bicarb  6. Hx BPH  7.  CAD  D/W patient and family member    Michael Up MD  [facility]    This report has been created using voice recognition software.  It may contain minor errors which are inherent in voice recognition technology

## 2023-01-20 NOTE — PROGRESS NOTES
Hospitalist Progress Note      Patient:  Shen Kline    Unit/Bed:3B-28/028-A  YOB: 1932  MRN: [id number]   Acct: [de-identified]   PCP: Epifanio Vera DO  Date of Admission: 1/17/2023    Assessment/Plan:    Query Sepsis vs SIRS secondary to aspiration PNA vs pneumonitis (resolved):               started on Vancomycin and zosyn upon admission. No need for such broad coverage; presentation and findings on CXR not c/w bilat necrotizing PNA; narrowed coverage to Ceftriaxone and Doxy (for possible Atypical and MRSA); IS/Acapella/PT to see.  AVSS. Will consider stepping down to PO Augmentin for completion of 5-day course. Query acute hypoxic resp. Failure (resolved): likely 2/2 aspiration pneumonitis pt was successfully weaned off O2. Management above  On R/A now. Query Chronic HFpEF vs RV failure with pulmonary HTN:               Last limited ECHO 8/4/22  reveals EF 50% with right ventricle globally reduced systolic function. ECHO 12/9/20 reveals RVSP 44 mmHg. Repeat TTE showed NL RVSP and RV systolic function. Possible resolution s/p query prior RV MI or acute hypoxic resp failure during last admission. Even if RV failure w/ corpulmonale were present one would not anticipate pulmonary edema. I/O, daily wt, salt (2 gr) and fluid (2L) restriction. Hold diuretics d/t worsening ANNIKA. No GDMT. Elevated Troponin, with Hx of CAD:   Hx of CABG in 2006. Trop down-trending since admission. Pt denies chest pain, EKG negative for any new/dynamic ischemic changes; will monitor. Continue OMT      ANNIKA on CKD, stage 3b Etiology? Obstructive uropathy vs Hypertensive nephropathy vs others: likely 2/2 aggressive diuresis (Improved)              Cr. 3.0 today, baseline around 1.5. held diuretics; avoid nephrotoxins. IVF while monitoring volume status; avoid nephrotoxins. Preliminary ANNIKA W/U initiated. Notified Nephrology consulted per pt's wish.  Added to Care Teams    Cr downtrending on IVF. Cr 2.4 today; continue IVF at reduced rate (75 cc/hr); FeNa (depsite being on diuretcis c/w pre-renal etiology); renal US showed R-sided hydronephrosis; will consider CT A/P w/o given concurrent ANNIKA on CKD) to assess for any pathology (mass, etc), since stone was not noted on US. Moderate right-sided hydronephrosis/hydroureter appears to be secondary to a right mid ureteral soft tissue density (series 301, image 48) resulting in either intrinsic or extrinsic obstruction. Right periureteral and right retroperitoneal    lymphadenopathy measures up to 16 mm in short axis. There are also numerous enlarged right pelvic sidewall lymph nodes as well as mesenteric lymphadenopathy suggesting metastatic disease. 3. 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 Additional ill-defined lytic lesions are noted within the L4 vertebral body and there is a suggestion of mixed lytic sclerotic bony metastasis in the right ribs as well as the bones of    the pelvis. ; urology consulted for consideration of diagnostic cystoscopy w/ possible tissue Dx. S/p cystoscopy w/ impression of likely an extrinsic (non-urothelial) compressive process. Will follow w/ MRCP A/P to better elucidate the nature of the mass. This may represent an underlying metastatic malignancy of unknown primary at this time w/ mets to bone. Will discuss findings w/ pt and family to inform but also seek if they wish to proceed w/ W/U for definitive tissue Dx. Hypomagnesemia (Resolved): Mag 1.4 on admission; likely 2/2 diuretics and nutritional; replace and trend; repeat Mg 2.1     Query Hx of Essential HTN:  Well-controlled on current meds. CCM and monitor                    Persistent A. Fib:   Pt remains rate controlled w/o any AVB agents. On NOAC. continued      BPH:   Continue home Tamsulosin and Finasteride. monitor for urinary retention. Chronic normo/macrocytic anemia: Hb stable. Anemia W/U c/w RAZA; will place on replacement at discharge. PCP to consider GI referral for bidirectional endoscopy if warranted . Code status: Full code; per d/w palliative care pt wishes to be Limited x4. Code status changed to reflect pt's wishes. PT/OT to follow         Chief Complaint: SOB    Initial H and P:-    Admitted for evaluation and management of acute hypoxic resp failure. Subjective (past 24 hours):   Sleepy d/t anesthetics/analgesics immediately post procedure. Feeling well. No new issues/events overnight. Denies CP/worsening SOB/fever/chills/cough/sputum/presyncope/palpitation/GI or  symptoms. Denies any complaints. Past medical history, family history, social history and allergies reviewed again and is unchanged since admission. ROS (All review of systems completed. Pertinent positives noted. Otherwise All other systems reviewed and negative.)     Medications:  Reviewed    Infusion Medications    sodium chloride 75 mL/hr at 01/20/23 1131    sodium chloride       Scheduled Medications    doxycycline hyclate  100 mg Oral 2 times per day    sodium bicarbonate  1,300 mg Oral BID    amoxicillin-clavulanate  1 tablet Oral 2 times per day    apixaban  2.5 mg Oral BID    aspirin  81 mg Oral Daily    finasteride  5 mg Oral Daily    [Held by provider] furosemide  40 mg Oral Daily    multivitamin  1 tablet Oral Daily    pantoprazole  40 mg Oral BID    tamsulosin  0.4 mg Oral Daily    sodium chloride flush  10 mL IntraVENous 2 times per day     PRN Meds: sodium chloride flush, sodium chloride, ondansetron **OR** ondansetron, polyethylene glycol, acetaminophen **OR** acetaminophen      Intake/Output Summary (Last 24 hours) at 1/20/2023 1324  Last data filed at 1/20/2023 1131  Gross per 24 hour   Intake 3260.64 ml   Output 850 ml   Net 2410.64 ml         Diet:  ADULT DIET;  Regular    Physical Exam:  BP (!) 143/64   Pulse 61   Temp 97.7 °F (36.5 °C) (Oral)   Resp 18   Wt 152 lb 12.8 oz (69.3 kg)   SpO2 98%   BMI 29.84 kg/m²   General appearance: Sleepy. No apparent distress, appears stated age and cooperative. HEENT: Pupils equal, round, and reactive to light. Conjunctivae/corneas clear. Neck: Supple, with full range of motion. No jugular venous distention. Trachea midline. Respiratory:  Normal respiratory effort. Clear to auscultation, bilaterally without Rales/Wheezes/Rhonchi. Cardiovascular: Regular rate and rhythm with normal S1/S2 without murmurs, rubs or gallops. Abdomen: Soft, non-tender, non-distended with normal bowel sounds. Duarte in-situ draining clear urine  Musculoskeletal: passive and active ROM x 4 extremities. Skin: Skin color, texture, turgor normal.  No rashes or lesions. Neurologic:  Neurovascularly intact without any focal sensory/motor deficits. Cranial nerves: II-XII intact, grossly non-focal.  Psychiatric: Alert and oriented, thought content appropriate, normal insight  Capillary Refill: Brisk,< 3 seconds   Peripheral Pulses: +2 palpable, equal bilaterally     Labs:   Recent Labs     01/18/23  0405 01/19/23  0337 01/20/23  0357   WBC 17.2* 12.1* 10.4   HGB 9.4* 9.7* 9.7*   HCT 29.3* 29.9* 30.3*    190 184       Recent Labs     01/19/23  0337 01/19/23  1834 01/20/23  0357    140 140   K 4.1 4.3 4.0    108 109   CO2 18* 19* 17*   BUN 41* 38* 34*   CREATININE 2.6* 2.4* 2.4*   CALCIUM 8.3* 8.1* 8.3*       No results for input(s): AST, ALT, BILIDIR, BILITOT, ALKPHOS in the last 72 hours. No results for input(s): INR in the last 72 hours. No results for input(s): [facility] in the last 72 hours. Microbiology:    Blood culture #1:   Lab Results   Component Value Date/Time    BC No growth 24 hours. No growth 48 hours.  01/17/2023 05:07 AM       Blood culture #2:No results found for: BLOODCULT2    Organism:  Lab Results   Component Value Date/Time    ORG Escherichia coli 08/06/2022 09:45 AM    ORG Enterococcus faecalis - (Group D) 08/06/2022 09:45 AM         Lab Results   Component Value Date/Time    LABGRAM  01/28/2021 12:40 PM     Few segmented neutrophils observed. No bacteria seen. performed on cytospun specimen       MRSA culture only:No results found for: [facility]    Urine culture:   Lab Results   Component Value Date/Time    LABURIN  08/06/2022 09:45 AM     One isolate is resistant in vitro to current regimen. Marck Diallo  08/06/2022 09:45 AM     [place] count: 10,000-50,000 CFU/mL This isolate is a probable ESBL . ID consultation may be helpful in some clinical circumstances. CONTACT isolation required. LABURIN [place] count: 10,000-20,000 CFU/mL 08/06/2022 09:45 AM       Respiratory culture: No results found for: CULTRESP    Aerobic and Anaerobic :  No results found for: LABAERO  Lab Results   Component Value Date/Time    LABANAE No growth-preliminary No growth 01/28/2021 12:40 PM       Urinalysis:      Lab Results   Component Value Date/Time    NITRU NEGATIVE 01/18/2023 10:30 AM    WBCUA 0-2 01/18/2023 10:30 AM    BACTERIA NONE SEEN 01/18/2023 10:30 AM    RBCUA 5-10 01/18/2023 10:30 AM    BLOODU NEGATIVE 01/18/2023 10:30 AM    SPECGRAV 1.022 01/18/2023 10:30 AM    GLUCOSEU NEGATIVE 12/26/2021 05:20 AM       Radiology:  [facility] RETROGRADE PYELOGRAM W WO KUB   Final Result   Fluoroscopic images provided during surgery. Please refer to the operative note for further details. **This report has been created using voice recognition software. It may contain minor errors which are inherent in voice recognition technology. **      Final report electronically signed by Dr Mary Adrian on 1/20/2023 9:16 AM      CT ABDOMEN PELVIS WO CONTRAST Additional Contrast? None   Final Result   1. Small to moderate right pleural effusion and small left pleural effusion with rounded atelectasis and patchy groundglass opacities at the right lung base are incompletely visualized.       2. Moderate right-sided hydronephrosis/hydroureter appears to be secondary to a right mid ureteral soft tissue density (series 301, image 48) resulting in either intrinsic or extrinsic obstruction. Right periureteral and right retroperitoneal    lymphadenopathy measures up to 16 mm in short axis. There are also numerous enlarged right pelvic sidewall lymph nodes as well as mesenteric lymphadenopathy suggesting metastatic disease. 3. 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 Additional ill-defined lytic lesions are noted within the L4 vertebral body and there is a suggestion of mixed lytic sclerotic bony metastasis in the right ribs as well as the bones of    the pelvis. 4. Prostatomegaly. Correlate with PSA levels. Mild bladder wall thickening may in part be artifactual related to underdistention. Correlate with urinalysis. 5. Colonic diverticulosis without diverticulitis. Nonspecific thickening of the rectosigmoid colon may be artifactual related to underdistention. Correlate with direct inspection. No bowel obstruction, perforation, or drainable fluid collection is    observed. 6. Hepatomegaly and lobulated contour of the liver parenchyma suggesting parenchymal liver disease. Correlate with liver function tests. Detailed characterization of the liver parenchyma is limited without IV contrast. Liver lesions are not excluded. **This report has been created using voice recognition software. It may contain minor errors which are inherent in voice recognition technology. **      Final report electronically signed by Dr Monserrat Castellanos on 1/19/2023 11:19 AM      US RENAL COMPLETE   Final Result   Mild right hydronephrosis. A right ureteral stone cannot be excluded. No left hydronephrosis. Cholelithiasis. This document has been electronically signed by: Frandy Whiteside MD on    01/18/2023 09:19 PM      XR CHEST (2 VW)   Final Result   Cardiomegaly.  Small bilateral pleural effusions and bilateral perihilar and lower lobe airspace opacities, right greater than left, can indicate CHF versus pneumonia in the appropriate clinical setting. Follow-up to ensure resolution is    advised. **This report has been created using voice recognition software. It may contain minor errors which are inherent in voice recognition technology. **      Final report electronically signed by Dr Bridgett Nix on 1/18/2023 1:21 PM      XR CHEST PORTABLE   Final Result   1. Enlargement of the cardiopericardial silhouette with small right    pleural effusion and right perihilar and lower lung airspace opacities    which could indicate pneumonia. This document has been electronically signed by: Michael Rivas MD on    01/17/2023 06:16 AM      MRI ABDOMEN W WO CONTRAST MRCP    (Results Pending)     XR CHEST PORTABLE    Result Date: 1/17/2023  1 view chest x-ray Comparison: CR/SR - XR CHEST PORTABLE - 08/06/2022 05:10 AM EDT Findings: Previous median sternotomy. Enlargement of the cardiopericardial silhouette. Right perihilar and lower lung consolidation. Probable small right pleural effusion. No pneumothorax. 1. Enlargement of the cardiopericardial silhouette with small right pleural effusion and right perihilar and lower lung airspace opacities which could indicate pneumonia. This document has been electronically signed by: Michael Rivas MD on 01/17/2023 06:16 AM    With RN in room, patient was updated about and agreed upon the treatment plan, all the questions and concerns were addressed. More than 30 minutes of time spent on counseling. Tried to reach out to son three times with no response. Will try again.       Electronically signed by Whitney Boateng MD on 1/20/2023 at 1:24 PM

## 2023-01-20 NOTE — ANESTHESIA POSTPROCEDURE EVALUATION
Department of Anesthesiology  Postprocedure Note    Patient: Richy Proctor  MRN: [id number]  YOB: 1932  Date of evaluation: 1/20/2023      Procedure Summary     Date: 01/20/23 Room / Location: [facility] / [address]    Anesthesia Start: 9135 Anesthesia Stop: 2253    Procedure: CYSTO, RIGHT URETEROSCOPY, RIGHT RETROGRADE PYELOGRAM, RIGHT STENT PLACEMENT (Right) Diagnosis:       Acute hypoxemic respiratory failure ([address]      (Acute hypoxemic respiratory failure ([address] [J96.01])    Surgeons: Daylin Mireles MD Responsible Provider: Krystal Irwin DO    Anesthesia Type: MAC ASA Status: 4          Anesthesia Type: No value filed.     [place] Phase I:      [place] Phase II:        Anesthesia Post Evaluation    Patient location during evaluation: bedside  Patient participation: complete - patient participated  Level of consciousness: awake  Airway patency: patent  Nausea & Vomiting: no vomiting and no nausea  Complications: no  Cardiovascular status: hemodynamically stable  Respiratory status: acceptable  Hydration status: stable

## 2023-01-20 NOTE — PROGRESS NOTES
Pt arrived to pre-op holding. No family at bedside. ID armband on left arm and necklace/lanyard on.  Temp 97.2 and nasal swabs completed

## 2023-01-20 NOTE — PLAN OF CARE
Problem: Discharge Planning  Goal: Discharge to home or other facility with appropriate resources  Outcome: Progressing  Flowsheets (Taken 1/20/2023 0426)  Discharge to home or other facility with appropriate resources: Identify barriers to discharge with patient and caregiver     Problem: Safety - Adult  Goal: Free from fall injury  Outcome: Progressing  Flowsheets (Taken 1/20/2023 0426)  Free From Fall Injury: Instruct family/caregiver on patient safety     Problem: ABCDS Injury Assessment  Goal: Absence of physical injury  Outcome: Progressing  Flowsheets (Taken 1/20/2023 0426)  Absence of Physical Injury: Implement safety measures based on patient assessment     Problem: Chronic Conditions and Co-morbidities  Goal: Patient's chronic conditions and co-morbidity symptoms are monitored and maintained or improved  Outcome: Progressing  Flowsheets (Taken 1/20/2023 0426)  Care Plan - Patient's Chronic Conditions and Co-Morbidity Symptoms are Monitored and Maintained or Improved: Monitor and assess patient's chronic conditions and comorbid symptoms for stability, deterioration, or improvement     Problem: Pain  Goal: Verbalizes/displays adequate comfort level or baseline comfort level  Outcome: Progressing  Flowsheets (Taken 1/20/2023 0426)  Verbalizes/displays adequate comfort level or baseline comfort level: Encourage patient to monitor pain and request assistance     Problem: Skin/Tissue Integrity  Goal: Absence of new skin breakdown  Description: 1. Monitor for areas of redness and/or skin breakdown  2. Assess vascular access sites hourly  3. Every 4-6 hours minimum:  Change oxygen saturation probe site  4. Every 4-6 hours:  If on nasal continuous positive airway pressure, respiratory therapy assess nares and determine need for appliance change or resting period. Outcome: Progressing  Note: Ongoing assessment & interventions provided throughout shift. Skin assessments provided.   Encouraging/assisting patient to turn as needed. Care plan reviewed with patient. Patient verbalizes understanding of the care plan and contributed to goal setting.

## 2023-01-20 NOTE — PLAN OF CARE
Problem: Discharge Planning  Goal: Discharge to home or other facility with appropriate resources  1/20/2023 1809 by Mildred Fernández RN  Outcome: Progressing  Flowsheets (Taken 1/20/2023 1809)  Discharge to home or other facility with appropriate resources:   Identify barriers to discharge with patient and caregiver   Arrange for needed discharge resources and transportation as appropriate    Problem: Safety - Adult  Goal: Free from fall injury  1/20/2023 1809 by Mildred Fernández RN  Outcome: Progressing  Flowsheets (Taken 1/20/2023 1809)  Free From Fall Injury:   Instruct family/caregiver on patient safety   Based on caregiver fall risk screen, instruct family/caregiver to ask for assistance with transferring infant if caregiver noted to have fall risk factors     Problem: ABCDS Injury Assessment  Goal: Absence of physical injury  1/20/2023 1809 by Mildred Fernández RN  Outcome: Progressing  Flowsheets (Taken 1/20/2023 1809)  Absence of Physical Injury: Implement safety measures based on patient assessment     Problem: Chronic Conditions and Co-morbidities  Goal: Patient's chronic conditions and co-morbidity symptoms are monitored and maintained or improved  1/20/2023 1809 by Mildred Fernández RN  Outcome: Progressing  Flowsheets (Taken 1/20/2023 1809)  Care Plan - Patient's Chronic Conditions and Co-Morbidity Symptoms are Monitored and Maintained or Improved:   Monitor and assess patient's chronic conditions and comorbid symptoms for stability, deterioration, or improvement   Collaborate with multidisciplinary team to address chronic and comorbid conditions and prevent exacerbation or deterioration     Problem: Pain  Goal: Verbalizes/displays adequate comfort level or baseline comfort level  1/20/2023 1809 by Mildred Fernández RN  Outcome: Progressing  Flowsheets (Taken 1/20/2023 1809)  Verbalizes/displays adequate comfort level or baseline comfort level:   Encourage patient to monitor pain and request assistance   Assess pain using appropriate pain scale  Problem: Skin/Tissue Integrity  Goal: Absence of new skin breakdown  Description: 1. Monitor for areas of redness and/or skin breakdown  2. Assess vascular access sites hourly  3. Every 4-6 hours minimum:  Change oxygen saturation probe site  4. Every 4-6 hours:  If on nasal continuous positive airway pressure, respiratory therapy assess nares and determine need for appliance change or resting period.   1/20/2023 1809 by Lorena Brumfield RN  Outcome: Progressing  Note: Monitor skin for breakdown at beginning of shift and throughout the day

## 2023-01-20 NOTE — OP NOTE
Operative Note      Patient: Brayden Medeiros  YOB: 1932  MRN: [id number]    Date of Procedure: 1/20/2023    Pre-Op Diagnosis: right hydronephrosis    Post-Op Diagnosis: Same       Procedure(s):  CYSTO, RIGHT URETEROSCOPY, RIGHT RETROGRADE PYELOGRAM, RIGHT STENT PLACEMENT    Surgeon(s):  Zuly Abdullahi MD    Assistant:   * No surgical staff found *    Anesthesia: General    Estimated Blood Loss (mL): Minimal    Complications: None    Specimens:   * No specimens in log *    Implants:  Implant Name Type Inv. Item Serial No.  Lot No. LRB No. Used Action   STENT URET 6FR L26CM HYDR+ PGTL [facility] [id number]  [facility] 6FR L26CM HYDR+ PGTL TAPR TIP GRAD BLDR MRK LO  [employer] [facility] UROLOGY-WD [id number] Right 1 Implanted         Drains:   Urinary Catheter 01/20/23 (Active)       Findings: Inflamed and narrow ureter with what appears to be extrinsic process causing obstruction and difficulty to pass the ureteroscope. There is soft tissue does not appear to be urothelial in nature or classic urothelial carcinoma. We were unable to pass the ureteroscope all the way up to the renal pelvis however we were able to get into the mid ureter and after that it was very narrow. Stent was placed in satisfactory position. INDICATIONS FOR PROCEDURE:  Brayden Medeiros is a 80 y.o. male presents for for right hydronephrosis and possible right ureteral obstruction due to a mass. After the risks, benefits, alternatives, of the procedure were discussed with the patient, informed consent was obtained. The patient elected to proceed. DETAILS OF THE PROCEDURE:  The patient was brought back from the preoperative holding area to the  operating suite, and was transferred to the operating table where the patient lay in  supine position. EPC's were in place, connected to the machine and the machine was turned on before induction.   General endotracheal anesthesia was induced, and patient was prepped and draped in standard surgical fashion after being placed in dorsolithotomy position. A proper timeout was performed, preoperative antibiotics were given. We began by inserting a cystoscope with a 22 Irish sheath and 30 degree lens into the patient's urethral meatus and advancing into the bladder without complication. A pan cystoscopy was preformed and the bladder appeared unremarkable. We then focused our attention on the Right ureteral orifice, which we cannulated with our glidewire, advanced up to renal pelvis. We then used a  dual lumen catheter to place a second wire. Once in good position, we advanced our flexible ureteroscope over the working wire to the distal ureter: we noted Inflamed and narrow ureter with what appears to be extrinsic process causing obstruction and difficulty to pass the ureteroscope. There is soft tissue does not appear to be urothelial in nature or classic urothelial carcinoma. We were unable to pass the ureteroscope all the way up to the renal pelvis however we were able to get into the mid ureter and after that it was very narrow. We withdrew the ureteroscope and visualized the entire ureter. No stone fragments were identified. No damage to the ureter was identified. At this time, over the remaining glidewire, we placed a 6x26 double J ureteral stent in the usual fashion, and we noted appropriate placement in the upper collecting system using fluoroscopy. There was a good curl noted in the bladder. We did not leave any strings attached to the stent. 18 fr Duarte catheter was placed in the patient's bladder, 10 cc balloon was inflated,and removed the scope and the procedure was subsequently terminated. Plan:  Patient will be admitted back to the floor. We will remove the Duarte catheter prior to discharge.   I recommend an oncology evaluation for these findings as this could be a nonneurologic process such as lymphoma or non-Hodgkin's lymphoma or other processes that is causing obstruction. At this time stent will remain in place. He will follow-up as an outpatient with Dr. Nasir Kerns in a few weeks to discuss the  management of the stent.     Electronically signed by Shekhar Stewart M.D, MD on 1/20/2023 at 8:38 AM

## 2023-01-20 NOTE — PROGRESS NOTES
[facility] Adult Intravenous to Oral Protocol    doxycycline changed to PO per [facility] P&T IV to PO protocol. Patient meets criteria based on the following: Tolerating diet more advanced than clear liquids: Yes  Tolerating other oral medications: Yes  Not on vasopressors: Yes  No nausea/vomiting within past 24 hours: Yes  No active GI bleed:  Yes  No gastrectomy, gastric outlet or bowel obstruction, ileus, malabsorption syndrome: Yes  No seizures for 72 hours (antiepileptics only):  Yes    Antibiotics only:  Received IV antibiotic for at least 24 hours: Yes  Infection is not meningitis, endocarditis, osteomyelitis, or pancreatitis: Yes  Afebrile for 24 hours: Yes  No clinical deteriorating/instability: Yes    Thank you,  Jayesh Biswas R.Ph., BCPS., 1/20/2023,1:05 PM

## 2023-01-20 NOTE — ANESTHESIA PRE PROCEDURE
Department of Anesthesiology  Preprocedure Note       Name:  Olga Zamorano   Age:  80 y.o.  :  3/30/1932                                          MRN:  [id number]         Date:  2023      Surgeon: Floridalma Tarango):  Delaney Perry MD    Procedure: Procedure(s):  CYSTO, RIGHT URETEROSCOPY, RIGHT RETROGRADE PYELOGRAM, RIGHT STENT    Medications prior to admission:   Prior to Admission medications    Medication Sig Start Date End Date Taking? Authorizing Provider   finasteride (PROSCAR) 5 MG tablet Take 1 tablet by mouth daily 12/6/22 3/6/23  Eder Lozano MD   tamsulosin [facility]) 0.4 MG capsule Take 1 capsule by mouth daily Capsule may be opened and contents dissolve for peg tube 12/6/22 3/6/23  Eder Lozano MD   aspirin 81 MG EC tablet Take 81 mg by mouth daily    Historical Provider, MD   furosemide (LASIX) 40 MG tablet Take 1 tablet by mouth daily 22   Cheyenne Lawrence MD   Multiple Vitamins-Minerals (THERAPEUTIC MULTIVITAMIN-MINERALS) tablet Take 1 tablet by mouth in the morning.     Historical Provider, MD   apixaban (ELIQUIS) 2.5 MG TABS tablet Take 1 tablet by mouth 2 times daily 21   Leonela Redd MD   pantoprazole (PROTONIX) 40 MG tablet Take 40 mg by mouth 2 times daily    Historical Provider, MD   potassium chloride (KLOR-CON M) 20 MEQ extended release tablet Take 1 tablet by mouth daily 21   Cheyenne Lawrence MD       Current medications:    Current Facility-Administered Medications   Medication Dose Route Frequency Provider Last Rate Last Admin    sodium bicarbonate tablet 1,300 mg  1,300 mg Oral BID Cheyenne Lawrence MD   1,300 mg at 23    amoxicillin-clavulanate (AUGMENTIN) 500-125 MG per tablet 1 tablet  1 tablet Oral 2 times per day Ramón Madrid MD        0.9 % sodium chloride infusion   IntraVENous Continuous Ramón Madrid MD 75 mL/hr at 23 New Bag at 23    doxycycline (VIBRAMYCIN) 100 mg in dextrose 5 % 100 mL IVPB  100 mg IntraVENous Q12H Billie HEAD Zuly Bashri MD   Stopped at 01/19/23 2147    apixaban (ELIQUIS) tablet 2.5 mg  2.5 mg Oral BID Ashley Keller PA-C   2.5 mg at 01/19/23 8197    aspirin EC tablet 81 mg  81 mg Oral Daily CHACHO MarrufoC   81 mg at 01/19/23 3468    finasteride (PROSCAR) tablet 5 mg  5 mg Oral Daily Ashley Keller PA-C   5 mg at 01/19/23 0953    [Held by provider] furosemide (LASIX) tablet 40 mg  40 mg Oral Daily JESSICA Reynoso        multivitamin 1 tablet  1 tablet Oral Daily Ashley Keller PA-C   1 tablet at 01/19/23 0954    pantoprazole (PROTONIX) tablet 40 mg  40 mg Oral BID Ashley Keller PA-C   40 mg at 01/19/23 2021    tamsulosin (FLOMAX) capsule 0.4 mg  0.4 mg Oral Daily Ashley Keller PA-C   0.4 mg at 01/19/23 0955    sodium chloride flush 0.9 % injection 10 mL  10 mL IntraVENous 2 times per day CHACHO MarrufoC   10 mL at 01/19/23 0956    sodium chloride flush 0.9 % injection 10 mL  10 mL IntraVENous PRN Ashley Keller PA-C        0.9 % sodium chloride infusion   IntraVENous PRN Ashley Keller PA-C        ondansetron (ZOFRAN-ODT) disintegrating tablet 4 mg  4 mg Oral Q8H PRN Ashley Keller PA-C        Or    ondansetron (ZOFRAN) injection 4 mg  4 mg IntraVENous Q6H PRN sAhley Keller PA-C        polyethylene glycol (GLYCOLAX) packet 17 g  17 g Oral Daily PRN Ashley Keller PA-C        acetaminophen (TYLENOL) tablet 650 mg  650 mg Oral Q6H PRN Ashley Keller PA-C        Or    acetaminophen (TYLENOL) suppository 650 mg  650 mg Rectal Q6H PRN Ashley Keller PA-C           Allergies:  No Known Allergies    Problem List:    Patient Active Problem List   Diagnosis Code    Hyperlipidemia E78.5    Essential hypertension I10    Carotid artery disease (HCC) I77.9    Partial intestinal obstruction ([address] K56.600    S/P CABG (coronary artery bypass graft) Z95.1    Inguinal hernia, left K40.90    Generalized abdominal pain R10.84    Hyponatremia E87.1    Leukocytosis D72.829    Enteritis K52.9    SIRS (systemic inflammatory response syndrome) ([facility]) R65.10    ANNIKA (acute kidney injury) ([facility] N17.9    Acidosis E87.20    Small bowel obstruction (HCC) K56.609    Small bowel ischemia ([facility] K55.9    Anticoagulated on Coumadin Z79.01    History of cardioversion Z98.890    Hiatal hernia K44.9    Coronary artery disease involving native coronary artery of native heart without angina pectoris I25.10    History of stroke Z86.73    History of ischemic bowel disease Z87.19    Atrial fibrillation with slow ventricular response ([facility]) I48.91    Bradycardia R00.1    Enteritis K52.9    Atrial fibrillation ([facility]) I48.91    Symptomatic bradycardia R00.1    Traumatic rhabdomyolysis ([facility]) T79. 6XXA    Supratherapeutic INR R79.1    Coagulopathy ([facility]) D68.9    Pneumonia due to COVID-19 virus U07.1, J12.82    Elevated CK R74.8    Acute metabolic encephalopathy N54.79    Acute pulmonary edema ([facility]) J81.0    Pulmonary edema cardiac cause ([facility]) I50.1    BPH with urinary obstruction N40.1, N13.8    Stage 2 chronic kidney disease N18.2    Acute on chronic congestive heart failure ([facility]) I50.9    Duarte catheter in place Z97.8    Abdominal pain R10.9    SBO (small bowel obstruction) ([facility]) K56.609    Chronic kidney disease (CKD) stage G3b/A1, moderately decreased glomerular filtration rate (GFR) between 30-44 mL/min/1.73 square meter and albuminuria creatinine ratio less than 30 mg/g ([facility]) N18.32    Sepsis ([facility]) A41.9    Acute respiratory failure with hypoxia ([facility]) J96.01    Acute hypoxemic respiratory failure ([facility]) J96.01    Pneumonia of right lung due to infectious organism J18.9    Elevated troponin R77.8    CKD (chronic kidney disease), stage IV ([facility]) N18.4    Hydronephrosis determined by ultrasound N13.30       Past Medical History:        Diagnosis Date    Actinic keratoses 8/21/2013    Adenomatous polyp 2008    Atrial fibrillation (HCC)     CAD (coronary artery disease) 2009   • Carotid artery disease (HCC) 2009    left   • Hyperlipidemia    • Hypertension    • Inguinal hernia, left 2014   • Unspecified cerebral artery occlusion with cerebral infarction        Past Surgical History:        Procedure Laterality Date   • ABDOMEN SURGERY     • ABDOMINAL ADHESION SURGERY  2012    Dr Lunsford   • CARDIAC SURGERY     • CATARACT REMOVAL      right   • COLONOSCOPY      neidich   • CORONARY ARTERY BYPASS GRAFT     • ENDOSCOPY, COLON, DIAGNOSTIC     • EYE SURGERY     • SMALL INTESTINE SURGERY     • SMALL INTESTINE SURGERY  2015    Exploratory Laparotomy   • UPPER GASTROINTESTINAL ENDOSCOPY N/A 2021    EGD ESOPHAGOGASTRODUODENOSCOPY PEG TUBE INSERTION performed by Mary Bhakta MD at [facility]   • VASCULAR SURGERY      cabg harvests from right leg       Social History:    Social History     Tobacco Use   • Smoking status: Former     Types: Cigarettes     Quit date: 1980     Years since quittin.0   • Smokeless tobacco: Never   Substance Use Topics   • Alcohol use: Yes     Alcohol/week: 1.0 standard drink     Types: 1 Cans of beer per week     Comment: occasionally                                Counseling given: Not Answered      Vital Signs (Current):   Vitals:    23 1536 23 2339 23 0320   BP: 139/63 135/64 (!) 143/66 (!) 130/57   Pulse: (!) 106 98 64 84   Resp: 16    Temp: 98.6 °F (37 °C) 98.4 °F (36.9 °C) 98.3 °F (36.8 °C) 99.1 °F (37.3 °C)   TempSrc: Oral Oral Oral Oral   SpO2: 93% 92% 91% 92%   Weight:                                                  BP Readings from Last 3 Encounters:   23 (!) 130/57   22 (!) 140/56   22 (!) 157/58       NPO Status:                                                                                 BMI:   Wt Readings from Last 3 Encounters:   23 152 lb 12.8 oz (69.3 kg)   22 145 lb (65.8 kg)   22 147 lb (66.7 kg)     Body mass index is  29.84 kg/m². CBC:   Lab Results   Component Value Date/Time    WBC 10.4 01/20/2023 03:57 AM    RBC 3.15 01/20/2023 03:57 AM    HGB 9.7 01/20/2023 03:57 AM    HCT 30.3 01/20/2023 03:57 AM    MCV 96.2 01/20/2023 03:57 AM    RDW 21.5 08/24/2016 08:23 AM     01/20/2023 03:57 AM       CMP:   Lab Results   Component Value Date/Time     01/20/2023 03:57 AM    K 4.0 01/20/2023 03:57 AM     01/20/2023 03:57 AM    CO2 17 01/20/2023 03:57 AM    BUN 34 01/20/2023 03:57 AM    CREATININE 2.4 01/20/2023 03:57 AM    LABGLOM 25 01/20/2023 03:57 AM    GLUCOSE 97 01/20/2023 03:57 AM    GLUCOSE 87 01/09/2015 07:20 AM    PROT 6.9 01/17/2023 05:00 AM    CALCIUM 8.3 01/20/2023 03:57 AM    BILITOT 0.5 01/17/2023 05:00 AM    ALKPHOS 164 01/17/2023 05:00 AM    AST 22 01/17/2023 05:00 AM    ALT 11 01/17/2023 05:00 AM       POC Tests: No results for input(s): POCGLU, POCNA, POCK, POCCL, POCBUN, POCHEMO, POCHCT in the last 72 hours.     Coags:   Lab Results   Component Value Date/Time    INR 1.17 12/25/2021 08:24 PM    APTT 42.8 01/17/2021 03:20 PM       HCG (If Applicable): No results found for: PREGTESTUR, PREGSERUM, HCG, HCGQUANT     ABGs: No results found for: PHART, PO2ART, MXL5CNN, UXI4XWW, BEART, S2XXBSSH     Type & Screen (If Applicable):  Lab Results   Component Value Date    LABRH NEG 01/26/2021       Drug/Infectious Status (If Applicable):  Lab Results   Component Value Date/Time    HEPCAB Negative 01/25/2021 04:49 AM       COVID-19 Screening (If Applicable):   Lab Results   Component Value Date/Time    COVID19 NOT DETECTED 01/17/2023 05:20 AM           Anesthesia Evaluation  Patient summary reviewed and Nursing notes reviewed no history of anesthetic complications:   Airway: Mallampati: III          Dental:          Pulmonary:   (+) shortness of breath: chronic,  decreased breath sounds: bilateral                            Cardiovascular:  Exercise tolerance: poor (<4 METS),   (+) hypertension:, CAD:, CABG/stent:, CHF:,       ECG reviewed  Rhythm: regular  Rate: normal  Echocardiogram reviewed                  Neuro/Psych:   (+) CVA:, neuromuscular disease:,             GI/Hepatic/Renal:   (+) hiatal hernia, renal disease: CRI,           Endo/Other:                     Abdominal:             Vascular: Other Findings:           Anesthesia Plan      MAC     ASA 4       Induction: intravenous. MIPS: Postoperative opioids intended and Prophylactic antiemetics administered. Anesthetic plan and risks discussed with patient. Plan discussed with CRNA.                     [address] [facility], DO   1/20/2023

## 2023-01-20 NOTE — CARE COORDINATION
1/20/23, 11:54 AM EST    DISCHARGE ON GOING EVALUATION    [address] day: 3  Location: [facility]28/028-A Reason for admit: Hypomagnesemia [E83.42]  Hypoxia [R09.02]  Elevated troponin [R77.8]  Pneumonia of right lung due to infectious organism, unspecified part of lung [J18.9]  Acute hypoxemic respiratory failure ([address] [J96.01]   Procedure:   1/17 CXR: Enlargement of the cardiopericardial silhouette with small right pleural effusion and right perihilar and lower lung airspace opacities which could indicate pneumonia. 1/17 ECHO: EF 55%. Moderately dilated left atrium. Moderately enlarged right atrium size. 1/18 CXR: Cardiomegaly. Small bilateral pleural effusions and bilateral perihilar and lower lobe airspace opacities, right greater than left, can indicate CHF versus pneumonia in the appropriate clinical setting. 1/18 Renal US: Mild right hydronephrosis. A right ureteral stone cannot be excluded. No left hydronephrosis. Cholelithiasis. 1/19 CT abd/pelvis:   1. Small to moderate right pleural effusion and small left pleural effusion with rounded atelectasis and patchy groundglass opacities at the right lung base are incompletely visualized. 2. Moderate right-sided hydronephrosis/hydroureter appears to be secondary to a right mid ureteral soft tissue density resulting in either intrinsic or extrinsic obstruction. Right periureteral and right retroperitoneal lymphadenopathy measures up to 16 mm in short axis. There are also numerous enlarged right pelvic sidewall lymph nodes as well as mesenteric lymphadenopathy suggesting metastatic disease. 3. 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 Additional ill-defined lytic lesions are noted within the L4 vertebral body and there is a suggestion of mixed lytic sclerotic bony metastasis in the right ribs as well as the bones of the pelvis. 4. Prostatomegaly. Correlate with PSA levels.  Mild bladder wall thickening may in part be artifactual related to underdistention. 5. Colonic diverticulosis without diverticulitis. Nonspecific thickening of the rectosigmoid colon may be artifactual related to underdistention. Correlate with direct inspection. No bowel obstruction, perforation, or drainable fluid collection is observed. 6. Hepatomegaly and lobulated contour of the liver parenchyma suggesting parenchymal liver disease. Correlate with liver function tests. Detailed characterization of the liver parenchyma is limited without IV contrast. Liver lesions are not excluded. 1/20 Cystoscopy, right ureteroscopy, right retrograde pyelogram, right stent placement by Dr. Tyrel Seymour. Barriers to Discharge: Hospitalist, Cardiology, Nephrology and Urology following. Cysto done this am. BUN 34, creatinine 2.4. IVF. Augmentin po q12hr. Eliquis, baby ASA, Doxycycline iv q12hr. Afebrile. Room air. Regular diet. Duarte. PCP: Loren Oconnor DO  Readmission Risk Score: 16.7%  Patient Goals/Plan/Treatment Preferences: From home alone. Sons live close by. Monitor for needs. Possible weekend discharge. 1/20/23, 12:04 PM EST    Patient goals/plan/ treatment preferences discussed by  and . Patient goals/plan/ treatment preferences reviewed with patient/ family. Patient/ family verbalize understanding of discharge plan and are in agreement with goal/plan/treatment preferences. Understanding was demonstrated using the teach back method. AVS provided by RN at time of discharge, which includes all necessary medical information pertaining to the patients current course of illness, treatment, post-discharge goals of care, and treatment preferences.           IMM Letter  IMM Letter given to Patient/Family/Significant other/Guardian/POA/by[de-identified] Cris Alva RN, CM  IMM Letter date given[de-identified] 01/20/23  IMM Letter time given[de-identified] 7027

## 2023-01-21 ENCOUNTER — TELEPHONE (OUTPATIENT)
Dept: UROLOGY | Age: 88
End: 2023-01-21

## 2023-01-21 PROBLEM — D53.9 MACROCYTIC ANEMIA: Status: ACTIVE | Noted: 2023-01-21

## 2023-01-21 PROBLEM — R16.0 HEPATOMEGALY: Status: ACTIVE | Noted: 2023-01-21

## 2023-01-21 PROBLEM — M47.816 LUMBAR SPONDYLOSIS: Status: ACTIVE | Noted: 2023-01-21

## 2023-01-21 PROBLEM — Z86.14 HISTORY OF MRSA INFECTION: Status: ACTIVE | Noted: 2023-01-21

## 2023-01-21 PROBLEM — N18.30 STAGE 3 CHRONIC KIDNEY DISEASE (HCC): Status: ACTIVE | Noted: 2022-05-06

## 2023-01-21 PROBLEM — I50.33 ACUTE ON CHRONIC DIASTOLIC CHF (CONGESTIVE HEART FAILURE) (HCC): Status: ACTIVE | Noted: 2023-01-21

## 2023-01-21 PROBLEM — R13.10 DYSPHAGIA: Status: ACTIVE | Noted: 2023-01-21

## 2023-01-21 PROBLEM — J90 PLEURAL EFFUSION: Status: ACTIVE | Noted: 2023-01-21

## 2023-01-21 PROBLEM — R53.81 PHYSICAL DECONDITIONING: Status: ACTIVE | Noted: 2023-01-21

## 2023-01-21 PROBLEM — N40.0 BPH WITHOUT OBSTRUCTION/LOWER URINARY TRACT SYMPTOMS: Status: ACTIVE | Noted: 2021-08-05

## 2023-01-21 PROBLEM — J02.9 PHARYNGITIS: Status: ACTIVE | Noted: 2023-01-21

## 2023-01-21 PROBLEM — R59.1 DIFFUSE LYMPHADENOPATHY: Status: ACTIVE | Noted: 2023-01-21

## 2023-01-21 PROBLEM — E83.42 HYPOMAGNESEMIA: Status: ACTIVE | Noted: 2023-01-21

## 2023-01-21 PROBLEM — I47.29 NSVT (NONSUSTAINED VENTRICULAR TACHYCARDIA) (HCC): Status: ACTIVE | Noted: 2023-01-21

## 2023-01-21 PROBLEM — M89.9 LYTIC LESION OF BONE ON X-RAY: Status: ACTIVE | Noted: 2023-01-21

## 2023-01-21 PROBLEM — E83.51 HYPOCALCEMIA: Status: ACTIVE | Noted: 2023-01-21

## 2023-01-21 LAB
ANION GAP SERPL CALC-SCNC: 15 MEQ/L (ref 8–16)
BUN SERPL-MCNC: 28 MG/DL (ref 7–22)
CALCIUM SERPL-MCNC: 8.1 MG/DL (ref 8.5–10.5)
CHLORIDE SERPL-SCNC: 113 MEQ/L (ref 98–111)
CO2 SERPL-SCNC: 16 MEQ/L (ref 23–33)
CREAT SERPL-MCNC: 2.2 MG/DL (ref 0.4–1.2)
DEPRECATED RDW RBC AUTO: 47 FL (ref 35–45)
ERYTHROCYTE [DISTWIDTH] IN BLOOD BY AUTOMATED COUNT: 13.2 % (ref 11.5–14.5)
GFR SERPL CREATININE-BSD FRML MDRD: 28 ML/MIN/1.73M2
GLUCOSE SERPL-MCNC: 103 MG/DL (ref 70–108)
HCT VFR BLD AUTO: 31.6 % (ref 42–52)
HGB BLD-MCNC: 10.4 GM/DL (ref 14–18)
MCH RBC QN AUTO: 31.8 PG (ref 26–33)
MCHC RBC AUTO-ENTMCNC: 32.9 GM/DL (ref 32.2–35.5)
MCV RBC AUTO: 96.6 FL (ref 80–94)
PHOSPHATE SERPL-MCNC: 3 MG/DL (ref 2.4–4.7)
PLATELET # BLD AUTO: 216 THOU/MM3 (ref 130–400)
PMV BLD AUTO: 10.8 FL (ref 9.4–12.4)
POTASSIUM SERPL-SCNC: 4.1 MEQ/L (ref 3.5–5.2)
RBC # BLD AUTO: 3.27 MILL/MM3 (ref 4.7–6.1)
SODIUM SERPL-SCNC: 144 MEQ/L (ref 135–145)
WBC # BLD AUTO: 11.6 THOU/MM3 (ref 4.8–10.8)

## 2023-01-21 PROCEDURE — 36415 COLL VENOUS BLD VENIPUNCTURE: CPT

## 2023-01-21 PROCEDURE — 94669 MECHANICAL CHEST WALL OSCILL: CPT

## 2023-01-21 PROCEDURE — 80048 BASIC METABOLIC PNL TOTAL CA: CPT

## 2023-01-21 PROCEDURE — 6370000000 HC RX 637 (ALT 250 FOR IP): Performed by: INTERNAL MEDICINE

## 2023-01-21 PROCEDURE — 6370000000 HC RX 637 (ALT 250 FOR IP)

## 2023-01-21 PROCEDURE — 2140000000 HC CCU INTERMEDIATE R&B

## 2023-01-21 PROCEDURE — 6370000000 HC RX 637 (ALT 250 FOR IP): Performed by: HOSPITALIST

## 2023-01-21 PROCEDURE — 99231 SBSQ HOSP IP/OBS SF/LOW 25: CPT | Performed by: UROLOGY

## 2023-01-21 PROCEDURE — 2580000003 HC RX 258: Performed by: HOSPITALIST

## 2023-01-21 PROCEDURE — 99232 SBSQ HOSP IP/OBS MODERATE 35: CPT | Performed by: INTERNAL MEDICINE

## 2023-01-21 PROCEDURE — 84100 ASSAY OF PHOSPHORUS: CPT

## 2023-01-21 PROCEDURE — 94640 AIRWAY INHALATION TREATMENT: CPT

## 2023-01-21 PROCEDURE — 99233 SBSQ HOSP IP/OBS HIGH 50: CPT | Performed by: FAMILY MEDICINE

## 2023-01-21 PROCEDURE — 85027 COMPLETE CBC AUTOMATED: CPT

## 2023-01-21 PROCEDURE — 6360000002 HC RX W HCPCS

## 2023-01-21 RX ADMIN — FINASTERIDE 5 MG: 5 TABLET, FILM COATED ORAL at 09:14

## 2023-01-21 RX ADMIN — SODIUM BICARBONATE 1300 MG: 650 TABLET ORAL at 09:14

## 2023-01-21 RX ADMIN — SODIUM BICARBONATE 1300 MG: 650 TABLET ORAL at 20:13

## 2023-01-21 RX ADMIN — ALBUTEROL SULFATE 2.5 MG: 2.5 SOLUTION RESPIRATORY (INHALATION) at 20:44

## 2023-01-21 RX ADMIN — SODIUM CHLORIDE: 9 INJECTION, SOLUTION INTRAVENOUS at 16:55

## 2023-01-21 RX ADMIN — DOXYCYCLINE HYCLATE 100 MG: 100 TABLET, COATED ORAL at 09:14

## 2023-01-21 RX ADMIN — Medication 1 TABLET: at 09:14

## 2023-01-21 RX ADMIN — PHENOL 1 SPRAY: 1.5 LIQUID ORAL at 20:13

## 2023-01-21 RX ADMIN — PANTOPRAZOLE SODIUM 40 MG: 40 TABLET, DELAYED RELEASE ORAL at 20:13

## 2023-01-21 RX ADMIN — TAMSULOSIN HYDROCHLORIDE 0.4 MG: 0.4 CAPSULE ORAL at 09:14

## 2023-01-21 RX ADMIN — AMOXICILLIN AND CLAVULANATE POTASSIUM 1 TABLET: 500; 125 TABLET, FILM COATED ORAL at 09:14

## 2023-01-21 RX ADMIN — AMOXICILLIN AND CLAVULANATE POTASSIUM 1 TABLET: 500; 125 TABLET, FILM COATED ORAL at 20:12

## 2023-01-21 RX ADMIN — APIXABAN 2.5 MG: 2.5 TABLET, FILM COATED ORAL at 20:13

## 2023-01-21 RX ADMIN — APIXABAN 2.5 MG: 2.5 TABLET, FILM COATED ORAL at 09:14

## 2023-01-21 RX ADMIN — DOXYCYCLINE HYCLATE 100 MG: 100 TABLET, COATED ORAL at 20:13

## 2023-01-21 RX ADMIN — PANTOPRAZOLE SODIUM 40 MG: 40 TABLET, DELAYED RELEASE ORAL at 09:14

## 2023-01-21 RX ADMIN — ASPIRIN 81 MG: 81 TABLET, COATED ORAL at 09:14

## 2023-01-21 ASSESSMENT — PAIN DESCRIPTION - LOCATION: LOCATION: THROAT

## 2023-01-21 ASSESSMENT — PAIN SCALES - GENERAL
PAINLEVEL_OUTOF10: 0
PAINLEVEL_OUTOF10: 0
PAINLEVEL_OUTOF10: 4
PAINLEVEL_OUTOF10: 0

## 2023-01-21 ASSESSMENT — PAIN DESCRIPTION - DESCRIPTORS: DESCRIPTORS: DISCOMFORT

## 2023-01-21 ASSESSMENT — PAIN DESCRIPTION - ORIENTATION: ORIENTATION: MID

## 2023-01-21 NOTE — PROGRESS NOTES
[facility]   Nephrology progress note  1/21/2023, 10:41 AM      Pt Name:    Felix Javier  MRN:     [id number]     YOB: 1932  Admit Date:    1/17/2023  4:32 AM    Chief Complaint: Nephrology following for ANNIKA/CKD    Subjective:  Patient was seen and examined   Went to the OR and had a right ureteral stent placed  No chest pain or shortness of breath  disc mostly having metastatic malignancy    Objective:  24HR INTAKE/OUTPUT:    Intake/Output Summary (Last 24 hours) at 1/21/2023 1041  Last data filed at 1/21/2023 0403  Gross per 24 hour   Intake 1000.17 ml   Output 1200 ml   Net -199.83 ml           I/O last 3 completed shifts: In: 1200.2 [P.O.:200; I.V.:819.6; IV Piggyback:180.6]  Out: 1850 [Urine:1850]  No intake/output data recorded.    Admission weight: 138 lb (62.6 kg)  Wt Readings from Last 3 Encounters:   01/21/23 154 lb 8.7 oz (70.1 kg)   12/06/22 145 lb (65.8 kg)   11/17/22 147 lb (66.7 kg)        Vitals :   Vitals:    01/20/23 1715 01/20/23 1951 01/20/23 2331 01/21/23 0403   BP: (!) 178/79 (!) 140/68 (!) 140/53 (!) 155/61   Pulse: 73 67 69 67   Resp: 16 18 18 22   Temp: 98.4 °F (36.9 °C) 98.4 °F (36.9 °C) 99 °F (37.2 °C) 98.1 °F (36.7 °C)   TempSrc: Oral Oral Axillary Oral   SpO2: 94% 92% 94% 94%   Weight:    154 lb 8.7 oz (70.1 kg)       Physical examination  General Appearance: alert and cooperative with exam, appears comfortable, no distress  Neck: No JVD  Lungs: no use of accessory muscles  GI: soft, non-tender, no guarding  Extremities: No pitting LE edema    Medications:  Infusion:    sodium chloride 50 mL/hr at 01/20/23 2138    sodium chloride       Meds:    doxycycline hyclate  100 mg Oral 2 times per day    sodium bicarbonate  1,300 mg Oral BID    amoxicillin-clavulanate  1 tablet Oral 2 times per day    apixaban  2.5 mg Oral BID    aspirin  81 mg Oral Daily    finasteride  5 mg Oral Daily    [Held by provider] furosemide  40 mg Oral Daily    multivitamin  1 tablet Oral Daily    pantoprazole  40 mg Oral BID    tamsulosin  0.4 mg Oral Daily    sodium chloride flush  10 mL IntraVENous 2 times per day     Meds prn: albuterol, sodium chloride flush, sodium chloride, ondansetron **OR** ondansetron, polyethylene glycol, acetaminophen **OR** acetaminophen     Lab Data :  CBC:   Recent Labs     01/19/23  0337 01/20/23  0357 01/21/23  0406   WBC 12.1* 10.4 11.6*   HGB 9.7* 9.7* 10.4*   HCT 29.9* 30.3* 31.6*    184 216       CMP:  Recent Labs     01/20/23  0357 01/20/23  1729 01/21/23  0406    144 144   K 4.0 4.6 4.1    111 113*   CO2 17* 19* 16*   BUN 34* 30* 28*   CREATININE 2.4* 2.2* 2.2*   GLUCOSE 97 92 103   CALCIUM 8.3* 8.4* 8.1*   PHOS  --   --  3.0       Hepatic: No results for input(s): LABALBU, AST, ALT, ALB, BILITOT, ALKPHOS in the last 72 hours. Assessment and Plan:  1. 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  UA is benign. Urine sodium 38. Creatinine stable, had a stent placed yesterday  Most likely might improve over time    2. Mild right-sided hydronephrosis. Procedure report reviewed. Appears per urology there is extrinsic compression on the ureter. 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 3.  Renal cyst  4. Atrial fibrillation  5. Metabolic acidosis. Worsening. Continue IV bicarbonate for now  6. Hx BPH  7.  CAD  D/W patient     Jenn Rawls MD  [facility]    This report has been created using voice recognition software.  It may contain minor errors which are inherent in voice recognition technology

## 2023-01-21 NOTE — PROGRESS NOTES
Richard Cardoza, ROBBIE - CNP  Urology Progress Note    Subjective: Nic Moeller is a 80 y.o. male. s/p CYSTO, RIGHT URETEROSCOPY, RIGHT RETROGRADE PYELOGRAM, RIGHT STENT PLACEMENT on 1/17/2023 - 1/20/2023    His/Her current Diet is: ADULT DIET; Regular. Since the previous note, the patient reports the following:  No acute issues overnight. No fevers or chills. No nausea or vomiting. No chest pain or shortness of breath. No calf pain. Pain Controlled. Ambulating. Having some throat irritation with coughing episodes - improved  Voiding yellow, concentrated urine - color improved per nurse    IMPRESSION/Plan:    Can remove boswell prior to discharge   Our office will manage stent    Right side hydronephrosis - right mid ureteral soft tissue density likely cause of obstruction, not seen on CT 2021. Enlarged lyphadenapathy. See Dr Jo Ann Mandujano op note     ANNIKA on CKD - CRT 2.2, above baseline, sees nephrology  Enlarged lymadenopathy, vertebral lesion - discussed with medicine  Hx of Afib on Eliquis/ASA    Plan to meet with family on Monday to discuss goals of care  Urology signing off, please call with questions    Vitals and Labs:  Patient Vitals for the past 24 hrs:   BP Temp Temp src Pulse Resp SpO2 Weight   01/21/23 0403 (!) 155/61 98.1 °F (36.7 °C) Oral 67 22 94 % 154 lb 8.7 oz (70.1 kg)   01/20/23 2331 (!) 140/53 99 °F (37.2 °C) Axillary 69 18 94 % --   01/20/23 1951 (!) 140/68 98.4 °F (36.9 °C) Oral 67 18 92 % --   01/20/23 1715 (!) 178/79 98.4 °F (36.9 °C) Oral 73 16 94 % --   01/20/23 1109 (!) 143/64 97.7 °F (36.5 °C) Oral 61 18 98 % --   01/20/23 0924 (!) 161/69 -- -- 68 16 90 % --     I/O last 3 completed shifts: In: 1200.2 [P.O.:200;  I.V.:819.6; IV Piggyback:180.6]  Out: 1850 [Urine:1850]    Recent Labs     01/19/23  0337 01/20/23  0357 01/21/23  0406   WBC 12.1* 10.4 11.6*   HGB 9.7* 9.7* 10.4*   HCT 29.9* 30.3* 31.6*   MCV 96.5* 96.2* 96.6*    184 216     Recent Labs     01/20/23  0357 01/20/23  1729 01/21/23  0406    144 144   K 4.0 4.6 4.1    111 113*   CO2 17* 19* 16*   PHOS  --   --  3.0   BUN 34* 30* 28*   CREATININE 2.4* 2.2* 2.2*       Recent Labs     01/18/23  1030   COLORU YELLOW   PHUR 5.5   LABCAST NONE SEEN  NONE SEEN   WBCUA 0-2   RBCUA 5-10   YEAST NONE SEEN   BACTERIA NONE SEEN   SPECGRAV 1.022   LEUKOCYTESUR NEGATIVE   UROBILINOGEN 0.2   BILIRUBINUR NEGATIVE   BLOODU NEGATIVE       Physical Exam:  No acute distress. Awake, alert and oriented. Neck is supple  Regular rate and rhythm. Normal peripheral pulses  No accessory muscles of inspiration. Symmetric chest rise  Abdomen soft, non-tender, non-distended. No CVA tenderness. Duarte draining yellow concentrated urine  No calf pain. Minimal/no edema in bilateral lower extremities. Skin is warm, dry  Psych: mood, affect normal    Additional Lab/Culture results:     Imaging Reviewed:     ROBBIE May CNP independently reviewed the images and verified the radiology reports from:    XR CHEST (2 VW)    Result Date: 1/18/2023  PROCEDURE: XR CHEST (2 VW) CLINICAL INFORMATION: Hypoxia. COMPARISON: Chest x-ray 1/17/2023. TECHNIQUE: PA and lateral views of the chest performed. FINDINGS: Lines/tubes: None. Heart/mediastinum: Cardiomegaly and median sternotomy wires are stable. The pulmonary vascular markings are prominent. Lungs: Small bilateral pleural effusions and bilateral lower lobe airspace opacities, right greater than left are more prominent. No pneumothorax is observed. Bones: Diffuse osteopenia is present. The visualized skeletal structures appear intact. Cardiomegaly. Small bilateral pleural effusions and bilateral perihilar and lower lobe airspace opacities, right greater than left, can indicate CHF versus pneumonia in the appropriate clinical setting. Follow-up to ensure resolution is advised. **This report has been created using voice recognition software.   It may contain minor errors which are inherent in voice recognition technology. ** Final report electronically signed by Dr Milena Coats on 1/18/2023 1:21 PM    US RENAL COMPLETE    Result Date: 1/18/2023  US Renal Comparison: US/SR - US RENAL COMPLETE - 08/04/2021 01:32 PM EDT Findings: Right kidney normal size and echotexture, 9.1 cm length. A 1.4 x 1.2 x 1.1 cm simple cyst at the upper pole of the right kidney. Mild right hydronephrosis. Left kidney normal size and echotexture, 10.5 cm length. No left hydronephrosis. The prostate measures 25 mL in volume which is within normal limits. Prevoid bladder volume is 140 mL. Patient declined to void. Incidental finding of a stone in the gallbladder measuring 1 x 0.7 x 0.5 cm at the gallbladder neck. Mild right hydronephrosis. A right ureteral stone cannot be excluded. No left hydronephrosis. Cholelithiasis. This document has been electronically signed by: John Paul Christensen MD on 01/18/2023 09:19 PM    XR CHEST PORTABLE    Result Date: 1/17/2023  1 view chest x-ray Comparison: CR/SR - XR CHEST PORTABLE - 08/06/2022 05:10 AM EDT Findings: Previous median sternotomy. Enlargement of the cardiopericardial silhouette. Right perihilar and lower lung consolidation. Probable small right pleural effusion. No pneumothorax. 1. Enlargement of the cardiopericardial silhouette with small right pleural effusion and right perihilar and lower lung airspace opacities which could indicate pneumonia.  This document has been electronically signed by: Thalia Rayo MD on 01/17/2023 06:16 AM      Impression:    Patient Active Problem List   Diagnosis    Hyperlipidemia    Essential hypertension    Carotid artery disease ([facility]    Partial intestinal obstruction (HCC)    S/P CABG (coronary artery bypass graft)    Inguinal hernia, left    Generalized abdominal pain    Hyponatremia    Leukocytosis    Enteritis    SIRS (systemic inflammatory response syndrome) (HCC)    ANNIKA (acute kidney injury) ([address]    Acidosis    Small bowel obstruction ([facility] [address]    Small bowel ischemia (HCC)    Anticoagulated on Coumadin    History of cardioversion    Hiatal hernia    Coronary artery disease involving native coronary artery of native heart without angina pectoris    History of stroke    History of ischemic bowel disease    Atrial fibrillation with slow ventricular response (HCC)    Bradycardia    Enteritis    Atrial fibrillation (HCC)    Symptomatic bradycardia    Traumatic rhabdomyolysis (HCC)    Supratherapeutic INR    Coagulopathy (HCC)    Pneumonia due to COVID-19 virus    Elevated CK    Acute metabolic encephalopathy    Acute pulmonary edema (HCC)    Pulmonary edema cardiac cause (HCC)    BPH with urinary obstruction    Stage 2 chronic kidney disease    Acute on chronic congestive heart failure ([facility] [address]    Duarte catheter in place    Abdominal pain    SBO (small bowel obstruction) (HCC)    Chronic kidney disease (CKD) stage G3b/A1, moderately decreased glomerular filtration rate (GFR) between 30-44 mL/min/1.73 square meter and albuminuria creatinine ratio less than 30 mg/g (HCC)    Sepsis (HCC)    Acute respiratory failure with hypoxia (HCC)    Acute hypoxemic respiratory failure (HCC)    Pneumonia of right lung due to infectious organism    Elevated troponin    CKD (chronic kidney disease), stage IV ([facility] [address]    Hydronephrosis determined by ultrasound       ROBBIE Robertson CNP  9:10 AM 1/21/2023

## 2023-01-21 NOTE — PLAN OF CARE
Problem: Discharge Planning  Goal: Discharge to home or other facility with appropriate resources  1/21/2023 0237 by Yaw Rebolledo RN  Outcome: Progressing  Flowsheets (Taken 1/21/2023 3631)  Discharge to home or other facility with appropriate resources: Identify barriers to discharge with patient and caregiver     Problem: Safety - Adult  Goal: Free from fall injury  1/21/2023 0237 by Yaw Rebolledo RN  Outcome: Progressing  Flowsheets (Taken 1/21/2023 0237)  Free From Fall Injury: Instruct family/caregiver on patient safety     Problem: ABCDS Injury Assessment  Goal: Absence of physical injury  1/21/2023 0237 by Yaw Rebolledo RN  Outcome: Progressing  Flowsheets (Taken 1/21/2023 0237)  Absence of Physical Injury: Implement safety measures based on patient assessment     Problem: Chronic Conditions and Co-morbidities  Goal: Patient's chronic conditions and co-morbidity symptoms are monitored and maintained or improved  1/21/2023 0237 by Yaw Rebolledo RN  Outcome: Progressing  Flowsheets (Taken 1/21/2023 0237)  Care Plan - Patient's Chronic Conditions and Co-Morbidity Symptoms are Monitored and Maintained or Improved: Monitor and assess patient's chronic conditions and comorbid symptoms for stability, deterioration, or improvement     Problem: Pain  Goal: Verbalizes/displays adequate comfort level or baseline comfort level  1/21/2023 0237 by Yaw Rebolledo RN  Outcome: Progressing  Flowsheets (Taken 1/21/2023 0237)  Verbalizes/displays adequate comfort level or baseline comfort level: Encourage patient to monitor pain and request assistance     Problem: Skin/Tissue Integrity  Goal: Absence of new skin breakdown  Description: 1. Monitor for areas of redness and/or skin breakdown  2. Assess vascular access sites hourly  3. Every 4-6 hours minimum:  Change oxygen saturation probe site  4.   Every 4-6 hours:  If on nasal continuous positive airway pressure, respiratory therapy assess nares and determine need for appliance change or resting period. 1/21/2023 0237 by Edilberto Garner RN  Outcome: Progressing     Care plan reviewed with patient. Patient verbalizes understanding of the care plan and contributed to goal setting.

## 2023-01-21 NOTE — PROGRESS NOTES
PROGRESS NOTE      Patient:  Kassandra Cooper      Unit/Bed:3B-28/028-A    YOB: 1932    MRN: [id number]       Acct: [de-identified]     PCP: Phillip Powell DO    Date of Admission: 1/17/2023      Assessment/Plan:    Sepsis secondary to right-sided pneumonia-POA:  - Patient arrived with chief complaint of shortness of breath  - Was hypoxic on room air, tachycardic with heart rate 106, tachypneic with respiratory rate of 34, afebrile normotensive  - Lactic acid 3.2 on arrival, Pro-Santos 0.69 on arrival, leukocytosis of 17.4 on arrival  - Source of infection from right sided pneumonia with chest x-ray demonstrating right perihilar and lower lung airspace opacities  - COVID, influenza A/B negative, UA not concerning for UTI  - Blood cultures x2 no preliminary growth  - 500 cc IV fluid bolus given, was hemodynamically stable in ED so no more was given due to concerns for decompensated heart failure  - Patient started on Rocephin and Zithromax in the ED, switched to Vanco/Zosyn due to history of MRSA, currently on doxycycline and Augmentin (patient started with antibiotic treatment on 1/17, will treat for 7 total days)    Right-sided hydronephrosis  Right mid ureteral soft tissue density  - Renal ultrasound demonstrated right sided hydronephrosis with associated right mid ureteral soft tissue density 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  - Noted on CT abdomen pelvis on 1/19  - Demonstrating a lytic likely mid diastasis in the T9 vertebral body with ill-defined lytic lesions within the L4 vertebral body  - There is evidence of mixed lytic sclerotic bony metastasis in the right ribs as well as the bones of the pelvis  - MRI of abdomen done on 1/20 demonstrated multiple bony metastasis in the right and left iliac crest, and suspicion for tumor involvement of the IVC  - Discussion to be had by family and patient regarding next steps or if potential biopsy is wanted    Enlarged lymphadenopathy:  - Noted periureteral lymphadenopathy with numerous enlarged right pelvic lymph nodes as well as mesenteric lymphadenopathy on CT abdomen pelvis on 1/19  - MRI abdomen on 1/20 demonstrated abnormal retroperitoneal and mesenteric lymph nodes consistent with neoplastic disease, retroperitoneal lymph nodes could be biopsy under CT guidance if clinically indicated  - Discussion to be had by family and patient regarding next steps or if potential biopsy is wanted    Right perihilar lower lung pneumonia-unspecified organism:  - As noted above on arrival on chest x-ray imaging  - Continue doxycycline and Augmentin for 5 total days (start 1/20 and end 1/25)  - Albuterol nebulizer every 6 hours as needed  - Acapella and IS encouraged    Bilateral pleural effusions:  - Noted on CT abdomen on 1/19 to have small to moderate right pleural effusion and small left pleural effusion, also noted on MRI abdomen 1/20  - Due to ANNIKA and appearing euvolemic diuretics avoided  - Patient continues to clinically improve on respiratory status and is currently on room air  - We will continue to monitor clinical status    Acute hypoxic respiratory failure-POA:  - Patient on room air at baseline, was requiring 6 L nasal cannula on arrival secondary to sepsis with right-sided pneumonia and bilateral pleural effusions   - Have been able to wean back down to room air and maintaining oxygen saturation  - We will continue to monitor clinical status    Concerns for acute on chronic HFpEF exacerbation:  - Concerns upon arrival due to increasing shortness of breath and chest x-ray demonstrated enlargement of the cardiopericardial silhouette with small right pleural effusion  - Prior echo on 8/4/2021 demonstrated EF 50% with no regional left ventricular wall motion abnormalities, moderately enlarged right atrium, severely dilated left atrium  - Repeat echo on 1/17/2023 demonstrated EF 55%, overall normal left ventricular function  - BNP chronically elevated at baseline  - Patient continues appear euvolemic we will use diuretic as needed  - Daily weights and I's and O's    Elevated troponin:  - History of coronary artery disease, with CABG in 2006  - Troponin up trended from 0.046-0.388 (1/17) currently 0.319 (1/18) so has trended down  - EKG on arrival demonstrated A. fib with premature ventricular complexes, left axis deviation, right bundle branch block, anteroseptal infarct age undetermined  - Cardiology consulted, noted patient not the best candidate for a left heart cath due to ANNIKA and overall condition    ANNIKA on CKD stage IIIb:  - Likely secondary to obstructive uropathy  - Creatinine 2.5 on arrival, as high as 3.0 on 1/18, since decreased to 2.2 (1/21) -> baseline appears to have been increasing during recent labs  - Follows with Dr. Torres Said as an outpatient  - Received 500 cc IV fluid bolus in ED, currently holding Lasix as patient appears euvolemic  - Urine eosinophils noted, secondary to diuresis?  - UA without any evidence of microhematuria or proteinuria  - Nephrology consulted appreciate recs  - Renal ultrasound done and demonstrated mild right-sided hydronephrosis -> urology following    Metabolic acidosis:  - Bicarb 20 on arrival continue to worsen to 16 on 1/21  - Nephrology following appreciate recommendations  - Started on sodium bicarb tablets  - IV bicarb    Parenchymal renal cyst:  - Right and left parenchymal cyst noted on MRI abdomen on 1/20    Pulmonary hypertension:  - RVSP 44 mmHg noted    Dysphagia:  - Likely secondary to procedures and anesthesia controlling respiratory status  - Continues to choke on thin liquids  -Speech to eval and treat, in the meantime we will switch to dysphagia soft and bite-size diet with moderately thickened liquids    Hiatal hernia  Left inguinal hernia  Lumbar spondylosis  Small gallstone  - Noted on MRI abdomen 1/20    Essential hypertension  -Noted per history  - Appears patient is on no first-line treatment medications, uses Lasix  - Lasix currently being held due to ANNIKA in euvolemic state  - Blood pressures remaining stable will monitor    Chronic macrocytic anemia:  - Likely anemia of chronic disease due to CKD and iron deficiency anemia  - Iron 29, iron saturation 17, total iron binding capacity 168, folate and vitamin B12 normal  - Hemoglobin has remained at patient's baseline  - We will monitor daily labs    Leukocytosis, improving:  - Secondary to septic state from right-sided pneumonia  - WBC 17.4 on arrival, decreased to 10.4 on 1/19, increased to 11.6 on 1/21 -> patient remains afebrile and on room air  - We will continue to monitor daily labs    Hypomagnesemia:  - Magnesium noted to be 1.4 on arrival was replaced and up to 2.1, potassium normal  - We will check intermittently during stay    Hypocalcemia:  - Likely secondary to hypoalbuminemia  - We will encourage to increase p.o. intake  - Monitor daily labs    Coronary artery disease:  - Noted per history  - Does not appear patient has had left heart cath in place noted in our system  - Continue aspirin    Persistent A. fib:  - Noted per chart review of patient's history and on EKG on arrival  - Continue home Eliquis    BPH:  - Follows with Dr. Martina Reynolds as outpatient with history of recurrent UTIs  - Continue Flomax and finasteride  - CT abdomen pelvis demonstrated prostatomegaly    GERD:  - Controlled with Protonix twice daily    Colonic diverticulosis without diverticulitis:  - Noted on CT abdomen pelvis on 1/19/2023  - No bowel obstruction or perforation noted    Hepatomegaly:  - Noted on CT abdomen pelvis on 1/19/2023  - Lobulated contour of the liver parenchyma suggesting parenchymal liver disease recommend correlate with liver function test    History of MRSA:  - Noted    Physical deconditioning  Generalized weakness  - PT OT  - Multivitamin      Chief Complaint: Shortness of breath    Hospital Course: As per initial HPI:  Neva Garg is a 81 y/o  male with a PMHx of CAD, HTN, pulmonary HTN who presents to [facility] ED today for the evaluation of shortness of breath. He states he called his son early this morning saying that he thinks he should come into the hospital for evaluation because of worsening symptoms. He states his shortness of breath is present at rest and with activity. He has an associated dry cough and chills. He denies chest pain, abdominal pain, LE swelling, unexpected weight gain, changes in bowel or urinary habits. Pt unable to provide additional history at this time due to being on bipap. \"    Patient arrived with chief complaint shortness of breath as stated above likely secondary to sepsis due to right-sided pneumonia. COVID influenza A/B- and blood cultures x2 no growth. Was given one-time dose of Rocephin and Zithromax in the ED and switched to vancomycin/Zosyn due to history of MRSA. Have since switched to doxycycline and Augmentin, antibiotic treatment started on 1/17 and will treat for 7 total days. Respiratory status has continued to improve throughout stay as patient no longer requiring supplemental oxygen is back on baseline of room air.     During stay patient had notable ANNIKA with creatinine increasing. Renal ultrasound demonstrated right-sided hydronephrosis so CT abdomen pelvis was done and demonstrated a right mid ureteral soft tissue mass with concerning lymphadenopathy and enlarged right pelvic lymph nodes concerning for mesenteric lymphadenopathy for metastatic disease. Also noted on CT abdomen pelvis was a thoracic spine lytic lesion. 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 Enlarged retroperitoneal lymph nodes could be biopsied under CT guidance if clinically indicated. Urology was consulted cystoscopy, right ureteroscopy, right retrograde pyelogram, and right ureteral stent placement done by Dr. Annmarie Bennett on 1/20.    1/21/2023 patient remained stable on room air. Due to findings on imaging, will await for family members to arrive from out of town and have discussion about results family and patient's likely at the beginning of next week. Subjective:   Patient seen in the a.m. no acute events noted overnight. Patient states he is feeling okay this morning, having some sore throat likely from procedures and anesthesia involvement. Patient states he is no longer feeling short of breath, denies chest pains, or abdominal pains. Is in good spirits. Son was also in the room during this encounter, answered all questions or concerns. Discussed with son outside the patient's room and will plan to have conversation with patient and other family members from out of town early next week regarding results and what neck steps will be.       Medications:  Reviewed    Infusion Medications    sodium chloride 50 mL/hr at 01/20/23 1851    sodium chloride       Scheduled Medications    doxycycline hyclate  100 mg Oral 2 times per day    sodium bicarbonate  1,300 mg Oral BID    amoxicillin-clavulanate  1 tablet Oral 2 times per day    apixaban  2.5 mg Oral BID    aspirin  81 mg Oral Daily    finasteride  5 mg Oral Daily    [Held by provider] furosemide  40 mg Oral Daily    multivitamin  1 tablet Oral Daily    pantoprazole  40 mg Oral BID    tamsulosin  0.4 mg Oral Daily    sodium chloride flush  10 mL IntraVENous 2 times per day     PRN Meds: phenol, albuterol, sodium chloride flush, sodium chloride, ondansetron **OR** ondansetron, polyethylene glycol, acetaminophen **OR** acetaminophen      Intake/Output Summary (Last 24 hours) at 1/21/2023 1442  Last data filed at 1/21/2023 1214  Gross per 24 hour   Intake 783.93 ml   Output 800 ml   Net -16.07 ml       Diet:  ADULT DIET; Dysphagia - Soft and Bite Sized; Moderately Thick (Honey)    Exam:  BP (!) 158/82   Pulse 67   Temp 98.4 °F (36.9 °C) (Oral)   Resp 20   Wt 154 lb 8.7 oz (70.1 kg)   SpO2 95%   BMI 30.18 kg/m²     General appearance: Elderly male laying in bed, cooperative alert and pleasant  HEENT: Pupils equal, round, and reactive to light. Conjunctivae/corneas clear. Neck: Supple, with full range of motion. No jugular venous distention. Trachea midline. Respiratory:  Normal respiratory effort. Clear to auscultation, bilaterally without Rales/Wheezes/Rhonchi. Cardiovascular: Regular rate and rhythm with normal S1/S2 without murmurs, rubs or gallops. Abdomen: Soft, non-tender, non-distended with normal bowel sounds. Musculoskeletal: passive and active ROM x 4 extremities. Skin: Skin color, texture, turgor normal.  No rashes or lesions. Neurologic:  Neurovascularly intact without any focal sensory/motor deficits.  Cranial nerves: II-XII intact, grossly non-focal.  Psychiatric: Alert and oriented, thought content appropriate, normal insight  Capillary Refill: Brisk,< 3 seconds   Peripheral Pulses: +2 palpable, equal bilaterally       Labs:   Recent Labs     01/19/23  0337 01/20/23  0357 01/21/23  0406   WBC 12.1* 10.4 11.6*   HGB 9.7* 9.7* 10.4*   HCT 29.9* 30.3* 31.6*    184 216     Recent Labs     01/20/23  0357 01/20/23  1729 01/21/23  0406    144 144   K 4.0 4.6 4.1   CL 109 111 113*   CO2 17* 19* 16*   BUN 34* 30* 28*   CREATININE 2.4* 2.2* 2.2*   CALCIUM 8.3* 8.4* 8.1*   PHOS  --   --  3.0     No results for input(s): AST, ALT, BILIDIR, BILITOT, ALKPHOS in the last 72 hours. No results for input(s): INR in the last 72 hours. No results for input(s): Adonica Hoose in the last 72 hours. Urinalysis:      Lab Results   Component Value Date/Time    NITRU NEGATIVE 01/18/2023 10:30 AM    WBCUA 0-2 01/18/2023 10:30 AM    BACTERIA NONE SEEN 01/18/2023 10:30 AM    RBCUA 5-10 01/18/2023 10:30 AM    BLOODU NEGATIVE 01/18/2023 10:30 AM    SPECGRAV 1.022 01/18/2023 10:30 AM    GLUCOSEU NEGATIVE 12/26/2021 05:20 AM       Radiology:  MRI ABDOMEN WO CONTRAST   Final Result       1. There are parenchymal cysts in the right left kidneys. This no discrete renal mass noted. 2. There are abnormal retroperitoneal and mesenteric lymph nodes present consistent with neoplastic disease. The retroperitoneal lymph nodes can be biopsied under CT guidance if clinically indicated. 3. There is abnormal signal intensity along the right lateral and posterior aspect of the inferior vena cava which is contiguous with the retroperitoneal lymph nodes. 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 Please correlate clinically. 4. 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   5. There is a hiatal hernia. There is a left inguinal hernia present. 6. There is a tiny gallstone. 7. There is a right pleural effusion. There is atelectasis at the right lung base. 8. There is a Duarte catheter in place. This a thick-walled bladder. 9. There is lumbar spondylosis. **This report has been created using voice recognition software. It may contain minor errors which are inherent in voice recognition technology. **      Final report electronically signed by DR Shania Garcia on 1/21/2023 7:29 AM      FL RETROGRADE Peg HUITRON WO KUB   Final Result   Fluoroscopic images provided during surgery. Please refer to the operative note for further details. **This report has been created using voice recognition software. It may contain minor errors which are inherent in voice recognition technology. **      Final report electronically signed by Dr Carson Gorman on 1/20/2023 9:16 AM      CT ABDOMEN PELVIS WO CONTRAST Additional Contrast? None   Final Result   1. Small to moderate right pleural effusion and small left pleural effusion with rounded atelectasis and patchy groundglass opacities at the right lung base are incompletely visualized. 2. Moderate right-sided hydronephrosis/hydroureter appears to be secondary to a right mid ureteral soft tissue density (series 301, image 48) resulting in either intrinsic or extrinsic obstruction. Right periureteral and right retroperitoneal    lymphadenopathy measures up to 16 mm in short axis. There are also numerous enlarged right pelvic sidewall lymph nodes as well as mesenteric lymphadenopathy suggesting metastatic disease. 3. 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 Additional ill-defined lytic lesions are noted within the L4 vertebral body and there is a suggestion of mixed lytic sclerotic bony metastasis in the right ribs as well as the bones of    the pelvis. 4. Prostatomegaly. Correlate with PSA levels. Mild bladder wall thickening may in part be artifactual related to underdistention. Correlate with urinalysis. 5. Colonic diverticulosis without diverticulitis. Nonspecific thickening of the rectosigmoid colon may be artifactual related to underdistention. Correlate with direct inspection. No bowel obstruction, perforation, or drainable fluid collection is    observed. 6. Hepatomegaly and lobulated contour of the liver parenchyma suggesting parenchymal liver disease. Correlate with liver function tests.  Detailed characterization of the liver parenchyma is limited without IV contrast. Liver lesions are not excluded. **This report has been created using voice recognition software. It may contain minor errors which are inherent in voice recognition technology. **      Final report electronically signed by Dr Rosemary Fleming on 1/19/2023 11:19 AM      US RENAL COMPLETE   Final Result   Mild right hydronephrosis. A right ureteral stone cannot be excluded. No left hydronephrosis. Cholelithiasis. This document has been electronically signed by: Aleah Cartagena MD on    01/18/2023 09:19 PM      XR CHEST (2 VW)   Final Result   Cardiomegaly. Small bilateral pleural effusions and bilateral perihilar and lower lobe airspace opacities, right greater than left, can indicate CHF versus pneumonia in the appropriate clinical setting. Follow-up to ensure resolution is    advised. **This report has been created using voice recognition software. It may contain minor errors which are inherent in voice recognition technology. **      Final report electronically signed by Dr Rosemary Fleming on 1/18/2023 1:21 PM      XR CHEST PORTABLE   Final Result   1. Enlargement of the cardiopericardial silhouette with small right    pleural effusion and right perihilar and lower lung airspace opacities    which could indicate pneumonia. This document has been electronically signed by: Celia Cochran MD on    01/17/2023 06:16 AM          Diet: ADULT DIET; Dysphagia - Soft and Bite Sized;  Moderately Thick (Honey)    DVT prophylaxis: [] Lovenox                                 [] SCDs                                 [] SQ Heparin                                 [] Encourage ambulation           [x] Already on Anticoagulation - Eliquis renally dosed 2.5 BID     Disposition:    [] Home       [] TCU       [] Rehab       [] Psych       [] SNF       [] [place]       [x] Other- Will await future planning with family     Code Status: Limited    PT/OT Eval Status: Pending      Electronically signed by Michael Grier DO on 1/21/2023 at 2:42 PM

## 2023-01-21 NOTE — PROGRESS NOTES
After conversation and helping him with his hearing aid battery, Madelyn Arredondo agreed to prayer. 01/21/23 1230   Encounter Summary   Encounter Overview/Reason  Spiritual/Emotional Needs   Service Provided For: Patient   Referral/Consult From: Christophe   Last Encounter  01/21/23   Complexity of Encounter Low   Spiritual/Emotional needs   Type Spiritual Support   Assessment/Intervention/Outcome   Assessment Calm; Hopeful   Intervention Empowerment   Outcome Comfort; Acceptance   He is independent and very pleasant, a vet and in good spirits. Care Plan:  Continue spiritual and emotional care for patient and family. Including prayers.

## 2023-01-21 NOTE — TELEPHONE ENCOUNTER
Right hydronephrosis  Dr Jamey Benton placed right stent  Needs OV with Dr Derrick Perez to discuss management of stent in the upcoming wks

## 2023-01-22 ENCOUNTER — APPOINTMENT (OUTPATIENT)
Dept: GENERAL RADIOLOGY | Age: 88
DRG: 853 | End: 2023-01-22
Payer: OTHER GOVERNMENT

## 2023-01-22 LAB
ANION GAP SERPL CALC-SCNC: 11 MEQ/L (ref 8–16)
BACTERIA BLD AEROBE CULT: NORMAL
BACTERIA BLD AEROBE CULT: NORMAL
BASOPHILS ABSOLUTE: 0.1 THOU/MM3 (ref 0–0.1)
BASOPHILS NFR BLD AUTO: 0.7 %
BUN SERPL-MCNC: 23 MG/DL (ref 7–22)
CALCIUM SERPL-MCNC: 8.4 MG/DL (ref 8.5–10.5)
CHLORIDE SERPL-SCNC: 117 MEQ/L (ref 98–111)
CO2 SERPL-SCNC: 20 MEQ/L (ref 23–33)
CREAT SERPL-MCNC: 2.3 MG/DL (ref 0.4–1.2)
DEPRECATED RDW RBC AUTO: 47.6 FL (ref 35–45)
EOSINOPHIL NFR BLD AUTO: 2 %
EOSINOPHILS ABSOLUTE: 0.2 THOU/MM3 (ref 0–0.4)
ERYTHROCYTE [DISTWIDTH] IN BLOOD BY AUTOMATED COUNT: 13.2 % (ref 11.5–14.5)
GFR SERPL CREATININE-BSD FRML MDRD: 26 ML/MIN/1.73M2
GLUCOSE SERPL-MCNC: 107 MG/DL (ref 70–108)
HCT VFR BLD AUTO: 31.3 % (ref 42–52)
HGB BLD-MCNC: 10.1 GM/DL (ref 14–18)
IMM GRANULOCYTES # BLD AUTO: 0.06 THOU/MM3 (ref 0–0.07)
IMM GRANULOCYTES NFR BLD AUTO: 0.6 %
LYMPHOCYTES ABSOLUTE: 0.8 THOU/MM3 (ref 1–4.8)
LYMPHOCYTES NFR BLD AUTO: 8.2 %
MAGNESIUM SERPL-MCNC: 1.8 MG/DL (ref 1.6–2.4)
MCH RBC QN AUTO: 31.8 PG (ref 26–33)
MCHC RBC AUTO-ENTMCNC: 32.3 GM/DL (ref 32.2–35.5)
MCV RBC AUTO: 98.4 FL (ref 80–94)
MONOCYTES ABSOLUTE: 1.2 THOU/MM3 (ref 0.4–1.3)
MONOCYTES NFR BLD AUTO: 11.5 %
NEUTROPHILS NFR BLD AUTO: 77 %
NRBC BLD AUTO-RTO: 0 /100 WBC
PLATELET # BLD AUTO: 219 THOU/MM3 (ref 130–400)
PMV BLD AUTO: 10.7 FL (ref 9.4–12.4)
POTASSIUM SERPL-SCNC: 4.2 MEQ/L (ref 3.5–5.2)
RBC # BLD AUTO: 3.18 MILL/MM3 (ref 4.7–6.1)
SEGMENTED NEUTROPHILS ABSOLUTE COUNT: 7.8 THOU/MM3 (ref 1.8–7.7)
SODIUM SERPL-SCNC: 148 MEQ/L (ref 135–145)
WBC # BLD AUTO: 10.1 THOU/MM3 (ref 4.8–10.8)

## 2023-01-22 PROCEDURE — 6370000000 HC RX 637 (ALT 250 FOR IP)

## 2023-01-22 PROCEDURE — 6370000000 HC RX 637 (ALT 250 FOR IP): Performed by: INTERNAL MEDICINE

## 2023-01-22 PROCEDURE — 71046 X-RAY EXAM CHEST 2 VIEWS: CPT

## 2023-01-22 PROCEDURE — 2140000000 HC CCU INTERMEDIATE R&B

## 2023-01-22 PROCEDURE — 36415 COLL VENOUS BLD VENIPUNCTURE: CPT

## 2023-01-22 PROCEDURE — 80048 BASIC METABOLIC PNL TOTAL CA: CPT

## 2023-01-22 PROCEDURE — 85025 COMPLETE CBC W/AUTO DIFF WBC: CPT

## 2023-01-22 PROCEDURE — 99232 SBSQ HOSP IP/OBS MODERATE 35: CPT | Performed by: FAMILY MEDICINE

## 2023-01-22 PROCEDURE — 2580000003 HC RX 258: Performed by: INTERNAL MEDICINE

## 2023-01-22 PROCEDURE — 99232 SBSQ HOSP IP/OBS MODERATE 35: CPT | Performed by: INTERNAL MEDICINE

## 2023-01-22 PROCEDURE — 6370000000 HC RX 637 (ALT 250 FOR IP): Performed by: HOSPITALIST

## 2023-01-22 PROCEDURE — 83735 ASSAY OF MAGNESIUM: CPT

## 2023-01-22 RX ORDER — SODIUM CHLORIDE 450 MG/100ML
INJECTION, SOLUTION INTRAVENOUS CONTINUOUS
Status: DISCONTINUED | OUTPATIENT
Start: 2023-01-22 | End: 2023-01-24

## 2023-01-22 RX ADMIN — SODIUM BICARBONATE 1300 MG: 650 TABLET ORAL at 21:23

## 2023-01-22 RX ADMIN — ASPIRIN 81 MG: 81 TABLET, COATED ORAL at 09:50

## 2023-01-22 RX ADMIN — Medication 1 TABLET: at 09:49

## 2023-01-22 RX ADMIN — SODIUM BICARBONATE 1300 MG: 650 TABLET ORAL at 09:49

## 2023-01-22 RX ADMIN — PANTOPRAZOLE SODIUM 40 MG: 40 TABLET, DELAYED RELEASE ORAL at 21:54

## 2023-01-22 RX ADMIN — APIXABAN 2.5 MG: 2.5 TABLET, FILM COATED ORAL at 09:50

## 2023-01-22 RX ADMIN — PANTOPRAZOLE SODIUM 40 MG: 40 TABLET, DELAYED RELEASE ORAL at 09:50

## 2023-01-22 RX ADMIN — DOXYCYCLINE HYCLATE 100 MG: 100 TABLET, COATED ORAL at 21:22

## 2023-01-22 RX ADMIN — SODIUM CHLORIDE: 4.5 INJECTION, SOLUTION INTRAVENOUS at 10:01

## 2023-01-22 RX ADMIN — APIXABAN 2.5 MG: 2.5 TABLET, FILM COATED ORAL at 21:22

## 2023-01-22 RX ADMIN — FINASTERIDE 5 MG: 5 TABLET, FILM COATED ORAL at 09:49

## 2023-01-22 RX ADMIN — DOXYCYCLINE HYCLATE 100 MG: 100 TABLET, COATED ORAL at 09:49

## 2023-01-22 RX ADMIN — TAMSULOSIN HYDROCHLORIDE 0.4 MG: 0.4 CAPSULE ORAL at 09:49

## 2023-01-22 ASSESSMENT — PAIN SCALES - GENERAL
PAINLEVEL_OUTOF10: 0

## 2023-01-22 NOTE — PROGRESS NOTES
Hospitalist Progress Note      Patient:  Rasta Jeffries      Unit/Bed:3B-28/028-A    YOB: 1932    MRN: [id number]       Acct: [de-identified]     PCP: Natalia Redmodn DO    Date of Admission: 1/17/2023    Date/Time of Evaluation:  1/22/2023 at 7:49 AM    Assessment/Plan:    Sepsis with leukocytosis, lactic acidosis, acute resp failure, hypoxia, tachypnia secondary to right-sided pneumonia - POA  - Patient arrived with chief complaint of shortness of breath  - Was hypoxic on room air, tachycardic with heart rate 106, tachypneic with respiratory rate of 34, afebrile normotensive  - Lactic acid 3.2 on arrival, Pro-Santos 0.69 on arrival, leukocytosis of 17.4 on arrival --> improving with tx  - Source of infection from right sided pneumonia with chest x-ray demonstrating right perihilar and lower lung airspace opacities  - COVID, influenza A/B negative, UA not concerning for UTI  - Blood cultures x 2 no preliminary growth  - 500 cc IV fluid bolus given, was hemodynamically stable in ED so no more was given due to concerns for decompensated heart failure  - Patient started on Rocephin 1/17 - 1/19 and Zithromax x 1 1/17  in the ED, switched to Vanco/Zosyn due to history of MRSA --> per Dr. Gustavo Richter currently on doxycycline 1/18 and Augmentin (patient started with antibiotic treatment on 1/17, will treat for 7 total days) --> holding augmentin 1/22 as pt with dysphagia -> ?resume IV atbx but overall improving but with cough  -- concern for ??aspiration -> repeat CXR 1/22 -- ?CT chest with extensive lymphadenopathy    Hypernatremia, hyperchloremia   - up to 148 on 1/22 -> renal assist apprec  - ?due to dysphagia and ??need more free water  - on NS IVF since 1/18 --> per renal changed to 0.45 NS IVF 1/22   - cont monitor    Right-sided hydronephrosis  Right mid ureteral soft tissue density  - Renal ultrasound 1/18 demonstrated right sided hydronephrosis with associated right mid ureteral soft tissue density 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 bone scan  - Discussion to be had by family and patient regarding next steps or if potential biopsy is wanted 1/23    Enlarged lymphadenopathy/extensive intra abdominal, retroperitoneal lymphadenopathy   - Noted periureteral lymphadenopathy with numerous enlarged right pelvic lymph nodes as well as mesenteric lymphadenopathy on CT abdomen pelvis on 1/19  - MRI abdomen on 1/20 demonstrated abnormal retroperitoneal and mesenteric lymph nodes consistent with neoplastic disease, retroperitoneal lymph nodes could be biopsy under CT guidance if clinically indicated  - CBC's do not suggest lymphoma   - Discussion to be had by family and patient regarding next steps or if potential biopsy is wanted and ?oncology c/s    Right perihilar lower lung pneumonia-unspecified organism but cover gram (+), MRSA with prior screen (+), gram (-) with ?aspiration  - As noted above on arrival on chest x-ray imaging and PCT 0.69 on 1/17/23  - Continue doxycycline and Augmentin for 5 total days (start 1/20 and end 1/25) --> hold augmentin 1/22 due to dysphagia and cannot swallow larger pills -- s/p rocephin 1/17 - 1/19  - Albuterol nebulizer every 6 hours as needed  - Acapella and IS encouraged, stable on RA    Pharyngitis  - c/o throat pain s/p procedure 1/20 -- OP erythematous -> ?yeast vs ?irritation but had LMA with surgery. - monitor - improving 1/22 per pt - ?chloraseptic    NSVT  -- 5 beats 1/21 and 9 beats 1/22 am   -- cardiology consulted and evaluated 1/17 and has not seen since  -- monitor lytes and replace prn  -- Echo 1/17/23 with normal EF 55%  -- ?? pericardial cyst on MRI     Bilateral pleural effusions  - Noted on CT abdomen on 1/19 to have small to moderate right pleural effusion and small left pleural effusion, also noted on MRI abdomen 1/20  - Due to ANNIKA and appearing euvolemic diuretics avoided  - Patient continues to clinically improve on respiratory status and is currently on room air  - repeat CXR 1/22/23 with cough  - Prior echo on 8/4/2021 demonstrated EF 50% with no regional left ventricular wall motion abnormalities, moderately enlarged right atrium, severely dilated left atrium  - Repeat echo on 1/17/2023 demonstrated EF 55%, overall normal left ventricular function  - We will continue to monitor clinical status and fluid status per renal    Acute hypoxic respiratory failure-POA  - Patient on room air at baseline, was requiring 6 L nasal cannula on arrival secondary to sepsis with right-sided pneumonia and bilateral pleural effusions --> weaned to RA since   -- see above - ?CT chest  - We will continue to monitor clinical status    Possible acute on chronic HFpEF exacerbation  - Concerns upon arrival due to increasing shortness of breath and chest x-ray demonstrated enlargement of the cardiopericardial silhouette with small right pleural effusion  - Prior echo on 8/4/2021 demonstrated EF 50% with no regional left ventricular wall motion abnormalities, moderately enlarged right atrium, severely dilated left atrium  - Repeat echo on 1/17/2023 demonstrated EF 55%, overall normal left ventricular function  - BNP chronically elevated at baseline  - Patient continues appear euvolemic we will use diuretic as needed  - Daily weights and I's and O's    Elevated troponin  - History of coronary artery disease, with CABG in 2006  - Troponin up trended from 0.046-0.388 (1/17) -> 0.319 (1/18) so has trended down slightly   - EKG on arrival demonstrated A. 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  - UA without any evidence of microhematuria or proteinuria  - Renal ultrasound 1/18/23 done and demonstrated mild right-sided hydronephrosis -> urology following and see above    Metabolic acidosis:  - Bicarb 20 on arrival continue to worsen to 16 on 1/21 -> improving 1/22 to 20 on BMP  - Nephrology following appreciate recommendations  - Started on sodium bicarb tablets 1300 mg bid 1/19 per renal   - IV bicarb given briefly 1/20     Mild MR, mild TR  - per echo 1/17/23    Parenchymal renal cyst:  - Right and left parenchymal cyst noted on MRI abdomen on 1/20    Pulmonary hypertension:  - RVSP 44 mmHg noted    Dysphagia:  - ??due to procedures -- has prior hx 2021  - Continues to choke on thin liquid  -- c/s ST and await recs ->  in the meantime we will switch to dysphagia soft and bite-size diet with moderately thickened liquids  -- pt with hx of PEG in 2021  -- ? MRI brain    Hiatal hernia  Left inguinal hernia  Lumbar spondylosis  Small gallstone  - Noted on MRI abdomen 1/20  -- PPI for [place]    Essential hypertension  -Noted per history  - Appears patient is on no first-line treatment medications, uses Lasix  - Lasix currently being held due to ANNIKA in euvolemic state  - Blood pressures remaining stable - monitor and adjust prn    Chronic macrocytic anemia:  - hgb 10.1 on 1/22 - stable in 9-10's since admission  and baseline 10-13's in past year -- asx   -- Likely anemia of chronic disease due to CKD and iron deficiency anemia  - 1/18/23 -> Iron 29, iron saturation 17, total iron binding capacity 168, folate and vitamin B12 normal, retic normal thus likely chronic disease  -> po iron at d/c  - monitor as on anticoagulation for afib    Leukocytosis, improving:  - Secondary to septic state from right-sided pneumonia  - WBC 17.4 on arrival, decreased to 10.4 on 1/19, increased to 11.6 on 1/21 -> patient remains afebrile and on room air and on doxy 1/18 - 1/22, augmentin f1/20 or pneumonia --> to complete 1/24 but hold due to dysphagia  - repeat CXR 1/22  - We will continue to monitor daily labs    Hypomagnesemia:  - Magnesium noted to be 1.4 on arrival was replaced and up to 2.1, potassium normal  - We will check intermittently during stay    Hypocalcemia:  - Likely secondary to hypoalbuminemia  - We will encourage to increase p.o. intake  - Monitor prn    Coronary artery disease with hx CABG 2006  - Noted per history  - Does not appear patient has had left heart cath in place noted in our system  - Continue aspirin  -- cardiology consulted for elevated trops and noted stress 2015    Persistent A. fib  - Noted per chart review of patient's history and on EKG on arrival  - Continue home Eliquis  -- no rate meds needed    BPH  - Follows with Dr. Toby Hayes as outpatient with history of recurrent UTIs  - Continue Flomax and finasteride  - CT abdomen pelvis demonstrated prostatomegaly    GERD:  - Controlled with Protonix twice daily    Colonic diverticulosis without diverticulitis:  - Noted on CT abdomen pelvis on 1/19/2023  - No bowel obstruction or perforation noted    Hepatomegaly:  - Noted on CT abdomen pelvis on 1/19/2023  - Lobulated contour of the liver parenchyma suggesting parenchymal liver disease recommend correlate with liver function test  -- LFT's WNL except slightly elevated AP (chronic) last checked 1/17/23 -> repeat 1/23    History of MRSA:  - Noted --> doxycycline with above    Physical deconditioning  Generalized weakness  - PT OT  - Multivitamin    Dispo  -- 1/22/2023 -> apprec consultants - awaiting return of family to further discuss imaging findings and further goals of care -> ?pursue bx and eval and tx vs conservative management/no aggressive mgmt -- palliative care also following - renal fxn stable in 2's and Na up and fluids adjusted per renal to 0.45 NS and cont monitor Na, renal fxn. CBC improving/stable. Cont monitor resp status closely with concern for dysphagia -> ST c/s and to see tomorrow. Cont increase activity - PT/OT consulted. Await pt/family plan.           Chief Complaint: Shortness of breath    Hospital Course:   Per Dr. Zahira Lowry note 1/21  Gillian Dalton is a 79 y/o  male with a PMHx of CAD, HTN, pulmonary HTN who presents to [facility] ED today for the evaluation of shortness of breath. He states he called his son early this morning saying that he thinks he should come into the hospital for evaluation because of worsening symptoms. He states his shortness of breath is present at rest and with activity. He has an associated dry cough and chills. He denies chest pain, abdominal pain, LE swelling, unexpected weight gain, changes in bowel or urinary habits. Pt unable to provide additional history at this time due to being on bipap. \"    Patient arrived with chief complaint shortness of breath as stated above likely secondary to sepsis due to right-sided pneumonia. COVID influenza A/B- and blood cultures x2 no growth. Was given one-time dose of Rocephin and Zithromax in the ED and switched to vancomycin/Zosyn due to history of MRSA. Have since switched to doxycycline and Augmentin, antibiotic treatment started on 1/17 and will treat for 7 total days. Respiratory status has continued to improve throughout stay as patient no longer requiring supplemental oxygen is back on baseline of room air. During stay patient had notable ANNIKA with creatinine increasing. Renal ultrasound demonstrated right-sided hydronephrosis so CT abdomen pelvis was done and demonstrated a right mid ureteral soft tissue mass with concerning lymphadenopathy and enlarged right pelvic lymph nodes concerning for mesenteric lymphadenopathy for metastatic disease. Also noted on CT abdomen pelvis was a thoracic spine lytic lesion. 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 Enlarged retroperitoneal lymph nodes could be biopsied under CT guidance if clinically indicated. Urology was consulted cystoscopy, right ureteroscopy, right retrograde pyelogram, and right ureteral stent placement done by Dr. Fabio Cr on 1/20.    1/21/2023 patient remained stable on room air.   Due to findings on imaging, will await for family members to arrive from out of town and have discussion about results family and patient's likely at the beginning of next week. \"    Subjective:   1/22 -> pt states throat is feeling better but per RN continues to cough/\"choke\" on even sips of liquids. Pt denies cp, sob.  +frequent cough. No abd pain or n/v.  +bloody urine in [place]. Afeb. On RA. Last BM 1/21 x 2      Medications:  Reviewed    Infusion Medications    sodium chloride 50 mL/hr at 01/21/23 1829    sodium chloride       Scheduled Medications    doxycycline hyclate  100 mg Oral 2 times per day    sodium bicarbonate  1,300 mg Oral BID    [Held by provider] amoxicillin-clavulanate  1 tablet Oral 2 times per day    apixaban  2.5 mg Oral BID    aspirin  81 mg Oral Daily    finasteride  5 mg Oral Daily    [Held by provider] furosemide  40 mg Oral Daily    multivitamin  1 tablet Oral Daily    pantoprazole  40 mg Oral BID    tamsulosin  0.4 mg Oral Daily    sodium chloride flush  10 mL IntraVENous 2 times per day     PRN Meds: phenol, albuterol, sodium chloride flush, sodium chloride, ondansetron **OR** ondansetron, polyethylene glycol, acetaminophen **OR** acetaminophen      Intake/Output Summary (Last 24 hours) at 1/22/2023 0749  Last data filed at 1/22/2023 0346  Gross per 24 hour   Intake 1436.34 ml   Output 975 ml   Net 461.34 ml       Diet:  ADULT DIET; Dysphagia - Soft and Bite Sized; Moderately Thick (Honey)    Exam:  /65   Pulse 57   Temp 97.5 °F (36.4 °C) (Oral)   Resp 20   Wt 154 lb 8.7 oz (70.1 kg)   SpO2 96%   BMI 30.18 kg/m²     General appearance: Elderly male laying in bed, cooperative alert and pleasant  HEENT: Pupils equal, round, and reactive to light. Conjunctivae/corneas clear. Neck: Supple, with full range of motion. No jugular venous distention. Trachea midline.   Respiratory:  Normal respiratory effort. +rhonchi bilaterally that improves little with cough  Cardiovascular: irreg, irreg, controlled rate with normal S1/S2, 2/6 LENNOX LLSB, no rubs or gallops. Abdomen: Soft, non-tender, non-distended with normal bowel sounds. Musculoskeletal: passive and active ROM x 4 extremities. Skin: Skin color, texture, turgor normal.  No rashes or lesions. Neurologic:  Neurovascularly intact without any focal sensory/motor deficits. Cranial nerves: II-XII intact, grossly non-focal.  Psychiatric: Alert and oriented, thought content appropriate  Capillary Refill: Brisk,< 3 seconds   Peripheral Pulses: +2 palpable, equal bilaterally       Labs:   Recent Labs     01/20/23  0357 01/21/23  0406 01/22/23  0403   WBC 10.4 11.6* 10.1   HGB 9.7* 10.4* 10.1*   HCT 30.3* 31.6* 31.3*    216 219     Recent Labs     01/20/23  1729 01/21/23  0406 01/22/23  0403    144 148*   K 4.6 4.1 4.2    113* 117*   CO2 19* 16* 20*   BUN 30* 28* 23*   CREATININE 2.2* 2.2* 2.3*   CALCIUM 8.4* 8.1* 8.4*   PHOS  --  3.0  --      No results for input(s): AST, ALT, BILIDIR, BILITOT, ALKPHOS in the last 72 hours. No results for input(s): INR in the last 72 hours. No results for input(s): Magdalene Garber in the last 72 hours. Urinalysis:      Lab Results   Component Value Date/Time    NITRU NEGATIVE 01/18/2023 10:30 AM    WBCUA 0-2 01/18/2023 10:30 AM    BACTERIA NONE SEEN 01/18/2023 10:30 AM    RBCUA 5-10 01/18/2023 10:30 AM    BLOODU NEGATIVE 01/18/2023 10:30 AM    SPECGRAV 1.022 01/18/2023 10:30 AM    GLUCOSEU NEGATIVE 12/26/2021 05:20 AM       Radiology:  MRI ABDOMEN WO CONTRAST   Final Result       1. There are parenchymal cysts in the right left kidneys. This no discrete renal mass noted. 2. There are abnormal retroperitoneal and mesenteric lymph nodes present consistent with neoplastic disease. The retroperitoneal lymph nodes can be biopsied under CT guidance if clinically indicated. 3. There is abnormal signal intensity along the right lateral and posterior aspect of the inferior vena cava which is contiguous with the retroperitoneal lymph nodes.  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 Please correlate clinically. 4. 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   5. There is a hiatal hernia. There is a left inguinal hernia present. 6. There is a tiny gallstone. 7. There is a right pleural effusion. There is atelectasis at the right lung base. 8. There is a Duarte catheter in place. This a thick-walled bladder. 9. There is lumbar spondylosis. **This report has been created using voice recognition software. It may contain minor errors which are inherent in voice recognition technology. **      Final report electronically signed by DR Janas Opitz on 1/21/2023 7:29 AM      FL RETROGRADE PYELOGRAM W WO KUB   Final Result   Fluoroscopic images provided during surgery. Please refer to the operative note for further details. **This report has been created using voice recognition software. It may contain minor errors which are inherent in voice recognition technology. **      Final report electronically signed by Dr Angelica Oliveira on 1/20/2023 9:16 AM      CT ABDOMEN PELVIS WO CONTRAST Additional Contrast? None   Final Result   1. Small to moderate right pleural effusion and small left pleural effusion with rounded atelectasis and patchy groundglass opacities at the right lung base are incompletely visualized. 2. Moderate right-sided hydronephrosis/hydroureter appears to be secondary to a right mid ureteral soft tissue density (series 301, image 48) resulting in either intrinsic or extrinsic obstruction. Right periureteral and right retroperitoneal    lymphadenopathy measures up to 16 mm in short axis. There are also numerous enlarged right pelvic sidewall lymph nodes as well as mesenteric lymphadenopathy suggesting metastatic disease. 3. 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  Additional ill-defined lytic lesions are noted within the L4 vertebral body and there is a suggestion of mixed lytic sclerotic bony metastasis in the right ribs as well as the bones of    the pelvis. 4. Prostatomegaly. Correlate with PSA levels. Mild bladder wall thickening may in part be artifactual related to underdistention. Correlate with urinalysis. 5. Colonic diverticulosis without diverticulitis. Nonspecific thickening of the rectosigmoid colon may be artifactual related to underdistention. Correlate with direct inspection. No bowel obstruction, perforation, or drainable fluid collection is    observed. 6. Hepatomegaly and lobulated contour of the liver parenchyma suggesting parenchymal liver disease. Correlate with liver function tests. Detailed characterization of the liver parenchyma is limited without IV contrast. Liver lesions are not excluded. **This report has been created using voice recognition software. It may contain minor errors which are inherent in voice recognition technology. **      Final report electronically signed by Dr Emily Herzog on 1/19/2023 11:19 AM      US RENAL COMPLETE   Final Result   Mild right hydronephrosis. A right ureteral stone cannot be excluded. No left hydronephrosis. Cholelithiasis. This document has been electronically signed by: Micha Ruzi MD on    01/18/2023 09:19 PM      XR CHEST (2 VW)   Final Result   Cardiomegaly. Small bilateral pleural effusions and bilateral perihilar and lower lobe airspace opacities, right greater than left, can indicate CHF versus pneumonia in the appropriate clinical setting. Follow-up to ensure resolution is    advised. **This report has been created using voice recognition software. It may contain minor errors which are inherent in voice recognition technology. **      Final report electronically signed by Dr Emily Herzog on 1/18/2023 1:21 PM      XR CHEST PORTABLE   Final Result   1.  Enlargement of the cardiopericardial silhouette with small right pleural effusion and right perihilar and lower lung airspace opacities    which could indicate pneumonia. This document has been electronically signed by: Zulma Hayden MD on    01/17/2023 06:16 AM          Diet: ADULT DIET; Dysphagia - Soft and Bite Sized;  Moderately Thick (Honey)    DVT prophylaxis: [] Lovenox                                 [] SCDs                                 [] SQ Heparin                                 [] Encourage ambulation           [x] Already on Anticoagulation - Eliquis renally dosed 2.5 BID     Disposition:    [] Home       [] TCU       [] Rehab       [] Psych       [] SNF       [] [place]       [x] Other- Will await future planning with family     Code Status: Limited    PT/OT Eval Status: consulted      Electronically signed by Jorden Alvares MD on 1/22/2023 at 7:49 AM

## 2023-01-22 NOTE — PROGRESS NOTES
[facility]   Nephrology progress note  1/22/2023, 8:21 AM      Pt Name:    Trinity Price  MRN:     [id number]     YOB: 1932  Admit Date:    1/17/2023  4:32 AM    Chief Complaint: Nephrology following for ANNIKA/CKD    Subjective:  Patient was seen and examined   Resting comfortably denies any chest pain or shortness of breath    Objective:  24HR INTAKE/OUTPUT:    Intake/Output Summary (Last 24 hours) at 1/22/2023 0821  Last data filed at 1/22/2023 0346  Gross per 24 hour   Intake 1436.34 ml   Output 975 ml   Net 461.34 ml           I/O last 3 completed shifts: In: 1636.3 [P.O.:440; I.V.:1196.3]  Out: 1775 [Urine:1775]  No intake/output data recorded.    Admission weight: 138 lb (62.6 kg)  Wt Readings from Last 3 Encounters:   01/21/23 154 lb 8.7 oz (70.1 kg)   12/06/22 145 lb (65.8 kg)   11/17/22 147 lb (66.7 kg)        Vitals :   Vitals:    01/21/23 2000 01/21/23 2046 01/22/23 0017 01/22/23 0343   BP:   136/70 128/65   Pulse: 68 71 52 57   Resp: 18 18 18 20   Temp: 97.6 °F (36.4 °C)  97.6 °F (36.4 °C) 97.5 °F (36.4 °C)   TempSrc: Oral  Oral Oral   SpO2: 96% 99% 97% 96%   Weight:           Physical examination  General Appearance: alert and cooperative with exam, appears comfortable, no distress  Neck: No JVD  Lungs: no use of accessory muscles  GI: soft, non-tender, no guarding  Extremities: No pitting LE edema    Medications:  Infusion:    sodium chloride 50 mL/hr at 01/21/23 1829    sodium chloride       Meds:    doxycycline hyclate  100 mg Oral 2 times per day    sodium bicarbonate  1,300 mg Oral BID    [Held by provider] amoxicillin-clavulanate  1 tablet Oral 2 times per day    apixaban  2.5 mg Oral BID    aspirin  81 mg Oral Daily    finasteride  5 mg Oral Daily    [Held by provider] furosemide  40 mg Oral Daily    multivitamin  1 tablet Oral Daily    pantoprazole  40 mg Oral BID    tamsulosin  0.4 mg Oral Daily    sodium chloride flush  10 mL IntraVENous 2 times per day Meds prn: phenol, albuterol, sodium chloride flush, sodium chloride, ondansetron **OR** ondansetron, polyethylene glycol, acetaminophen **OR** acetaminophen     Lab Data :  CBC:   Recent Labs     01/20/23  0357 01/21/23  0406 01/22/23  0403   WBC 10.4 11.6* 10.1   HGB 9.7* 10.4* 10.1*   HCT 30.3* 31.6* 31.3*    216 219       CMP:  Recent Labs     01/20/23  1729 01/21/23  0406 01/22/23  0403    144 148*   K 4.6 4.1 4.2    113* 117*   CO2 19* 16* 20*   BUN 30* 28* 23*   CREATININE 2.2* 2.2* 2.3*   GLUCOSE 92 103 107   CALCIUM 8.4* 8.1* 8.4*   MG  --   --  1.8   PHOS  --  3.0  --        Hepatic: No results for input(s): LABALBU, AST, ALT, ALB, BILITOT, ALKPHOS in the last 72 hours. Assessment and Plan:  1. 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  UA is benign. Urine sodium 38. Creatinine stable, had a stent placed Friday  Most likely might improve over time for now stable    2. Mild right-sided hydronephrosis. Procedure report reviewed. Appears per urology there is extrinsic compression on the ureter. 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 3.  Renal cyst  4. Atrial fibrillation  5. Metabolic acidosis. Worsening. Continue IV bicarbonate for now  6. Hx BPH  7.  CAD  Hypernatremia switch fluids to half-normal saline  D/W patient     Melly Ojeda MD  [facility]    This report has been created using voice recognition software.  It may contain minor errors which are inherent in voice recognition technology

## 2023-01-23 LAB
ANION GAP SERPL CALC-SCNC: 13 MEQ/L (ref 8–16)
BASOPHILS ABSOLUTE: 0.1 THOU/MM3 (ref 0–0.1)
BASOPHILS NFR BLD AUTO: 0.4 %
BUN SERPL-MCNC: 21 MG/DL (ref 7–22)
CALCIUM SERPL-MCNC: 8.1 MG/DL (ref 8.5–10.5)
CHLORIDE SERPL-SCNC: 115 MEQ/L (ref 98–111)
CO2 SERPL-SCNC: 19 MEQ/L (ref 23–33)
CREAT SERPL-MCNC: 2.1 MG/DL (ref 0.4–1.2)
DEPRECATED RDW RBC AUTO: 48.1 FL (ref 35–45)
EOSINOPHIL NFR BLD AUTO: 0.8 %
EOSINOPHILS ABSOLUTE: 0.1 THOU/MM3 (ref 0–0.4)
ERYTHROCYTE [DISTWIDTH] IN BLOOD BY AUTOMATED COUNT: 13.5 % (ref 11.5–14.5)
GFR SERPL CREATININE-BSD FRML MDRD: 29 ML/MIN/1.73M2
GLUCOSE SERPL-MCNC: 101 MG/DL (ref 70–108)
HCT VFR BLD AUTO: 29.3 % (ref 42–52)
HGB BLD-MCNC: 9.4 GM/DL (ref 14–18)
IMM GRANULOCYTES # BLD AUTO: 0.1 THOU/MM3 (ref 0–0.07)
IMM GRANULOCYTES NFR BLD AUTO: 0.7 %
LYMPHOCYTES ABSOLUTE: 1.1 THOU/MM3 (ref 1–4.8)
LYMPHOCYTES NFR BLD AUTO: 7.7 %
MCH RBC QN AUTO: 31 PG (ref 26–33)
MCHC RBC AUTO-ENTMCNC: 32.1 GM/DL (ref 32.2–35.5)
MCV RBC AUTO: 96.7 FL (ref 80–94)
MONOCYTES ABSOLUTE: 1 THOU/MM3 (ref 0.4–1.3)
MONOCYTES NFR BLD AUTO: 7.2 %
NEUTROPHILS NFR BLD AUTO: 83.2 %
NRBC BLD AUTO-RTO: 0 /100 WBC
PLATELET # BLD AUTO: 221 THOU/MM3 (ref 130–400)
PMV BLD AUTO: 10.6 FL (ref 9.4–12.4)
POTASSIUM SERPL-SCNC: 3.8 MEQ/L (ref 3.5–5.2)
RBC # BLD AUTO: 3.03 MILL/MM3 (ref 4.7–6.1)
SEGMENTED NEUTROPHILS ABSOLUTE COUNT: 11.8 THOU/MM3 (ref 1.8–7.7)
SODIUM SERPL-SCNC: 147 MEQ/L (ref 135–145)
WBC # BLD AUTO: 14.2 THOU/MM3 (ref 4.8–10.8)

## 2023-01-23 PROCEDURE — 36415 COLL VENOUS BLD VENIPUNCTURE: CPT

## 2023-01-23 PROCEDURE — 99232 SBSQ HOSP IP/OBS MODERATE 35: CPT | Performed by: INTERNAL MEDICINE

## 2023-01-23 PROCEDURE — 2580000003 HC RX 258

## 2023-01-23 PROCEDURE — 85025 COMPLETE CBC W/AUTO DIFF WBC: CPT

## 2023-01-23 PROCEDURE — 80048 BASIC METABOLIC PNL TOTAL CA: CPT

## 2023-01-23 PROCEDURE — 2580000003 HC RX 258: Performed by: INTERNAL MEDICINE

## 2023-01-23 PROCEDURE — 2140000000 HC CCU INTERMEDIATE R&B

## 2023-01-23 PROCEDURE — 6370000000 HC RX 637 (ALT 250 FOR IP): Performed by: INTERNAL MEDICINE

## 2023-01-23 PROCEDURE — 92610 EVALUATE SWALLOWING FUNCTION: CPT

## 2023-01-23 PROCEDURE — 6370000000 HC RX 637 (ALT 250 FOR IP)

## 2023-01-23 PROCEDURE — 99232 SBSQ HOSP IP/OBS MODERATE 35: CPT | Performed by: FAMILY MEDICINE

## 2023-01-23 RX ADMIN — ASPIRIN 81 MG: 81 TABLET, COATED ORAL at 08:46

## 2023-01-23 RX ADMIN — FINASTERIDE 5 MG: 5 TABLET, FILM COATED ORAL at 08:46

## 2023-01-23 RX ADMIN — SODIUM CHLORIDE, PRESERVATIVE FREE 10 ML: 5 INJECTION INTRAVENOUS at 21:10

## 2023-01-23 RX ADMIN — TAMSULOSIN HYDROCHLORIDE 0.4 MG: 0.4 CAPSULE ORAL at 08:46

## 2023-01-23 RX ADMIN — APIXABAN 2.5 MG: 2.5 TABLET, FILM COATED ORAL at 08:46

## 2023-01-23 RX ADMIN — SODIUM BICARBONATE 1300 MG: 650 TABLET ORAL at 23:13

## 2023-01-23 RX ADMIN — APIXABAN 2.5 MG: 2.5 TABLET, FILM COATED ORAL at 21:04

## 2023-01-23 RX ADMIN — SODIUM CHLORIDE: 4.5 INJECTION, SOLUTION INTRAVENOUS at 20:12

## 2023-01-23 RX ADMIN — PANTOPRAZOLE SODIUM 40 MG: 40 TABLET, DELAYED RELEASE ORAL at 21:04

## 2023-01-23 RX ADMIN — PANTOPRAZOLE SODIUM 40 MG: 40 TABLET, DELAYED RELEASE ORAL at 08:46

## 2023-01-23 RX ADMIN — SODIUM CHLORIDE: 4.5 INJECTION, SOLUTION INTRAVENOUS at 03:05

## 2023-01-23 RX ADMIN — Medication 1 TABLET: at 08:46

## 2023-01-23 ASSESSMENT — PAIN SCALES - GENERAL
PAINLEVEL_OUTOF10: 0

## 2023-01-23 NOTE — PROGRESS NOTES
[facility]  SPEECH THERAPY  STRZ CCU-STEPDOWN 3B  Clinical Swallow Evaluation      SLP Individual Minutes  Time In: 2894  Time Out: 1687  Minutes: 12  Timed Code Treatment Minutes: 0 Minutes  Clinical swallow evaluation: 12 minutes       Date: 2023  Patient Name: Elizabeth Perez      CSN: [id number]   : 3/30/1932  (80 y.o.)  Gender: male   Referring Physician:  Cam Irving DO    Diagnosis: Acute hypoxemic respiratory failure ([facility] [address]    History of Present Illness/Injury: Patient arrived at [facility] with above dx. Per chart review, \"Patient is a 79 y/o  male with a PMHx of CAD, HTN, pulmonary HTN who presents to [facility] ED today for the evaluation of shortness of breath. He states he called his son early this morning saying that he thinks he should come into the hospital for evaluation because of worsening symptoms. He states his shortness of breath is present at rest and with activity. He has an associated dry cough and chills. He denies chest pain, abdominal pain, LE swelling, unexpected weight gain, changes in bowel or urinary habits. Pt unable to provide additional history at this time due to being on bipap. \"     Pertinent hx for dysphagia with most recent MBS completed 3/18/2021 documenting mild oral dysphagia, moderate pharyngeal dysphagia with diet level of NPO to be maintained; considerations for initiation of pleasure feeds consistent for puree and thin liquids with chin tuck. ST consulted to complete clinical swallow evaluation to assess current swallow function and recommend safest level of po intake.      Past Medical History:   Diagnosis Date    Actinic keratoses 2013    Adenomatous polyp 2008    Atrial fibrillation ([facility] [address]     CAD (coronary artery disease) 2009    Carotid artery disease ([facility] [address] 2009    left    Hyperlipidemia 2004    Hypertension 2004    Inguinal hernia, left 2014    Unspecified cerebral artery occlusion with cerebral infarction        SUBJECTIVE:  Spoke with GERARDO Mooney for approval to evaluate. Upon arrival, patient was sitting in chair with family sitting beside him. He was alert the entire session. Family with confirmation for coughing post intake of \"water\" with PEG tube since removed. OBJECTIVE:    Pain:  No pain reported. Current Diet: Soft and bite sized; moderately thick liquids    Respiratory Status:  Room Air    Behavioral Observation:  Alert and Oriented    CRANIAL NERVE ASSESSMENT   CN V (Trigeminal) Closes and Opens Mandible WFL    Rotary Jaw Movement WFL      CN VII (Facial) Cheeks Hold Food out of Sulci WFL    Opens, Closes/Seals, Protrudes, Retracts Lips Impaired: Bilateral    General Appearance WFL    Sensation WFL      CN X (Vagus - Pharyngeal) Raises Back of Tongue Impaired      CN XI (Accessory) Lifts Soft Palate WFL      CN XII (Hypoglossal) Elevates Tongue Up and Back WFL    Protrusion   WFL    Lateralizes Tongue WFL    Sensation WFL      Other Observations Dentition Sparse natural dentition    Vocal Quality WFL    Cough WFL     Patient Evaluated Using: Ice Chips, Thin Liquids, and Puree    Oral Phase:  WFL    Pharyngeal Phase: Impaired:  Suspected Decreased Airway Protection  *not able to fully validate presence of pharyngeal phase deficits without formal instrumentation/supportive imaging     Signs and Symptoms of Laryngeal Penetration/Aspiration: Immediate Cough and Delayed Cough    Impressions: Patient presents with mild oral dysphagia with inability to fully discern potential presence of pharyngeal phase deficits without formal instrumentation. Patient demonstrates difficulties with complete lip closure/seal. Patient with suspected adequate bolus control and manipulation. Patient consumed PO trials of thin liquids via spoon; patient with immediate coughing in 100% of trials. ST suspecting impaired airway protection. No difficulty noted with ice chips or puree; no s/s of aspiration.  ST recommending NPO with approval of PO medications in puree + instrumental evaluation (MBS) to be completed. Education provided to patient re: clinical necessity for further diagnostic assessment of swallow function via MBS with verbal agreement obtained to proceed with instrumental evaluation. Will plan MBS 01/24. RECOMMENDATIONS/ASSESSMENT:  Instrumental Evaluation: Modified Barium Swallow (MBS)  Diet Recommendations:  NPO + PO medications in puree; pending MBS  Strategies:  Strategies pending MBS completion   Rehabilitation Potential: good  Discharge Recommendations: Continue to Assess Pending Progress    EDUCATION:  Learner: Patient and Family  Education:  Reviewed results and recommendations of this evaluation and Reviewed signs, symptoms and risks of aspiration  Evaluation of Education: Verbalizes understanding    PLAN:  Recommendations pending MBS    PATIENT GOAL:    Did not state. Will further assess during treatment. SHORT TERM GOALS:  Short Term Goals  Time Frame for Short Term Goals: 1-3 days  Goal 1: Complete MBS to evaluate deficits in pharygneal integrity. Goal 2: Staff will exhibit return demonstration for completion of comprehensive oral care procedural analysis to maximize oral integrity and to reduce potential bacteria colonization.     LONG TERM GOALS:  No LTG established due to [id number] [facility] Student Speech Intern

## 2023-01-23 NOTE — PROGRESS NOTES
Hospitalist Progress Note      Patient:  Tana Denis      Unit/Bed:3B-28/028-A    YOB: 1932    MRN: [id number]       Acct: [de-identified]     PCP: Hector Dunaway DO    Date of Admission: 1/17/2023    Date/Time of Evaluation:  1/23/2023 at 9:41 AM    Assessment/Plan:    Sepsis with leukocytosis, lactic acidosis, acute resp failure, hypoxia, tachypnia secondary to right-sided pneumonia - POA  - Patient arrived with chief complaint of shortness of breath  - Was hypoxic on room air, tachycardic with heart rate 106, tachypneic with respiratory rate of 34, afebrile normotensive  - Lactic acid 3.2 on arrival, Pro-Santos 0.69 on arrival, leukocytosis of 17.4 on arrival --> improving with tx  - Source of infection from right sided pneumonia with chest x-ray demonstrating right perihilar and lower lung airspace opacities  - COVID, influenza A/B negative, UA not concerning for UTI  - Blood cultures x 2 no preliminary growth  - 500 cc IV fluid bolus given, was hemodynamically stable in ED so no more was given due to concerns for decompensated heart failure  - Patient started on Rocephin 1/17 - 1/19 and Zithromax x 1 1/17  in the ED, switched to Vanco/Zosyn due to history of MRSA --> per Dr. Raúl Jamil currently on doxycycline 1/18 and Augmentin (patient started with antibiotic treatment on 1/17, will treat for 7 total days) --> holding augmentin 1/22 as pt with dysphagia -> will hold off on IV antibiotics at this time as patient continues to clinically improve, will monitor  -Concern for aspiration due to dysphagia, speech to evaluate, repeat chest x-ray 1/22 no clear concerns for aspiration pneumonia  - May consider CT chest due to extensive lymphadenopathy    Hypernatremia, hyperchloremia   - up to 148 on 1/22-> 147 1/23 -> renal assist apprec  - ?due to dysphagia and ??need more free water  - on NS IVF since 1/18 --> per renal changed to 0.45 NS IVF 1/22 -> continue with 0.45 NS IVF  - cont monitor    Right-sided hydronephrosis  Right mid ureteral soft tissue density  - Renal ultrasound 1/18 demonstrated right sided hydronephrosis with associated right mid ureteral soft tissue density 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 bone scan  - Discussion to be had by family and patient regarding next steps or if potential biopsy is wanted 1/23  - May consider future bone scan, CT chest, due to these above findings. Pending family discussion.     Enlarged lymphadenopathy/extensive intra abdominal, retroperitoneal lymphadenopathy   - Noted periureteral lymphadenopathy with numerous enlarged right pelvic lymph nodes as well as mesenteric lymphadenopathy on CT abdomen pelvis on 1/19  - MRI abdomen on 1/20 demonstrated abnormal retroperitoneal and mesenteric lymph nodes consistent with neoplastic disease, retroperitoneal lymph nodes could be biopsy under CT guidance if clinically indicated  - CBC's do not suggest lymphoma   - Discussion to be had by family and patient regarding next steps or if potential biopsy is wanted and ?oncology c/s    Right perihilar lower lung pneumonia-unspecified organism but cover gram (+), MRSA with prior screen (+), gram (-) with ?aspiration  - As noted above on arrival on chest x-ray imaging and PCT 0.69 on 1/17/23  - Continue doxycycline and Augmentin for 5 total days (start 1/20 and end 1/25) --> hold augmentin 1/22 due to dysphagia and cannot swallow larger pills -- s/p rocephin 1/17 - 1/19  - Albuterol nebulizer every 6 hours as needed  - Acapella and IS encouraged, stable on RA    Pharyngitis  - c/o throat pain s/p procedure 1/20 -- OP erythematous -> ?yeast vs ?irritation but had LMA with surgery. - monitor - improving 1/22 per pt   - As needed Chloraseptic spray    NSVT  -- 5 beats 1/21 and 9 beats 1/22 am   -- cardiology consulted and evaluated 1/17 and has not seen since  -- monitor lytes and replace prn  -- Echo 1/17/23 with normal EF 55%  -- ?? pericardial cyst on MRI  - We will monitor    Bilateral pleural effusions  - Noted on CT abdomen on 1/19 to have small to moderate right pleural effusion and small left pleural effusion, also noted on MRI abdomen 1/20  - Due to ANNIKA and appearing euvolemic diuretics avoided  - Patient continues to clinically improve on respiratory status and is currently on room air  - repeat CXR 1/22/23 with cough  - Prior echo on 8/4/2021 demonstrated EF 50% with no regional left ventricular wall motion abnormalities, moderately enlarged right atrium, severely dilated left atrium  - Repeat echo on 1/17/2023 demonstrated EF 55%, overall normal left ventricular function  - We will continue to monitor clinical status and fluid status per renal    Acute hypoxic respiratory failure-POA  - Patient on room air at baseline, was requiring 6 L nasal cannula on arrival secondary to sepsis with right-sided pneumonia and bilateral pleural effusions --> weaned to RA since   -- see above - ?CT chest  - We will continue to monitor clinical status    Possible acute on chronic HFpEF exacerbation  - Concerns upon arrival due to increasing shortness of breath and chest x-ray demonstrated enlargement of the cardiopericardial silhouette with small right pleural effusion  - Prior echo on 8/4/2021 demonstrated EF 50% with no regional left ventricular wall motion abnormalities, moderately enlarged right atrium, severely dilated left atrium  - Repeat echo on 1/17/2023 demonstrated EF 55%, overall normal left ventricular function  - BNP chronically elevated at baseline  - Patient continues appear euvolemic we will use diuretic as needed  - Daily weights and I's and O's    Elevated troponin  - History of coronary artery disease, with CABG in 2006  - Troponin up trended from 0.046-0.388 (1/17) -> 0.319 (1/18) so has trended down slightly   - EKG on arrival demonstrated A. 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 - UA without any evidence of microhematuria or proteinuria  - Renal ultrasound 1/18/23 done and demonstrated mild right-sided hydronephrosis -> urology following and see above    Metabolic acidosis:  - Bicarb 20 on arrival continue to worsen to 16 on 1/21 -> improving 1/22 to 20 on BMP -> 19 1/23  - Nephrology following appreciate recommendations  - Started on sodium bicarb tablets 1300 mg bid 1/19 per renal, will continue  - IV bicarb given briefly 1/20     Mild MR, mild TR  - per echo 1/17/23    Parenchymal renal cyst:  - Right and left parenchymal cyst noted on MRI abdomen on 1/20    Pulmonary hypertension:  - RVSP 44 mmHg noted    Dysphagia:  - ??due to procedures -- has prior hx 2021  - Continues to choke on thin liquid  -- c/s ST and await recs ->  in the meantime we will switch to dysphagia soft and bite-size diet with moderately thickened liquids  -- pt with hx of PEG in 2021  -- ? MRI brain, no other focal neurological deficits noted    Hiatal hernia  Left inguinal hernia  Lumbar spondylosis  Small gallstone  - Noted on MRI abdomen 1/20  -- PPI for [facility]    Essential hypertension  -Noted per history  - Appears patient is on no first-line treatment medications, uses Lasix  - Lasix currently being held due to ANNIKA in euvolemic state  - Blood pressures remaining stable - monitor and adjust prn    Chronic macrocytic anemia:  - hgb 10.1 on 1/22 - stable in 9-10's since admission  and baseline 10-13's in past year -- asx   -- Likely anemia of chronic disease due to CKD and iron deficiency anemia  - 1/18/23 -> Iron 29, iron saturation 17, total iron binding capacity 168, folate and vitamin B12 normal, retic normal thus likely chronic disease  -> po iron at d/c  - monitor as on anticoagulation for afib    Leukocytosis:  - Secondary to septic state from right-sided pneumonia  - WBC 17.4 on arrival, decreased to 10.4 on 1/19, increased to 11.6 on 1/21 -> Up to 14.2 (1/23) -> patient remains afebrile and on room air and on doxy 1/18 - 1/22, augmentin 1/20 for pneumonia --> to complete 1/24 but hold due to dysphagia  - repeat CXR 1/22 demonstrated moderate cardiomegaly, thickening of interstitial lung markings, moderate right pleural effusion, thoracic spondylosis  - WBC increased today, however patient clinically stable afebrile on room air. Will await speech evaluation may continue with p.o. antibiotics.   - We will continue to monitor daily labs    Hypomagnesemia:  - Magnesium noted to be 1.4 on arrival was replaced and up to 2.1, potassium normal  - We will check intermittently during stay    Hypocalcemia:  - Likely secondary to hypoalbuminemia  - We will encourage to increase p.o. intake  - Monitor prn    Coronary artery disease with hx CABG 2006  - Noted per history  - Does not appear patient has had left heart cath in place noted in our system  - Continue aspirin  -- cardiology consulted for elevated trops and noted stress 2015    Persistent A. fib  - Noted per chart review of patient's history and on EKG on arrival  - Continue home Eliquis  -- no rate meds needed    BPH  - Follows with Dr. Remy Parker as outpatient with history of recurrent UTIs  - Continue Flomax and finasteride  - CT abdomen pelvis demonstrated prostatomegaly    GERD:  - Controlled with Protonix twice daily    Colonic diverticulosis without diverticulitis:  - Noted on CT abdomen pelvis on 1/19/2023  - No bowel obstruction or perforation noted    Hepatomegaly:  - Noted on CT abdomen pelvis on 1/19/2023  - Lobulated contour of the liver parenchyma suggesting parenchymal liver disease recommend correlate with liver function test  -- LFT's WNL except slightly elevated AP (chronic) last checked 1/17/23 -> repeat 1/23    History of MRSA:  - Noted --> doxycycline with above    Physical deconditioning  Generalized weakness  - PT OT  - Multivitamin    Dispo  -- 1/23/2023 -> awaiting family discussion likely today to pursue biopsy and eval and treat versus conservative management or no aggressive management.  Palliative care following.  Renal function remained stable per nephrology.  Speech to eval and treat due to dysphagia.  Will await overall family plan.    Chief Complaint: Shortness of breath    Hospital Course:   Per Dr. Bañuelos's note 1/21  \"Velasquez is a 89 y/o  male with a PMHx of CAD, HTN, pulmonary HTN who presents to [facility] ED today for the evaluation of shortness of breath. He states he called his son early this morning saying that he thinks he should come into the hospital for evaluation because of worsening symptoms. He states his shortness of breath is present at rest and with activity. He has an associated dry cough and chills. He denies chest pain, abdominal pain, LE swelling, unexpected weight gain, changes in bowel or urinary habits. Pt unable to provide additional history at this time due to being on bipap.\"    Patient arrived with chief complaint shortness of breath as stated above likely secondary to sepsis due to right-sided pneumonia.  COVID influenza A/B- and blood cultures x2 no growth.  Was given one-time dose of Rocephin and Zithromax in the ED and switched to vancomycin/Zosyn due to history of MRSA.  Have since switched to doxycycline and Augmentin, antibiotic treatment started on 1/17 and will treat for 7 total days.  Respiratory status has continued to improve throughout stay as patient no longer requiring supplemental oxygen is back on baseline of room air.    During stay patient had notable ANNIKA with creatinine increasing.  Renal ultrasound demonstrated right-sided hydronephrosis so CT abdomen pelvis was done and demonstrated a right mid ureteral soft tissue mass with concerning lymphadenopathy and enlarged right pelvic lymph nodes concerning for mesenteric lymphadenopathy for metastatic disease.  Also noted on  CT abdomen pelvis was a thoracic spine lytic lesion. 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 Enlarged retroperitoneal lymph nodes could be biopsied under CT guidance if clinically indicated. Urology was consulted cystoscopy, right ureteroscopy, right retrograde pyelogram, and right ureteral stent placement done by Dr. Gavin Ray on 1/20.    1/21/2023 patient remained stable on room air. Due to findings on imaging, will await for family members to arrive from out of town and have discussion about results family and patient's likely at the beginning of next week. \"    1/23/2023 patient stable, awaiting family discussion regarding possible biopsy versus treatment versus conservative management. Speech to evaluate and treat for dysphagia. Subjective:   1/23 -> patient seen in a.m. no acute events noted overnight. Per RN patient still having trouble swallowing, however she was able to put some medications in applesauce and he tolerated fine. Patient states he feels okay this morning, denies any chest pains, shortness of breath, or abdominal pains. We will await family discussion.     Medications:  Reviewed    Infusion Medications    sodium chloride 60 mL/hr at 01/23/23 0734    sodium chloride       Scheduled Medications    sodium bicarbonate  1,300 mg Oral BID    [Held by provider] amoxicillin-clavulanate  1 tablet Oral 2 times per day    apixaban  2.5 mg Oral BID    aspirin  81 mg Oral Daily    finasteride  5 mg Oral Daily    [Held by provider] furosemide  40 mg Oral Daily    multivitamin  1 tablet Oral Daily    pantoprazole  40 mg Oral BID    tamsulosin  0.4 mg Oral Daily    sodium chloride flush  10 mL IntraVENous 2 times per day     PRN Meds: phenol, albuterol, sodium chloride flush, sodium chloride, ondansetron **OR** ondansetron, polyethylene glycol, acetaminophen **OR** acetaminophen      Intake/Output Summary (Last 24 hours) at 1/23/2023 0915  Last data filed at 1/23/2023 8951  Gross per 24 hour   Intake 2151.63 ml   Output 800 ml   Net 1351.63 ml       Diet:  ADULT DIET; Dysphagia - Soft and Bite Sized; Moderately Thick (Honey)    Exam:  BP (!) 131/50   Pulse 66   Temp 98.3 °F (36.8 °C) (Oral)   Resp 18   Wt 154 lb 11.2 oz (70.2 kg)   SpO2 92%   BMI 30.21 kg/m²     General appearance: Elderly male laying in bed, cooperative alert and pleasant  HEENT: Pupils equal, round, and reactive to light. Conjunctivae/corneas clear. Neck: Supple, with full range of motion. No jugular venous distention. Trachea midline. Respiratory:  Normal respiratory effort. +rhonchi bilaterally that improves little with cough  Cardiovascular: irreg, irreg, controlled rate with normal S1/S2, 2/6 LENNOX LLSB, no rubs or gallops. Abdomen: Soft, non-tender, non-distended with normal bowel sounds. Musculoskeletal: passive and active ROM x 4 extremities. Skin: Skin color, texture, turgor normal.  No rashes or lesions. Neurologic:  Neurovascularly intact without any focal sensory/motor deficits. Cranial nerves: II-XII intact, grossly non-focal.  Psychiatric: Alert and oriented, thought content appropriate  Capillary Refill: Brisk,< 3 seconds   Peripheral Pulses: +2 palpable, equal bilaterally       Labs:   Recent Labs     01/21/23  0406 01/22/23  0403 01/23/23  0345   WBC 11.6* 10.1 14.2*   HGB 10.4* 10.1* 9.4*   HCT 31.6* 31.3* 29.3*    219 221     Recent Labs     01/21/23  0406 01/22/23  0403 01/23/23  0345    148* 147*   K 4.1 4.2 3.8   * 117* 115*   CO2 16* 20* 19*   BUN 28* 23* 21   CREATININE 2.2* 2.3* 2.1*   CALCIUM 8.1* 8.4* 8.1*   PHOS 3.0  --   --      No results for input(s): AST, ALT, BILIDIR, BILITOT, ALKPHOS in the last 72 hours. No results for input(s): INR in the last 72 hours. No results for input(s): Sarahi Dross in the last 72 hours.     Urinalysis:      Lab Results   Component Value Date/Time    NITRU NEGATIVE 01/18/2023 10:30 AM    WBCUA 0-2 01/18/2023 10:30 AM    BACTERIA NONE SEEN 01/18/2023 10:30 AM    RBCUA 5-10 01/18/2023 10:30 AM    BLOODU NEGATIVE 01/18/2023 10:30 AM    SPECGRAV 1.022 01/18/2023 10:30 AM    GLUCOSEU NEGATIVE 12/26/2021 05:20 AM       Radiology:  XR CHEST (2 VW)   Final Result   1. Moderate cardiomegaly and probable congestive heart failure in this patient status post coronary bypass surgery. 2. Thickening off the interstitial lung markings. 3. Moderate right pleural effusion    4. Thoracic spondylosis. **This report has been created using voice recognition software. It may contain minor errors which are inherent in voice recognition technology. **      Final report electronically signed by DR Janeen Foster on 1/22/2023 9:18 AM      MRI ABDOMEN WO CONTRAST   Final Result       1. There are parenchymal cysts in the right left kidneys. This no discrete renal mass noted. 2. There are abnormal retroperitoneal and mesenteric lymph nodes present consistent with neoplastic disease. The retroperitoneal lymph nodes can be biopsied under CT guidance if clinically indicated. 3. There is abnormal signal intensity along the right lateral and posterior aspect of the inferior vena cava which is contiguous with the retroperitoneal lymph nodes. 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 Please correlate clinically. 4. 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   5. There is a hiatal hernia. There is a left inguinal hernia present. 6. There is a tiny gallstone. 7. There is a right pleural effusion. There is atelectasis at the right lung base. 8. There is a Duarte catheter in place. This a thick-walled bladder. 9. There is lumbar spondylosis. **This report has been created using voice recognition software. It may contain minor errors which are inherent in voice recognition technology. **      Final report electronically signed by DR Janeen Foster on 1/21/2023 7:29 AM      FL RETROGRADE PYELOGRAM W WO KUB Final Result   Fluoroscopic images provided during surgery. Please refer to the operative note for further details. **This report has been created using voice recognition software. It may contain minor errors which are inherent in voice recognition technology. **      Final report electronically signed by Dr Kristin Lacy on 1/20/2023 9:16 AM      CT ABDOMEN PELVIS WO CONTRAST Additional Contrast? None   Final Result   1. Small to moderate right pleural effusion and small left pleural effusion with rounded atelectasis and patchy groundglass opacities at the right lung base are incompletely visualized. 2. Moderate right-sided hydronephrosis/hydroureter appears to be secondary to a right mid ureteral soft tissue density (series 301, image 48) resulting in either intrinsic or extrinsic obstruction. Right periureteral and right retroperitoneal    lymphadenopathy measures up to 16 mm in short axis. There are also numerous enlarged right pelvic sidewall lymph nodes as well as mesenteric lymphadenopathy suggesting metastatic disease. 3. 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 Additional ill-defined lytic lesions are noted within the L4 vertebral body and there is a suggestion of mixed lytic sclerotic bony metastasis in the right ribs as well as the bones of    the pelvis. 4. Prostatomegaly. Correlate with PSA levels. Mild bladder wall thickening may in part be artifactual related to underdistention. Correlate with urinalysis. 5. Colonic diverticulosis without diverticulitis. Nonspecific thickening of the rectosigmoid colon may be artifactual related to underdistention. Correlate with direct inspection. No bowel obstruction, perforation, or drainable fluid collection is    observed. 6. Hepatomegaly and lobulated contour of the liver parenchyma suggesting parenchymal liver disease. Correlate with liver function tests.  Detailed characterization of the liver parenchyma is limited without IV contrast. Liver lesions are not excluded. **This report has been created using voice recognition software. It may contain minor errors which are inherent in voice recognition technology. **      Final report electronically signed by Dr Shania Vo on 1/19/2023 11:19 AM      US RENAL COMPLETE   Final Result   Mild right hydronephrosis. A right ureteral stone cannot be excluded. No left hydronephrosis. Cholelithiasis. This document has been electronically signed by: Missael Keith MD on    01/18/2023 09:19 PM      XR CHEST (2 VW)   Final Result   Cardiomegaly. Small bilateral pleural effusions and bilateral perihilar and lower lobe airspace opacities, right greater than left, can indicate CHF versus pneumonia in the appropriate clinical setting. Follow-up to ensure resolution is    advised. **This report has been created using voice recognition software. It may contain minor errors which are inherent in voice recognition technology. **      Final report electronically signed by Dr Shania Vo on 1/18/2023 1:21 PM      XR CHEST PORTABLE   Final Result   1. Enlargement of the cardiopericardial silhouette with small right    pleural effusion and right perihilar and lower lung airspace opacities    which could indicate pneumonia. This document has been electronically signed by: Annabel Mclaughlin MD on    01/17/2023 06:16 AM          Diet: ADULT DIET; Dysphagia - Soft and Bite Sized;  Moderately Thick (Honey)    DVT prophylaxis: [] Lovenox                                 [] SCDs                                 [] SQ Heparin                                 [] Encourage ambulation           [x] Already on Anticoagulation - Eliquis renally dosed 2.5 BID     Disposition:    [] Home       [] TCU       [] Rehab       [] Psych       [] SNF       [] [place]       [x] Other- Will await future planning with family     Code Status: Limited    PT/OT Eval Status: consulted      Electronically signed by Johny Hall DO on 1/23/2023 at 9:41 AM

## 2023-01-23 NOTE — CARE COORDINATION
1/23/23, 2:48 PM EST    DISCHARGE ON GOING EVALUATION    [address] day: 6  Location: [facility]28/028-A Reason for admit: Hypomagnesemia [E83.42]  Hypoxia [R09.02]  Elevated troponin [R77.8]  Pneumonia of right lung due to infectious organism, unspecified part of lung [J18.9]  Acute hypoxemic respiratory failure ([address] [J96.01]   Procedure:   1/17 CXR: Enlargement of the cardiopericardial silhouette with small right pleural effusion and right perihilar and lower lung airspace opacities which could indicate pneumonia. 1/17 ECHO: EF 55%. Moderately dilated left atrium. Moderately enlarged right atrium size. 1/18 CXR: Cardiomegaly. Small bilateral pleural effusions and bilateral perihilar and lower lobe airspace opacities, right greater than left, can indicate CHF versus pneumonia in the appropriate clinical setting. 1/18 Renal US: Mild right hydronephrosis. A right ureteral stone cannot be excluded. No left hydronephrosis. Cholelithiasis. 1/19 CT abd/pelvis:   1. Small to moderate right pleural effusion and small left pleural effusion with rounded atelectasis and patchy groundglass opacities at the right lung base are incompletely visualized. 2. Moderate right-sided hydronephrosis/hydroureter appears to be secondary to a right mid ureteral soft tissue density resulting in either intrinsic or extrinsic obstruction. Right periureteral and right retroperitoneal lymphadenopathy measures up to 16 mm in short axis. There are also numerous enlarged right pelvic sidewall lymph nodes as well as mesenteric lymphadenopathy suggesting metastatic disease. 3. 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 Additional ill-defined lytic lesions are noted within the L4 vertebral body and there is a suggestion of mixed lytic sclerotic bony metastasis in the right ribs as well as the bones of the pelvis. 4. Prostatomegaly. Correlate with PSA levels.  Mild bladder wall thickening may in part be artifactual related to underdistention. 5. Colonic diverticulosis without diverticulitis. Nonspecific thickening of the rectosigmoid colon may be artifactual related to underdistention. Correlate with direct inspection. No bowel obstruction, perforation, or drainable fluid collection is observed. 6. Hepatomegaly and lobulated contour of the liver parenchyma suggesting parenchymal liver disease. Correlate with liver function tests. Detailed characterization of the liver parenchyma is limited without IV contrast. Liver lesions are not excluded. 1/20 Cystoscopy, right ureteroscopy, right retrograde pyelogram, right stent placement by Dr. Parvin Suarez.   1/22 CXR: Moderate cardiomegaly and probable congestive heart failure in this patient status post coronary bypass surgery. Thickening off the interstitial lung markings. Moderate right pleural effusion. Thoracic spondylosis. Barriers to Discharge: Hospitalist, Cardiology, Nephrology following. Urology signed off. PT/OT/SLP. NPO until MBS done. Sodium 147, chloride 115, CO2 19, BUN 21, creatinine 2.1, WBC 14.2. IVF. Eliquis, baby ASA, Proscar, MVI, Protonix bid. Palliative Care following. Per Hospitalist: awaiting family discussion likely today to pursue biopsy and eval and treat versus conservative management or no aggressive management. PCP: Douglas Leonardo DO  Readmission Risk Score: 18.3%  Patient Goals/Plan/Treatment Preferences: From home alone. Sons live close by. Monitor for needs.

## 2023-01-23 NOTE — PROGRESS NOTES
[facility]   Nephrology progress note  1/23/2023, 9:17 AM      Pt Name:    Terri Malagon  MRN:     [id number]     YOB: 1932  Admit Date:    1/17/2023  4:32 AM    Chief Complaint: Nephrology following for ANNIKA/CKD    Subjective:  Patient was seen and examined   No chest pain or shortness of breath  MRI findings noted      Objective:  24HR INTAKE/OUTPUT:    Intake/Output Summary (Last 24 hours) at 1/23/2023 0917  Last data filed at 1/23/2023 0734  Gross per 24 hour   Intake 2151.63 ml   Output 800 ml   Net 1351.63 ml           I/O last 3 completed shifts: In: 1320.5 [P.O.:120; I.V.:1200.5]  Out: 1150 [Urine:1150]  I/O this shift:   In: 831.1 [I.V.:831.1]  Out: -    Admission weight: 138 lb (62.6 kg)  Wt Readings from Last 3 Encounters:   01/23/23 154 lb 11.2 oz (70.2 kg)   12/06/22 145 lb (65.8 kg)   11/17/22 147 lb (66.7 kg)        Vitals :   Vitals:    01/22/23 1513 01/22/23 2100 01/22/23 2322 01/23/23 0414   BP: (!) 158/94 (!) 144/78 (!) 146/47 (!) 131/50   Pulse: 78 78 81 66   Resp: 18 20 18 18   Temp: 98.3 °F (36.8 °C) 100.2 °F (37.9 °C) 98.2 °F (36.8 °C) 98.3 °F (36.8 °C)   TempSrc: Oral Axillary Oral Oral   SpO2: 93% 91% 94% 92%   Weight:    154 lb 11.2 oz (70.2 kg)       Physical examination  General Appearance: cooperative with exam, appears comfortable, no distress  Neck: No JVD  Lungs: no use of accessory muscles  GI: soft, non-tender, no guarding  Extremities: No pitting LE edema    Medications:  Infusion:    sodium chloride 60 mL/hr at 01/23/23 0734    sodium chloride       Meds:    sodium bicarbonate  1,300 mg Oral BID    [Held by provider] amoxicillin-clavulanate  1 tablet Oral 2 times per day    apixaban  2.5 mg Oral BID    aspirin  81 mg Oral Daily    finasteride  5 mg Oral Daily    [Held by provider] furosemide  40 mg Oral Daily    multivitamin  1 tablet Oral Daily    pantoprazole  40 mg Oral BID    tamsulosin  0.4 mg Oral Daily    sodium chloride flush  10 mL IntraVENous 2 times per day     Meds prn: phenol, albuterol, sodium chloride flush, sodium chloride, ondansetron **OR** ondansetron, polyethylene glycol, acetaminophen **OR** acetaminophen     Lab Data :  CBC:   Recent Labs     01/21/23  0406 01/22/23  0403 01/23/23  0345   WBC 11.6* 10.1 14.2*   HGB 10.4* 10.1* 9.4*   HCT 31.6* 31.3* 29.3*    219 221       CMP:  Recent Labs     01/21/23  0406 01/22/23  0403 01/23/23  0345    148* 147*   K 4.1 4.2 3.8   * 117* 115*   CO2 16* 20* 19*   BUN 28* 23* 21   CREATININE 2.2* 2.3* 2.1*   GLUCOSE 103 107 101   CALCIUM 8.1* 8.4* 8.1*   MG  --  1.8  --    PHOS 3.0  --   --        Hepatic: No results for input(s): LABALBU, AST, ALT, ALB, BILITOT, ALKPHOS in the last 72 hours. Assessment and Plan:  1. 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 Overall stable renal function  Serum creatinine today is down to 2.1 sodium is 147. Patient is on hypotonic fluids. Low-dose IV fluids  Status post right ureteral stent    2. Mild right-sided hydronephrosis. 3.  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 Bony metastasis  5. Renal cyst  6. Atrial fibrillation  7. Metabolic acidosis. Continue with oral sodium bicarb  8. Hx BPH  9. CAD  10. Mild hyponatremia. Continue with hypotonic fluids    Discussed with medical resident from primary services  Discussion to be held with patient's family including son  Agustina Butte regarding further goals of care. Mg Bacon MD  [facility]    This report has been created using voice recognition software.  It may contain minor errors which are inherent in voice recognition technology

## 2023-01-24 ENCOUNTER — APPOINTMENT (OUTPATIENT)
Dept: GENERAL RADIOLOGY | Age: 88
DRG: 853 | End: 2023-01-24
Payer: OTHER GOVERNMENT

## 2023-01-24 LAB
ANION GAP SERPL CALC-SCNC: 11 MEQ/L (ref 8–16)
BASOPHILS ABSOLUTE: 0.1 THOU/MM3 (ref 0–0.1)
BASOPHILS NFR BLD AUTO: 0.5 %
BUN SERPL-MCNC: 19 MG/DL (ref 7–22)
CALCIUM SERPL-MCNC: 8.2 MG/DL (ref 8.5–10.5)
CHLORIDE SERPL-SCNC: 115 MEQ/L (ref 98–111)
CO2 SERPL-SCNC: 20 MEQ/L (ref 23–33)
CREAT SERPL-MCNC: 2 MG/DL (ref 0.4–1.2)
DEPRECATED RDW RBC AUTO: 48.5 FL (ref 35–45)
EOSINOPHIL NFR BLD AUTO: 2.9 %
EOSINOPHILS ABSOLUTE: 0.3 THOU/MM3 (ref 0–0.4)
ERYTHROCYTE [DISTWIDTH] IN BLOOD BY AUTOMATED COUNT: 13.7 % (ref 11.5–14.5)
GFR SERPL CREATININE-BSD FRML MDRD: 31 ML/MIN/1.73M2
GLUCOSE BLD STRIP.AUTO-MCNC: 106 MG/DL (ref 70–108)
GLUCOSE BLD STRIP.AUTO-MCNC: 141 MG/DL (ref 70–108)
GLUCOSE SERPL-MCNC: 95 MG/DL (ref 70–108)
HCT VFR BLD AUTO: 30.9 % (ref 42–52)
HGB BLD-MCNC: 9.8 GM/DL (ref 14–18)
IMM GRANULOCYTES # BLD AUTO: 0.07 THOU/MM3 (ref 0–0.07)
IMM GRANULOCYTES NFR BLD AUTO: 0.7 %
LYMPHOCYTES ABSOLUTE: 0.9 THOU/MM3 (ref 1–4.8)
LYMPHOCYTES NFR BLD AUTO: 8.7 %
MCH RBC QN AUTO: 30.6 PG (ref 26–33)
MCHC RBC AUTO-ENTMCNC: 31.7 GM/DL (ref 32.2–35.5)
MCV RBC AUTO: 96.6 FL (ref 80–94)
MONOCYTES ABSOLUTE: 0.9 THOU/MM3 (ref 0.4–1.3)
MONOCYTES NFR BLD AUTO: 8.8 %
NEUTROPHILS NFR BLD AUTO: 78.4 %
NRBC BLD AUTO-RTO: 0 /100 WBC
PLATELET # BLD AUTO: 232 THOU/MM3 (ref 130–400)
PMV BLD AUTO: 10.5 FL (ref 9.4–12.4)
POTASSIUM SERPL-SCNC: 3.9 MEQ/L (ref 3.5–5.2)
RBC # BLD AUTO: 3.2 MILL/MM3 (ref 4.7–6.1)
SEGMENTED NEUTROPHILS ABSOLUTE COUNT: 8.2 THOU/MM3 (ref 1.8–7.7)
SODIUM SERPL-SCNC: 146 MEQ/L (ref 135–145)
WBC # BLD AUTO: 10.4 THOU/MM3 (ref 4.8–10.8)

## 2023-01-24 PROCEDURE — 82948 REAGENT STRIP/BLOOD GLUCOSE: CPT

## 2023-01-24 PROCEDURE — 92611 MOTION FLUOROSCOPY/SWALLOW: CPT

## 2023-01-24 PROCEDURE — 6370000000 HC RX 637 (ALT 250 FOR IP): Performed by: INTERNAL MEDICINE

## 2023-01-24 PROCEDURE — 99232 SBSQ HOSP IP/OBS MODERATE 35: CPT | Performed by: FAMILY MEDICINE

## 2023-01-24 PROCEDURE — 2580000003 HC RX 258: Performed by: INTERNAL MEDICINE

## 2023-01-24 PROCEDURE — 36415 COLL VENOUS BLD VENIPUNCTURE: CPT

## 2023-01-24 PROCEDURE — 2500000003 HC RX 250 WO HCPCS: Performed by: FAMILY MEDICINE

## 2023-01-24 PROCEDURE — 2140000000 HC CCU INTERMEDIATE R&B

## 2023-01-24 PROCEDURE — 74230 X-RAY XM SWLNG FUNCJ C+: CPT

## 2023-01-24 PROCEDURE — 6370000000 HC RX 637 (ALT 250 FOR IP)

## 2023-01-24 PROCEDURE — 99232 SBSQ HOSP IP/OBS MODERATE 35: CPT | Performed by: INTERNAL MEDICINE

## 2023-01-24 PROCEDURE — 80048 BASIC METABOLIC PNL TOTAL CA: CPT

## 2023-01-24 PROCEDURE — 85025 COMPLETE CBC W/AUTO DIFF WBC: CPT

## 2023-01-24 RX ORDER — DEXTROSE MONOHYDRATE 50 MG/ML
INJECTION, SOLUTION INTRAVENOUS CONTINUOUS
Status: DISCONTINUED | OUTPATIENT
Start: 2023-01-24 | End: 2023-01-26

## 2023-01-24 RX ADMIN — PANTOPRAZOLE SODIUM 40 MG: 40 TABLET, DELAYED RELEASE ORAL at 08:29

## 2023-01-24 RX ADMIN — ASPIRIN 81 MG: 81 TABLET, COATED ORAL at 08:29

## 2023-01-24 RX ADMIN — APIXABAN 2.5 MG: 2.5 TABLET, FILM COATED ORAL at 08:29

## 2023-01-24 RX ADMIN — Medication 1 TABLET: at 08:29

## 2023-01-24 RX ADMIN — BARIUM SULFATE 30 ML: 0.81 POWDER, FOR SUSPENSION ORAL at 10:03

## 2023-01-24 RX ADMIN — FINASTERIDE 5 MG: 5 TABLET, FILM COATED ORAL at 08:29

## 2023-01-24 RX ADMIN — DEXTROSE MONOHYDRATE: 50 INJECTION, SOLUTION INTRAVENOUS at 15:19

## 2023-01-24 RX ADMIN — TAMSULOSIN HYDROCHLORIDE 0.4 MG: 0.4 CAPSULE ORAL at 08:29

## 2023-01-24 RX ADMIN — PANTOPRAZOLE SODIUM 40 MG: 40 TABLET, DELAYED RELEASE ORAL at 19:46

## 2023-01-24 RX ADMIN — APIXABAN 2.5 MG: 2.5 TABLET, FILM COATED ORAL at 20:32

## 2023-01-24 RX ADMIN — SODIUM BICARBONATE 1300 MG: 650 TABLET ORAL at 08:29

## 2023-01-24 RX ADMIN — BARIUM SULFATE 10 ML: 400 PASTE ORAL at 10:02

## 2023-01-24 RX ADMIN — SODIUM BICARBONATE 1300 MG: 650 TABLET ORAL at 19:46

## 2023-01-24 RX ADMIN — BARIUM SULFATE 20 ML: 400 SUSPENSION ORAL at 10:03

## 2023-01-24 NOTE — PALLIATIVE CARE
Follow Up / Progress Note        Patient:   Christopher Elkisn  YOB: 1932  Age:  80 y.o. Room:  [facility]28/028-A  MRN:  [id number]            Family/Patient Discussion:  Pt returned from [facility]. Pt's son,Velasquez and daughter,Lauren are at beside, also Lauren's . Report of MBS results via phone received from SLP, [address] [place] [address]. Per report, SLP recommends diet of soft and bite size, mildly thick liquid, no chin tuck needed but double swallow. Dr Dorla Kawasaki, pt's family and pt, joined by this RN in pt's room to discuss goals of care. Writer relayed results of MBS. Pt states that he does not want to pursue any work up or treatment of lymphadenopathy, pt would like to be able to be home for what ever time he has and not return to the hospital.  Family is supportive of pt request.  We discussed hospice care as an option to assure pt has resources in place to help assure comfort and quality of life. Pt and family request [facility] to be consulted. Time spent in supportive conversation, concluded with pt and family stating no further questions or needs. Emotional support given. Verbal order received from Dr Dorla Kawasaki to order hospice consult. Hospice referral call to Stephanie Henderson at [facility], inpatient hospice liaison,GERARDO Branch updated on referral.          Plan/Follow-Up:  Discussed with pt and family that providers would like another day or two of IV fluids for Na correction. Dr Dorla Kawasaki with discuss with urology regarding removal of boswell cath and void trial.  Pt would like boswell cath out if possible. Plan for discharge to home with hospice in the next few days. Pt's nurse,GERARDO Dixon updated on above. Palliative care will remain available as needed. Floor to notify PRN for needs.          Electronically signed by Mendez Gregory RN on 1/24/2023 at 12:08 PM             Palliative Care Office: [phone]

## 2023-01-24 NOTE — PLAN OF CARE
Problem: Discharge Planning  Goal: Discharge to home or other facility with appropriate resources  1/24/2023 1037 by Bri Pack RN  Outcome: Progressing  Flowsheets (Taken 1/24/2023 0815)  Discharge to home or other facility with appropriate resources:   Identify barriers to discharge with patient and caregiver   Arrange for needed discharge resources and transportation as appropriate   Identify discharge learning needs (meds, wound care, etc)     Problem: Safety - Adult  Goal: Free from fall injury  1/24/2023 1037 by Bri Pack RN  Outcome: Progressing  Flowsheets (Taken 1/24/2023 1034)  Free From Fall Injury: Instruct family/caregiver on patient safety     Problem: ABCDS Injury Assessment  Goal: Absence of physical injury  1/24/2023 1037 by Bri Pack RN  Outcome: Progressing  Flowsheets (Taken 1/24/2023 1034)  Absence of Physical Injury: Implement safety measures based on patient assessment     Problem: Chronic Conditions and Co-morbidities  Goal: Patient's chronic conditions and co-morbidity symptoms are monitored and maintained or improved  1/24/2023 1037 by Bri Pack RN  Outcome: Progressing  Flowsheets (Taken 1/24/2023 0815)  Care Plan - Patient's Chronic Conditions and Co-Morbidity Symptoms are Monitored and Maintained or Improved: Collaborate with multidisciplinary team to address chronic and comorbid conditions and prevent exacerbation or deterioration     Problem: Pain  Goal: Verbalizes/displays adequate comfort level or baseline comfort level  1/24/2023 1037 by Bri Pack RN  Outcome: Progressing  Flowsheets (Taken 1/21/2023 0237 by Andrea Brush RN)  Verbalizes/displays adequate comfort level or baseline comfort level: Encourage patient to monitor pain and request assistance     Problem: Skin/Tissue Integrity  Goal: Absence of new skin breakdown  Description: 1. Monitor for areas of redness and/or skin breakdown  2. Assess vascular access sites hourly  3.   Every 4-6 hours minimum:  Change oxygen saturation probe site  4. Every 4-6 hours:  If on nasal continuous positive airway pressure, respiratory therapy assess nares and determine need for appliance change or resting period. 1/24/2023 1037 by Bri Pack RN  Outcome: Progressing  Note: Ongoing assessment & interventions provided throughout shift. Skin assessments provided. Encouraging/assisting patient to turn as needed. Care plan reviewed with patient. Patient verbalizes understanding of the care plan and contributed to goal setting.

## 2023-01-24 NOTE — CARE COORDINATION
1/24/23, 2:39 PM EST    DISCHARGE ON GOING EVALUATION    [address] day: 7  Location: [facility]28/028-A Reason for admit: Hypomagnesemia [E83.42]  Hypoxia [R09.02]  Elevated troponin [R77.8]  Pneumonia of right lung due to infectious organism, unspecified part of lung [J18.9]  Acute hypoxemic respiratory failure ([address] [J96.01]   Procedure:   1/17 CXR: Enlargement of the cardiopericardial silhouette with small right pleural effusion and right perihilar and lower lung airspace opacities which could indicate pneumonia. 1/17 ECHO: EF 55%. Moderately dilated left atrium. Moderately enlarged right atrium size. 1/18 CXR: Cardiomegaly. Small bilateral pleural effusions and bilateral perihilar and lower lobe airspace opacities, right greater than left, can indicate CHF versus pneumonia in the appropriate clinical setting. 1/18 Renal US: Mild right hydronephrosis. A right ureteral stone cannot be excluded. No left hydronephrosis. Cholelithiasis. 1/19 CT abd/pelvis:   1. Small to moderate right pleural effusion and small left pleural effusion with rounded atelectasis and patchy groundglass opacities at the right lung base are incompletely visualized. 2. Moderate right-sided hydronephrosis/hydroureter appears to be secondary to a right mid ureteral soft tissue density resulting in either intrinsic or extrinsic obstruction. Right periureteral and right retroperitoneal lymphadenopathy measures up to 16 mm in short axis. There are also numerous enlarged right pelvic sidewall lymph nodes as well as mesenteric lymphadenopathy suggesting metastatic disease. 3. 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 Additional ill-defined lytic lesions are noted within the L4 vertebral body and there is a suggestion of mixed lytic sclerotic bony metastasis in the right ribs as well as the bones of the pelvis. 4. Prostatomegaly. Correlate with PSA levels.  Mild bladder wall thickening may in part be artifactual related to underdistention. 5. Colonic diverticulosis without diverticulitis. Nonspecific thickening of the rectosigmoid colon may be artifactual related to underdistention. Correlate with direct inspection. No bowel obstruction, perforation, or drainable fluid collection is observed. 6. Hepatomegaly and lobulated contour of the liver parenchyma suggesting parenchymal liver disease. Correlate with liver function tests. Detailed characterization of the liver parenchyma is limited without IV contrast. Liver lesions are not excluded. 1/20 Cystoscopy, right ureteroscopy, right retrograde pyelogram, right stent placement by Dr. Leslye Yeager.   1/22 CXR: Moderate cardiomegaly and probable congestive heart failure in this patient status post coronary bypass surgery. Thickening off the interstitial lung markings. Moderate right pleural effusion. Thoracic spondylosis. Barriers to Discharge: Hospitalist, Cardiology and Nephrology following. Urology signed off. PT/OT/SLP. Ok for soft bite sized diet with mildly thick liquids, ok with straw. Palliative Care met with pt and family this am. Planning Hospice consult today (at 1400). PCP: Roe Monroy DO  Readmission Risk Score: 17.3%  Patient Goals/Plan/Treatment Preferences: Anticipate home with family support and Hospice.

## 2023-01-24 NOTE — PROGRESS NOTES
Hospice referral completed in Velasquez's [facility])room with him, son Dr. Anna Marie Oneill (goes by [facility], daughter Lauren(in from [place]), and DIL(assists with bills and other things, she does not work and is able to assist). Lia Huerta sitting up in chair and denies pain or other adverse symptoms. Noted congestive cough, non-productive. He has been using [place]. Hospice concepts, philosophies, and services explained. \"During stay patient had notable ANNIKA with creatinine increasing. Renal ultrasound demonstrated right-sided hydronephrosis so CT abdomen pelvis was done and demonstrated a right mid ureteral soft tissue mass with concerning lymphadenopathy and enlarged right pelvic lymph nodes concerning for mesenteric lymphadenopathy for metastatic disease. Also noted on CT abdomen pelvis was a thoracic spine lytic lesion. 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 Enlarged retroperitoneal lymph nodes could be biopsied under CT guidance if clinically indicated. \" Patient does not wish for biopsy or treatment, patient and family wishes for patient to go home with hospice care. Patient currently living at home by self with son Fredy Garcia and his wife living close by. Family aware that as patient condition changes will need to figure out around the clock care at home or placement at facility. All realistic about patient diagnosis and expected decline in ability to take care of himself but wish to allow him to do so as long as he can(know that he may not be able to even with discharge, to be evaluated daily by family). At this time plan is for trial of removal of boswell with void trail and day or 2 of IV fluids for NA correction. Spoke to Dr. Meeta Nelson and probable discharge on Thursday. Hospice will complete comfort scripts and have signed either on Wednesday or Thursday morning. Plan to follow up to see if patient or family has questions on 01.25.2023 and review plan of admission in home when discharged.    Updated primary nurse Graham Awan RN of above.

## 2023-01-24 NOTE — PROGRESS NOTES
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 Jennifer Childress on 1/20  - Duarte catheter to remain in place until discharge -> bloody urine  - MRI abdomen 1/20/23 demonstrating pericardial cyst on the right and left kidneys however no discrete renal mass noted, abnormal retroperitoneal and mesenteric lymph nodes present consistent with neoplastic disease  -Family discussion was had with palliative care, will pursue home with hospice care. 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 bone scan  -Discussion had with family as stated above.   We will pursue hospice. Enlarged lymphadenopathy/extensive intra abdominal, retroperitoneal lymphadenopathy   - Noted periureteral lymphadenopathy with numerous enlarged right pelvic lymph nodes as well as mesenteric lymphadenopathy on CT abdomen pelvis on 1/19  - MRI abdomen on 1/20 demonstrated abnormal retroperitoneal and mesenteric lymph nodes consistent with neoplastic disease, retroperitoneal lymph nodes could be biopsy under CT guidance if clinically indicated  - CBC's do not suggest lymphoma   - Family to pursue hospice care    Sepsis with leukocytosis, lactic acidosis, acute resp failure, hypoxia, tachypnia secondary to right-sided pneumonia - POA  - COVID, influenza A/B negative, UA not concerning for UTI  - Blood cultures x 2 no preliminary growth  - 500 cc IV fluid bolus given, was hemodynamically stable in ED so no more was given due to concerns for decompensated heart failure  - Patient started on Rocephin 1/17 - 1/19 and Zithromax x 1 1/17  in the ED, switched to Vanco/Zosyn due to history of MRSA --> per Dr. George Helton currently on doxycycline 1/18 and Augmentin (patient started with antibiotic treatment on 1/17, will treat for 7 total days) --> holding augmentin 1/22 as pt with dysphagia   -Patient remains clinically stable, afebrile, nonhypoxic. We will not continue with IV antibiotics at this time.     Right perihilar lower lung pneumonia-unspecified organism but cover gram (+), MRSA with prior screen (+), gram (-) with ?aspiration  - As noted above on arrival on chest x-ray imaging and PCT 0.69 on 1/17/23  - Continue doxycycline and Augmentin for 5 total days (start 1/20 and end 1/25) --> hold augmentin 1/22 due to dysphagia and cannot swallow larger pills -- s/p rocephin 1/17 - 1/19 -> Remains clinically stable 1/24  - Albuterol nebulizer every 6 hours as needed  - Acapella and IS encouraged, stable on RA    Hypernatremia, hyperchloremia, improving   - ?due to dysphagia and ??need more free water  - on NS IVF since 1/18 --> per renal changed to 0.45 NS IVF 1/22 -> changed to D5W on 1/24 with Sodium 146  - cont monitor  -Nephrology following appreciate their recs. Pharyngitis  - c/o throat pain s/p procedure 1/20 -- OP erythematous -> ?yeast vs ?irritation but had LMA with surgery. - monitor - improving 1/22 per pt   - As needed Chloraseptic spray    NSVT  -- 5 beats 1/21 and 9 beats 1/22 am   -- cardiology consulted and evaluated 1/17 and has not seen since  -- monitor lytes and replace prn  -- Echo 1/17/23 with normal EF 55%  -- ?? pericardial cyst on MRI  - We will monitor    Bilateral pleural effusions  - Noted on CT abdomen on 1/19 to have small to moderate right pleural effusion and small left pleural effusion, also noted on MRI abdomen 1/20  - Due to ANNIKA and appearing euvolemic diuretics avoided  - Patient continues to clinically improve on respiratory status and is currently on room air  - repeat CXR 1/22/23 with cough  - Prior echo on 8/4/2021 demonstrated EF 50% with no regional left ventricular wall motion abnormalities, moderately enlarged right atrium, severely dilated left atrium  - Repeat echo on 1/17/2023 demonstrated EF 55%, overall normal left ventricular function  - We will continue to monitor clinical status and fluid status per renal    Acute hypoxic respiratory failure-POA  - Patient on room air at baseline, was requiring 6 L nasal cannula on arrival secondary to sepsis with right-sided pneumonia and bilateral pleural effusions --> weaned to RA since   -- see above - ?CT chest  - We will continue to monitor clinical status    Possible acute on chronic HFpEF exacerbation  - Concerns upon arrival due to increasing shortness of breath and chest x-ray demonstrated enlargement of the cardiopericardial silhouette with small right pleural effusion  - Prior echo on 8/4/2021 demonstrated EF 50% with no regional left ventricular wall motion abnormalities, moderately enlarged right atrium, severely dilated left atrium  - Repeat echo on 1/17/2023 demonstrated EF 55%, overall normal left ventricular function  - BNP chronically elevated at baseline  - Patient continues appear euvolemic we will use diuretic as needed  - Daily weights and I's and O's    Elevated troponin  - History of coronary artery disease, with CABG in 2006  - Troponin up trended from 0.046-0.388 (1/17) -> 0.319 (1/18) so has trended down slightly   - EKG on arrival demonstrated A. 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  - UA without any evidence of microhematuria or proteinuria  - Renal ultrasound 1/18/23 done and demonstrated mild right-sided hydronephrosis -> urology following and see above    Metabolic acidosis:  - Bicarb 20 on arrival continue to worsen to 16 on 1/21 -> improving 1/22 to 20 on BMP -> 19 1/23 -> 20 (1/24)  - Nephrology following appreciate recommendations  - Has been off and on Bicarb infusion  - Continue with sodium bicarb tablets    Mild MR, mild TR  - per echo 1/17/23    Parenchymal renal cyst:  - Right and left parenchymal cyst noted on MRI abdomen on 1/20    Pulmonary hypertension:  - RVSP 44 mmHg noted    Dysphagia:  - ??due to procedures -- has prior hx 2021  - Continues to choke on thin liquid  -- Speech consulted, MBS done and demonstrated laryngeal penetration with aspiration of thin liquids only  - Recommending soft and bite-size diet with mildly thickened liquids okay for straw.     Hiatal hernia  Left inguinal hernia  Lumbar spondylosis  Small gallstone  - Noted on MRI abdomen 1/20  -- PPI for [facility]    Essential hypertension  -Noted per history  - Appears patient is on no first-line treatment medications, uses Lasix  - Lasix currently being held due to ANNIKA in euvolemic state  - Blood pressures remaining stable - monitor and adjust prn    Chronic macrocytic anemia, stable:  - hgb 10.1 on 1/22 - stable in 9-10's since admission  and baseline 10-13's in past year -- asx   -- Likely anemia of chronic disease due to CKD and iron deficiency anemia  - 1/18/23 -> Iron 29, iron saturation 17, total iron binding capacity 168, folate and vitamin B12 normal, retic normal thus likely chronic disease  -> po iron at d/c  - monitor as on anticoagulation for afib    Leukocytosis, improved:  - Secondary to septic state from right-sided pneumonia  - WBC 17.4 on arrival, decreased to 10.4 on 1/19, increased to 11.6 on 1/21 -> Up to 14.2 (1/23) -> patient remains afebrile and on room air and on doxy 1/18 - 1/22, augmentin 1/20 for pneumonia --> to complete 1/24 but hold due to dysphagia  - repeat CXR 1/22 demonstrated moderate cardiomegaly, thickening of interstitial lung markings, moderate right pleural effusion, thoracic spondylosis    Hypomagnesemia, improved:  - Magnesium noted to be 1.4 on arrival was replaced and up to 2.1, potassium normal  - We will check intermittently during stay    Hypocalcemia:  - Likely secondary to hypoalbuminemia  - We will encourage to increase p.o. intake  - Monitor prn    Coronary artery disease with hx CABG 2006  - Noted per history  - Does not appear patient has had left heart cath in place noted in our system  - Continue aspirin  -- cardiology consulted for elevated trops and noted stress 2015    Persistent A. fib  - Noted per chart review of patient's history and on EKG on arrival  - Continue home Eliquis  -- no rate meds needed    BPH  - Follows with Dr. Renell Osler as outpatient with history of recurrent UTIs  - Continue Flomax and finasteride  - CT abdomen pelvis demonstrated prostatomegaly    GERD:  - Controlled with Protonix twice daily    Colonic diverticulosis without diverticulitis:  - Noted on CT abdomen pelvis on 1/19/2023  - No bowel obstruction or perforation noted    Hepatomegaly:  - Noted on CT abdomen pelvis on 1/19/2023  - Lobulated contour of the liver parenchyma suggesting parenchymal liver disease recommend correlate with liver function test  -- LFT's WNL except slightly elevated AP (chronic) last checked 1/17/23 -> repeat 1/23    History of MRSA:  - Noted --> doxycycline with above    Physical deconditioning  Generalized weakness  - PT OT  - Multivitamin    Dispo  -- 1/24/2023 -> discussion was had with family continue with hospice care, would like another day or 2 of IV fluids for sodium correction. We will reach out to urology regarding removal of Duarte. We will plan for discharge home with hospice in the next few days. Hospice to have a meeting with family and patient today. Chief Complaint: Shortness of breath    Hospital Course:   Per Dr. Tita Ragsdale note 1/21  Dagmar Morales is a 79 y/o  male with a PMHx of CAD, HTN, pulmonary HTN who presents to [facility] ED today for the evaluation of shortness of breath. He states he called his son early this morning saying that he thinks he should come into the hospital for evaluation because of worsening symptoms. He states his shortness of breath is present at rest and with activity. He has an associated dry cough and chills. He denies chest pain, abdominal pain, LE swelling, unexpected weight gain, changes in bowel or urinary habits. Pt unable to provide additional history at this time due to being on bipap. \"    Patient arrived with chief complaint shortness of breath as stated above likely secondary to sepsis due to right-sided pneumonia. COVID influenza A/B- and blood cultures x2 no growth. Was given one-time dose of Rocephin and Zithromax in the ED and switched to vancomycin/Zosyn due to history of MRSA. Have since switched to doxycycline and Augmentin, antibiotic treatment started on 1/17 and will treat for 7 total days. Respiratory status has continued to improve throughout stay as patient no longer requiring supplemental oxygen is back on baseline of room air. During stay patient had notable ANNIKA with creatinine increasing. Renal ultrasound demonstrated right-sided hydronephrosis so CT abdomen pelvis was done and demonstrated a right mid ureteral soft tissue mass with concerning lymphadenopathy and enlarged right pelvic lymph nodes concerning for mesenteric lymphadenopathy for metastatic disease. Also noted on CT abdomen pelvis was a thoracic spine lytic lesion. 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 Enlarged retroperitoneal lymph nodes could be biopsied under CT guidance if clinically indicated. Urology was consulted cystoscopy, right ureteroscopy, right retrograde pyelogram, and right ureteral stent placement done by Dr. Lizet Lubin on 1/20.    1/21/2023 patient remained stable on room air. Due to findings on imaging, will await for family members to arrive from out of town and have discussion about results family and patient's likely at the beginning of next week. \"    1/23/2023 patient stable, awaiting family discussion regarding possible biopsy versus treatment versus conservative management. Speech to evaluate and treat for dysphagia. 1/24/2023 patient remained stable, discussion was had with family and palliative care regarding neck steps. Going to not pursue biopsy or treatment and instead will go home with hospice care. Subjective:   Patient seen in the PM no acute events noted overnight.  Patient has no complaints and states he is doing well. Denies chest pains, shortness of breath, or abdominal pains. Medications:  Reviewed    Infusion Medications    dextrose      sodium chloride       Scheduled Medications    sodium bicarbonate  1,300 mg Oral BID    [Held by provider] amoxicillin-clavulanate  1 tablet Oral 2 times per day    apixaban  2.5 mg Oral BID    aspirin  81 mg Oral Daily    finasteride  5 mg Oral Daily    [Held by provider] furosemide  40 mg Oral Daily    multivitamin  1 tablet Oral Daily    pantoprazole  40 mg Oral BID    tamsulosin  0.4 mg Oral Daily    sodium chloride flush  10 mL IntraVENous 2 times per day     PRN Meds: phenol, albuterol, sodium chloride flush, sodium chloride, ondansetron **OR** ondansetron, polyethylene glycol, acetaminophen **OR** acetaminophen      Intake/Output Summary (Last 24 hours) at 1/24/2023 1513  Last data filed at 1/24/2023 0400  Gross per 24 hour   Intake 318 ml   Output 250 ml   Net 68 ml       Diet:  ADULT DIET; Dysphagia - Soft and Bite Sized; Mildly Thick (Nectar)    Exam:  /60   Pulse 68   Temp 98.1 °F (36.7 °C) (Oral)   Resp 18   Wt 154 lb 11.2 oz (70.2 kg)   SpO2 93%   BMI 30.21 kg/m²     General appearance: Elderly male sitting in bedside chair, cooperative alert and pleasant  HEENT: Pupils equal, round, and reactive to light. Conjunctivae/corneas clear. Neck: Supple, with full range of motion. No jugular venous distention. Trachea midline. Respiratory:  Normal respiratory effort. +rhonchi bilaterally that improves little with cough. No wheezing or rales heard. Cardiovascular: irreg, irreg, controlled rate with normal S1/S2, 2/6 LENNOX LLSB, no rubs or gallops. Abdomen: Soft, non-tender, non-distended with normal bowel sounds. Musculoskeletal: passive and active ROM x 4 extremities. Skin: Skin color, texture, turgor normal.  No rashes or lesions. Neurologic:  Neurovascularly intact without any focal sensory/motor deficits.  Cranial nerves: II-XII intact, grossly non-focal.  Psychiatric: Alert and oriented, thought content appropriate  Capillary Refill: Brisk,< 3 seconds   Peripheral Pulses: +2 palpable, equal bilaterally       Labs:   Recent Labs     01/22/23  0403 01/23/23  0345 01/24/23  0404   WBC 10.1 14.2* 10.4   HGB 10.1* 9.4* 9.8*   HCT 31.3* 29.3* 30.9*    221 232     Recent Labs     01/22/23  0403 01/23/23  0345 01/24/23  0404   * 147* 146*   K 4.2 3.8 3.9   * 115* 115*   CO2 20* 19* 20*   BUN 23* 21 19   CREATININE 2.3* 2.1* 2.0*   CALCIUM 8.4* 8.1* 8.2*     No results for input(s): AST, ALT, BILIDIR, BILITOT, ALKPHOS in the last 72 hours. No results for input(s): INR in the last 72 hours. No results for input(s): Rhae Childes in the last 72 hours. Urinalysis:      Lab Results   Component Value Date/Time    NITRU NEGATIVE 01/18/2023 10:30 AM    WBCUA 0-2 01/18/2023 10:30 AM    BACTERIA NONE SEEN 01/18/2023 10:30 AM    RBCUA 5-10 01/18/2023 10:30 AM    BLOODU NEGATIVE 01/18/2023 10:30 AM    SPECGRAV 1.022 01/18/2023 10:30 AM    GLUCOSEU NEGATIVE 12/26/2021 05:20 AM       Radiology:  [place] MODIFIED BARIUM SWALLOW W VIDEO   Final Result   Laryngeal penetration with aspiration of thin liquids only. Recommendations from speech therapy to follow. **This report has been created using voice recognition software. It may contain minor errors which are inherent in voice recognition technology. **      Final report electronically signed by Dr. Power Restrepo on 1/24/2023 12:20 PM      XR CHEST (2 VW)   Final Result   1. Moderate cardiomegaly and probable congestive heart failure in this patient status post coronary bypass surgery. 2. Thickening off the interstitial lung markings. 3. Moderate right pleural effusion    4. Thoracic spondylosis. **This report has been created using voice recognition software. It may contain minor errors which are inherent in voice recognition technology. **      Final report electronically signed by DR Melissa Portillo on 1/22/2023 9:18 AM      MRI ABDOMEN WO CONTRAST   Final Result       1. There are parenchymal cysts in the right left kidneys. This no discrete renal mass noted. 2. There are abnormal retroperitoneal and mesenteric lymph nodes present consistent with neoplastic disease. The retroperitoneal lymph nodes can be biopsied under CT guidance if clinically indicated. 3. There is abnormal signal intensity along the right lateral and posterior aspect of the inferior vena cava which is contiguous with the retroperitoneal lymph nodes. 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 Please correlate clinically. 4. 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   5. There is a hiatal hernia. There is a left inguinal hernia present. 6. There is a tiny gallstone. 7. There is a right pleural effusion. There is atelectasis at the right lung base. 8. There is a Duarte catheter in place. This a thick-walled bladder. 9. There is lumbar spondylosis. **This report has been created using voice recognition software. It may contain minor errors which are inherent in voice recognition technology. **      Final report electronically signed by DR Melissa Portillo on 1/21/2023 7:29 AM      FL RETROGRADE PYELOGRAM W WO KUB   Final Result   Fluoroscopic images provided during surgery. Please refer to the operative note for further details. **This report has been created using voice recognition software. It may contain minor errors which are inherent in voice recognition technology. **      Final report electronically signed by Dr Elías Freeman on 1/20/2023 9:16 AM      CT ABDOMEN PELVIS WO CONTRAST Additional Contrast? None   Final Result   1. Small to moderate right pleural effusion and small left pleural effusion with rounded atelectasis and patchy groundglass opacities at the right lung base are incompletely visualized. 2. Moderate right-sided hydronephrosis/hydroureter appears to be secondary to a right mid ureteral soft tissue density (series 301, image 48) resulting in either intrinsic or extrinsic obstruction. Right periureteral and right retroperitoneal    lymphadenopathy measures up to 16 mm in short axis. There are also numerous enlarged right pelvic sidewall lymph nodes as well as mesenteric lymphadenopathy suggesting metastatic disease. 3. 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 Additional ill-defined lytic lesions are noted within the L4 vertebral body and there is a suggestion of mixed lytic sclerotic bony metastasis in the right ribs as well as the bones of    the pelvis. 4. Prostatomegaly. Correlate with PSA levels. Mild bladder wall thickening may in part be artifactual related to underdistention. Correlate with urinalysis. 5. Colonic diverticulosis without diverticulitis. Nonspecific thickening of the rectosigmoid colon may be artifactual related to underdistention. Correlate with direct inspection. No bowel obstruction, perforation, or drainable fluid collection is    observed. 6. Hepatomegaly and lobulated contour of the liver parenchyma suggesting parenchymal liver disease. Correlate with liver function tests. Detailed characterization of the liver parenchyma is limited without IV contrast. Liver lesions are not excluded. **This report has been created using voice recognition software. It may contain minor errors which are inherent in voice recognition technology. **      Final report electronically signed by Dr Manuela Juarez on 1/19/2023 11:19 AM      US RENAL COMPLETE   Final Result   Mild right hydronephrosis. A right ureteral stone cannot be excluded. No left hydronephrosis. Cholelithiasis. This document has been electronically signed by: Lucio Suarez MD on    01/18/2023 09:19 PM      XR CHEST (2 VW)   Final Result   Cardiomegaly.  Small bilateral pleural effusions and bilateral perihilar and lower lobe airspace opacities, right greater than left, can indicate CHF versus pneumonia in the appropriate clinical setting. Follow-up to ensure resolution is    advised. **This report has been created using voice recognition software. It may contain minor errors which are inherent in voice recognition technology. **      Final report electronically signed by Dr Monserrat Castellanos on 1/18/2023 1:21 PM      XR CHEST PORTABLE   Final Result   1. Enlargement of the cardiopericardial silhouette with small right    pleural effusion and right perihilar and lower lung airspace opacities    which could indicate pneumonia. This document has been electronically signed by: Shania Nicholas MD on    01/17/2023 06:16 AM          Diet: ADULT DIET;  Dysphagia - Soft and Bite Sized; Mildly Thick (Nectar)    DVT prophylaxis: [] Lovenox                                 [] SCDs                                 [] SQ Heparin                                 [] Encourage ambulation           [x] Already on Anticoagulation - Eliquis renally dosed 2.5 BID     Disposition:    [x] Home       [] TCU       [] Rehab       [] Psych       [] SNF       [] [place]       [] Other    Code Status: Limited    PT/OT Eval Status: consulted      Electronically signed by Keya Mcmahon DO on 1/24/2023 at 3:13 PM

## 2023-01-24 NOTE — PROGRESS NOTES
[facility]  SPEECH THERAPY  STRZ CCU-STEPDOWN 3B  Modified Barium Swallow    SLP Individual Minutes  Time In: 0951  Time Out: 1004  Minutes: 13  Timed Code Treatment Minutes: 0 Minutes       Date: 2023  Patient Name: Velasquez Vyas      CSN: [id number]   : 3/30/1932  (89 y.o.)  Gender: male   Referring Physician:  Hilda Eller MD  Diagnosis: Hypomagnesemia  Precautions: Aspiration Risk, Fall Risk  History of Present Illness/Injury: Patient admitted to [facility] with above diagnosis; please see physician H&P for full report. Per chart review, \"Patient is a 89 y/o  male with a PMHx of CAD, HTN, pulmonary HTN who presents to [facility] ED today for the evaluation of shortness of breath. He states he called his son early this morning saying that he thinks he should come into the hospital for evaluation because of worsening symptoms. He states his shortness of breath is present at rest and with activity. He has an associated dry cough and chills. He denies chest pain, abdominal pain, LE swelling, unexpected weight gain, changes in bowel or urinary habits. Pt unable to provide additional history at this time due to being on bipap.\"      Pertinent hx for dysphagia with most recent MBS completed 3/18/2021 documenting mild oral dysphagia, moderate pharyngeal dysphagia with diet level of NPO to be maintained; considerations for initiation of pleasure feeds consistent for puree and thin liquids with chin tuck. \"    ST consulted to further evaluate oropharyngeal swallow integrity via formal instrumentation with implementation of goals/POC as clinically indicated.     Patient has a past medical history of Actinic keratoses, Adenomatous polyp, Atrial fibrillation (HCC), CAD (coronary artery disease), Carotid artery disease (HCC), Hyperlipidemia, Hypertension, Inguinal hernia, left, and Unspecified cerebral artery occlusion with cerebral infarction.    Current Diet: NPO with exceptions for  critical/crucial medications crushed in puree    Pain: No pain reported. SUBJECTIVE:  Patient arrived to fluoroscopy suite in bed via transport; alert, pleasant, and agreeable for completion of controlled study. Patient [place] despite placement of bilateral aids. No  family present. OBJECTIVE:    Respiratory Status:  Room Air    Behavioral Observation:  Alert    PATIENT WAS EVALUATED USING:  Barium: Thin Liquids, Mildly Thick Liquids, Puree, Coarse Solids, and Mixed Consistency    ORAL PHASE YAS SCORE: (Dysphagia outcome and severity scale)  5 = Mild Dysphagia - May have one diet consistency restricted - Mild oral residue but clears    PHARYNGEAL PHASE YAS SCORE: (Dysphagia outcome and severity scale)  5 = Mild Dysphagia - may need one consistency restricted - May have one or more of the following: Aspiration with thin - cough to clear, Airway penetration midway to the vocal cords with one or more consistency or to the vocal folds with one consistency, but clears spontaneously - Residue in the pharynx clears spontaneously    EVIDENCE FOR LARYNGEAL PENETRATION AND/OR ASPIRATION:  Laryngeal penetration evident with thin via cup, thin  via cup with chin tuck  Audible aspiration evident with thin via cup    PENETRATION-ASPIRATION SCALE (PAS):   Thin Liquids via Cup: 6 = Material enters the airway, passes below the vocal folds, and is ejected into the larynx or out of the airway  Thin Liquids via Cup with Chin Tuck: 2 = Material enters the airway, remains above vocal folds, and is ejected from the airway  Mildly Thick Liquids:  1 = Material does not enter the airway  Puree:  1 = Material does not enter the airway  Mixed Consistencies: 1 = Material does not enter the airway  Hard Solid: 1 = Material does not enter the airway    ESOPHAGEAL PHASE:   No significant findings    ATTEMPTED TECHNIQUES:  Small Bolus Size Effective    Straw Effective    Cup Effective    Large Drinks Not Attempted    Consecutive Drinks Not Attempted    Chin Tuck Ineffective    Head Turn Not Attempted    Spoon Presentations Not Attempted    Volitional Cough Not Attempted    Spontaneous Cough Effective           DIAGNOSTIC IMPRESSIONS:  Patient presents with mild oral dysphagia and mild pharyngeal dysphagia based on skilled clinical findings outlined above. Oral phase characterized by mildly reduced  bolus control/formation, however, efficacious given additional time. Patient with endorsed BOT retraction resulting in minimal cuing  in valleculae. Patient with increased stage  transition duration (two to three seconds) endorsed during study resulting in reduced airway protection  (see PAS scores above). Patient with decreased hyolaryngeal anterior excursion and elevation resulting in reduced epiglottic inversion. Presence of minimal residue on PPW, however, cleared with additional swallows. Patient with  trace laryngeal penetration to the level of the vocal folds with eventual AUDIBLE aspiration; immediate cough to clear. Given PO trials of thin liquids via cup with chin tuck, patient with consistent laryngeal penetration midway to the vocal folds with each additional swallow (multiple swallows evidenced) with spontaneous clearance. Mildly thick liquids with cup resulted in no evidence of laryngeal penetration and/or tracheal aspiration, however, minimal to moderate residue with double swallow clearing material to minimal residue. Of  note, concerns for patient mental status impacting  success with implementation of compensatory swallowing strategies.      Recommendations for soft and bite-size diet with mildly thick liquids with Set-up with Meals, Full Upright Position, Small Bite/Sip, Intermittent Supervision, Medications Whole with Puree, Medications Crushed with Puree, Alternate Solids and Liquids, Limit Distractions, and Monitor for Fatigue    *Should patient decide to transition to hospice care, recommendations for regular diet with thin liquids with CHIN TUCK for safest PO consumption. Diet Recommendations:  Soft and Bite Size Diet with Mildly Thick Liquids (OK Straw)  Strategies:  Set-up with Meals, Full Upright Position, Small Bite/Sip, Intermittent Supervision, Medications Whole with Puree, Medications Crushed with Puree, Alternate Solids and Liquids, Limit Distractions, and Monitor for Fatigue   Rehabilitation Potential: fair  Discharge Recommendations: Continue to Assess Pending Progress    EDUCATION:  Learner: Patient  Education:  Reviewed results and recommendations of this evaluation, Reviewed diet and strategies, Reviewed signs, symptoms and risks of aspiration, Reviewed ST goals and Plan of Care, Reviewed recommendations for follow-up, Education Related to Potential Risks and Complications Due to Impairment/Illness/Injury, Education Related to Prevention of Recurrence of Impairment/Illness/Injury, Education Related to [employer] and Wellness, and Home Safety Education  Evaluation of Education: Needs further instruction, No evidence of learning, and Family not present    PLAN:  Skilled SLP intervention on acute care 3-5 x per week or until goals met and/or pt plateaus in function. Specific interventions for next session may include: dietary analysis, pharyngeal strengthening exercises. PATIENT GOAL:    Did not state. Will further assess during treatment. SHORT TERM GOALS:  Short Term Goals  Time Frame for Short Term Goals: 2 weeks  Goal 1: Patient will safely consume soft and bite size diet with mildly thick liquids (OK straw) without overt s/s  of  penetration/aspiration in order to assist with meeting nutrition/hydration measures. Goal 2: Patient will safely consume  advanced  texture trials with ST  ONLY  without overt s/s  of penetration/aspiration in order to determine readiness for advancement of PO diet level.   Goal 3: Patient will complete pharyngeal strengthening exercises (i.e., effortful swallows, Susy, CTAR, etc.) x10 with good success in order to improve pharyngeal integrity. LONG TERM GOALS:  No LTGs established due to short ELOS.       Everett Allen M.S., [facility]

## 2023-01-24 NOTE — PROGRESS NOTES
[facility]   Nephrology progress note  1/24/2023, 10:28 AM      Pt Name:    Celestine Eason  MRN:     [id number]     YOB: 1932  Admit Date:    1/17/2023  4:32 AM    Chief Complaint: Nephrology following for ANNIKA/CKD    Subjective:  Patient was seen and examined   No chest pain or shortness of breath  This is late entry    Objective:  24HR INTAKE/OUTPUT:    Intake/Output Summary (Last 24 hours) at 1/24/2023 1028  Last data filed at 1/24/2023 0400  Gross per 24 hour   Intake 525.31 ml   Output 250 ml   Net 275.31 ml           I/O last 3 completed shifts: In: 1716.4 [P.O.:360; I.V.:1356.4]  Out: 700 [Urine:700]  No intake/output data recorded.    Admission weight: 138 lb (62.6 kg)  Wt Readings from Last 3 Encounters:   01/23/23 154 lb 11.2 oz (70.2 kg)   12/06/22 145 lb (65.8 kg)   11/17/22 147 lb (66.7 kg)        Vitals :   Vitals:    01/23/23 2100 01/23/23 2345 01/24/23 0400 01/24/23 0815   BP: (!) 129/53 126/63 (!) 149/91 (!) 146/62   Pulse: 75 61 65 70   Resp: 18 18 19 19   Temp: 98.1 °F (36.7 °C) 97.6 °F (36.4 °C) 98.3 °F (36.8 °C) 98.2 °F (36.8 °C)   TempSrc: Oral Oral Oral Oral   SpO2: 93% 94% 93% 91%   Weight:           Physical examination  General Appearance: cooperative with exam, appears comfortable, no distress  Neck: No JVD  Lungs: no use of accessory muscles  GI: soft, non-tender, no guarding  Extremities: No pitting LE edema    Medications:  Infusion:    sodium chloride 60 mL/hr at 01/23/23 2012    sodium chloride       Meds:    sodium bicarbonate  1,300 mg Oral BID    [Held by provider] amoxicillin-clavulanate  1 tablet Oral 2 times per day    apixaban  2.5 mg Oral BID    aspirin  81 mg Oral Daily    finasteride  5 mg Oral Daily    [Held by provider] furosemide  40 mg Oral Daily    multivitamin  1 tablet Oral Daily    pantoprazole  40 mg Oral BID    tamsulosin  0.4 mg Oral Daily    sodium chloride flush  10 mL IntraVENous 2 times per day     Meds prn: phenol, albuterol, sodium chloride flush, sodium chloride, ondansetron **OR** ondansetron, polyethylene glycol, acetaminophen **OR** acetaminophen     Lab Data :  CBC:   Recent Labs     01/22/23  0403 01/23/23  0345 01/24/23  0404   WBC 10.1 14.2* 10.4   HGB 10.1* 9.4* 9.8*   HCT 31.3* 29.3* 30.9*    221 232       CMP:  Recent Labs     01/22/23  0403 01/23/23  0345 01/24/23  0404   * 147* 146*   K 4.2 3.8 3.9   * 115* 115*   CO2 20* 19* 20*   BUN 23* 21 19   CREATININE 2.3* 2.1* 2.0*   GLUCOSE 107 101 95   CALCIUM 8.4* 8.1* 8.2*   MG 1.8  --   --        Hepatic: No results for input(s): LABALBU, AST, ALT, ALB, BILITOT, ALKPHOS in the last 72 hours. Assessment and Plan:  1. 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 Overall stable renal function, creat down to 2.0 today  Status post right ureteral stent    2. Mild hypernatremia: change IVF to D5W  3. Mild right-sided hydronephrosis. 4.  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 Bony metastasis  6. Renal cyst  7. Atrial fibrillation  8. Metabolic acidosis. Continue with oral sodium bicarb  9. Hx BPH  10. CAD    Family has decided to pursue hospice care     Michael Up MD  [facility]    This report has been created using voice recognition software.  It may contain minor errors which are inherent in voice recognition technology

## 2023-01-24 NOTE — PLAN OF CARE
Problem: Discharge Planning  Goal: Discharge to home or other facility with appropriate resources  Outcome: Progressing  Flowsheets (Taken 1/24/2023 0419)  Discharge to home or other facility with appropriate resources:   Identify barriers to discharge with patient and caregiver   Identify discharge learning needs (meds, wound care, etc)     Problem: Safety - Adult  Goal: Free from fall injury  Outcome: Progressing  Note: Bed locked & in low position, call light in reach, side-rails up x2, bed/chair alarm utilized, non-slip socks on when ambulating, reminded patient to use call light to call for assistance. Problem: ABCDS Injury Assessment  Goal: Absence of physical injury  Outcome: Progressing     Problem: Chronic Conditions and Co-morbidities  Goal: Patient's chronic conditions and co-morbidity symptoms are monitored and maintained or improved  Outcome: Progressing  Flowsheets (Taken 1/24/2023 0419)  Care Plan - Patient's Chronic Conditions and Co-Morbidity Symptoms are Monitored and Maintained or Improved:   Monitor and assess patient's chronic conditions and comorbid symptoms for stability, deterioration, or improvement   Collaborate with multidisciplinary team to address chronic and comorbid conditions and prevent exacerbation or deterioration     Problem: Pain  Goal: Verbalizes/displays adequate comfort level or baseline comfort level  Outcome: Progressing     Problem: Skin/Tissue Integrity  Goal: Absence of new skin breakdown  Description: 1. Monitor for areas of redness and/or skin breakdown  2. Assess vascular access sites hourly  3. Every 4-6 hours minimum:  Change oxygen saturation probe site  4. Every 4-6 hours:  If on nasal continuous positive airway pressure, respiratory therapy assess nares and determine need for appliance change or resting period. Outcome: Progressing   Care plan reviewed with patient. Patient verbalizes understanding of the care plan and contributed to goal setting.

## 2023-01-25 LAB
ANION GAP SERPL CALC-SCNC: 14 MEQ/L (ref 8–16)
BASOPHILS ABSOLUTE: 0 THOU/MM3 (ref 0–0.1)
BASOPHILS NFR BLD AUTO: 0.4 %
BUN SERPL-MCNC: 18 MG/DL (ref 7–22)
CALCIUM SERPL-MCNC: 8.2 MG/DL (ref 8.5–10.5)
CHLORIDE SERPL-SCNC: 114 MEQ/L (ref 98–111)
CO2 SERPL-SCNC: 19 MEQ/L (ref 23–33)
CREAT SERPL-MCNC: 2.1 MG/DL (ref 0.4–1.2)
DEPRECATED RDW RBC AUTO: 48.5 FL (ref 35–45)
EOSINOPHIL NFR BLD AUTO: 2.6 %
EOSINOPHILS ABSOLUTE: 0.3 THOU/MM3 (ref 0–0.4)
ERYTHROCYTE [DISTWIDTH] IN BLOOD BY AUTOMATED COUNT: 13.8 % (ref 11.5–14.5)
GFR SERPL CREATININE-BSD FRML MDRD: 29 ML/MIN/1.73M2
GLUCOSE BLD STRIP.AUTO-MCNC: 104 MG/DL (ref 70–108)
GLUCOSE BLD STRIP.AUTO-MCNC: 118 MG/DL (ref 70–108)
GLUCOSE BLD STRIP.AUTO-MCNC: 126 MG/DL (ref 70–108)
GLUCOSE SERPL-MCNC: 114 MG/DL (ref 70–108)
HCT VFR BLD AUTO: 30 % (ref 42–52)
HGB BLD-MCNC: 9.6 GM/DL (ref 14–18)
IMM GRANULOCYTES # BLD AUTO: 0.05 THOU/MM3 (ref 0–0.07)
IMM GRANULOCYTES NFR BLD AUTO: 0.5 %
LYMPHOCYTES ABSOLUTE: 0.9 THOU/MM3 (ref 1–4.8)
LYMPHOCYTES NFR BLD AUTO: 9.1 %
MCH RBC QN AUTO: 31.3 PG (ref 26–33)
MCHC RBC AUTO-ENTMCNC: 32 GM/DL (ref 32.2–35.5)
MCV RBC AUTO: 97.7 FL (ref 80–94)
MONOCYTES ABSOLUTE: 0.8 THOU/MM3 (ref 0.4–1.3)
MONOCYTES NFR BLD AUTO: 8 %
NEUTROPHILS NFR BLD AUTO: 79.4 %
NRBC BLD AUTO-RTO: 0 /100 WBC
PLATELET # BLD AUTO: 233 THOU/MM3 (ref 130–400)
PMV BLD AUTO: 10.6 FL (ref 9.4–12.4)
POTASSIUM SERPL-SCNC: 3.6 MEQ/L (ref 3.5–5.2)
RBC # BLD AUTO: 3.07 MILL/MM3 (ref 4.7–6.1)
SEGMENTED NEUTROPHILS ABSOLUTE COUNT: 7.7 THOU/MM3 (ref 1.8–7.7)
SODIUM SERPL-SCNC: 147 MEQ/L (ref 135–145)
WBC # BLD AUTO: 9.7 THOU/MM3 (ref 4.8–10.8)

## 2023-01-25 PROCEDURE — 6370000000 HC RX 637 (ALT 250 FOR IP)

## 2023-01-25 PROCEDURE — 2580000003 HC RX 258: Performed by: INTERNAL MEDICINE

## 2023-01-25 PROCEDURE — 2140000000 HC CCU INTERMEDIATE R&B

## 2023-01-25 PROCEDURE — 36415 COLL VENOUS BLD VENIPUNCTURE: CPT

## 2023-01-25 PROCEDURE — 99232 SBSQ HOSP IP/OBS MODERATE 35: CPT | Performed by: INTERNAL MEDICINE

## 2023-01-25 PROCEDURE — 82948 REAGENT STRIP/BLOOD GLUCOSE: CPT

## 2023-01-25 PROCEDURE — 6370000000 HC RX 637 (ALT 250 FOR IP): Performed by: INTERNAL MEDICINE

## 2023-01-25 PROCEDURE — 80048 BASIC METABOLIC PNL TOTAL CA: CPT

## 2023-01-25 PROCEDURE — 85025 COMPLETE CBC W/AUTO DIFF WBC: CPT

## 2023-01-25 RX ADMIN — APIXABAN 2.5 MG: 2.5 TABLET, FILM COATED ORAL at 20:22

## 2023-01-25 RX ADMIN — DEXTROSE MONOHYDRATE: 50 INJECTION, SOLUTION INTRAVENOUS at 13:37

## 2023-01-25 RX ADMIN — FINASTERIDE 5 MG: 5 TABLET, FILM COATED ORAL at 08:24

## 2023-01-25 RX ADMIN — SODIUM BICARBONATE 1300 MG: 650 TABLET ORAL at 20:21

## 2023-01-25 RX ADMIN — PANTOPRAZOLE SODIUM 40 MG: 40 TABLET, DELAYED RELEASE ORAL at 08:24

## 2023-01-25 RX ADMIN — TAMSULOSIN HYDROCHLORIDE 0.4 MG: 0.4 CAPSULE ORAL at 08:24

## 2023-01-25 RX ADMIN — Medication 1 TABLET: at 08:24

## 2023-01-25 RX ADMIN — ASPIRIN 81 MG: 81 TABLET, COATED ORAL at 08:24

## 2023-01-25 RX ADMIN — PANTOPRAZOLE SODIUM 40 MG: 40 TABLET, DELAYED RELEASE ORAL at 20:22

## 2023-01-25 RX ADMIN — APIXABAN 2.5 MG: 2.5 TABLET, FILM COATED ORAL at 08:24

## 2023-01-25 RX ADMIN — SODIUM BICARBONATE 1300 MG: 650 TABLET ORAL at 08:24

## 2023-01-25 ASSESSMENT — PAIN SCALES - GENERAL: PAINLEVEL_OUTOF10: 0

## 2023-01-25 NOTE — PROGRESS NOTES
[facility]   Nephrology progress note  1/25/2023, 10:49 AM      Pt Name:    Radha Ray  MRN:     [id number]     YOB: 1932  Admit Date:    1/17/2023  4:32 AM    Chief Complaint: Nephrology following for ANNIKA/CKD    Subjective:  Patient was seen and examined   No chest pain or shortness of breath  Poor historian  Hard of hearing  On D5W    Objective:  24HR INTAKE/OUTPUT:    Intake/Output Summary (Last 24 hours) at 1/25/2023 1049  Last data filed at 1/25/2023 1680  Gross per 24 hour   Intake 1258.96 ml   Output 500 ml   Net 758.96 ml           I/O last 3 completed shifts:   In: 1079 [I.V.:1079]  Out: 750 [Urine:750]  I/O this shift:  In: 180 [P.O.:180]  Out: -    Admission weight: 138 lb (62.6 kg)  Wt Readings from Last 3 Encounters:   01/25/23 168 lb 6.9 oz (76.4 kg)   12/06/22 145 lb (65.8 kg)   11/17/22 147 lb (66.7 kg)        Vitals :   Vitals:    01/24/23 1945 01/25/23 0000 01/25/23 0244 01/25/23 0812   BP: 129/64 (!) 147/68 130/86 (!) 158/70   Pulse: 60 65 51 76   Resp: 18 18 18 20   Temp: 98.1 °F (36.7 °C) 97.7 °F (36.5 °C) 97.6 °F (36.4 °C) 97.7 °F (36.5 °C)   TempSrc: Oral Oral Oral Oral   SpO2: 93% 91% 92% 94%   Weight:   168 lb 6.9 oz (76.4 kg)        Physical examination  General Appearance: cooperative with exam, appears comfortable, no distress  Neck: No JVD  Lungs: no use of accessory muscles  GI: soft, non-tender, no guarding  Extremities: No pitting LE edema    Medications:  Infusion:    dextrose 40 mL/hr at 01/24/23 1848    sodium chloride       Meds:    sodium bicarbonate  1,300 mg Oral BID    apixaban  2.5 mg Oral BID    aspirin  81 mg Oral Daily    finasteride  5 mg Oral Daily    [Held by provider] furosemide  40 mg Oral Daily    multivitamin  1 tablet Oral Daily    pantoprazole  40 mg Oral BID    tamsulosin  0.4 mg Oral Daily    sodium chloride flush  10 mL IntraVENous 2 times per day     Meds prn: phenol, albuterol, sodium chloride flush, sodium chloride, ondansetron **OR** ondansetron, polyethylene glycol, acetaminophen **OR** acetaminophen     Lab Data :  CBC:   Recent Labs     01/23/23  0345 01/24/23  0404 01/25/23  0347   WBC 14.2* 10.4 9.7   HGB 9.4* 9.8* 9.6*   HCT 29.3* 30.9* 30.0*    232 233       CMP:  Recent Labs     01/23/23  0345 01/24/23  0404 01/25/23  0347   * 146* 147*   K 3.8 3.9 3.6   * 115* 114*   CO2 19* 20* 19*   BUN 21 19 18   CREATININE 2.1* 2.0* 2.1*   GLUCOSE 101 95 114*   CALCIUM 8.1* 8.2* 8.2*       Hepatic: No results for input(s): LABALBU, AST, ALT, ALB, BILITOT, ALKPHOS in the last 72 hours. Assessment and Plan:  1. 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 Overall stable renal function, creat down to 2.0 today  Status post right ureteral stent  Serum creatinine remained stable, close to baseline    2. Mild hypernatremia: Started D5W yesterday. Will increase IV fluid rate. Monitor sodium levels. 3.  Mild right-sided hydronephrosis. 4.  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 Bony metastasis  6. Renal cyst  7. Atrial fibrillation  8. Metabolic acidosis. Continue with oral sodium bicarb  9. Hx BPH  10. CAD    Family eventually planning hospice care once electrolytes have corrected. Rudy Roberts MD  [facility]    This report has been created using voice recognition software.  It may contain minor errors which are inherent in voice recognition technology

## 2023-01-25 NOTE — PROGRESS NOTES
Visit made to see if Velasquez has questions about hospice after referral completed 01.24.2023. Gianni denied questions for nurse, stated I peed so I can go home. Told him that will see what physician has to say and likely home in next day or 2. Voiced he is happy that he does not have to return to hospital. Let him know that nurses will come to him for visits and if issues.   Call placed to son Dr. Velasquez Vyas(when asked told nurse to call him J. J.). Asked if questions about hospice. He told nurse that he felt information had been shared well on 01.24.2023 and denied questions. Reviewed plan of admission to service when patient getting home, he voiced understanding. Message sent to Dr. Padilla(Dr. Bañuelos off this date) to see if possible discharge on 01.26.2023, told nurse depending on patient void trial and sodium level but likely. Updated J.J. of this and told him staff will follow up tomorrow to see if discharging.   Dr. Padilla signed comfort scripts and DNR-CC(for home) and will assess in morning whether discharging or not.   Updated primary nurse Shelton RN of possible discharge on 01.26.2023- he told nurse patient had only scant amount out which only about 10 ml. Did leave note on chart asking for AVS to be faxed to hospice office at [phone] and to call when being transported home by private motor vehicle.

## 2023-01-25 NOTE — PROGRESS NOTES
Type and Reason for Visit:    Initial (LOS)     Nutrition Recommendations/Plan:   Diet as per SLP  Continue daily MVI     Nutrition Assessment:      Pt. At low nutrition risk per RD evaluation. Pt being assessed d/t LOS day 8- Hospice has been consulted; Pt is planning Hospice at discharge, currently working on electrolyte correction before dc per Nephrology; If nutrition intervention is required, please submit a dietitian consult. Malnutrition Assessment:  No Malnutrition      Wounds:    None     Current Nutrition Therapies:    ADULT DIET; Dysphagia - Soft and Bite Sized; Mildly Thick (Nectar)     Anthropometric Measures:  Height:    5' (152.4 cm)  Current Body Weight:   168 lb 6.9 oz (76.4 kg) (1/25; BLE trace)  Admission Body Weight:    149 lb 13 oz (68 kg) (1/18)  Usual Body Weight:      (Per EMR 4/19/21 146 lb 6 oz, 8/11/22 151 lb 2 oz)  Ideal Body Weight:     106 lbs   BMI:   32.9  BMI Categories:    Obese Class 1 (BMI 30.0-34. 9)     Nutrition Diagnosis:   No nutrition diagnosis at this time       Nutrition Interventions:   Food and/or Nutrient Delivery:    Continue Current Diet  Nutrition Education/Counseling:    No recommendation at this time (pt plans to go hospice)  Coordination of Nutrition Care:   Continue to monitor while inpatient     Nutrition Monitoring and Evaluation:   Will monitor nutritional needs during LOS.        Discharge Planning:    RD following    Nathalie Del Rio RD:    Contact Number: [phone]

## 2023-01-25 NOTE — PROGRESS NOTES
Perfect serve to Tojoe Martell, DO, asking if voiding trial was to be initiated - Dr. Codie Hsieh note from 1/24 indicated urology would be contacted regarding whether or not to remove the boswell.   No order currently in place to do a voiding trial.

## 2023-01-25 NOTE — PROGRESS NOTES
Pt was with family and was at peace when asked. He was not giving up. He was blessed and prayed over. 01/25/23 1425   Encounter Summary   Service Provided For: Patient   Referral/Consult From: Family   Support System Children   Last Encounter  01/25/23  (Anointed)   Complexity of Encounter Low   Begin Time 1234   End Time  1249   Total Time Calculated 15 min   Spiritual/Emotional needs   Type Spiritual Support   Assessment/Intervention/Outcome   Assessment Calm   Intervention Empowerment; Active listening

## 2023-01-25 NOTE — PLAN OF CARE
Problem: Discharge Planning  Goal: Discharge to home or other facility with appropriate resources  Outcome: Progressing  Flowsheets (Taken 1/25/2023 0508)  Discharge to home or other facility with appropriate resources:   Identify barriers to discharge with patient and caregiver   Identify discharge learning needs (meds, wound care, etc)     Problem: Safety - Adult  Goal: Free from fall injury  Outcome: Progressing  Note: Bed locked & in low position, call light in reach, side-rails up x2, bed/chair alarm utilized, non-slip socks on when ambulating, reminded patient to use call light to call for assistance. Problem: ABCDS Injury Assessment  Goal: Absence of physical injury  Outcome: Progressing     Problem: Chronic Conditions and Co-morbidities  Goal: Patient's chronic conditions and co-morbidity symptoms are monitored and maintained or improved  Outcome: Progressing  Flowsheets (Taken 1/25/2023 0508)  Care Plan - Patient's Chronic Conditions and Co-Morbidity Symptoms are Monitored and Maintained or Improved:   Monitor and assess patient's chronic conditions and comorbid symptoms for stability, deterioration, or improvement   Collaborate with multidisciplinary team to address chronic and comorbid conditions and prevent exacerbation or deterioration     Problem: Pain  Goal: Verbalizes/displays adequate comfort level or baseline comfort level  Outcome: Progressing  Flowsheets (Taken 1/25/2023 0508)  Verbalizes/displays adequate comfort level or baseline comfort level:   Encourage patient to monitor pain and request assistance   Assess pain using appropriate pain scale     Problem: Skin/Tissue Integrity  Goal: Absence of new skin breakdown  Description: 1. Monitor for areas of redness and/or skin breakdown  2. Assess vascular access sites hourly  3. Every 4-6 hours minimum:  Change oxygen saturation probe site  4.   Every 4-6 hours:  If on nasal continuous positive airway pressure, respiratory therapy assess nares and determine need for appliance change or resting period. Outcome: Progressing     Care plan reviewed with patient. Patient verbalizes understanding of the care plan and contributed to goal setting.

## 2023-01-25 NOTE — PROGRESS NOTES
Progress Note    Patient:  Tana Denis    Unit/Bed:3B-28/028-A  YOB: 1932  MRN: [id number]   Acct: [de-identified]   Admit date: 1/17/2023      Principal Problem:    Acute hypoxemic respiratory failure (HCC)  Active Problems:    Pneumonia of right lung due to infectious organism    Elevated troponin    Stage 3 chronic kidney disease (HCC)    CKD (chronic kidney disease), stage IV (HCC)    Hydronephrosis of right kidney    Pharyngitis    Diffuse lymphadenopathy    Lytic lesion of bone on x-ray    Pleural effusion    Acute on chronic diastolic CHF (congestive heart failure) (HCC)    Dysphagia    Lumbar spondylosis    Macrocytic anemia    Hypomagnesemia    Hypocalcemia    Hepatomegaly    History of MRSA infection    Physical deconditioning    NSVT (nonsustained ventricular tachycardia)    Left inguinal hernia    Acute kidney injury superimposed on CKD (HCC)    Metabolic acidosis    Benign prostatic hyperplasia without lower urinary tract symptoms  Resolved Problems:    * No resolved hospital problems.  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 intake has received intermittent free water  Generalized weakness  Resolved sepsis with right-sided pneumonia present on arrival patient currently on room air resolved hypoxic respiratory failure, with bilateral pleural effusions his O2 requirements least in previous 2 days have not worsened        Patient Seen, Chart, Consults notes, Labs, Radiology studies reviewed. Subjective: Day 8 of stay with hospital stay  Patient is accompanied by family  Patient is accompanied by his son daughter and granddaughter    I reviewed the chart and reviewed the patient's vital signs it appears patient has been evaluated by hospice he has a Duarte catheter in place I have requested the Duarte void trial and I will likely consider the patient eligible for discharge in the next 24 to 48 hours provided his serum sodium is less than 150 he shows no new metabolic encephalopathy and is able to void on his own    For details regarding his daily stay please review progress note dated 1/24/2023    All other ROS negative except noted in HPI    Past, Family, Social History unchanged from admission. Diet:  ADULT DIET;  Dysphagia - Soft and Bite Sized; Mildly Thick (Nectar)    Medications:  Scheduled Meds:   sodium bicarbonate  1,300 mg Oral BID    apixaban  2.5 mg Oral BID    aspirin  81 mg Oral Daily    finasteride  5 mg Oral Daily    [Held by provider] furosemide  40 mg Oral Daily    multivitamin  1 tablet Oral Daily    pantoprazole  40 mg Oral BID    tamsulosin  0.4 mg Oral Daily    sodium chloride flush  10 mL IntraVENous 2 times per day     Continuous Infusions:   dextrose 60 mL/hr at 01/25/23 1337    sodium chloride       PRN Meds:phenol, albuterol, sodium chloride flush, sodium chloride, ondansetron **OR** ondansetron, polyethylene glycol, acetaminophen **OR** acetaminophen    Objective:  CBC:   Recent Labs     01/23/23  0345 01/24/23  0404 01/25/23  0347   WBC 14.2* 10.4 9.7   HGB 9.4* 9.8* 9.6*    232 233     BMP:    Recent Labs     01/23/23 0345 01/24/23  0404 01/25/23  0347   * 146* 147*   K 3.8 3.9 3.6   * 115* 114*   CO2 19* 20* 19*   BUN 21 19 18   CREATININE 2.1* 2.0* 2.1*   GLUCOSE 101 95 114*     Calcium:  Recent Labs     01/25/23  0347   CALCIUM 8.2*     Ionized Calcium:No results for input(s): IONCA in the last 72 hours. Magnesium:No results for input(s): MG in the last 72 hours. Phosphorus:No results for input(s): PHOS in the last 72 hours. BNP:No results for input(s): BNP in the last 72 hours. Glucose:  Recent Labs     01/25/23  0803 01/25/23  1230 01/25/23  1649   POCGLU 104 126* 118*     HgbA1C: No results for input(s): LABA1C in the last 72 hours. INR: No results for input(s): INR in the last 72 hours. Hepatic: No results for input(s): ALKPHOS, ALT, AST, PROT, BILITOT, BILIDIR, LABALBU in the last 72 hours. Amylase and Lipase:No results for input(s): LACTA, AMYLASE in the last 72 hours. Lactic Acid: No results for input(s): LACTA in the last 72 hours. Troponin: No results for input(s): CKTOTAL, CKMB, TROPONINT in the last 72 hours. BNP: No results for input(s): BNP in the last 72 hours. Lipids: No results for input(s): CHOL, TRIG, HDL, LDL, LDLCALC in the last 72 hours. ABGs:   Lab Results   Component Value Date/Time    PH 7.39 08/06/2022 05:26 AM    PCO2 40 08/06/2022 05:26 AM    PO2 76 08/06/2022 05:26 AM    HCO3 24 08/06/2022 05:26 AM    O2SAT 95 08/06/2022 05:26 AM           Radiology reports as per the Radiologist  Radiology: CT ABDOMEN PELVIS WO CONTRAST Additional Contrast? None    Result Date: 1/19/2023  PROCEDURE: CT ABDOMEN PELVIS WO CONTRAST CLINICAL INFORMATION: Right-sided hydronephrosis. COMPARISON: CT abdomen and pelvis 12/25/2021. Chest x-ray 1/18/2023. TECHNIQUE: 5 mm axial imaging through the abdomen and pelvis without IV contrast.  Coronal and sagittal reconstructions were performed.  All CT scans at this facility use dose modulation, iterative reconstruction, and/or weight based dosing when appropriate to reduce the radiation dose to as low as reasonably achievable. FINDINGS: LIMITATIONS: Evaluation of the solid organs is limited without IV contrast. Lung bases: Small bilateral pleural effusions, right greater than left is identified. Rounded atelectasis at the right lung base is incompletely visualized. Patchy groundglass opacities at the right lung base are incompletely visualized. Liver/gallbladder/bilary tree: The liver is enlarged and has a lobulated contour. Detailed characterization of the liver parenchyma is limited without IV contrast. Small dependent gallstones are stable. No biliary ductal dilatation or pericholecystic inflammation is observed. Pancreas: Limited assessment without IV contrast. Spleen : Not enlarged. Adrenal glands: Unremarkable contrast CT appearance. Kidneys/ ureters/ bladder: Moderate right-sided hydronephrosis and hydroureter is present. The right ureter appears dilated and the level of the mid ureter there is increased density within and adjacent to the right mid ureter (series 301, image 47). The  urinary bladder wall is thickened in part related to underdistention. No renal calculi are observed. The left kidney is unremarkable. Gastrointestinal:  Colonic diverticulosis without diverticulitis is present. A surgical anastomosis is noted in the bowel along the anterior abdominal wall to the right of the midline (series 301, image 40). Mild thickening of the rectosigmoid colon may be artifactual related to underdistention. A small fat-containing left inguinal hernia appears stable. A small amount of ascites within the hernia sac is observed. No protruding small or large bowel loops are identified. A large sliding-type hiatal hernia is stable Retroperitoneum / lymph nodes: Numerous enlarged mesenteric lymph nodes measure up to 14 mm in short axis. : Large right pelvic sidewall lymph nodes measure up to 15 mm in short axis.  There are also enlarged lymph nodes surrounding the right ureter measuring up to 12 mm (series 301, image 46). Right retroperitoneal and para-aortic lymphadenopathy measures up to 16 mm in short axis. Pelvis: The prostate gland is enlarged measuring 40 x 41 mm. Musculoskeletal: A lytic lesion in the T9 vertebral body measures 11 x 12 mm (series 602, image 39). There is a suggestion of lytic metastasis in the L4 vertebral body involving the posterior elements. Irregularity of the right seventh rib (series 301, image 3) is mixed lytic and sclerotic, incompletely visualized. Chronic right-sided rib fracture deformities are observed     1. Small to moderate right pleural effusion and small left pleural effusion with rounded atelectasis and patchy groundglass opacities at the right lung base are incompletely visualized. 2. Moderate right-sided hydronephrosis/hydroureter appears to be secondary to a right mid ureteral soft tissue density (series 301, image 48) resulting in either intrinsic or extrinsic obstruction. Right periureteral and right retroperitoneal lymphadenopathy measures up to 16 mm in short axis. There are also numerous enlarged right pelvic sidewall lymph nodes as well as mesenteric lymphadenopathy suggesting metastatic disease. 3. 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 Additional ill-defined lytic lesions are noted within the L4 vertebral body and there is a suggestion of mixed lytic sclerotic bony metastasis in the right ribs as well as the bones of the pelvis. 4. Prostatomegaly. Correlate with PSA levels. Mild bladder wall thickening may in part be artifactual related to underdistention. Correlate with urinalysis. 5. Colonic diverticulosis without diverticulitis. Nonspecific thickening of the rectosigmoid colon may be artifactual related to underdistention. Correlate with direct inspection. No bowel obstruction, perforation, or drainable fluid collection is observed.  6. Hepatomegaly and lobulated contour of the liver parenchyma suggesting parenchymal liver disease. Correlate with liver function tests. Detailed characterization of the liver parenchyma is limited without IV contrast. Liver lesions are not excluded. **This report has been created using voice recognition software.  It may contain minor errors which are inherent in voice recognition technology.** Final report electronically signed by Dr Laura Naik on 1/19/2023 11:19 AM    XR CHEST (2 VW)    Result Date: 1/22/2023  PROCEDURE: XR CHEST (2 VW) CLINICAL INFORMATION: cough. COMPARISON: Chest x-ray dated 18th of January 2023. TECHNIQUE: PA and lateral views the chest. FINDINGS: There is moderate cardiomegaly in this patient status post coronary bypass surgery.  There is atherosclerotic calcification in the aortic arch.  There is thickening off the interstitial lung markings. There is a moderate right pleural effusion..  The pulmonary vascularity is increased. There is thoracic spondylosis..      1. Moderate cardiomegaly and probable congestive heart failure in this patient status post coronary bypass surgery. 2. Thickening off the interstitial lung markings. 3. Moderate right pleural effusion 4. Thoracic spondylosis. **This report has been created using voice recognition software. It may contain minor errors which are inherent in voice recognition technology.** Final report electronically signed by DR PAN DIALLO on 1/22/2023 9:18 AM    XR CHEST (2 VW)    Result Date: 1/18/2023  PROCEDURE: XR CHEST (2 VW) CLINICAL INFORMATION: Hypoxia. COMPARISON: Chest x-ray 1/17/2023. TECHNIQUE: PA and lateral views of the chest performed. FINDINGS: Lines/tubes: None. Heart/mediastinum: Cardiomegaly and median sternotomy wires are stable. The pulmonary vascular markings are prominent. Lungs: Small bilateral pleural effusions and bilateral lower lobe airspace opacities, right greater than left are more prominent. No pneumothorax is observed. Bones: Diffuse osteopenia is present. The visualized  skeletal structures appear intact. Cardiomegaly. Small bilateral pleural effusions and bilateral perihilar and lower lobe airspace opacities, right greater than left, can indicate CHF versus pneumonia in the appropriate clinical setting. Follow-up to ensure resolution is advised. **This report has been created using voice recognition software. It may contain minor errors which are inherent in voice recognition technology. ** Final report electronically signed by Dr Karla Ricks on 1/18/2023 1:21 PM    US RENAL COMPLETE    Result Date: 1/18/2023  US Renal Comparison: US/SR - US RENAL COMPLETE - 08/04/2021 01:32 PM EDT Findings: Right kidney normal size and echotexture, 9.1 cm length. A 1.4 x 1.2 x 1.1 cm simple cyst at the upper pole of the right kidney. Mild right hydronephrosis. Left kidney normal size and echotexture, 10.5 cm length. No left hydronephrosis. The prostate measures 25 mL in volume which is within normal limits. Prevoid bladder volume is 140 mL. Patient declined to void. Incidental finding of a stone in the gallbladder measuring 1 x 0.7 x 0.5 cm at the gallbladder neck. Mild right hydronephrosis. A right ureteral stone cannot be excluded. No left hydronephrosis. Cholelithiasis. This document has been electronically signed by: Radha Kramer MD on 01/18/2023 09:19 PM    FL RETROGRADE PYELOGRAM W WO KUB    Result Date: 1/20/2023  PROCEDURE: FL RETROGRADE PYELOGRAM W WO KUB CLINICAL INFORMATION: 72-year-old male with right-sided hydronephrosis . COMPARISON: Radiograph 12/26/2021 TECHNIQUE: 4 fluoroscopic images of the abdomen were obtained during surgery. FINDINGS: There appears to be a scope position at the ureterovesicular junction on the right. Contrast is infused opacifying the right renal collecting system. A wire is advanced. The superior portion of the wire appears to be coiled within the urinary bladder.  There is a tubular structure on the left side projecting over the abdomen on the initial 3 images. Fluoroscopic images provided during surgery. Please refer to the operative note for further details. **This report has been created using voice recognition software. It may contain minor errors which are inherent in voice recognition technology. ** Final report electronically signed by Dr Hermilo Hobson on 1/20/2023 9:16 AM    XR CHEST PORTABLE    Result Date: 1/17/2023  1 view chest x-ray Comparison: CR/SR - XR CHEST PORTABLE - 08/06/2022 05:10 AM EDT Findings: Previous median sternotomy. Enlargement of the cardiopericardial silhouette. Right perihilar and lower lung consolidation. Probable small right pleural effusion. No pneumothorax. 1. Enlargement of the cardiopericardial silhouette with small right pleural effusion and right perihilar and lower lung airspace opacities which could indicate pneumonia. This document has been electronically signed by: hCacho Patterson MD on 01/17/2023 06:16 AM    MRI ABDOMEN WO CONTRAST    Result Date: 1/21/2023  PROCEDURE: MRI ABDOMEN WO CONTRAST CLINICAL INFORMATION Likely Renal mass. COMPARISON: CT scan of the abdomen dated 19 January 2023. Renal ultrasound dated 18th of January 2023. Retrograde pyelogram dated 20 January 2023. Seneca Copping TECHNIQUE: A noncontrast MRI scan was carried out through the abdomen. FINDINGS: Lung bases: There is a moderate right pleural effusion and atelectasis at the right lung base. Liver: There is a slightly irregular outline of the liver. There are no superimposed focal hepatic defects. Gallbladder/biliary system. There is a tiny gallstone. There is no gallbladder wall thickening or pericholecystic fluid. There is no intra or extrahepatic biliary dilatation. There is no evidence of choledocholithiasis. Spleen: Normal. Pancreas: Normal. Adrenal glands: Normal.. Kidneys: On the right side, there is a probable 15 x 13 mm Bosniak type I cyst arising from the midpole laterally.  There is a 6.7 mm Bosniak type I cyst arising from the midpole of the right kidney anteriorly. There is slightly increased signal intensity  in the perinephric fat. There is no definite parenchymal renal mass noted. On the left side, there is a tiny Bosniak type I cyst  arising from the lower pole of the left kidney. Vascular: There is abnormal signal intensity along the right lateral and posterior aspect of the inferior vena cava. This may represent involvement by tumor. There is relatively normal flow in the abdominal aorta. Abdominal cavity: There is a moderate-sized hiatal hernia. There is a left inguinal lymph node present. There are enlarged retroperitoneal lymph nodes measuring 2.9 x 1.8 cm, 1.4 x 1.7 cm, 1.7 x 1.3 cm and 1.4 cm in size. There are abnormal mesenteric lymph nodes present. There is no ascites. Bony structures. There are areas of abnormal signal intensity in the right and left iliac crests consistent with bony metastatic disease. There is lumbar spondylosis Other: There is a  thick-walled bladder. There is a Duarte catheter in place.      1. There are parenchymal cysts in the right left kidneys. This no discrete renal mass noted. 2. There are abnormal retroperitoneal and mesenteric lymph nodes present consistent with neoplastic disease. The retroperitoneal lymph nodes can be biopsied under CT guidance if clinically indicated. 3. There is abnormal signal intensity along the right lateral and posterior aspect of the inferior vena cava which is contiguous with the retroperitoneal lymph nodes. 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 Please correlate clinically. 4. 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 5. There is a hiatal hernia. There is a left inguinal hernia present. 6. There is a tiny gallstone. 7. There is a right pleural effusion. There is atelectasis at the right lung base. 8. There is a Duarte catheter in place. This a thick-walled bladder. 9. There is lumbar spondylosis. **This report has been  created using voice recognition software. It may contain minor errors which are inherent in voice recognition technology. ** Final report electronically signed by DR Andrew Alegria on 1/21/2023 7:29 AM    FL MODIFIED BARIUM SWALLOW W VIDEO    Result Date: 1/24/2023  PROCEDURE: FL MODIFIED BARIUM SWALLOW W VIDEO CLINICAL INFORMATION: Further evaluate pharyngeal integrity to r/o dysfunction . TECHNIQUE: Modified barium swallow was performed in radiology by speech therapy. Imaging done in the lateral projection with cine fluoroscopy. Patient was administered barium of various consistencies by the speech pathologist. The radiologist was available for the entire exam. Mr. consistencies: Thin, mildly thick, puree, soft solids, mixed consistency and hard solid Fluoroscopy time 3 minutes 47 seconds Images: 26 video clips COMPARISON: No prior study. FINDINGS: There is laryngeal penetration with thin liquids. There is tracheal aspiration with thin liquids. There is no laryngeal penetration or aspiration with any other tested barium consistencies. Laryngeal penetration with aspiration of thin liquids only. Recommendations from speech therapy to follow. **This report has been created using voice recognition software. It may contain minor errors which are inherent in voice recognition technology. ** Final report electronically signed by Dr. Cheryl Way on 1/24/2023 12:20 PM        Physical Exam:  Vitals: BP (!) 145/65   Pulse 56   Temp 97.8 °F (36.6 °C) (Oral)   Resp 18   Ht 5' (1.524 m)   Wt 168 lb 6.9 oz (76.4 kg)   SpO2 98%   BMI 32.89 kg/m²   24 hour intake/output:  Intake/Output Summary (Last 24 hours) at 1/25/2023 1842  Last data filed at 1/25/2023 1840  Gross per 24 hour   Intake 1618.96 ml   Output 560 ml   Net 1058.96 ml     Last 3 weights:   Wt Readings from Last 3 Encounters:   01/25/23 168 lb 6.9 oz (76.4 kg)   12/06/22 145 lb (65.8 kg)   11/17/22 147 lb (66.7 kg)       General appearance - alert, awake appears to be in no acute distress  HEENT: Atraumatic normocephalic, no JVD. Trachea midline.   Bilateral hearing aids in place  Chest - Bilateral air entry, no wheezes, crackles or rhonchi  Cardiovascular - S1S2 RRR, no murmurs or gallops  Abdomen - Soft non tender non distended, normoactive bowel sounds   Neurological - non focal, no neurological deficits noted  Integumentary - Skin color, texture, turgor normal. No Rashes or lesions  Musculoskeletal -Full ROM, No clubbing or cyanosis  Extremities: No peripheral edema    DVT prophylaxis: [] Lovenox                                 [] SCDs                                 [] SQ Heparin                                 [] Encourage ambulation           [x] Already on Anticoagulation               Electronically signed by Johanna Bartlett MD on 1/25/2023 at [address] [facility]

## 2023-01-25 NOTE — CARE COORDINATION
1/25/23, 1:57 PM EST    DISCHARGE ON GOING EVALUATION    [address] day: 8  Location: [facility]28/028-A Reason for admit: Hypomagnesemia [E83.42]  Hypoxia [R09.02]  Elevated troponin [R77.8]  Pneumonia of right lung due to infectious organism, unspecified part of lung [J18.9]  Acute hypoxemic respiratory failure ([address] [J96.01]   Procedure:   1/17 CXR: Enlargement of the cardiopericardial silhouette with small right pleural effusion and right perihilar and lower lung airspace opacities which could indicate pneumonia. 1/17 ECHO: EF 55%. Moderately dilated left atrium. Moderately enlarged right atrium size. 1/18 CXR: Cardiomegaly. Small bilateral pleural effusions and bilateral perihilar and lower lobe airspace opacities, right greater than left, can indicate CHF versus pneumonia in the appropriate clinical setting. 1/18 Renal US: Mild right hydronephrosis. A right ureteral stone cannot be excluded. No left hydronephrosis. Cholelithiasis. 1/19 CT abd/pelvis:   1. Small to moderate right pleural effusion and small left pleural effusion with rounded atelectasis and patchy groundglass opacities at the right lung base are incompletely visualized. 2. Moderate right-sided hydronephrosis/hydroureter appears to be secondary to a right mid ureteral soft tissue density resulting in either intrinsic or extrinsic obstruction. Right periureteral and right retroperitoneal lymphadenopathy measures up to 16 mm in short axis. There are also numerous enlarged right pelvic sidewall lymph nodes as well as mesenteric lymphadenopathy suggesting metastatic disease. 3. 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 Additional ill-defined lytic lesions are noted within the L4 vertebral body and there is a suggestion of mixed lytic sclerotic bony metastasis in the right ribs as well as the bones of the pelvis. 4. Prostatomegaly. Correlate with PSA levels.  Mild bladder wall thickening may in part be artifactual related to underdistention. 5. Colonic diverticulosis without diverticulitis. Nonspecific thickening of the rectosigmoid colon may be artifactual related to underdistention. Correlate with direct inspection. No bowel obstruction, perforation, or drainable fluid collection is observed. 6. Hepatomegaly and lobulated contour of the liver parenchyma suggesting parenchymal liver disease. Correlate with liver function tests. Detailed characterization of the liver parenchyma is limited without IV contrast. Liver lesions are not excluded. 1/20 Cystoscopy, right ureteroscopy, right retrograde pyelogram, right stent placement by Dr. Jai Ward.   1/22 CXR: Moderate cardiomegaly and probable congestive heart failure in this patient status post coronary bypass surgery. Thickening off the interstitial lung markings. Moderate right pleural effusion. Thoracic spondylosis. 1/24 MBS: Laryngeal penetration with aspiration of thin liquids only. Barriers to Discharge: Hospitalist, Cardiology and Nephrology following. Urology signed off. PT/OT/SLP. Ok for soft bite sized diet with mildly thick liquids, ok with straw. Planning home with Hospice either today or tomorrow. Duarte removed and void trial today. PCP: Fred Riggs DO  Readmission Risk Score: 16.3%  Patient Goals/Plan/Treatment Preferences: Anticipate home with family support and Hospice.

## 2023-01-26 VITALS
HEIGHT: 60 IN | HEART RATE: 58 BPM | OXYGEN SATURATION: 93 % | SYSTOLIC BLOOD PRESSURE: 124 MMHG | DIASTOLIC BLOOD PRESSURE: 59 MMHG | WEIGHT: 168.43 LBS | TEMPERATURE: 98.2 F | BODY MASS INDEX: 33.07 KG/M2 | RESPIRATION RATE: 18 BRPM

## 2023-01-26 LAB
ANION GAP SERPL CALC-SCNC: 10 MEQ/L (ref 8–16)
BASOPHILS ABSOLUTE: 0.1 THOU/MM3 (ref 0–0.1)
BASOPHILS NFR BLD AUTO: 0.7 %
BUN SERPL-MCNC: 16 MG/DL (ref 7–22)
CA-I BLD ISE-SCNC: 1.15 MMOL/L (ref 1.12–1.32)
CALCIUM SERPL-MCNC: 7.9 MG/DL (ref 8.5–10.5)
CHLORIDE SERPL-SCNC: 110 MEQ/L (ref 98–111)
CO2 SERPL-SCNC: 23 MEQ/L (ref 23–33)
CREAT SERPL-MCNC: 2 MG/DL (ref 0.4–1.2)
DEPRECATED RDW RBC AUTO: 48.1 FL (ref 35–45)
EOSINOPHIL NFR BLD AUTO: 3.4 %
EOSINOPHILS ABSOLUTE: 0.3 THOU/MM3 (ref 0–0.4)
ERYTHROCYTE [DISTWIDTH] IN BLOOD BY AUTOMATED COUNT: 13.7 % (ref 11.5–14.5)
GFR SERPL CREATININE-BSD FRML MDRD: 31 ML/MIN/1.73M2
GLUCOSE SERPL-MCNC: 109 MG/DL (ref 70–108)
HCT VFR BLD AUTO: 30.1 % (ref 42–52)
HGB BLD-MCNC: 9.6 GM/DL (ref 14–18)
IMM GRANULOCYTES # BLD AUTO: 0.05 THOU/MM3 (ref 0–0.07)
IMM GRANULOCYTES NFR BLD AUTO: 0.6 %
LYMPHOCYTES ABSOLUTE: 0.9 THOU/MM3 (ref 1–4.8)
LYMPHOCYTES NFR BLD AUTO: 10.3 %
MAGNESIUM SERPL-MCNC: 1.5 MG/DL (ref 1.6–2.4)
MCH RBC QN AUTO: 30.6 PG (ref 26–33)
MCHC RBC AUTO-ENTMCNC: 31.9 GM/DL (ref 32.2–35.5)
MCV RBC AUTO: 95.9 FL (ref 80–94)
MONOCYTES ABSOLUTE: 0.7 THOU/MM3 (ref 0.4–1.3)
MONOCYTES NFR BLD AUTO: 8.8 %
NEUTROPHILS NFR BLD AUTO: 76.2 %
NRBC BLD AUTO-RTO: 0 /100 WBC
PLATELET # BLD AUTO: 237 THOU/MM3 (ref 130–400)
PMV BLD AUTO: 10.5 FL (ref 9.4–12.4)
POTASSIUM SERPL-SCNC: 3.6 MEQ/L (ref 3.5–5.2)
RBC # BLD AUTO: 3.14 MILL/MM3 (ref 4.7–6.1)
SEGMENTED NEUTROPHILS ABSOLUTE COUNT: 6.5 THOU/MM3 (ref 1.8–7.7)
SODIUM SERPL-SCNC: 143 MEQ/L (ref 135–145)
WBC # BLD AUTO: 8.5 THOU/MM3 (ref 4.8–10.8)

## 2023-01-26 PROCEDURE — 2580000003 HC RX 258: Performed by: INTERNAL MEDICINE

## 2023-01-26 PROCEDURE — 6370000000 HC RX 637 (ALT 250 FOR IP): Performed by: INTERNAL MEDICINE

## 2023-01-26 PROCEDURE — 80048 BASIC METABOLIC PNL TOTAL CA: CPT

## 2023-01-26 PROCEDURE — 36415 COLL VENOUS BLD VENIPUNCTURE: CPT

## 2023-01-26 PROCEDURE — 85025 COMPLETE CBC W/AUTO DIFF WBC: CPT

## 2023-01-26 PROCEDURE — 6370000000 HC RX 637 (ALT 250 FOR IP)

## 2023-01-26 PROCEDURE — 82330 ASSAY OF CALCIUM: CPT

## 2023-01-26 PROCEDURE — 83735 ASSAY OF MAGNESIUM: CPT

## 2023-01-26 PROCEDURE — 99232 SBSQ HOSP IP/OBS MODERATE 35: CPT | Performed by: INTERNAL MEDICINE

## 2023-01-26 PROCEDURE — 99239 HOSP IP/OBS DSCHRG MGMT >30: CPT | Performed by: INTERNAL MEDICINE

## 2023-01-26 RX ADMIN — ASPIRIN 81 MG: 81 TABLET, COATED ORAL at 08:11

## 2023-01-26 RX ADMIN — PANTOPRAZOLE SODIUM 40 MG: 40 TABLET, DELAYED RELEASE ORAL at 08:11

## 2023-01-26 RX ADMIN — Medication 1 TABLET: at 08:11

## 2023-01-26 RX ADMIN — FINASTERIDE 5 MG: 5 TABLET, FILM COATED ORAL at 08:11

## 2023-01-26 RX ADMIN — APIXABAN 2.5 MG: 2.5 TABLET, FILM COATED ORAL at 08:11

## 2023-01-26 RX ADMIN — DEXTROSE MONOHYDRATE: 50 INJECTION, SOLUTION INTRAVENOUS at 06:40

## 2023-01-26 RX ADMIN — SODIUM BICARBONATE 1300 MG: 650 TABLET ORAL at 08:11

## 2023-01-26 RX ADMIN — TAMSULOSIN HYDROCHLORIDE 0.4 MG: 0.4 CAPSULE ORAL at 08:11

## 2023-01-26 NOTE — PROGRESS NOTES
IV removed,  telemetry removed and left in room for cleaning, discharge instructions reviewed with patient and daughters and signed, patient taken to discharge lobby for transport home by daughter.

## 2023-01-26 NOTE — DISCHARGE SUMMARY
DISCHARGE SUMMARY      Patient Identification:   Felix Javier   : 3/30/1932  MRN: [id number]   Account: [de-identified]      Patient's PCP: Katharine Berry DO    Admit Date: 2023     Discharge Date:   2023    Admitting Physician: No admitting provider for patient encounter. 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 fib  BPH  GERD  Colonic diverticulosis without diverticulitis  Hepatomegaly  History of MRSA  Physical deconditioning  Generalized weakness      The patient was seen and examined on day of discharge and this discharge summary is in conjunction with any daily progress note from day of discharge. Hospital Course:   Felix Javier is a 80 y.o. male admitted to [address] [place] [address] on 2023 for evaluation of shortness of breath. He states he called his son early this morning saying that he thinks he should come into the hospital for evaluation because of worsening symptoms. He states his shortness of breath is present at rest and with activity. He has an associated dry cough and chills. He denies chest pain, abdominal pain, LE swelling, unexpected weight gain, changes in bowel or urinary habits.  Pt unable to provide additional history at this time due to being on bipap. \"     Patient arrived with chief complaint shortness of breath as stated above likely secondary to sepsis due to right-sided pneumonia. COVID influenza A/B- and blood cultures x2 no growth. Was given one-time dose of Rocephin and Zithromax in the ED and switched to vancomycin/Zosyn due to history of MRSA. Have since switched to doxycycline and Augmentin, antibiotic treatment started on 1/17 and will treat for 7 total days. Respiratory status has continued to improve throughout stay as patient no longer requiring supplemental oxygen is back on baseline of room air. During stay patient had notable ANNIKA with creatinine increasing. Renal ultrasound demonstrated right-sided hydronephrosis so CT abdomen pelvis was done and demonstrated a right mid ureteral soft tissue mass with concerning lymphadenopathy and enlarged right pelvic lymph nodes concerning for mesenteric lymphadenopathy for metastatic disease. Also noted on CT abdomen pelvis was a thoracic spine lytic lesion. 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 Enlarged retroperitoneal lymph nodes could be biopsied under CT guidance if clinically indicated. Urology was consulted cystoscopy, right ureteroscopy, right retrograde pyelogram, and right ureteral stent placement done by Dr. Peggy Farias on 1/20.    1/21/2023 patient remained stable on room air. Due to findings on imaging, will await for family members to arrive from out of town and have discussion about results family and patient's likely at the beginning of next week. \"    1/23/2023 patient stable, awaiting family discussion regarding possible biopsy versus treatment versus conservative management. Speech to evaluate and treat for dysphagia. 1/24/2023 patient remained stable, discussion was had with family and palliative care regarding neck steps.   Going to not pursue biopsy or treatment and instead will go home with hospice care..        After discussion was had between patient, family, palliative, and hospice care nursing it was decided that patient will go home with hospice care with no further biopsy or treatment. Hypernatremia improved to 143, and patient having adequate urine output. Okay for discharge on 1/26/2023. Exam:     Vitals:  Vitals:    01/26/23 0344 01/26/23 0400 01/26/23 0759 01/26/23 1207   BP: (!) 142/54  (!) 170/70 (!) 124/59   Pulse: (!) 48 61 56 58   Resp: 18  18 18   Temp: 97.3 °F (36.3 °C)  98.2 °F (36.8 °C) 98.2 °F (36.8 °C)   TempSrc: Oral  Oral Oral   SpO2: 93%  92% 93%   Weight:       Height:         Weight: Weight: 168 lb 6.9 oz (76.4 kg)     24 hour intake/output:  Intake/Output Summary (Last 24 hours) at 1/26/2023 1213  Last data filed at 1/26/2023 0809  Gross per 24 hour   Intake 360 ml   Output 385 ml   Net -25 ml         General appearance:  No apparent distress, appears stated age and cooperative. HEENT:  Normal cephalic, atraumatic without obvious deformity. Pupils equal, round, and reactive to light. Extra ocular muscles intact. Conjunctivae/corneas clear. Neck: Supple, with full range of motion. No jugular venous distention. Trachea midline. Respiratory:  Normal respiratory effort. Clear to auscultation, bilaterally without Rales/Wheezes/Rhonchi. Cardiovascular:  Regular rate and rhythm with normal S1/S2 without murmurs, rubs or gallops. Abdomen: Soft, non-tender, non-distended with normal bowel sounds. Musculoskeletal:  No clubbing, cyanosis or edema bilaterally. Full range of motion without deformity. Skin: Skin color, texture, turgor normal.  No rashes or lesions. Neurologic:  Neurovascularly intact without any focal sensory/motor deficits. Cranial nerves: II-XII intact, grossly non-focal.  Psychiatric:  Alert and oriented, thought content appropriate, normal insight  Capillary Refill: Brisk,< 3 seconds   Peripheral Pulses: +2 palpable, equal bilaterally       Labs:  For convenience and continuity at follow-up the following most recent labs are provided:      CBC:    Lab Results   Component Value Date/Time    WBC 8.5 01/26/2023 03:33 AM    HGB 9.6 01/26/2023 03:33 AM    HCT 30.1 01/26/2023 03:33 AM     01/26/2023 03:33 AM       Renal:    Lab Results   Component Value Date/Time     01/26/2023 03:33 AM    K 3.6 01/26/2023 03:33 AM    K 4.1 01/21/2023 04:06 AM     01/26/2023 03:33 AM    CO2 23 01/26/2023 03:33 AM    BUN 16 01/26/2023 03:33 AM    CREATININE 2.0 01/26/2023 03:33 AM    CALCIUM 7.9 01/26/2023 03:33 AM    PHOS 3.0 01/21/2023 04:06 AM         Significant Diagnostic Studies    Radiology:   FL MODIFIED BARIUM SWALLOW W VIDEO   Final Result   Laryngeal penetration with aspiration of thin liquids only. Recommendations from speech therapy to follow. **This report has been created using voice recognition software. It may contain minor errors which are inherent in voice recognition technology. **      Final report electronically signed by Dr. Tabby Gustafson on 1/24/2023 12:20 PM      XR CHEST (2 VW)   Final Result   1. Moderate cardiomegaly and probable congestive heart failure in this patient status post coronary bypass surgery. 2. Thickening off the interstitial lung markings. 3. Moderate right pleural effusion    4. Thoracic spondylosis. **This report has been created using voice recognition software. It may contain minor errors which are inherent in voice recognition technology. **      Final report electronically signed by DR Yolande Patrick on 1/22/2023 9:18 AM      MRI ABDOMEN WO CONTRAST   Final Result       1. There are parenchymal cysts in the right left kidneys. This no discrete renal mass noted. 2. There are abnormal retroperitoneal and mesenteric lymph nodes present consistent with neoplastic disease. The retroperitoneal lymph nodes can be biopsied under CT guidance if clinically indicated.    3. There is abnormal signal intensity along the right lateral and posterior aspect of the inferior vena cava which is contiguous with the retroperitoneal lymph nodes. 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 Please correlate clinically. 4. 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   5. There is a hiatal hernia. There is a left inguinal hernia present. 6. There is a tiny gallstone. 7. There is a right pleural effusion. There is atelectasis at the right lung base. 8. There is a Duarte catheter in place. This a thick-walled bladder. 9. There is lumbar spondylosis. **This report has been created using voice recognition software. It may contain minor errors which are inherent in voice recognition technology. **      Final report electronically signed by DR Trupti Max on 1/21/2023 7:29 AM      FL RETROGRADE PYELOGRAM W WO KUB   Final Result   Fluoroscopic images provided during surgery. Please refer to the operative note for further details. **This report has been created using voice recognition software. It may contain minor errors which are inherent in voice recognition technology. **      Final report electronically signed by Dr Dat Alex on 1/20/2023 9:16 AM      CT ABDOMEN PELVIS WO CONTRAST Additional Contrast? None   Final Result   1. Small to moderate right pleural effusion and small left pleural effusion with rounded atelectasis and patchy groundglass opacities at the right lung base are incompletely visualized. 2. Moderate right-sided hydronephrosis/hydroureter appears to be secondary to a right mid ureteral soft tissue density (series 301, image 48) resulting in either intrinsic or extrinsic obstruction. Right periureteral and right retroperitoneal    lymphadenopathy measures up to 16 mm in short axis. There are also numerous enlarged right pelvic sidewall lymph nodes as well as mesenteric lymphadenopathy suggesting metastatic disease.       3. 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 Additional ill-defined lytic lesions are noted within the L4 vertebral body and there is a suggestion of mixed lytic sclerotic bony metastasis in the right ribs as well as the bones of    the pelvis. 4. Prostatomegaly. Correlate with PSA levels. Mild bladder wall thickening may in part be artifactual related to underdistention. Correlate with urinalysis. 5. Colonic diverticulosis without diverticulitis. Nonspecific thickening of the rectosigmoid colon may be artifactual related to underdistention. Correlate with direct inspection. No bowel obstruction, perforation, or drainable fluid collection is    observed. 6. Hepatomegaly and lobulated contour of the liver parenchyma suggesting parenchymal liver disease. Correlate with liver function tests. Detailed characterization of the liver parenchyma is limited without IV contrast. Liver lesions are not excluded. **This report has been created using voice recognition software. It may contain minor errors which are inherent in voice recognition technology. **      Final report electronically signed by Dr Jake Briones on 1/19/2023 11:19 AM      US RENAL COMPLETE   Final Result   Mild right hydronephrosis. A right ureteral stone cannot be excluded. No left hydronephrosis. Cholelithiasis. This document has been electronically signed by: Karly Maddox MD on    01/18/2023 09:19 PM      XR CHEST (2 VW)   Final Result   Cardiomegaly. Small bilateral pleural effusions and bilateral perihilar and lower lobe airspace opacities, right greater than left, can indicate CHF versus pneumonia in the appropriate clinical setting. Follow-up to ensure resolution is    advised. **This report has been created using voice recognition software. It may contain minor errors which are inherent in voice recognition technology. **      Final report electronically signed by Dr Jake Briones on 1/18/2023 1:21 PM      XR CHEST PORTABLE   Final Result   1. Enlargement of the cardiopericardial silhouette with small right    pleural effusion and right perihilar and lower lung airspace opacities    which could indicate pneumonia. This document has been electronically signed by: Tanja Sierra MD on    01/17/2023 06:16 AM             Consults:     PALLIATIVE CARE EVAL  IP CONSULT TO CARDIOLOGY  IP CONSULT TO SPIRITUAL SERVICES  IP CONSULT TO UROLOGY  IP CONSULT TO HOSPICE    Disposition:    [x] Home with hospice       [] TCU       [] Rehab       [] Psych       [] SNF       [] [facility]       [] Other-    Condition at Discharge: Stable    Code Status:  Limited     Patient Instructions:    Discharge lab work: N/A  Activity: activity as tolerated  Diet: ADULT DIET; Dysphagia - Soft and Bite Sized; Mildly Thick (Nectar)      Follow-up visits:   No follow-up provider specified. Discharge Medications:        Medication List        CONTINUE taking these medications      apixaban 2.5 MG Tabs tablet  Commonly known as: ELIQUIS  Take 1 tablet by mouth 2 times daily     aspirin 81 MG EC tablet     finasteride 5 MG tablet  Commonly known as: PROSCAR  Take 1 tablet by mouth daily     pantoprazole 40 MG tablet  Commonly known as: PROTONIX     tamsulosin 0.4 MG capsule  Commonly known as: FLOMAX  Take 1 capsule by mouth daily Capsule may be opened and contents dissolve for peg tube     therapeutic multivitamin-minerals tablet            STOP taking these medications      furosemide 40 MG tablet  Commonly known as: Lasix     potassium chloride 20 MEQ extended release tablet  Commonly known as: KLOR-CON M              Time Spent on discharge is more than 45 minutes in the examination, evaluation, counseling and review of medications and discharge plan. Signed: Thank you Keiry Unger DO for the opportunity to be involved in this patient's care.     Electronically signed by Yenni Pinto DO on 1/26/2023 at 12:13 PM

## 2023-01-26 NOTE — CARE COORDINATION
1/26/23, 12:18 PM EST    Patient goals/plan/ treatment preferences discussed by  and . Patient goals/plan/ treatment preferences reviewed with patient/ family. Patient/ family verbalize understanding of discharge plan and are in agreement with goal/plan/treatment preferences. Understanding was demonstrated using the teach back method. AVS provided by RN at time of discharge, which includes all necessary medical information pertaining to the patients current course of illness, treatment, post-discharge goals of care, and treatment preferences. Services At/After Discharge: Hospice [facility]       IMM Letter  IMM Letter given to Patient/Family/Significant other/Guardian/POA/by[de-identified] Dago An RN CM  IMM Letter date given[de-identified] 01/20/23  IMM Letter time given[de-identified] 1084     Planning home today with [facility]. No other needs at this time.

## 2023-01-26 NOTE — PROGRESS NOTES
[facility]   Nephrology progress note  1/26/2023, 12:20 PM      Pt Name:    Shen Kline  MRN:     [id number]     YOB: 1932  Admit Date:    1/17/2023  4:32 AM    Chief Complaint: Nephrology following for ANNIKA/CKD    Subjective:  Patient was seen and examined   No chest pain or shortness of breath      Objective:  24HR INTAKE/OUTPUT:    Intake/Output Summary (Last 24 hours) at 1/26/2023 1220  Last data filed at 1/26/2023 0809  Gross per 24 hour   Intake 360 ml   Output 385 ml   Net -25 ml           I/O last 3 completed shifts:   In: 540 [P.O.:540]  Out: 660 [Urine:660]  I/O this shift:  In: -   Out: 25 [Urine:25]   Admission weight: 138 lb (62.6 kg)  Wt Readings from Last 3 Encounters:   01/25/23 168 lb 6.9 oz (76.4 kg)   12/06/22 145 lb (65.8 kg)   11/17/22 147 lb (66.7 kg)        Vitals :   Vitals:    01/26/23 0344 01/26/23 0400 01/26/23 0759 01/26/23 1207   BP: (!) 142/54  (!) 170/70 (!) 124/59   Pulse: (!) 48 61 56 58   Resp: 18  18 18   Temp: 97.3 °F (36.3 °C)  98.2 °F (36.8 °C) 98.2 °F (36.8 °C)   TempSrc: Oral  Oral Oral   SpO2: 93%  92% 93%   Weight:       Height:           Physical examination  General Appearance: cooperative with exam, appears comfortable, no distress  Neck: No JVD  Lungs: no use of accessory muscles  GI: soft, non-tender, no guarding  Extremities: No pitting LE edema    Medications:  Infusion:    dextrose 60 mL/hr at 01/26/23 0640    sodium chloride       Meds:    sodium bicarbonate  1,300 mg Oral BID    apixaban  2.5 mg Oral BID    aspirin  81 mg Oral Daily    finasteride  5 mg Oral Daily    [Held by provider] furosemide  40 mg Oral Daily    multivitamin  1 tablet Oral Daily    pantoprazole  40 mg Oral BID    tamsulosin  0.4 mg Oral Daily    sodium chloride flush  10 mL IntraVENous 2 times per day     Meds prn: phenol, albuterol, sodium chloride flush, sodium chloride, ondansetron **OR** ondansetron, polyethylene glycol, acetaminophen **OR** acetaminophen     Lab Data :  CBC:   Recent Labs     01/24/23  0404 01/25/23  0347 01/26/23  0333   WBC 10.4 9.7 8.5   HGB 9.8* 9.6* 9.6*   HCT 30.9* 30.0* 30.1*    233 237       CMP:  Recent Labs     01/24/23  0404 01/25/23  0347 01/26/23  0333   * 147* 143   K 3.9 3.6 3.6   * 114* 110   CO2 20* 19* 23   BUN 19 18 16   CREATININE 2.0* 2.1* 2.0*   GLUCOSE 95 114* 109*   CALCIUM 8.2* 8.2* 7.9*   MG  --   --  1.5*       Hepatic: No results for input(s): LABALBU, AST, ALT, ALB, BILITOT, ALKPHOS in the last 72 hours. Assessment and Plan:  1. 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 Overall stable renal function, creat down to 2.0 today  Overall stable renal function at this time  Okay to stop D5W    2. Mild hypernatremia: Improved, stop D5W  3. Mild right-sided hydronephrosis. 4.  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 Bony metastasis  6. Renal cyst  7. Atrial fibrillation  8. Metabolic acidosis. Continue with oral sodium bicarb  9. Hx BPH  10. CAD    Discussed with patient nursing staff this morning  Rudy Roberts MD  [facility]    This report has been created using voice recognition software.  It may contain minor errors which are inherent in voice recognition technology

## 2023-01-26 NOTE — PROGRESS NOTES
Talked with son KEVIN on the phone when got to floor. Son wants to take pt home at 2-230 today. All the medical equipment is at the house and ready KEVIN had some questions that where answered. Met with pt at chair side, pt has no questions other then \"when am I going home?\" Talked with pt that the plan is going home today. Pt said \"I will believe it when I see it\"   Messaged Dr. Bañuelos with the above information.  Talked with nurse Shelton with the above information and plan.   Dr Bañuelos messaged back and the plan is going home today.  Asked Shelton the nurse to fax avs to office and call me when he leaves.

## 2023-01-26 NOTE — PLAN OF CARE
Problem: Discharge Planning  Goal: Discharge to home or other facility with appropriate resources  Outcome: Progressing  Flowsheets (Taken 1/26/2023 0326)  Discharge to home or other facility with appropriate resources: Identify barriers to discharge with patient and caregiver  Note: Patient plans to do home hospice after discharge. Continue to assess discharge needs. Problem: Safety - Adult  Goal: Free from fall injury  Outcome: Progressing  Note: Call light kept within reach, bed in lowest position, bed alarm on. Patient is up with 1 assist using walker     Problem: ABCDS Injury Assessment  Goal: Absence of physical injury  Outcome: Progressing  Flowsheets (Taken 1/26/2023 0329)  Absence of Physical Injury: Implement safety measures based on patient assessment     Problem: Chronic Conditions and Co-morbidities  Goal: Patient's chronic conditions and co-morbidity symptoms are monitored and maintained or improved  Outcome: Progressing  Flowsheets (Taken 1/26/2023 0329)  Care Plan - Patient's Chronic Conditions and Co-Morbidity Symptoms are Monitored and Maintained or Improved: Monitor and assess patient's chronic conditions and comorbid symptoms for stability, deterioration, or improvement     Problem: Pain  Goal: Verbalizes/displays adequate comfort level or baseline comfort level  Outcome: Progressing  Flowsheets (Taken 1/26/2023 0329)  Verbalizes/displays adequate comfort level or baseline comfort level:   Encourage patient to monitor pain and request assistance   Assess pain using appropriate pain scale  Note: Ongoing assessment and interventions provided this shift. Problem: Skin/Tissue Integrity  Goal: Absence of new skin breakdown  Description: 1. Monitor for areas of redness and/or skin breakdown  2. Assess vascular access sites hourly  3. Every 4-6 hours minimum:  Change oxygen saturation probe site  4.   Every 4-6 hours:  If on nasal continuous positive airway pressure, respiratory therapy assess nares and determine need for appliance change or resting period. Outcome: Progressing   Care plan reviewed with patient. Patient verbalize understanding of the plan of care and contribute to goal setting.

## 2023-02-07 NOTE — ED TRIAGE NOTES
Patient presents to ED via EMS with chief complaint of shortness of breath. Per EMS patient does not wear oxygen at home but he required 15 L en route to hospital. Patient placed on 6L upon arrival. Dr. Toñito Ivy at the bedside. 07-Feb-2023 13:32

## 2023-02-16 PROBLEM — R77.8 ELEVATED TROPONIN: Status: RESOLVED | Noted: 2023-01-17 | Resolved: 2023-02-16

## 2023-02-16 PROBLEM — R79.89 ELEVATED TROPONIN: Status: RESOLVED | Noted: 2023-01-17 | Resolved: 2023-02-16

## 2024-04-06 NOTE — ED NOTES
ED to inpatient nurses report    Chief Complaint   Patient presents with    Bradycardia      Present to ED from home  LOC: alert and orientated to name, place, date  Vital signs   Vitals:    12/09/20 1258 12/09/20 1313 12/09/20 1328 12/09/20 1413   BP: (!) 131/53 (!) 142/58 (!) 142/48 (!) 140/47   Pulse: (!) 36 (!) 37 (!) 39 (!) 36   Resp: 13 16 17 18   Temp:       TempSrc:       SpO2: 95% 96% 98%    Weight:          Oxygen Baseline RA    Current needs required none   Bipap/Cpap No  LDAs:   Peripheral IV 12/09/20 Right Antecubital (Active)   Site Assessment Clean;Dry; Intact 12/09/20 1230   Line Status Blood return noted; Flushed;Normal saline locked 12/09/20 1230   Dressing Status Clean;Dry; Intact 12/09/20 1230   Dressing Intervention New 12/09/20 1230     Mobility: Requires assistance * 1  Pending ED orders: none  Present condition: stable  Person of contract Daughter in law at bedside; Allan Leonard; son, phone number [phone]  Our promise was given to patient    Electronically signed by Andreas Asher RN on 12/9/2020 at 2:21 PM       Andreas Asher RN  12/09/20 6081 Suture/Staple Removal

## 2024-08-29 NOTE — PROGRESS NOTES
Call for chest pain or heart racing   Talked to son, Dr. Kelton Aguirre, and updated on patient's condition. Dr. Kelton Aguirre notified of patient's transfer to [address] [place] [address]. All questions and concerns answered.

## 2025-02-10 NOTE — TELEPHONE ENCOUNTER
David Lopez called the office requesting that the prescriptions ordered by Dr. Preston Thomas, while the patient was in the office, on 8/6/21-Elliquis 2.5mg and Proscar 5mg 8/6/2021 be sent to the [place]. Please advise. I Agree.

## (undated) DEVICE — SET ADMIN 25ML L117IN PMP MOD CK VLV RLER CLMP 2 SMRTSITE

## (undated) DEVICE — SOLUTION IV IRRIG WATER 1000ML POUR BRL 2F7114

## (undated) DEVICE — SET LNR RED GRN W/ BASE CLEANASCOPE

## (undated) DEVICE — BINDER ABD UNISX 4 PNL PREM 6INX6INX12IN L XL 4

## (undated) DEVICE — GUIDEWIRE URO L150CM DIA0.035IN STIFF NIT HYDRPHLC STR TIP

## (undated) DEVICE — GLOVE ORANGE PI 7 1/2   MSG9075

## (undated) DEVICE — ADAPTER URO SCP UROLOK LL

## (undated) DEVICE — CYSTO PACK: Brand: MEDLINE INDUSTRIES, INC.

## (undated) DEVICE — TUBING IV STOPCOCK 48 CM 3 W

## (undated) DEVICE — SYRINGE 10ML FLUSH ST PREFILLED W/SALINE

## (undated) DEVICE — GLOVE ORTHO 8   MSG9480

## (undated) DEVICE — SOLUTION IV 1000ML 0.45% SOD CHL PH 5 INJ USP VIAFLX PLAS

## (undated) DEVICE — DRAINBAG,ANTI-REFLUX TOWER,L/F,2000ML,LL: Brand: MEDLINE

## (undated) DEVICE — SOLUTION IRRIG 3000ML 0.9% SOD CHL USP UROMATIC PLAS CONT

## (undated) DEVICE — SOLUTION IV IRRIG WATER 500ML POUR BRL ST 2F7113

## (undated) DEVICE — CATHETER,FOLEY,SILI-ELAST,LTX,18FR,10ML: Brand: MEDLINE

## (undated) DEVICE — SINGLE-USE DIGITAL FLEXIBLE URETEROSCOPE: Brand: LITHOVUE

## (undated) DEVICE — SINGLE ACTION PUMPING SYSTEM

## (undated) DEVICE — CONMED SCOPE SAVER BITE BLOCK, 20X27 MM: Brand: SCOPE SAVER